# Patient Record
Sex: MALE | URBAN - METROPOLITAN AREA
[De-identification: names, ages, dates, MRNs, and addresses within clinical notes are randomized per-mention and may not be internally consistent; named-entity substitution may affect disease eponyms.]

---

## 2024-01-06 ENCOUNTER — INPATIENT (INPATIENT)
Facility: HOSPITAL | Age: 49
LOS: 30 days | Discharge: HOME | DRG: 981 | End: 2024-02-06
Attending: SURGERY | Admitting: SURGERY
Payer: MEDICAID

## 2024-01-06 VITALS
DIASTOLIC BLOOD PRESSURE: 77 MMHG | SYSTOLIC BLOOD PRESSURE: 110 MMHG | WEIGHT: 202.16 LBS | HEIGHT: 73 IN | TEMPERATURE: 97 F | RESPIRATION RATE: 50 BRPM | HEART RATE: 105 BPM

## 2024-01-06 DIAGNOSIS — J94.2 HEMOTHORAX: ICD-10-CM

## 2024-01-06 LAB
ALBUMIN SERPL ELPH-MCNC: 2 G/DL — LOW (ref 3.3–5)
ALBUMIN SERPL ELPH-MCNC: 2 G/DL — LOW (ref 3.3–5)
ALP SERPL-CCNC: 134 U/L — HIGH (ref 40–120)
ALP SERPL-CCNC: 134 U/L — HIGH (ref 40–120)
ALT FLD-CCNC: 28 U/L — SIGNIFICANT CHANGE UP (ref 10–45)
ALT FLD-CCNC: 28 U/L — SIGNIFICANT CHANGE UP (ref 10–45)
ANION GAP SERPL CALC-SCNC: 20 MMOL/L — HIGH (ref 5–17)
ANION GAP SERPL CALC-SCNC: 20 MMOL/L — HIGH (ref 5–17)
AST SERPL-CCNC: 107 U/L — HIGH (ref 10–40)
AST SERPL-CCNC: 107 U/L — HIGH (ref 10–40)
BASE EXCESS BLDV CALC-SCNC: 0 MMOL/L — SIGNIFICANT CHANGE UP (ref -2–3)
BASE EXCESS BLDV CALC-SCNC: 0 MMOL/L — SIGNIFICANT CHANGE UP (ref -2–3)
BASOPHILS # BLD AUTO: 0 K/UL — SIGNIFICANT CHANGE UP (ref 0–0.2)
BASOPHILS # BLD AUTO: 0 K/UL — SIGNIFICANT CHANGE UP (ref 0–0.2)
BASOPHILS NFR BLD AUTO: 0 % — SIGNIFICANT CHANGE UP (ref 0–2)
BASOPHILS NFR BLD AUTO: 0 % — SIGNIFICANT CHANGE UP (ref 0–2)
BILIRUB SERPL-MCNC: 0.8 MG/DL — SIGNIFICANT CHANGE UP (ref 0.2–1.2)
BILIRUB SERPL-MCNC: 0.8 MG/DL — SIGNIFICANT CHANGE UP (ref 0.2–1.2)
BUN SERPL-MCNC: 33 MG/DL — HIGH (ref 7–23)
BUN SERPL-MCNC: 33 MG/DL — HIGH (ref 7–23)
CA-I SERPL-SCNC: 0.98 MMOL/L — LOW (ref 1.15–1.33)
CA-I SERPL-SCNC: 0.98 MMOL/L — LOW (ref 1.15–1.33)
CALCIUM SERPL-MCNC: 8.3 MG/DL — LOW (ref 8.4–10.5)
CALCIUM SERPL-MCNC: 8.3 MG/DL — LOW (ref 8.4–10.5)
CHLORIDE BLDV-SCNC: 90 MMOL/L — LOW (ref 96–108)
CHLORIDE BLDV-SCNC: 90 MMOL/L — LOW (ref 96–108)
CHLORIDE SERPL-SCNC: 89 MMOL/L — LOW (ref 96–108)
CHLORIDE SERPL-SCNC: 89 MMOL/L — LOW (ref 96–108)
CK SERPL-CCNC: 349 U/L — HIGH (ref 30–200)
CK SERPL-CCNC: 349 U/L — HIGH (ref 30–200)
CO2 BLDV-SCNC: 26 MMOL/L — SIGNIFICANT CHANGE UP (ref 22–26)
CO2 BLDV-SCNC: 26 MMOL/L — SIGNIFICANT CHANGE UP (ref 22–26)
CO2 SERPL-SCNC: 15 MMOL/L — LOW (ref 22–31)
CO2 SERPL-SCNC: 15 MMOL/L — LOW (ref 22–31)
CREAT SERPL-MCNC: 1.99 MG/DL — HIGH (ref 0.5–1.3)
CREAT SERPL-MCNC: 1.99 MG/DL — HIGH (ref 0.5–1.3)
EGFR: 41 ML/MIN/1.73M2 — LOW
EGFR: 41 ML/MIN/1.73M2 — LOW
EOSINOPHIL # BLD AUTO: 0 K/UL — SIGNIFICANT CHANGE UP (ref 0–0.5)
EOSINOPHIL # BLD AUTO: 0 K/UL — SIGNIFICANT CHANGE UP (ref 0–0.5)
EOSINOPHIL NFR BLD AUTO: 0 % — SIGNIFICANT CHANGE UP (ref 0–6)
EOSINOPHIL NFR BLD AUTO: 0 % — SIGNIFICANT CHANGE UP (ref 0–6)
GAS PNL BLDV: 119 MMOL/L — CRITICAL LOW (ref 136–145)
GAS PNL BLDV: 119 MMOL/L — CRITICAL LOW (ref 136–145)
GAS PNL BLDV: SIGNIFICANT CHANGE UP
GAS PNL BLDV: SIGNIFICANT CHANGE UP
GLUCOSE BLDV-MCNC: 117 MG/DL — HIGH (ref 70–99)
GLUCOSE BLDV-MCNC: 117 MG/DL — HIGH (ref 70–99)
GLUCOSE SERPL-MCNC: 114 MG/DL — HIGH (ref 70–99)
GLUCOSE SERPL-MCNC: 114 MG/DL — HIGH (ref 70–99)
HCO3 BLDV-SCNC: 25 MMOL/L — SIGNIFICANT CHANGE UP (ref 22–29)
HCO3 BLDV-SCNC: 25 MMOL/L — SIGNIFICANT CHANGE UP (ref 22–29)
HCT VFR BLD CALC: 32 % — LOW (ref 39–50)
HCT VFR BLD CALC: 32 % — LOW (ref 39–50)
HCT VFR BLDA CALC: 35 % — LOW (ref 39–51)
HCT VFR BLDA CALC: 35 % — LOW (ref 39–51)
HGB BLD CALC-MCNC: 11.8 G/DL — LOW (ref 12.6–17.4)
HGB BLD CALC-MCNC: 11.8 G/DL — LOW (ref 12.6–17.4)
HGB BLD-MCNC: 11.5 G/DL — LOW (ref 13–17)
HGB BLD-MCNC: 11.5 G/DL — LOW (ref 13–17)
HOROWITZ INDEX BLDV+IHG-RTO: 100 — SIGNIFICANT CHANGE UP
HOROWITZ INDEX BLDV+IHG-RTO: 100 — SIGNIFICANT CHANGE UP
LACTATE BLDV-MCNC: 2.8 MMOL/L — HIGH (ref 0.5–2)
LACTATE BLDV-MCNC: 2.8 MMOL/L — HIGH (ref 0.5–2)
LIDOCAIN IGE QN: 23 U/L — SIGNIFICANT CHANGE UP (ref 7–60)
LIDOCAIN IGE QN: 23 U/L — SIGNIFICANT CHANGE UP (ref 7–60)
LYMPHOCYTES # BLD AUTO: 1.4 K/UL — SIGNIFICANT CHANGE UP (ref 1–3.3)
LYMPHOCYTES # BLD AUTO: 1.4 K/UL — SIGNIFICANT CHANGE UP (ref 1–3.3)
LYMPHOCYTES # BLD AUTO: 9.7 % — LOW (ref 13–44)
LYMPHOCYTES # BLD AUTO: 9.7 % — LOW (ref 13–44)
MANUAL SMEAR VERIFICATION: SIGNIFICANT CHANGE UP
MANUAL SMEAR VERIFICATION: SIGNIFICANT CHANGE UP
MCHC RBC-ENTMCNC: 29.3 PG — SIGNIFICANT CHANGE UP (ref 27–34)
MCHC RBC-ENTMCNC: 29.3 PG — SIGNIFICANT CHANGE UP (ref 27–34)
MCHC RBC-ENTMCNC: 35.9 GM/DL — SIGNIFICANT CHANGE UP (ref 32–36)
MCHC RBC-ENTMCNC: 35.9 GM/DL — SIGNIFICANT CHANGE UP (ref 32–36)
MCV RBC AUTO: 81.6 FL — SIGNIFICANT CHANGE UP (ref 80–100)
MCV RBC AUTO: 81.6 FL — SIGNIFICANT CHANGE UP (ref 80–100)
METAMYELOCYTES # FLD: 2.6 % — HIGH (ref 0–0)
METAMYELOCYTES # FLD: 2.6 % — HIGH (ref 0–0)
MONOCYTES # BLD AUTO: 1.01 K/UL — HIGH (ref 0–0.9)
MONOCYTES # BLD AUTO: 1.01 K/UL — HIGH (ref 0–0.9)
MONOCYTES NFR BLD AUTO: 7 % — SIGNIFICANT CHANGE UP (ref 2–14)
MONOCYTES NFR BLD AUTO: 7 % — SIGNIFICANT CHANGE UP (ref 2–14)
NEUTROPHILS # BLD AUTO: 11.64 K/UL — HIGH (ref 1.8–7.4)
NEUTROPHILS # BLD AUTO: 11.64 K/UL — HIGH (ref 1.8–7.4)
NEUTROPHILS NFR BLD AUTO: 68.4 % — SIGNIFICANT CHANGE UP (ref 43–77)
NEUTROPHILS NFR BLD AUTO: 68.4 % — SIGNIFICANT CHANGE UP (ref 43–77)
NEUTS BAND # BLD: 12.3 % — HIGH (ref 0–8)
NEUTS BAND # BLD: 12.3 % — HIGH (ref 0–8)
PCO2 BLDV: 40 MMHG — LOW (ref 42–55)
PCO2 BLDV: 40 MMHG — LOW (ref 42–55)
PH BLDV: 7.4 — SIGNIFICANT CHANGE UP (ref 7.32–7.43)
PH BLDV: 7.4 — SIGNIFICANT CHANGE UP (ref 7.32–7.43)
PLAT MORPH BLD: NORMAL — SIGNIFICANT CHANGE UP
PLAT MORPH BLD: NORMAL — SIGNIFICANT CHANGE UP
PLATELET # BLD AUTO: 224 K/UL — SIGNIFICANT CHANGE UP (ref 150–400)
PLATELET # BLD AUTO: 224 K/UL — SIGNIFICANT CHANGE UP (ref 150–400)
PO2 BLDV: 38 MMHG — SIGNIFICANT CHANGE UP (ref 25–45)
PO2 BLDV: 38 MMHG — SIGNIFICANT CHANGE UP (ref 25–45)
POLYCHROMASIA BLD QL SMEAR: SLIGHT — SIGNIFICANT CHANGE UP
POLYCHROMASIA BLD QL SMEAR: SLIGHT — SIGNIFICANT CHANGE UP
POTASSIUM BLDV-SCNC: 5.2 MMOL/L — HIGH (ref 3.5–5.1)
POTASSIUM BLDV-SCNC: 5.2 MMOL/L — HIGH (ref 3.5–5.1)
POTASSIUM SERPL-MCNC: 5.4 MMOL/L — HIGH (ref 3.5–5.3)
POTASSIUM SERPL-MCNC: 5.4 MMOL/L — HIGH (ref 3.5–5.3)
POTASSIUM SERPL-SCNC: 5.4 MMOL/L — HIGH (ref 3.5–5.3)
POTASSIUM SERPL-SCNC: 5.4 MMOL/L — HIGH (ref 3.5–5.3)
PROT SERPL-MCNC: 6.3 G/DL — SIGNIFICANT CHANGE UP (ref 6–8.3)
PROT SERPL-MCNC: 6.3 G/DL — SIGNIFICANT CHANGE UP (ref 6–8.3)
RBC # BLD: 3.92 M/UL — LOW (ref 4.2–5.8)
RBC # BLD: 3.92 M/UL — LOW (ref 4.2–5.8)
RBC # FLD: 14.5 % — SIGNIFICANT CHANGE UP (ref 10.3–14.5)
RBC # FLD: 14.5 % — SIGNIFICANT CHANGE UP (ref 10.3–14.5)
RBC BLD AUTO: SIGNIFICANT CHANGE UP
RBC BLD AUTO: SIGNIFICANT CHANGE UP
SAO2 % BLDV: 65.4 % — LOW (ref 67–88)
SAO2 % BLDV: 65.4 % — LOW (ref 67–88)
SODIUM SERPL-SCNC: 124 MMOL/L — LOW (ref 135–145)
SODIUM SERPL-SCNC: 124 MMOL/L — LOW (ref 135–145)
TOXIC GRANULES BLD QL SMEAR: PRESENT — SIGNIFICANT CHANGE UP
TOXIC GRANULES BLD QL SMEAR: PRESENT — SIGNIFICANT CHANGE UP
WBC # BLD: 14.42 K/UL — HIGH (ref 3.8–10.5)
WBC # BLD: 14.42 K/UL — HIGH (ref 3.8–10.5)
WBC # FLD AUTO: 14.42 K/UL — HIGH (ref 3.8–10.5)
WBC # FLD AUTO: 14.42 K/UL — HIGH (ref 3.8–10.5)

## 2024-01-06 PROCEDURE — 74176 CT ABD & PELVIS W/O CONTRAST: CPT | Mod: 26

## 2024-01-06 PROCEDURE — 72125 CT NECK SPINE W/O DYE: CPT | Mod: 26

## 2024-01-06 PROCEDURE — 71250 CT THORAX DX C-: CPT | Mod: 26

## 2024-01-06 PROCEDURE — 99291 CRITICAL CARE FIRST HOUR: CPT

## 2024-01-06 PROCEDURE — 71045 X-RAY EXAM CHEST 1 VIEW: CPT | Mod: 26

## 2024-01-06 PROCEDURE — 99223 1ST HOSP IP/OBS HIGH 75: CPT

## 2024-01-06 PROCEDURE — 70450 CT HEAD/BRAIN W/O DYE: CPT | Mod: 26

## 2024-01-06 RX ORDER — ACETAMINOPHEN 500 MG
1000 TABLET ORAL EVERY 6 HOURS
Refills: 0 | Status: COMPLETED | OUTPATIENT
Start: 2024-01-06 | End: 2024-01-07

## 2024-01-06 RX ORDER — INFLUENZA VIRUS VACCINE 15; 15; 15; 15 UG/.5ML; UG/.5ML; UG/.5ML; UG/.5ML
0.5 SUSPENSION INTRAMUSCULAR ONCE
Refills: 0 | Status: DISCONTINUED | OUTPATIENT
Start: 2024-01-06 | End: 2024-02-06

## 2024-01-06 RX ORDER — OXYCODONE HYDROCHLORIDE 5 MG/1
10 TABLET ORAL
Refills: 0 | Status: DISCONTINUED | OUTPATIENT
Start: 2024-01-06 | End: 2024-01-08

## 2024-01-06 RX ORDER — SENNA PLUS 8.6 MG/1
2 TABLET ORAL AT BEDTIME
Refills: 0 | Status: DISCONTINUED | OUTPATIENT
Start: 2024-01-06 | End: 2024-01-09

## 2024-01-06 RX ORDER — CALCIUM GLUCONATE 100 MG/ML
2 VIAL (ML) INTRAVENOUS ONCE
Refills: 0 | Status: COMPLETED | OUTPATIENT
Start: 2024-01-06 | End: 2024-01-07

## 2024-01-06 RX ORDER — HYDROMORPHONE HYDROCHLORIDE 2 MG/ML
0.5 INJECTION INTRAMUSCULAR; INTRAVENOUS; SUBCUTANEOUS
Refills: 0 | Status: DISCONTINUED | OUTPATIENT
Start: 2024-01-06 | End: 2024-01-11

## 2024-01-06 RX ORDER — SODIUM CHLORIDE 9 MG/ML
1000 INJECTION, SOLUTION INTRAVENOUS ONCE
Refills: 0 | Status: COMPLETED | OUTPATIENT
Start: 2024-01-06 | End: 2024-01-07

## 2024-01-06 RX ORDER — OXYCODONE HYDROCHLORIDE 5 MG/1
5 TABLET ORAL
Refills: 0 | Status: DISCONTINUED | OUTPATIENT
Start: 2024-01-06 | End: 2024-01-08

## 2024-01-06 RX ORDER — CHLORHEXIDINE GLUCONATE 213 G/1000ML
1 SOLUTION TOPICAL
Refills: 0 | Status: DISCONTINUED | OUTPATIENT
Start: 2024-01-06 | End: 2024-01-18

## 2024-01-06 RX ORDER — SODIUM CHLORIDE 9 MG/ML
1000 INJECTION, SOLUTION INTRAVENOUS
Refills: 0 | Status: DISCONTINUED | OUTPATIENT
Start: 2024-01-06 | End: 2024-01-06

## 2024-01-06 RX ORDER — ENOXAPARIN SODIUM 100 MG/ML
40 INJECTION SUBCUTANEOUS
Refills: 0 | Status: DISCONTINUED | OUTPATIENT
Start: 2024-01-06 | End: 2024-01-09

## 2024-01-06 RX ORDER — POLYETHYLENE GLYCOL 3350 17 G/17G
17 POWDER, FOR SOLUTION ORAL EVERY 12 HOURS
Refills: 0 | Status: DISCONTINUED | OUTPATIENT
Start: 2024-01-06 | End: 2024-01-12

## 2024-01-06 RX ORDER — SODIUM CHLORIDE 9 MG/ML
1000 INJECTION INTRAMUSCULAR; INTRAVENOUS; SUBCUTANEOUS
Refills: 0 | Status: DISCONTINUED | OUTPATIENT
Start: 2024-01-06 | End: 2024-01-06

## 2024-01-06 RX ORDER — LIDOCAINE 4 G/100G
1 CREAM TOPICAL EVERY 24 HOURS
Refills: 0 | Status: DISCONTINUED | OUTPATIENT
Start: 2024-01-06 | End: 2024-01-26

## 2024-01-06 RX ADMIN — Medication 400 MILLIGRAM(S): at 23:13

## 2024-01-06 RX ADMIN — SENNA PLUS 2 TABLET(S): 8.6 TABLET ORAL at 21:21

## 2024-01-06 RX ADMIN — LIDOCAINE 1 PATCH: 4 CREAM TOPICAL at 21:21

## 2024-01-06 RX ADMIN — Medication 1000 MILLIGRAM(S): at 23:43

## 2024-01-06 RX ADMIN — SODIUM CHLORIDE 125 MILLILITER(S): 9 INJECTION, SOLUTION INTRAVENOUS at 21:20

## 2024-01-06 RX ADMIN — OXYCODONE HYDROCHLORIDE 10 MILLIGRAM(S): 5 TABLET ORAL at 22:41

## 2024-01-06 RX ADMIN — OXYCODONE HYDROCHLORIDE 10 MILLIGRAM(S): 5 TABLET ORAL at 23:11

## 2024-01-06 NOTE — CONSULT NOTE ADULT - ASSESSMENT
ASSESSMENT:     PLAN:   Neurologic:  - AO x 4  - Multimodal pain control with Tylenol, Lidocaine patch, oxycodone PRN    Respiratory:  - Saturating well on 2L NC  - Incentive spirometry, presently pulling ***  - AM CXR    Cardiovascular:  - Hemodynamically stable    Gastrointestinal/Nutrition:  - Regular diet  - Bowel regimen with senna, Miralax    Genitourinary/Renal:  - Supplement electrolytes PRN    Hematologic:  - Chemical VTE ppx with Lovenox  - Mechanical VTE ppx with SCDs    Infectious Disease:  - No antibiotics    Endocrine:  - No active issues, no history of DM    Disposition: SICU ASSESSMENT: 48y Male with PMHx hepatitis C (diagnosed many years ago, never treated), depression transferred from outside hospital for trauma eval. Patient found down by roommate after unknown amount of time, found with multiple 4-11 L sided rib fractures with flail chest. Chest tube placed there with >1L output (old blood). Patient also with tachypnea, concern for respiratory failure started on BiPAP. Level 1 called for transient hypotension, patient given 2 unit PRBC in [facility] ED. Patient admitted to SICU for hemodynamic monitoring, pain management.     PLAN:   Neurologic:  - AO x 4  - Multimodal pain control with Tylenol, Lidocaine patch, oxycodone PRN  - Dilaudid for breakthrough pain  - Send urine tox        Respiratory:  - On 100% NRB, breathing comfortable, saturating well, titrate down as able  - L sided chest tube placed at outside hospital, maintain to suction  - Incentive spirometry  - AM CXR    Cardiovascular:  - Hemodynamically stable    Gastrointestinal/Nutrition:  - Regular diet  - Bowel regimen with senna, Miralax    Genitourinary/Renal:  - Supplement electrolytes PRN    Hematologic:  - Chemical VTE ppx with Lovenox  - Mechanical VTE ppx with SCDs    Infectious Disease:  - No antibiotics    Endocrine:  - No active issues, no history of DM    Disposition: SICU ASSESSMENT: 48y Male with PMHx hepatitis C (diagnosed many years ago, never treated), depression transferred from outside hospital for trauma eval. Patient found down by roommate after unknown amount of time, found with multiple 4-11 L sided rib fractures with flail chest. Chest tube placed there with >1L output (old blood). Patient also with tachypnea, concern for respiratory failure started on BiPAP. Level 1 called for transient hypotension, patient given 2 unit PRBC in [facility] ED. Patient admitted to SICU for hemodynamic monitoring, pain management.     PLAN:   Neurologic:  - AO x 4  - Multimodal pain control with Tylenol, Lidocaine patch, oxycodone PRN  - Dilaudid for breakthrough pain  - Send urine tox        Respiratory:  - On 100% NRB, breathing comfortable, saturating well, titrate down as able  - L sided chest tube placed at outside hospital, maintain to suction  - Incentive spirometry  - AM CXR    Cardiovascular:  - Hemodynamically stable    Gastrointestinal/Nutrition:  - Regular diet  - Bowel regimen with senna, Miralax    Genitourinary/Renal:  - Supplement electrolytes PRN    Hematologic:  - Chemical VTE ppx with Lovenox  - Mechanical VTE ppx with SCDs    Infectious Disease:  - No antibiotics    Endocrine:  - No active issues, no history of DM    Disposition: SICU ASSESSMENT: 48y Male with PMHx hepatitis C (diagnosed many years ago, never treated), depression transferred from outside hospital for trauma eval. Patient found down by roommate after unknown amount of time, found with multiple 4-11 L sided rib fractures with flail chest. Chest tube placed there with >1L output (old blood). Patient also with tachypnea, concern for respiratory failure started on BiPAP. Level 1 called for transient hypotension, patient given 2 unit PRBC in [facility] ED. Patient admitted to SICU for hemodynamic monitoring, pain management.     PLAN:   Neurologic:  - AO x 4  - Multimodal pain control with Tylenol, Lidocaine patch, oxycodone PRN  - Dilaudid for breakthrough pain  - Send urine tox  - Concern for history of ETOH use, sent ETOH level, monitor for signs of withdrawal     Respiratory:  - On 100% NRB, breathing comfortable, saturating well, titrate down as able  - L sided chest tube placed at outside hospital, maintain to suction  - Incentive spirometry  - AM CXR    Cardiovascular:  - Sinus tachycardia rate low 100s, ?multifactorial, monitor  - BP stable  - Send lactate     Gastrointestinal/Nutrition:  - Regular diet  - Bowel regimen with senna, Miralax    Genitourinary/Renal:  - Elevated Cr, unknown baseline  - Repeat lytes  - CPK elevated, continue to trend  - Plasmalyte 100cc/hr   - Supplement electrolytes PRN    Hematologic:  - Chemical VTE ppx with Lovenox  - Mechanical VTE ppx with SCDs    Infectious Disease:  - Treated empirically for sepsis with Rocephin, Azithro   - Imaging non-contributory for infection   - Send UA, blood cultures x 2  - Monitor off antibiotics     Endocrine:  - No active issues, no history of DM    Disposition: SICU ASSESSMENT: 48y Male with PMHx hepatitis C (diagnosed many years ago, never treated), depression transferred from outside hospital for trauma eval. Patient found down by roommate after unknown amount of time, found with multiple 4-11 L sided rib fractures with flail chest. Chest tube placed there with >1L output (old blood). Patient also with tachypnea, concern for respiratory failure started on BiPAP. Level 1 called for transient hypotension, patient given 2 unit PRBC in [facility] ED. Patient admitted to SICU for hemodynamic monitoring, pain management.     PLAN:   Neurologic:  - AO x 4  - Multimodal pain control with Tylenol, Lidocaine patch, oxycodone PRN  - Dilaudid for breakthrough pain  - Send urine tox  - Concern for history of ETOH use, sent ETOH level, monitor for signs of withdrawal     Respiratory:  - On 100% NRB, breathing comfortable, saturating well, titrate down as able  - L sided chest tube placed at outside hospital, maintain to suction  - Incentive spirometry  - AM CXR    Cardiovascular:  - Sinus tachycardia rate low 100s, ?multifactorial, monitor  - BP stable  - Send lactate     Gastrointestinal/Nutrition:  - Regular diet  - Bowel regimen with senna, Miralax    Genitourinary/Renal:  - Elevated Cr, unknown baseline  - Repeat lytes  - CPK elevated, continue to trend  - Plasmalyte 100cc/hr   - Supplement electrolytes PRN    Hematologic:  - Chemical VTE ppx with Lovenox  - Mechanical VTE ppx with SCDs    Infectious Disease:  - Treated empirically for sepsis with Rocephin, Azithro   - Imaging non-contributory for infection   - Send UA, blood cultures x 2  - Monitor off antibiotics     Endocrine:  - No active issues, no history of DM    Disposition: SICU ASSESSMENT: 48y Male with PMHx hepatitis C (diagnosed many years ago, never treated), depression transferred from outside hospital for trauma eval. Patient found down by roommate after unknown amount of time, found with multiple 4-11 L sided rib fractures with flail chest. Chest tube placed there with >1L output (old blood). Patient also with tachypnea, concern for respiratory failure started on BiPAP. Level 1 called for transient hypotension, patient given 2 unit PRBC in [facility] ED. Patient admitted to SICU for hemodynamic monitoring, pain management.     PLAN:   Neurologic:  - AO x 4  - Multimodal pain control with Tylenol, Lidocaine patch, oxycodone PRN  - Dilaudid for breakthrough pain  - Send urine tox  - Concern for history of ETOH use, sent ETOH level, monitor for signs of withdrawal     Respiratory:  - On 100% NRB, breathing comfortable, saturating well, titrate down as able  - L sided chest tube placed at outside hospital, maintain to suction  - Incentive spirometry  - AM CXR    Cardiovascular:  - Sinus tachycardia rate low 100s, ?multifactorial, monitor  - BP stable  - Send lactate     Gastrointestinal/Nutrition:  - Regular diet  - Bowel regimen with senna, Miralax    Genitourinary/Renal:  - Elevated Cr, unknown baseline  - Repeat lytes  - CPK elevated, continue to trend  - Plasmalyte 100cc/hr   - Supplement electrolytes PRN    Hematologic:  - Chemical VTE ppx with Lovenox  - Mechanical VTE ppx with SCDs    Infectious Disease:  - Treated empirically for sepsis with Rocephin, Azithro   - Imaging non-contributory for infection   - Send UA, blood cultures x 2  - Monitor off antibiotics     Endocrine:  - No active issues, no history of DM    Disposition: SICU. Discussed with Dr. Molina ASSESSMENT: 48y Male with PMHx hepatitis C (diagnosed many years ago, never treated), depression transferred from outside hospital for trauma eval. Patient found down by roommate after unknown amount of time, found with multiple 4-11 L sided rib fractures with flail chest. Chest tube placed there with >1L output (old blood). Patient also with tachypnea, concern for respiratory failure started on BiPAP. Level 1 called for transient hypotension, patient given 2 unit PRBC in [facility] ED. Patient admitted to SICU for hemodynamic monitoring, pain management.     PLAN:   Neurologic:  - AO x 4  - Multimodal pain control with Tylenol, Lidocaine patch, oxycodone PRN  - Dilaudid for breakthrough pain  - Send urine tox  - Concern for history of ETOH use, sent ETOH level, monitor for signs of withdrawal     Respiratory:  - On 100% NRB, breathing comfortable, saturating well, titrate down as able  - L sided chest tube placed at outside hospital, maintain to suction  - Incentive spirometry  - AM CXR    Cardiovascular:  - Sinus tachycardia rate low 100s, ?multifactorial, monitor  - BP stable  - Send lactate     Gastrointestinal/Nutrition:  - Regular diet  - Bowel regimen with senna, Miralax    Genitourinary/Renal:  - Elevated Cr, unknown baseline  - Repeat lytes  - CPK elevated, continue to trend  - Plasmalyte 100cc/hr   - Supplement electrolytes PRN    Hematologic:  - Chemical VTE ppx with Lovenox  - Mechanical VTE ppx with SCDs    Infectious Disease:  - Treated empirically for sepsis with Rocephin, Azithro   - Imaging non-contributory for infection   - Send UA, blood cultures x 2  - Monitor off antibiotics     Endocrine:  - No active issues, no history of DM    Disposition: SICU. Discussed with Dr. Molina

## 2024-01-06 NOTE — H&P ADULT - NSHPSOURCEINFORD_GEN_ALL_CORE
Patient
keep left leg elevated on 2 pillows. Ambulate q2-3hours    Pain medications sent by DR CUELLAR OFFICE

## 2024-01-06 NOTE — ED ADULT NURSE NOTE - NS ED NURSE LEVEL OF CONSCIOUSNESS MENTAL STATUS
Awake/Alert/Cooperative
Left 2nd toe: erythema, warmth and swelling present. ulceration with green discharge on plantar aspect of toe.

## 2024-01-06 NOTE — ED ADULT NURSE NOTE - OBJECTIVE STATEMENT
Pt is 48y M with unknown PMH transferred from [facility] for trauma. Level 1 trauma activated 1654. Pt found down this morning, assessment at [facility] found L rib fractures 4-11, L flail chest s/p L chest tube draining 1L of mara red blood. Found hypotensive 80s/40s, febrile. Placed on BIPAP, received ceftriaxone, zithromax, 4 morphine at [facility] prior to transfer. Upon assessment, A&Ox4, /72, tachy HR 110s, SpO2 100% on bipap.     See trauma flowsheet for additional documentation. Pt is 48y M with unknown PMH transferred from [facility] for trauma. Level 1 trauma activated 1654. Pt found down this morning, assessment at [facility] found L rib fractures 4-11, L flail chest s/p L chest tube draining 1L of mara red blood. Found hypotensive 80s/40s, febrile. Placed on BIPAP, received ceftriaxone, zithromax, 4 morphine at [facility] prior to transfer. Upon assessment, A&Ox4, /72, tachy HR 110s, SpO2 100% on bipap.     See trauma flowsheet for additional documentation.

## 2024-01-06 NOTE — PATIENT PROFILE ADULT - HOSPITALS/PSYCHIATRIC FACILITIES
[place] [facility] [place] [facility]

## 2024-01-06 NOTE — ED ADULT NURSE NOTE - NSFALLHARMRISKINTERV_ED_ALL_ED
Assistance OOB with selected safe patient handling equipment if applicable/Communicate risk of Fall with Harm to all staff, patient, and family/Provide visual cue: red socks, yellow wristband, yellow gown, etc/Reinforce activity limits and safety measures with patient and family/Bed in lowest position, wheels locked, appropriate side rails in place/Call bell, personal items and telephone in reach/Instruct patient to call for assistance before getting out of bed/chair/stretcher/Non-slip footwear applied when patient is off stretcher/[place] to call system/Physically safe environment - no spills, clutter or unnecessary equipment/Purposeful Proactive Rounding/Room/bathroom lighting operational, light cord in reach Assistance OOB with selected safe patient handling equipment if applicable/Communicate risk of Fall with Harm to all staff, patient, and family/Provide visual cue: red socks, yellow wristband, yellow gown, etc/Reinforce activity limits and safety measures with patient and family/Bed in lowest position, wheels locked, appropriate side rails in place/Call bell, personal items and telephone in reach/Instruct patient to call for assistance before getting out of bed/chair/stretcher/Non-slip footwear applied when patient is off stretcher/[place] to call system/Physically safe environment - no spills, clutter or unnecessary equipment/Purposeful Proactive Rounding/Room/bathroom lighting operational, light cord in reach

## 2024-01-06 NOTE — ED ADULT TRIAGE NOTE - NS ED NURSE AMBULANCES
[facility]

## 2024-01-06 NOTE — ED PROVIDER NOTE - PHYSICAL EXAMINATION
acutely ill appearing, toxic hypotension / respiratory distress  Symm Facies, PERRL 3mm, (+)Pallor, Anicteric, Dry MM;  tachycardic w/o m/g/r, equal distal pulses;   tachypnea w/ distress, L chest tube w/ bright red blood;   Abd soft, nt/nd, +bs;  No rash;  AOX3, Normal speech, normal strength/sensation

## 2024-01-06 NOTE — H&P ADULT - NSHPPHYSICALEXAM_GEN_ALL_CORE
GENERAL: NAD, lying in bed   NEURO: AOx3, awake alert appropriate  HEENT: NCAT, trachea midline  PULM: Respirations non-labored  ABD: Soft, non-tender, non-distended, no peritonitis/rebound tenderness,   EXT: Warm, well perfused General: NAD  HEENT: Normocephalic, atraumatic, EOMI, PEERLA.  Neck: Soft, midline trachea, C-collar in place  Chest: L chest tube in place  Cardiac: S1, S2, RRR  Respiratory: Bilateral breath sounds, clear and equal bilaterally. L chest tube in place  Abdomen: Soft, non-distended, non-tender, no rebound, no guarding, no masses palpated  Pelvis: Stable, non-tender, no ecchymosis  Ext: palp radial b/l UE, b/l DP palp in Lower Extrem, motor and sensory grossly intact in all 4 extremities  - LLE ecchymosis  - RLE ecchymosis   Back: no TTP, no palpable runoff/stepoff/deformity

## 2024-01-06 NOTE — ED PROVIDER NOTE - CARE PLAN
Principal Discharge DX:	Hemothorax, left   1 Principal Discharge DX:	Hemothorax, left  Secondary Diagnosis:	Flail chest  Secondary Diagnosis:	Acute hypotension  Secondary Diagnosis:	Severe dehydration   Principal Discharge DX:	Hemothorax, left  Secondary Diagnosis:	Flail chest  Secondary Diagnosis:	Acute hypotension  Secondary Diagnosis:	Severe dehydration  Secondary Diagnosis:	Multiple fractures of ribs of left side

## 2024-01-06 NOTE — H&P ADULT - NSHPLABSRESULTS_GEN_ALL_CORE
CBC (01-06 @ 17:31)                              11.5<L>                         14.42<H>  )----------------(  224        68.4  % Neutrophils, 9.7<L>% Lymphocytes, ANC: 11.64<H>                              32.0<L>    BMP (01-06 @ 23:41)             127<L>  |  93<L>   |  20    		Ca++ --      Ca 6.7<L>             ---------------------------------( 68<L> 		Mg 1.2<L>             3.7     |  15<L>   |  0.99  			Ph 1.9<L>  BMP (01-06 @ 17:30)             124<L>  |  89<L>   |  33<H> 		Ca++ --      Ca 8.3<L>             ---------------------------------( 114<H>		Mg --                 5.4<H>  |  15<L>   |  1.99<H>			Ph --        LFTs (01-06 @ 17:30)      TPro 6.3 / Alb 2.0<L> / TBili 0.8 / DBili -- / <H> / ALT 28 / AlkPhos 134<H>    Coags (01-06 @ 23:41)  aPTT 49.3<H> / INR 5.24<HH> / PT 52.4<H>    Cardiac Markers (01-06 @ 23:41)     HSTrop: -- / CKMB: -- / CK: 156  Cardiac Markers (01-06 @ 17:30)     HSTrop: -- / CKMB: -- / CK: 349      VBG (01-06 @ 21:15)     7.40 / 40<L> / 38 / 25 / 0.0 / 65.4<L>%     Lactate: 2.8<H>  < from: CT Cervical Spine No Cont (01.06.24 @ 18:02) >    IMPRESSION:    CT HEAD:  No acute hemorrhage, mass effect, or midline shift.  No acute calvarial fracture.    CT CERVICAL SPINE:  No acute fracture or subluxation.  Cervical spondylosis, as described above.  Partially imaged left hydropneumothorax. Please see concurrent CT chest   report.    < end of copied text >    < from: CT Abdomen and Pelvis No Cont (01.06.24 @ 18:05) >    IMPRESSION: Multiple left rib fractures, as described, concerning for   flail chest. Left-sided chest tube with mild left hydropneumothorax.   Subtle L1 and L2 superior compression deformities.    < end of copied text >

## 2024-01-06 NOTE — CONSULT NOTE ADULT - SUBJECTIVE AND OBJECTIVE BOX
HISTORY OF PRESENT ILLNESS:  BIANCA MICHAEL is a 48y Male  pt transferred to ED from outside ED for concerns of multiple L sided rib fx w/ flail chest and respiratory failure / tachypnea, chest tube placed w/ > 1L blood out, complicated by suspected sepsis (Ceftriaxone 1 gm and Azithromycin 500 mg given prior), complicated as suspected alcohol withdrawal, reviewed labs (ARF w/ elevated Cr) and CXR (L sided white out, my interpretation), Level 1 Trauma called on ED arrival for hypotension (SBP 85) and respiratory failure (outside ED and EMS kept pt on BiPap), pending full labs/imaging and admitted.  - Geovany Lebron MD     PAST MEDICAL HISTORY:     PAST SURGICAL HISTORY:     FAMILY HISTORY:     SOCIAL HISTORY:    CODE STATUS:     HOME MEDICATIONS:    ALLERGIES: No Known Allergies      VITAL SIGNS:  ICU Vital Signs Last 24 Hrs  T(C): 36 (06 Jan 2024 17:51), Max: 36 (06 Jan 2024 17:51)  T(F): 96.8 (06 Jan 2024 17:51), Max: 96.8 (06 Jan 2024 17:51)  HR: 107 (06 Jan 2024 19:00) (105 - 108)  BP: 124/70 (06 Jan 2024 19:00) (103/81 - 124/70)  BP(mean): 89 (06 Jan 2024 19:00) (80 - 90)  ABP: --  ABP(mean): --  RR: 34 (06 Jan 2024 19:00) (34 - 50)  SpO2: 100% (06 Jan 2024 19:00) (100% - 100%)        NEURO  Exam:  Meds:acetaminophen   IVPB .. 1000 milliGRAM(s) IV Intermittent every 6 hours  HYDROmorphone  Injectable 0.5 milliGRAM(s) IV Push every 3 hours PRN Breakthrough  oxyCODONE    IR 5 milliGRAM(s) Oral every 3 hours PRN Moderate Pain (4 - 6)  oxyCODONE    IR 10 milliGRAM(s) Oral every 3 hours PRN Severe Pain (7 - 10)      RESPIRATORY  Mechanical Ventilation:     Exam: CTA b/l. No wheezing, rales, rhonchi appreciated.   Meds:    CARDIOVASCULAR    Exam: S1S2. No murmurs, rubs, gallops appreciated.   Cardiac Rhythm: NSR.  Meds:    GI/NUTRITION  Exam: Soft, non distended, non-tender.  Diet:  Meds:polyethylene glycol 3350 17 Gram(s) Oral every 12 hours  senna 2 Tablet(s) Oral at bedtime      GENITOURINARY/RENAL  Meds:    Weight (kg): 91.7 (01-06 @ 17:51)  01-06    124<L>  |  89<L>  |  33<H>  ----------------------------<  114<H>  5.4<H>   |  15<L>  |  1.99<H>    Ca    8.3<L>      06 Jan 2024 17:30    TPro  6.3  /  Alb  2.0<L>  /  TBili  0.8  /  DBili  x   /  AST  107<H>  /  ALT  28  /  AlkPhos  134<H>  01-06    [ ] Hendrix catheter, indication: urine output monitoring in critically ill patient    HEMATOLOGIC  [ ] VTE Prophylaxis:                          11.5   14.42 )-----------( 224      ( 06 Jan 2024 17:31 )             32.0       Transfusion: [ ] PRBC	[ ] Platelets	[ ] FFP	[ ] Cryoprecipitate      INFECTIOUS DISEASES  Meds:influenza   Vaccine 0.5 milliLiter(s) IntraMuscular once    RECENT CULTURES:      ENDOCRINE  Meds:  CAPILLARY BLOOD GLUCOSE          PATIENT CARE ACCESS DEVICES:  [ ] Peripheral IV  [ ] Central Venous Line	[ ] R	[ ] L	[ ] IJ	[ ] Fem	[ ] SC	Placed:   [ ] Arterial Line		[ ] R	[ ] L	[ ] Fem	[ ] Rad	[ ] Ax	Placed:   [ ] PICC:					[ ] Mediport  [ ] Urinary Catheter, Date Placed:   [x] Necessity of urinary, arterial, and venous catheters discussed    OTHER MEDICATIONS: chlorhexidine 2% Cloths 1 Application(s) Topical <User Schedule>  lidocaine   4% Patch 1 Patch Transdermal every 24 hours      IMAGING STUDIES: HISTORY OF PRESENT ILLNESS:  BIANCA MICHAEL is a 48y Male with PMHx hepatitis C (diagnosed many years ago, never treated), depression transferred from outside hospital for trauma eval. Patient found down by roommate after unknown amount of time, found with multiple 4-11 L sided rib fractures with flail chest. Chest tube placed there with >1L output (old blood). Patient also with tachypnea, concern for respiratory failure started on BiPAP. Level 1 called for transient hypotension, patient given 2 unit PRBC in [facility] ED. Patient admitted to SICU for hemodynamic monitoring, pain management.     PAST MEDICAL HISTORY:   Depression  Hepatitis C    PAST SURGICAL HISTORY:     FAMILY HISTORY:     SOCIAL HISTORY:    CODE STATUS:   Full    HOME MEDICATIONS:  None    ALLERGIES: No Known Allergies      VITAL SIGNS:  ICU Vital Signs Last 24 Hrs  T(C): 36 (06 Jan 2024 17:51), Max: 36 (06 Jan 2024 17:51)  T(F): 96.8 (06 Jan 2024 17:51), Max: 96.8 (06 Jan 2024 17:51)  HR: 107 (06 Jan 2024 19:00) (105 - 108)  BP: 124/70 (06 Jan 2024 19:00) (103/81 - 124/70)  BP(mean): 89 (06 Jan 2024 19:00) (80 - 90)  ABP: --  ABP(mean): --  RR: 34 (06 Jan 2024 19:00) (34 - 50)  SpO2: 100% (06 Jan 2024 19:00) (100% - 100%)        NEURO  Exam: AOx3. NAD. Follows commands. Moves all extremities. Strength and sensation intact.   Meds:acetaminophen   IVPB .. 1000 milliGRAM(s) IV Intermittent every 6 hours  HYDROmorphone  Injectable 0.5 milliGRAM(s) IV Push every 3 hours PRN Breakthrough  oxyCODONE    IR 5 milliGRAM(s) Oral every 3 hours PRN Moderate Pain (4 - 6)  oxyCODONE    IR 10 milliGRAM(s) Oral every 3 hours PRN Severe Pain (7 - 10)      RESPIRATORY  Mechanical Ventilation:   Exam: CTA b/l. No wheezing, rales, rhonchi appreciated.   Meds:    CARDIOVASCULAR    Exam: S1S2. No murmurs, rubs, gallops appreciated.   Cardiac Rhythm: Sinus tachycardia rate 108  Meds:    GI/NUTRITION  Exam: Soft, non distended, non-tender.  Diet: Regular diet  Meds:polyethylene glycol 3350 17 Gram(s) Oral every 12 hours  senna 2 Tablet(s) Oral at bedtime      GENITOURINARY/RENAL  Meds:    Weight (kg): 91.7 (01-06 @ 17:51)  01-06    124<L>  |  89<L>  |  33<H>  ----------------------------<  114<H>  5.4<H>   |  15<L>  |  1.99<H>    Ca    8.3<L>      06 Jan 2024 17:30    TPro  6.3  /  Alb  2.0<L>  /  TBili  0.8  /  DBili  x   /  AST  107<H>  /  ALT  28  /  AlkPhos  134<H>  01-06    [ ] Hendrix catheter, indication: urine output monitoring in critically ill patient    HEMATOLOGIC  [ ] VTE Prophylaxis:                          11.5   14.42 )-----------( 224      ( 06 Jan 2024 17:31 )             32.0       Transfusion: [ ] PRBC	[ ] Platelets	[ ] FFP	[ ] Cryoprecipitate      INFECTIOUS DISEASES  Meds:influenza   Vaccine 0.5 milliLiter(s) IntraMuscular once    RECENT CULTURES:      ENDOCRINE  Meds:  CAPILLARY BLOOD GLUCOSE          PATIENT CARE ACCESS DEVICES:  [ X ] Peripheral IV  [ ] Central Venous Line	[ ] R	[ ] L	[ ] IJ	[ ] Fem	[ ] SC	Placed:   [ ] Arterial Line		[ ] R	[ ] L	[ ] Fem	[ ] Rad	[ ] Ax	Placed:   [ ] PICC:					[ ] Mediport  [ ] Urinary Catheter, Date Placed:   [x] Necessity of urinary, arterial, and venous catheters discussed    OTHER MEDICATIONS: chlorhexidine 2% Cloths 1 Application(s) Topical <User Schedule>  lidocaine   4% Patch 1 Patch Transdermal every 24 hours      IMAGING STUDIES: HISTORY OF PRESENT ILLNESS:  BIANCA MICHAEL is a 48y Male with PMHx hepatitis C (diagnosed many years ago, never treated), depression transferred from outside hospital for trauma eval. Patient found down by roommate after unknown amount of time, found with multiple 4-11 L sided rib fractures with flail chest. Chest tube placed there with >1L output (old blood). Patient also with tachypnea, concern for respiratory failure started on BiPAP. Level 1 called for transient hypotension, patient given 2 unit PRBC in [facility] ED. Patient admitted to SICU for hemodynamic monitoring, pain management.     PAST MEDICAL HISTORY:   Depression  Hepatitis C    PAST SURGICAL HISTORY:     FAMILY HISTORY:     SOCIAL HISTORY:    CODE STATUS:   Full    HOME MEDICATIONS:  None    ALLERGIES: No Known Allergies      VITAL SIGNS:  ICU Vital Signs Last 24 Hrs  T(C): 36 (06 Jan 2024 17:51), Max: 36 (06 Jan 2024 17:51)  T(F): 96.8 (06 Jan 2024 17:51), Max: 96.8 (06 Jan 2024 17:51)  HR: 107 (06 Jan 2024 19:00) (105 - 108)  BP: 124/70 (06 Jan 2024 19:00) (103/81 - 124/70)  BP(mean): 89 (06 Jan 2024 19:00) (80 - 90)  ABP: --  ABP(mean): --  RR: 34 (06 Jan 2024 19:00) (34 - 50)  SpO2: 100% (06 Jan 2024 19:00) (100% - 100%)        NEURO  Exam: AOx3. NAD. Follows commands. Moves all extremities. Strength and sensation intact.   Meds:acetaminophen   IVPB .. 1000 milliGRAM(s) IV Intermittent every 6 hours  HYDROmorphone  Injectable 0.5 milliGRAM(s) IV Push every 3 hours PRN Breakthrough  oxyCODONE    IR 5 milliGRAM(s) Oral every 3 hours PRN Moderate Pain (4 - 6)  oxyCODONE    IR 10 milliGRAM(s) Oral every 3 hours PRN Severe Pain (7 - 10)      RESPIRATORY  Mechanical Ventilation:   Exam: CTA b/l. No wheezing, rales, rhonchi appreciated.   Meds:    CARDIOVASCULAR    Exam: S1S2. No murmurs, rubs, gallops appreciated.   Cardiac Rhythm: Sinus tachycardia rate 108  Meds:    GI/NUTRITION  Exam: Soft, non distended, non-tender.  Diet: Regular diet  Meds:polyethylene glycol 3350 17 Gram(s) Oral every 12 hours  senna 2 Tablet(s) Oral at bedtime      GENITOURINARY/RENAL  Meds:    Weight (kg): 91.7 (01-06 @ 17:51)  01-06    124<L>  |  89<L>  |  33<H>  ----------------------------<  114<H>  5.4<H>   |  15<L>  |  1.99<H>    Ca    8.3<L>      06 Jan 2024 17:30    TPro  6.3  /  Alb  2.0<L>  /  TBili  0.8  /  DBili  x   /  AST  107<H>  /  ALT  28  /  AlkPhos  134<H>  01-06    [ ] Hendrix catheter, indication: urine output monitoring in critically ill patient    HEMATOLOGIC  [ ] VTE Prophylaxis:                          11.5   14.42 )-----------( 224      ( 06 Jan 2024 17:31 )             32.0       Transfusion: [ ] PRBC	[ ] Platelets	[ ] FFP	[ ] Cryoprecipitate      INFECTIOUS DISEASES  Meds:influenza   Vaccine 0.5 milliLiter(s) IntraMuscular once    RECENT CULTURES:      ENDOCRINE  Meds:  CAPILLARY BLOOD GLUCOSE          PATIENT CARE ACCESS DEVICES:  [ X ] Peripheral IV  [ ] Central Venous Line	[ ] R	[ ] L	[ ] IJ	[ ] Fem	[ ] SC	Placed:   [ ] Arterial Line		[ ] R	[ ] L	[ ] Fem	[ ] Rad	[ ] Ax	Placed:   [ ] PICC:					[ ] Mediport  [ ] Urinary Catheter, Date Placed:   [x] Necessity of urinary, arterial, and venous catheters discussed    OTHER MEDICATIONS: chlorhexidine 2% Cloths 1 Application(s) Topical <User Schedule>  lidocaine   4% Patch 1 Patch Transdermal every 24 hours      IMAGING STUDIES:

## 2024-01-06 NOTE — ED PROVIDER NOTE - ATTENDING CONTRIBUTION TO CARE
------------ATTENDING NOTE------------  pt transferred to ED from outside ED for concerns of multiple L sided rib fx w/ flail chest and respiratory failure / tachypnea, chest tube placed w/ > 1L blood out, complicated by suspected sepsis (Ceftriaxone 1 gm and Azithromycin 500 mg given prior), complicated as suspected alcohol withdrawal, reviewed labs (ARF w/ elevated Cr) and CXR (L sided white out, my interpretation), Level 1 Trauma called on ED arrival for hypotension (SBP 85) and respiratory failure (outside ED and EMS kept pt on BiPap), pending full labs/imaging and admitted.  - Geovany Lebron MD   ------------------------------------------------

## 2024-01-06 NOTE — ED ADULT NURSE NOTE - CHIEF COMPLAINT QUOTE
tx from [facility] now hypotensive upon arrival tx from [facility] now hypotensive upon arrival

## 2024-01-06 NOTE — PATIENT PROFILE ADULT - FALL HARM RISK - HARM RISK INTERVENTIONS
Assistance with ambulation/Assistance OOB with selected safe patient handling equipment/Communicate Risk of Fall with Harm to all staff/Discuss with provider need for PT consult/Monitor gait and stability/Reinforce activity limits and safety measures with patient and family/Tailored Fall Risk Interventions/Visual Cue: Yellow wristband and red socks/Bed in lowest position, wheels locked, appropriate side rails in place/Call bell, personal items and telephone in reach/Instruct patient to call for assistance before getting out of bed or chair/Non-slip footwear when patient is out of bed/[place] to call system/Physically safe environment - no spills, clutter or unnecessary equipment/Purposeful Proactive Rounding/Room/bathroom lighting operational, light cord in reach Assistance with ambulation/Assistance OOB with selected safe patient handling equipment/Communicate Risk of Fall with Harm to all staff/Discuss with provider need for PT consult/Monitor gait and stability/Reinforce activity limits and safety measures with patient and family/Tailored Fall Risk Interventions/Visual Cue: Yellow wristband and red socks/Bed in lowest position, wheels locked, appropriate side rails in place/Call bell, personal items and telephone in reach/Instruct patient to call for assistance before getting out of bed or chair/Non-slip footwear when patient is out of bed/[place] to call system/Physically safe environment - no spills, clutter or unnecessary equipment/Purposeful Proactive Rounding/Room/bathroom lighting operational, light cord in reach

## 2024-01-06 NOTE — PATIENT PROFILE ADULT - NSPROPTRIGHTNOTIFY_GEN_A_NUR
Carlos Enrique Vaz is an 36 y.o. female who presents to follow up anxiety and depression    #Anxiety/depression  - denies SI  - just wants to sleep and be calm  - always crying and tired  - doesn't think the venlafaxine is working  - confirmed that she is taking this medicine  - has not picked up next refill    #headache  - following with neurology  - lost insurance  - diamox was work and then it stopped  - needs to see an eye doctor    Allergies   Allergies   Allergen Reactions    Acetazolamide Unknown (comments)     Rash arm and paresthesias. Other Medication Unknown (comments)     Pt. States does not remember the name of the shot but had a reaction. Medications   Current Outpatient Medications   Medication Sig    traZODone (DESYREL) 50 mg tablet Take 1 tab every night and take half tab in the morning as needed. venlafaxine-SR (EFFEXOR-XR) 37.5 mg capsule Take 1 Capsule by mouth daily. Indications: Taper off of this medication. Take 1 tab daily for 2 weeks. Then take 1 tab every other day for two weeks. Then stop.    acetaZOLAMIDE (DIAMOX) 250 mg tablet Take 4 Tablets by mouth two (2) times a day for 180 days. butalbital-acetaminophen-caff (Fioricet) -40 mg per capsule Take 1 Capsule by mouth every four (4) hours as needed for Headache. (Patient not taking: Reported on 3/15/2023)     No current facility-administered medications for this visit. Past surgical, medical and social history reviewed and updated as relevant to presenting concerns. ROS: See HPI      OBJECTIVE  Visit Vitals  /69   Pulse 96   Temp 98.6 °F (37 °C) (Oral)   Wt 286 lb (129.7 kg)   SpO2 100%   BMI 47.59 kg/m²       Physical Exam  General: No acute distress. HEENT: Conjunctiva are clear, sclera non-icteric. Respiratory: Normal work of breathing, talking in full sentences without issue  Musculoskeletal: Normal gait.    Skin: No abnormalities or rashes   Neuro: Alert, oriented, speech clear and coherent  Psychiatric: Normal mood and affect        ASSESSMENT & PLAN  1. Insomnia secondary to anxiety  Difficult to control hygiene factors, twins sleep in bed with her. Will trial trazodone for sleep and anxiety/depression benefits. - traZODone (DESYREL) 50 mg tablet; Take 1 tab every night and take half tab in the morning as needed. Dispense: 60 Tablet; Refill: 1    2. Adjustment disorder with anxiety  Tapering off this medication. I wonder if buspar might be helpful - depression might be secondary to profound anxiety. Since I have trialed several medications with no effect, I also provided the number and address for [facility]. She would certainly benefit from counseling, and if our medication trials are not effective, will benefit from psychiatry consult there. - venlafaxine-SR (EFFEXOR-XR) 37.5 mg capsule; Take 1 Capsule by mouth daily. Indications: Taper off of this medication. Take 1 tab daily for 2 weeks. Then take 1 tab every other day for two weeks. Then stop. Dispense: 30 Capsule; Refill: 0    3. Chronic tension-type headache, not intractable  Tapering this medication as it does not appear to have helped headache or mood. WRote down instructions in [address] and explained with . Also has follow up with neurology. I provided the phone number of the previous ophthalmology referral, and asked our coordinator about offices that take pts without insurance. - venlafaxine-SR (EFFEXOR-XR) 37.5 mg capsule; Take 1 Capsule by mouth daily. Indications: Taper off of this medication. Take 1 tab daily for 2 weeks. Then take 1 tab every other day for two weeks. Then stop. Dispense: 30 Capsule; Refill: 0      Follow-up and Dispositions    Return in about 4 weeks (around 4/12/2023) for Anxiety. I have discussed the diagnosis with the patient and the intended plan as seen in the above orders. Patient verbalized understanding of the plan and agrees with the plan.  The patient has received an after-visit summary and questions were answered concerning future plans. I have discussed medication side effects and warnings with the patient as well. Informed patient to return to the office if new symptoms arise.         Farrukh Heard MD declines

## 2024-01-06 NOTE — H&P ADULT - HISTORY OF PRESENT ILLNESS
TRAUMA SERVICE (Acute Care Surgery / ACS - #9047) - CONSULT NOTE  --------------------------------------------------------------------------------------------    TRAUMA ACTIVATION LEVEL:     MECHANISM OF INJURY:   [] Blunt | [] MVC | [] Fall | 	[] Pedestrian Struck| [] Motorcycle accident   [] Penetrating | [] Gun Shot Wound | [] Stab Wound    GCS: 	E: 4	V: 5	M: 6    HPI  Patient is a 48y old  Male who presents with a chief complaint of   HPI:      PRIMARY SURVEY  A - airway intact  B - bilateral breath sounds and good chest rise  C - initial BP: -- , HR: -- , palpable pulses in all extremities  D - GCS 15 on arrival  Exposure obtained    SECONDARY SURVEY  General: NAD  HEENT: Normocephalic, atraumatic, EOMI, PEERLA.  Neck: Soft, midline trachea, C-collar in place  Chest: No chest wall tenderness.   Cardiac: S1, S2, RRR  Respiratory: Bilateral breath sounds, clear and equal bilaterally  Abdomen: Soft, non-distended, non-tender, no rebound, no guarding, no masses palpated  Pelvis: Stable, non-tender, no ecchymosis  Ext: palp radial b/l UE, b/l DP palp in Lower Extrem, motor and sensory grossly intact in all 4 extremities  Back: no TTP, no palpable runoff/stepoff/deformity  Rectal: No mara blood, DESTINEE with good tone    Patient denies fevers/chills, denies lightheadedness/dizziness, denies SOB/chest pain, denies nausea/vomiting, denies constipation/diarrhea.     ROS: 10-system review is otherwise negative except HPI above.        TRAUMA SERVICE (Acute Care Surgery / ACS - #9015) - CONSULT NOTE  --------------------------------------------------------------------------------------------    TRAUMA ACTIVATION LEVEL:     MECHANISM OF INJURY:   [] Blunt | [] MVC | [] Fall | 	[] Pedestrian Struck| [] Motorcycle accident   [] Penetrating | [] Gun Shot Wound | [] Stab Wound    GCS: 	E: 4	V: 5	M: 6    HPI  Patient is a 48y old  Male who presents with a chief complaint of   HPI:      PRIMARY SURVEY  A - airway intact  B - bilateral breath sounds and good chest rise  C - initial BP: -- , HR: -- , palpable pulses in all extremities  D - GCS 15 on arrival  Exposure obtained    SECONDARY SURVEY  General: NAD  HEENT: Normocephalic, atraumatic, EOMI, PEERLA.  Neck: Soft, midline trachea, C-collar in place  Chest: No chest wall tenderness.   Cardiac: S1, S2, RRR  Respiratory: Bilateral breath sounds, clear and equal bilaterally  Abdomen: Soft, non-distended, non-tender, no rebound, no guarding, no masses palpated  Pelvis: Stable, non-tender, no ecchymosis  Ext: palp radial b/l UE, b/l DP palp in Lower Extrem, motor and sensory grossly intact in all 4 extremities  Back: no TTP, no palpable runoff/stepoff/deformity  Rectal: No mara blood, DESTINEE with good tone    Patient denies fevers/chills, denies lightheadedness/dizziness, denies SOB/chest pain, denies nausea/vomiting, denies constipation/diarrhea.     ROS: 10-system review is otherwise negative except HPI above.        TRAUMA SERVICE (Acute Care Surgery / ACS - #4350) - CONSULT NOTE  --------------------------------------------------------------------------------------------    TRAUMA ACTIVATION LEVEL:     MECHANISM OF INJURY:   [] Blunt | [] MVC | [x] Fall | 	[] Pedestrian Struck| [] Motorcycle accident   [] Penetrating | [] Gun Shot Wound | [] Stab Wound    GCS: 15	E: 4	V: 5	M: 6    HPI  48y Male with PMHx hepatitis C (diagnosed many years ago, never treated), depression transferred from outside hospital for trauma eval. Patient found down by roommate after unknown amount of time, found with multiple 4-11 L sided rib fractures with flail chest. Chest tube placed there with >1L output (old blood). Patient also with tachypnea, concern for respiratory failure started on BiPAP. Level 1 called for transient hypotension, patient given 2 unit PRBC in [facility] ED.     PRIMARY SURVEY  A - airway intact  B - bilateral breath sounds and good chest rise  C - initial BP: -- , HR: -- , palpable pulses in all extremities  D - GCS 15 on arrival  Exposure obtained    SECONDARY SURVEY  General: NAD  HEENT: Normocephalic, atraumatic, EOMI, PEERLA.  Neck: Soft, midline trachea, C-collar in place  Chest: L chest tube in place  Cardiac: S1, S2, RRR  Respiratory: Bilateral breath sounds, clear and equal bilaterally. L chest tube in place  Abdomen: Soft, non-distended, non-tender, no rebound, no guarding, no masses palpated  Pelvis: Stable, non-tender, no ecchymosis  Ext: palp radial b/l UE, b/l DP palp in Lower Extrem, motor and sensory grossly intact in all 4 extremities  - LLE ecchymosis  - RLE ecchymosis   Back: no TTP, no palpable runoff/stepoff/deformity       Patient denies fevers/chills, denies lightheadedness/dizziness, denies SOB/chest pain, denies nausea/vomiting, denies constipation/diarrhea.     ROS: 10-system review is otherwise negative except HPI above.        TRAUMA SERVICE (Acute Care Surgery / ACS - #1381) - CONSULT NOTE  --------------------------------------------------------------------------------------------    TRAUMA ACTIVATION LEVEL:     MECHANISM OF INJURY:   [] Blunt | [] MVC | [x] Fall | 	[] Pedestrian Struck| [] Motorcycle accident   [] Penetrating | [] Gun Shot Wound | [] Stab Wound    GCS: 15	E: 4	V: 5	M: 6    HPI  48y Male with PMHx hepatitis C (diagnosed many years ago, never treated), depression transferred from outside hospital for trauma eval. Patient found down by roommate after unknown amount of time, found with multiple 4-11 L sided rib fractures with flail chest. Chest tube placed there with >1L output (old blood). Patient also with tachypnea, concern for respiratory failure started on BiPAP. Level 1 called for transient hypotension, patient given 2 unit PRBC in [facility] ED.     PRIMARY SURVEY  A - airway intact  B - bilateral breath sounds and good chest rise  C - initial BP: -- , HR: -- , palpable pulses in all extremities  D - GCS 15 on arrival  Exposure obtained    SECONDARY SURVEY  General: NAD  HEENT: Normocephalic, atraumatic, EOMI, PEERLA.  Neck: Soft, midline trachea, C-collar in place  Chest: L chest tube in place  Cardiac: S1, S2, RRR  Respiratory: Bilateral breath sounds, clear and equal bilaterally. L chest tube in place  Abdomen: Soft, non-distended, non-tender, no rebound, no guarding, no masses palpated  Pelvis: Stable, non-tender, no ecchymosis  Ext: palp radial b/l UE, b/l DP palp in Lower Extrem, motor and sensory grossly intact in all 4 extremities  - LLE ecchymosis  - RLE ecchymosis   Back: no TTP, no palpable runoff/stepoff/deformity       Patient denies fevers/chills, denies lightheadedness/dizziness, denies SOB/chest pain, denies nausea/vomiting, denies constipation/diarrhea.     ROS: 10-system review is otherwise negative except HPI above.        TRAUMA SERVICE (Acute Care Surgery / ACS - #0521) - CONSULT NOTE  --------------------------------------------------------------------------------------------    TRAUMA ACTIVATION LEVEL: 1    MECHANISM OF INJURY:   [] Blunt | [] MVC | [x] Fall | 	[] Pedestrian Struck| [] Motorcycle accident   [] Penetrating | [] Gun Shot Wound | [] Stab Wound    GCS: 15	E: 4	V: 5	M: 6    HPI  48y Male with PMHx hepatitis C (diagnosed many years ago, never treated), depression transferred from outside hospital for trauma eval. Patient found down by roommate after unknown amount of time, found with multiple 4-11 L sided rib fractures with flail chest. Chest tube placed there with >1L output (old blood). Patient also with tachypnea, concern for respiratory failure started on BiPAP. Level 1 called for transient hypotension, patient given 2 unit PRBC in [facility] ED.     PRIMARY SURVEY  A - airway intact  B - bilateral breath sounds and good chest rise  C - initial BP: -- , HR: -- , palpable pulses in all extremities  D - GCS 15 on arrival  Exposure obtained    SECONDARY SURVEY  General: NAD  HEENT: Normocephalic, atraumatic, EOMI, PEERLA.  Neck: Soft, midline trachea, C-collar in place  Chest: L chest tube in place  Cardiac: S1, S2, RRR  Respiratory: Bilateral breath sounds, clear and equal bilaterally. L chest tube in place  Abdomen: Soft, non-distended, non-tender, no rebound, no guarding, no masses palpated  Pelvis: Stable, non-tender, no ecchymosis  Ext: palp radial b/l UE, b/l DP palp in Lower Extrem, motor and sensory grossly intact in all 4 extremities  - LLE ecchymosis  - RLE ecchymosis   Back: no TTP, no palpable runoff/stepoff/deformity       Patient denies fevers/chills, denies lightheadedness/dizziness, denies SOB/chest pain, denies nausea/vomiting, denies constipation/diarrhea.     ROS: 10-system review is otherwise negative except HPI above.        TRAUMA SERVICE (Acute Care Surgery / ACS - #9119) - CONSULT NOTE  --------------------------------------------------------------------------------------------    TRAUMA ACTIVATION LEVEL: 1    MECHANISM OF INJURY:   [] Blunt | [] MVC | [x] Fall | 	[] Pedestrian Struck| [] Motorcycle accident   [] Penetrating | [] Gun Shot Wound | [] Stab Wound    GCS: 15	E: 4	V: 5	M: 6    HPI  48y Male with PMHx hepatitis C (diagnosed many years ago, never treated), depression transferred from outside hospital for trauma eval. Patient found down by roommate after unknown amount of time, found with multiple 4-11 L sided rib fractures with flail chest. Chest tube placed there with >1L output (old blood). Patient also with tachypnea, concern for respiratory failure started on BiPAP. Level 1 called for transient hypotension, patient given 2 unit PRBC in [facility] ED.     PRIMARY SURVEY  A - airway intact  B - bilateral breath sounds and good chest rise  C - initial BP: -- , HR: -- , palpable pulses in all extremities  D - GCS 15 on arrival  Exposure obtained    SECONDARY SURVEY  General: NAD  HEENT: Normocephalic, atraumatic, EOMI, PEERLA.  Neck: Soft, midline trachea, C-collar in place  Chest: L chest tube in place  Cardiac: S1, S2, RRR  Respiratory: Bilateral breath sounds, clear and equal bilaterally. L chest tube in place  Abdomen: Soft, non-distended, non-tender, no rebound, no guarding, no masses palpated  Pelvis: Stable, non-tender, no ecchymosis  Ext: palp radial b/l UE, b/l DP palp in Lower Extrem, motor and sensory grossly intact in all 4 extremities  - LLE ecchymosis  - RLE ecchymosis   Back: no TTP, no palpable runoff/stepoff/deformity       Patient denies fevers/chills, denies lightheadedness/dizziness, denies SOB/chest pain, denies nausea/vomiting, denies constipation/diarrhea.     ROS: 10-system review is otherwise negative except HPI above.

## 2024-01-06 NOTE — H&P ADULT - ASSESSMENT
48y Male with PMHx hepatitis C (diagnosed many years ago, never treated), depression transferred from outside hospital for trauma eval. Patient found down by roommate after unknown amount of time, found with multiple 4-11 L sided rib fractures with flail chest. Chest tube placed there with >1L output (old blood). Patient also with tachypnea, concern for respiratory failure started on BiPAP. Level 1 called for transient hypotension, patient given 2 unit PRBC in [facility] ED. Rib score 3    Injuries:   - left posterolateral fourth, fifth, sixth, and 11th ribs, minimally displaced   - mildly displaced fractures of the left anterolateral fourth, fifth, sixth, and seventh ribs  - displaced fractures of the left posterior seventh, eighth, ninth, and 10th ribs.    PLAN  - Admit to Dr Munoz  - SICU consult  - Rib fracture protocol   - Plan for rib platting for flail chest  - ISS  - Tertiary  - c/w chest tube    ACS/Trauma  [id number]  48y Male with PMHx hepatitis C (diagnosed many years ago, never treated), depression transferred from outside hospital for trauma eval. Patient found down by roommate after unknown amount of time, found with multiple 4-11 L sided rib fractures with flail chest. Chest tube placed there with >1L output (old blood). Patient also with tachypnea, concern for respiratory failure started on BiPAP. Level 1 called for transient hypotension, patient given 2 unit PRBC in [facility] ED. Rib score 3    Injuries:   - left posterolateral fourth, fifth, sixth, and 11th ribs, minimally displaced   - mildly displaced fractures of the left anterolateral fourth, fifth, sixth, and seventh ribs  - displaced fractures of the left posterior seventh, eighth, ninth, and 10th ribs.    PLAN  - Admit to Dr Munoz  - SICU consult  - Rib fracture protocol   - Plan for rib platting for flail chest  - ISS  - Tertiary  - c/w chest tube    ACS/Trauma  [id number]

## 2024-01-06 NOTE — H&P ADULT - ATTENDING COMMENTS
48M fell 2 days ago while intoxicated, subsequently found down by his roommate today,  transferred from [facility],  seen and examined upon arrival as level 1 trauma.    at [facility]  febrile to 102  CT scan demonstrated large left hemothorax (reviewed over internet)  >1L blood drained from left chest tube    at [facility]  initially hypotensive, but hemodynamics quickly improved with IVF and 1u RBC transfusion  moderate left lateral chest wall tenderness  minimal paradoxical movement of left chest  chest tube drainage is now serosanguinous  no air leak seen in Pleur-evac    CXR - left chest tube is in basilar position. no residual hemothorax is seen. no pneumothorax is seen.  FAST - no free intraperitoneal fluid      WBC = 14  hgb - 11.5 g/dL  Na - 124  K - 5.4  HCO3 - 15  anion gap - 20  Cr - 1.9  AST - 107  ALT - 28  CPK - 349    CT scans done without contrast since Cr = 2.2 at [facility]  CT head / c-spine - no intracranial hemorrhage or c-spine fracture  CT chest / abd/pelvis w/o contrast - left 4-9 anterolateral displaced rib fractures, left 4-11 posterior displaced rib fractures, mild L1 and L2 vertebral compression deformities.      left 4-9 anterolateral displaced rib fractures  left 4-11 posterior displaced rib fractures  left flail chest  -We initially planned urgent surgical stabilization of left chest wall with VATS and cryoablation of intercostal nerves. Shortly after SICU admission and pain meds, he appears to be breathing comfortably. Also, INR = 5.2, and he has fever 102 at [facility] which raises concern for sepsis, possibly secondary to aspiration pneumonia, so he is a suboptimal candidate for surgery.    traumatic left hemopneumothorax  acute blood loss anemia  -Despite >1500 mL initial chest tube output, he has a delayed presentation, so will no do emergent VATS as blood is likely old without evidence of ongoing bleeding.    possible sepsis  lactic acidosis  KEZIA with hyperkalemia  -fluid resuscitation  -f/u BCx  -care as per SICU    hyponatremia is likely hypovolemic, but could be secondary to chronic alcohol use  hypomagnesemia  elevate AST  -these lab abnormalities suggest significant alcohol use  -alcohol withdrawal syndrome is unlikely since he has already been abstinent for 2 days    L1/L2 vertebral compression deformities  -no tenderness to suggest acute fractures      I reviewed his labs and radiologic images.  [facility] chart and imaging reviewed.  care coordinated with ED and SICU teams. 48M fell 2 days ago while intoxicated, subsequently found down by his roommate today,  transferred from [facility],  seen and examined upon arrival as level 1 trauma.    at [facility]  febrile to 102  CT scan demonstrated large left hemothorax (reviewed over internet)  >1L blood drained from left chest tube    at [facility]  initially hypotensive, but hemodynamics quickly improved with IVF and 1u RBC transfusion  moderate left lateral chest wall tenderness  minimal paradoxical movement of left chest  chest tube drainage is now serosanguinous  no air leak seen in Pleur-evac    CXR - left chest tube is in basilar position. no residual hemothorax is seen. no pneumothorax is seen.  FAST - no free intraperitoneal fluid      WBC = 14  hgb - 11.5 g/dL  Na - 124  K - 5.4  HCO3 - 15  anion gap - 20  Cr - 1.9  AST - 107  ALT - 28  CPK - 349    CT scans done without contrast since Cr = 2.2 at [facility]  CT head / c-spine - no intracranial hemorrhage or c-spine fracture  CT chest / abd/pelvis w/o contrast - left 4-9 anterolateral displaced rib fractures, left 4-11 posterior displaced rib fractures, mild L1 and L2 vertebral compression deformities.      left 4-9 anterolateral displaced rib fractures  left 4-11 posterior displaced rib fractures  left flail chest  -We initially planned urgent surgical stabilization of left chest wall with VATS and cryoablation of intercostal nerves. Shortly after SICU admission and pain meds, he appears to be breathing comfortably. Also, INR = 5.2, and he has fever 102 at [facility] which raises concern for sepsis, possibly secondary to aspiration pneumonia, so he is a suboptimal candidate for surgery.    traumatic left hemopneumothorax  acute blood loss anemia  -Despite >1500 mL initial chest tube output, he has a delayed presentation, so will no do emergent VATS as blood is likely old without evidence of ongoing bleeding.    possible sepsis  lactic acidosis  KEZIA with hyperkalemia  -fluid resuscitation  -f/u BCx  -care as per SICU    hyponatremia is likely hypovolemic, but could be secondary to chronic alcohol use  hypomagnesemia  elevate AST  -these lab abnormalities suggest significant alcohol use  -alcohol withdrawal syndrome is unlikely since he has already been abstinent for 2 days    L1/L2 vertebral compression deformities  -no tenderness to suggest acute fractures      I reviewed his labs and radiologic images.  [facility] chart and imaging reviewed.  care coordinated with ED and SICU teams. 48M fell 2 days ago while intoxicated, subsequently found down by his roommate today,  transferred from [facility],  seen and examined upon arrival as level 1 trauma.    at [facility]  febrile to 102  CT scan demonstrated large left hemothorax (reviewed over internet)  >1L blood drained from left chest tube    at [facility]  initially hypotensive, but hemodynamics quickly improved with IVF and 1u RBC transfusion  moderate left lateral chest wall tenderness  minimal paradoxical movement of left chest  chest tube drainage is now serosanguinous  no air leak seen in Pleur-evac    CXR - left chest tube is in basilar position. no residual hemothorax is seen. no pneumothorax is seen.  FAST - no free intraperitoneal fluid      WBC = 14  hgb - 11.5 g/dL  Na - 124  K - 5.4  HCO3 - 15  anion gap - 20  Cr - 1.9  AST - 107  ALT - 28  CPK - 349    CT scans done without contrast since Cr = 2.2 at [facility]  CT head / c-spine - no intracranial hemorrhage or c-spine fracture  CT chest / abd/pelvis w/o contrast - left 4-9 anterolateral displaced rib fractures, left 4-11 posterior displaced rib fractures, mild L1 and L2 vertebral compression deformities.      left 4-9 anterolateral displaced rib fractures  left 4-11 posterior displaced rib fractures  left flail chest  -We initially planned urgent surgical stabilization of left chest wall with VATS and cryoablation of intercostal nerves. Shortly after SICU admission and pain meds, he appears to be breathing comfortably. Also, INR = 5.2, and he has fever 102 at [facility] which raises concern for sepsis, possibly secondary to aspiration pneumonia, so he is a suboptimal candidate for surgery.    traumatic left hemopneumothorax  acute blood loss anemia  -Despite >1500 mL initial chest tube output, he has a delayed presentation, so will no do emergent VATS as blood is likely old without evidence of ongoing bleeding.  -appears mostly drained with the chest tube, the trace residual pneumothorax is likely secondary to basilar chest tube placement and is not clinically significant    possible sepsis  lactic acidosis  KEZIA with hyperkalemia  -fluid resuscitation  -f/u BCx  -care as per SICU    hyponatremia is likely hypovolemic, but could be secondary to chronic alcohol use  hypomagnesemia  elevate AST  -these lab abnormalities suggest significant alcohol use  -alcohol withdrawal syndrome is unlikely since he has already been abstinent for 2 days    L1/L2 vertebral compression deformities  -no tenderness to suggest acute fractures      I reviewed his labs and radiologic images.  [facility] chart and imaging reviewed.  care coordinated with ED and SICU teams. 48M fell 2 days ago while intoxicated, subsequently found down by his roommate today,  transferred from [facility],  seen and examined upon arrival as level 1 trauma.    at [facility]  febrile to 102  CT scan demonstrated large left hemothorax (reviewed over internet)  >1L blood drained from left chest tube    at [facility]  initially hypotensive, but hemodynamics quickly improved with IVF and 1u RBC transfusion  moderate left lateral chest wall tenderness  minimal paradoxical movement of left chest  chest tube drainage is now serosanguinous  no air leak seen in Pleur-evac    CXR - left chest tube is in basilar position. no residual hemothorax is seen. no pneumothorax is seen.  FAST - no free intraperitoneal fluid      WBC = 14  hgb - 11.5 g/dL  Na - 124  K - 5.4  HCO3 - 15  anion gap - 20  Cr - 1.9  AST - 107  ALT - 28  CPK - 349    CT scans done without contrast since Cr = 2.2 at [facility]  CT head / c-spine - no intracranial hemorrhage or c-spine fracture  CT chest / abd/pelvis w/o contrast - left 4-9 anterolateral displaced rib fractures, left 4-11 posterior displaced rib fractures, mild L1 and L2 vertebral compression deformities.      left 4-9 anterolateral displaced rib fractures  left 4-11 posterior displaced rib fractures  left flail chest  -We initially planned urgent surgical stabilization of left chest wall with VATS and cryoablation of intercostal nerves. Shortly after SICU admission and pain meds, he appears to be breathing comfortably. Also, INR = 5.2, and he has fever 102 at [facility] which raises concern for sepsis, possibly secondary to aspiration pneumonia, so he is a suboptimal candidate for surgery.    traumatic left hemopneumothorax  acute blood loss anemia  -Despite >1500 mL initial chest tube output, he has a delayed presentation, so will no do emergent VATS as blood is likely old without evidence of ongoing bleeding.  -appears mostly drained with the chest tube, the trace residual pneumothorax is likely secondary to basilar chest tube placement and is not clinically significant    possible sepsis  lactic acidosis  KEZIA with hyperkalemia  -fluid resuscitation  -f/u BCx  -care as per SICU    hyponatremia is likely hypovolemic, but could be secondary to chronic alcohol use  hypomagnesemia  elevate AST  -these lab abnormalities suggest significant alcohol use  -alcohol withdrawal syndrome is unlikely since he has already been abstinent for 2 days    L1/L2 vertebral compression deformities  -no tenderness to suggest acute fractures      I reviewed his labs and radiologic images.  [facility] chart and imaging reviewed.  care coordinated with ED and SICU teams. 48M fell 2 days ago while intoxicated, subsequently found down by his roommate today,  transferred from [facility],  seen and examined upon arrival as level 1 trauma.    at [facility]  febrile to 102  CT scan demonstrated large left hemothorax (reviewed over internet)  >1L blood drained from left chest tube    at [facility]  initially hypotensive, but hemodynamics quickly improved with IVF and 1u RBC transfusion  moderate left lateral chest wall tenderness  minimal paradoxical movement of left chest  chest tube drainage is now serosanguinous  no air leak seen in Pleur-evac    CXR - left chest tube is in basilar position. no residual hemothorax is seen. no pneumothorax is seen.  FAST - no free intraperitoneal fluid      WBC = 14  hgb - 11.5 g/dL  Na - 124  K - 5.4  HCO3 - 15  anion gap - 20  Cr - 1.9  AST - 107  ALT - 28  CPK - 349    CT scans done without contrast since Cr = 2.2 at [facility]  CT head / c-spine - no intracranial hemorrhage or c-spine fracture  CT chest / abd/pelvis w/o contrast - left 4-9 anterolateral displaced rib fractures, left 4-11 posterior displaced rib fractures, mild L1 and L2 vertebral compression deformities.      left 4-9 anterolateral displaced rib fractures  left 4-11 posterior displaced rib fractures  left flail chest  -We initially planned urgent surgical stabilization of left chest wall with VATS and cryoablation of intercostal nerves. Shortly after SICU admission and pain meds, he appears to be breathing comfortably. Also, INR = 5.2, and he has fever 102 at [facility] which raises concern for sepsis, possibly secondary to aspiration pneumonia, so he is a suboptimal candidate for surgery.    traumatic left hemopneumothorax  traumatic hemorrhagic shock  acute blood loss anemia  -Despite >1500 mL initial chest tube output, he has a delayed presentation, so will no do emergent VATS as blood is likely old without evidence of ongoing bleeding.  -appears mostly drained with the chest tube, the trace residual pneumothorax is likely secondary to basilar chest tube placement and is not clinically significant    possible sepsis  lactic acidosis  KEZIA with hyperkalemia  -fluid resuscitation  -f/u BCx  -care as per SICU    hyponatremia is likely hypovolemic, but could be secondary to chronic alcohol use  hypomagnesemia  elevate AST  -these lab abnormalities suggest significant alcohol use  -alcohol withdrawal syndrome is unlikely since he has already been abstinent for 2 days    L1/L2 vertebral compression deformities  -no tenderness to suggest acute fractures      I reviewed his labs and radiologic images.  [facility] chart and imaging reviewed.  care coordinated with ED and SICU teams. 48M fell 2 days ago while intoxicated, subsequently found down by his roommate today,  transferred from [facility],  seen and examined upon arrival as level 1 trauma.    at [facility]  febrile to 102  CT scan demonstrated large left hemothorax (reviewed over internet)  >1L blood drained from left chest tube    at [facility]  initially hypotensive, but hemodynamics quickly improved with IVF and 1u RBC transfusion  moderate left lateral chest wall tenderness  minimal paradoxical movement of left chest  chest tube drainage is now serosanguinous  no air leak seen in Pleur-evac    CXR - left chest tube is in basilar position. no residual hemothorax is seen. no pneumothorax is seen.  FAST - no free intraperitoneal fluid      WBC = 14  hgb - 11.5 g/dL  Na - 124  K - 5.4  HCO3 - 15  anion gap - 20  Cr - 1.9  AST - 107  ALT - 28  CPK - 349    CT scans done without contrast since Cr = 2.2 at [facility]  CT head / c-spine - no intracranial hemorrhage or c-spine fracture  CT chest / abd/pelvis w/o contrast - left 4-9 anterolateral displaced rib fractures, left 4-11 posterior displaced rib fractures, mild L1 and L2 vertebral compression deformities.      left 4-9 anterolateral displaced rib fractures  left 4-11 posterior displaced rib fractures  left flail chest  -We initially planned urgent surgical stabilization of left chest wall with VATS and cryoablation of intercostal nerves. Shortly after SICU admission and pain meds, he appears to be breathing comfortably. Also, INR = 5.2, and he has fever 102 at [facility] which raises concern for sepsis, possibly secondary to aspiration pneumonia, so he is a suboptimal candidate for surgery.    traumatic left hemopneumothorax  traumatic hemorrhagic shock  acute blood loss anemia  -Despite >1500 mL initial chest tube output, he has a delayed presentation, so will no do emergent VATS as blood is likely old without evidence of ongoing bleeding.  -appears mostly drained with the chest tube, the trace residual pneumothorax is likely secondary to basilar chest tube placement and is not clinically significant    possible sepsis  lactic acidosis  KEZIA with hyperkalemia  -fluid resuscitation  -f/u BCx  -care as per SICU    hyponatremia is likely hypovolemic, but could be secondary to chronic alcohol use  hypomagnesemia  elevate AST  -these lab abnormalities suggest significant alcohol use  -alcohol withdrawal syndrome is unlikely since he has already been abstinent for 2 days    L1/L2 vertebral compression deformities  -no tenderness to suggest acute fractures      I reviewed his labs and radiologic images.  [facility] chart and imaging reviewed.  care coordinated with ED and SICU teams.

## 2024-01-06 NOTE — ED ADULT TRIAGE NOTE - CHIEF COMPLAINT QUOTE
tx from [facility] now hypotensive upon arrival tx from [facility] now hypotensive upon arrival

## 2024-01-07 LAB
ANION GAP SERPL CALC-SCNC: 13 MMOL/L — SIGNIFICANT CHANGE UP (ref 5–17)
ANION GAP SERPL CALC-SCNC: 19 MMOL/L — HIGH (ref 5–17)
ANION GAP SERPL CALC-SCNC: 19 MMOL/L — HIGH (ref 5–17)
APPEARANCE UR: ABNORMAL
APPEARANCE UR: ABNORMAL
APTT BLD: 32.6 SEC — SIGNIFICANT CHANGE UP (ref 24.5–35.6)
APTT BLD: 32.6 SEC — SIGNIFICANT CHANGE UP (ref 24.5–35.6)
APTT BLD: 36.7 SEC — HIGH (ref 24.5–35.6)
APTT BLD: 36.7 SEC — HIGH (ref 24.5–35.6)
APTT BLD: 49.3 SEC — HIGH (ref 24.5–35.6)
APTT BLD: 49.3 SEC — HIGH (ref 24.5–35.6)
BACTERIA # UR AUTO: NEGATIVE /HPF — SIGNIFICANT CHANGE UP
BACTERIA # UR AUTO: NEGATIVE /HPF — SIGNIFICANT CHANGE UP
BASE EXCESS BLDV CALC-SCNC: 2 MMOL/L — SIGNIFICANT CHANGE UP (ref -2–3)
BASE EXCESS BLDV CALC-SCNC: 2 MMOL/L — SIGNIFICANT CHANGE UP (ref -2–3)
BILIRUB UR-MCNC: NEGATIVE — SIGNIFICANT CHANGE UP
BILIRUB UR-MCNC: NEGATIVE — SIGNIFICANT CHANGE UP
BUN SERPL-MCNC: 20 MG/DL — SIGNIFICANT CHANGE UP (ref 7–23)
BUN SERPL-MCNC: 20 MG/DL — SIGNIFICANT CHANGE UP (ref 7–23)
BUN SERPL-MCNC: 23 MG/DL — SIGNIFICANT CHANGE UP (ref 7–23)
BUN SERPL-MCNC: 23 MG/DL — SIGNIFICANT CHANGE UP (ref 7–23)
BUN SERPL-MCNC: 28 MG/DL — HIGH (ref 7–23)
BUN SERPL-MCNC: 28 MG/DL — HIGH (ref 7–23)
CA-I SERPL-SCNC: 1.11 MMOL/L — LOW (ref 1.15–1.33)
CA-I SERPL-SCNC: 1.11 MMOL/L — LOW (ref 1.15–1.33)
CALCIUM SERPL-MCNC: 6.7 MG/DL — LOW (ref 8.4–10.5)
CALCIUM SERPL-MCNC: 6.7 MG/DL — LOW (ref 8.4–10.5)
CALCIUM SERPL-MCNC: 8.2 MG/DL — LOW (ref 8.4–10.5)
CALCIUM SERPL-MCNC: 8.2 MG/DL — LOW (ref 8.4–10.5)
CALCIUM SERPL-MCNC: 8.7 MG/DL — SIGNIFICANT CHANGE UP (ref 8.4–10.5)
CALCIUM SERPL-MCNC: 8.7 MG/DL — SIGNIFICANT CHANGE UP (ref 8.4–10.5)
CAST: 13 /LPF — HIGH (ref 0–4)
CAST: 13 /LPF — HIGH (ref 0–4)
CHLORIDE BLDV-SCNC: 91 MMOL/L — LOW (ref 96–108)
CHLORIDE BLDV-SCNC: 91 MMOL/L — LOW (ref 96–108)
CHLORIDE SERPL-SCNC: 88 MMOL/L — LOW (ref 96–108)
CHLORIDE SERPL-SCNC: 88 MMOL/L — LOW (ref 96–108)
CHLORIDE SERPL-SCNC: 89 MMOL/L — LOW (ref 96–108)
CHLORIDE SERPL-SCNC: 89 MMOL/L — LOW (ref 96–108)
CHLORIDE SERPL-SCNC: 93 MMOL/L — LOW (ref 96–108)
CHLORIDE SERPL-SCNC: 93 MMOL/L — LOW (ref 96–108)
CK SERPL-CCNC: 156 U/L — SIGNIFICANT CHANGE UP (ref 30–200)
CK SERPL-CCNC: 156 U/L — SIGNIFICANT CHANGE UP (ref 30–200)
CO2 BLDV-SCNC: 28 MMOL/L — HIGH (ref 22–26)
CO2 BLDV-SCNC: 28 MMOL/L — HIGH (ref 22–26)
CO2 SERPL-SCNC: 15 MMOL/L — LOW (ref 22–31)
CO2 SERPL-SCNC: 15 MMOL/L — LOW (ref 22–31)
CO2 SERPL-SCNC: 23 MMOL/L — SIGNIFICANT CHANGE UP (ref 22–31)
COLOR SPEC: YELLOW — SIGNIFICANT CHANGE UP
COLOR SPEC: YELLOW — SIGNIFICANT CHANGE UP
CREAT SERPL-MCNC: 0.97 MG/DL — SIGNIFICANT CHANGE UP (ref 0.5–1.3)
CREAT SERPL-MCNC: 0.97 MG/DL — SIGNIFICANT CHANGE UP (ref 0.5–1.3)
CREAT SERPL-MCNC: 0.99 MG/DL — SIGNIFICANT CHANGE UP (ref 0.5–1.3)
CREAT SERPL-MCNC: 0.99 MG/DL — SIGNIFICANT CHANGE UP (ref 0.5–1.3)
CREAT SERPL-MCNC: 1.31 MG/DL — HIGH (ref 0.5–1.3)
CREAT SERPL-MCNC: 1.31 MG/DL — HIGH (ref 0.5–1.3)
DIFF PNL FLD: NEGATIVE — SIGNIFICANT CHANGE UP
DIFF PNL FLD: NEGATIVE — SIGNIFICANT CHANGE UP
EGFR: 68 ML/MIN/1.73M2 — SIGNIFICANT CHANGE UP
EGFR: 68 ML/MIN/1.73M2 — SIGNIFICANT CHANGE UP
EGFR: 95 ML/MIN/1.73M2 — SIGNIFICANT CHANGE UP
EGFR: 95 ML/MIN/1.73M2 — SIGNIFICANT CHANGE UP
EGFR: 97 ML/MIN/1.73M2 — SIGNIFICANT CHANGE UP
EGFR: 97 ML/MIN/1.73M2 — SIGNIFICANT CHANGE UP
ETHANOL SERPL-MCNC: <10 MG/DL — SIGNIFICANT CHANGE UP (ref 0–10)
ETHANOL SERPL-MCNC: <10 MG/DL — SIGNIFICANT CHANGE UP (ref 0–10)
GAS PNL BLDV: 122 MMOL/L — LOW (ref 136–145)
GAS PNL BLDV: 122 MMOL/L — LOW (ref 136–145)
GAS PNL BLDV: SIGNIFICANT CHANGE UP
GLUCOSE BLDC GLUCOMTR-MCNC: 130 MG/DL — HIGH (ref 70–99)
GLUCOSE BLDC GLUCOMTR-MCNC: 130 MG/DL — HIGH (ref 70–99)
GLUCOSE BLDV-MCNC: 138 MG/DL — HIGH (ref 70–99)
GLUCOSE BLDV-MCNC: 138 MG/DL — HIGH (ref 70–99)
GLUCOSE SERPL-MCNC: 110 MG/DL — HIGH (ref 70–99)
GLUCOSE SERPL-MCNC: 110 MG/DL — HIGH (ref 70–99)
GLUCOSE SERPL-MCNC: 135 MG/DL — HIGH (ref 70–99)
GLUCOSE SERPL-MCNC: 135 MG/DL — HIGH (ref 70–99)
GLUCOSE SERPL-MCNC: 68 MG/DL — LOW (ref 70–99)
GLUCOSE SERPL-MCNC: 68 MG/DL — LOW (ref 70–99)
GLUCOSE UR QL: NEGATIVE MG/DL — SIGNIFICANT CHANGE UP
GLUCOSE UR QL: NEGATIVE MG/DL — SIGNIFICANT CHANGE UP
HCO3 BLDV-SCNC: 26 MMOL/L — SIGNIFICANT CHANGE UP (ref 22–29)
HCO3 BLDV-SCNC: 26 MMOL/L — SIGNIFICANT CHANGE UP (ref 22–29)
HCT VFR BLD CALC: 27.9 % — LOW (ref 39–50)
HCT VFR BLD CALC: 27.9 % — LOW (ref 39–50)
HCT VFR BLD CALC: 28.5 % — LOW (ref 39–50)
HCT VFR BLD CALC: 28.5 % — LOW (ref 39–50)
HCT VFR BLDA CALC: 32 % — LOW (ref 39–51)
HCT VFR BLDA CALC: 32 % — LOW (ref 39–51)
HGB BLD CALC-MCNC: 10.6 G/DL — LOW (ref 12.6–17.4)
HGB BLD CALC-MCNC: 10.6 G/DL — LOW (ref 12.6–17.4)
HGB BLD-MCNC: 10.1 G/DL — LOW (ref 13–17)
HGB BLD-MCNC: 10.1 G/DL — LOW (ref 13–17)
HGB BLD-MCNC: 10.5 G/DL — LOW (ref 13–17)
HGB BLD-MCNC: 10.5 G/DL — LOW (ref 13–17)
HOROWITZ INDEX BLDV+IHG-RTO: 28 — SIGNIFICANT CHANGE UP
HOROWITZ INDEX BLDV+IHG-RTO: 28 — SIGNIFICANT CHANGE UP
INR BLD: 1.01 RATIO — SIGNIFICANT CHANGE UP (ref 0.85–1.18)
INR BLD: 1.01 RATIO — SIGNIFICANT CHANGE UP (ref 0.85–1.18)
INR BLD: 1.12 RATIO — SIGNIFICANT CHANGE UP (ref 0.85–1.18)
INR BLD: 1.12 RATIO — SIGNIFICANT CHANGE UP (ref 0.85–1.18)
INR BLD: 5.24 RATIO — CRITICAL HIGH (ref 0.85–1.18)
INR BLD: 5.24 RATIO — CRITICAL HIGH (ref 0.85–1.18)
KETONES UR-MCNC: NEGATIVE MG/DL — SIGNIFICANT CHANGE UP
KETONES UR-MCNC: NEGATIVE MG/DL — SIGNIFICANT CHANGE UP
LACTATE BLDV-MCNC: 1.4 MMOL/L — SIGNIFICANT CHANGE UP (ref 0.5–2)
LACTATE BLDV-MCNC: 1.4 MMOL/L — SIGNIFICANT CHANGE UP (ref 0.5–2)
LEUKOCYTE ESTERASE UR-ACNC: NEGATIVE — SIGNIFICANT CHANGE UP
LEUKOCYTE ESTERASE UR-ACNC: NEGATIVE — SIGNIFICANT CHANGE UP
MAGNESIUM SERPL-MCNC: 1.2 MG/DL — LOW (ref 1.6–2.6)
MAGNESIUM SERPL-MCNC: 1.2 MG/DL — LOW (ref 1.6–2.6)
MAGNESIUM SERPL-MCNC: 2.8 MG/DL — HIGH (ref 1.6–2.6)
MAGNESIUM SERPL-MCNC: 2.8 MG/DL — HIGH (ref 1.6–2.6)
MAGNESIUM SERPL-MCNC: 3.3 MG/DL — HIGH (ref 1.6–2.6)
MAGNESIUM SERPL-MCNC: 3.3 MG/DL — HIGH (ref 1.6–2.6)
MCHC RBC-ENTMCNC: 29 PG — SIGNIFICANT CHANGE UP (ref 27–34)
MCHC RBC-ENTMCNC: 29 PG — SIGNIFICANT CHANGE UP (ref 27–34)
MCHC RBC-ENTMCNC: 29.2 PG — SIGNIFICANT CHANGE UP (ref 27–34)
MCHC RBC-ENTMCNC: 29.2 PG — SIGNIFICANT CHANGE UP (ref 27–34)
MCHC RBC-ENTMCNC: 36.2 GM/DL — HIGH (ref 32–36)
MCHC RBC-ENTMCNC: 36.2 GM/DL — HIGH (ref 32–36)
MCHC RBC-ENTMCNC: 36.8 GM/DL — HIGH (ref 32–36)
MCHC RBC-ENTMCNC: 36.8 GM/DL — HIGH (ref 32–36)
MCV RBC AUTO: 79.2 FL — LOW (ref 80–100)
MCV RBC AUTO: 79.2 FL — LOW (ref 80–100)
MCV RBC AUTO: 80.2 FL — SIGNIFICANT CHANGE UP (ref 80–100)
MCV RBC AUTO: 80.2 FL — SIGNIFICANT CHANGE UP (ref 80–100)
NITRITE UR-MCNC: NEGATIVE — SIGNIFICANT CHANGE UP
NITRITE UR-MCNC: NEGATIVE — SIGNIFICANT CHANGE UP
NRBC # BLD: 0 /100 WBCS — SIGNIFICANT CHANGE UP (ref 0–0)
PCO2 BLDV: 40 MMHG — LOW (ref 42–55)
PCO2 BLDV: 40 MMHG — LOW (ref 42–55)
PH BLDV: 7.43 — SIGNIFICANT CHANGE UP (ref 7.32–7.43)
PH BLDV: 7.43 — SIGNIFICANT CHANGE UP (ref 7.32–7.43)
PH UR: 5 — SIGNIFICANT CHANGE UP (ref 5–8)
PH UR: 5 — SIGNIFICANT CHANGE UP (ref 5–8)
PHOSPHATE SERPL-MCNC: 1.9 MG/DL — LOW (ref 2.5–4.5)
PHOSPHATE SERPL-MCNC: 1.9 MG/DL — LOW (ref 2.5–4.5)
PHOSPHATE SERPL-MCNC: 2.8 MG/DL — SIGNIFICANT CHANGE UP (ref 2.5–4.5)
PHOSPHATE SERPL-MCNC: 2.8 MG/DL — SIGNIFICANT CHANGE UP (ref 2.5–4.5)
PHOSPHATE SERPL-MCNC: 4.4 MG/DL — SIGNIFICANT CHANGE UP (ref 2.5–4.5)
PHOSPHATE SERPL-MCNC: 4.4 MG/DL — SIGNIFICANT CHANGE UP (ref 2.5–4.5)
PLATELET # BLD AUTO: 199 K/UL — SIGNIFICANT CHANGE UP (ref 150–400)
PLATELET # BLD AUTO: 199 K/UL — SIGNIFICANT CHANGE UP (ref 150–400)
PLATELET # BLD AUTO: 233 K/UL — SIGNIFICANT CHANGE UP (ref 150–400)
PLATELET # BLD AUTO: 233 K/UL — SIGNIFICANT CHANGE UP (ref 150–400)
PO2 BLDV: 39 MMHG — SIGNIFICANT CHANGE UP (ref 25–45)
PO2 BLDV: 39 MMHG — SIGNIFICANT CHANGE UP (ref 25–45)
POTASSIUM BLDV-SCNC: 2.9 MMOL/L — CRITICAL LOW (ref 3.5–5.1)
POTASSIUM BLDV-SCNC: 2.9 MMOL/L — CRITICAL LOW (ref 3.5–5.1)
POTASSIUM SERPL-MCNC: 2.9 MMOL/L — CRITICAL LOW (ref 3.5–5.3)
POTASSIUM SERPL-MCNC: 2.9 MMOL/L — CRITICAL LOW (ref 3.5–5.3)
POTASSIUM SERPL-MCNC: 3.2 MMOL/L — LOW (ref 3.5–5.3)
POTASSIUM SERPL-MCNC: 3.2 MMOL/L — LOW (ref 3.5–5.3)
POTASSIUM SERPL-MCNC: 3.7 MMOL/L — SIGNIFICANT CHANGE UP (ref 3.5–5.3)
POTASSIUM SERPL-MCNC: 3.7 MMOL/L — SIGNIFICANT CHANGE UP (ref 3.5–5.3)
POTASSIUM SERPL-SCNC: 2.9 MMOL/L — CRITICAL LOW (ref 3.5–5.3)
POTASSIUM SERPL-SCNC: 2.9 MMOL/L — CRITICAL LOW (ref 3.5–5.3)
POTASSIUM SERPL-SCNC: 3.2 MMOL/L — LOW (ref 3.5–5.3)
POTASSIUM SERPL-SCNC: 3.2 MMOL/L — LOW (ref 3.5–5.3)
POTASSIUM SERPL-SCNC: 3.7 MMOL/L — SIGNIFICANT CHANGE UP (ref 3.5–5.3)
POTASSIUM SERPL-SCNC: 3.7 MMOL/L — SIGNIFICANT CHANGE UP (ref 3.5–5.3)
PROCALCITONIN SERPL-MCNC: 5.27 NG/ML — HIGH (ref 0.02–0.1)
PROCALCITONIN SERPL-MCNC: 5.27 NG/ML — HIGH (ref 0.02–0.1)
PROCALCITONIN SERPL-MCNC: 5.63 NG/ML — HIGH (ref 0.02–0.1)
PROCALCITONIN SERPL-MCNC: 5.63 NG/ML — HIGH (ref 0.02–0.1)
PROCALCITONIN SERPL-MCNC: 9.39 NG/ML — HIGH (ref 0.02–0.1)
PROCALCITONIN SERPL-MCNC: 9.39 NG/ML — HIGH (ref 0.02–0.1)
PROT UR-MCNC: NEGATIVE MG/DL — SIGNIFICANT CHANGE UP
PROT UR-MCNC: NEGATIVE MG/DL — SIGNIFICANT CHANGE UP
PROTHROM AB SERPL-ACNC: 11.1 SEC — SIGNIFICANT CHANGE UP (ref 9.5–13)
PROTHROM AB SERPL-ACNC: 11.1 SEC — SIGNIFICANT CHANGE UP (ref 9.5–13)
PROTHROM AB SERPL-ACNC: 11.7 SEC — SIGNIFICANT CHANGE UP (ref 9.5–13)
PROTHROM AB SERPL-ACNC: 11.7 SEC — SIGNIFICANT CHANGE UP (ref 9.5–13)
PROTHROM AB SERPL-ACNC: 52.4 SEC — HIGH (ref 9.5–13)
PROTHROM AB SERPL-ACNC: 52.4 SEC — HIGH (ref 9.5–13)
RBC # BLD: 3.48 M/UL — LOW (ref 4.2–5.8)
RBC # BLD: 3.48 M/UL — LOW (ref 4.2–5.8)
RBC # BLD: 3.6 M/UL — LOW (ref 4.2–5.8)
RBC # BLD: 3.6 M/UL — LOW (ref 4.2–5.8)
RBC # FLD: 14.2 % — SIGNIFICANT CHANGE UP (ref 10.3–14.5)
RBC # FLD: 14.2 % — SIGNIFICANT CHANGE UP (ref 10.3–14.5)
RBC # FLD: 14.5 % — SIGNIFICANT CHANGE UP (ref 10.3–14.5)
RBC # FLD: 14.5 % — SIGNIFICANT CHANGE UP (ref 10.3–14.5)
RBC CASTS # UR COMP ASSIST: 2 /HPF — SIGNIFICANT CHANGE UP (ref 0–4)
RBC CASTS # UR COMP ASSIST: 2 /HPF — SIGNIFICANT CHANGE UP (ref 0–4)
REVIEW: SIGNIFICANT CHANGE UP
REVIEW: SIGNIFICANT CHANGE UP
SAO2 % BLDV: 70.6 % — SIGNIFICANT CHANGE UP (ref 67–88)
SAO2 % BLDV: 70.6 % — SIGNIFICANT CHANGE UP (ref 67–88)
SODIUM SERPL-SCNC: 124 MMOL/L — LOW (ref 135–145)
SODIUM SERPL-SCNC: 124 MMOL/L — LOW (ref 135–145)
SODIUM SERPL-SCNC: 125 MMOL/L — LOW (ref 135–145)
SODIUM SERPL-SCNC: 125 MMOL/L — LOW (ref 135–145)
SODIUM SERPL-SCNC: 127 MMOL/L — LOW (ref 135–145)
SODIUM SERPL-SCNC: 127 MMOL/L — LOW (ref 135–145)
SP GR SPEC: 1.02 — SIGNIFICANT CHANGE UP (ref 1–1.03)
SP GR SPEC: 1.02 — SIGNIFICANT CHANGE UP (ref 1–1.03)
SQUAMOUS # UR AUTO: 20 /HPF — HIGH (ref 0–5)
SQUAMOUS # UR AUTO: 20 /HPF — HIGH (ref 0–5)
UROBILINOGEN FLD QL: 1 MG/DL — SIGNIFICANT CHANGE UP (ref 0.2–1)
UROBILINOGEN FLD QL: 1 MG/DL — SIGNIFICANT CHANGE UP (ref 0.2–1)
WBC # BLD: 14.52 K/UL — HIGH (ref 3.8–10.5)
WBC # BLD: 14.52 K/UL — HIGH (ref 3.8–10.5)
WBC # BLD: 14.98 K/UL — HIGH (ref 3.8–10.5)
WBC # BLD: 14.98 K/UL — HIGH (ref 3.8–10.5)
WBC # FLD AUTO: 14.52 K/UL — HIGH (ref 3.8–10.5)
WBC # FLD AUTO: 14.52 K/UL — HIGH (ref 3.8–10.5)
WBC # FLD AUTO: 14.98 K/UL — HIGH (ref 3.8–10.5)
WBC # FLD AUTO: 14.98 K/UL — HIGH (ref 3.8–10.5)
WBC UR QL: 3 /HPF — SIGNIFICANT CHANGE UP (ref 0–5)
WBC UR QL: 3 /HPF — SIGNIFICANT CHANGE UP (ref 0–5)

## 2024-01-07 PROCEDURE — 72170 X-RAY EXAM OF PELVIS: CPT | Mod: 26

## 2024-01-07 PROCEDURE — 99232 SBSQ HOSP IP/OBS MODERATE 35: CPT

## 2024-01-07 PROCEDURE — 93010 ELECTROCARDIOGRAM REPORT: CPT

## 2024-01-07 PROCEDURE — 95720 EEG PHY/QHP EA INCR W/VEEG: CPT

## 2024-01-07 PROCEDURE — 71045 X-RAY EXAM CHEST 1 VIEW: CPT | Mod: 26,76

## 2024-01-07 RX ORDER — CALCIUM GLUCONATE 100 MG/ML
2 VIAL (ML) INTRAVENOUS ONCE
Refills: 0 | Status: COMPLETED | OUTPATIENT
Start: 2024-01-07 | End: 2024-01-07

## 2024-01-07 RX ORDER — PIPERACILLIN AND TAZOBACTAM 4; .5 G/20ML; G/20ML
3.38 INJECTION, POWDER, LYOPHILIZED, FOR SOLUTION INTRAVENOUS EVERY 8 HOURS
Refills: 0 | Status: COMPLETED | OUTPATIENT
Start: 2024-01-07 | End: 2024-01-14

## 2024-01-07 RX ORDER — POTASSIUM PHOSPHATE, MONOBASIC POTASSIUM PHOSPHATE, DIBASIC 236; 224 MG/ML; MG/ML
15 INJECTION, SOLUTION INTRAVENOUS ONCE
Refills: 0 | Status: COMPLETED | OUTPATIENT
Start: 2024-01-07 | End: 2024-01-07

## 2024-01-07 RX ORDER — SODIUM CHLORIDE 9 MG/ML
1000 INJECTION, SOLUTION INTRAVENOUS ONCE
Refills: 0 | Status: COMPLETED | OUTPATIENT
Start: 2024-01-07 | End: 2024-01-07

## 2024-01-07 RX ORDER — POTASSIUM CHLORIDE 20 MEQ
10 PACKET (EA) ORAL
Refills: 0 | Status: COMPLETED | OUTPATIENT
Start: 2024-01-07 | End: 2024-01-07

## 2024-01-07 RX ORDER — PIPERACILLIN AND TAZOBACTAM 4; .5 G/20ML; G/20ML
3.38 INJECTION, POWDER, LYOPHILIZED, FOR SOLUTION INTRAVENOUS ONCE
Refills: 0 | Status: COMPLETED | OUTPATIENT
Start: 2024-01-07 | End: 2024-01-07

## 2024-01-07 RX ORDER — POTASSIUM CHLORIDE 20 MEQ
40 PACKET (EA) ORAL EVERY 4 HOURS
Refills: 0 | Status: COMPLETED | OUTPATIENT
Start: 2024-01-07 | End: 2024-01-07

## 2024-01-07 RX ORDER — MAGNESIUM SULFATE 500 MG/ML
2 VIAL (ML) INJECTION
Refills: 0 | Status: COMPLETED | OUTPATIENT
Start: 2024-01-07 | End: 2024-01-07

## 2024-01-07 RX ORDER — FAMOTIDINE 10 MG/ML
20 INJECTION INTRAVENOUS ONCE
Refills: 0 | Status: COMPLETED | OUTPATIENT
Start: 2024-01-07 | End: 2024-01-07

## 2024-01-07 RX ADMIN — Medication 400 MILLIGRAM(S): at 18:07

## 2024-01-07 RX ADMIN — Medication 100 MILLIEQUIVALENT(S): at 12:08

## 2024-01-07 RX ADMIN — SODIUM CHLORIDE 2000 MILLILITER(S): 9 INJECTION, SOLUTION INTRAVENOUS at 12:45

## 2024-01-07 RX ADMIN — CHLORHEXIDINE GLUCONATE 1 APPLICATION(S): 213 SOLUTION TOPICAL at 05:52

## 2024-01-07 RX ADMIN — Medication 1000 MILLIGRAM(S): at 12:44

## 2024-01-07 RX ADMIN — ENOXAPARIN SODIUM 40 MILLIGRAM(S): 100 INJECTION SUBCUTANEOUS at 05:16

## 2024-01-07 RX ADMIN — Medication 1000 MILLIGRAM(S): at 05:46

## 2024-01-07 RX ADMIN — PIPERACILLIN AND TAZOBACTAM 25 GRAM(S): 4; .5 INJECTION, POWDER, LYOPHILIZED, FOR SOLUTION INTRAVENOUS at 12:59

## 2024-01-07 RX ADMIN — Medication 100 MILLIEQUIVALENT(S): at 10:32

## 2024-01-07 RX ADMIN — SODIUM CHLORIDE 1000 MILLILITER(S): 9 INJECTION, SOLUTION INTRAVENOUS at 00:15

## 2024-01-07 RX ADMIN — LIDOCAINE 1 PATCH: 4 CREAM TOPICAL at 07:04

## 2024-01-07 RX ADMIN — PIPERACILLIN AND TAZOBACTAM 200 GRAM(S): 4; .5 INJECTION, POWDER, LYOPHILIZED, FOR SOLUTION INTRAVENOUS at 09:52

## 2024-01-07 RX ADMIN — Medication 25 GRAM(S): at 03:49

## 2024-01-07 RX ADMIN — Medication 200 GRAM(S): at 09:10

## 2024-01-07 RX ADMIN — POTASSIUM PHOSPHATE, MONOBASIC POTASSIUM PHOSPHATE, DIBASIC 62.5 MILLIMOLE(S): 236; 224 INJECTION, SOLUTION INTRAVENOUS at 18:06

## 2024-01-07 RX ADMIN — Medication 200 GRAM(S): at 00:14

## 2024-01-07 RX ADMIN — Medication 400 MILLIGRAM(S): at 05:16

## 2024-01-07 RX ADMIN — Medication 1000 MILLIGRAM(S): at 18:38

## 2024-01-07 RX ADMIN — OXYCODONE HYDROCHLORIDE 5 MILLIGRAM(S): 5 TABLET ORAL at 12:44

## 2024-01-07 RX ADMIN — POLYETHYLENE GLYCOL 3350 17 GRAM(S): 17 POWDER, FOR SOLUTION ORAL at 05:16

## 2024-01-07 RX ADMIN — Medication 40 MILLIEQUIVALENT(S): at 18:06

## 2024-01-07 RX ADMIN — LIDOCAINE 1 PATCH: 4 CREAM TOPICAL at 09:04

## 2024-01-07 RX ADMIN — Medication 100 MILLIEQUIVALENT(S): at 08:54

## 2024-01-07 RX ADMIN — FAMOTIDINE 20 MILLIGRAM(S): 10 INJECTION INTRAVENOUS at 18:06

## 2024-01-07 RX ADMIN — Medication 255 MILLIMOLE(S): at 05:52

## 2024-01-07 RX ADMIN — PIPERACILLIN AND TAZOBACTAM 25 GRAM(S): 4; .5 INJECTION, POWDER, LYOPHILIZED, FOR SOLUTION INTRAVENOUS at 21:58

## 2024-01-07 RX ADMIN — Medication 25 GRAM(S): at 01:44

## 2024-01-07 RX ADMIN — Medication 40 MILLIEQUIVALENT(S): at 21:55

## 2024-01-07 RX ADMIN — Medication 400 MILLIGRAM(S): at 12:07

## 2024-01-07 RX ADMIN — OXYCODONE HYDROCHLORIDE 5 MILLIGRAM(S): 5 TABLET ORAL at 12:23

## 2024-01-07 NOTE — PROGRESS NOTE ADULT - ATTENDING COMMENTS
I have seen and evaluated this patient at the time of SICU admission. I have reviewed all new labs, imaging and reports. I have participated in formulating the plan for the day, and have read and agree with the history, ROS, exam, assessment and plan as stated above.     Pt is a 48 year old male with a medical history signficant for ETOH cirrhosis and Hep C who presented to [facility] after being found down at home by a room mate. It is likely he had fallen over a day ago. Work up revealed the following injuries:    left 4-11 rib fractures with flail segment; left hemothorax  Left hemothorax drained with chest tube at OSH --> 1L blood.  CXR here shows good resolution of hemothorax with chest tube in adequate position.     No acute events overnight.     WBC  14.98  Hgb  10.5  K+    2.9  Creat 0.99    A/p  S/p fall  ? seizure disorder  Neuro consult  24 hour EEG  CIWA protocol    S/p hemorrhagic shock  S/p resuscitation  Continue cardiac monitoring    Eight left rib fx with flail segment  No need for rib plating per Dr. Venegas  Continue multimodal pain control  Chest tube to H2O seal    Reg diet    Replace lytes  Monitor I's and O's    Procal is 5.27 will trend    I spent 35 min in the E/M of this patient I have seen and evaluated this patient at the time of SICU admission. I have reviewed all new labs, imaging and reports. I have participated in formulating the plan for the day, and have read and agree with the history, ROS, exam, assessment and plan as stated above.     Pt is a 48 year old male with a medical history signficant for ETOH cirrhosis and Hep C who presented to [facility] after being found down at home by a room mate. It is likely he had fallen over a day ago. Work up revealed the following injuries:    left 4-11 rib fractures with flail segment; left hemothorax  Left hemothorax drained with chest tube at OSH --> 1L blood.  CXR here shows good resolution of hemothorax with chest tube in adequate position.     No acute events overnight.     WBC  14.98  Hgb  10.5  K+    2.9  Creat 0.99    A/p  S/p fall  ? seizure disorder  Neuro consult  24 hour EEG  CIWA protocol    S/p hemorrhagic shock  S/p resuscitation  Continue cardiac monitoring    Eight left rib fx with flail segment  No need for rib plating per Dr. Venegas  Continue multimodal pain control  Chest tube to H2O seal    Reg diet    Replace lytes  Monitor I's and O's    Procal is 5.27 will trend    I spent 35 min in the E/M of this patient

## 2024-01-07 NOTE — SBIRT NOTE ADULT - NSSBIRTBRIEFINTDET_GEN_A_CORE
Screening results were reviewed with the patient and the patient was provided information about healthy guidelines and potential negative consequences associated with level of risk. Motivation and readiness to reduce or stop use was discussed and goals and activities to make changes were suggested/offered. Patient declined resources at this time.

## 2024-01-07 NOTE — CONSULT NOTE ADULT - CRITICAL CARE ATTENDING COMMENT
HPI as per resident note, personally verified by me. Patient with fall and resultant L sided rib fractures following a self-reported seizure. Confused after. Patient reports he was not intoxicated with alcohol but there are differing reports of this. He also may have had additional seizures outside the setting of alcohol intoxication/withdrawal but this is not clear. Connected to EEG at bedside. No focal neurologic deficits at bedside. Primary team has started him on phenobarbital.    GTT:  Phenylephrine 0.8 mcg/kg/min  Propofol 40 mcg/kg/min    ROS: Due to clinical condition unable to assess (DYAN)    Gen - Intubated, sedated, EEG connected  CV - Peripheral circulation intact, no evidence of edema  Eyes - Fundoscopy not well visualized    Neurologic exam:  MS - Intubated, sedated, lethargic, attends to all stimuli b/l, no speech output but nods head appropriately to "yes"/"no" questions, follows simple commands. DYAN orientation, rep/naming, attn/conc/recent and remote memory/fund of knowledge  CN - PERRL, EOMI, VFF, face sens/str/hearing WNL b/l, tongue midline, trap 5/5 b/l, (+) spontaneous respirations (patient rate 28 bpm, vent rate 14 bpm), (+) cough. DYAN palate  Motor - Normal bulk/tone. BUE's at least 4/5, BLE's at least 2+/5 and symmetric, wrist restraints noted  Sens - LT/temp intact all  DTR's - 2+ BUE's, 0+ BLE's, and neutral b/l plantar response  Coord - Limited by restraints, no tremors noted  Gait and station - DYAN    UA (-), CPK inc 349 -> WNL    vEEG 1/8 -  EEG Summary / Classification:  Abnormal EEG in the awake / drowsy states.  •	Background slowing (including PDR < 8)    EEG Impression / Clinical Correlate:  Abnormal prolonged EEG study due to mild diffuse slowing which indicates mild diffuse cerebral dysfunction that is not specific in etiology  No epileptic discharges recorded.  No seizures recorded.  •	Cardiac abnormalities mentioned above are better assessed with cardiac monitoring.  •	EEG is limited in part by excessive artifact.    A/P:  Seizure  Alcohol use disorder  Depression  Hepatitis C  L rib fractures and flail chest  Sepsis  Transaminitis (Alb 2.0, LFT's with inc AST/alk phos 107/134)    - Patient with likely self-reported seizure in the setting of alcohol withdrawal, no further seizures reported. Less likely to have underlying epilepsy although history is inconsistent. No focal neurologic deficits. CT head personally reviewed by me without evidence for acute intracranial event or seizure nidus  - vEEG with generalized slowing and increased beta activity. No focal slowing, epileptiform discharges, or seizures. PLEASE STOP  - CIWA protocol, phenobarbital taper  - MRI brain w/ and w/o (epilepsy protocol) when able  - No other ASM's needed  - Continue to address above medical and surgical issues, as you are doing  - Will continue to follow patient with you

## 2024-01-07 NOTE — PROGRESS NOTE ADULT - ASSESSMENT
48y Male with PMHx hepatitis C (diagnosed many years ago, never treated), depression transferred from outside hospital for trauma eval. Patient found down by roommate after unknown amount of time, found with multiple 4-11 L sided rib fractures with flail chest. Chest tube placed there with >1L output (old blood). Patient also with tachypnea, concern for respiratory failure started on BiPAP. Level 1 called for transient hypotension, patient given 2 unit PRBC in [facility] ED. Rib score 3    Injuries:   - left posterolateral fourth, fifth, sixth, and 11th ribs, minimally displaced   - mildly displaced fractures of the left anterolateral fourth, fifth, sixth, and seventh ribs  - displaced fractures of the left posterior seventh, eighth, ninth, and 10th ribs.    PLAN:  - F/u chest tube output  - Rec starting abx zosyn  - Rib fracture protocol   - Plan for rib platting for flail chest  - ISS  - Obtain Tertiary  - c/w chest tube    ACS/Trauma  [id number]  48y Male with PMHx hepatitis C (diagnosed many years ago, never treated), depression transferred from outside hospital for trauma eval. Patient found down by roommate after unknown amount of time, found with multiple 4-11 L sided rib fractures with flail chest. Chest tube placed there with >1L output (old blood). Patient also with tachypnea, concern for respiratory failure started on BiPAP. Level 1 called for transient hypotension, patient given 2 unit PRBC in [facility] ED. Rib score 3    Injuries:   - left posterolateral fourth, fifth, sixth, and 11th ribs, minimally displaced   - mildly displaced fractures of the left anterolateral fourth, fifth, sixth, and seventh ribs  - displaced fractures of the left posterior seventh, eighth, ninth, and 10th ribs.    PLAN:  - F/u chest tube output  - Rec starting abx zosyn  - Rib fracture protocol   - Plan for rib platting for flail chest  - ISS  - Obtain Tertiary  - c/w chest tube    ACS/Trauma  [id number]

## 2024-01-07 NOTE — PROGRESS NOTE ADULT - ASSESSMENT
ASSESSMENT: 48y Male with PMHx hepatitis C (diagnosed many years ago, never treated), depression transferred from outside hospital for trauma eval. Patient found down by roommate after unknown amount of time, found with multiple 4-11 L sided rib fractures with flail chest. Chest tube placed there with >1L output (old blood). Patient also with tachypnea, concern for respiratory failure started on BiPAP. Level 1 called for transient hypotension, patient given 2 unit PRBC in [facility] ED. Patient admitted to SICU for hemodynamic monitoring, pain management.     PLAN:   Neurologic:  - AO x 4  - Multimodal pain control with Tylenol, Lidocaine patch, oxycodone PRN  - Dilaudid for breakthrough pain  - Send urine tox  - Concern for history of ETOH use, not actively intoxicated, monitor for signs of withdrawal     Respiratory:  - On 100% NRB, breathing comfortable, saturating well, titrate down as able  - L sided chest tube placed at outside hospital, maintain to suction  - Incentive spirometry  - AM CXR    Cardiovascular:  - Sinus tachycardia rate low 100s, ?multifactorial, monitor  - BP stable  - Send lactate     Gastrointestinal/Nutrition:  - Regular diet  - Bowel regimen with senna, Miralax    Genitourinary/Renal:  - Creatinine normalized, electrolytes improved   - CPK cleared  - Supplement electrolytes PRN    Hematologic:  - Chemical VTE ppx with Lovenox  - Mechanical VTE ppx with SCDs    Infectious Disease:  - Treated empirically for sepsis with Rocephin, Azithro   - Procal 5.27  - Send UA, blood cultures x 2  - Afebrile here, monitor off antibiotics     Endocrine:  - No active issues, no history of DM    Disposition: SICU. ASSESSMENT: 48y Male with PMHx hepatitis C (diagnosed many years ago, never treated), depression transferred from outside hospital for trauma eval. Patient found down by roommate after unknown amount of time, found with multiple 4-11 L sided rib fractures with flail chest. Chest tube placed there with >1L output (old blood). Patient also with tachypnea, concern for respiratory failure started on BiPAP. Level 1 called for transient hypotension, patient given 2 unit PRBC in [facility] ED. Patient admitted to SICU for hemodynamic monitoring, pain management.     PLAN:   Neurologic:  - AO x 4  - Multimodal pain control with Tylenol, Lidocaine patch, oxycodone PRN  - Dilaudid for breakthrough pain  - Send urine tox  - Concern for history of ETOH use, not actively intoxicated, monitor for signs of withdrawal     Respiratory:  - On 100% NRB, breathing comfortable, saturating well, titrate down as able  - L sided chest tube placed at outside hospital, maintain to suction  - Incentive spirometry  - AM CXR    Cardiovascular:  - Sinus tachycardia rate low 100s, ?multifactorial, monitor  - BP stable  - Send lactate     Gastrointestinal/Nutrition:  - Regular diet  - Bowel regimen with senna, Miralax    Genitourinary/Renal:  - Creatinine normalized, electrolytes improved   - CPK cleared  - Supplement electrolytes PRN    Hematologic:  - Chemical VTE ppx with Lovenox  - Mechanical VTE ppx with SCDs    Infectious Disease:  - Treated empirically for sepsis with Rocephin, Azithro   - Procal 5.27  - Send UA, blood cultures x 2  - Afebrile here, monitor off antibiotics     Endocrine:  - No active issues, no history of DM    Disposition: SICU.

## 2024-01-07 NOTE — CONSULT NOTE ADULT - ASSESSMENT
48y (1975) PMH of hep c not treated, depression found by his roommate for a fall and found to have multiple 4-11 left sided rib fracture with flail chest and had a chest tube placement to evacuate the blood. The patient was found comatose by his roommate and was suspected to be intoxicated with alcohol use. When the patient woke up he said he had a seizure. He claimed having 4 seizures as well in the past. He expressed that he experiences postictal confusion after the episode for about an hour but never had incontinence or tongue biting. Despite note by roommate about alcohol intoxication, he expressed that he stopped alcohol 2 days ago. He expresses generalized movements that will be present during the episode witnessed by others. He has never be treated and notes that it might not only happen in the setting of alcohol withdrawal.     impression: episodes of loss of consciouness described by the patient to be GTC, untreated    plan:  [] obtain vEEG  [] obtain MRI brain epilepsy protocol  [] if seizure, document duration and if GTC > 3 minutes provide ativan 2 mg rescue, provide keppra 1000 mg rescue if refractory to ativan    case discussed with epilepsy fellow Kerrie.  48y (1975) PMH of hep c not treated, depression found by his roommate for a fall and found to have multiple 4-11 left sided rib fracture with flail chest and had a chest tube placement to evacuate the blood. The patient was found comatose by his roommate and was suspected to be intoxicated with alcohol use. When the patient woke up he said he had a seizure. He claimed having 4 seizures as well in the past. He expressed that he experiences postictal confusion after the episode for about an hour but never had incontinence or tongue biting. Despite note by roommate about alcohol intoxication, he expressed that he stopped alcohol 2 days ago. He expresses generalized movements that will be present during the episode witnessed by others. He has never be treated and notes that it might not only happen in the setting of alcohol withdrawal.     impression: episodes of loss of consciouness described by the patient to be GTC, untreated likely alcohol withdrawal seizure    plan:  [] obtain vEEG  [] obtain MRI brain epilepsy protocol  [] if seizure, document duration and if GTC > 3 minutes provide ativan 2 mg rescue, provide keppra 1000 mg rescue if refractory to ativan    case discussed with epilepsy fellow Kerrie.  48y (1975) PMH of hep c not treated, depression found by his roommate for a fall and found to have multiple 4-11 left sided rib fracture with flail chest and had a chest tube placement to evacuate the blood. The patient was found comatose by his roommate and was suspected to be intoxicated with alcohol use. When the patient woke up he said he had a seizure. He claimed having 4 seizures as well in the past. He expressed that he experiences postictal confusion after the episode for about an hour but never had incontinence or tongue biting. Despite note by roommate about alcohol intoxication, he expressed that he stopped alcohol 2 days ago. He expresses generalized movements that will be present during the episode witnessed by others. He has never be treated and notes that it might not only happen in the setting of alcohol withdrawal.     impression: episodes of loss of consciousness described by the patient to be GTC, untreated likely alcohol withdrawal seizure    plan:  [] obtain vEEG  [] obtain MRI brain epilepsy protocol  [] if seizure, document duration and if GTC > 3 minutes provide ativan 2 mg rescue, provide keppra 1000 mg rescue if refractory to ativan    case discussed with epilepsy fellow Kerrie.

## 2024-01-07 NOTE — PROGRESS NOTE ADULT - SUBJECTIVE AND OBJECTIVE BOX
24 HOUR EVENTS:  - Titrated to nasal cannula  - Creatinine normalized, electrolytes improved   - Procal 5.27    SUBJECTIVE/ROS:  [ X ] A ten-point review of systems was otherwise negative except as noted.  [ ] Due to altered mental status/intubation, subjective information were not able to be obtained from the patient. History was obtained, to the extent possible, from review of the chart and collateral sources of information.      NEURO  Exam: AOx3. NAD. Follows commands. Moves all extremities. Strength and sensation intact.   Meds: acetaminophen   IVPB .. 1000 milliGRAM(s) IV Intermittent every 6 hours  HYDROmorphone  Injectable 0.5 milliGRAM(s) IV Push every 3 hours PRN Breakthrough  oxyCODONE    IR 5 milliGRAM(s) Oral every 3 hours PRN Moderate Pain (4 - 6)  oxyCODONE    IR 10 milliGRAM(s) Oral every 3 hours PRN Severe Pain (7 - 10)    [x] Adequacy of sedation and pain control has been assessed and adjusted      RESPIRATORY  RR: 27 (01-07-24 @ 05:00) (20 - 50)  SpO2: 100% (01-07-24 @ 05:00) (97% - 100%)  Wt(kg): --  Exam: CTA b/l. No murmurs, rubs, gallops appreciated.   Mechanical Ventilation:     [ ] Extubation Readiness Assessed  Meds:       CARDIOVASCULAR  HR: 104 (01-07-24 @ 05:00) (104 - 116)  BP: 101/64 (01-07-24 @ 05:00) (92/66 - 124/70)  BP(mean): 76 (01-07-24 @ 05:00) (73 - 90)  ABP: --  ABP(mean): --  Wt(kg): --  CVP(cm H2O): --  VBG - ( 06 Jan 2024 21:15 )  pH: 7.40  /  pCO2: 40    /  pO2: 38    / HCO3: 25    / Base Excess: 0.0   /  SaO2: 65.4   Lactate: 2.8                Exam: S1S2. No murmurs, rubs, gallops appreciated.  Cardiac Rhythm: Sinus tachycardia rate 101  Meds:       GI/NUTRITION  Exam: Soft, non-distended, non-tender.   Diet: Regular  Meds: polyethylene glycol 3350 17 Gram(s) Oral every 12 hours  senna 2 Tablet(s) Oral at bedtime      GENITOURINARY  I&O's Detail    01-06 @ 07:01  -  01-07 @ 05:31  --------------------------------------------------------  IN:    IV PiggyBack: 300 mL    Lactated Ringers Bolus: 1000 mL    multiple electrolytes Injection Type 1.: 250 mL    Oral Fluid: 480 mL  Total IN: 2030 mL    OUT:    Voided (mL): 750 mL  Total OUT: 750 mL    Total NET: 1280 mL        Weight (kg): 91.7 (01-06 @ 17:51)  01-06    127<L>  |  93<L>  |  20  ----------------------------<  68<L>  3.7   |  15<L>  |  0.99    Ca    6.7<L>      06 Jan 2024 23:41  Phos  1.9     01-06  Mg     1.2     01-06    TPro  6.3  /  Alb  2.0<L>  /  TBili  0.8  /  DBili  x   /  AST  107<H>  /  ALT  28  /  AlkPhos  134<H>  01-06    [ ] Hendrix catheter, indication: N/A  Meds: sodium phosphate 30 milliMole(s)/500 mL IVPB 30 milliMole(s) IV Intermittent once        HEMATOLOGIC  Meds: enoxaparin Injectable 40 milliGRAM(s) SubCutaneous <User Schedule>    [x] VTE Prophylaxis                        10.1   14.52 )-----------( 199      ( 07 Jan 2024 01:07 )             27.9     PT/INR - ( 07 Jan 2024 01:08 )   PT: 11.7 sec;   INR: 1.12 ratio         PTT - ( 07 Jan 2024 01:08 )  PTT:32.6 sec  Transfusion     [ ] PRBC   [ ] Platelets   [ ] FFP   [ ] Cryoprecipitate      INFECTIOUS DISEASES  T(C): 36.7 (01-07-24 @ 03:00), Max: 37.2 (01-06-24 @ 19:00)  Wt(kg): --  WBC Count: 14.52 K/uL (01-07 @ 01:07)  WBC Count: 14.42 K/uL (01-06 @ 17:31)    Recent Cultures:    Meds: influenza   Vaccine 0.5 milliLiter(s) IntraMuscular once        ENDOCRINE  Capillary Blood Glucose    Meds:       ACCESS DEVICES:  [ X ] Peripheral IV  [ ] Central Venous Line	[ ] R	[ ] L	[ ] IJ	[ ] Fem	[ ] SC	Placed:   [ ] Arterial Line		[ ] R	[ ] L	[ ] Fem	[ ] Rad	[ ] Ax	Placed:   [ ] PICC:					[ ] Mediport  [ ] Urinary Catheter, Date Placed:   [ ] Necessity of urinary, arterial, and venous catheters discussed    OTHER MEDICATIONS:  chlorhexidine 2% Cloths 1 Application(s) Topical <User Schedule>  lidocaine   4% Patch 1 Patch Transdermal every 24 hours      CODE STATUS: Full    IMAGING:

## 2024-01-07 NOTE — PROGRESS NOTE ADULT - SUBJECTIVE AND OBJECTIVE BOX
ACS/Trauma Team (General Surgery) Daily Progress Note    SUBJECTIVE:  Pt seen and examined by surgical team.    OBJECTIVE:  Vital Signs Last 24 Hrs  T(C): 36.5 (07 Jan 2024 19:00), Max: 36.7 (06 Jan 2024 23:00)  T(F): 97.7 (07 Jan 2024 19:00), Max: 98.1 (07 Jan 2024 11:00)  HR: 99 (07 Jan 2024 19:00) (99 - 116)  BP: 110/63 (07 Jan 2024 19:00) (91/56 - 110/63)  BP(mean): 81 (07 Jan 2024 19:00) (68 - 82)  RR: 25 (07 Jan 2024 19:00) (20 - 39)  SpO2: 99% (07 Jan 2024 19:00) (93% - 100%)    Parameters below as of 07 Jan 2024 19:00  Patient On (Oxygen Delivery Method): room air        I&O's Detail    06 Jan 2024 07:01  -  07 Jan 2024 07:00  --------------------------------------------------------  IN:    IV PiggyBack: 650 mL    Lactated Ringers Bolus: 1000 mL    multiple electrolytes Injection Type 1.: 250 mL    Oral Fluid: 720 mL  Total IN: 2620 mL    OUT:    Chest Tube (mL): 700 mL    Voided (mL): 950 mL  Total OUT: 1650 mL    Total NET: 970 mL      07 Jan 2024 07:01  -  07 Jan 2024 20:15  --------------------------------------------------------  IN:    IV PiggyBack: 500 mL    IV PiggyBack: 525 mL    Lactated Ringers Bolus: 1000 mL    Oral Fluid: 600 mL  Total IN: 2625 mL    OUT:    Chest Tube (mL): 220 mL    Voided (mL): 700 mL  Total OUT: 920 mL    Total NET: 1705 mL        Exam:  GEN: NAD  HEENT: atraumatic, normocephalic  CV: no JVD  RESP: no increased work of breathing, no use of accessory muscles, chest tube on L side output SS 700mL  GI/ABD: soft, nontender, nondistended  EXTREMITIES: warm, pink, well-perfused                        10.5   14.98 )-----------( 233      ( 07 Jan 2024 06:47 )             28.5       01-07    125<L>  |  89<L>  |  23  ----------------------------<  110<H>  3.2<L>   |  23  |  0.97    Ca    8.7      07 Jan 2024 15:20  Phos  2.8     01-07  Mg     2.8     01-07    TPro  6.3  /  Alb  2.0<L>  /  TBili  0.8  /  DBili  x   /  AST  107<H>  /  ALT  28  /  AlkPhos  134<H>  01-06      PT/INR - ( 07 Jan 2024 06:47 )   PT: 11.1 sec;   INR: 1.01 ratio         PTT - ( 07 Jan 2024 06:47 )  PTT:36.7 sec

## 2024-01-07 NOTE — CONSULT NOTE ADULT - SUBJECTIVE AND OBJECTIVE BOX
Neurology - Consult Note    -  Spectra: [phone] ([facility]), [phone] ([facility])  -    HPI: Patient BIANCA MICHAEL is a 48y (1975) PMH of hep c not treated, depression found by his roommate for a fall and found to have multiple 4-11 left sided rib fracture with flail chest and had a chest tube placement to evacuate the blood. The patient was found comatose by his roommate and was suspected to be intoxicated with alcohol use. When the patient woke up he said he had a seizure. He claimed having 4 seizures as well in the past. He expressed that he experiences postictal confusion after the episode for about an hour but never had incontinence or tongue biting. Despite note by roommate about alcohol intoxication, he expressed that he stopped alcohol 2 days ago. He expresses generalized movements that will be present during the episode witnessed by others. He has never be treated and notes that it might not only happen in the setting of alcohol withdrawal.     Review of Systems:    NEUROLOGICAL: +As stated in HPI above  All other review of systems is negative unless indicated above.    Allergies:  No Known Allergies    PMHx/PSHx/Family Hx: As above, otherwise see below   No pertinent past medical history    No pertinent past medical history    Social Hx:  No current use of tobacco, alcohol, or illicit drugs    Medications:  MEDICATIONS  (STANDING):  chlorhexidine 2% Cloths 1 Application(s) Topical <User Schedule>  enoxaparin Injectable 40 milliGRAM(s) SubCutaneous <User Schedule>  influenza   Vaccine 0.5 milliLiter(s) IntraMuscular once  lidocaine   4% Patch 1 Patch Transdermal every 24 hours  piperacillin/tazobactam IVPB.. 3.375 Gram(s) IV Intermittent every 8 hours  polyethylene glycol 3350 17 Gram(s) Oral every 12 hours  potassium chloride    Tablet ER 40 milliEquivalent(s) Oral every 4 hours  senna 2 Tablet(s) Oral at bedtime    MEDICATIONS  (PRN):  HYDROmorphone  Injectable 0.5 milliGRAM(s) IV Push every 3 hours PRN Breakthrough  LORazepam   Injectable 2 milliGRAM(s) IV Push every 1 hour PRN CIWA-Ar score 8 or greater  oxyCODONE    IR 10 milliGRAM(s) Oral every 3 hours PRN Severe Pain (7 - 10)  oxyCODONE    IR 5 milliGRAM(s) Oral every 3 hours PRN Moderate Pain (4 - 6)      Vitals:  T(C): 36 (01-07-24 @ 15:00), Max: 36.7 (01-06-24 @ 23:00)  HR: 104 (01-07-24 @ 18:00) (99 - 116)  BP: 96/67 (01-07-24 @ 18:00) (91/56 - 109/68)  RR: 31 (01-07-24 @ 18:00) (20 - 39)  SpO2: 97% (01-07-24 @ 18:00) (93% - 100%)    Physical Examination:   General - NAD    Neurologic Exam:  Mental status - Awake, Alert, Oriented to person, place, and time. Speech fluent, repetition and naming intact. Follows simple and complex commands.     Cranial nerves - VFF, EOMI, face sensation (V1-V3) intact b/l, facial strength intact without asymmetry b/l  Motor - Normal bulk and tone throughout. No pronator drift.  Strength testing            Deltoid      Biceps      Triceps             R            5                 5               5                     5                             L             5                 5               5                     5                                       Hip Flexion    Hip Extension    Knee Flexion    Knee Extension                  Dorsiflexion    Plantar Flexion  R              5                           5                       5                           5                            5                          5  L              5                           5                        5                           5                            5                          5    Sensation - Light touch/temperature OR pain/vibration intact throughout    DTR's -             Biceps      Triceps     Brachioradialis               Patellar    Ankle    Toes/plantar response  R             2+             2+                  2+                       2+            2+                 Down  L              2+             2+                 2+                        2+           2+                 Down    Coordination - Finger to Nose intact b/l. No tremors appreciated. HTS normal    Gait and station - [facility]    Labs:                        10.5   14.98 )-----------( 233      ( 07 Jan 2024 06:47 )             28.5     01-07    125<L>  |  89<L>  |  23  ----------------------------<  110<H>  3.2<L>   |  23  |  0.97    Ca    8.7      07 Jan 2024 15:20  Phos  2.8     01-07  Mg     2.8     01-07    TPro  6.3  /  Alb  2.0<L>  /  TBili  0.8  /  DBili  x   /  AST  107<H>  /  ALT  28  /  AlkPhos  134<H>  01-06    CAPILLARY BLOOD GLUCOSE      POCT Blood Glucose.: 130 mg/dL (07 Jan 2024 02:05)    LIVER FUNCTIONS - ( 06 Jan 2024 17:30 )  Alb: 2.0 g/dL / Pro: 6.3 g/dL / ALK PHOS: 134 U/L / ALT: 28 U/L / AST: 107 U/L / GGT: x             PT/INR - ( 07 Jan 2024 06:47 )   PT: 11.1 sec;   INR: 1.01 ratio         PTT - ( 07 Jan 2024 06:47 )  PTT:36.7 sec  CSF:                  Radiology:  CT Head No Cont:  (06 Jan 2024 18:01)    < from: CT Head No Cont (01.06.24 @ 18:01) >  CT HEAD:  No acute hemorrhage, mass effect, or midline shift.  No acute calvarial fracture.    CT CERVICAL SPINE:  No acute fracture or subluxation.  Cervical spondylosis, as described above.  Partially imaged left hydropneumothorax. Please see concurrent CT chest   report.    < end of copied text >     Neurology - Consult Note    -  Spectra: [phone] ([facility]), [phone] ([facility])  -    HPI: Patient BIANCA MICHAEL is a 48y (1975) PMH of hep c not treated, depression found by his roommate for a fall and found to have multiple 4-11 left sided rib fracture with flail chest and had a chest tube placement to evacuate the blood. The patient was found comatose by his roommate and was suspected to be intoxicated with alcohol use. When the patient woke up he said he had a seizure. He claimed having 4 seizures as well in the past. He expressed that he experiences postictal confusion after the episode for about an hour but never had incontinence or tongue biting. Despite note by roommate about alcohol intoxication, he expressed that he stopped alcohol 2 days ago. He expresses generalized movements that will be present during the episode witnessed by others. He has never be treated and notes that it might not only happen in the setting of alcohol withdrawal.     Review of Systems:    NEUROLOGICAL: +As stated in HPI above  All other review of systems is negative unless indicated above.    Allergies:  No Known Allergies    PMHx/PSHx/Family Hx: As above, otherwise see below   No pertinent past medical history    No pertinent past medical history    Social Hx:  No current use of tobacco, alcohol, or illicit drugs    Medications:  MEDICATIONS  (STANDING):  chlorhexidine 2% Cloths 1 Application(s) Topical <User Schedule>  enoxaparin Injectable 40 milliGRAM(s) SubCutaneous <User Schedule>  influenza   Vaccine 0.5 milliLiter(s) IntraMuscular once  lidocaine   4% Patch 1 Patch Transdermal every 24 hours  piperacillin/tazobactam IVPB.. 3.375 Gram(s) IV Intermittent every 8 hours  polyethylene glycol 3350 17 Gram(s) Oral every 12 hours  potassium chloride    Tablet ER 40 milliEquivalent(s) Oral every 4 hours  senna 2 Tablet(s) Oral at bedtime    MEDICATIONS  (PRN):  HYDROmorphone  Injectable 0.5 milliGRAM(s) IV Push every 3 hours PRN Breakthrough  LORazepam   Injectable 2 milliGRAM(s) IV Push every 1 hour PRN CIWA-Ar score 8 or greater  oxyCODONE    IR 10 milliGRAM(s) Oral every 3 hours PRN Severe Pain (7 - 10)  oxyCODONE    IR 5 milliGRAM(s) Oral every 3 hours PRN Moderate Pain (4 - 6)      Vitals:  T(C): 36 (01-07-24 @ 15:00), Max: 36.7 (01-06-24 @ 23:00)  HR: 104 (01-07-24 @ 18:00) (99 - 116)  BP: 96/67 (01-07-24 @ 18:00) (91/56 - 109/68)  RR: 31 (01-07-24 @ 18:00) (20 - 39)  SpO2: 97% (01-07-24 @ 18:00) (93% - 100%)    Physical Examination:   General - NAD    Neurologic Exam:  Mental status - Awake, Alert, Oriented to person, place, and time. Speech fluent, repetition and naming intact. Follows simple and complex commands.     Cranial nerves - VFF, EOMI, face sensation (V1-V3) intact b/l, facial strength intact without asymmetry b/l  Motor - Normal bulk and tone throughout. No pronator drift.  Strength testing            Deltoid      Biceps      Triceps             R            5                 5               5                     5                             L             5                 5               5                     5                                       Hip Flexion    Hip Extension    Knee Flexion    Knee Extension                  Dorsiflexion    Plantar Flexion  R              5                           5                       5                           5                            5                          5  L              5                           5                        5                           5                            5                          5    Sensation - Light touch/temperature OR pain/vibration intact throughout    DTR's -             Biceps      Triceps     Brachioradialis               Patellar    Ankle    Toes/plantar response  R             2+             2+                  2+                       2+            2+                 Down  L              2+             2+                 2+                        2+           2+                 Down    Coordination - Finger to Nose intact b/l. No tremors appreciated. HTS normal    Gait and station - [facility]    Labs:                        10.5   14.98 )-----------( 233      ( 07 Jan 2024 06:47 )             28.5     01-07    125<L>  |  89<L>  |  23  ----------------------------<  110<H>  3.2<L>   |  23  |  0.97    Ca    8.7      07 Jan 2024 15:20  Phos  2.8     01-07  Mg     2.8     01-07    TPro  6.3  /  Alb  2.0<L>  /  TBili  0.8  /  DBili  x   /  AST  107<H>  /  ALT  28  /  AlkPhos  134<H>  01-06    CAPILLARY BLOOD GLUCOSE      POCT Blood Glucose.: 130 mg/dL (07 Jan 2024 02:05)    LIVER FUNCTIONS - ( 06 Jan 2024 17:30 )  Alb: 2.0 g/dL / Pro: 6.3 g/dL / ALK PHOS: 134 U/L / ALT: 28 U/L / AST: 107 U/L / GGT: x             PT/INR - ( 07 Jan 2024 06:47 )   PT: 11.1 sec;   INR: 1.01 ratio         PTT - ( 07 Jan 2024 06:47 )  PTT:36.7 sec  CSF:                  Radiology:  CT Head No Cont:  (06 Jan 2024 18:01)    < from: CT Head No Cont (01.06.24 @ 18:01) >  CT HEAD:  No acute hemorrhage, mass effect, or midline shift.  No acute calvarial fracture.    CT CERVICAL SPINE:  No acute fracture or subluxation.  Cervical spondylosis, as described above.  Partially imaged left hydropneumothorax. Please see concurrent CT chest   report.    < end of copied text >

## 2024-01-08 PROBLEM — Z78.9 OTHER SPECIFIED HEALTH STATUS: Chronic | Status: ACTIVE | Noted: 2024-01-07

## 2024-01-08 LAB
ANION GAP SERPL CALC-SCNC: 10 MMOL/L — SIGNIFICANT CHANGE UP (ref 5–17)
ANION GAP SERPL CALC-SCNC: 10 MMOL/L — SIGNIFICANT CHANGE UP (ref 5–17)
ANION GAP SERPL CALC-SCNC: 13 MMOL/L — SIGNIFICANT CHANGE UP (ref 5–17)
ANION GAP SERPL CALC-SCNC: 13 MMOL/L — SIGNIFICANT CHANGE UP (ref 5–17)
APTT BLD: 26 SEC — SIGNIFICANT CHANGE UP (ref 24.5–35.6)
APTT BLD: 26 SEC — SIGNIFICANT CHANGE UP (ref 24.5–35.6)
BUN SERPL-MCNC: 15 MG/DL — SIGNIFICANT CHANGE UP (ref 7–23)
BUN SERPL-MCNC: 15 MG/DL — SIGNIFICANT CHANGE UP (ref 7–23)
BUN SERPL-MCNC: 17 MG/DL — SIGNIFICANT CHANGE UP (ref 7–23)
BUN SERPL-MCNC: 17 MG/DL — SIGNIFICANT CHANGE UP (ref 7–23)
CALCIUM SERPL-MCNC: 8 MG/DL — LOW (ref 8.4–10.5)
CALCIUM SERPL-MCNC: 8 MG/DL — LOW (ref 8.4–10.5)
CALCIUM SERPL-MCNC: 8.3 MG/DL — LOW (ref 8.4–10.5)
CALCIUM SERPL-MCNC: 8.3 MG/DL — LOW (ref 8.4–10.5)
CHLORIDE SERPL-SCNC: 90 MMOL/L — LOW (ref 96–108)
CHLORIDE SERPL-SCNC: 90 MMOL/L — LOW (ref 96–108)
CHLORIDE SERPL-SCNC: 93 MMOL/L — LOW (ref 96–108)
CHLORIDE SERPL-SCNC: 93 MMOL/L — LOW (ref 96–108)
CO2 SERPL-SCNC: 23 MMOL/L — SIGNIFICANT CHANGE UP (ref 22–31)
CO2 SERPL-SCNC: 23 MMOL/L — SIGNIFICANT CHANGE UP (ref 22–31)
CO2 SERPL-SCNC: 24 MMOL/L — SIGNIFICANT CHANGE UP (ref 22–31)
CO2 SERPL-SCNC: 24 MMOL/L — SIGNIFICANT CHANGE UP (ref 22–31)
CREAT SERPL-MCNC: 0.72 MG/DL — SIGNIFICANT CHANGE UP (ref 0.5–1.3)
CREAT SERPL-MCNC: 0.72 MG/DL — SIGNIFICANT CHANGE UP (ref 0.5–1.3)
CREAT SERPL-MCNC: 0.77 MG/DL — SIGNIFICANT CHANGE UP (ref 0.5–1.3)
CREAT SERPL-MCNC: 0.77 MG/DL — SIGNIFICANT CHANGE UP (ref 0.5–1.3)
EGFR: 111 ML/MIN/1.73M2 — SIGNIFICANT CHANGE UP
EGFR: 111 ML/MIN/1.73M2 — SIGNIFICANT CHANGE UP
EGFR: 113 ML/MIN/1.73M2 — SIGNIFICANT CHANGE UP
EGFR: 113 ML/MIN/1.73M2 — SIGNIFICANT CHANGE UP
GAS PNL BLDA: SIGNIFICANT CHANGE UP
GAS PNL BLDA: SIGNIFICANT CHANGE UP
GLUCOSE SERPL-MCNC: 111 MG/DL — HIGH (ref 70–99)
GLUCOSE SERPL-MCNC: 111 MG/DL — HIGH (ref 70–99)
GLUCOSE SERPL-MCNC: 93 MG/DL — SIGNIFICANT CHANGE UP (ref 70–99)
GLUCOSE SERPL-MCNC: 93 MG/DL — SIGNIFICANT CHANGE UP (ref 70–99)
HCT VFR BLD CALC: 26.5 % — LOW (ref 39–50)
HCT VFR BLD CALC: 26.5 % — LOW (ref 39–50)
HGB BLD-MCNC: 9.7 G/DL — LOW (ref 13–17)
HGB BLD-MCNC: 9.7 G/DL — LOW (ref 13–17)
INR BLD: 1.04 RATIO — SIGNIFICANT CHANGE UP (ref 0.85–1.18)
INR BLD: 1.04 RATIO — SIGNIFICANT CHANGE UP (ref 0.85–1.18)
MAGNESIUM SERPL-MCNC: 2.2 MG/DL — SIGNIFICANT CHANGE UP (ref 1.6–2.6)
MAGNESIUM SERPL-MCNC: 2.2 MG/DL — SIGNIFICANT CHANGE UP (ref 1.6–2.6)
MAGNESIUM SERPL-MCNC: 2.3 MG/DL — SIGNIFICANT CHANGE UP (ref 1.6–2.6)
MAGNESIUM SERPL-MCNC: 2.3 MG/DL — SIGNIFICANT CHANGE UP (ref 1.6–2.6)
MCHC RBC-ENTMCNC: 28.6 PG — SIGNIFICANT CHANGE UP (ref 27–34)
MCHC RBC-ENTMCNC: 28.6 PG — SIGNIFICANT CHANGE UP (ref 27–34)
MCHC RBC-ENTMCNC: 36.6 GM/DL — HIGH (ref 32–36)
MCHC RBC-ENTMCNC: 36.6 GM/DL — HIGH (ref 32–36)
MCV RBC AUTO: 78.2 FL — LOW (ref 80–100)
MCV RBC AUTO: 78.2 FL — LOW (ref 80–100)
NRBC # BLD: 0 /100 WBCS — SIGNIFICANT CHANGE UP (ref 0–0)
NRBC # BLD: 0 /100 WBCS — SIGNIFICANT CHANGE UP (ref 0–0)
OSMOLALITY UR: 428 MOS/KG — SIGNIFICANT CHANGE UP (ref 300–900)
OSMOLALITY UR: 428 MOS/KG — SIGNIFICANT CHANGE UP (ref 300–900)
PHOSPHATE SERPL-MCNC: 2.1 MG/DL — LOW (ref 2.5–4.5)
PHOSPHATE SERPL-MCNC: 2.1 MG/DL — LOW (ref 2.5–4.5)
PHOSPHATE SERPL-MCNC: 4.3 MG/DL — SIGNIFICANT CHANGE UP (ref 2.5–4.5)
PHOSPHATE SERPL-MCNC: 4.3 MG/DL — SIGNIFICANT CHANGE UP (ref 2.5–4.5)
PLATELET # BLD AUTO: 342 K/UL — SIGNIFICANT CHANGE UP (ref 150–400)
PLATELET # BLD AUTO: 342 K/UL — SIGNIFICANT CHANGE UP (ref 150–400)
POTASSIUM SERPL-MCNC: 3.8 MMOL/L — SIGNIFICANT CHANGE UP (ref 3.5–5.3)
POTASSIUM SERPL-MCNC: 3.8 MMOL/L — SIGNIFICANT CHANGE UP (ref 3.5–5.3)
POTASSIUM SERPL-MCNC: 4 MMOL/L — SIGNIFICANT CHANGE UP (ref 3.5–5.3)
POTASSIUM SERPL-MCNC: 4 MMOL/L — SIGNIFICANT CHANGE UP (ref 3.5–5.3)
POTASSIUM SERPL-SCNC: 3.8 MMOL/L — SIGNIFICANT CHANGE UP (ref 3.5–5.3)
POTASSIUM SERPL-SCNC: 3.8 MMOL/L — SIGNIFICANT CHANGE UP (ref 3.5–5.3)
POTASSIUM SERPL-SCNC: 4 MMOL/L — SIGNIFICANT CHANGE UP (ref 3.5–5.3)
POTASSIUM SERPL-SCNC: 4 MMOL/L — SIGNIFICANT CHANGE UP (ref 3.5–5.3)
PROCALCITONIN SERPL-MCNC: 4.75 NG/ML — HIGH (ref 0.02–0.1)
PROCALCITONIN SERPL-MCNC: 4.75 NG/ML — HIGH (ref 0.02–0.1)
PROTHROM AB SERPL-ACNC: 11.4 SEC — SIGNIFICANT CHANGE UP (ref 9.5–13)
PROTHROM AB SERPL-ACNC: 11.4 SEC — SIGNIFICANT CHANGE UP (ref 9.5–13)
RBC # BLD: 3.39 M/UL — LOW (ref 4.2–5.8)
RBC # BLD: 3.39 M/UL — LOW (ref 4.2–5.8)
RBC # FLD: 14.7 % — HIGH (ref 10.3–14.5)
RBC # FLD: 14.7 % — HIGH (ref 10.3–14.5)
SODIUM SERPL-SCNC: 123 MMOL/L — LOW (ref 135–145)
SODIUM SERPL-SCNC: 123 MMOL/L — LOW (ref 135–145)
SODIUM SERPL-SCNC: 130 MMOL/L — LOW (ref 135–145)
SODIUM SERPL-SCNC: 130 MMOL/L — LOW (ref 135–145)
SODIUM UR-SCNC: 7 MMOL/L — SIGNIFICANT CHANGE UP
SODIUM UR-SCNC: 7 MMOL/L — SIGNIFICANT CHANGE UP
WBC # BLD: 19.22 K/UL — HIGH (ref 3.8–10.5)
WBC # BLD: 19.22 K/UL — HIGH (ref 3.8–10.5)
WBC # FLD AUTO: 19.22 K/UL — HIGH (ref 3.8–10.5)
WBC # FLD AUTO: 19.22 K/UL — HIGH (ref 3.8–10.5)

## 2024-01-08 PROCEDURE — 99291 CRITICAL CARE FIRST HOUR: CPT | Mod: GC

## 2024-01-08 PROCEDURE — 71045 X-RAY EXAM CHEST 1 VIEW: CPT | Mod: 26

## 2024-01-08 PROCEDURE — 71045 X-RAY EXAM CHEST 1 VIEW: CPT | Mod: 26,77

## 2024-01-08 PROCEDURE — 99232 SBSQ HOSP IP/OBS MODERATE 35: CPT | Mod: GC

## 2024-01-08 PROCEDURE — 95720 EEG PHY/QHP EA INCR W/VEEG: CPT

## 2024-01-08 PROCEDURE — 99291 CRITICAL CARE FIRST HOUR: CPT

## 2024-01-08 PROCEDURE — 99254 IP/OBS CNSLTJ NEW/EST MOD 60: CPT | Mod: 57

## 2024-01-08 RX ORDER — PROPOFOL 10 MG/ML
10 INJECTION, EMULSION INTRAVENOUS ONCE
Refills: 0 | Status: DISCONTINUED | OUTPATIENT
Start: 2024-01-08 | End: 2024-01-08

## 2024-01-08 RX ORDER — PHENOBARBITAL 60 MG
330 TABLET ORAL ONCE
Refills: 0 | Status: DISCONTINUED | OUTPATIENT
Start: 2024-01-08 | End: 2024-01-08

## 2024-01-08 RX ORDER — PROPOFOL 10 MG/ML
10 INJECTION, EMULSION INTRAVENOUS
Qty: 1000 | Refills: 0 | Status: DISCONTINUED | OUTPATIENT
Start: 2024-01-08 | End: 2024-01-08

## 2024-01-08 RX ORDER — CHLORHEXIDINE GLUCONATE 213 G/1000ML
15 SOLUTION TOPICAL EVERY 12 HOURS
Refills: 0 | Status: DISCONTINUED | OUTPATIENT
Start: 2024-01-08 | End: 2024-01-13

## 2024-01-08 RX ORDER — PROPOFOL 10 MG/ML
10 INJECTION, EMULSION INTRAVENOUS
Qty: 500 | Refills: 0 | Status: DISCONTINUED | OUTPATIENT
Start: 2024-01-08 | End: 2024-01-08

## 2024-01-08 RX ORDER — DEXMEDETOMIDINE HYDROCHLORIDE IN 0.9% SODIUM CHLORIDE 4 UG/ML
0.05 INJECTION INTRAVENOUS
Qty: 200 | Refills: 0 | Status: DISCONTINUED | OUTPATIENT
Start: 2024-01-08 | End: 2024-01-08

## 2024-01-08 RX ORDER — ASCORBIC ACID 60 MG
500 TABLET,CHEWABLE ORAL DAILY
Refills: 0 | Status: DISCONTINUED | OUTPATIENT
Start: 2024-01-08 | End: 2024-01-13

## 2024-01-08 RX ORDER — PROPOFOL 10 MG/ML
10 INJECTION, EMULSION INTRAVENOUS ONCE
Refills: 0 | Status: COMPLETED | OUTPATIENT
Start: 2024-01-08 | End: 2024-01-08

## 2024-01-08 RX ORDER — PHENOBARBITAL 60 MG
440 TABLET ORAL ONCE
Refills: 0 | Status: DISCONTINUED | OUTPATIENT
Start: 2024-01-08 | End: 2024-01-08

## 2024-01-08 RX ORDER — PHENYLEPHRINE HYDROCHLORIDE 10 MG/ML
0.2 INJECTION INTRAVENOUS
Qty: 40 | Refills: 0 | Status: DISCONTINUED | OUTPATIENT
Start: 2024-01-08 | End: 2024-01-09

## 2024-01-08 RX ORDER — PREGABALIN 225 MG/1
1000 CAPSULE ORAL DAILY
Refills: 0 | Status: DISCONTINUED | OUTPATIENT
Start: 2024-01-08 | End: 2024-01-13

## 2024-01-08 RX ORDER — PHENOBARBITAL 60 MG
130 TABLET ORAL EVERY 8 HOURS
Refills: 0 | Status: DISCONTINUED | OUTPATIENT
Start: 2024-01-08 | End: 2024-01-10

## 2024-01-08 RX ORDER — FOLIC ACID 0.8 MG
1 TABLET ORAL DAILY
Refills: 0 | Status: DISCONTINUED | OUTPATIENT
Start: 2024-01-08 | End: 2024-01-13

## 2024-01-08 RX ORDER — DEXMEDETOMIDINE HYDROCHLORIDE IN 0.9% SODIUM CHLORIDE 4 UG/ML
0.5 INJECTION INTRAVENOUS
Qty: 200 | Refills: 0 | Status: DISCONTINUED | OUTPATIENT
Start: 2024-01-08 | End: 2024-01-10

## 2024-01-08 RX ORDER — MULTIVIT-MIN/FERROUS GLUCONATE 9 MG/15 ML
15 LIQUID (ML) ORAL DAILY
Refills: 0 | Status: DISCONTINUED | OUTPATIENT
Start: 2024-01-08 | End: 2024-01-13

## 2024-01-08 RX ORDER — CALCIUM GLUCONATE 100 MG/ML
2 VIAL (ML) INTRAVENOUS ONCE
Refills: 0 | Status: COMPLETED | OUTPATIENT
Start: 2024-01-08 | End: 2024-01-08

## 2024-01-08 RX ORDER — PHENOBARBITAL 60 MG
130 TABLET ORAL EVERY 12 HOURS
Refills: 0 | Status: DISCONTINUED | OUTPATIENT
Start: 2024-01-09 | End: 2024-01-10

## 2024-01-08 RX ADMIN — PROPOFOL 10 MILLIGRAM(S): 10 INJECTION, EMULSION INTRAVENOUS at 04:12

## 2024-01-08 RX ADMIN — PHENYLEPHRINE HYDROCHLORIDE 6.88 MICROGRAM(S)/KG/MIN: 10 INJECTION INTRAVENOUS at 04:33

## 2024-01-08 RX ADMIN — CHLORHEXIDINE GLUCONATE 15 MILLILITER(S): 213 SOLUTION TOPICAL at 17:34

## 2024-01-08 RX ADMIN — ENOXAPARIN SODIUM 40 MILLIGRAM(S): 100 INJECTION SUBCUTANEOUS at 05:46

## 2024-01-08 RX ADMIN — PHENYLEPHRINE HYDROCHLORIDE 6.88 MICROGRAM(S)/KG/MIN: 10 INJECTION INTRAVENOUS at 19:10

## 2024-01-08 RX ADMIN — PROPOFOL 5.5 MICROGRAM(S)/KG/MIN: 10 INJECTION, EMULSION INTRAVENOUS at 05:00

## 2024-01-08 RX ADMIN — PIPERACILLIN AND TAZOBACTAM 25 GRAM(S): 4; .5 INJECTION, POWDER, LYOPHILIZED, FOR SOLUTION INTRAVENOUS at 13:35

## 2024-01-08 RX ADMIN — HYDROMORPHONE HYDROCHLORIDE 0.5 MILLIGRAM(S): 2 INJECTION INTRAMUSCULAR; INTRAVENOUS; SUBCUTANEOUS at 20:38

## 2024-01-08 RX ADMIN — HYDROMORPHONE HYDROCHLORIDE 0.5 MILLIGRAM(S): 2 INJECTION INTRAMUSCULAR; INTRAVENOUS; SUBCUTANEOUS at 13:34

## 2024-01-08 RX ADMIN — CHLORHEXIDINE GLUCONATE 15 MILLILITER(S): 213 SOLUTION TOPICAL at 05:47

## 2024-01-08 RX ADMIN — Medication 130 MILLIGRAM(S): at 23:42

## 2024-01-08 RX ADMIN — DEXMEDETOMIDINE HYDROCHLORIDE IN 0.9% SODIUM CHLORIDE 11.5 MICROGRAM(S)/KG/HR: 4 INJECTION INTRAVENOUS at 14:25

## 2024-01-08 RX ADMIN — PHENYLEPHRINE HYDROCHLORIDE 6.88 MICROGRAM(S)/KG/MIN: 10 INJECTION INTRAVENOUS at 08:16

## 2024-01-08 RX ADMIN — HYDROMORPHONE HYDROCHLORIDE 0.5 MILLIGRAM(S): 2 INJECTION INTRAMUSCULAR; INTRAVENOUS; SUBCUTANEOUS at 18:03

## 2024-01-08 RX ADMIN — Medication 130 MILLIGRAM(S): at 16:25

## 2024-01-08 RX ADMIN — Medication 2 MILLIGRAM(S): at 01:45

## 2024-01-08 RX ADMIN — CHLORHEXIDINE GLUCONATE 1 APPLICATION(S): 213 SOLUTION TOPICAL at 05:46

## 2024-01-08 RX ADMIN — PIPERACILLIN AND TAZOBACTAM 25 GRAM(S): 4; .5 INJECTION, POWDER, LYOPHILIZED, FOR SOLUTION INTRAVENOUS at 22:16

## 2024-01-08 RX ADMIN — DEXMEDETOMIDINE HYDROCHLORIDE IN 0.9% SODIUM CHLORIDE 11.5 MICROGRAM(S)/KG/HR: 4 INJECTION INTRAVENOUS at 20:38

## 2024-01-08 RX ADMIN — PROPOFOL 5.5 MICROGRAM(S)/KG/MIN: 10 INJECTION, EMULSION INTRAVENOUS at 08:16

## 2024-01-08 RX ADMIN — Medication 255 MILLIMOLE(S): at 11:15

## 2024-01-08 RX ADMIN — PIPERACILLIN AND TAZOBACTAM 25 GRAM(S): 4; .5 INJECTION, POWDER, LYOPHILIZED, FOR SOLUTION INTRAVENOUS at 05:50

## 2024-01-08 RX ADMIN — Medication 2 MILLIGRAM(S): at 02:29

## 2024-01-08 RX ADMIN — HYDROMORPHONE HYDROCHLORIDE 0.5 MILLIGRAM(S): 2 INJECTION INTRAMUSCULAR; INTRAVENOUS; SUBCUTANEOUS at 21:08

## 2024-01-08 RX ADMIN — HYDROMORPHONE HYDROCHLORIDE 0.5 MILLIGRAM(S): 2 INJECTION INTRAMUSCULAR; INTRAVENOUS; SUBCUTANEOUS at 03:20

## 2024-01-08 RX ADMIN — Medication 200 GRAM(S): at 08:15

## 2024-01-08 RX ADMIN — HYDROMORPHONE HYDROCHLORIDE 0.5 MILLIGRAM(S): 2 INJECTION INTRAMUSCULAR; INTRAVENOUS; SUBCUTANEOUS at 03:50

## 2024-01-08 RX ADMIN — HYDROMORPHONE HYDROCHLORIDE 0.5 MILLIGRAM(S): 2 INJECTION INTRAMUSCULAR; INTRAVENOUS; SUBCUTANEOUS at 10:48

## 2024-01-08 RX ADMIN — HYDROMORPHONE HYDROCHLORIDE 0.5 MILLIGRAM(S): 2 INJECTION INTRAMUSCULAR; INTRAVENOUS; SUBCUTANEOUS at 14:04

## 2024-01-08 RX ADMIN — HYDROMORPHONE HYDROCHLORIDE 0.5 MILLIGRAM(S): 2 INJECTION INTRAMUSCULAR; INTRAVENOUS; SUBCUTANEOUS at 10:18

## 2024-01-08 RX ADMIN — DEXMEDETOMIDINE HYDROCHLORIDE IN 0.9% SODIUM CHLORIDE 1.15 MICROGRAM(S)/KG/HR: 4 INJECTION INTRAVENOUS at 02:17

## 2024-01-08 RX ADMIN — LIDOCAINE 1 PATCH: 4 CREAM TOPICAL at 22:16

## 2024-01-08 RX ADMIN — DEXMEDETOMIDINE HYDROCHLORIDE IN 0.9% SODIUM CHLORIDE 11.5 MICROGRAM(S)/KG/HR: 4 INJECTION INTRAVENOUS at 20:03

## 2024-01-08 RX ADMIN — HYDROMORPHONE HYDROCHLORIDE 0.5 MILLIGRAM(S): 2 INJECTION INTRAMUSCULAR; INTRAVENOUS; SUBCUTANEOUS at 17:33

## 2024-01-08 NOTE — DIETITIAN INITIAL EVALUATION ADULT - REASON FOR ADMISSION
Cataracts    Cataracts refer to a clouding or yellowing of the lens of the eye, which is located just behind the pupil and helps to better focus incoming light.  In most cases they develop in people over 60, but can develop earlier and in rare cases are congenital.  Cataracts develop at different rates in different people, and sometimes at different rates in one eye versus the other.  Some cataracts may take years to develop to a point where they are a significant problem, others may worsen in much less time.    Two conditions which can cause them to develop earlier are poorly controlled diabetes and previous eye trauma.  Long term use of steroid medications may also cause them.    People who have cataracts often notice a decrease in their vision-at certain distances or  sometimes in general.  Another common problem is glare, especially at night.    In their early stages, cataracts may simply require a change of eyeglass prescription to see better.  As they progress, if glasses do not improve vision to a satisfactory level to enjoy the tasks of daily living, then cataract surgery is the suggested treatment.  In cataract micro-surgery, a tiny incision is made in the eye and the cloudy lens is removed and replaced with a clear plastic one.  Following cataract surgery, most people experience substantial improvement in their vision.    There is no medicine one can take to treat a cataract.  Protection of the eyes from long term ultraviolet (UV) light exposure throughout life, may help prevent their development.           "48y Male with PMHx hepatitis C (diagnosed many years ago, never treated), depression transferred from outside hospital for trauma eval. Patient found down by roommate after unknown amount of time, found with multiple 4-11 L sided rib fractures with flail chest. Chest tube placed there with >1L output (old blood). Patient also with tachypnea, concern for respiratory failure"

## 2024-01-08 NOTE — CHART NOTE - NSCHARTNOTEFT_GEN_A_CORE
Patient seen and examined at bedside with attending and neurology team. Please refer to attending attestation of neurology consult note from the day prior for final recommendations.    EEG reviewed.  EEG Summary / Classification:  Abnormal EEG in the awake / drowsy states.  •	Background slowing (including PDR < 8)    EEG Impression / Clinical Correlate:  Abnormal prolonged EEG study due to mild diffuse slowing which indicates mild diffuse cerebral dysfunction that is not specific in etiology  No epileptic discharges recorded.  No seizures recorded.  •	Cardiac abnormalities mentioned above are better assessed with cardiac monitoring.  •	EEG is limited in part by excessive artifact.      Ok to dc EEG and monitor off ASMs. Will follow up on MRI brain (if ordered, not currently ordered)

## 2024-01-08 NOTE — DIETITIAN INITIAL EVALUATION ADULT - PERTINENT MEDS FT
MEDICATIONS  (STANDING):  chlorhexidine 0.12% Liquid 15 milliLiter(s) Oral Mucosa every 12 hours  chlorhexidine 2% Cloths 1 Application(s) Topical <User Schedule>  enoxaparin Injectable 40 milliGRAM(s) SubCutaneous <User Schedule>  influenza   Vaccine 0.5 milliLiter(s) IntraMuscular once  lidocaine   4% Patch 1 Patch Transdermal every 24 hours  phenylephrine    Infusion 0.2 MICROgram(s)/kG/Min (6.88 mL/Hr) IV Continuous <Continuous>  piperacillin/tazobactam IVPB.. 3.375 Gram(s) IV Intermittent every 8 hours  polyethylene glycol 3350 17 Gram(s) Oral every 12 hours  propofol Infusion 10 MICROgram(s)/kG/Min (5.5 mL/Hr) IV Continuous <Continuous>  senna 2 Tablet(s) Oral at bedtime  sodium phosphate 30 milliMole(s)/500 mL IVPB 30 milliMole(s) IV Intermittent once    MEDICATIONS  (PRN):  HYDROmorphone  Injectable 0.5 milliGRAM(s) IV Push every 3 hours PRN Breakthrough  LORazepam   Injectable 2 milliGRAM(s) IV Push every 1 hour PRN CIWA-Ar score 8 or greater  oxyCODONE    IR 5 milliGRAM(s) Oral every 3 hours PRN Moderate Pain (4 - 6)  oxyCODONE    IR 10 milliGRAM(s) Oral every 3 hours PRN Severe Pain (7 - 10)

## 2024-01-08 NOTE — DIETITIAN INITIAL EVALUATION ADULT - NSFNSGIIOFT_GEN_A_CORE
01-07-24 @ 07:01  -  01-08-24 @ 07:00  --------------------------------------------------------  OUT:    Chest Tube (mL): 390 mL  Total OUT: 390 mL    Total NET: -390 mL

## 2024-01-08 NOTE — CONSULT NOTE ADULT - SUBJECTIVE AND OBJECTIVE BOX
BIANCA MICHAEL [id number]  48y Male  2d    HPI:  TRAUMA SERVICE (Acute Care Surgery / ACS - #[id number]) - CONSULT NOTE  --------------------------------------------------------------------------------------------    TRAUMA ACTIVATION LEVEL: 1    MECHANISM OF INJURY:   [] Blunt | [] MVC | [x] Fall | 	[] Pedestrian Struck| [] Motorcycle accident   [] Penetrating | [] Gun Shot Wound | [] Stab Wound    GCS: 15	E: 4	V: 5	M: 6    HPI  48y Male with PMHx hepatitis C (diagnosed many years ago, never treated), depression transferred from outside hospital for trauma eval. Patient found down by roommate after unknown amount of time, found with multiple 4-11 L sided rib fractures with flail chest. Chest tube placed there with >1L output (old blood). Patient also with tachypnea, concern for respiratory failure started on BiPAP. Level 1 called for transient hypotension, patient given 2 unit PRBC in [facility] ED. Intubated 1/8 for agitation and respiratory distress.      PAST MEDICAL & SURGICAL HISTORY:  No pertinent past medical history      No significant past surgical history        MEDICATIONS  (STANDING):  chlorhexidine 0.12% Liquid 15 milliLiter(s) Oral Mucosa every 12 hours  chlorhexidine 2% Cloths 1 Application(s) Topical <User Schedule>  dexMEDEtomidine Infusion 0.5 MICROgram(s)/kG/Hr (11.5 mL/Hr) IV Continuous <Continuous>  enoxaparin Injectable 40 milliGRAM(s) SubCutaneous <User Schedule>  influenza   Vaccine 0.5 milliLiter(s) IntraMuscular once  lidocaine   4% Patch 1 Patch Transdermal every 24 hours  phenylephrine    Infusion 0.2 MICROgram(s)/kG/Min (6.88 mL/Hr) IV Continuous <Continuous>  piperacillin/tazobactam IVPB.. 3.375 Gram(s) IV Intermittent every 8 hours  polyethylene glycol 3350 17 Gram(s) Oral every 12 hours  propofol Infusion 10 MICROgram(s)/kG/Min (5.5 mL/Hr) IV Continuous <Continuous>  senna 2 Tablet(s) Oral at bedtime    MEDICATIONS  (PRN):  HYDROmorphone  Injectable 0.5 milliGRAM(s) IV Push every 3 hours PRN Breakthrough      Allergies    No Known Allergies    Intolerances        REVIEW OF SYSTEMS    [ ] A ten-point review of systems was otherwise negative except as noted.  [X] Due to altered mental status/intubation, subjective information were not able to be obtained from the patient. History was obtained, to the extent possible, from review of the chart and collateral sources of information.      Vital Signs Last 24 Hrs  T(C): 37.2 (08 Jan 2024 11:00), Max: 37.2 (08 Jan 2024 11:00)  T(F): 98.9 (08 Jan 2024 11:00), Max: 98.9 (08 Jan 2024 11:00)  HR: 89 (08 Jan 2024 13:30) (77 - 132)  BP: 95/62 (08 Jan 2024 13:30) (76/51 - 143/65)  BP(mean): 72 (08 Jan 2024 13:30) (57 - 109)  RR: 30 (08 Jan 2024 13:30) (25 - 53)  SpO2: 98% (08 Jan 2024 13:30) (89% - 100%)    Parameters below as of 08 Jan 2024 07:00  Patient On (Oxygen Delivery Method): ventilator        PHYSICAL EXAM:  GENERAL: Intubated, appropriately sedated  CHEST/LUNG: Clear to auscultation bilaterally, notable L chest tube in place.   HEART: Regular rate and rhythm  ABDOMEN: Soft, Nontender, Nondistended;   EXTREMITIES:  No clubbing, cyanosis, or edema      LABS:  Labs:  CAPILLARY BLOOD GLUCOSE                              10.5   14.98 )-----------( 233      ( 07 Jan 2024 06:47 )             28.5         01-08    123<L>  |  90<L>  |  17  ----------------------------<  93  3.8   |  23  |  0.77      Phosphorus: 2.1 mg/dL (01-08-24 @ 04:31)      LFTs:             6.3  | 0.8  | 107      ------------------[134     ( 06 Jan 2024 17:30 )  2.0  | x    | 28          Lipase:23     Amylase:x         Blood Gas Arterial, Lactate: 1.1 mmol/L (01-08-24 @ 04:05)  Blood Gas Venous - Lactate: 1.4 mmol/L (01-07-24 @ 06:35)  Blood Gas Venous - Lactate: 2.8 mmol/L (01-06-24 @ 21:15)    ABG - ( 08 Jan 2024 04:05 )  pH: 7.56  /  pCO2: 26    /  pO2: 170   / HCO3: 23    / Base Excess: 1.8   /  SaO2: 100.0             Coags:     11.4   ----< 1.04    ( 08 Jan 2024 04:31 )     26.0        CARDIAC MARKERS ( 06 Jan 2024 23:41 )  x     / x     / 156 U/L / x     / x      CARDIAC MARKERS ( 06 Jan 2024 17:30 )  x     / x     / 349 U/L / x     / x              Urinalysis Basic - ( 08 Jan 2024 04:31 )    Color: x / Appearance: x / SG: x / pH: x  Gluc: 93 mg/dL / Ketone: x  / Bili: x / Urobili: x   Blood: x / Protein: x / Nitrite: x   Leuk Esterase: x / RBC: x / WBC x   Sq Epi: x / Non Sq Epi: x / Bacteria: x        Culture - Blood (collected 06 Jan 2024 21:48)  Source: .Blood Blood-Peripheral  Preliminary Report (08 Jan 2024 07:02):    No growth at 24 hours    Culture - Blood (collected 06 Jan 2024 21:48)  Source: .Blood Blood-Peripheral  Preliminary Report (08 Jan 2024 07:02):    No growth at 24 hours          RADIOLOGY & ADDITIONAL STUDIES:  < from: CT Abdomen and Pelvis No Cont (01.06.24 @ 18:05) >  FINDINGS:  CHEST:  LUNGS AND LARGE AIRWAYS: Patent central airways. There is moderate   subpleural consolidative opacities throughout the left lower lobe   posterolaterally. There is focal consolidative opacity in the right lower   lobe posteromedially.  PLEURA: There is a left-sided chest tube extending posteriorly and   caudally, entering between the fifth and sixth ribs laterally. There is   mild left hydropneumothorax.  VESSELS: Within normal limits.  HEART: Heart size is normal. No pericardial effusion.  MEDIASTINUM AND SENTHIL: No lymphadenopathy.  CHEST WALL AND LOWER NECK: There are minimally displaced fractures of the   left posterolateral fourth, fifth, sixth, and 11th ribs, mildly displaced   fractures of the left anterolateral fourth, fifth, sixth, and seventh   ribs, and displaced fractures of the left posterior seventh, eighth,   ninth, and 10th ribs.    ABDOMEN AND PELVIS:  LIVER: Mild diffuse decreased attenuation of the liver, compatible with   steatosis. Otherwise, within normal limits.  BILE DUCTS: Normal caliber.  GALLBLADDER: Within normal limits.  SPLEEN: Within normal limits.  PANCREAS: Within normal limits.  ADRENALS: Within normal limits.  KIDNEYS/URETERS: Within normal limits.    BLADDER: Within normal limits.  REPRODUCTIVE ORGANS: The prostate is not enlarged.    BOWEL: No bowel obstruction. Appendix is normal.  PERITONEUM: No ascites.  VESSELS: Within normal limits.  RETROPERITONEUM/LYMPH NODES: No lymphadenopathy.  ABDOMINAL WALL: Within normal limits.  BONES: There are subtle compression deformities of the superior L1 and L2   vertebral bodies.    IMPRESSION: Multiple left rib fractures, as described, concerning for   flail chest. Left-sided chest tube with mild left hydropneumothorax.   Subtle L1 and L2 superior compression deformities.    --- End of Report ---    < end of copied text >   BIANCA MICHAEL [id number]  48y Male  2d    HPI:  TRAUMA SERVICE (Acute Care Surgery / ACS - #[id number]) - CONSULT NOTE  --------------------------------------------------------------------------------------------    TRAUMA ACTIVATION LEVEL: 1    MECHANISM OF INJURY:   [] Blunt | [] MVC | [x] Fall | 	[] Pedestrian Struck| [] Motorcycle accident   [] Penetrating | [] Gun Shot Wound | [] Stab Wound    GCS: 15	E: 4	V: 5	M: 6    HPI  48y Male with PMHx hepatitis C (diagnosed many years ago, never treated), depression transferred from outside hospital for trauma eval. Patient found down by roommate after unknown amount of time, found with multiple 4-11 L sided rib fractures with flail chest. Chest tube placed there with >1L output (old blood). Patient also with tachypnea, concern for respiratory failure started on BiPAP. Level 1 called for transient hypotension, patient given 2 unit PRBC in [facility] ED. Intubated 1/8 for agitation and respiratory distress.      PAST MEDICAL & SURGICAL HISTORY:  No pertinent past medical history      No significant past surgical history        MEDICATIONS  (STANDING):  chlorhexidine 0.12% Liquid 15 milliLiter(s) Oral Mucosa every 12 hours  chlorhexidine 2% Cloths 1 Application(s) Topical <User Schedule>  dexMEDEtomidine Infusion 0.5 MICROgram(s)/kG/Hr (11.5 mL/Hr) IV Continuous <Continuous>  enoxaparin Injectable 40 milliGRAM(s) SubCutaneous <User Schedule>  influenza   Vaccine 0.5 milliLiter(s) IntraMuscular once  lidocaine   4% Patch 1 Patch Transdermal every 24 hours  phenylephrine    Infusion 0.2 MICROgram(s)/kG/Min (6.88 mL/Hr) IV Continuous <Continuous>  piperacillin/tazobactam IVPB.. 3.375 Gram(s) IV Intermittent every 8 hours  polyethylene glycol 3350 17 Gram(s) Oral every 12 hours  propofol Infusion 10 MICROgram(s)/kG/Min (5.5 mL/Hr) IV Continuous <Continuous>  senna 2 Tablet(s) Oral at bedtime    MEDICATIONS  (PRN):  HYDROmorphone  Injectable 0.5 milliGRAM(s) IV Push every 3 hours PRN Breakthrough      Allergies    No Known Allergies    Intolerances        REVIEW OF SYSTEMS    [ ] A ten-point review of systems was otherwise negative except as noted.  [X] Due to altered mental status/intubation, subjective information were not able to be obtained from the patient. History was obtained, to the extent possible, from review of the chart and collateral sources of information.      Vital Signs Last 24 Hrs  T(C): 37.2 (08 Jan 2024 11:00), Max: 37.2 (08 Jan 2024 11:00)  T(F): 98.9 (08 Jan 2024 11:00), Max: 98.9 (08 Jan 2024 11:00)  HR: 89 (08 Jan 2024 13:30) (77 - 132)  BP: 95/62 (08 Jan 2024 13:30) (76/51 - 143/65)  BP(mean): 72 (08 Jan 2024 13:30) (57 - 109)  RR: 30 (08 Jan 2024 13:30) (25 - 53)  SpO2: 98% (08 Jan 2024 13:30) (89% - 100%)    Parameters below as of 08 Jan 2024 07:00  Patient On (Oxygen Delivery Method): ventilator        PHYSICAL EXAM:  GENERAL: Intubated, appropriately sedated  CHEST/LUNG: Clear to auscultation bilaterally, notable L chest tube in place.   HEART: Regular rate and rhythm  ABDOMEN: Soft, Nontender, Nondistended;   EXTREMITIES:  No clubbing, cyanosis, or edema      LABS:  Labs:  CAPILLARY BLOOD GLUCOSE                              10.5   14.98 )-----------( 233      ( 07 Jan 2024 06:47 )             28.5         01-08    123<L>  |  90<L>  |  17  ----------------------------<  93  3.8   |  23  |  0.77      Phosphorus: 2.1 mg/dL (01-08-24 @ 04:31)      LFTs:             6.3  | 0.8  | 107      ------------------[134     ( 06 Jan 2024 17:30 )  2.0  | x    | 28          Lipase:23     Amylase:x         Blood Gas Arterial, Lactate: 1.1 mmol/L (01-08-24 @ 04:05)  Blood Gas Venous - Lactate: 1.4 mmol/L (01-07-24 @ 06:35)  Blood Gas Venous - Lactate: 2.8 mmol/L (01-06-24 @ 21:15)    ABG - ( 08 Jan 2024 04:05 )  pH: 7.56  /  pCO2: 26    /  pO2: 170   / HCO3: 23    / Base Excess: 1.8   /  SaO2: 100.0             Coags:     11.4   ----< 1.04    ( 08 Jan 2024 04:31 )     26.0        CARDIAC MARKERS ( 06 Jan 2024 23:41 )  x     / x     / 156 U/L / x     / x      CARDIAC MARKERS ( 06 Jan 2024 17:30 )  x     / x     / 349 U/L / x     / x              Urinalysis Basic - ( 08 Jan 2024 04:31 )    Color: x / Appearance: x / SG: x / pH: x  Gluc: 93 mg/dL / Ketone: x  / Bili: x / Urobili: x   Blood: x / Protein: x / Nitrite: x   Leuk Esterase: x / RBC: x / WBC x   Sq Epi: x / Non Sq Epi: x / Bacteria: x        Culture - Blood (collected 06 Jan 2024 21:48)  Source: .Blood Blood-Peripheral  Preliminary Report (08 Jan 2024 07:02):    No growth at 24 hours    Culture - Blood (collected 06 Jan 2024 21:48)  Source: .Blood Blood-Peripheral  Preliminary Report (08 Jan 2024 07:02):    No growth at 24 hours          RADIOLOGY & ADDITIONAL STUDIES:  < from: CT Abdomen and Pelvis No Cont (01.06.24 @ 18:05) >  FINDINGS:  CHEST:  LUNGS AND LARGE AIRWAYS: Patent central airways. There is moderate   subpleural consolidative opacities throughout the left lower lobe   posterolaterally. There is focal consolidative opacity in the right lower   lobe posteromedially.  PLEURA: There is a left-sided chest tube extending posteriorly and   caudally, entering between the fifth and sixth ribs laterally. There is   mild left hydropneumothorax.  VESSELS: Within normal limits.  HEART: Heart size is normal. No pericardial effusion.  MEDIASTINUM AND SENTHIL: No lymphadenopathy.  CHEST WALL AND LOWER NECK: There are minimally displaced fractures of the   left posterolateral fourth, fifth, sixth, and 11th ribs, mildly displaced   fractures of the left anterolateral fourth, fifth, sixth, and seventh   ribs, and displaced fractures of the left posterior seventh, eighth,   ninth, and 10th ribs.    ABDOMEN AND PELVIS:  LIVER: Mild diffuse decreased attenuation of the liver, compatible with   steatosis. Otherwise, within normal limits.  BILE DUCTS: Normal caliber.  GALLBLADDER: Within normal limits.  SPLEEN: Within normal limits.  PANCREAS: Within normal limits.  ADRENALS: Within normal limits.  KIDNEYS/URETERS: Within normal limits.    BLADDER: Within normal limits.  REPRODUCTIVE ORGANS: The prostate is not enlarged.    BOWEL: No bowel obstruction. Appendix is normal.  PERITONEUM: No ascites.  VESSELS: Within normal limits.  RETROPERITONEUM/LYMPH NODES: No lymphadenopathy.  ABDOMINAL WALL: Within normal limits.  BONES: There are subtle compression deformities of the superior L1 and L2   vertebral bodies.    IMPRESSION: Multiple left rib fractures, as described, concerning for   flail chest. Left-sided chest tube with mild left hydropneumothorax.   Subtle L1 and L2 superior compression deformities.    --- End of Report ---    < end of copied text >

## 2024-01-08 NOTE — ADVANCED PRACTICE NURSE CONSULT - ASSESSMENT
Patient encountered on 8ICU. When wound care RN arrived on unit, patient was found lying in a low air loss pressure redistribution support surface style bed. Patient was intubated with B/L wrist restraints in use, he is unable to turn independently and staff assistance x 2 was provided. Once turned, wound care RN was able to visualize an area of gray and tan adherent eschar in place over right buttocks measuring approximately 5cm x 5cm x unknown - presentation is consistent with an ustageable pressure injury present on admission. Once consult was complete, patient was placed in a modified right side-lying position utilizing pillow positioner assistive devices. Education regarding the need for routine turning and positioning to prevent pressure injuries not appropriate at this time.  Patient encountered on 8ICU. When wound care RN arrived on unit, patient was found lying in a low air loss pressure redistribution support surface style bed. Patient was intubated with B/L wrist restraints in use, he is unable to turn independently and staff assistance x 2 was provided. Once turned, wound care RN was able to visualize an area of gray and tan adherent eschar in place over right buttocks measuring approximately 5cm x 5cm x unknown - presentation is consistent with an unstageable pressure injury present on admission. On B/L heels, there is hyperpigmentation noted to measure approximately 3cm x 3cm x 0cm - cannot rule out a deep tissue injury present on admission. Once consult was complete, patient was placed in a modified right side-lying position utilizing pillow positioner assistive devices. Education regarding the need for routine turning and positioning to prevent pressure injuries not appropriate at this time.

## 2024-01-08 NOTE — PROGRESS NOTE ADULT - ATTENDING COMMENTS
ATTENDING ATTESTATION:    48M history of hepatitis C found down with following injuries:  - left 4-11 severely displaced rib fractures with flail segment  - left hemothorax s/p chest tube   - L1-L2 superior compression deformities of vertebral bodies    Intubated overnight for tachypnea and/or alcohol withdrawal  PRVC FIO2 40%, PEEP 5  Left CT serosanguinous - 390cc, no air leak    WBC = 19  Hb = 9.7  Na = 130    CXR 1/8 - minimal left pleural effusion, no pneumothorax    A/P:  #left 4-11 severely displaced rib fractures with flail segment and acute respiratory failure  - thoracic surgery consult for possible SSRF  - continue left chest tube    #L1-L2 superior compression deformities of vertebral bodies  - spine consult    Care coordinated with SICU.       Total time spent in the care of this patient today (excluding critical care, teaching & procedures): 35 minutes    Over 50% of the total time was spent in discussion and coordination of care with consulting services, dietary and rehab services.    Moon Banks MD  Acute Care Surgery

## 2024-01-08 NOTE — PROGRESS NOTE ADULT - ASSESSMENT
48y Male with PMHx hepatitis C (diagnosed many years ago, never treated), depression transferred from outside hospital for trauma eval. Patient found down by roommate after unknown amount of time, found with multiple 4-11 L sided rib fractures with flail chest. Chest tube placed there with >1L output (old blood). Patient also with tachypnea, concern for respiratory failure started on BiPAP. Level 1 called for transient hypotension, patient given 2 unit PRBC in [facility] ED. Rib score 3    Injuries:   - left posterolateral fourth, fifth, sixth, and 11th ribs, minimally displaced   - mildly displaced fractures of the left anterolateral fourth, fifth, sixth, and seventh ribs  - displaced fractures of the left posterior seventh, eighth, ninth, and 10th ribs.    PLAN  - NPO/IVF  - Wean vent and pressors as tolerated  - Rib fracture protocol   - Plan for rib platting for flail chest  - ISS  - c/w chest tube  - Appreciate excellent SICU care    ACS/Trauma  [id number] 48y Male with PMHx hepatitis C (diagnosed many years ago, never treated), depression transferred from outside hospital for trauma eval. Patient found down by roommate after unknown amount of time, found with multiple 4-11 L sided rib fractures with flail chest. Chest tube placed there with >1L output (old blood). Patient also with tachypnea, concern for respiratory failure started on BiPAP. Level 1 called for transient hypotension, patient given 2 unit PRBC in [facility] ED. Rib score 3    Injuries:   - left posterolateral fourth, fifth, sixth, and 11th ribs, minimally displaced   - mildly displaced fractures of the left anterolateral fourth, fifth, sixth, and seventh ribs  - displaced fractures of the left posterior seventh, eighth, ninth, and 10th ribs.    PLAN  - NPO/IVF  - Wean vent and pressors as tolerated  - Rib fracture protocol   - Plan for rib platting for flail chest  - ISS  - c/w chest tube  - Appreciate excellent SICU care    ACS/Trauma  [id number]

## 2024-01-08 NOTE — DIETITIAN INITIAL EVALUATION ADULT - ADD RECOMMEND
1) Recommend 6Scan Peptode 1.5 @ 60mL x 24hrs providing 1440mL of formula, 2214kcal/day (24kcal/kg), 106g protein/day (1.2g/kg), 1008mL of free water - based on 91.6kg   -Defer free water to Team   2) Add multivitamin, folic acid, vitamin C and thiamine daily  3) Monitor diet tolerance, weight trends, labs, GI function, and skin integrity    Estelita Javier, MS, RDN, CDN (Teams)  1) Recommend Tradesparq Peptode 1.5 @ 60mL x 24hrs providing 1440mL of formula, 2214kcal/day (24kcal/kg), 106g protein/day (1.2g/kg), 1008mL of free water - based on 91.6kg   -Defer free water to Team   2) Add multivitamin, folic acid, vitamin C and thiamine daily  3) Monitor diet tolerance, weight trends, labs, GI function, and skin integrity    Estelita Javier, MS, RDN, CDN (Teams)  1) Recommend Pivot 1.5 @ 40mL x 24hrs providing 960mL of formula, 1440kcal/day, 90g protein/day (1.2g/kg), 720mL of free water - based on 91.6kg   -Additional 726kcal/day via lipids from Propofol  -Add Prosource TF Free x1/day for an additional 90kcal and 15g protein   -Total: 2166kcal/day (24kcal/kg), 105g protein/day (1.2g/kg) - based on 91.6kg   -Defer free water to Team   2) Add multivitamin, folic acid, vitamin C and thiamine daily  3) Monitor diet tolerance, weight trends, labs, GI function, and skin integrity    Estelita Javier, MS, RDN, CDN (Teams)

## 2024-01-08 NOTE — PROGRESS NOTE ADULT - SUBJECTIVE AND OBJECTIVE BOX
ACS TEAM SURGERY DAILY PROGRESS NOTE:     INTERVAL EVENTS:   -follow up on potassium s/p repletion  -chest tube  suction  -f/u procal and bc  -increasing agitation, diaphoresis, tremor-on precedex,  -intubated for AMS and increase WOB      SUBJECTIVE/ROS: Patient seen and examined at bedside by surgical team.      OBJECTIVE:  Vital Signs Last 24 Hrs  T(C): 36.8 (08 Jan 2024 03:00), Max: 36.8 (07 Jan 2024 23:00)  T(F): 98.2 (08 Jan 2024 03:00), Max: 98.2 (07 Jan 2024 23:00)  HR: 82 (08 Jan 2024 08:30) (82 - 132)  BP: 109/73 (08 Jan 2024 08:30) (76/51 - 134/66)  BP(mean): 86 (08 Jan 2024 08:30) (57 - 109)  RR: 31 (08 Jan 2024 08:30) (25 - 53)  SpO2: 98% (08 Jan 2024 08:30) (89% - 100%)    Parameters below as of 07 Jan 2024 19:00  Patient On (Oxygen Delivery Method): room air                            10.5   14.98 )-----------( 233      ( 07 Jan 2024 06:47 )             28.5     01-08    123<L>  |  90<L>  |  17  ----------------------------<  93  3.8   |  23  |  0.77    Ca    8.0<L>      08 Jan 2024 04:31  Phos  2.1     01-08  Mg     2.2     01-08    TPro  6.3  /  Alb  2.0<L>  /  TBili  0.8  /  DBili  x   /  AST  107<H>  /  ALT  28  /  AlkPhos  134<H>  01-06   PT/INR - ( 08 Jan 2024 04:31 )   PT: 11.4 sec;   INR: 1.04 ratio         PTT - ( 08 Jan 2024 04:31 )  PTT:26.0 sec  I&O's Detail    07 Jan 2024 07:01  -  08 Jan 2024 07:00  --------------------------------------------------------  IN:    IV PiggyBack: 650 mL    IV PiggyBack: 600 mL    Lactated Ringers Bolus: 1000 mL    Oral Fluid: 600 mL    Phenylephrine: 123.7 mL    Propofol: 27.5 mL  Total IN: 3001.2 mL    OUT:    Chest Tube (mL): 390 mL    Dexmedetomidine: 0 mL    Voided (mL): 1500 mL  Total OUT: 1890 mL    Total NET: 1111.2 mL      08 Jan 2024 07:01  -  08 Jan 2024 09:32  --------------------------------------------------------  IN:    IV PiggyBack: 50 mL    IV PiggyBack: 100 mL    Phenylephrine: 82.6 mL    Propofol: 33 mL  Total IN: 265.6 mL    OUT:    Voided (mL): 0 mL  Total OUT: 0 mL    Total NET: 265.6 mL          IMAGING:      PHYSICAL EXAM:  Constitutional: NAD  Respiratory: non-labored breathing, patent airway  Gastrointestinal: abdomen soft, nontender, nondistended  Extremities: warm  Neurological: intact

## 2024-01-08 NOTE — ADVANCED PRACTICE NURSE CONSULT - RECOMMEDATIONS
Impression:    right buttocks unstageable pressure injury present on admission   B/L heel hyperpigmentation, cannot rule out a deep tissue injury present on admission    Recommendations:    1) continue turning and positioning q2 and PRN utilizing offloading assistive devices  2) continue with routine pericare daily and PRN soiling  3) encourage optimal nutrition  4) waffle cushion when oob to chair  5) B/L LE complete cair air fluidized boots OR carla lock pillow to offload heels/feet  6) triad protective barrier cream to B/L buttocks/sacrum daily and PRN soiling    Plan discussed with ELIZABETH Mccormick at bedside    For questions or comments regarding this consult please call Lisseth at [phone]. Thank you. Impression:    right buttocks unstageable pressure injury present on admission   B/L heel hyperpigmentation, cannot rule out a deep tissue injury present on admission    Recommendations:    1) continue turning and positioning q2 and PRN utilizing offloading assistive devices  2) continue with routine pericare daily and PRN soiling  3) encourage optimal nutrition  4) waffle cushion when oob to chair  5) B/L LE complete cair air fluidized boots OR carla lock pillow to offload heels/feet  6) triad protective barrier cream to B/L buttocks/sacrum daily and PRN soiling    Plan discussed with ELIZABETH Mccormick at bedside    For questions or comments regarding this consult please call Lisseth at [phone]. Thank you.

## 2024-01-08 NOTE — PROGRESS NOTE ADULT - CRITICAL CARE ATTENDING COMMENT
Dr. Garcia (Attending Physician)  Agitated Delirium likely from EtOH withdrawal, will start phenobarb  Acute resp failure intubated overnight for agitation/tachypnea Dr. Garcia (Attending Physician)  Agitated Delirium, likely from EtOH withdrawal, will start phenobarb  Acute resp failure intubated overnight for agitation/tachypnea, flail chest will consult thoracic surgery for rib plating  Hyponatremia will send Lashaun, Uosm, euvolemic

## 2024-01-08 NOTE — PROVIDER CONTACT NOTE (OTHER) - SITUATION
Patient experiencing extreme agitation. Patient trying to get out of bed, pull lines, and becoming very agitated.

## 2024-01-08 NOTE — DIETITIAN INITIAL EVALUATION ADULT - PERTINENT LABORATORY DATA
01-08    123<L>  |  90<L>  |  17  ----------------------------<  93  3.8   |  23  |  0.77    Ca    8.0<L>      08 Jan 2024 04:31  Phos  2.1     01-08  Mg     2.2     01-08    TPro  6.3  /  Alb  2.0<L>  /  TBili  0.8  /  DBili  x   /  AST  107<H>  /  ALT  28  /  AlkPhos  134<H>  01-06

## 2024-01-08 NOTE — PROGRESS NOTE ADULT - ASSESSMENT
Neurologic:  - AO x 4  - Multimodal pain control with Tylenol, Lidocaine patch, oxycodone PRN  - Dilaudid for breakthrough pain  - Send urine tox  - Concern for history of ETOH use, not actively intoxicated, monitor for signs of withdrawal   -CIWA  -EEG pending read    Respiratory:  - On 100% RA, breathing comfortable, saturating well,   - L sided chest tube placed at outside hospital, water seal  - Incentive spirometry  - AM CXR    Cardiovascular:  - Sinus tachycardia rate low 100s, ?multifactorial, monitor  - BP stable  - Send lactate     Gastrointestinal/Nutrition:  - Regular diet  - Bowel regimen with senna, Miralax    Genitourinary/Renal:  - Creatinine normalized, electrolytes improved   - CPK cleared  - Supplement electrolytes PRN  -hypokalemia, hyponatremia    Hematologic:  - Chemical VTE ppx with Lovenox  - Mechanical VTE ppx with SCDs    Infectious Disease:  - Treated empirically for sepsis with Rocephin, Azithro   -Zosyn 1/7-  - Procal 5.27  - Send UA, blood cultures x 2  - Afebrile here, monitor off antibiotics     Endocrine:  - No active issues, no history of DM    Disposition: SICU.       ASSESSMENT: 48y Male with PMHx hepatitis C (diagnosed many years ago, never treated), depression transferred from outside hospital for trauma eval. Patient found down by roommate after unknown amount of time, found with multiple 4-11 L sided rib fractures with flail chest. Chest tube placed there with >1L output (old blood). Patient also with tachypnea, concern for respiratory failure started on BiPAP. Level 1 called for transient hypotension, patient given 2 unit PRBC in [facility] ED. Patient admitted to SICU for hemodynamic monitoring, pain management.       Neurologic:  - AO x 4  - Multimodal pain control with Tylenol, Lidocaine patch, oxycodone PRN  - Dilaudid for breakthrough pain  - Send urine tox  - Concern for history of ETOH use, not actively intoxicated, monitor for signs of withdrawal   -CIWA  -EEG pending read    Respiratory:  - On 100% RA, breathing comfortable, saturating well,   - L sided chest tube placed at outside hospital, water seal  - Incentive spirometry  - AM CXR    Cardiovascular:  - Sinus tachycardia rate low 100s, ?multifactorial, monitor  - BP stable  - Send lactate     Gastrointestinal/Nutrition:  - Regular diet  - Bowel regimen with senna, Miralax    Genitourinary/Renal:  - Creatinine normalized, electrolytes improved   - CPK cleared  - Supplement electrolytes PRN  -hypokalemia, hyponatremia    Hematologic:  - Chemical VTE ppx with Lovenox  - Mechanical VTE ppx with SCDs    Infectious Disease:  - Treated empirically for sepsis with Rocephin, Azithro   -Zosyn 1/7-  - Procal 5.27  - Send UA, blood cultures x 2  - Afebrile here, monitor off antibiotics     Endocrine:  - No active issues, no history of DM    Disposition: SICU.       ASSESSMENT: 48y Male with PMHx hepatitis C (diagnosed many years ago, never treated), depression transferred from outside hospital for trauma eval. Patient found down by roommate after unknown amount of time, found with multiple 4-11 L sided rib fractures with flail chest. Chest tube placed there with >1L output (old blood). Patient also with tachypnea, concern for respiratory failure started on BiPAP. Level 1 called for transient hypotension, patient given 2 unit PRBC in [facility] ED. Patient admitted to SICU for hemodynamic monitoring, pain management.       Neurologic:  - AO x 4  - Multimodal pain control with Tylenol, Lidocaine patch, oxycodone PRN  - Dilaudid for breakthrough pain  - Send urine tox  - Concern for history of ETOH use, not actively intoxicated, monitor for signs of withdrawal   -CIWA  -EEG pending read    Respiratory:  - On 100% RA, breathing comfortable, saturating well,   - L sided chest tube placed at outside hospital, water seal  - Incentive spirometry  - AM CXR    Cardiovascular:  - Sinus tachycardia rate low 100s, ?multifactorial, monitor  - BP stable  - Send lactate     Gastrointestinal/Nutrition:  - Regular diet  - Bowel regimen with senna, Miralax    Genitourinary/Renal:  - Creatinine normalized, electrolytes improved   - CPK cleared  - Supplement electrolytes PRN  -hypokalemia, hyponatremia    Hematologic:  - Chemical VTE ppx with Lovenox  - Mechanical VTE ppx with SCDs    Infectious Disease:  - Treated empirically for sepsis with Rocephin, Azithro   -Zosyn 1/7-  - Procal 5.27  - Send UA, blood cultures x 2  - Afebrile here, monitor off antibiotics     Endocrine:  - No active issues, no history of DM    Disposition: SICU.       ASSESSMENT: 48y Male with PMHx hepatitis C (diagnosed many years ago, never treated), depression transferred from outside hospital for trauma eval. Patient found down by roommate after unknown amount of time, found with multiple 4-11 L sided rib fractures with flail chest. Chest tube placed there with >1L output (old blood). Patient also with tachypnea, concern for respiratory failure started on BiPAP. Level 1 called for transient hypotension, patient given 2 unit PRBC in [facility] ED. Patient admitted to SICU for hemodynamic monitoring, pain management.       Neurologic:  - AO x 4  - Multimodal pain control with Tylenol, Lidocaine patch, oxycodone PRN  - Dilaudid for breakthrough pain  - Send urine tox  - Concern for history of ETOH use, not actively intoxicated, monitor for signs of withdrawal   -precedex for agitation   -EEG pending read    Respiratory:  - On 100% RA, breathing comfortable, saturating well,   - L sided chest tube placed at outside hospital, water seal  - Incentive spirometry  - AM CXR    Cardiovascular:  - Sinus tachycardia rate low 100s, ?multifactorial, monitor  - BP stable  - Send lactate     Gastrointestinal/Nutrition:  - Regular diet  - Bowel regimen with senna, Miralax    Genitourinary/Renal:  - Creatinine normalized, electrolytes improved   - CPK cleared  - Supplement electrolytes PRN  -hypokalemia, hyponatremia    Hematologic:  - Chemical VTE ppx with Lovenox  - Mechanical VTE ppx with SCDs    Infectious Disease:  - Treated empirically for sepsis with Rocephin, Azithro   -Zosyn 1/7-  - Procal 5.27  - Send UA, blood cultures x 2  - Afebrile here, monitor off antibiotics     Endocrine:  - No active issues, no history of DM    Disposition: SICU.       ASSESSMENT: 48y Male with PMHx hepatitis C (diagnosed many years ago, never treated), depression transferred from outside hospital for trauma eval. Patient found down by roommate after unknown amount of time, found with multiple 4-11 L sided rib fractures with flail chest. Chest tube placed there with >1L output (old blood). Patient also with tachypnea, concern for respiratory failure started on BiPAP. Level 1 called for transient hypotension, patient given 2 unit PRBC in [facility] ED. Patient admitted to SICU for hemodynamic monitoring, pain management.       Neurologic:  - AO x 4  - Multimodal pain control with Tylenol, Lidocaine patch, oxycodone PRN  - Dilaudid for breakthrough pain  - Send urine tox  - Concern for history of ETOH use, not actively intoxicated, monitor for signs of withdrawal   -precedex for agitation   -EEG pending read    Respiratory:  - On 100% RA, breathing comfortable, saturating well,   - L sided chest tube placed at outside hospital, water seal  - Incentive spirometry  - AM CXR    Cardiovascular:  - Sinus tachycardia rate low 100s, ?multifactorial, monitor  - BP stable  - Send lactate     Gastrointestinal/Nutrition:  - Regular diet  - Bowel regimen with senna, Miralax    Genitourinary/Renal:  - Creatinine normalized, electrolytes improved   - CPK cleared  - Supplement electrolytes PRN  -hypokalemia, hyponatremia    Hematologic:  - Chemical VTE ppx with Lovenox  - Mechanical VTE ppx with SCDs    Infectious Disease:  - Treated empirically for sepsis with Rocephin, Azithro   -Zosyn 1/7-  - Procal 5.27  - Send UA, blood cultures x 2  - Afebrile here, monitor off antibiotics     Endocrine:  - No active issues, no history of DM    Disposition: SICU.       ASSESSMENT: 48y Male with PMHx hepatitis C (diagnosed many years ago, never treated), depression transferred from outside hospital for trauma eval. Patient found down by roommate after unknown amount of time, found with multiple 4-11 L sided rib fractures with flail chest. Chest tube placed there with >1L output (old blood). Patient also with tachypnea, concern for respiratory failure started on BiPAP. Level 1 called for transient hypotension, patient given 2 unit PRBC in [facility] ED. Patient admitted to SICU for hemodynamic monitoring, pain management.       Neurologic:  - AO x 4  - Multimodal pain control with Tylenol, Lidocaine patch, oxycodone PRN  - Dilaudid for breakthrough pain  - Send urine tox  - Concern for history of ETOH use, not actively intoxicated, monitor for signs of withdrawal   -precedex for agitation , ativan PRN as needed   -EEG pending read    Respiratory:  - On 100% RA, breathing comfortable, saturating well,   - L sided chest tube placed at outside hospital, water seal  - Incentive spirometry  - AM CXR    Cardiovascular:  - Sinus tachycardia rate low 100s, ?multifactorial, monitor  - BP stable  - Send lactate     Gastrointestinal/Nutrition:  - Regular diet  - Bowel regimen with senna, Miralax    Genitourinary/Renal:  - Creatinine normalized, electrolytes improved   - CPK cleared  - Supplement electrolytes PRN  -hypokalemia, hyponatremia    Hematologic:  - Chemical VTE ppx with Lovenox  - Mechanical VTE ppx with SCDs    Infectious Disease:  - Treated empirically for sepsis with Rocephin, Azithro   -Zosyn 1/7-  - Procal 5.27  - Send UA, blood cultures x 2  - Afebrile here, monitor off antibiotics     Endocrine:  - No active issues, no history of DM    Disposition: SICU.       ASSESSMENT: 48y Male with PMHx hepatitis C (diagnosed many years ago, never treated), depression transferred from outside hospital for trauma eval. Patient found down by roommate after unknown amount of time, found with multiple 4-11 L sided rib fractures with flail chest. Chest tube placed there with >1L output (old blood). Patient also with tachypnea, concern for respiratory failure started on BiPAP. Level 1 called for transient hypotension, patient given 2 unit PRBC in [facility] ED. Patient admitted to SICU for hemodynamic monitoring, pain management.       Neurologic:  - AO x 4  - Multimodal pain control with Tylenol, Lidocaine patch, oxycodone PRN  - Dilaudid for breakthrough pain  - Send urine tox  - Concern for history of ETOH use, not actively intoxicated, monitor for signs of withdrawal   -precedex for agitation , ativan PRN as needed   -EEG pending read    Respiratory:  - On 100% RA, breathing comfortable, saturating well,   - L sided chest tube placed at outside hospital, water seal  - Incentive spirometry  - AM CXR    Cardiovascular:  - Sinus tachycardia rate low 100s, ?multifactorial, monitor  - BP stable  - Send lactate     Gastrointestinal/Nutrition:  - Regular diet  - Bowel regimen with senna, Miralax    Genitourinary/Renal:  - Creatinine normalized, electrolytes improved   - CPK cleared  - Supplement electrolytes PRN  -hypokalemia, hyponatremia    Hematologic:  - Chemical VTE ppx with Lovenox  - Mechanical VTE ppx with SCDs    Infectious Disease:  - Treated empirically for sepsis with Rocephin, Azithro   -Zosyn 1/7-  - Procal 5.27  - Send UA, blood cultures x 2  - Afebrile here, monitor off antibiotics     Endocrine:  - No active issues, no history of DM    Disposition: SICU.       ASSESSMENT: 48y Male with PMHx hepatitis C (diagnosed many years ago, never treated), depression transferred from outside hospital for trauma eval. Patient found down by roommate after unknown amount of time, found with multiple 4-11 L sided rib fractures with flail chest. Chest tube placed there with >1L output (old blood). Patient also with tachypnea, concern for respiratory failure started on BiPAP. Level 1 called for transient hypotension, patient given 2 unit PRBC in [facility] ED. Patient admitted to SICU for hemodynamic monitoring, pain management.       Neurologic:  - AO x 4  - Multimodal pain control with Tylenol, Lidocaine patch, oxycodone PRN  - Dilaudid for breakthrough pain  - Send urine tox  - Concern for history of ETOH use, not actively intoxicated, monitor for signs of withdrawal   -precedex for agitation , ativan PRN as needed   -EEG pending read  -sedated on propofol    Respiratory:  - Intubated 1/8 PRVC 500/14/50/5  - L sided chest tube placed at outside hospital, water seal  - Incentive spirometry  - AM CXR    Cardiovascular:  - Sinus tachycardia rate low 100s, ?multifactorial, monitor  - BP stable  - Send lactate     Gastrointestinal/Nutrition:  - Regular diet  - Bowel regimen with senna, Miralax    Genitourinary/Renal:  - Creatinine normalized, electrolytes improved   - CPK cleared  - Supplement electrolytes PRN  -hypokalemia, hyponatremia    Hematologic:  - Chemical VTE ppx with Lovenox  - Mechanical VTE ppx with SCDs    Infectious Disease:  - Treated empirically for sepsis with Rocephin, Azithro   -Zosyn 1/7-  - Procal 5.27  - Send UA, blood cultures x 2  - Afebrile here, monitor off antibiotics     Endocrine:  - No active issues, no history of DM    Disposition: SICU.       ASSESSMENT: 48y Male with PMHx hepatitis C (diagnosed many years ago, never treated), depression transferred from outside hospital for trauma eval. Patient found down by roommate after unknown amount of time, found with multiple 4-11 L sided rib fractures with flail chest. Chest tube placed there with >1L output (old blood). Patient also with tachypnea, concern for respiratory failure started on BiPAP. Level 1 called for transient hypotension, patient given 2 unit PRBC in [facility] ED. Patient admitted to SICU for hemodynamic monitoring, pain management.       Neurologic:  - AO x 4  - Multimodal pain control with Tylenol, Lidocaine patch, oxycodone PRN  - Dilaudid for breakthrough pain  - Send urine tox  - Concern for history of ETOH use, not actively intoxicated, monitor for signs of withdrawal   -precedex for agitation , ativan PRN as needed   -EEG pending read  -sedated on propofol    Respiratory:  - Intubated 1/8 PRVC 500/14/50/5  - L sided chest tube placed at outside hospital, water seal  - Incentive spirometry  - AM CXR    Cardiovascular:  - Sinus tachycardia rate low 100s, ?multifactorial, monitor  - BP stable  - Send lactate     Gastrointestinal/Nutrition:  - Regular diet  - Bowel regimen with senna, Miralax    Genitourinary/Renal:  - Creatinine normalized, electrolytes improved   - CPK cleared  - Supplement electrolytes PRN  -hypokalemia, hyponatremia    Hematologic:  - Chemical VTE ppx with Lovenox  - Mechanical VTE ppx with SCDs    Infectious Disease:  - Treated empirically for sepsis with Rocephin, Azithro   -Zosyn 1/7-  - Procal 5.27  - Send UA, blood cultures x 2  - Afebrile here, monitor off antibiotics     Endocrine:  - No active issues, no history of DM    Disposition: SICU.

## 2024-01-08 NOTE — DIETITIAN INITIAL EVALUATION ADULT - WEIGHT USED FOR CALCULATIONS
Please make an appointment to follow up with Primary Care - [facility] (phone: [phone]) in 2-4 days.  Please make an appointment to follow up with Cardiology Clinic (phone: [phone]) as soon as possible.     If you have worsening symptoms including increased pain, shortness of breath, feeling like you are going to pass out or other concerns, return to the emergency department for re-evaluation.      
current weight

## 2024-01-08 NOTE — ADVANCED PRACTICE NURSE CONSULT - REASON FOR CONSULT
Wound care consult initiated by RN to assess patient's skin for a possible right buttocks deep tissue injury present on admission     HPI  48y Male with PMHx hepatitis C (diagnosed many years ago, never treated), depression transferred from outside hospital for trauma eval. Patient found down by roommate after unknown amount of time, found with multiple 4-11 L sided rib fractures with flail chest. Chest tube placed there with >1L output (old blood). Patient also with tachypnea, concern for respiratory failure started on BiPAP. Level 1 called for transient hypotension, patient given 2 unit PRBC in [facility] ED.  Wound care consult initiated by RN to assess patient's skin for a possible right buttocks deep tissue injury present on admission     HPI  48y Male with PMHx hepatitis C (diagnosed many years ago, never treated), depression transferred from outside hospital for trauma eval. Patient found down by roommate after unknown amount of time, found with multiple 4-11 L sided rib fractures with flail chest. Chest tube placed there with >1L output (old blood). Patient also with tachypnea, concern for respiratory failure started on BiPAP. Level 1 called for transient hypotension, patient given 2 unit PRBC in [facility] ED.

## 2024-01-08 NOTE — EEG REPORT - NS EEG TEXT BOX
REPORT OF CONTINUOUS VIDEO EEG      [facility]: [address], [place] [address], Phone: [phone]  [facility]: [address], [place] [address], Phone: [phone]  [facility]: [address] [place] [address], Phone: [phone]    Patient Name: Bernardo Salazar    Age: 48 year, : 1975  Temple: -8 ICU # 4  EEG #: [id number]    Study Date: 2024   Start Time: 13:27     End Date:  2024        End Time: 08:00    Study Duration: 18 h 31 m. EEG is off from 24 @ 1:03 till the end of the recording    Study Information:    EEG Recording Technique:  The patient underwent continuous Video-EEG monitoring, using Telemetry System hardware on the XLTek Digital System. EEG and video data were stored on a computer hard drive with important events saved in digital archive files. The material was reviewed by a physician (electroencephalographer / epileptologist) on a daily basis. Cristian and seizure detection algorithms were utilized and reviewed. An EEG Technician attended to the patient, and was available throughout daytime work hours.  The epilepsy center neurologist was available in person or on call 24-hours per day.    EEG Placement and Labeling of Electrodes:  The EEG was performed utilizing 20 channel referential EEG connections (coronal over temporal over parasagittal montage) using all standard 10-20 electrode placements with EKG, with additional electrodes placed in the inferior temporal region using the modified 10-10 montage electrode placements for elective admissions, or if deemed necessary. Recording was at a sampling rate of 256 samples per second per channel. Time synchronized digital video recording was done simultaneously with EEG recording. A low light infrared camera was used for low light recording.     History:  48y Male with PMHx hepatitis C (diagnosed many years ago, never treated), and depression is here for evaluation of seizures.      Medication  No Data.    Interpretation:    [[[Abbreviation Key:  PDR=alpha rhythm/posterior dominant rhythm. A-P=anterior posterior.  Amplitude: ‘very low’:<20; ‘low’:20-49; ‘medium’:; ‘high’:>150uV.  Persistence for periodic/rhythmic patterns (% of epoch) ‘rare’:<1%; ‘occasional’:1-10%; ‘frequent’:10-50%; ‘abundant’:50-90%; ‘continuous’:>90%.  Persistence for sporadic discharges: ‘rare’:<1/hr; ‘occasional’:1/min-1/hr; ‘frequent’:>1/min; ‘abundant’:>1/10 sec.  RPP=rhythmic and periodic patterns; GRDA=generalized rhythmic delta activity; FIRDA=frontal intermittent GRDA; LRDA=lateralized rhythmic delta activity; TIRDA=temporal intermittent rhythmic delta activity;  LPD=PLED=lateralized periodic discharges; GPD=generalized periodic discharges; BIPDs =bilateral independent periodic discharges; Mf=multifocal; SIRPDs=stimulus induced rhythmic, periodic, or ictal appearing discharges; BIRDs=brief potentially ictal rhythmic discharges >4 Hz, lasting .5-10s; PFA (paroxysmal bursts >13 Hz or =8 Hz <10s).  Modifiers: +F=with fast component; +S=with spike component; +R=with rhythmic component.  S-B=burst suppression pattern.  Max=maximal. N1-drowsy; N2-stage II sleep; N3-slow wave sleep. SSS/BETS=small sharp spikes/benign epileptiform transients of sleep. HV=hyperventilation; PS=photic stimulation]]]      Daily EEG Visual Analysis    FINDINGS:      Background:  Symmetry: symmetric  Continuous: continuous  PDR: symmetric, well-modulated 7 Hz activity, with amplitude to 40 uV, that attenuated to eye opening.  Low amplitude frontal beta noted in wakefulness.  Reactivity: present  Voltage: normal, [defined typically between 20-150uV]  Anterior Posterior Gradient: present  Breach: absent    Background Slowing:  Generalized slowing: none was present.  Focal slowing: none was present.    State Changes:   -Drowsiness was characterized by fragmentation, attenuation, and slowing of the background activity.    -Sleep is not seen.    Sporadic Epileptiform Discharges:   None    Rhythmic and Periodic Patterns (RPPs):  None     Electrographic and Electroclinical seizures:  None    Other Clinical Events:  None    Activation Procedures:   -Hyperventilation was not performed.    -Photic stimulation was not performed.    Artifacts:  Intermittent myogenic and movement artifacts were noted. Study is limited by excessive artifact.    ECG:  The heart rate on single channel ECG shows sinus tachycardia and was predominantly between 114-116 BPM.    EEG Summary / Classification:  Abnormal EEG in the awake / drowsy states.  •	Background slowing (including PDR < 8)    EEG Impression / Clinical Correlate:  Abnormal prolonged EEG study due to mild diffuse slowing which indicates mild diffuse cerebral dysfunction that is not specific in etiology  No epileptic discharges recorded.  No seizures recorded.  •	Cardiac abnormalities mentioned above are better assessed with cardiac monitoring.  •	EEG is limited in part by excessive artifact.    ________________________________________    NEL Arias  Attending Physician, [facility] [place]      ------------------------------------  EEG Reading Room: [phone]  On Call Service After Hours: [phone]      REPORT OF CONTINUOUS VIDEO EEG      [facility]: [address], [place] [address], Phone: [phone]  [facility]: [phone], [place] [address], Phone: [phone]  [facility]: [address] [place] [address], Phone: [phone]    Patient Name: Bernardo Salazar    Age: 48 year, : 1975  Temple: -8 ICU # 4  EEG #: [id number]    Study Date: 2024   Start Time: 13:27     End Date:  2024        End Time: 08:00    Study Duration: 18 h 31 m. EEG is off from 24 @ 1:03 till the end of the recording    Study Information:    EEG Recording Technique:  The patient underwent continuous Video-EEG monitoring, using Telemetry System hardware on the XLTek Digital System. EEG and video data were stored on a computer hard drive with important events saved in digital archive files. The material was reviewed by a physician (electroencephalographer / epileptologist) on a daily basis. Cristian and seizure detection algorithms were utilized and reviewed. An EEG Technician attended to the patient, and was available throughout daytime work hours.  The epilepsy center neurologist was available in person or on call 24-hours per day.    EEG Placement and Labeling of Electrodes:  The EEG was performed utilizing 20 channel referential EEG connections (coronal over temporal over parasagittal montage) using all standard 10-20 electrode placements with EKG, with additional electrodes placed in the inferior temporal region using the modified 10-10 montage electrode placements for elective admissions, or if deemed necessary. Recording was at a sampling rate of 256 samples per second per channel. Time synchronized digital video recording was done simultaneously with EEG recording. A low light infrared camera was used for low light recording.     History:  48y Male with PMHx hepatitis C (diagnosed many years ago, never treated), and depression is here for evaluation of seizures.      Medication  No Data.    Interpretation:    [[[Abbreviation Key:  PDR=alpha rhythm/posterior dominant rhythm. A-P=anterior posterior.  Amplitude: ‘very low’:<20; ‘low’:20-49; ‘medium’:; ‘high’:>150uV.  Persistence for periodic/rhythmic patterns (% of epoch) ‘rare’:<1%; ‘occasional’:1-10%; ‘frequent’:10-50%; ‘abundant’:50-90%; ‘continuous’:>90%.  Persistence for sporadic discharges: ‘rare’:<1/hr; ‘occasional’:1/min-1/hr; ‘frequent’:>1/min; ‘abundant’:>1/10 sec.  RPP=rhythmic and periodic patterns; GRDA=generalized rhythmic delta activity; FIRDA=frontal intermittent GRDA; LRDA=lateralized rhythmic delta activity; TIRDA=temporal intermittent rhythmic delta activity;  LPD=PLED=lateralized periodic discharges; GPD=generalized periodic discharges; BIPDs =bilateral independent periodic discharges; Mf=multifocal; SIRPDs=stimulus induced rhythmic, periodic, or ictal appearing discharges; BIRDs=brief potentially ictal rhythmic discharges >4 Hz, lasting .5-10s; PFA (paroxysmal bursts >13 Hz or =8 Hz <10s).  Modifiers: +F=with fast component; +S=with spike component; +R=with rhythmic component.  S-B=burst suppression pattern.  Max=maximal. N1-drowsy; N2-stage II sleep; N3-slow wave sleep. SSS/BETS=small sharp spikes/benign epileptiform transients of sleep. HV=hyperventilation; PS=photic stimulation]]]      Daily EEG Visual Analysis    FINDINGS:      Background:  Symmetry: symmetric  Continuous: continuous  PDR: symmetric, well-modulated 7 Hz activity, with amplitude to 40 uV, that attenuated to eye opening.  Low amplitude frontal beta noted in wakefulness.  Reactivity: present  Voltage: normal, [defined typically between 20-150uV]  Anterior Posterior Gradient: present  Breach: absent    Background Slowing:  Generalized slowing: none was present.  Focal slowing: none was present.    State Changes:   -Drowsiness was characterized by fragmentation, attenuation, and slowing of the background activity.    -Sleep is not seen.    Sporadic Epileptiform Discharges:   None    Rhythmic and Periodic Patterns (RPPs):  None     Electrographic and Electroclinical seizures:  None    Other Clinical Events:  None    Activation Procedures:   -Hyperventilation was not performed.    -Photic stimulation was not performed.    Artifacts:  Intermittent myogenic and movement artifacts were noted. Study is limited by excessive artifact.    ECG:  The heart rate on single channel ECG shows sinus tachycardia and was predominantly between 114-116 BPM.    EEG Summary / Classification:  Abnormal EEG in the awake / drowsy states.  •	Background slowing (including PDR < 8)    EEG Impression / Clinical Correlate:  Abnormal prolonged EEG study due to mild diffuse slowing which indicates mild diffuse cerebral dysfunction that is not specific in etiology  No epileptic discharges recorded.  No seizures recorded.  •	Cardiac abnormalities mentioned above are better assessed with cardiac monitoring.  •	EEG is limited in part by excessive artifact.    ________________________________________    NEL Arias  Attending Physician, [facility] [place]      ------------------------------------  EEG Reading Room: [phone]  On Call Service After Hours: [phone]      REPORT OF CONTINUOUS VIDEO EEG      [facility]: [address], [place] [address], Phone: [phone]  [facility]: [phone], [place] [address], Phone: [phone]  [facility]: [address] [place] [address], Phone: [phone]    Patient Name: Bernardo Salazar    Age: 48 year, : 1975  Temple: -8 ICU # 4  EEG #: [id number]    Study Date: 2024   Start Time: 13:27     End Date:  2024        End Time: 08:00    Study Duration: 18 h 31 m. EEG is off from 24 @ 1:03 till the end of the recording    Study Information:    EEG Recording Technique:  The patient underwent continuous Video-EEG monitoring, using Telemetry System hardware on the XLTek Digital System. EEG and video data were stored on a computer hard drive with important events saved in digital archive files. The material was reviewed by a physician (electroencephalographer / epileptologist) on a daily basis. Cristian and seizure detection algorithms were utilized and reviewed. An EEG Technician attended to the patient, and was available throughout daytime work hours.  The epilepsy center neurologist was available in person or on call 24-hours per day.    EEG Placement and Labeling of Electrodes:  The EEG was performed utilizing 20 channel referential EEG connections (coronal over temporal over parasagittal montage) using all standard 10-20 electrode placements with EKG, with additional electrodes placed in the inferior temporal region using the modified 10-10 montage electrode placements for elective admissions, or if deemed necessary. Recording was at a sampling rate of 256 samples per second per channel. Time synchronized digital video recording was done simultaneously with EEG recording. A low light infrared camera was used for low light recording.     History:  48y Male with PMHx hepatitis C (diagnosed many years ago, never treated), and depression is here for evaluation of seizures.      Medication  No Data.    Interpretation:    [[[Abbreviation Key:  PDR=alpha rhythm/posterior dominant rhythm. A-P=anterior posterior.  Amplitude: ‘very low’:<20; ‘low’:20-49; ‘medium’:; ‘high’:>150uV.  Persistence for periodic/rhythmic patterns (% of epoch) ‘rare’:<1%; ‘occasional’:1-10%; ‘frequent’:10-50%; ‘abundant’:50-90%; ‘continuous’:>90%.  Persistence for sporadic discharges: ‘rare’:<1/hr; ‘occasional’:1/min-1/hr; ‘frequent’:>1/min; ‘abundant’:>1/10 sec.  RPP=rhythmic and periodic patterns; GRDA=generalized rhythmic delta activity; FIRDA=frontal intermittent GRDA; LRDA=lateralized rhythmic delta activity; TIRDA=temporal intermittent rhythmic delta activity;  LPD=PLED=lateralized periodic discharges; GPD=generalized periodic discharges; BIPDs =bilateral independent periodic discharges; Mf=multifocal; SIRPDs=stimulus induced rhythmic, periodic, or ictal appearing discharges; BIRDs=brief potentially ictal rhythmic discharges >4 Hz, lasting .5-10s; PFA (paroxysmal bursts >13 Hz or =8 Hz <10s).  Modifiers: +F=with fast component; +S=with spike component; +R=with rhythmic component.  S-B=burst suppression pattern.  Max=maximal. N1-drowsy; N2-stage II sleep; N3-slow wave sleep. SSS/BETS=small sharp spikes/benign epileptiform transients of sleep. HV=hyperventilation; PS=photic stimulation]]]      Daily EEG Visual Analysis    FINDINGS:      Background:  Symmetry: symmetric  Continuous: continuous  PDR: symmetric, well-modulated 7 Hz activity, with amplitude to 40 uV, that attenuated to eye opening.  Low amplitude frontal beta noted in wakefulness.  Reactivity: present  Voltage: normal, [defined typically between 20-150uV]  Anterior Posterior Gradient: present  Breach: absent    Background Slowing:  Generalized slowing: none was present.  Focal slowing: none was present.    State Changes:   -Drowsiness was characterized by fragmentation, attenuation, and slowing of the background activity.    -Sleep is not seen.    Sporadic Epileptiform Discharges:   None    Rhythmic and Periodic Patterns (RPPs):  None     Electrographic and Electroclinical seizures:  None    Other Clinical Events:  None    Activation Procedures:   -Hyperventilation was not performed.    -Photic stimulation was not performed.    Artifacts:  Intermittent myogenic and movement artifacts were noted. Study is limited by excessive artifact.    ECG:  The heart rate on single channel ECG shows sinus tachycardia and was predominantly between 114-116 BPM.    EEG Summary / Classification:  Abnormal EEG in the awake / drowsy states.  •	Background slowing (including PDR < 8)    EEG Impression / Clinical Correlate:  Abnormal prolonged EEG study due to mild diffuse slowing which indicates mild diffuse cerebral dysfunction that is not specific in etiology  No epileptic discharges recorded.  No seizures recorded.  •	Cardiac abnormalities mentioned above are better assessed with cardiac monitoring.  •	EEG is limited in part by excessive artifact.    This is preliminary report. Full report follows.  ________________________________________    NEL Arias  Attending Physician, [facility]      ------------------------------------  EEG Reading Room: [phone]  On Call Service After Hours: [phone]      REPORT OF CONTINUOUS VIDEO EEG      [facility]: [address], [place] [address], Phone: [phone]  [facility]: [address] [address] [facility], [place] [address], Phone: [phone]  [facility]: [address] [place] [address], Phone: [phone]    Patient Name: Bernardo Salazar    Age: 48 year, : 1975  Temple: -8 ICU # 4  EEG #: [id number]    Study Date: 2024   Start Time: 13:27     End Date:  2024        End Time: 08:00    Study Duration: 18 h 31 m. EEG is off from 24 @ 1:03 till the end of the recording    Study Information:    EEG Recording Technique:  The patient underwent continuous Video-EEG monitoring, using Telemetry System hardware on the XLTek Digital System. EEG and video data were stored on a computer hard drive with important events saved in digital archive files. The material was reviewed by a physician (electroencephalographer / epileptologist) on a daily basis. Cristian and seizure detection algorithms were utilized and reviewed. An EEG Technician attended to the patient, and was available throughout daytime work hours.  The epilepsy center neurologist was available in person or on call 24-hours per day.    EEG Placement and Labeling of Electrodes:  The EEG was performed utilizing 20 channel referential EEG connections (coronal over temporal over parasagittal montage) using all standard 10-20 electrode placements with EKG, with additional electrodes placed in the inferior temporal region using the modified 10-10 montage electrode placements for elective admissions, or if deemed necessary. Recording was at a sampling rate of 256 samples per second per channel. Time synchronized digital video recording was done simultaneously with EEG recording. A low light infrared camera was used for low light recording.     History:  48y Male with PMHx hepatitis C (diagnosed many years ago, never treated), and depression is here for evaluation of seizures.      Medication  No Data.    Interpretation:    [[[Abbreviation Key:  PDR=alpha rhythm/posterior dominant rhythm. A-P=anterior posterior.  Amplitude: ‘very low’:<20; ‘low’:20-49; ‘medium’:; ‘high’:>150uV.  Persistence for periodic/rhythmic patterns (% of epoch) ‘rare’:<1%; ‘occasional’:1-10%; ‘frequent’:10-50%; ‘abundant’:50-90%; ‘continuous’:>90%.  Persistence for sporadic discharges: ‘rare’:<1/hr; ‘occasional’:1/min-1/hr; ‘frequent’:>1/min; ‘abundant’:>1/10 sec.  RPP=rhythmic and periodic patterns; GRDA=generalized rhythmic delta activity; FIRDA=frontal intermittent GRDA; LRDA=lateralized rhythmic delta activity; TIRDA=temporal intermittent rhythmic delta activity;  LPD=PLED=lateralized periodic discharges; GPD=generalized periodic discharges; BIPDs =bilateral independent periodic discharges; Mf=multifocal; SIRPDs=stimulus induced rhythmic, periodic, or ictal appearing discharges; BIRDs=brief potentially ictal rhythmic discharges >4 Hz, lasting .5-10s; PFA (paroxysmal bursts >13 Hz or =8 Hz <10s).  Modifiers: +F=with fast component; +S=with spike component; +R=with rhythmic component.  S-B=burst suppression pattern.  Max=maximal. N1-drowsy; N2-stage II sleep; N3-slow wave sleep. SSS/BETS=small sharp spikes/benign epileptiform transients of sleep. HV=hyperventilation; PS=photic stimulation]]]      Daily EEG Visual Analysis    FINDINGS:      Background:  Symmetry: symmetric  Continuous: continuous  PDR: symmetric, well-modulated 7 Hz activity, with amplitude to 40 uV, that attenuated to eye opening.  Low amplitude frontal beta noted in wakefulness.  Reactivity: present  Voltage: normal, [defined typically between 20-150uV]  Anterior Posterior Gradient: present  Breach: absent    Background Slowing:  Generalized slowing: none was present.  Focal slowing: none was present.    State Changes:   -Drowsiness was characterized by fragmentation, attenuation, and slowing of the background activity.    -Sleep is not seen.    Sporadic Epileptiform Discharges:   None    Rhythmic and Periodic Patterns (RPPs):  None     Electrographic and Electroclinical seizures:  None    Other Clinical Events:  None    Activation Procedures:   -Hyperventilation was not performed.    -Photic stimulation was not performed.    Artifacts:  Intermittent myogenic and movement artifacts were noted. Study is limited by excessive artifact.    ECG:  The heart rate on single channel ECG shows sinus tachycardia and was predominantly between 114-116 BPM.    EEG Summary / Classification:  Abnormal EEG in the awake / drowsy states.  •	Background slowing (including PDR < 8)    EEG Impression / Clinical Correlate:  Abnormal prolonged EEG study due to mild diffuse slowing which indicates mild diffuse cerebral dysfunction that is not specific in etiology  No epileptic discharges recorded.  No seizures recorded.  •	Cardiac abnormalities mentioned above are better assessed with cardiac monitoring.  •	EEG is limited in part by excessive artifact.    This is preliminary report. Full report follows.  ________________________________________    NEL Arias  Attending Physician, [facility]      ------------------------------------  EEG Reading Room: [phone]  On Call Service After Hours: [phone]      REPORT OF CONTINUOUS VIDEO EEG      [facility]: [address], [place] [address], Phone: [phone]  [facility]: [address], [place] [address], Phone: [phone]  [facility]: [address] [place] [address], Phone: [phone]    Patient Name: Bernardo Salazar    Age: 48 year, : 1975  Temple: -8 ICU # 4  EEG #: [id number]    Study Date: 2024   Start Time: 13:27     End Date:  2024        End Time: 08:00    Study Duration: 18 h 31 m. EEG is off from 24 @ 1:03 till the end of the recording    Study Information:    EEG Recording Technique:  The patient underwent continuous Video-EEG monitoring, using Telemetry System hardware on the XLTek Digital System. EEG and video data were stored on a computer hard drive with important events saved in digital archive files. The material was reviewed by a physician (electroencephalographer / epileptologist) on a daily basis. Cristian and seizure detection algorithms were utilized and reviewed. An EEG Technician attended to the patient, and was available throughout daytime work hours.  The epilepsy center neurologist was available in person or on call 24-hours per day.    EEG Placement and Labeling of Electrodes:  The EEG was performed utilizing 20 channel referential EEG connections (coronal over temporal over parasagittal montage) using all standard 10-20 electrode placements with EKG, with additional electrodes placed in the inferior temporal region using the modified 10-10 montage electrode placements for elective admissions, or if deemed necessary. Recording was at a sampling rate of 256 samples per second per channel. Time synchronized digital video recording was done simultaneously with EEG recording. A low light infrared camera was used for low light recording.     History:  48y Male with PMHx hepatitis C (diagnosed many years ago, never treated), and depression is here for evaluation of seizures.      Medication  No Data.    Interpretation:    [[[Abbreviation Key:  PDR=alpha rhythm/posterior dominant rhythm. A-P=anterior posterior.  Amplitude: ‘very low’:<20; ‘low’:20-49; ‘medium’:; ‘high’:>150uV.  Persistence for periodic/rhythmic patterns (% of epoch) ‘rare’:<1%; ‘occasional’:1-10%; ‘frequent’:10-50%; ‘abundant’:50-90%; ‘continuous’:>90%.  Persistence for sporadic discharges: ‘rare’:<1/hr; ‘occasional’:1/min-1/hr; ‘frequent’:>1/min; ‘abundant’:>1/10 sec.  RPP=rhythmic and periodic patterns; GRDA=generalized rhythmic delta activity; FIRDA=frontal intermittent GRDA; LRDA=lateralized rhythmic delta activity; TIRDA=temporal intermittent rhythmic delta activity;  LPD=PLED=lateralized periodic discharges; GPD=generalized periodic discharges; BIPDs =bilateral independent periodic discharges; Mf=multifocal; SIRPDs=stimulus induced rhythmic, periodic, or ictal appearing discharges; BIRDs=brief potentially ictal rhythmic discharges >4 Hz, lasting .5-10s; PFA (paroxysmal bursts >13 Hz or =8 Hz <10s).  Modifiers: +F=with fast component; +S=with spike component; +R=with rhythmic component.  S-B=burst suppression pattern.  Max=maximal. N1-drowsy; N2-stage II sleep; N3-slow wave sleep. SSS/BETS=small sharp spikes/benign epileptiform transients of sleep. HV=hyperventilation; PS=photic stimulation]]]      Daily EEG Visual Analysis    FINDINGS:      Background:  Symmetry: symmetric  Continuous: continuous  PDR: symmetric, well-modulated 7 Hz activity, with amplitude to 40 uV, that attenuated to eye opening.  Low amplitude frontal beta noted in wakefulness.  Reactivity: present  Voltage: normal, [defined typically between 20-150uV]  Anterior Posterior Gradient: present  Breach: absent    Background Slowing:  Generalized slowing: none was present.  Focal slowing: none was present.    State Changes:   -Drowsiness was characterized by fragmentation, attenuation, and slowing of the background activity.    -Sleep is not seen.    Sporadic Epileptiform Discharges:   None    Rhythmic and Periodic Patterns (RPPs):  None     Electrographic and Electroclinical seizures:  None    Other Clinical Events:  None    Activation Procedures:   -Hyperventilation was not performed.    -Photic stimulation was not performed.    Artifacts:  Intermittent myogenic and movement artifacts were noted. Study is limited by excessive artifact.    ECG:  The heart rate on single channel ECG shows sinus tachycardia and was predominantly between 114-116 BPM.    EEG Summary / Classification:  Abnormal EEG in the awake / drowsy states.  •	Background slowing (including PDR < 8)    EEG Impression / Clinical Correlate:  Abnormal prolonged EEG study due to mild diffuse slowing which indicates mild diffuse cerebral dysfunction that is not specific in etiology  No epileptic discharges recorded.  No seizures recorded.  •	Cardiac abnormalities mentioned above are better assessed with cardiac monitoring.  •	EEG is limited in part by excessive artifact.    ________________________________________    NEL Arias  Attending Physician, [facility]    Mitchell Paz MD  EEG/Epilepsy Attending       ------------------------------------  EEG Reading Room: [phone]  On Call Service After Hours: [phone]      REPORT OF CONTINUOUS VIDEO EEG      [facility]: [address], [place] [address], Phone: [phone]  [facility]: [address], [place] [address], Phone: [phone]  [facility]: [address] [place] [address], Phone: [phone]    Patient Name: Bernardo Salazar    Age: 48 year, : 1975  Temple: -8 ICU # 4  EEG #: [id number]    Study Date: 2024   Start Time: 13:27     End Date:  2024        End Time: 08:00    Study Duration: 18 h 31 m. EEG is off from 24 @ 1:03 till the end of the recording    Study Information:    EEG Recording Technique:  The patient underwent continuous Video-EEG monitoring, using Telemetry System hardware on the XLTek Digital System. EEG and video data were stored on a computer hard drive with important events saved in digital archive files. The material was reviewed by a physician (electroencephalographer / epileptologist) on a daily basis. Cristian and seizure detection algorithms were utilized and reviewed. An EEG Technician attended to the patient, and was available throughout daytime work hours.  The epilepsy center neurologist was available in person or on call 24-hours per day.    EEG Placement and Labeling of Electrodes:  The EEG was performed utilizing 20 channel referential EEG connections (coronal over temporal over parasagittal montage) using all standard 10-20 electrode placements with EKG, with additional electrodes placed in the inferior temporal region using the modified 10-10 montage electrode placements for elective admissions, or if deemed necessary. Recording was at a sampling rate of 256 samples per second per channel. Time synchronized digital video recording was done simultaneously with EEG recording. A low light infrared camera was used for low light recording.     History:  48y Male with PMHx hepatitis C (diagnosed many years ago, never treated), and depression is here for evaluation of seizures.      Medication  No Data.    Interpretation:    [[[Abbreviation Key:  PDR=alpha rhythm/posterior dominant rhythm. A-P=anterior posterior.  Amplitude: ‘very low’:<20; ‘low’:20-49; ‘medium’:; ‘high’:>150uV.  Persistence for periodic/rhythmic patterns (% of epoch) ‘rare’:<1%; ‘occasional’:1-10%; ‘frequent’:10-50%; ‘abundant’:50-90%; ‘continuous’:>90%.  Persistence for sporadic discharges: ‘rare’:<1/hr; ‘occasional’:1/min-1/hr; ‘frequent’:>1/min; ‘abundant’:>1/10 sec.  RPP=rhythmic and periodic patterns; GRDA=generalized rhythmic delta activity; FIRDA=frontal intermittent GRDA; LRDA=lateralized rhythmic delta activity; TIRDA=temporal intermittent rhythmic delta activity;  LPD=PLED=lateralized periodic discharges; GPD=generalized periodic discharges; BIPDs =bilateral independent periodic discharges; Mf=multifocal; SIRPDs=stimulus induced rhythmic, periodic, or ictal appearing discharges; BIRDs=brief potentially ictal rhythmic discharges >4 Hz, lasting .5-10s; PFA (paroxysmal bursts >13 Hz or =8 Hz <10s).  Modifiers: +F=with fast component; +S=with spike component; +R=with rhythmic component.  S-B=burst suppression pattern.  Max=maximal. N1-drowsy; N2-stage II sleep; N3-slow wave sleep. SSS/BETS=small sharp spikes/benign epileptiform transients of sleep. HV=hyperventilation; PS=photic stimulation]]]      Daily EEG Visual Analysis    FINDINGS:      Background:  Symmetry: symmetric  Continuous: continuous  PDR: symmetric, well-modulated 7 Hz activity, with amplitude to 40 uV, that attenuated to eye opening.  Low amplitude frontal beta noted in wakefulness.  Reactivity: present  Voltage: normal, [defined typically between 20-150uV]  Anterior Posterior Gradient: present  Breach: absent    Background Slowing:  Generalized slowing: none was present.  Focal slowing: none was present.    State Changes:   -Drowsiness was characterized by fragmentation, attenuation, and slowing of the background activity.    -Sleep is not seen.    Sporadic Epileptiform Discharges:   None    Rhythmic and Periodic Patterns (RPPs):  None     Electrographic and Electroclinical seizures:  None    Other Clinical Events:  None    Activation Procedures:   -Hyperventilation was not performed.    -Photic stimulation was not performed.    Artifacts:  Intermittent myogenic and movement artifacts were noted. Study is limited by excessive artifact.    ECG:  The heart rate on single channel ECG shows sinus tachycardia and was predominantly between 114-116 BPM.    EEG Summary / Classification:  Abnormal EEG in the awake / drowsy states.  •	Background slowing (including PDR < 8)    EEG Impression / Clinical Correlate:  Abnormal prolonged EEG study due to mild diffuse slowing which indicates mild diffuse cerebral dysfunction that is not specific in etiology  No epileptic discharges recorded.  No seizures recorded.  •	Cardiac abnormalities mentioned above are better assessed with cardiac monitoring.  •	EEG is limited in part by excessive artifact.    ________________________________________    NEL Arias  Attending Physician, [facility]    Mitchell Paz MD  EEG/Epilepsy Attending       ------------------------------------  EEG Reading Room: [phone]  On Call Service After Hours: [phone]

## 2024-01-08 NOTE — DIETITIAN INITIAL EVALUATION ADULT - OTHER INFO
GI/Intake:  -NPO; plan to place NGT today   -No BM documented thus far; bowel regimen ordered (Miralax, Senna)   -? ETOH withdrawal; order folic acid, thiamine and multivitamin     Renal:  -Hypophosphatemic - NaPhos ordered for repletion   -Hyponatremic     Resp:   -Intubated for WOB and AMS   -Chest tube in place     Neuro:   -Sedated on Propofol   -Approximately 726kcal/day via lipids     CV:  -Yonathan for pressor support

## 2024-01-08 NOTE — PROVIDER CONTACT NOTE (OTHER) - SITUATION
Patient is SOB, making a snoring sound with his breaths. Patient is also tachypneic and tachycardic (RR: 40-50s and HR in 120s). Patient states he is having trouble breathing.

## 2024-01-08 NOTE — CONSULT NOTE ADULT - ASSESSMENT
ASSESSMENT: 54yo M w pmhx of Hep C and possible EtOH use disorder who presents after being found down. Intubated for respiratory distress and agitation 1/8. Pt has displaced 4-11 L rib fx with flail segment.     RECOMMENDATIONS:   - Thoracic surgery attending Dr. Sepulveda to review imaging for possible rib plating  - further recs pending    Plan discussed with surgical attending, Dr. Sepulveda    Thoracic Surgery [id number] ASSESSMENT: 54yo M w pmhx of Hep C and possible EtOH use disorder who presents after being found down. Intubated for respiratory distress and agitation 1/8. Pt has displaced 4-11 L rib fx with flail segment.     RECOMMENDATIONS:   - Thoracic surgery attending Dr. Sepulveda to review imaging for possible rib plating  - further recs pending    Plan discussed with surgical attending, Dr. Sepulveda    Thoracic Surgery [id number]

## 2024-01-08 NOTE — PROGRESS NOTE ADULT - SUBJECTIVE AND OBJECTIVE BOX
24 HOUR EVENTS:  follow up on potassium s/p repletion  chest tube water seal, xray in am  f/u procal and bc    SUBJECTIVE/ROS:  Patient seen at bedside this AM.         OBJECTIVE:    VITALS:  Vital Signs Last 24 Hrs  T(C): 36.8 (07 Jan 2024 23:00), Max: 36.8 (07 Jan 2024 23:00)  T(F): 98.2 (07 Jan 2024 23:00), Max: 98.2 (07 Jan 2024 23:00)  HR: 106 (08 Jan 2024 00:00) (98 - 109)  BP: 125/81 (08 Jan 2024 00:00) (91/56 - 125/81)  BP(mean): 98 (08 Jan 2024 00:00) (68 - 98)  RR: 25 (08 Jan 2024 00:00) (21 - 36)  SpO2: 98% (08 Jan 2024 00:00) (93% - 100%)    Parameters below as of 07 Jan 2024 19:00  Patient On (Oxygen Delivery Method): room air        I&O's Summary    06 Jan 2024 07:01  -  07 Jan 2024 07:00  --------------------------------------------------------  IN: 2620 mL / OUT: 1650 mL / NET: 970 mL    07 Jan 2024 07:01  -  08 Jan 2024 00:20  --------------------------------------------------------  IN: 2800 mL / OUT: 1720 mL / NET: 1080 mL        PHYSICAL EXAM:    NEURO  RASS:     GCS:     CAM ICU:  Exam: awake, alert, oriented    RESPIRATORY  Exam: no increased WOB on RA  Mechanical Ventilation:     CARDIOVASCULAR  Exam: warm, well-perfused  Cardiac Rhythm: normal sinus rhythm noted on cardiac monitor    GI/NUTRITION  Exam: soft, non-distended, non-tender    GENITOURINARY  Exam:     ACCESS DEVICES:  [ ] Peripheral IV  [ ] Central Venous Line	[ ] R	[ ] L	[ ] IJ	[ ] Fem	[ ] SC	Placed:   [ ] Arterial Line		[ ] R	[ ] L	[ ] Fem	[ ] Rad	[ ] Ax	Placed:   [ ] PICC:					[ ] Mediport  [ ] Urinary Catheter, Date Placed:   [x] Necessity of urinary, arterial, and venous catheters discussed      LABS:                        10.5   14.98 )-----------( 233      ( 07 Jan 2024 06:47 )             28.5   01-07    125<L>  |  89<L>  |  23  ----------------------------<  110<H>  3.2<L>   |  23  |  0.97    Ca    8.7      07 Jan 2024 15:20  Phos  2.8     01-07  Mg     2.8     01-07    TPro  6.3  /  Alb  2.0<L>  /  TBili  0.8  /  DBili  x   /  AST  107<H>  /  ALT  28  /  AlkPhos  134<H>  01-06    CAPILLARY BLOOD GLUCOSE      POCT Blood Glucose.: 130 mg/dL (07 Jan 2024 02:05)      MEDICATIONS:   MEDICATIONS  (STANDING):  chlorhexidine 2% Cloths 1 Application(s) Topical <User Schedule>  enoxaparin Injectable 40 milliGRAM(s) SubCutaneous <User Schedule>  influenza   Vaccine 0.5 milliLiter(s) IntraMuscular once  lidocaine   4% Patch 1 Patch Transdermal every 24 hours  piperacillin/tazobactam IVPB.. 3.375 Gram(s) IV Intermittent every 8 hours  polyethylene glycol 3350 17 Gram(s) Oral every 12 hours  senna 2 Tablet(s) Oral at bedtime    MEDICATIONS  (PRN):  HYDROmorphone  Injectable 0.5 milliGRAM(s) IV Push every 3 hours PRN Breakthrough  LORazepam   Injectable 2 milliGRAM(s) IV Push every 1 hour PRN CIWA-Ar score 8 or greater  oxyCODONE    IR 5 milliGRAM(s) Oral every 3 hours PRN Moderate Pain (4 - 6)  oxyCODONE    IR 10 milliGRAM(s) Oral every 3 hours PRN Severe Pain (7 - 10)      IMAGING: 24 HOUR EVENTS:  follow up on potassium s/p repletion  chest tube water seal, xray in am  f/u procal and bc  -midsternal chest pain, rpt xray, ekg    SUBJECTIVE/ROS:  Patient seen at bedside this AM.         OBJECTIVE:    VITALS:  Vital Signs Last 24 Hrs  T(C): 36.8 (07 Jan 2024 23:00), Max: 36.8 (07 Jan 2024 23:00)  T(F): 98.2 (07 Jan 2024 23:00), Max: 98.2 (07 Jan 2024 23:00)  HR: 106 (08 Jan 2024 00:00) (98 - 109)  BP: 125/81 (08 Jan 2024 00:00) (91/56 - 125/81)  BP(mean): 98 (08 Jan 2024 00:00) (68 - 98)  RR: 25 (08 Jan 2024 00:00) (21 - 36)  SpO2: 98% (08 Jan 2024 00:00) (93% - 100%)    Parameters below as of 07 Jan 2024 19:00  Patient On (Oxygen Delivery Method): room air        I&O's Summary    06 Jan 2024 07:01  -  07 Jan 2024 07:00  --------------------------------------------------------  IN: 2620 mL / OUT: 1650 mL / NET: 970 mL    07 Jan 2024 07:01  -  08 Jan 2024 00:20  --------------------------------------------------------  IN: 2800 mL / OUT: 1720 mL / NET: 1080 mL        PHYSICAL EXAM:    NEURO  RASS:     GCS:     CAM ICU:  Exam: awake, alert, oriented    RESPIRATORY  Exam: no increased WOB on RA  Mechanical Ventilation:     CARDIOVASCULAR  Exam: warm, well-perfused  Cardiac Rhythm: normal sinus rhythm noted on cardiac monitor    GI/NUTRITION  Exam: soft, non-distended, non-tender    GENITOURINARY  Exam:     ACCESS DEVICES:  [ ] Peripheral IV  [ ] Central Venous Line	[ ] R	[ ] L	[ ] IJ	[ ] Fem	[ ] SC	Placed:   [ ] Arterial Line		[ ] R	[ ] L	[ ] Fem	[ ] Rad	[ ] Ax	Placed:   [ ] PICC:					[ ] Mediport  [ ] Urinary Catheter, Date Placed:   [x] Necessity of urinary, arterial, and venous catheters discussed      LABS:                        10.5   14.98 )-----------( 233      ( 07 Jan 2024 06:47 )             28.5   01-07    125<L>  |  89<L>  |  23  ----------------------------<  110<H>  3.2<L>   |  23  |  0.97    Ca    8.7      07 Jan 2024 15:20  Phos  2.8     01-07  Mg     2.8     01-07    TPro  6.3  /  Alb  2.0<L>  /  TBili  0.8  /  DBili  x   /  AST  107<H>  /  ALT  28  /  AlkPhos  134<H>  01-06    CAPILLARY BLOOD GLUCOSE      POCT Blood Glucose.: 130 mg/dL (07 Jan 2024 02:05)      MEDICATIONS:   MEDICATIONS  (STANDING):  chlorhexidine 2% Cloths 1 Application(s) Topical <User Schedule>  enoxaparin Injectable 40 milliGRAM(s) SubCutaneous <User Schedule>  influenza   Vaccine 0.5 milliLiter(s) IntraMuscular once  lidocaine   4% Patch 1 Patch Transdermal every 24 hours  piperacillin/tazobactam IVPB.. 3.375 Gram(s) IV Intermittent every 8 hours  polyethylene glycol 3350 17 Gram(s) Oral every 12 hours  senna 2 Tablet(s) Oral at bedtime    MEDICATIONS  (PRN):  HYDROmorphone  Injectable 0.5 milliGRAM(s) IV Push every 3 hours PRN Breakthrough  LORazepam   Injectable 2 milliGRAM(s) IV Push every 1 hour PRN CIWA-Ar score 8 or greater  oxyCODONE    IR 5 milliGRAM(s) Oral every 3 hours PRN Moderate Pain (4 - 6)  oxyCODONE    IR 10 milliGRAM(s) Oral every 3 hours PRN Severe Pain (7 - 10)      IMAGING: 24 HOUR EVENTS:  follow up on potassium s/p repletion  chest tube water seal, xray in am  f/u procal and bc  -midsternal chest pain, rpt xray, ekg  -increasing agitation, diaphoresis, tremor-on precedex    SUBJECTIVE/ROS:  Patient seen at bedside this AM.         OBJECTIVE:    VITALS:  Vital Signs Last 24 Hrs  T(C): 36.8 (07 Jan 2024 23:00), Max: 36.8 (07 Jan 2024 23:00)  T(F): 98.2 (07 Jan 2024 23:00), Max: 98.2 (07 Jan 2024 23:00)  HR: 106 (08 Jan 2024 00:00) (98 - 109)  BP: 125/81 (08 Jan 2024 00:00) (91/56 - 125/81)  BP(mean): 98 (08 Jan 2024 00:00) (68 - 98)  RR: 25 (08 Jan 2024 00:00) (21 - 36)  SpO2: 98% (08 Jan 2024 00:00) (93% - 100%)    Parameters below as of 07 Jan 2024 19:00  Patient On (Oxygen Delivery Method): room air        I&O's Summary    06 Jan 2024 07:01  -  07 Jan 2024 07:00  --------------------------------------------------------  IN: 2620 mL / OUT: 1650 mL / NET: 970 mL    07 Jan 2024 07:01  -  08 Jan 2024 00:20  --------------------------------------------------------  IN: 2800 mL / OUT: 1720 mL / NET: 1080 mL        PHYSICAL EXAM:    NEURO  RASS:     GCS:     CAM ICU:  Exam: awake, alert, oriented    RESPIRATORY  Exam: no increased WOB on RA  Mechanical Ventilation:     CARDIOVASCULAR  Exam: warm, well-perfused  Cardiac Rhythm: normal sinus rhythm noted on cardiac monitor    GI/NUTRITION  Exam: soft, non-distended, non-tender    GENITOURINARY  Exam:     ACCESS DEVICES:  [ ] Peripheral IV  [ ] Central Venous Line	[ ] R	[ ] L	[ ] IJ	[ ] Fem	[ ] SC	Placed:   [ ] Arterial Line		[ ] R	[ ] L	[ ] Fem	[ ] Rad	[ ] Ax	Placed:   [ ] PICC:					[ ] Mediport  [ ] Urinary Catheter, Date Placed:   [x] Necessity of urinary, arterial, and venous catheters discussed      LABS:                        10.5   14.98 )-----------( 233      ( 07 Jan 2024 06:47 )             28.5   01-07    125<L>  |  89<L>  |  23  ----------------------------<  110<H>  3.2<L>   |  23  |  0.97    Ca    8.7      07 Jan 2024 15:20  Phos  2.8     01-07  Mg     2.8     01-07    TPro  6.3  /  Alb  2.0<L>  /  TBili  0.8  /  DBili  x   /  AST  107<H>  /  ALT  28  /  AlkPhos  134<H>  01-06    CAPILLARY BLOOD GLUCOSE      POCT Blood Glucose.: 130 mg/dL (07 Jan 2024 02:05)      MEDICATIONS:   MEDICATIONS  (STANDING):  chlorhexidine 2% Cloths 1 Application(s) Topical <User Schedule>  enoxaparin Injectable 40 milliGRAM(s) SubCutaneous <User Schedule>  influenza   Vaccine 0.5 milliLiter(s) IntraMuscular once  lidocaine   4% Patch 1 Patch Transdermal every 24 hours  piperacillin/tazobactam IVPB.. 3.375 Gram(s) IV Intermittent every 8 hours  polyethylene glycol 3350 17 Gram(s) Oral every 12 hours  senna 2 Tablet(s) Oral at bedtime    MEDICATIONS  (PRN):  HYDROmorphone  Injectable 0.5 milliGRAM(s) IV Push every 3 hours PRN Breakthrough  LORazepam   Injectable 2 milliGRAM(s) IV Push every 1 hour PRN CIWA-Ar score 8 or greater  oxyCODONE    IR 5 milliGRAM(s) Oral every 3 hours PRN Moderate Pain (4 - 6)  oxyCODONE    IR 10 milliGRAM(s) Oral every 3 hours PRN Severe Pain (7 - 10)      IMAGING: 24 HOUR EVENTS:  follow up on potassium s/p repletion  chest tube water seal, xray in am  f/u procal and bc  -midsternal chest pain, rpt xray, ekg  -increasing agitation, diaphoresis, tremor-on precedex, ativan PRN    SUBJECTIVE/ROS:  Patient seen at bedside this AM.         OBJECTIVE:    VITALS:  Vital Signs Last 24 Hrs  T(C): 36.8 (07 Jan 2024 23:00), Max: 36.8 (07 Jan 2024 23:00)  T(F): 98.2 (07 Jan 2024 23:00), Max: 98.2 (07 Jan 2024 23:00)  HR: 106 (08 Jan 2024 00:00) (98 - 109)  BP: 125/81 (08 Jan 2024 00:00) (91/56 - 125/81)  BP(mean): 98 (08 Jan 2024 00:00) (68 - 98)  RR: 25 (08 Jan 2024 00:00) (21 - 36)  SpO2: 98% (08 Jan 2024 00:00) (93% - 100%)    Parameters below as of 07 Jan 2024 19:00  Patient On (Oxygen Delivery Method): room air        I&O's Summary    06 Jan 2024 07:01  -  07 Jan 2024 07:00  --------------------------------------------------------  IN: 2620 mL / OUT: 1650 mL / NET: 970 mL    07 Jan 2024 07:01  -  08 Jan 2024 00:20  --------------------------------------------------------  IN: 2800 mL / OUT: 1720 mL / NET: 1080 mL        PHYSICAL EXAM:    NEURO  RASS:     GCS:     CAM ICU:  Exam: awake, alert, oriented    RESPIRATORY  Exam: no increased WOB on RA  Mechanical Ventilation:     CARDIOVASCULAR  Exam: warm, well-perfused  Cardiac Rhythm: normal sinus rhythm noted on cardiac monitor    GI/NUTRITION  Exam: soft, non-distended, non-tender    GENITOURINARY  Exam:     ACCESS DEVICES:  [ ] Peripheral IV  [ ] Central Venous Line	[ ] R	[ ] L	[ ] IJ	[ ] Fem	[ ] SC	Placed:   [ ] Arterial Line		[ ] R	[ ] L	[ ] Fem	[ ] Rad	[ ] Ax	Placed:   [ ] PICC:					[ ] Mediport  [ ] Urinary Catheter, Date Placed:   [x] Necessity of urinary, arterial, and venous catheters discussed      LABS:                        10.5   14.98 )-----------( 233      ( 07 Jan 2024 06:47 )             28.5   01-07    125<L>  |  89<L>  |  23  ----------------------------<  110<H>  3.2<L>   |  23  |  0.97    Ca    8.7      07 Jan 2024 15:20  Phos  2.8     01-07  Mg     2.8     01-07    TPro  6.3  /  Alb  2.0<L>  /  TBili  0.8  /  DBili  x   /  AST  107<H>  /  ALT  28  /  AlkPhos  134<H>  01-06    CAPILLARY BLOOD GLUCOSE      POCT Blood Glucose.: 130 mg/dL (07 Jan 2024 02:05)      MEDICATIONS:   MEDICATIONS  (STANDING):  chlorhexidine 2% Cloths 1 Application(s) Topical <User Schedule>  enoxaparin Injectable 40 milliGRAM(s) SubCutaneous <User Schedule>  influenza   Vaccine 0.5 milliLiter(s) IntraMuscular once  lidocaine   4% Patch 1 Patch Transdermal every 24 hours  piperacillin/tazobactam IVPB.. 3.375 Gram(s) IV Intermittent every 8 hours  polyethylene glycol 3350 17 Gram(s) Oral every 12 hours  senna 2 Tablet(s) Oral at bedtime    MEDICATIONS  (PRN):  HYDROmorphone  Injectable 0.5 milliGRAM(s) IV Push every 3 hours PRN Breakthrough  LORazepam   Injectable 2 milliGRAM(s) IV Push every 1 hour PRN CIWA-Ar score 8 or greater  oxyCODONE    IR 5 milliGRAM(s) Oral every 3 hours PRN Moderate Pain (4 - 6)  oxyCODONE    IR 10 milliGRAM(s) Oral every 3 hours PRN Severe Pain (7 - 10)      IMAGING: 24 HOUR EVENTS:  follow up on potassium s/p repletion  chest tube water seal, xray in am  f/u procal and bc  -midsternal chest pain, rpt xray, ekg  -increasing agitation, diaphoresis, tremor-on precedex, loaded w phenobarb as per bakari protocol    SUBJECTIVE/ROS:  Patient seen at bedside this AM.         OBJECTIVE:    VITALS:  Vital Signs Last 24 Hrs  T(C): 36.8 (07 Jan 2024 23:00), Max: 36.8 (07 Jan 2024 23:00)  T(F): 98.2 (07 Jan 2024 23:00), Max: 98.2 (07 Jan 2024 23:00)  HR: 106 (08 Jan 2024 00:00) (98 - 109)  BP: 125/81 (08 Jan 2024 00:00) (91/56 - 125/81)  BP(mean): 98 (08 Jan 2024 00:00) (68 - 98)  RR: 25 (08 Jan 2024 00:00) (21 - 36)  SpO2: 98% (08 Jan 2024 00:00) (93% - 100%)    Parameters below as of 07 Jan 2024 19:00  Patient On (Oxygen Delivery Method): room air        I&O's Summary    06 Jan 2024 07:01  -  07 Jan 2024 07:00  --------------------------------------------------------  IN: 2620 mL / OUT: 1650 mL / NET: 970 mL    07 Jan 2024 07:01  -  08 Jan 2024 00:20  --------------------------------------------------------  IN: 2800 mL / OUT: 1720 mL / NET: 1080 mL        PHYSICAL EXAM:    NEURO  RASS:     GCS:     CAM ICU:  Exam: awake, alert, oriented    RESPIRATORY  Exam: no increased WOB on RA  Mechanical Ventilation:     CARDIOVASCULAR  Exam: warm, well-perfused  Cardiac Rhythm: normal sinus rhythm noted on cardiac monitor    GI/NUTRITION  Exam: soft, non-distended, non-tender    GENITOURINARY  Exam:     ACCESS DEVICES:  [ ] Peripheral IV  [ ] Central Venous Line	[ ] R	[ ] L	[ ] IJ	[ ] Fem	[ ] SC	Placed:   [ ] Arterial Line		[ ] R	[ ] L	[ ] Fem	[ ] Rad	[ ] Ax	Placed:   [ ] PICC:					[ ] Mediport  [ ] Urinary Catheter, Date Placed:   [x] Necessity of urinary, arterial, and venous catheters discussed      LABS:                        10.5   14.98 )-----------( 233      ( 07 Jan 2024 06:47 )             28.5   01-07    125<L>  |  89<L>  |  23  ----------------------------<  110<H>  3.2<L>   |  23  |  0.97    Ca    8.7      07 Jan 2024 15:20  Phos  2.8     01-07  Mg     2.8     01-07    TPro  6.3  /  Alb  2.0<L>  /  TBili  0.8  /  DBili  x   /  AST  107<H>  /  ALT  28  /  AlkPhos  134<H>  01-06    CAPILLARY BLOOD GLUCOSE      POCT Blood Glucose.: 130 mg/dL (07 Jan 2024 02:05)      MEDICATIONS:   MEDICATIONS  (STANDING):  chlorhexidine 2% Cloths 1 Application(s) Topical <User Schedule>  enoxaparin Injectable 40 milliGRAM(s) SubCutaneous <User Schedule>  influenza   Vaccine 0.5 milliLiter(s) IntraMuscular once  lidocaine   4% Patch 1 Patch Transdermal every 24 hours  piperacillin/tazobactam IVPB.. 3.375 Gram(s) IV Intermittent every 8 hours  polyethylene glycol 3350 17 Gram(s) Oral every 12 hours  senna 2 Tablet(s) Oral at bedtime    MEDICATIONS  (PRN):  HYDROmorphone  Injectable 0.5 milliGRAM(s) IV Push every 3 hours PRN Breakthrough  LORazepam   Injectable 2 milliGRAM(s) IV Push every 1 hour PRN CIWA-Ar score 8 or greater  oxyCODONE    IR 5 milliGRAM(s) Oral every 3 hours PRN Moderate Pain (4 - 6)  oxyCODONE    IR 10 milliGRAM(s) Oral every 3 hours PRN Severe Pain (7 - 10)      IMAGING: 24 HOUR EVENTS:  -follow up on potassium s/p repletion  -chest tube  suction  -f/u procal and bc  -increasing agitation, diaphoresis, tremor-on precedex,  -intubated for AMS and increase WOB    SUBJECTIVE/ROS:  Patient seen at bedside this AM.         OBJECTIVE:    VITALS:  Vital Signs Last 24 Hrs  T(C): 36.8 (07 Jan 2024 23:00), Max: 36.8 (07 Jan 2024 23:00)  T(F): 98.2 (07 Jan 2024 23:00), Max: 98.2 (07 Jan 2024 23:00)  HR: 106 (08 Jan 2024 00:00) (98 - 109)  BP: 125/81 (08 Jan 2024 00:00) (91/56 - 125/81)  BP(mean): 98 (08 Jan 2024 00:00) (68 - 98)  RR: 25 (08 Jan 2024 00:00) (21 - 36)  SpO2: 98% (08 Jan 2024 00:00) (93% - 100%)    Parameters below as of 07 Jan 2024 19:00  Patient On (Oxygen Delivery Method): room air        I&O's Summary    06 Jan 2024 07:01  -  07 Jan 2024 07:00  --------------------------------------------------------  IN: 2620 mL / OUT: 1650 mL / NET: 970 mL    07 Jan 2024 07:01  -  08 Jan 2024 00:20  --------------------------------------------------------  IN: 2800 mL / OUT: 1720 mL / NET: 1080 mL        PHYSICAL EXAM:    NEURO  RASS:     GCS:     CAM ICU:  Exam: awake, alert, oriented    RESPIRATORY  Exam: no increased WOB on RA  Mechanical Ventilation:     CARDIOVASCULAR  Exam: warm, well-perfused  Cardiac Rhythm: normal sinus rhythm noted on cardiac monitor    GI/NUTRITION  Exam: soft, non-distended, non-tender    GENITOURINARY  Exam:     ACCESS DEVICES:  [ ] Peripheral IV  [ ] Central Venous Line	[ ] R	[ ] L	[ ] IJ	[ ] Fem	[ ] SC	Placed:   [ ] Arterial Line		[ ] R	[ ] L	[ ] Fem	[ ] Rad	[ ] Ax	Placed:   [ ] PICC:					[ ] Mediport  [ ] Urinary Catheter, Date Placed:   [x] Necessity of urinary, arterial, and venous catheters discussed      LABS:                        10.5   14.98 )-----------( 233      ( 07 Jan 2024 06:47 )             28.5   01-07    125<L>  |  89<L>  |  23  ----------------------------<  110<H>  3.2<L>   |  23  |  0.97    Ca    8.7      07 Jan 2024 15:20  Phos  2.8     01-07  Mg     2.8     01-07    TPro  6.3  /  Alb  2.0<L>  /  TBili  0.8  /  DBili  x   /  AST  107<H>  /  ALT  28  /  AlkPhos  134<H>  01-06    CAPILLARY BLOOD GLUCOSE      POCT Blood Glucose.: 130 mg/dL (07 Jan 2024 02:05)      MEDICATIONS:   MEDICATIONS  (STANDING):  chlorhexidine 2% Cloths 1 Application(s) Topical <User Schedule>  enoxaparin Injectable 40 milliGRAM(s) SubCutaneous <User Schedule>  influenza   Vaccine 0.5 milliLiter(s) IntraMuscular once  lidocaine   4% Patch 1 Patch Transdermal every 24 hours  piperacillin/tazobactam IVPB.. 3.375 Gram(s) IV Intermittent every 8 hours  polyethylene glycol 3350 17 Gram(s) Oral every 12 hours  senna 2 Tablet(s) Oral at bedtime    MEDICATIONS  (PRN):  HYDROmorphone  Injectable 0.5 milliGRAM(s) IV Push every 3 hours PRN Breakthrough  LORazepam   Injectable 2 milliGRAM(s) IV Push every 1 hour PRN CIWA-Ar score 8 or greater  oxyCODONE    IR 5 milliGRAM(s) Oral every 3 hours PRN Moderate Pain (4 - 6)  oxyCODONE    IR 10 milliGRAM(s) Oral every 3 hours PRN Severe Pain (7 - 10)      IMAGING:

## 2024-01-08 NOTE — PROVIDER CONTACT NOTE (OTHER) - SITUATION
Patient complaining of 8/10 crushing chest pain when taking a breath. Patient is tachypneic at this time RR in 30s

## 2024-01-09 ENCOUNTER — TRANSCRIPTION ENCOUNTER (OUTPATIENT)
Age: 49
End: 2024-01-09

## 2024-01-09 DIAGNOSIS — R29.898 OTHER SYMPTOMS AND SIGNS INVOLVING THE MUSCULOSKELETAL SYSTEM: ICD-10-CM

## 2024-01-09 PROBLEM — Z00.00 ENCOUNTER FOR PREVENTIVE HEALTH EXAMINATION: Status: ACTIVE | Noted: 2024-01-09

## 2024-01-09 LAB
ANION GAP SERPL CALC-SCNC: 12 MMOL/L — SIGNIFICANT CHANGE UP (ref 5–17)
ANION GAP SERPL CALC-SCNC: 12 MMOL/L — SIGNIFICANT CHANGE UP (ref 5–17)
ANION GAP SERPL CALC-SCNC: 9 MMOL/L — SIGNIFICANT CHANGE UP (ref 5–17)
ANION GAP SERPL CALC-SCNC: 9 MMOL/L — SIGNIFICANT CHANGE UP (ref 5–17)
APTT BLD: 29 SEC — SIGNIFICANT CHANGE UP (ref 24.5–35.6)
APTT BLD: 29 SEC — SIGNIFICANT CHANGE UP (ref 24.5–35.6)
BASE EXCESS BLDV CALC-SCNC: 3.4 MMOL/L — HIGH (ref -2–3)
BASE EXCESS BLDV CALC-SCNC: 3.4 MMOL/L — HIGH (ref -2–3)
BLD GP AB SCN SERPL QL: NEGATIVE — SIGNIFICANT CHANGE UP
BLD GP AB SCN SERPL QL: NEGATIVE — SIGNIFICANT CHANGE UP
BUN SERPL-MCNC: 14 MG/DL — SIGNIFICANT CHANGE UP (ref 7–23)
CA-I SERPL-SCNC: 1.1 MMOL/L — LOW (ref 1.15–1.33)
CA-I SERPL-SCNC: 1.1 MMOL/L — LOW (ref 1.15–1.33)
CALCIUM SERPL-MCNC: 8.2 MG/DL — LOW (ref 8.4–10.5)
CALCIUM SERPL-MCNC: 8.2 MG/DL — LOW (ref 8.4–10.5)
CALCIUM SERPL-MCNC: 8.3 MG/DL — LOW (ref 8.4–10.5)
CALCIUM SERPL-MCNC: 8.3 MG/DL — LOW (ref 8.4–10.5)
CHLORIDE BLDV-SCNC: 97 MMOL/L — SIGNIFICANT CHANGE UP (ref 96–108)
CHLORIDE BLDV-SCNC: 97 MMOL/L — SIGNIFICANT CHANGE UP (ref 96–108)
CHLORIDE SERPL-SCNC: 95 MMOL/L — LOW (ref 96–108)
CHLORIDE SERPL-SCNC: 95 MMOL/L — LOW (ref 96–108)
CHLORIDE SERPL-SCNC: 96 MMOL/L — SIGNIFICANT CHANGE UP (ref 96–108)
CHLORIDE SERPL-SCNC: 96 MMOL/L — SIGNIFICANT CHANGE UP (ref 96–108)
CO2 BLDV-SCNC: 28 MMOL/L — HIGH (ref 22–26)
CO2 BLDV-SCNC: 28 MMOL/L — HIGH (ref 22–26)
CO2 SERPL-SCNC: 23 MMOL/L — SIGNIFICANT CHANGE UP (ref 22–31)
CO2 SERPL-SCNC: 23 MMOL/L — SIGNIFICANT CHANGE UP (ref 22–31)
CO2 SERPL-SCNC: 24 MMOL/L — SIGNIFICANT CHANGE UP (ref 22–31)
CO2 SERPL-SCNC: 24 MMOL/L — SIGNIFICANT CHANGE UP (ref 22–31)
CREAT SERPL-MCNC: 0.72 MG/DL — SIGNIFICANT CHANGE UP (ref 0.5–1.3)
CREAT SERPL-MCNC: 0.72 MG/DL — SIGNIFICANT CHANGE UP (ref 0.5–1.3)
CREAT SERPL-MCNC: 0.8 MG/DL — SIGNIFICANT CHANGE UP (ref 0.5–1.3)
CREAT SERPL-MCNC: 0.8 MG/DL — SIGNIFICANT CHANGE UP (ref 0.5–1.3)
EGFR: 110 ML/MIN/1.73M2 — SIGNIFICANT CHANGE UP
EGFR: 110 ML/MIN/1.73M2 — SIGNIFICANT CHANGE UP
EGFR: 113 ML/MIN/1.73M2 — SIGNIFICANT CHANGE UP
EGFR: 113 ML/MIN/1.73M2 — SIGNIFICANT CHANGE UP
GAS PNL BLDV: 126 MMOL/L — LOW (ref 136–145)
GAS PNL BLDV: 126 MMOL/L — LOW (ref 136–145)
GAS PNL BLDV: SIGNIFICANT CHANGE UP
GAS PNL BLDV: SIGNIFICANT CHANGE UP
GLUCOSE BLDV-MCNC: 109 MG/DL — HIGH (ref 70–99)
GLUCOSE BLDV-MCNC: 109 MG/DL — HIGH (ref 70–99)
GLUCOSE SERPL-MCNC: 110 MG/DL — HIGH (ref 70–99)
GLUCOSE SERPL-MCNC: 110 MG/DL — HIGH (ref 70–99)
GLUCOSE SERPL-MCNC: 113 MG/DL — HIGH (ref 70–99)
GLUCOSE SERPL-MCNC: 113 MG/DL — HIGH (ref 70–99)
HCO3 BLDV-SCNC: 27 MMOL/L — SIGNIFICANT CHANGE UP (ref 22–29)
HCO3 BLDV-SCNC: 27 MMOL/L — SIGNIFICANT CHANGE UP (ref 22–29)
HCT VFR BLD CALC: 28 % — LOW (ref 39–50)
HCT VFR BLD CALC: 28 % — LOW (ref 39–50)
HCT VFR BLDA CALC: 41 % — SIGNIFICANT CHANGE UP (ref 39–51)
HCT VFR BLDA CALC: 41 % — SIGNIFICANT CHANGE UP (ref 39–51)
HGB BLD CALC-MCNC: 13.5 G/DL — SIGNIFICANT CHANGE UP (ref 12.6–17.4)
HGB BLD CALC-MCNC: 13.5 G/DL — SIGNIFICANT CHANGE UP (ref 12.6–17.4)
HGB BLD-MCNC: 10.1 G/DL — LOW (ref 13–17)
HGB BLD-MCNC: 10.1 G/DL — LOW (ref 13–17)
INR BLD: 1.12 RATIO — SIGNIFICANT CHANGE UP (ref 0.85–1.18)
INR BLD: 1.12 RATIO — SIGNIFICANT CHANGE UP (ref 0.85–1.18)
LACTATE BLDV-MCNC: 1.5 MMOL/L — SIGNIFICANT CHANGE UP (ref 0.5–2)
LACTATE BLDV-MCNC: 1.5 MMOL/L — SIGNIFICANT CHANGE UP (ref 0.5–2)
MAGNESIUM SERPL-MCNC: 2.1 MG/DL — SIGNIFICANT CHANGE UP (ref 1.6–2.6)
MCHC RBC-ENTMCNC: 28.5 PG — SIGNIFICANT CHANGE UP (ref 27–34)
MCHC RBC-ENTMCNC: 28.5 PG — SIGNIFICANT CHANGE UP (ref 27–34)
MCHC RBC-ENTMCNC: 36.1 GM/DL — HIGH (ref 32–36)
MCHC RBC-ENTMCNC: 36.1 GM/DL — HIGH (ref 32–36)
MCV RBC AUTO: 79.1 FL — LOW (ref 80–100)
MCV RBC AUTO: 79.1 FL — LOW (ref 80–100)
NRBC # BLD: 0 /100 WBCS — SIGNIFICANT CHANGE UP (ref 0–0)
NRBC # BLD: 0 /100 WBCS — SIGNIFICANT CHANGE UP (ref 0–0)
OTHER CELLS CSF MANUAL: 13.8 ML/DL — LOW (ref 18–22)
OTHER CELLS CSF MANUAL: 13.8 ML/DL — LOW (ref 18–22)
PCO2 BLDV: 36 MMHG — LOW (ref 42–55)
PCO2 BLDV: 36 MMHG — LOW (ref 42–55)
PH BLDV: 7.48 — HIGH (ref 7.32–7.43)
PH BLDV: 7.48 — HIGH (ref 7.32–7.43)
PHOSPHATE SERPL-MCNC: 2.8 MG/DL — SIGNIFICANT CHANGE UP (ref 2.5–4.5)
PHOSPHATE SERPL-MCNC: 2.8 MG/DL — SIGNIFICANT CHANGE UP (ref 2.5–4.5)
PHOSPHATE SERPL-MCNC: 4.1 MG/DL — SIGNIFICANT CHANGE UP (ref 2.5–4.5)
PHOSPHATE SERPL-MCNC: 4.1 MG/DL — SIGNIFICANT CHANGE UP (ref 2.5–4.5)
PLATELET # BLD AUTO: 355 K/UL — SIGNIFICANT CHANGE UP (ref 150–400)
PLATELET # BLD AUTO: 355 K/UL — SIGNIFICANT CHANGE UP (ref 150–400)
PO2 BLDV: 47 MMHG — HIGH (ref 25–45)
PO2 BLDV: 47 MMHG — HIGH (ref 25–45)
POTASSIUM BLDV-SCNC: 3.9 MMOL/L — SIGNIFICANT CHANGE UP (ref 3.5–5.1)
POTASSIUM BLDV-SCNC: 3.9 MMOL/L — SIGNIFICANT CHANGE UP (ref 3.5–5.1)
POTASSIUM SERPL-MCNC: 3.6 MMOL/L — SIGNIFICANT CHANGE UP (ref 3.5–5.3)
POTASSIUM SERPL-MCNC: 3.6 MMOL/L — SIGNIFICANT CHANGE UP (ref 3.5–5.3)
POTASSIUM SERPL-MCNC: 4 MMOL/L — SIGNIFICANT CHANGE UP (ref 3.5–5.3)
POTASSIUM SERPL-MCNC: 4 MMOL/L — SIGNIFICANT CHANGE UP (ref 3.5–5.3)
POTASSIUM SERPL-SCNC: 3.6 MMOL/L — SIGNIFICANT CHANGE UP (ref 3.5–5.3)
POTASSIUM SERPL-SCNC: 3.6 MMOL/L — SIGNIFICANT CHANGE UP (ref 3.5–5.3)
POTASSIUM SERPL-SCNC: 4 MMOL/L — SIGNIFICANT CHANGE UP (ref 3.5–5.3)
POTASSIUM SERPL-SCNC: 4 MMOL/L — SIGNIFICANT CHANGE UP (ref 3.5–5.3)
PROTHROM AB SERPL-ACNC: 11.7 SEC — SIGNIFICANT CHANGE UP (ref 9.5–13)
PROTHROM AB SERPL-ACNC: 11.7 SEC — SIGNIFICANT CHANGE UP (ref 9.5–13)
RAPID RVP RESULT: SIGNIFICANT CHANGE UP
RAPID RVP RESULT: SIGNIFICANT CHANGE UP
RBC # BLD: 3.54 M/UL — LOW (ref 4.2–5.8)
RBC # BLD: 3.54 M/UL — LOW (ref 4.2–5.8)
RBC # FLD: 14.4 % — SIGNIFICANT CHANGE UP (ref 10.3–14.5)
RBC # FLD: 14.4 % — SIGNIFICANT CHANGE UP (ref 10.3–14.5)
RH IG SCN BLD-IMP: POSITIVE — SIGNIFICANT CHANGE UP
RH IG SCN BLD-IMP: POSITIVE — SIGNIFICANT CHANGE UP
SAO2 % BLDV: 74.8 % — SIGNIFICANT CHANGE UP (ref 67–88)
SAO2 % BLDV: 74.8 % — SIGNIFICANT CHANGE UP (ref 67–88)
SARS-COV-2 RNA SPEC QL NAA+PROBE: SIGNIFICANT CHANGE UP
SARS-COV-2 RNA SPEC QL NAA+PROBE: SIGNIFICANT CHANGE UP
SODIUM SERPL-SCNC: 128 MMOL/L — LOW (ref 135–145)
SODIUM SERPL-SCNC: 128 MMOL/L — LOW (ref 135–145)
SODIUM SERPL-SCNC: 131 MMOL/L — LOW (ref 135–145)
SODIUM SERPL-SCNC: 131 MMOL/L — LOW (ref 135–145)
WBC # BLD: 18.57 K/UL — HIGH (ref 3.8–10.5)
WBC # BLD: 18.57 K/UL — HIGH (ref 3.8–10.5)
WBC # FLD AUTO: 18.57 K/UL — HIGH (ref 3.8–10.5)
WBC # FLD AUTO: 18.57 K/UL — HIGH (ref 3.8–10.5)

## 2024-01-09 PROCEDURE — 99233 SBSQ HOSP IP/OBS HIGH 50: CPT | Mod: 57

## 2024-01-09 PROCEDURE — 71045 X-RAY EXAM CHEST 1 VIEW: CPT | Mod: 26

## 2024-01-09 PROCEDURE — 99291 CRITICAL CARE FIRST HOUR: CPT | Mod: GC

## 2024-01-09 PROCEDURE — 99231 SBSQ HOSP IP/OBS SF/LOW 25: CPT

## 2024-01-09 PROCEDURE — 95718 EEG PHYS/QHP 2-12 HR W/VEEG: CPT

## 2024-01-09 RX ORDER — PHENOBARBITAL 60 MG
130 TABLET ORAL ONCE
Refills: 0 | Status: DISCONTINUED | OUTPATIENT
Start: 2024-01-09 | End: 2024-01-09

## 2024-01-09 RX ORDER — PANTOPRAZOLE SODIUM 20 MG/1
40 TABLET, DELAYED RELEASE ORAL DAILY
Refills: 0 | Status: DISCONTINUED | OUTPATIENT
Start: 2024-01-09 | End: 2024-01-13

## 2024-01-09 RX ORDER — SENNA PLUS 8.6 MG/1
10 TABLET ORAL EVERY 24 HOURS
Refills: 0 | Status: DISCONTINUED | OUTPATIENT
Start: 2024-01-09 | End: 2024-01-12

## 2024-01-09 RX ORDER — VANCOMYCIN HCL 1 G
1000 VIAL (EA) INTRAVENOUS ONCE
Refills: 0 | Status: COMPLETED | OUTPATIENT
Start: 2024-01-09 | End: 2024-01-09

## 2024-01-09 RX ORDER — ACETAMINOPHEN 500 MG
1000 TABLET ORAL ONCE
Refills: 0 | Status: COMPLETED | OUTPATIENT
Start: 2024-01-09 | End: 2024-01-09

## 2024-01-09 RX ORDER — PHENYLEPHRINE HYDROCHLORIDE 10 MG/ML
0.2 INJECTION INTRAVENOUS
Qty: 40 | Refills: 0 | Status: DISCONTINUED | OUTPATIENT
Start: 2024-01-09 | End: 2024-01-11

## 2024-01-09 RX ORDER — CALCIUM GLUCONATE 100 MG/ML
2 VIAL (ML) INTRAVENOUS ONCE
Refills: 0 | Status: COMPLETED | OUTPATIENT
Start: 2024-01-09 | End: 2024-01-09

## 2024-01-09 RX ORDER — ENOXAPARIN SODIUM 100 MG/ML
40 INJECTION SUBCUTANEOUS
Refills: 0 | Status: DISCONTINUED | OUTPATIENT
Start: 2024-01-09 | End: 2024-01-10

## 2024-01-09 RX ORDER — POTASSIUM CHLORIDE 20 MEQ
40 PACKET (EA) ORAL ONCE
Refills: 0 | Status: COMPLETED | OUTPATIENT
Start: 2024-01-09 | End: 2024-01-09

## 2024-01-09 RX ADMIN — DEXMEDETOMIDINE HYDROCHLORIDE IN 0.9% SODIUM CHLORIDE 11.5 MICROGRAM(S)/KG/HR: 4 INJECTION INTRAVENOUS at 08:05

## 2024-01-09 RX ADMIN — HYDROMORPHONE HYDROCHLORIDE 0.5 MILLIGRAM(S): 2 INJECTION INTRAMUSCULAR; INTRAVENOUS; SUBCUTANEOUS at 18:07

## 2024-01-09 RX ADMIN — Medication 2 MILLIGRAM(S): at 17:48

## 2024-01-09 RX ADMIN — HYDROMORPHONE HYDROCHLORIDE 0.5 MILLIGRAM(S): 2 INJECTION INTRAMUSCULAR; INTRAVENOUS; SUBCUTANEOUS at 17:40

## 2024-01-09 RX ADMIN — HYDROMORPHONE HYDROCHLORIDE 0.5 MILLIGRAM(S): 2 INJECTION INTRAMUSCULAR; INTRAVENOUS; SUBCUTANEOUS at 12:29

## 2024-01-09 RX ADMIN — PIPERACILLIN AND TAZOBACTAM 25 GRAM(S): 4; .5 INJECTION, POWDER, LYOPHILIZED, FOR SOLUTION INTRAVENOUS at 05:13

## 2024-01-09 RX ADMIN — CHLORHEXIDINE GLUCONATE 1 APPLICATION(S): 213 SOLUTION TOPICAL at 05:13

## 2024-01-09 RX ADMIN — PREGABALIN 1000 MICROGRAM(S): 225 CAPSULE ORAL at 12:11

## 2024-01-09 RX ADMIN — Medication 200 GRAM(S): at 07:10

## 2024-01-09 RX ADMIN — HYDROMORPHONE HYDROCHLORIDE 0.5 MILLIGRAM(S): 2 INJECTION INTRAMUSCULAR; INTRAVENOUS; SUBCUTANEOUS at 01:14

## 2024-01-09 RX ADMIN — HYDROMORPHONE HYDROCHLORIDE 0.5 MILLIGRAM(S): 2 INJECTION INTRAMUSCULAR; INTRAVENOUS; SUBCUTANEOUS at 00:44

## 2024-01-09 RX ADMIN — Medication 1000 MILLIGRAM(S): at 14:27

## 2024-01-09 RX ADMIN — Medication 250 MILLIGRAM(S): at 13:32

## 2024-01-09 RX ADMIN — Medication 500 MILLIGRAM(S): at 12:11

## 2024-01-09 RX ADMIN — PHENYLEPHRINE HYDROCHLORIDE 6.88 MICROGRAM(S)/KG/MIN: 10 INJECTION INTRAVENOUS at 19:15

## 2024-01-09 RX ADMIN — Medication 130 MILLIGRAM(S): at 19:17

## 2024-01-09 RX ADMIN — SENNA PLUS 10 MILLILITER(S): 8.6 TABLET ORAL at 22:59

## 2024-01-09 RX ADMIN — Medication 130 MILLIGRAM(S): at 03:38

## 2024-01-09 RX ADMIN — Medication 400 MILLIGRAM(S): at 13:57

## 2024-01-09 RX ADMIN — LIDOCAINE 1 PATCH: 4 CREAM TOPICAL at 11:00

## 2024-01-09 RX ADMIN — POLYETHYLENE GLYCOL 3350 17 GRAM(S): 17 POWDER, FOR SOLUTION ORAL at 17:58

## 2024-01-09 RX ADMIN — PHENYLEPHRINE HYDROCHLORIDE 6.88 MICROGRAM(S)/KG/MIN: 10 INJECTION INTRAVENOUS at 08:05

## 2024-01-09 RX ADMIN — LIDOCAINE 1 PATCH: 4 CREAM TOPICAL at 08:00

## 2024-01-09 RX ADMIN — DEXMEDETOMIDINE HYDROCHLORIDE IN 0.9% SODIUM CHLORIDE 11.5 MICROGRAM(S)/KG/HR: 4 INJECTION INTRAVENOUS at 04:36

## 2024-01-09 RX ADMIN — CHLORHEXIDINE GLUCONATE 15 MILLILITER(S): 213 SOLUTION TOPICAL at 05:13

## 2024-01-09 RX ADMIN — LIDOCAINE 1 PATCH: 4 CREAM TOPICAL at 22:59

## 2024-01-09 RX ADMIN — Medication 130 MILLIGRAM(S): at 16:47

## 2024-01-09 RX ADMIN — PIPERACILLIN AND TAZOBACTAM 25 GRAM(S): 4; .5 INJECTION, POWDER, LYOPHILIZED, FOR SOLUTION INTRAVENOUS at 13:36

## 2024-01-09 RX ADMIN — Medication 15 MILLILITER(S): at 12:11

## 2024-01-09 RX ADMIN — Medication 1 MILLIGRAM(S): at 12:12

## 2024-01-09 RX ADMIN — DEXMEDETOMIDINE HYDROCHLORIDE IN 0.9% SODIUM CHLORIDE 11.5 MICROGRAM(S)/KG/HR: 4 INJECTION INTRAVENOUS at 19:15

## 2024-01-09 RX ADMIN — Medication 40 MILLIEQUIVALENT(S): at 13:31

## 2024-01-09 RX ADMIN — HYDROMORPHONE HYDROCHLORIDE 0.5 MILLIGRAM(S): 2 INJECTION INTRAMUSCULAR; INTRAVENOUS; SUBCUTANEOUS at 12:59

## 2024-01-09 RX ADMIN — PIPERACILLIN AND TAZOBACTAM 25 GRAM(S): 4; .5 INJECTION, POWDER, LYOPHILIZED, FOR SOLUTION INTRAVENOUS at 23:00

## 2024-01-09 RX ADMIN — Medication 130 MILLIGRAM(S): at 08:05

## 2024-01-09 RX ADMIN — PANTOPRAZOLE SODIUM 40 MILLIGRAM(S): 20 TABLET, DELAYED RELEASE ORAL at 12:12

## 2024-01-09 RX ADMIN — HYDROMORPHONE HYDROCHLORIDE 0.5 MILLIGRAM(S): 2 INJECTION INTRAMUSCULAR; INTRAVENOUS; SUBCUTANEOUS at 23:31

## 2024-01-09 RX ADMIN — CHLORHEXIDINE GLUCONATE 15 MILLILITER(S): 213 SOLUTION TOPICAL at 17:58

## 2024-01-09 RX ADMIN — HYDROMORPHONE HYDROCHLORIDE 0.5 MILLIGRAM(S): 2 INJECTION INTRAMUSCULAR; INTRAVENOUS; SUBCUTANEOUS at 09:30

## 2024-01-09 RX ADMIN — ENOXAPARIN SODIUM 40 MILLIGRAM(S): 100 INJECTION SUBCUTANEOUS at 05:14

## 2024-01-09 RX ADMIN — Medication 255 MILLIMOLE(S): at 13:32

## 2024-01-09 RX ADMIN — HYDROMORPHONE HYDROCHLORIDE 0.5 MILLIGRAM(S): 2 INJECTION INTRAMUSCULAR; INTRAVENOUS; SUBCUTANEOUS at 23:01

## 2024-01-09 RX ADMIN — POLYETHYLENE GLYCOL 3350 17 GRAM(S): 17 POWDER, FOR SOLUTION ORAL at 05:13

## 2024-01-09 RX ADMIN — HYDROMORPHONE HYDROCHLORIDE 0.5 MILLIGRAM(S): 2 INJECTION INTRAMUSCULAR; INTRAVENOUS; SUBCUTANEOUS at 09:05

## 2024-01-09 NOTE — PROGRESS NOTE ADULT - CRITICAL CARE ATTENDING COMMENT
Dr. Garcia (Attending Physician)  N -  Phenobarb taper, prns yesterday  P - Flail chest acute resp failure will get rib plating tomorrow  C - low dose phenylephrine for sedation  GI - start TFs    - hyponatremia low Stefano, High Uosm possible SIADH  H - lovenox for dvt ppx  ID - Fever 102.2, on zosyn, will check blood cx, combicath, UA negative on 1/7, pct downtrending yesterday, will mrsa and rvp, give dose of vanc while mrsa pending  E - glucose controlled  PPx - ppi, lovenox  Lines -   Dispo - sicu, OR tomorrow Dr. Garcia (Attending Physician)  N -  Phenobarb taper, prns given yesterday  P - Flail chest acute resp failure will get rib plating tomorrow  C - low dose phenylephrine when more sedated  GI - start TFs    - hyponatremia low Stefano, High Uosm possible SIADH free water resticting, less likely beer potomania  H - lovenox for dvt ppx  ID - Fever 102.2, on zosyn, will check blood cx, combicath, UA negative on 1/7, pct downtrending yesterday, will mrsa and rvp, give dose of vanc while mrsa pending  E - glucose controlled  PPx - ppi, lovenox  Dispo - sicu, OR tomorrow

## 2024-01-09 NOTE — PROGRESS NOTE ADULT - NS ATTEND AMEND GEN_ALL_CORE FT
patient seen - chest tube in place, rescheduled for tomorrow for rib plating via left thoracotomy with cryoablation. discussed procedure with mother over phone including risks not limited to bleeding, infection, scarring, injury to surrounding structures. all questions answered and patient's mother  agreeable to proceed.

## 2024-01-09 NOTE — PROGRESS NOTE ADULT - SUBJECTIVE AND OBJECTIVE BOX
Patient is a 48y old  Male who presents with a chief complaint of Trauma 1 activation (09 Jan 2024 09:48)      SUBJECTIVE: "Hi, I feel fine "    Vital Signs Last 24 Hrs  T(C): 39 (01-09-24 @ 12:00), Max: 39 (01-09-24 @ 12:00)  T(F): 102.2 (01-09-24 @ 12:00), Max: 102.2 (01-09-24 @ 12:00)  HR: 84 (01-09-24 @ 12:45) (54 - 92)  BP: 75/47 (01-09-24 @ 12:45) (75/47 - 155/101)  RR: 25 (01-09-24 @ 12:45) (20 - 40)  SpO2: 91% (01-09-24 @ 12:45) (86% - 100%)          Mode: AC/ CMV (Assist Control/ Continuous Mandatory Ventilation), RR (machine): 14, TV (machine): 500, FiO2: 30, PEEP: 5, ITime: 0.9, MAP: 11, PIP: 22      01-08-24 @ 07:01  -  01-09-24 @ 07:00  --------------------------------------------------------  IN: 1734.3 mL / OUT: 2615 mL / NET: -880.7 mL    01-09-24 @ 07:01  -  01-09-24 @ 12:54  --------------------------------------------------------  IN: 360.6 mL / OUT: 190 mL / NET: 170.6 mL        Daily     Daily                           10.1   18.57 )-----------( 355      ( 09 Jan 2024 05:55 )             28.0     01-09    128<L>  |  95<L>  |  14  ----------------------------<  110<H>  3.6   |  24  |  0.72    Ca    8.3<L>      09 Jan 2024 05:55  Phos  2.8     01-09  Mg     2.1     01-09            PHYSICAL EXAM  Neurology:  Intubated   CV : [id number]+[id number]  Lungs: intubated  Abdomen: Soft, NT/ND, +[id number]  Extremities: B/L LE edema, negative calf tenderness, +PP           CHEST TUBES:  Left CT     [  ]LWS  [  ]H2O seal  Right CT   [  ]LWS  [  ]H2O seal        Mode: AC/ CMV (Assist Control/ Continuous Mandatory Ventilation)  RR (machine): 14  TV (machine): 500  FiO2: 30  PEEP: 5  ITime: 0.9  MAP: 11  PIP: 22        MEDICATIONS  ascorbic acid 500 milliGRAM(s) Oral daily  chlorhexidine 0.12% Liquid 15 milliLiter(s) Oral Mucosa every 12 hours  chlorhexidine 2% Cloths 1 Application(s) Topical <User Schedule>  cyanocobalamin 1000 MICROGram(s) Oral daily  dexMEDEtomidine Infusion 0.5 MICROgram(s)/kG/Hr IV Continuous <Continuous>  folic acid 1 milliGRAM(s) Enteral Tube daily  HYDROmorphone  Injectable 0.5 milliGRAM(s) IV Push every 3 hours PRN  influenza   Vaccine 0.5 milliLiter(s) IntraMuscular once  lidocaine   4% Patch 1 Patch Transdermal every 24 hours  multivitamin/minerals/iron Oral Solution (CENTRUM) 15 milliLiter(s) Enteral Tube daily  pantoprazole  Injectable 40 milliGRAM(s) IV Push daily  PHENobarbital Injectable 130 milliGRAM(s) IV Push every 8 hours  PHENobarbital Injectable 130 milliGRAM(s) IV Push every 12 hours  piperacillin/tazobactam IVPB.. 3.375 Gram(s) IV Intermittent every 8 hours  polyethylene glycol 3350 17 Gram(s) Oral every 12 hours  potassium chloride   Solution 40 milliEquivalent(s) Oral once  senna 2 Tablet(s) Oral at bedtime  sodium phosphate 15 milliMole(s)/250 mL IVPB 15 milliMole(s) IV Intermittent once  vancomycin  IVPB 1000 milliGRAM(s) IV Intermittent once

## 2024-01-09 NOTE — PROGRESS NOTE ADULT - SUBJECTIVE AND OBJECTIVE BOX
CARIN WATSON 49y Male  MRN#: [id number]   Hospital Day: 127d    HPI:  49 yo M with PMHx of Intellectual Disability, DM not on any medications presents with foot wounds. Pt has very poor foot hygiene and per the sister, has been persistently scratching an open wound on his L second toe, which worsened and became more red, had drainage, malodor. He saw a physician at [facility] and was told to come to the ED for evaluation. Pt is poor historian. Does endorse abdominal pain for a few days, cannot give much more description. Per the sister, pt did not seem to have any recent fevers, however is also not a very good historian and does not seem to have good health literacy. States that her and her mom decided to stop giving pt Metformin a while ago, are treating his diabetes by not giving him any sugary foods.  In the ED, T 97.8, /87, , RR 16, SpO2 99% on RA. Lab work unrevealing.  (31 Mar 2022 22:36)    SUBJECTIVE  Patient is a 49y old Male who presents with a chief complaint of DFU (04 Aug 2022 17:18)  Currently admitted to medicine with the primary diagnosis of Onychomycosis    INTERVAL HPI AND OVERNIGHT EVENTS:  Patient was examined and seen at bedside. This morning he is resting comfortably in bed and reports no issues or overnight events.    OBJECTIVE  PAST MEDICAL & SURGICAL HISTORY  DM (diabetes mellitus)    Intellectual disability    ALLERGIES:  penicillin (Unknown)    MEDICATIONS:  STANDING MEDICATIONS  ammonium lactate 12% Lotion 1 Application(s) Topical two times a day  chlorhexidine 4% Liquid 1 Application(s) Topical <User Schedule>  clotrimazole 1% Cream 1 Application(s) Topical two times a day  famotidine    Tablet 20 milliGRAM(s) Oral daily  insulin glargine Injectable (LANTUS) 22 Unit(s) SubCutaneous every morning  insulin lispro (ADMELOG) corrective regimen sliding scale   SubCutaneous three times a day before meals  insulin lispro Injectable (ADMELOG) 8 Unit(s) SubCutaneous three times a day before meals  lactobacillus acidophilus 1 Tablet(s) Oral two times a day  melatonin 3 milliGRAM(s) Oral at bedtime  vitamin A &amp; D Ointment 1 Application(s) Topical daily    PRN MEDICATIONS  acetaminophen     Tablet .. 650 milliGRAM(s) Oral every 6 hours PRN      VITAL SIGNS: Last 24 Hours  T(C): 36.1 (05 Aug 2022 08:00), Max: 36.1 (04 Aug 2022 16:00)  T(F): 97 (05 Aug 2022 08:00), Max: 97 (05 Aug 2022 08:00)  HR: 91 (05 Aug 2022 08:00) (90 - 91)  BP: 132/62 (05 Aug 2022 08:00) (109/61 - 132/62)  BP(mean): --  RR: 18 (05 Aug 2022 00:13) (18 - 19)  SpO2: --    LABS:                        14.2   6.41  )-----------( 311      ( 03 Aug 2022 11:58 )             43.1     08-03    139  |  102  |  16  ----------------------------<  100<H>  3.9   |  28  |  0.7    Ca    9.2      03 Aug 2022 11:58  Mg     1.7     08-03      RADIOLOGY:      PHYSICAL EXAM:  Not in acute distress  General: No icterus  HEENT:   no JVD.  Heart: S1+S2 audible  Lungs: bilateral  moderate air entry, no wheezing, no crepitations.  Abdomen: Soft, non-tender, non-distended , no  rigidity or guarding.  CNS: Awake alert, CN  grossly intact. Intellectual disability  Extremities:  No edema, onychomycosis toe nails     24 HOUR EVENTS:  -OG placement  -phenobarb pushes PRN   -rib plate plan for CT surg 1/9  -xray am    SUBJECTIVE/ROS:  Patient seen at bedside this AM.     [ ] Due to altered mental status/intubation, subjective information were not able to be obtained from the patient. History was obtained, to the extent possible, from review of the chart and collateral sources of information.    OBJECTIVE:    VITALS:  Vital Signs Last 24 Hrs  T(C): 37.2 (08 Jan 2024 23:00), Max: 37.2 (08 Jan 2024 11:00)  T(F): 98.9 (08 Jan 2024 23:00), Max: 98.9 (08 Jan 2024 11:00)  HR: 75 (09 Jan 2024 02:45) (54 - 127)  BP: 111/72 (09 Jan 2024 02:45) (76/51 - 155/101)  BP(mean): 87 (09 Jan 2024 02:45) (57 - 123)  RR: 22 (09 Jan 2024 02:45) (20 - 53)  SpO2: 95% (09 Jan 2024 02:45) (90% - 100%)    Parameters below as of 08 Jan 2024 23:00  Patient On (Oxygen Delivery Method): ventilator    O2 Concentration (%): 30  Mode: AC/ CMV (Assist Control/ Continuous Mandatory Ventilation)  RR (machine): 14  TV (machine): 500  FiO2: 30  PEEP: 5  ITime: 0.9  MAP: 11  PIP: 22    I&O's Summary    07 Jan 2024 07:01  -  08 Jan 2024 07:00  --------------------------------------------------------  IN: 3001.2 mL / OUT: 1890 mL / NET: 1111.2 mL    08 Jan 2024 07:01  -  09 Jan 2024 02:57  --------------------------------------------------------  IN: 1558.1 mL / OUT: 2405 mL / NET: -846.9 mL        PHYSICAL EXAM:    NEURO  RASS:     GCS:     CAM ICU:  Exam: awake, alert, oriented    RESPIRATORY  Exam: no increased WOB on RA  Mechanical Ventilation: Mode: AC/ CMV (Assist Control/ Continuous Mandatory Ventilation), RR (machine): 14, RR (patient): 21, TV (machine): 500, FiO2: 30, PEEP: 5, ITime: 0.9, MAP: 11, PIP: 22    CARDIOVASCULAR  Exam: warm, well-perfused  Cardiac Rhythm: normal sinus rhythm noted on cardiac monitor    GI/NUTRITION  Exam: soft, non-distended, non-tender    GENITOURINARY  Exam:     ACCESS DEVICES:  [ ] Peripheral IV  [ ] Central Venous Line	[ ] R	[ ] L	[ ] IJ	[ ] Fem	[ ] SC	Placed:   [ ] Arterial Line		[ ] R	[ ] L	[ ] Fem	[ ] Rad	[ ] Ax	Placed:   [ ] PICC:					[ ] Mediport  [ ] Urinary Catheter, Date Placed:   [x] Necessity of urinary, arterial, and venous catheters discussed      LABS:                        9.7    19.22 )-----------( 342      ( 08 Jan 2024 18:43 )             26.5   01-08    130<L>  |  93<L>  |  15  ----------------------------<  111<H>  4.0   |  24  |  0.72    Ca    8.3<L>      08 Jan 2024 18:43  Phos  4.3     01-08  Mg     2.3     01-08      CAPILLARY BLOOD GLUCOSE          MEDICATIONS:   MEDICATIONS  (STANDING):  ascorbic acid 500 milliGRAM(s) Oral daily  chlorhexidine 0.12% Liquid 15 milliLiter(s) Oral Mucosa every 12 hours  chlorhexidine 2% Cloths 1 Application(s) Topical <User Schedule>  cyanocobalamin 1000 MICROGram(s) Oral daily  dexMEDEtomidine Infusion 0.5 MICROgram(s)/kG/Hr (11.5 mL/Hr) IV Continuous <Continuous>  enoxaparin Injectable 40 milliGRAM(s) SubCutaneous <User Schedule>  folic acid 1 milliGRAM(s) Enteral Tube daily  influenza   Vaccine 0.5 milliLiter(s) IntraMuscular once  lidocaine   4% Patch 1 Patch Transdermal every 24 hours  multivitamin/minerals/iron Oral Solution (CENTRUM) 15 milliLiter(s) Enteral Tube daily  PHENobarbital Injectable 130 milliGRAM(s) IV Push every 8 hours  PHENobarbital Injectable 130 milliGRAM(s) IV Push every 12 hours  phenylephrine    Infusion 0.2 MICROgram(s)/kG/Min (6.88 mL/Hr) IV Continuous <Continuous>  piperacillin/tazobactam IVPB.. 3.375 Gram(s) IV Intermittent every 8 hours  polyethylene glycol 3350 17 Gram(s) Oral every 12 hours  senna 2 Tablet(s) Oral at bedtime    MEDICATIONS  (PRN):  HYDROmorphone  Injectable 0.5 milliGRAM(s) IV Push every 3 hours PRN Breakthrough      IMAGING:

## 2024-01-09 NOTE — PHYSICAL THERAPY INITIAL EVALUATION ADULT - ADDITIONAL COMMENTS
Pt is a poor historian; as per chart review, pt lives alone in a first floor apartment, no steps required. Pt was independent with all functional mobility and ADLs prior to admission without AD.

## 2024-01-09 NOTE — PROGRESS NOTE ADULT - PROBLEM SELECTOR PLAN 1
Left Rib Fx 4-11th /Hemothorax  Dr. Sepulveda to review imaging   Plan for Rib plating WED   NPO after MN  active T&S and coags

## 2024-01-09 NOTE — PROGRESS NOTE ADULT - ASSESSMENT
48y Male with PMHx hepatitis C (diagnosed many years ago, never treated), depression transferred from outside hospital for trauma eval. Patient found down by roommate after unknown amount of time, found with multiple 4-11 L sided rib fractures with flail chest. Chest tube placed there with >1L output (old blood). Patient also with tachypnea, concern for respiratory failure started on BiPAP. Level 1 called for transient hypotension, patient given 2 unit PRBC in [facility] ED. Rib score 3    Injuries:   - left posterolateral fourth, fifth, sixth, and 11th ribs, minimally displaced   - mildly displaced fractures of the left anterolateral fourth, fifth, sixth, and seventh ribs  - displaced fractures of the left posterior seventh, eighth, ninth, and 10th ribs.    PLAN  - NPO/IVF  - Wean vent and pressors as tolerated  - Rib fracture protocol   - Plan for rib platting for flail chest 1/9   - ISS  - c/w chest tube  - Appreciate excellent SICU care    ACS/Trauma  [id number] 48y Male with PMHx hepatitis C (diagnosed many years ago, never treated), depression transferred from outside hospital for trauma eval. Patient found down by roommate after unknown amount of time, found with multiple 4-11 L sided rib fractures with flail chest. Chest tube placed there with >1L output (old blood). Patient also with tachypnea, concern for respiratory failure started on BiPAP. Level 1 called for transient hypotension, patient given 2 unit PRBC in [facility] ED. Rib score 3    Injuries:   - left posterolateral fourth, fifth, sixth, and 11th ribs, minimally displaced   - mildly displaced fractures of the left anterolateral fourth, fifth, sixth, and seventh ribs  - displaced fractures of the left posterior seventh, eighth, ninth, and 10th ribs.    PLAN  - NPO/IVF  - Wean vent and pressors as tolerated  - Rib fracture protocol   - Plan for rib platting for flail chest 1/9   - ISS  - c/w chest tube  - Appreciate excellent SICU care    ACS/Trauma  [id number]

## 2024-01-09 NOTE — EEG REPORT - NS EEG TEXT BOX
REPORT OF CONTINUOUS VIDEO EEG      [facility]: [address] [facility] [address], [place] [address], Phone: [phone]  [facility]: [address], [place] [address], Phone: [phone]  [facility]: [address] [place] [address], Phone: [phone]    Patient Name: Bernardo Salazar    Age: 48 year, : 1975  Temple: -8 ICU # 4  EEG #: [id number]    Study Date: 2024   Start Time: 8:00  End Date: 2024         End Time: 08:00   Study Duration: 23 h 59 m    Study Information:    EEG Recording Technique:  The patient underwent continuous Video-EEG monitoring, using Telemetry System hardware on the XLTek Digital System. EEG and video data were stored on a computer hard drive with important events saved in digital archive files. The material was reviewed by a physician (electroencephalographer / epileptologist) on a daily basis. Cristian and seizure detection algorithms were utilized and reviewed. An EEG Technician attended to the patient, and was available throughout daytime work hours.  The epilepsy center neurologist was available in person or on call 24-hours per day.    EEG Placement and Labeling of Electrodes:  The EEG was performed utilizing 20 channel referential EEG connections (coronal over temporal over parasagittal montage) using all standard 10-20 electrode placements with EKG, with additional electrodes placed in the inferior temporal region using the modified 10-10 montage electrode placements for elective admissions, or if deemed necessary. Recording was at a sampling rate of 256 samples per second per channel. Time synchronized digital video recording was done simultaneously with EEG recording. A low light infrared camera was used for low light recording.     History:  48 year-old male presents for evaluation of seizures.      Medication  No Data.    Interpretation:    [[[Abbreviation Key:  PDR=alpha rhythm/posterior dominant rhythm. A-P=anterior posterior.  Amplitude: ‘very low’:<20; ‘low’:20-49; ‘medium’:; ‘high’:>150uV.  Persistence for periodic/rhythmic patterns (% of epoch) ‘rare’:<1%; ‘occasional’:1-10%; ‘frequent’:10-50%; ‘abundant’:50-90%; ‘continuous’:>90%.  Persistence for sporadic discharges: ‘rare’:<1/hr; ‘occasional’:1/min-1/hr; ‘frequent’:>1/min; ‘abundant’:>1/10 sec.  RPP=rhythmic and periodic patterns; GRDA=generalized rhythmic delta activity; FIRDA=frontal intermittent GRDA; LRDA=lateralized rhythmic delta activity; TIRDA=temporal intermittent rhythmic delta activity;  LPD=PLED=lateralized periodic discharges; GPD=generalized periodic discharges; BIPDs =bilateral independent periodic discharges; Mf=multifocal; SIRPDs=stimulus induced rhythmic, periodic, or ictal appearing discharges; BIRDs=brief potentially ictal rhythmic discharges >4 Hz, lasting .5-10s; PFA (paroxysmal bursts >13 Hz or =8 Hz <10s).  Modifiers: +F=with fast component; +S=with spike component; +R=with rhythmic component.  S-B=burst suppression pattern.  Max=maximal. N1-drowsy; N2-stage II sleep; N3-slow wave sleep. SSS/BETS=small sharp spikes/benign epileptiform transients of sleep. HV=hyperventilation; PS=photic stimulation]]]      Daily EEG Visual Analysis    FINDINGS:      Background:  Symmetry: symmetric  Continuous: continuous  PDR: absent  Reactivity: present  Voltage: normal, [defined typically between 20-150uV]  Anterior Posterior Gradient: absent  Breach: absent    Background Slowing:  Generalized slowing: present. Background is diffusely slow and consists primarily of delta theta activity up to 5 Hz.  Focal slowing: none was present.    State Changes:   No clear state changes.    Sporadic Epileptiform Discharges:   None    Rhythmic and Periodic Patterns (RPPs):  Generalized rhythmic delta activity (GRDA) is noted occasionally during the recording.    Electrographic and Electroclinical seizures:  None    Other Clinical Events:  None    Activation Procedures:   -Hyperventilation was not performed.    -Photic stimulation was not performed.      Artifacts:  Intermittent myogenic and movement artifacts were noted.    ECG:  The heart rate on single channel ECG was predominantly between  BPM.    EEG Summary / Classification:  Abnormal EEG.  •	Background slowing    EEG Impression / Clinical Correlate:  Abnormal prolonged EEG study due to   1- Moderate to severe diffuse slowing which indicates moderate to severe diffuse cerebral dysfunction that is not specific in etiology.  2- GRDA which is seen in diffuse cerebral dysfunction, toxic/metabolic encephalopathy or in certain structural lesions  No epileptic discharges recorded.  No seizures recorded.  • This is preliminary report. Full report follows	       ________________________	  NEL Arias  Attending Physician, [facility] Epilepsy [place]      ------------------------------------  EEG Reading Room: [phone]  On Call Service After Hours: [phone]      REPORT OF CONTINUOUS VIDEO EEG      [facility]: [address] [facility] [address], [place] [address], Phone: [phone]  [facility]: [phone], [place] [address], Phone: [phone]  [facility]: [address] [place] [address], Phone: [phone]    Patient Name: Bernardo Salazar    Age: 48 year, : 1975  Temple: -8 ICU # 4  EEG #: [id number]    Study Date: 2024   Start Time: 8:00  End Date: 2024         End Time: 08:00   Study Duration: 23 h 59 m    Study Information:    EEG Recording Technique:  The patient underwent continuous Video-EEG monitoring, using Telemetry System hardware on the XLTek Digital System. EEG and video data were stored on a computer hard drive with important events saved in digital archive files. The material was reviewed by a physician (electroencephalographer / epileptologist) on a daily basis. Cristian and seizure detection algorithms were utilized and reviewed. An EEG Technician attended to the patient, and was available throughout daytime work hours.  The epilepsy center neurologist was available in person or on call 24-hours per day.    EEG Placement and Labeling of Electrodes:  The EEG was performed utilizing 20 channel referential EEG connections (coronal over temporal over parasagittal montage) using all standard 10-20 electrode placements with EKG, with additional electrodes placed in the inferior temporal region using the modified 10-10 montage electrode placements for elective admissions, or if deemed necessary. Recording was at a sampling rate of 256 samples per second per channel. Time synchronized digital video recording was done simultaneously with EEG recording. A low light infrared camera was used for low light recording.     History:  48 year-old male presents for evaluation of seizures.      Medication  No Data.    Interpretation:    [[[Abbreviation Key:  PDR=alpha rhythm/posterior dominant rhythm. A-P=anterior posterior.  Amplitude: ‘very low’:<20; ‘low’:20-49; ‘medium’:; ‘high’:>150uV.  Persistence for periodic/rhythmic patterns (% of epoch) ‘rare’:<1%; ‘occasional’:1-10%; ‘frequent’:10-50%; ‘abundant’:50-90%; ‘continuous’:>90%.  Persistence for sporadic discharges: ‘rare’:<1/hr; ‘occasional’:1/min-1/hr; ‘frequent’:>1/min; ‘abundant’:>1/10 sec.  RPP=rhythmic and periodic patterns; GRDA=generalized rhythmic delta activity; FIRDA=frontal intermittent GRDA; LRDA=lateralized rhythmic delta activity; TIRDA=temporal intermittent rhythmic delta activity;  LPD=PLED=lateralized periodic discharges; GPD=generalized periodic discharges; BIPDs =bilateral independent periodic discharges; Mf=multifocal; SIRPDs=stimulus induced rhythmic, periodic, or ictal appearing discharges; BIRDs=brief potentially ictal rhythmic discharges >4 Hz, lasting .5-10s; PFA (paroxysmal bursts >13 Hz or =8 Hz <10s).  Modifiers: +F=with fast component; +S=with spike component; +R=with rhythmic component.  S-B=burst suppression pattern.  Max=maximal. N1-drowsy; N2-stage II sleep; N3-slow wave sleep. SSS/BETS=small sharp spikes/benign epileptiform transients of sleep. HV=hyperventilation; PS=photic stimulation]]]      Daily EEG Visual Analysis    FINDINGS:      Background:  Symmetry: symmetric  Continuous: continuous  PDR: absent  Reactivity: present  Voltage: normal, [defined typically between 20-150uV]  Anterior Posterior Gradient: absent  Breach: absent    Background Slowing:  Generalized slowing: present. Background is diffusely slow and consists primarily of delta theta activity up to 5 Hz.  Focal slowing: none was present.    State Changes:   No clear state changes.    Sporadic Epileptiform Discharges:   None    Rhythmic and Periodic Patterns (RPPs):  Generalized rhythmic delta activity (GRDA) is noted occasionally during the recording.    Electrographic and Electroclinical seizures:  None    Other Clinical Events:  None    Activation Procedures:   -Hyperventilation was not performed.    -Photic stimulation was not performed.      Artifacts:  Intermittent myogenic and movement artifacts were noted.    ECG:  The heart rate on single channel ECG was predominantly between  BPM.    EEG Summary / Classification:  Abnormal EEG.  •	Background slowing    EEG Impression / Clinical Correlate:  Abnormal prolonged EEG study due to   1- Moderate to severe diffuse slowing which indicates moderate to severe diffuse cerebral dysfunction that is not specific in etiology.  2- GRDA which is seen in diffuse cerebral dysfunction, toxic/metabolic encephalopathy or in certain structural lesions  No epileptic discharges recorded.  No seizures recorded.  • This is preliminary report. Full report follows	       ________________________	  NEL Arias  Attending Physician, [facility] Epilepsy [place]      ------------------------------------  EEG Reading Room: [phone]  On Call Service After Hours: [phone]      REPORT OF CONTINUOUS VIDEO EEG      [facility]: [address] [facility] [address], [place] [address], Phone: [phone]  [facility]: [address] [place] [address], [place] [address], Phone: [phone]  [facility]: [address] [place] [address], Phone: [phone]    Patient Name: Bernardo Salazar    Age: 48 year, : 1975  Temple: -8 ICU # 4  EEG #: [id number]    Study Date: 2024   Start Time: 8:00  End Date: 2024         End Time: 08:00   Study Duration: 24 h     Study Information:    EEG Recording Technique:  The patient underwent continuous Video-EEG monitoring, using Telemetry System hardware on the XLTek Digital System. EEG and video data were stored on a computer hard drive with important events saved in digital archive files. The material was reviewed by a physician (electroencephalographer / epileptologist) on a daily basis. Cristian and seizure detection algorithms were utilized and reviewed. An EEG Technician attended to the patient, and was available throughout daytime work hours.  The epilepsy center neurologist was available in person or on call 24-hours per day.    EEG Placement and Labeling of Electrodes:  The EEG was performed utilizing 20 channel referential EEG connections (coronal over temporal over parasagittal montage) using all standard 10-20 electrode placements with EKG, with additional electrodes placed in the inferior temporal region using the modified 10-10 montage electrode placements for elective admissions, or if deemed necessary. Recording was at a sampling rate of 256 samples per second per channel. Time synchronized digital video recording was done simultaneously with EEG recording. A low light infrared camera was used for low light recording.     History:  48 year-old male presents for evaluation of seizures.      Medication  No Data.    Interpretation:    [[[Abbreviation Key:  PDR=alpha rhythm/posterior dominant rhythm. A-P=anterior posterior.  Amplitude: ‘very low’:<20; ‘low’:20-49; ‘medium’:; ‘high’:>150uV.  Persistence for periodic/rhythmic patterns (% of epoch) ‘rare’:<1%; ‘occasional’:1-10%; ‘frequent’:10-50%; ‘abundant’:50-90%; ‘continuous’:>90%.  Persistence for sporadic discharges: ‘rare’:<1/hr; ‘occasional’:1/min-1/hr; ‘frequent’:>1/min; ‘abundant’:>1/10 sec.  RPP=rhythmic and periodic patterns; GRDA=generalized rhythmic delta activity; FIRDA=frontal intermittent GRDA; LRDA=lateralized rhythmic delta activity; TIRDA=temporal intermittent rhythmic delta activity;  LPD=PLED=lateralized periodic discharges; GPD=generalized periodic discharges; BIPDs =bilateral independent periodic discharges; Mf=multifocal; SIRPDs=stimulus induced rhythmic, periodic, or ictal appearing discharges; BIRDs=brief potentially ictal rhythmic discharges >4 Hz, lasting .5-10s; PFA (paroxysmal bursts >13 Hz or =8 Hz <10s).  Modifiers: +F=with fast component; +S=with spike component; +R=with rhythmic component.  S-B=burst suppression pattern.  Max=maximal. N1-drowsy; N2-stage II sleep; N3-slow wave sleep. SSS/BETS=small sharp spikes/benign epileptiform transients of sleep. HV=hyperventilation; PS=photic stimulation]]]      Daily EEG Visual Analysis    FINDINGS:      Background:  Symmetry: symmetric  Continuous: continuous  PDR: absent  Reactivity: present  Voltage: normal, [defined typically between 20-150uV]  Anterior Posterior Gradient: absent  Breach: absent    Background Slowing:  Generalized slowing: present. Background is diffusely slow and consists primarily of delta theta activity up to 5 Hz.  Focal slowing: none was present.    State Changes:   No clear state changes.    Sporadic Epileptiform Discharges:   None    Rhythmic and Periodic Patterns (RPPs):  Generalized rhythmic delta activity (GRDA) is noted occasionally during the recording.    Electrographic and Electroclinical seizures:  None    Other Clinical Events:  None    Activation Procedures:   -Hyperventilation was not performed.    -Photic stimulation was not performed.      Artifacts:  Intermittent myogenic and movement artifacts were noted.    ECG:  The heart rate on single channel ECG was predominantly between  BPM.    EEG Summary / Classification:  Abnormal EEG.  •	Background slowing    EEG Impression / Clinical Correlate:  Abnormal prolonged EEG study due to   1- Moderate to severe diffuse slowing which indicates moderate to severe diffuse cerebral dysfunction that is not specific in etiology.  2- GRDA which is seen in diffuse cerebral dysfunction, toxic/metabolic encephalopathy or in certain structural lesions  No epileptic discharges recorded.  No seizures recorded.       ________________________	  NEL Arias  Attending Physician, [facility] Epilepsy [place]    Mitchell Paz MD  EEG/Epilepsy Attending     ------------------------------------  EEG Reading Room: [phone]  On Call Service After Hours: [phone]      REPORT OF CONTINUOUS VIDEO EEG      [facility]: [address] [facility] [address], [place] [address], Phone: [phone]  [facility]: [address] [place] [address], [place] [address], Phone: [phone]  [facility]: [address] [place] [address], Phone: [phone]    Patient Name: Bernardo Salazar    Age: 48 year, : 1975  Temple: -8 ICU # 4  EEG #: [id number]    Study Date: 2024   Start Time: 8:00  End Date: 2024         End Time: 08:00   Study Duration: 24 h     Study Information:    EEG Recording Technique:  The patient underwent continuous Video-EEG monitoring, using Telemetry System hardware on the XLTek Digital System. EEG and video data were stored on a computer hard drive with important events saved in digital archive files. The material was reviewed by a physician (electroencephalographer / epileptologist) on a daily basis. Cristian and seizure detection algorithms were utilized and reviewed. An EEG Technician attended to the patient, and was available throughout daytime work hours.  The epilepsy center neurologist was available in person or on call 24-hours per day.    EEG Placement and Labeling of Electrodes:  The EEG was performed utilizing 20 channel referential EEG connections (coronal over temporal over parasagittal montage) using all standard 10-20 electrode placements with EKG, with additional electrodes placed in the inferior temporal region using the modified 10-10 montage electrode placements for elective admissions, or if deemed necessary. Recording was at a sampling rate of 256 samples per second per channel. Time synchronized digital video recording was done simultaneously with EEG recording. A low light infrared camera was used for low light recording.     History:  48 year-old male presents for evaluation of seizures.      Medication  No Data.    Interpretation:    [[[Abbreviation Key:  PDR=alpha rhythm/posterior dominant rhythm. A-P=anterior posterior.  Amplitude: ‘very low’:<20; ‘low’:20-49; ‘medium’:; ‘high’:>150uV.  Persistence for periodic/rhythmic patterns (% of epoch) ‘rare’:<1%; ‘occasional’:1-10%; ‘frequent’:10-50%; ‘abundant’:50-90%; ‘continuous’:>90%.  Persistence for sporadic discharges: ‘rare’:<1/hr; ‘occasional’:1/min-1/hr; ‘frequent’:>1/min; ‘abundant’:>1/10 sec.  RPP=rhythmic and periodic patterns; GRDA=generalized rhythmic delta activity; FIRDA=frontal intermittent GRDA; LRDA=lateralized rhythmic delta activity; TIRDA=temporal intermittent rhythmic delta activity;  LPD=PLED=lateralized periodic discharges; GPD=generalized periodic discharges; BIPDs =bilateral independent periodic discharges; Mf=multifocal; SIRPDs=stimulus induced rhythmic, periodic, or ictal appearing discharges; BIRDs=brief potentially ictal rhythmic discharges >4 Hz, lasting .5-10s; PFA (paroxysmal bursts >13 Hz or =8 Hz <10s).  Modifiers: +F=with fast component; +S=with spike component; +R=with rhythmic component.  S-B=burst suppression pattern.  Max=maximal. N1-drowsy; N2-stage II sleep; N3-slow wave sleep. SSS/BETS=small sharp spikes/benign epileptiform transients of sleep. HV=hyperventilation; PS=photic stimulation]]]      Daily EEG Visual Analysis    FINDINGS:      Background:  Symmetry: symmetric  Continuous: continuous  PDR: absent  Reactivity: present  Voltage: normal, [defined typically between 20-150uV]  Anterior Posterior Gradient: absent  Breach: absent    Background Slowing:  Generalized slowing: present. Background is diffusely slow and consists primarily of delta theta activity up to 5 Hz.  Focal slowing: none was present.    State Changes:   No clear state changes.    Sporadic Epileptiform Discharges:   None    Rhythmic and Periodic Patterns (RPPs):  Generalized rhythmic delta activity (GRDA) is noted occasionally during the recording.    Electrographic and Electroclinical seizures:  None    Other Clinical Events:  None    Activation Procedures:   -Hyperventilation was not performed.    -Photic stimulation was not performed.      Artifacts:  Intermittent myogenic and movement artifacts were noted.    ECG:  The heart rate on single channel ECG was predominantly between  BPM.    EEG Summary / Classification:  Abnormal EEG.  •	Background slowing    EEG Impression / Clinical Correlate:  Abnormal prolonged EEG study due to   1- Moderate to severe diffuse slowing which indicates moderate to severe diffuse cerebral dysfunction that is not specific in etiology.  2- GRDA which is seen in diffuse cerebral dysfunction, toxic/metabolic encephalopathy or in certain structural lesions  No epileptic discharges recorded.  No seizures recorded.       ________________________	  NEL Arias  Attending Physician, [facility] Epilepsy [place]    Mitchell Paz MD  EEG/Epilepsy Attending     ------------------------------------  EEG Reading Room: [phone]  On Call Service After Hours: [phone]      REPORT OF CONTINUOUS VIDEO EEG      [facility]: [address] [facility] [address], [place] [address], Phone: [phone]  [facility]: [address] [place] [address], [place] [address], Phone: [phone]  [facility]: [address] [place] [address], Phone: [phone]    Patient Name: Bernardo Salazar    Age: 48 year, : 1975  Temple: -8 ICU # 4  EEG #: [id number]    Study Date: 2024   Start Time: 8:00  End Date: 2024         End Time: 17:00   Study Duration: 33 h     Study Information:    EEG Recording Technique:  The patient underwent continuous Video-EEG monitoring, using Telemetry System hardware on the XLTek Digital System. EEG and video data were stored on a computer hard drive with important events saved in digital archive files. The material was reviewed by a physician (electroencephalographer / epileptologist) on a daily basis. Cristian and seizure detection algorithms were utilized and reviewed. An EEG Technician attended to the patient, and was available throughout daytime work hours.  The epilepsy center neurologist was available in person or on call 24-hours per day.    EEG Placement and Labeling of Electrodes:  The EEG was performed utilizing 20 channel referential EEG connections (coronal over temporal over parasagittal montage) using all standard 10-20 electrode placements with EKG, with additional electrodes placed in the inferior temporal region using the modified 10-10 montage electrode placements for elective admissions, or if deemed necessary. Recording was at a sampling rate of 256 samples per second per channel. Time synchronized digital video recording was done simultaneously with EEG recording. A low light infrared camera was used for low light recording.     History:  48 year-old male presents for evaluation of seizures.      Medication  No Data.    Interpretation:    [[[Abbreviation Key:  PDR=alpha rhythm/posterior dominant rhythm. A-P=anterior posterior.  Amplitude: ‘very low’:<20; ‘low’:20-49; ‘medium’:; ‘high’:>150uV.  Persistence for periodic/rhythmic patterns (% of epoch) ‘rare’:<1%; ‘occasional’:1-10%; ‘frequent’:10-50%; ‘abundant’:50-90%; ‘continuous’:>90%.  Persistence for sporadic discharges: ‘rare’:<1/hr; ‘occasional’:1/min-1/hr; ‘frequent’:>1/min; ‘abundant’:>1/10 sec.  RPP=rhythmic and periodic patterns; GRDA=generalized rhythmic delta activity; FIRDA=frontal intermittent GRDA; LRDA=lateralized rhythmic delta activity; TIRDA=temporal intermittent rhythmic delta activity;  LPD=PLED=lateralized periodic discharges; GPD=generalized periodic discharges; BIPDs =bilateral independent periodic discharges; Mf=multifocal; SIRPDs=stimulus induced rhythmic, periodic, or ictal appearing discharges; BIRDs=brief potentially ictal rhythmic discharges >4 Hz, lasting .5-10s; PFA (paroxysmal bursts >13 Hz or =8 Hz <10s).  Modifiers: +F=with fast component; +S=with spike component; +R=with rhythmic component.  S-B=burst suppression pattern.  Max=maximal. N1-drowsy; N2-stage II sleep; N3-slow wave sleep. SSS/BETS=small sharp spikes/benign epileptiform transients of sleep. HV=hyperventilation; PS=photic stimulation]]]      Daily EEG Visual Analysis    FINDINGS:      Background:  Symmetry: symmetric  Continuous: continuous  PDR: absent  Reactivity: present  Voltage: normal, [defined typically between 20-150uV]  Anterior Posterior Gradient: absent  Breach: absent    Background Slowing:  Generalized slowing: present. Background is diffusely slow and consists primarily of delta theta activity up to 5 Hz.  Focal slowing: none was present.    State Changes:   No clear state changes.    Sporadic Epileptiform Discharges:   None    Rhythmic and Periodic Patterns (RPPs):  Generalized rhythmic delta activity (GRDA) is noted occasionally during the recording.    Electrographic and Electroclinical seizures:  None    Other Clinical Events:  None    Activation Procedures:   -Hyperventilation was not performed.    -Photic stimulation was not performed.      Artifacts:  Intermittent myogenic and movement artifacts were noted.    ECG:  The heart rate on single channel ECG was predominantly between  BPM.    EEG Summary / Classification:  Abnormal EEG.  •	Background slowing    EEG Impression / Clinical Correlate:  Abnormal prolonged EEG study due to   1- Moderate to severe diffuse slowing which indicates moderate to severe diffuse cerebral dysfunction that is not specific in etiology.  2- GRDA which is seen in diffuse cerebral dysfunction, toxic/metabolic encephalopathy or in certain structural lesions  No epileptic discharges recorded.  No seizures recorded.       ________________________	  NEL Arias  Attending Physician, [facility] [place]    Mitchell Paz MD  EEG/Epilepsy Attending     ------------------------------------  EEG Reading Room: [phone]  On Call Service After Hours: [phone]      REPORT OF CONTINUOUS VIDEO EEG      [facility]: [address], [place] [address], Phone: [phone]  [facility]: [address] [place] [address], [place] [address], Phone: [phone]  [facility]: [address] [place] [address], Phone: [phone]    Patient Name: Bernardo Salazar    Age: 48 year, : 1975  [place]: -8 ICU # 4  EEG #: [id number]    Study Date: 2024   Start Time: 8:00  End Date: 2024         End Time: 17:00   Study Duration: 33 h     Study Information:    EEG Recording Technique:  The patient underwent continuous Video-EEG monitoring, using Telemetry System hardware on the XLTek Digital System. EEG and video data were stored on a computer hard drive with important events saved in digital archive files. The material was reviewed by a physician (electroencephalographer / epileptologist) on a daily basis. Cristian and seizure detection algorithms were utilized and reviewed. An EEG Technician attended to the patient, and was available throughout daytime work hours.  The epilepsy center neurologist was available in person or on call 24-hours per day.    EEG Placement and Labeling of Electrodes:  The EEG was performed utilizing 20 channel referential EEG connections (coronal over temporal over parasagittal montage) using all standard 10-20 electrode placements with EKG, with additional electrodes placed in the inferior temporal region using the modified 10-10 montage electrode placements for elective admissions, or if deemed necessary. Recording was at a sampling rate of 256 samples per second per channel. Time synchronized digital video recording was done simultaneously with EEG recording. A low light infrared camera was used for low light recording.     History:  48 year-old male presents for evaluation of seizures.      Medication  No Data.    Interpretation:    [[[Abbreviation Key:  PDR=alpha rhythm/posterior dominant rhythm. A-P=anterior posterior.  Amplitude: ‘very low’:<20; ‘low’:20-49; ‘medium’:; ‘high’:>150uV.  Persistence for periodic/rhythmic patterns (% of epoch) ‘rare’:<1%; ‘occasional’:1-10%; ‘frequent’:10-50%; ‘abundant’:50-90%; ‘continuous’:>90%.  Persistence for sporadic discharges: ‘rare’:<1/hr; ‘occasional’:1/min-1/hr; ‘frequent’:>1/min; ‘abundant’:>1/10 sec.  RPP=rhythmic and periodic patterns; GRDA=generalized rhythmic delta activity; FIRDA=frontal intermittent GRDA; LRDA=lateralized rhythmic delta activity; TIRDA=temporal intermittent rhythmic delta activity;  LPD=PLED=lateralized periodic discharges; GPD=generalized periodic discharges; BIPDs =bilateral independent periodic discharges; Mf=multifocal; SIRPDs=stimulus induced rhythmic, periodic, or ictal appearing discharges; BIRDs=brief potentially ictal rhythmic discharges >4 Hz, lasting .5-10s; PFA (paroxysmal bursts >13 Hz or =8 Hz <10s).  Modifiers: +F=with fast component; +S=with spike component; +R=with rhythmic component.  S-B=burst suppression pattern.  Max=maximal. N1-drowsy; N2-stage II sleep; N3-slow wave sleep. SSS/BETS=small sharp spikes/benign epileptiform transients of sleep. HV=hyperventilation; PS=photic stimulation]]]      Daily EEG Visual Analysis    FINDINGS:      Background:  Symmetry: symmetric  Continuous: continuous  PDR: absent  Reactivity: present  Voltage: normal, [defined typically between 20-150uV]  Anterior Posterior Gradient: absent  Breach: absent    Background Slowing:  Generalized slowing: present. Background is diffusely slow and consists primarily of delta theta activity up to 5 Hz.  Focal slowing: none was present.    State Changes:   No clear state changes.    Sporadic Epileptiform Discharges:   None    Rhythmic and Periodic Patterns (RPPs):  Generalized rhythmic delta activity (GRDA) is noted occasionally during the recording.    Electrographic and Electroclinical seizures:  None    Other Clinical Events:  None    Activation Procedures:   -Hyperventilation was not performed.    -Photic stimulation was not performed.      Artifacts:  Intermittent myogenic and movement artifacts were noted.    ECG:  The heart rate on single channel ECG was predominantly between  BPM.    EEG Summary / Classification:  Abnormal EEG.  •	Background slowing    EEG Impression / Clinical Correlate:  Abnormal prolonged EEG study due to   1- Moderate to severe diffuse slowing which indicates moderate to severe diffuse cerebral dysfunction that is not specific in etiology.  2- GRDA which is seen in diffuse cerebral dysfunction, toxic/metabolic encephalopathy or in certain structural lesions  No epileptic discharges recorded.  No seizures recorded.       ________________________	  NEL Arias  Attending Physician, [facility] [place]    Mitchell Paz MD  EEG/Epilepsy Attending     ------------------------------------  EEG Reading Room: [phone]  On Call Service After Hours: [phone]

## 2024-01-09 NOTE — PROGRESS NOTE ADULT - ASSESSMENT
ASSESSMENT: 56yo M w pmhx of Hep C and possible EtOH use disorder who presents after being found down. Intubated for respiratory distress and agitation 1/8. Pt has displaced 4-11 L rib fx with flail segment.     1/9 VSS: NPO after MN. Plan for Rib plating WED 1/10/24

## 2024-01-09 NOTE — PROGRESS NOTE ADULT - SUBJECTIVE AND OBJECTIVE BOX
SURGERY DAILY PROGRESS NOTE:     Overnight Events:  No acute events overnight.    SUBJECTIVE: Patient seen and evaluated on AM rounds.      OBJECTIVE:  Vital Signs Last 24 Hrs  T(C): 37.8 (09 Jan 2024 08:00), Max: 37.8 (09 Jan 2024 08:00)  T(F): 100 (09 Jan 2024 08:00), Max: 100 (09 Jan 2024 08:00)  HR: 90 (09 Jan 2024 09:00) (54 - 92)  BP: 128/67 (09 Jan 2024 09:00) (80/57 - 155/101)  BP(mean): 90 (09 Jan 2024 09:00) (60 - 123)  RR: 30 (09 Jan 2024 09:00) (20 - 33)  SpO2: 86% (09 Jan 2024 09:00) (86% - 100%)    Parameters below as of 09 Jan 2024 07:00  Patient On (Oxygen Delivery Method): ventilator    O2 Concentration (%): 30  I&O's Detail    08 Jan 2024 07:01  -  09 Jan 2024 07:00  --------------------------------------------------------  IN:    Dexmedetomidine: 511.1 mL    IV PiggyBack: 433.2 mL    IV PiggyBack: 275 mL    Phenylephrine: 377.5 mL    Propofol: 137.5 mL  Total IN: 1734.3 mL    OUT:    Chest Tube (mL): 110 mL    Indwelling Catheter - Urethral (mL): 700 mL    Indwelling Catheter - Urethral (mL): 1805 mL    Voided (mL): 0 mL  Total OUT: 2615 mL    Total NET: -880.7 mL      09 Jan 2024 07:01  -  09 Jan 2024 09:48  --------------------------------------------------------  IN:    Dexmedetomidine: 68.8 mL    IV PiggyBack: 100 mL    IV PiggyBack: 50 mL    Phenylephrine: 6.8 mL  Total IN: 225.6 mL    OUT:    Indwelling Catheter - Urethral (mL): 60 mL  Total OUT: 60 mL    Total NET: 165.6 mL        Daily     Daily     LABS:                        10.1   18.57 )-----------( 355      ( 09 Jan 2024 05:55 )             28.0     01-09    128<L>  |  95<L>  |  14  ----------------------------<  110<H>  3.6   |  24  |  0.72    Ca    8.3<L>      09 Jan 2024 05:55  Phos  2.8     01-09  Mg     2.1     01-09      PT/INR - ( 09 Jan 2024 05:55 )   PT: 11.7 sec;   INR: 1.12 ratio         PTT - ( 09 Jan 2024 05:55 )  PTT:29.0 sec  Urinalysis Basic - ( 09 Jan 2024 05:55 )    Color: x / Appearance: x / SG: x / pH: x  Gluc: 110 mg/dL / Ketone: x  / Bili: x / Urobili: x   Blood: x / Protein: x / Nitrite: x   Leuk Esterase: x / RBC: x / WBC x   Sq Epi: x / Non Sq Epi: x / Bacteria: x        PHYSICAL EXAM:  Constitutional: NAD  Respiratory: non-labored breathing, intubated  Gastrointestinal: abdomen soft, nontender, nondistended  Extremities: warm  Neurological: intact

## 2024-01-09 NOTE — PHYSICAL THERAPY INITIAL EVALUATION ADULT - PERTINENT HX OF CURRENT PROBLEM, REHAB EVAL
48y Male with PMHx hepatitis C (diagnosed many years ago, never treated), depression transferred from outside hospital for trauma eval. Patient found down by roommate after unknown amount of time, found with multiple 4-11 L sided rib fractures with flail chest. Chest tube placed there with >1L output (old blood). Patient also with tachypnea, concern for respiratory failure started on BiPAP. Level 1 called for transient hypotension, patient given 2 unit [facility]BC in [facility] ED. 48y Male with PMHx hepatitis C (diagnosed many years ago, never treated), depression transferred from outside hospital for trauma eval. Patient found down by roommate after unknown amount of time, found with multiple 4-11 L sided rib fractures with flail chest. Chest tube placed there with >1L output (old blood). Patient also with tachypnea, concern for respiratory failure started on BiPAP. Level 1 called for transient hypotension, patient given 2 unit PRBC in [facility] ED. 48y Male with PMHx hepatitis C (diagnosed many years ago, never treated), depression transferred from outside hospital for trauma eval. Patient found down by roommate after unknown amount of time, found with multiple 4-11 L sided rib fractures with flail chest. Chest tube placed there with >1L output (old blood). Patient also with tachypnea, concern for respiratory failure started on BiPAP. Level 1 called for transient hypotension, patient given 2 unit PRBC in [facility] ED. 1/9 VSS: NPO after MN. Plan for Rib plating WED 1/10/24 1/10 underwent VATS, with partial lung decortication  Thoracoscopic removal of intrapleural foreign body 1/11VSS intubated sedated on pressors  chest tube lws. 48y Male with PMHx hepatitis C (diagnosed many years ago, never treated), depression transferred from outside hospital for trauma eval. Patient found down by roommate after unknown amount of time, found with multiple 4-11 L sided rib fractures with flail chest. Chest tube placed there with >1L output (old blood). Patient also with tachypnea, concern for respiratory failure started on BiPAP. Level 1 called for transient hypotension, patient given 2 unit PRBC in [facility] ED. 1/9 VSS: NPO after MN. Plan for Rib plating WED 1/10/24 1/10 underwent VATS, with partial lung decortication  Thoracoscopic removal of intrapleural foreign body 1/11VSS intubated sedated on pressors  chest tube lws.

## 2024-01-09 NOTE — PROGRESS NOTE ADULT - ASSESSMENT
ASSESSMENT: 48y Male with PMHx hepatitis C (diagnosed many years ago, never treated), depression transferred from outside hospital for trauma eval. Patient found down by roommate after unknown amount of time, found with multiple 4-11 L sided rib fractures with flail chest. Chest tube placed there with >1L output (old blood). Patient also with tachypnea, concern for respiratory failure started on BiPAP. Level 1 called for transient hypotension, patient given 2 unit PRBC in [facility] ED. Patient admitted to SICU for hemodynamic monitoring, pain management.       Neurologic:  - Intubated sedated on propofol  - Multimodal pain control with Tylenol, Lidocaine patch, oxycodone PRN  - Dilaudid for breakthrough pain  - Send urine tox  - Concern for history of ETOH use, not actively intoxicated, monitor for signs of withdrawal   -precedex for agitation , ativan PRN as needed   -EEG negative prelim read  -phenobarb PRN       Respiratory:  - Intubated 1/8 PRVC 500/14/50/5  - L sided chest tube placed at outside hospital, water seal-am xray p  - Incentive spirometry  - AM CXR    Cardiovascular:  - Sinus tachycardia rate low 100s, ?multifactorial, monitor  - BP stable  - Send lactate     Gastrointestinal/Nutrition:  - Regular diet  - Bowel regimen with senna, Miralax    Genitourinary/Renal:  - Creatinine normalized, electrolytes improved   - CPK cleared  - Supplement electrolytes PRN  -hypokalemia, hyponatremia    Hematologic:  - Chemical VTE ppx with Lovenox  - Mechanical VTE ppx with SCDs    Infectious Disease:  - Treated empirically for sepsis with Rocephin, Azithro   -Zosyn 1/7-  - Procal 5.27  - Send UA, blood cultures x 2  - Afebrile here, monitor off antibiotics     Endocrine:  - No active issues, no history of DM    Disposition: SICU.       ASSESSMENT: 48y Male with PMHx hepatitis C (diagnosed many years ago, never treated), depression transferred from outside hospital for trauma eval. Patient found down by roommate after unknown amount of time, found with multiple 4-11 L sided rib fractures with flail chest. Chest tube placed there with >1L output (old blood). Patient also with tachypnea, concern for respiratory failure started on BiPAP. Level 1 called for transient hypotension, patient given 2 unit PRBC in [facility] ED. Patient admitted to SICU for hemodynamic monitoring, pain management.       Neurologic:  - Intubated sedated on propofol  - Multimodal pain control with Tylenol, Lidocaine patch, oxycodone PRN  - Dilaudid for breakthrough pain  - Send urine tox  - Concern for history of ETOH use, not actively intoxicated, monitor for signs of withdrawal   -precedex for agitation , ativan PRN as needed   -EEG negative prelim read  -phenobarb PRN       Respiratory:  - Intubated 1/8 PRVC 500/14/50/5  - L sided chest tube placed at outside hospital, water seal-am xray p  - Incentive spirometry  - AM CXR    Cardiovascular:  - Sinus tachycardia rate low 100s, ?multifactorial, monitor  - BP stable  - Send lactate     Gastrointestinal/Nutrition:  - Regular diet  - Bowel regimen with senna, Miralax    Genitourinary/Renal:  - Creatinine normalized, electrolytes improved   - CPK cleared  - Supplement electrolytes PRN  -hypokalemia, hyponatremia    Hematologic:  - Chemical VTE ppx with Lovenox  - Mechanical VTE ppx with SCDs    Infectious Disease:  - Treated empirically for sepsis with Rocephin, Azithro   -Zosyn 1/7-  - Procal 5.27  - Send UA, blood cultures x 2  - Afebrile here, monitor off antibiotics     Endocrine:  - No active issues, no history of DM    Disposition: SICU.

## 2024-01-10 ENCOUNTER — APPOINTMENT (OUTPATIENT)
Dept: THORACIC SURGERY | Facility: HOSPITAL | Age: 49
End: 2024-01-10

## 2024-01-10 ENCOUNTER — TRANSCRIPTION ENCOUNTER (OUTPATIENT)
Age: 49
End: 2024-01-10

## 2024-01-10 LAB
ANION GAP SERPL CALC-SCNC: 11 MMOL/L — SIGNIFICANT CHANGE UP (ref 5–17)
APTT BLD: 27.5 SEC — SIGNIFICANT CHANGE UP (ref 24.5–35.6)
APTT BLD: 27.5 SEC — SIGNIFICANT CHANGE UP (ref 24.5–35.6)
BASE EXCESS BLDA CALC-SCNC: 2.9 MMOL/L — SIGNIFICANT CHANGE UP (ref -2–3)
BASE EXCESS BLDA CALC-SCNC: 2.9 MMOL/L — SIGNIFICANT CHANGE UP (ref -2–3)
BUN SERPL-MCNC: 12 MG/DL — SIGNIFICANT CHANGE UP (ref 7–23)
BUN SERPL-MCNC: 12 MG/DL — SIGNIFICANT CHANGE UP (ref 7–23)
BUN SERPL-MCNC: 14 MG/DL — SIGNIFICANT CHANGE UP (ref 7–23)
BUN SERPL-MCNC: 14 MG/DL — SIGNIFICANT CHANGE UP (ref 7–23)
CALCIUM SERPL-MCNC: 7.8 MG/DL — LOW (ref 8.4–10.5)
CALCIUM SERPL-MCNC: 7.8 MG/DL — LOW (ref 8.4–10.5)
CALCIUM SERPL-MCNC: 8.4 MG/DL — SIGNIFICANT CHANGE UP (ref 8.4–10.5)
CALCIUM SERPL-MCNC: 8.4 MG/DL — SIGNIFICANT CHANGE UP (ref 8.4–10.5)
CHLORIDE SERPL-SCNC: 100 MMOL/L — SIGNIFICANT CHANGE UP (ref 96–108)
CHLORIDE SERPL-SCNC: 100 MMOL/L — SIGNIFICANT CHANGE UP (ref 96–108)
CHLORIDE SERPL-SCNC: 98 MMOL/L — SIGNIFICANT CHANGE UP (ref 96–108)
CHLORIDE SERPL-SCNC: 98 MMOL/L — SIGNIFICANT CHANGE UP (ref 96–108)
CO2 BLDA-SCNC: 27 MMOL/L — HIGH (ref 19–24)
CO2 BLDA-SCNC: 27 MMOL/L — HIGH (ref 19–24)
CO2 SERPL-SCNC: 21 MMOL/L — LOW (ref 22–31)
CO2 SERPL-SCNC: 21 MMOL/L — LOW (ref 22–31)
CO2 SERPL-SCNC: 22 MMOL/L — SIGNIFICANT CHANGE UP (ref 22–31)
CO2 SERPL-SCNC: 22 MMOL/L — SIGNIFICANT CHANGE UP (ref 22–31)
CREAT SERPL-MCNC: 0.57 MG/DL — SIGNIFICANT CHANGE UP (ref 0.5–1.3)
CREAT SERPL-MCNC: 0.57 MG/DL — SIGNIFICANT CHANGE UP (ref 0.5–1.3)
CREAT SERPL-MCNC: 0.58 MG/DL — SIGNIFICANT CHANGE UP (ref 0.5–1.3)
CREAT SERPL-MCNC: 0.58 MG/DL — SIGNIFICANT CHANGE UP (ref 0.5–1.3)
EGFR: 121 ML/MIN/1.73M2 — SIGNIFICANT CHANGE UP
EGFR: 121 ML/MIN/1.73M2 — SIGNIFICANT CHANGE UP
EGFR: 122 ML/MIN/1.73M2 — SIGNIFICANT CHANGE UP
EGFR: 122 ML/MIN/1.73M2 — SIGNIFICANT CHANGE UP
GAS PNL BLDA: SIGNIFICANT CHANGE UP
GAS PNL BLDA: SIGNIFICANT CHANGE UP
GLUCOSE SERPL-MCNC: 116 MG/DL — HIGH (ref 70–99)
GLUCOSE SERPL-MCNC: 116 MG/DL — HIGH (ref 70–99)
GLUCOSE SERPL-MCNC: 125 MG/DL — HIGH (ref 70–99)
GLUCOSE SERPL-MCNC: 125 MG/DL — HIGH (ref 70–99)
GRAM STN FLD: SIGNIFICANT CHANGE UP
GRAM STN FLD: SIGNIFICANT CHANGE UP
HCO3 BLDA-SCNC: 26 MMOL/L — SIGNIFICANT CHANGE UP (ref 21–28)
HCO3 BLDA-SCNC: 26 MMOL/L — SIGNIFICANT CHANGE UP (ref 21–28)
HCT VFR BLD CALC: 25.3 % — LOW (ref 39–50)
HCT VFR BLD CALC: 25.3 % — LOW (ref 39–50)
HCT VFR BLD CALC: 26.2 % — LOW (ref 39–50)
HCT VFR BLD CALC: 26.2 % — LOW (ref 39–50)
HGB BLD-MCNC: 8.9 G/DL — LOW (ref 13–17)
HGB BLD-MCNC: 8.9 G/DL — LOW (ref 13–17)
HGB BLD-MCNC: 9.3 G/DL — LOW (ref 13–17)
HGB BLD-MCNC: 9.3 G/DL — LOW (ref 13–17)
HOROWITZ INDEX BLDA+IHG-RTO: 50 — SIGNIFICANT CHANGE UP
HOROWITZ INDEX BLDA+IHG-RTO: 50 — SIGNIFICANT CHANGE UP
INR BLD: 1.08 RATIO — SIGNIFICANT CHANGE UP (ref 0.85–1.18)
INR BLD: 1.08 RATIO — SIGNIFICANT CHANGE UP (ref 0.85–1.18)
MAGNESIUM SERPL-MCNC: 1.9 MG/DL — SIGNIFICANT CHANGE UP (ref 1.6–2.6)
MAGNESIUM SERPL-MCNC: 1.9 MG/DL — SIGNIFICANT CHANGE UP (ref 1.6–2.6)
MAGNESIUM SERPL-MCNC: 2 MG/DL — SIGNIFICANT CHANGE UP (ref 1.6–2.6)
MAGNESIUM SERPL-MCNC: 2 MG/DL — SIGNIFICANT CHANGE UP (ref 1.6–2.6)
MCHC RBC-ENTMCNC: 28.6 PG — SIGNIFICANT CHANGE UP (ref 27–34)
MCHC RBC-ENTMCNC: 28.6 PG — SIGNIFICANT CHANGE UP (ref 27–34)
MCHC RBC-ENTMCNC: 28.7 PG — SIGNIFICANT CHANGE UP (ref 27–34)
MCHC RBC-ENTMCNC: 28.7 PG — SIGNIFICANT CHANGE UP (ref 27–34)
MCHC RBC-ENTMCNC: 35.2 GM/DL — SIGNIFICANT CHANGE UP (ref 32–36)
MCHC RBC-ENTMCNC: 35.2 GM/DL — SIGNIFICANT CHANGE UP (ref 32–36)
MCHC RBC-ENTMCNC: 35.5 GM/DL — SIGNIFICANT CHANGE UP (ref 32–36)
MCHC RBC-ENTMCNC: 35.5 GM/DL — SIGNIFICANT CHANGE UP (ref 32–36)
MCV RBC AUTO: 80.6 FL — SIGNIFICANT CHANGE UP (ref 80–100)
MCV RBC AUTO: 80.6 FL — SIGNIFICANT CHANGE UP (ref 80–100)
MCV RBC AUTO: 81.6 FL — SIGNIFICANT CHANGE UP (ref 80–100)
MCV RBC AUTO: 81.6 FL — SIGNIFICANT CHANGE UP (ref 80–100)
MRSA PCR RESULT.: DETECTED
MRSA PCR RESULT.: DETECTED
NRBC # BLD: 0 /100 WBCS — SIGNIFICANT CHANGE UP (ref 0–0)
PCO2 BLDA: 34 MMHG — LOW (ref 35–48)
PCO2 BLDA: 34 MMHG — LOW (ref 35–48)
PH BLDA: 7.49 — HIGH (ref 7.35–7.45)
PH BLDA: 7.49 — HIGH (ref 7.35–7.45)
PHOSPHATE SERPL-MCNC: 3.1 MG/DL — SIGNIFICANT CHANGE UP (ref 2.5–4.5)
PHOSPHATE SERPL-MCNC: 3.1 MG/DL — SIGNIFICANT CHANGE UP (ref 2.5–4.5)
PHOSPHATE SERPL-MCNC: 4.4 MG/DL — SIGNIFICANT CHANGE UP (ref 2.5–4.5)
PHOSPHATE SERPL-MCNC: 4.4 MG/DL — SIGNIFICANT CHANGE UP (ref 2.5–4.5)
PLATELET # BLD AUTO: 332 K/UL — SIGNIFICANT CHANGE UP (ref 150–400)
PLATELET # BLD AUTO: 332 K/UL — SIGNIFICANT CHANGE UP (ref 150–400)
PLATELET # BLD AUTO: 384 K/UL — SIGNIFICANT CHANGE UP (ref 150–400)
PLATELET # BLD AUTO: 384 K/UL — SIGNIFICANT CHANGE UP (ref 150–400)
PO2 BLDA: 123 MMHG — HIGH (ref 83–108)
PO2 BLDA: 123 MMHG — HIGH (ref 83–108)
POTASSIUM SERPL-MCNC: 3.6 MMOL/L — SIGNIFICANT CHANGE UP (ref 3.5–5.3)
POTASSIUM SERPL-MCNC: 3.6 MMOL/L — SIGNIFICANT CHANGE UP (ref 3.5–5.3)
POTASSIUM SERPL-MCNC: 3.9 MMOL/L — SIGNIFICANT CHANGE UP (ref 3.5–5.3)
POTASSIUM SERPL-MCNC: 3.9 MMOL/L — SIGNIFICANT CHANGE UP (ref 3.5–5.3)
POTASSIUM SERPL-SCNC: 3.6 MMOL/L — SIGNIFICANT CHANGE UP (ref 3.5–5.3)
POTASSIUM SERPL-SCNC: 3.6 MMOL/L — SIGNIFICANT CHANGE UP (ref 3.5–5.3)
POTASSIUM SERPL-SCNC: 3.9 MMOL/L — SIGNIFICANT CHANGE UP (ref 3.5–5.3)
POTASSIUM SERPL-SCNC: 3.9 MMOL/L — SIGNIFICANT CHANGE UP (ref 3.5–5.3)
PROCALCITONIN SERPL-MCNC: 1.53 NG/ML — HIGH (ref 0.02–0.1)
PROCALCITONIN SERPL-MCNC: 1.53 NG/ML — HIGH (ref 0.02–0.1)
PROTHROM AB SERPL-ACNC: 11.9 SEC — SIGNIFICANT CHANGE UP (ref 9.5–13)
PROTHROM AB SERPL-ACNC: 11.9 SEC — SIGNIFICANT CHANGE UP (ref 9.5–13)
RBC # BLD: 3.1 M/UL — LOW (ref 4.2–5.8)
RBC # BLD: 3.1 M/UL — LOW (ref 4.2–5.8)
RBC # BLD: 3.25 M/UL — LOW (ref 4.2–5.8)
RBC # BLD: 3.25 M/UL — LOW (ref 4.2–5.8)
RBC # FLD: 14.9 % — HIGH (ref 10.3–14.5)
RBC # FLD: 14.9 % — HIGH (ref 10.3–14.5)
RBC # FLD: 15.2 % — HIGH (ref 10.3–14.5)
RBC # FLD: 15.2 % — HIGH (ref 10.3–14.5)
S AUREUS DNA NOSE QL NAA+PROBE: DETECTED
S AUREUS DNA NOSE QL NAA+PROBE: DETECTED
SAO2 % BLDA: 99.2 % — HIGH (ref 94–98)
SAO2 % BLDA: 99.2 % — HIGH (ref 94–98)
SODIUM SERPL-SCNC: 131 MMOL/L — LOW (ref 135–145)
SODIUM SERPL-SCNC: 131 MMOL/L — LOW (ref 135–145)
SODIUM SERPL-SCNC: 132 MMOL/L — LOW (ref 135–145)
SODIUM SERPL-SCNC: 132 MMOL/L — LOW (ref 135–145)
SPECIMEN SOURCE: SIGNIFICANT CHANGE UP
SPECIMEN SOURCE: SIGNIFICANT CHANGE UP
WBC # BLD: 13.16 K/UL — HIGH (ref 3.8–10.5)
WBC # BLD: 13.16 K/UL — HIGH (ref 3.8–10.5)
WBC # BLD: 20.7 K/UL — HIGH (ref 3.8–10.5)
WBC # BLD: 20.7 K/UL — HIGH (ref 3.8–10.5)
WBC # FLD AUTO: 13.16 K/UL — HIGH (ref 3.8–10.5)
WBC # FLD AUTO: 13.16 K/UL — HIGH (ref 3.8–10.5)
WBC # FLD AUTO: 20.7 K/UL — HIGH (ref 3.8–10.5)
WBC # FLD AUTO: 20.7 K/UL — HIGH (ref 3.8–10.5)

## 2024-01-10 PROCEDURE — 32652 THORACOSCOPY REM TOTL CORTEX: CPT

## 2024-01-10 PROCEDURE — 99291 CRITICAL CARE FIRST HOUR: CPT | Mod: GC

## 2024-01-10 PROCEDURE — 32653 THORACOSCOPY REMOV FB/FIBRIN: CPT

## 2024-01-10 PROCEDURE — 71045 X-RAY EXAM CHEST 1 VIEW: CPT | Mod: 26,76

## 2024-01-10 DEVICE — CHEST DRAIN THORACIC ARGYLE PVC 28FR STRAIGHT: Type: IMPLANTABLE DEVICE | Site: LEFT | Status: FUNCTIONAL

## 2024-01-10 RX ORDER — MUPIROCIN 20 MG/G
1 OINTMENT TOPICAL
Refills: 0 | Status: COMPLETED | OUTPATIENT
Start: 2024-01-10 | End: 2024-01-15

## 2024-01-10 RX ORDER — VANCOMYCIN HCL 1 G
1200 VIAL (EA) INTRAVENOUS EVERY 8 HOURS
Refills: 0 | Status: DISCONTINUED | OUTPATIENT
Start: 2024-01-10 | End: 2024-01-10

## 2024-01-10 RX ORDER — ACETAMINOPHEN 500 MG
1000 TABLET ORAL ONCE
Refills: 0 | Status: COMPLETED | OUTPATIENT
Start: 2024-01-10 | End: 2024-01-10

## 2024-01-10 RX ORDER — MAGNESIUM SULFATE 500 MG/ML
2 VIAL (ML) INJECTION ONCE
Refills: 0 | Status: COMPLETED | OUTPATIENT
Start: 2024-01-10 | End: 2024-01-10

## 2024-01-10 RX ORDER — PHENOBARBITAL 60 MG
130 TABLET ORAL EVERY 6 HOURS
Refills: 0 | Status: DISCONTINUED | OUTPATIENT
Start: 2024-01-10 | End: 2024-01-12

## 2024-01-10 RX ORDER — HYDROMORPHONE HYDROCHLORIDE 2 MG/ML
0.5 INJECTION INTRAMUSCULAR; INTRAVENOUS; SUBCUTANEOUS ONCE
Refills: 0 | Status: DISCONTINUED | OUTPATIENT
Start: 2024-01-10 | End: 2024-01-10

## 2024-01-10 RX ORDER — VANCOMYCIN HCL 1 G
1250 VIAL (EA) INTRAVENOUS EVERY 12 HOURS
Refills: 0 | Status: DISCONTINUED | OUTPATIENT
Start: 2024-01-10 | End: 2024-01-11

## 2024-01-10 RX ORDER — ACETAMINOPHEN 500 MG
1000 TABLET ORAL EVERY 6 HOURS
Refills: 0 | Status: COMPLETED | OUTPATIENT
Start: 2024-01-10 | End: 2024-01-11

## 2024-01-10 RX ORDER — ENOXAPARIN SODIUM 100 MG/ML
40 INJECTION SUBCUTANEOUS EVERY 24 HOURS
Refills: 0 | Status: DISCONTINUED | OUTPATIENT
Start: 2024-01-10 | End: 2024-01-21

## 2024-01-10 RX ORDER — ACETAMINOPHEN 500 MG
1000 TABLET ORAL EVERY 6 HOURS
Refills: 0 | Status: DISCONTINUED | OUTPATIENT
Start: 2024-01-10 | End: 2024-01-10

## 2024-01-10 RX ORDER — PROPOFOL 10 MG/ML
30 INJECTION, EMULSION INTRAVENOUS
Qty: 1000 | Refills: 0 | Status: DISCONTINUED | OUTPATIENT
Start: 2024-01-10 | End: 2024-01-11

## 2024-01-10 RX ADMIN — Medication 2 MILLIGRAM(S): at 09:14

## 2024-01-10 RX ADMIN — PIPERACILLIN AND TAZOBACTAM 25 GRAM(S): 4; .5 INJECTION, POWDER, LYOPHILIZED, FOR SOLUTION INTRAVENOUS at 17:23

## 2024-01-10 RX ADMIN — MUPIROCIN 1 APPLICATION(S): 20 OINTMENT TOPICAL at 17:21

## 2024-01-10 RX ADMIN — PREGABALIN 1000 MICROGRAM(S): 225 CAPSULE ORAL at 11:13

## 2024-01-10 RX ADMIN — Medication 400 MILLIGRAM(S): at 20:14

## 2024-01-10 RX ADMIN — LIDOCAINE 1 PATCH: 4 CREAM TOPICAL at 07:21

## 2024-01-10 RX ADMIN — Medication 500 MILLIGRAM(S): at 11:15

## 2024-01-10 RX ADMIN — HYDROMORPHONE HYDROCHLORIDE 0.5 MILLIGRAM(S): 2 INJECTION INTRAMUSCULAR; INTRAVENOUS; SUBCUTANEOUS at 17:24

## 2024-01-10 RX ADMIN — PIPERACILLIN AND TAZOBACTAM 25 GRAM(S): 4; .5 INJECTION, POWDER, LYOPHILIZED, FOR SOLUTION INTRAVENOUS at 22:12

## 2024-01-10 RX ADMIN — PIPERACILLIN AND TAZOBACTAM 25 GRAM(S): 4; .5 INJECTION, POWDER, LYOPHILIZED, FOR SOLUTION INTRAVENOUS at 06:25

## 2024-01-10 RX ADMIN — Medication 2 MILLIGRAM(S): at 17:57

## 2024-01-10 RX ADMIN — PHENYLEPHRINE HYDROCHLORIDE 6.88 MICROGRAM(S)/KG/MIN: 10 INJECTION INTRAVENOUS at 06:25

## 2024-01-10 RX ADMIN — CHLORHEXIDINE GLUCONATE 1 APPLICATION(S): 213 SOLUTION TOPICAL at 06:25

## 2024-01-10 RX ADMIN — Medication 166.67 MILLIGRAM(S): at 17:20

## 2024-01-10 RX ADMIN — Medication 400 MILLIGRAM(S): at 01:27

## 2024-01-10 RX ADMIN — Medication 400 MILLIGRAM(S): at 06:25

## 2024-01-10 RX ADMIN — LIDOCAINE 1 PATCH: 4 CREAM TOPICAL at 10:25

## 2024-01-10 RX ADMIN — ENOXAPARIN SODIUM 40 MILLIGRAM(S): 100 INJECTION SUBCUTANEOUS at 06:26

## 2024-01-10 RX ADMIN — Medication 1 MILLIGRAM(S): at 11:13

## 2024-01-10 RX ADMIN — PHENYLEPHRINE HYDROCHLORIDE 6.88 MICROGRAM(S)/KG/MIN: 10 INJECTION INTRAVENOUS at 23:12

## 2024-01-10 RX ADMIN — PROPOFOL 16.5 MICROGRAM(S)/KG/MIN: 10 INJECTION, EMULSION INTRAVENOUS at 20:45

## 2024-01-10 RX ADMIN — Medication 130 MILLIGRAM(S): at 20:46

## 2024-01-10 RX ADMIN — ENOXAPARIN SODIUM 40 MILLIGRAM(S): 100 INJECTION SUBCUTANEOUS at 23:12

## 2024-01-10 RX ADMIN — Medication 130 MILLIGRAM(S): at 23:12

## 2024-01-10 RX ADMIN — CHLORHEXIDINE GLUCONATE 15 MILLILITER(S): 213 SOLUTION TOPICAL at 06:26

## 2024-01-10 RX ADMIN — Medication 130 MILLIGRAM(S): at 00:24

## 2024-01-10 RX ADMIN — PANTOPRAZOLE SODIUM 40 MILLIGRAM(S): 20 TABLET, DELAYED RELEASE ORAL at 11:13

## 2024-01-10 RX ADMIN — PHENYLEPHRINE HYDROCHLORIDE 6.88 MICROGRAM(S)/KG/MIN: 10 INJECTION INTRAVENOUS at 20:45

## 2024-01-10 RX ADMIN — DEXMEDETOMIDINE HYDROCHLORIDE IN 0.9% SODIUM CHLORIDE 11.5 MICROGRAM(S)/KG/HR: 4 INJECTION INTRAVENOUS at 07:37

## 2024-01-10 RX ADMIN — Medication 130 MILLIGRAM(S): at 07:36

## 2024-01-10 RX ADMIN — Medication 25 GRAM(S): at 23:12

## 2024-01-10 RX ADMIN — HYDROMORPHONE HYDROCHLORIDE 0.5 MILLIGRAM(S): 2 INJECTION INTRAMUSCULAR; INTRAVENOUS; SUBCUTANEOUS at 17:29

## 2024-01-10 RX ADMIN — CHLORHEXIDINE GLUCONATE 15 MILLILITER(S): 213 SOLUTION TOPICAL at 17:20

## 2024-01-10 RX ADMIN — HYDROMORPHONE HYDROCHLORIDE 0.5 MILLIGRAM(S): 2 INJECTION INTRAMUSCULAR; INTRAVENOUS; SUBCUTANEOUS at 23:42

## 2024-01-10 RX ADMIN — Medication 15 MILLILITER(S): at 11:13

## 2024-01-10 RX ADMIN — HYDROMORPHONE HYDROCHLORIDE 0.5 MILLIGRAM(S): 2 INJECTION INTRAMUSCULAR; INTRAVENOUS; SUBCUTANEOUS at 12:29

## 2024-01-10 RX ADMIN — PHENYLEPHRINE HYDROCHLORIDE 6.88 MICROGRAM(S)/KG/MIN: 10 INJECTION INTRAVENOUS at 07:37

## 2024-01-10 RX ADMIN — POLYETHYLENE GLYCOL 3350 17 GRAM(S): 17 POWDER, FOR SOLUTION ORAL at 17:20

## 2024-01-10 RX ADMIN — HYDROMORPHONE HYDROCHLORIDE 0.5 MILLIGRAM(S): 2 INJECTION INTRAMUSCULAR; INTRAVENOUS; SUBCUTANEOUS at 18:30

## 2024-01-10 RX ADMIN — LIDOCAINE 1 PATCH: 4 CREAM TOPICAL at 23:11

## 2024-01-10 RX ADMIN — HYDROMORPHONE HYDROCHLORIDE 0.5 MILLIGRAM(S): 2 INJECTION INTRAMUSCULAR; INTRAVENOUS; SUBCUTANEOUS at 23:12

## 2024-01-10 NOTE — PROGRESS NOTE ADULT - SUBJECTIVE AND OBJECTIVE BOX
ACS/Trauma Team (General Surgery) Daily Progress Note    SUBJECTIVE:  Pt seen and examined, and is resting in bed. Intubated, sedated.     OBJECTIVE:  Vital Signs Last 24 Hrs  T(C): 36.7 (10 Dominic 2024 07:00), Max: 39 (2024 12:00)  T(F): 98.1 (10 Dominic 2024 07:00), Max: 102.2 (2024 12:00)  HR: 73 (10 Dominic 2024 08:15) (60 - 92)  BP: 85/51 (10 Dominic 2024 08:15) (75/47 - 148/59)  BP(mean): 62 (10 Dominic 2024 08:15) (55 - 107)  RR: 21 (10 Dominic 2024 08:15) (20 - 51)  SpO2: 96% (10 Dominic 2024 08:15) (86% - 100%)    Parameters below as of 10 Dominic 2024 07:00  Patient On (Oxygen Delivery Method): ventilator    O2 Concentration (%): 50    I&O's Detail    2024 07:01  -  10 Dominic 2024 07:00  --------------------------------------------------------  IN:    Dexmedetomidine: 793.4 mL    Enteral Tube Flush: 30 mL    IV PiggyBack: 250 mL    IV PiggyBack: 250 mL    IV PiggyBack: 375 mL    IV PiggyBack: 375 mL    Miscellaneous Tube Feedin mL    Phenylephrine: 13.6 mL    Phenylephrine: 125.8 mL  Total IN: 2272.8 mL    OUT:    Chest Tube (mL): 70 mL    Indwelling Catheter - Urethral (mL): 920 mL  Total OUT: 990 mL    Total NET: 1282.8 mL      10 Dominic 2024 07:01  -  10 Dominic 2024 08:23  --------------------------------------------------------  IN:    Dexmedetomidine: 29.8 mL    IV PiggyBack: 25 mL    Phenylephrine: 4.8 mL  Total IN: 59.6 mL    OUT:    Chest Tube (mL): 0 mL    Indwelling Catheter - Urethral (mL): 35 mL  Total OUT: 35 mL    Total NET: 24.6 mL        Exam:  Constitutional: NAD, C-collar in place  Respiratory: non-labored breathing, intubated  Gastrointestinal: abdomen soft, nontender, nondistended  Extremities: warm  Neurological: intact                        9.3    13.16 )-----------( 332      ( 10 Dominic 2024 05:56 )             26.2       0110    131<L>  |  98  |  14  ----------------------------<  125<H>  3.6   |  22  |  0.58    Ca    7.8<L>      10 Dominic 2024 05:56  Phos  3.1     01-10  Mg     2.0     -10        PT/INR - ( 10 Dominic 2024 05:56 )   PT: 11.9 sec;   INR: 1.08 ratio         PTT - ( 10 Dominic 2024 05:56 )  PTT:27.5 sec

## 2024-01-10 NOTE — CONSULT NOTE ADULT - SUBJECTIVE AND OBJECTIVE BOX
HPI  48yMale transferred from outside hospital for trauma eval. Patient found down by roommate after unknown amount of time, found with multiple 4-11 L sided rib fractures with flail chest. Pt experiencing withdrawal symptoms and intubated on 1/8.     ROS  Negative unless otherwise specified in HPI.    PAST MEDICAL & SURGICAL Hx  PAST MEDICAL & SURGICAL HISTORY:  No pertinent past medical history      No significant past surgical history          MEDICATIONS  Home Medications:      ALLERGIES  No Known Allergies      FAMILY Hx  FAMILY HISTORY:  No pertinent family history in first degree relatives        SOCIAL Hx  Social History:      VITALS  Vital Signs Last 24 Hrs  T(C): 36.7 (10 Dominic 2024 07:00), Max: 38.8 (09 Jan 2024 23:00)  T(F): 98.1 (10 Dominic 2024 07:00), Max: 101.8 (09 Jan 2024 23:00)  HR: 86 (10 Dominic 2024 11:45) (60 - 88)  BP: 96/63 (10 Dominic 2024 11:45) (75/47 - 131/90)  BP(mean): 75 (10 Dominic 2024 11:45) (55 - 107)  RR: 29 (10 Dominic 2024 11:45) (20 - 51)  SpO2: 98% (10 Dominic 2024 11:45) (89% - 100%)    Parameters below as of 10 Dominic 2024 07:00  Patient On (Oxygen Delivery Method): ventilator    O2 Concentration (%): 50    PHYSICAL EXAM  Gen: Lying in bed, intubated, sedated on precedex  Resp: intubated  Spine:  Skin intact  No bony TTP or step-offs along c-, t-, l-spine, or sacrum  SILT BLUE and BLLE  able to move bilateral fingers  able to SLR, extend knee, dorsi/plantarflex against gravity on RLE  able to dorsi/plantarflex on LLE  palpable radial and DP pulses bilaterally  unable to assess motor or sensory function further due to intubated/sedated status      LABS                        9.3    13.16 )-----------( 332      ( 10 Dominic 2024 05:56 )             26.2     01-10    131<L>  |  98  |  14  ----------------------------<  125<H>  3.6   |  22  |  0.58    Ca    7.8<L>      10 Dominic 2024 05:56  Phos  3.1     01-10  Mg     2.0     01-10      PT/INR - ( 10 Dominic 2024 05:56 )   PT: 11.9 sec;   INR: 1.08 ratio         PTT - ( 10 Dominic 2024 05:56 )  PTT:27.5 sec    IMAGING  CT: mild L1-2 VCF (personal read)    ASSESSMENT & PLAN  48yMale w/ L1-2 VCF w/o bony retropulsion.  -WBAT  -pain control  -incentive spirometry  -PT/OT  -no acute ortho spine surgery at this time, ortho stable for DC planning  -f/u outpt with Dr. Wyatt upon DC, please call office for appt    -will discuss with Dr. Wyatt and advise with changes to plan HPI  48yMale transferred from outside hospital for trauma eval. Patient found down by roommate after unknown amount of time, found with multiple 4-11 L sided rib fractures with flail chest. Pt experiencing withdrawal symptoms and intubated on 1/8.     ROS  Negative unless otherwise specified in HPI.    PAST MEDICAL & SURGICAL Hx  PAST MEDICAL & SURGICAL HISTORY:  No pertinent past medical history      No significant past surgical history          MEDICATIONS  Home Medications:      ALLERGIES  No Known Allergies      FAMILY Hx  FAMILY HISTORY:  No pertinent family history in first degree relatives        SOCIAL Hx  Social History:      VITALS  Vital Signs Last 24 Hrs  T(C): 36.7 (10 Dominic 2024 07:00), Max: 38.8 (09 Jan 2024 23:00)  T(F): 98.1 (10 Dominic 2024 07:00), Max: 101.8 (09 Jan 2024 23:00)  HR: 86 (10 Dominic 2024 11:45) (60 - 88)  BP: 96/63 (10 Dominic 2024 11:45) (75/47 - 131/90)  BP(mean): 75 (10 Dominic 2024 11:45) (55 - 107)  RR: 29 (10 Dominic 2024 11:45) (20 - 51)  SpO2: 98% (10 Dominic 2024 11:45) (89% - 100%)    Parameters below as of 10 Dominic 2024 07:00  Patient On (Oxygen Delivery Method): ventilator    O2 Concentration (%): 50    PHYSICAL EXAM  Gen: Lying in bed, intubated, sedated on precedex  Resp: intubated  Spine:  Skin intact  No bony TTP or step-offs along c-, t-, l-spine, or sacrum  SILT BLUE and BLLE  able to move bilateral fingers  able to SLR, extend knee, dorsi/plantarflex against gravity on RLE  able to dorsi/plantarflex on LLE  palpable radial and DP pulses bilaterally  unable to assess motor or sensory function further due to intubated/sedated status      LABS                        9.3    13.16 )-----------( 332      ( 10 Dominic 2024 05:56 )             26.2     01-10    131<L>  |  98  |  14  ----------------------------<  125<H>  3.6   |  22  |  0.58    Ca    7.8<L>      10 Dominic 2024 05:56  Phos  3.1     01-10  Mg     2.0     01-10      PT/INR - ( 10 Dominic 2024 05:56 )   PT: 11.9 sec;   INR: 1.08 ratio         PTT - ( 10 Dominic 2024 05:56 )  PTT:27.5 sec    IMAGING  CT: mild L1-2 VCF (personal read)    ASSESSMENT & PLAN  48yMale w/ L1-2 VCF w/o bony retropulsion.    -FU tertiary exam once extubated  -WBAT  -pain control  -incentive spirometry  -PT/OT  -no acute ortho spine surgery at this time, ortho stable for DC planning  -f/u outpt with Dr. Wyatt upon DC, please call office for appt  -discussed with Dr. Wyatt who agrees with plan

## 2024-01-10 NOTE — PROGRESS NOTE ADULT - ASSESSMENT
48y Male with PMHx hepatitis C (diagnosed many years ago, never treated), depression transferred from outside hospital for trauma eval. Patient found down by roommate after unknown amount of time, found with multiple 4-11 L sided rib fractures with flail chest. Chest tube placed there with >1L output (old blood). Patient also with tachypnea, concern for respiratory failure started on BiPAP. Level 1 called for transient hypotension, patient given 2 unit PRBC in [facility] ED. Rib score 3    Injuries:   - left posterolateral fourth, fifth, sixth, and 11th ribs, minimally displaced   - mildly displaced fractures of the left anterolateral fourth, fifth, sixth, and seventh ribs  - displaced fractures of the left posterior seventh, eighth, ninth, and 10th ribs.    PLAN  - Rec getting a-line  - NPO/IVF/Tube feeds  - Wean vent and pressors as tolerated  - Rib fracture protocol   - Plan for rib platting for flail chest today   - ISS  - c/w chest tube  - Appreciate excellent SICU care    ACS/Trauma  [id number] 48y Male with PMHx hepatitis C (diagnosed many years ago, never treated), depression transferred from outside hospital for trauma eval. Patient found down by roommate after unknown amount of time, found with multiple 4-11 L sided rib fractures with flail chest. Chest tube placed there with >1L output (old blood). Patient also with tachypnea, concern for respiratory failure started on BiPAP. Level 1 called for transient hypotension, patient given 2 unit PRBC in [facility] ED. Rib score 3    Injuries:   - left posterolateral fourth, fifth, sixth, and 11th ribs, minimally displaced   - mildly displaced fractures of the left anterolateral fourth, fifth, sixth, and seventh ribs  - displaced fractures of the left posterior seventh, eighth, ninth, and 10th ribs.    PLAN  - Rec getting a-line  - NPO/IVF/Tube feeds  - Wean vent and pressors as tolerated  - Rib fracture protocol   - Plan for rib platting for flail chest today   - ISS  - c/w chest tube  - Appreciate excellent SICU care    ACS/Trauma  [id number]

## 2024-01-10 NOTE — PROGRESS NOTE ADULT - SUBJECTIVE AND OBJECTIVE BOX
24 HOUR EVENTS:  -agitation, got PRN phenobarb, 2 ativan    SUBJECTIVE/ROS:  Patient seen at bedside this AM.     [ ] Due to altered mental status/intubation, subjective information were not able to be obtained from the patient. History was obtained, to the extent possible, from review of the chart and collateral sources of information.    OBJECTIVE:    VITALS:  Vital Signs Last 24 Hrs  T(C): 37.8 (09 Jan 2024 19:00), Max: 39 (09 Jan 2024 12:00)  T(F): 100 (09 Jan 2024 19:00), Max: 102.2 (09 Jan 2024 12:00)  HR: 77 (09 Jan 2024 22:45) (60 - 92)  BP: 95/59 (09 Jan 2024 21:45) (75/47 - 148/59)  BP(mean): 72 (09 Jan 2024 21:45) (55 - 107)  RR: 25 (09 Jan 2024 22:45) (20 - 51)  SpO2: 96% (09 Jan 2024 22:45) (86% - 100%)    Parameters below as of 09 Jan 2024 19:00  Patient On (Oxygen Delivery Method): ventilator    O2 Concentration (%): 30  Mode: AC/ CMV (Assist Control/ Continuous Mandatory Ventilation)  RR (machine): 14  TV (machine): 500  FiO2: 30  PEEP: 5  ITime: 0.9  MAP: 12  PIP: 23    I&O's Summary    08 Jan 2024 07:01  -  09 Jan 2024 07:00  --------------------------------------------------------  IN: 1734.3 mL / OUT: 2615 mL / NET: -880.7 mL    09 Jan 2024 07:01  -  10 Dominic 2024 01:04  --------------------------------------------------------  IN: 1473.5 mL / OUT: 580 mL / NET: 893.5 mL        PHYSICAL EXAM:    NEURO  RASS:     GCS:     CAM ICU:  Exam: awake, alert, oriented    RESPIRATORY  Exam: no increased WOB on RA  Mechanical Ventilation: Mode: AC/ CMV (Assist Control/ Continuous Mandatory Ventilation), RR (machine): 14, RR (patient): 23, TV (machine): 500, FiO2: 30, PEEP: 5, ITime: 0.9, MAP: 12, PIP: 23    CARDIOVASCULAR  Exam: warm, well-perfused  Cardiac Rhythm: normal sinus rhythm noted on cardiac monitor    GI/NUTRITION  Exam: soft, non-distended, non-tender    GENITOURINARY  Exam:     ACCESS DEVICES:  [ ] Peripheral IV  [ ] Central Venous Line	[ ] R	[ ] L	[ ] IJ	[ ] Fem	[ ] SC	Placed:   [ ] Arterial Line		[ ] R	[ ] L	[ ] Fem	[ ] Rad	[ ] Ax	Placed:   [ ] PICC:					[ ] Mediport  [ ] Urinary Catheter, Date Placed:   [x] Necessity of urinary, arterial, and venous catheters discussed      LABS:                        10.1   18.57 )-----------( 355      ( 09 Jan 2024 05:55 )             28.0   01-09    131<L>  |  96  |  14  ----------------------------<  113<H>  4.0   |  23  |  0.80    Ca    8.2<L>      09 Jan 2024 18:29  Phos  4.1     01-09  Mg     2.1     01-09      CAPILLARY BLOOD GLUCOSE          MEDICATIONS:   MEDICATIONS  (STANDING):  acetaminophen   IVPB .. 1000 milliGRAM(s) IV Intermittent once  ascorbic acid 500 milliGRAM(s) Oral daily  chlorhexidine 0.12% Liquid 15 milliLiter(s) Oral Mucosa every 12 hours  chlorhexidine 2% Cloths 1 Application(s) Topical <User Schedule>  cyanocobalamin 1000 MICROGram(s) Oral daily  dexMEDEtomidine Infusion 0.5 MICROgram(s)/kG/Hr (11.5 mL/Hr) IV Continuous <Continuous>  enoxaparin Injectable 40 milliGRAM(s) SubCutaneous <User Schedule>  folic acid 1 milliGRAM(s) Enteral Tube daily  influenza   Vaccine 0.5 milliLiter(s) IntraMuscular once  lidocaine   4% Patch 1 Patch Transdermal every 24 hours  LORazepam   Injectable 2 milliGRAM(s) IV Push once  multivitamin/minerals/iron Oral Solution (CENTRUM) 15 milliLiter(s) Enteral Tube daily  pantoprazole  Injectable 40 milliGRAM(s) IV Push daily  PHENobarbital Injectable 130 milliGRAM(s) IV Push every 8 hours  PHENobarbital Injectable 130 milliGRAM(s) IV Push every 12 hours  phenylephrine    Infusion 0.2 MICROgram(s)/kG/Min (6.88 mL/Hr) IV Continuous <Continuous>  piperacillin/tazobactam IVPB.. 3.375 Gram(s) IV Intermittent every 8 hours  polyethylene glycol 3350 17 Gram(s) Oral every 12 hours  senna Syrup 10 milliLiter(s) Oral every 24 hours    MEDICATIONS  (PRN):  HYDROmorphone  Injectable 0.5 milliGRAM(s) IV Push every 3 hours PRN Breakthrough      IMAGING: 24 HOUR EVENTS:  -agitation, got PRN phenobarb, 2 ativan  -increased fio2 50% hypoxemic 90%    SUBJECTIVE/ROS:  Patient seen at bedside this AM.     [ ] Due to altered mental status/intubation, subjective information were not able to be obtained from the patient. History was obtained, to the extent possible, from review of the chart and collateral sources of information.    OBJECTIVE:    VITALS:  Vital Signs Last 24 Hrs  T(C): 37.8 (09 Jan 2024 19:00), Max: 39 (09 Jan 2024 12:00)  T(F): 100 (09 Jan 2024 19:00), Max: 102.2 (09 Jan 2024 12:00)  HR: 77 (09 Jan 2024 22:45) (60 - 92)  BP: 95/59 (09 Jan 2024 21:45) (75/47 - 148/59)  BP(mean): 72 (09 Jan 2024 21:45) (55 - 107)  RR: 25 (09 Jan 2024 22:45) (20 - 51)  SpO2: 96% (09 Jan 2024 22:45) (86% - 100%)    Parameters below as of 09 Jan 2024 19:00  Patient On (Oxygen Delivery Method): ventilator    O2 Concentration (%): 30  Mode: AC/ CMV (Assist Control/ Continuous Mandatory Ventilation)  RR (machine): 14  TV (machine): 500  FiO2: 30  PEEP: 5  ITime: 0.9  MAP: 12  PIP: 23    I&O's Summary    08 Jan 2024 07:01  -  09 Jan 2024 07:00  --------------------------------------------------------  IN: 1734.3 mL / OUT: 2615 mL / NET: -880.7 mL    09 Jan 2024 07:01  -  10 Dominic 2024 01:04  --------------------------------------------------------  IN: 1473.5 mL / OUT: 580 mL / NET: 893.5 mL        PHYSICAL EXAM:    NEURO  RASS:     GCS:     CAM ICU:  Exam: awake, alert, oriented    RESPIRATORY  Exam: no increased WOB on RA  Mechanical Ventilation: Mode: AC/ CMV (Assist Control/ Continuous Mandatory Ventilation), RR (machine): 14, RR (patient): 23, TV (machine): 500, FiO2: 30, PEEP: 5, ITime: 0.9, MAP: 12, PIP: 23    CARDIOVASCULAR  Exam: warm, well-perfused  Cardiac Rhythm: normal sinus rhythm noted on cardiac monitor    GI/NUTRITION  Exam: soft, non-distended, non-tender    GENITOURINARY  Exam:     ACCESS DEVICES:  [ ] Peripheral IV  [ ] Central Venous Line	[ ] R	[ ] L	[ ] IJ	[ ] Fem	[ ] SC	Placed:   [ ] Arterial Line		[ ] R	[ ] L	[ ] Fem	[ ] Rad	[ ] Ax	Placed:   [ ] PICC:					[ ] Mediport  [ ] Urinary Catheter, Date Placed:   [x] Necessity of urinary, arterial, and venous catheters discussed      LABS:                        10.1   18.57 )-----------( 355      ( 09 Jan 2024 05:55 )             28.0   01-09    131<L>  |  96  |  14  ----------------------------<  113<H>  4.0   |  23  |  0.80    Ca    8.2<L>      09 Jan 2024 18:29  Phos  4.1     01-09  Mg     2.1     01-09      CAPILLARY BLOOD GLUCOSE          MEDICATIONS:   MEDICATIONS  (STANDING):  acetaminophen   IVPB .. 1000 milliGRAM(s) IV Intermittent once  ascorbic acid 500 milliGRAM(s) Oral daily  chlorhexidine 0.12% Liquid 15 milliLiter(s) Oral Mucosa every 12 hours  chlorhexidine 2% Cloths 1 Application(s) Topical <User Schedule>  cyanocobalamin 1000 MICROGram(s) Oral daily  dexMEDEtomidine Infusion 0.5 MICROgram(s)/kG/Hr (11.5 mL/Hr) IV Continuous <Continuous>  enoxaparin Injectable 40 milliGRAM(s) SubCutaneous <User Schedule>  folic acid 1 milliGRAM(s) Enteral Tube daily  influenza   Vaccine 0.5 milliLiter(s) IntraMuscular once  lidocaine   4% Patch 1 Patch Transdermal every 24 hours  LORazepam   Injectable 2 milliGRAM(s) IV Push once  multivitamin/minerals/iron Oral Solution (CENTRUM) 15 milliLiter(s) Enteral Tube daily  pantoprazole  Injectable 40 milliGRAM(s) IV Push daily  PHENobarbital Injectable 130 milliGRAM(s) IV Push every 8 hours  PHENobarbital Injectable 130 milliGRAM(s) IV Push every 12 hours  phenylephrine    Infusion 0.2 MICROgram(s)/kG/Min (6.88 mL/Hr) IV Continuous <Continuous>  piperacillin/tazobactam IVPB.. 3.375 Gram(s) IV Intermittent every 8 hours  polyethylene glycol 3350 17 Gram(s) Oral every 12 hours  senna Syrup 10 milliLiter(s) Oral every 24 hours    MEDICATIONS  (PRN):  HYDROmorphone  Injectable 0.5 milliGRAM(s) IV Push every 3 hours PRN Breakthrough      IMAGING:

## 2024-01-10 NOTE — PROVIDER CONTACT NOTE (OTHER) - SITUATION
Pt on Yonathan running through midline, per order ok to run through PIV. Now pt with increased pressor requirement, Yonathan @ 1.7. Pt on Yonathan running through midline, per order ok to run through PIV. Now pt with increased pressor requirement, Yonahtan @ 1.7.

## 2024-01-10 NOTE — CONSULT NOTE ADULT - TIME BILLING
Please note that over 55 minutes of time was spent in care of this patient including  - pre visit preparation  - in person visit  - post visit documentation  - review of imaging  - discussion with colleagues

## 2024-01-10 NOTE — PROGRESS NOTE ADULT - ASSESSMENT
ASSESSMENT: 48y Male with PMHx hepatitis C (diagnosed many years ago, never treated), depression transferred from outside hospital for trauma eval. Patient found down by roommate after unknown amount of time, found with multiple 4-11 L sided rib fractures with flail chest. Chest tube placed there with >1L output (old blood). Patient also with tachypnea, concern for respiratory failure started on BiPAP. Level 1 called for transient hypotension, patient given 2 unit PRBC in [facility] ED. Patient admitted to SICU for hemodynamic monitoring, pain management.       Neurologic:  - Intubated sedated on propofol  - Multimodal pain control with Tylenol, Lidocaine patch, oxycodone PRN  - Dilaudid for breakthrough pain  - Send urine tox  - Concern for history of ETOH use, not actively intoxicated, monitor for signs of withdrawal   -precedex for agitation , ativan PRN as needed   -EEG negative prelim read  -phenobarb PRN ETOH withdrawal       Respiratory:  - Intubated 1/8 PRVC 500/14/50/5  - L sided chest tube placed at outside hospital, water seal-am xray p  - Incentive spirometry  - AM CXR  -plan for rib plating 1/10    Cardiovascular:  - Sinus tachycardia rate low 100s, ?multifactorial, monitor  - BP stable  - Send lactate     Gastrointestinal/Nutrition:  - Regular diet  - Bowel regimen with senna, Miralax    Genitourinary/Renal:  - Creatinine normalized, electrolytes improved   - CPK cleared  - Supplement electrolytes PRN  -hypokalemia, hyponatremia    Hematologic:  - Chemical VTE ppx with Lovenox  - Mechanical VTE ppx with SCDs    Infectious Disease:  - Treated empirically for sepsis with Rocephin, Azithro   -Zosyn 1/7-  - Procal 5.27  - Send UA, blood cultures x 2  - Afebrile here, monitor off antibiotics     Endocrine:  - No active issues, no history of DM    Disposition: SICU.       ASSESSMENT: 48y Male with PMHx hepatitis C (diagnosed many years ago, never treated), depression transferred from outside hospital for trauma eval. Patient found down by roommate after unknown amount of time, found with multiple 4-11 L sided rib fractures with flail chest. Chest tube placed there with >1L output (old blood). Patient also with tachypnea, concern for respiratory failure started on BiPAP. Level 1 called for transient hypotension, patient given 2 unit [facility]BC in [facility] ED. Patient admitted to SICU for hemodynamic monitoring, pain management.       Neurologic:  - Intubated sedated on propofol  - Multimodal pain control with Tylenol, Lidocaine patch, oxycodone PRN  - Dilaudid for breakthrough pain  - Send urine tox  - Concern for history of ETOH use, not actively intoxicated, monitor for signs of withdrawal   -precedex for agitation , ativan PRN as needed   -EEG negative prelim read  -phenobarb PRN ETOH withdrawal       Respiratory:  - Intubated 1/8 PRVC 500/14/50/5  - L sided chest tube placed at outside hospital, water seal-am xray p  - Incentive spirometry  - AM CXR  -plan for rib plating 1/10    Cardiovascular:  - Sinus tachycardia rate low 100s, ?multifactorial, monitor  - BP stable  - Send lactate     Gastrointestinal/Nutrition:  - Regular diet  - Bowel regimen with senna, Miralax    Genitourinary/Renal:  - Creatinine normalized, electrolytes improved   - CPK cleared  - Supplement electrolytes PRN  -hypokalemia, hyponatremia    Hematologic:  - Chemical VTE ppx with Lovenox  - Mechanical VTE ppx with SCDs    Infectious Disease:  - Treated empirically for sepsis with Rocephin, Azithro   -Zosyn 1/7-  - Procal 5.27  - Send UA, blood cultures x 2  - Afebrile here, monitor off antibiotics     Endocrine:  - No active issues, no history of DM    Disposition: SICU.

## 2024-01-10 NOTE — CHART NOTE - NSCHARTNOTEFT_GEN_A_CORE
discussed procedure with patient's mother yesterday including all risks and alternatives.   this morning mother is not reachable by phone - and /or paging for anesthesia cosent.   will proceed with surgery with two physician consent for anesthesia and rep for biomet and atricure. ICU attending agreeable given the urgent nature of surgery .

## 2024-01-10 NOTE — PROGRESS NOTE ADULT - CRITICAL CARE ATTENDING COMMENT
Dr. Garcia (Attending Physician)  Alcohol Withdrawal - Phenobarbital wean and precedex for sedation  Acute resp failure - intubated on LPV, will wean to 40%, if has episode of hypoxia will increase PEEP to 8  Flail chest - OR today for rib plating  Fever yesterday, MRSA positive, will start vancomycin, combicath gram stain neg

## 2024-01-11 ENCOUNTER — RESULT REVIEW (OUTPATIENT)
Age: 49
End: 2024-01-11

## 2024-01-11 DIAGNOSIS — J94.2 HEMOTHORAX: ICD-10-CM

## 2024-01-11 LAB
ANION GAP SERPL CALC-SCNC: 9 MMOL/L — SIGNIFICANT CHANGE UP (ref 5–17)
ANION GAP SERPL CALC-SCNC: 9 MMOL/L — SIGNIFICANT CHANGE UP (ref 5–17)
APTT BLD: 30.9 SEC — SIGNIFICANT CHANGE UP (ref 24.5–35.6)
APTT BLD: 30.9 SEC — SIGNIFICANT CHANGE UP (ref 24.5–35.6)
BUN SERPL-MCNC: 8 MG/DL — SIGNIFICANT CHANGE UP (ref 7–23)
BUN SERPL-MCNC: 8 MG/DL — SIGNIFICANT CHANGE UP (ref 7–23)
CALCIUM SERPL-MCNC: 7.4 MG/DL — LOW (ref 8.4–10.5)
CALCIUM SERPL-MCNC: 7.4 MG/DL — LOW (ref 8.4–10.5)
CHLORIDE SERPL-SCNC: 101 MMOL/L — SIGNIFICANT CHANGE UP (ref 96–108)
CHLORIDE SERPL-SCNC: 101 MMOL/L — SIGNIFICANT CHANGE UP (ref 96–108)
CO2 SERPL-SCNC: 24 MMOL/L — SIGNIFICANT CHANGE UP (ref 22–31)
CO2 SERPL-SCNC: 24 MMOL/L — SIGNIFICANT CHANGE UP (ref 22–31)
CREAT SERPL-MCNC: 0.58 MG/DL — SIGNIFICANT CHANGE UP (ref 0.5–1.3)
CREAT SERPL-MCNC: 0.58 MG/DL — SIGNIFICANT CHANGE UP (ref 0.5–1.3)
CULTURE RESULTS: SIGNIFICANT CHANGE UP
CULTURE RESULTS: SIGNIFICANT CHANGE UP
EGFR: 121 ML/MIN/1.73M2 — SIGNIFICANT CHANGE UP
EGFR: 121 ML/MIN/1.73M2 — SIGNIFICANT CHANGE UP
GAS PNL BLDA: SIGNIFICANT CHANGE UP
GAS PNL BLDA: SIGNIFICANT CHANGE UP
GLUCOSE BLDC GLUCOMTR-MCNC: 93 MG/DL — SIGNIFICANT CHANGE UP (ref 70–99)
GLUCOSE BLDC GLUCOMTR-MCNC: 93 MG/DL — SIGNIFICANT CHANGE UP (ref 70–99)
GLUCOSE SERPL-MCNC: 100 MG/DL — HIGH (ref 70–99)
GLUCOSE SERPL-MCNC: 100 MG/DL — HIGH (ref 70–99)
GRAM STN FLD: SIGNIFICANT CHANGE UP
GRAM STN FLD: SIGNIFICANT CHANGE UP
HCT VFR BLD CALC: 23 % — LOW (ref 39–50)
HCT VFR BLD CALC: 23 % — LOW (ref 39–50)
HGB BLD-MCNC: 8.1 G/DL — LOW (ref 13–17)
HGB BLD-MCNC: 8.1 G/DL — LOW (ref 13–17)
INR BLD: 1.18 RATIO — SIGNIFICANT CHANGE UP (ref 0.85–1.18)
INR BLD: 1.18 RATIO — SIGNIFICANT CHANGE UP (ref 0.85–1.18)
MAGNESIUM SERPL-MCNC: 2.2 MG/DL — SIGNIFICANT CHANGE UP (ref 1.6–2.6)
MAGNESIUM SERPL-MCNC: 2.2 MG/DL — SIGNIFICANT CHANGE UP (ref 1.6–2.6)
MCHC RBC-ENTMCNC: 28.8 PG — SIGNIFICANT CHANGE UP (ref 27–34)
MCHC RBC-ENTMCNC: 28.8 PG — SIGNIFICANT CHANGE UP (ref 27–34)
MCHC RBC-ENTMCNC: 35.2 GM/DL — SIGNIFICANT CHANGE UP (ref 32–36)
MCHC RBC-ENTMCNC: 35.2 GM/DL — SIGNIFICANT CHANGE UP (ref 32–36)
MCV RBC AUTO: 81.9 FL — SIGNIFICANT CHANGE UP (ref 80–100)
MCV RBC AUTO: 81.9 FL — SIGNIFICANT CHANGE UP (ref 80–100)
NIGHT BLUE STAIN TISS: SIGNIFICANT CHANGE UP
NIGHT BLUE STAIN TISS: SIGNIFICANT CHANGE UP
NRBC # BLD: 0 /100 WBCS — SIGNIFICANT CHANGE UP (ref 0–0)
NRBC # BLD: 0 /100 WBCS — SIGNIFICANT CHANGE UP (ref 0–0)
PHOSPHATE SERPL-MCNC: 3.4 MG/DL — SIGNIFICANT CHANGE UP (ref 2.5–4.5)
PHOSPHATE SERPL-MCNC: 3.4 MG/DL — SIGNIFICANT CHANGE UP (ref 2.5–4.5)
PLATELET # BLD AUTO: 390 K/UL — SIGNIFICANT CHANGE UP (ref 150–400)
PLATELET # BLD AUTO: 390 K/UL — SIGNIFICANT CHANGE UP (ref 150–400)
POTASSIUM SERPL-MCNC: 3.6 MMOL/L — SIGNIFICANT CHANGE UP (ref 3.5–5.3)
POTASSIUM SERPL-MCNC: 3.6 MMOL/L — SIGNIFICANT CHANGE UP (ref 3.5–5.3)
POTASSIUM SERPL-SCNC: 3.6 MMOL/L — SIGNIFICANT CHANGE UP (ref 3.5–5.3)
POTASSIUM SERPL-SCNC: 3.6 MMOL/L — SIGNIFICANT CHANGE UP (ref 3.5–5.3)
PROTHROM AB SERPL-ACNC: 12.3 SEC — SIGNIFICANT CHANGE UP (ref 9.5–13)
PROTHROM AB SERPL-ACNC: 12.3 SEC — SIGNIFICANT CHANGE UP (ref 9.5–13)
RBC # BLD: 2.81 M/UL — LOW (ref 4.2–5.8)
RBC # BLD: 2.81 M/UL — LOW (ref 4.2–5.8)
RBC # FLD: 15.1 % — HIGH (ref 10.3–14.5)
RBC # FLD: 15.1 % — HIGH (ref 10.3–14.5)
SODIUM SERPL-SCNC: 134 MMOL/L — LOW (ref 135–145)
SODIUM SERPL-SCNC: 134 MMOL/L — LOW (ref 135–145)
SPECIMEN SOURCE: SIGNIFICANT CHANGE UP
VANCOMYCIN TROUGH SERPL-MCNC: 7 UG/ML — LOW (ref 10–20)
VANCOMYCIN TROUGH SERPL-MCNC: 7 UG/ML — LOW (ref 10–20)
WBC # BLD: 18.95 K/UL — HIGH (ref 3.8–10.5)
WBC # BLD: 18.95 K/UL — HIGH (ref 3.8–10.5)
WBC # FLD AUTO: 18.95 K/UL — HIGH (ref 3.8–10.5)
WBC # FLD AUTO: 18.95 K/UL — HIGH (ref 3.8–10.5)

## 2024-01-11 PROCEDURE — 71045 X-RAY EXAM CHEST 1 VIEW: CPT | Mod: 26,76

## 2024-01-11 PROCEDURE — 99222 1ST HOSP IP/OBS MODERATE 55: CPT

## 2024-01-11 PROCEDURE — 99291 CRITICAL CARE FIRST HOUR: CPT | Mod: GC

## 2024-01-11 PROCEDURE — 88300 SURGICAL PATH GROSS: CPT | Mod: 26

## 2024-01-11 PROCEDURE — 71045 X-RAY EXAM CHEST 1 VIEW: CPT | Mod: 26,77,76

## 2024-01-11 PROCEDURE — 99231 SBSQ HOSP IP/OBS SF/LOW 25: CPT

## 2024-01-11 PROCEDURE — 36556 INSERT NON-TUNNEL CV CATH: CPT | Mod: GC

## 2024-01-11 RX ORDER — HYDROMORPHONE HYDROCHLORIDE 2 MG/ML
0.5 INJECTION INTRAMUSCULAR; INTRAVENOUS; SUBCUTANEOUS
Refills: 0 | Status: DISCONTINUED | OUTPATIENT
Start: 2024-01-11 | End: 2024-01-12

## 2024-01-11 RX ORDER — HYDROMORPHONE HYDROCHLORIDE 2 MG/ML
0.5 INJECTION INTRAMUSCULAR; INTRAVENOUS; SUBCUTANEOUS ONCE
Refills: 0 | Status: DISCONTINUED | OUTPATIENT
Start: 2024-01-11 | End: 2024-01-11

## 2024-01-11 RX ORDER — VANCOMYCIN HCL 1 G
1250 VIAL (EA) INTRAVENOUS EVERY 12 HOURS
Refills: 0 | Status: DISCONTINUED | OUTPATIENT
Start: 2024-01-11 | End: 2024-01-12

## 2024-01-11 RX ORDER — MIDAZOLAM HYDROCHLORIDE 1 MG/ML
2 INJECTION, SOLUTION INTRAMUSCULAR; INTRAVENOUS ONCE
Refills: 0 | Status: DISCONTINUED | OUTPATIENT
Start: 2024-01-11 | End: 2024-01-11

## 2024-01-11 RX ORDER — FENTANYL CITRATE 50 UG/ML
100 INJECTION INTRAVENOUS ONCE
Refills: 0 | Status: DISCONTINUED | OUTPATIENT
Start: 2024-01-11 | End: 2024-01-11

## 2024-01-11 RX ORDER — VANCOMYCIN HCL 1 G
1000 VIAL (EA) INTRAVENOUS EVERY 8 HOURS
Refills: 0 | Status: DISCONTINUED | OUTPATIENT
Start: 2024-01-11 | End: 2024-01-11

## 2024-01-11 RX ORDER — PHENOBARBITAL 60 MG
130 TABLET ORAL EVERY 6 HOURS
Refills: 0 | Status: DISCONTINUED | OUTPATIENT
Start: 2024-01-11 | End: 2024-01-13

## 2024-01-11 RX ORDER — CALCIUM GLUCONATE 100 MG/ML
2 VIAL (ML) INTRAVENOUS ONCE
Refills: 0 | Status: COMPLETED | OUTPATIENT
Start: 2024-01-11 | End: 2024-01-11

## 2024-01-11 RX ORDER — SODIUM CHLORIDE 9 MG/ML
500 INJECTION, SOLUTION INTRAVENOUS ONCE
Refills: 0 | Status: COMPLETED | OUTPATIENT
Start: 2024-01-11 | End: 2024-01-11

## 2024-01-11 RX ORDER — DEXMEDETOMIDINE HYDROCHLORIDE IN 0.9% SODIUM CHLORIDE 4 UG/ML
1 INJECTION INTRAVENOUS
Qty: 200 | Refills: 0 | Status: DISCONTINUED | OUTPATIENT
Start: 2024-01-11 | End: 2024-01-17

## 2024-01-11 RX ORDER — PHENOBARBITAL 60 MG
130 TABLET ORAL ONCE
Refills: 0 | Status: DISCONTINUED | OUTPATIENT
Start: 2024-01-11 | End: 2024-01-11

## 2024-01-11 RX ORDER — PHENOBARBITAL 60 MG
260 TABLET ORAL ONCE
Refills: 0 | Status: DISCONTINUED | OUTPATIENT
Start: 2024-01-11 | End: 2024-01-11

## 2024-01-11 RX ORDER — POTASSIUM CHLORIDE 20 MEQ
10 PACKET (EA) ORAL
Refills: 0 | Status: COMPLETED | OUTPATIENT
Start: 2024-01-11 | End: 2024-01-11

## 2024-01-11 RX ORDER — PHENYLEPHRINE HYDROCHLORIDE 10 MG/ML
0.3 INJECTION INTRAVENOUS
Qty: 160 | Refills: 0 | Status: DISCONTINUED | OUTPATIENT
Start: 2024-01-11 | End: 2024-01-12

## 2024-01-11 RX ORDER — KETOROLAC TROMETHAMINE 30 MG/ML
15 SYRINGE (ML) INJECTION EVERY 6 HOURS
Refills: 0 | Status: DISCONTINUED | OUTPATIENT
Start: 2024-01-11 | End: 2024-01-12

## 2024-01-11 RX ADMIN — Medication 130 MILLIGRAM(S): at 23:05

## 2024-01-11 RX ADMIN — Medication 400 MILLIGRAM(S): at 10:15

## 2024-01-11 RX ADMIN — SODIUM CHLORIDE 2000 MILLILITER(S): 9 INJECTION, SOLUTION INTRAVENOUS at 02:05

## 2024-01-11 RX ADMIN — Medication 200 GRAM(S): at 06:11

## 2024-01-11 RX ADMIN — MIDAZOLAM HYDROCHLORIDE 2 MILLIGRAM(S): 1 INJECTION, SOLUTION INTRAMUSCULAR; INTRAVENOUS at 17:24

## 2024-01-11 RX ADMIN — Medication 15 MILLIGRAM(S): at 01:05

## 2024-01-11 RX ADMIN — Medication 500 MILLIGRAM(S): at 11:32

## 2024-01-11 RX ADMIN — PROPOFOL 16.5 MICROGRAM(S)/KG/MIN: 10 INJECTION, EMULSION INTRAVENOUS at 00:32

## 2024-01-11 RX ADMIN — Medication 166.67 MILLIGRAM(S): at 18:49

## 2024-01-11 RX ADMIN — CHLORHEXIDINE GLUCONATE 15 MILLILITER(S): 213 SOLUTION TOPICAL at 17:35

## 2024-01-11 RX ADMIN — PROPOFOL 16.5 MICROGRAM(S)/KG/MIN: 10 INJECTION, EMULSION INTRAVENOUS at 06:10

## 2024-01-11 RX ADMIN — SENNA PLUS 10 MILLILITER(S): 8.6 TABLET ORAL at 23:00

## 2024-01-11 RX ADMIN — Medication 130 MILLIGRAM(S): at 06:11

## 2024-01-11 RX ADMIN — ENOXAPARIN SODIUM 40 MILLIGRAM(S): 100 INJECTION SUBCUTANEOUS at 23:00

## 2024-01-11 RX ADMIN — DEXMEDETOMIDINE HYDROCHLORIDE IN 0.9% SODIUM CHLORIDE 22.9 MICROGRAM(S)/KG/HR: 4 INJECTION INTRAVENOUS at 20:18

## 2024-01-11 RX ADMIN — MUPIROCIN 1 APPLICATION(S): 20 OINTMENT TOPICAL at 06:14

## 2024-01-11 RX ADMIN — Medication 100 MILLIEQUIVALENT(S): at 10:21

## 2024-01-11 RX ADMIN — MIDAZOLAM HYDROCHLORIDE 2 MILLIGRAM(S): 1 INJECTION, SOLUTION INTRAMUSCULAR; INTRAVENOUS at 14:30

## 2024-01-11 RX ADMIN — Medication 130 MILLIGRAM(S): at 18:00

## 2024-01-11 RX ADMIN — HYDROMORPHONE HYDROCHLORIDE 0.5 MILLIGRAM(S): 2 INJECTION INTRAMUSCULAR; INTRAVENOUS; SUBCUTANEOUS at 01:55

## 2024-01-11 RX ADMIN — Medication 15 MILLILITER(S): at 11:32

## 2024-01-11 RX ADMIN — Medication 166.67 MILLIGRAM(S): at 06:11

## 2024-01-11 RX ADMIN — Medication 130 MILLIGRAM(S): at 14:30

## 2024-01-11 RX ADMIN — CHLORHEXIDINE GLUCONATE 15 MILLILITER(S): 213 SOLUTION TOPICAL at 06:11

## 2024-01-11 RX ADMIN — Medication 100 MILLIEQUIVALENT(S): at 08:59

## 2024-01-11 RX ADMIN — MUPIROCIN 1 APPLICATION(S): 20 OINTMENT TOPICAL at 17:35

## 2024-01-11 RX ADMIN — PHENYLEPHRINE HYDROCHLORIDE 6.88 MICROGRAM(S)/KG/MIN: 10 INJECTION INTRAVENOUS at 08:07

## 2024-01-11 RX ADMIN — PROPOFOL 16.5 MICROGRAM(S)/KG/MIN: 10 INJECTION, EMULSION INTRAVENOUS at 08:07

## 2024-01-11 RX ADMIN — Medication 130 MILLIGRAM(S): at 17:25

## 2024-01-11 RX ADMIN — PHENYLEPHRINE HYDROCHLORIDE 5.16 MICROGRAM(S)/KG/MIN: 10 INJECTION INTRAVENOUS at 21:19

## 2024-01-11 RX ADMIN — LIDOCAINE 1 PATCH: 4 CREAM TOPICAL at 11:00

## 2024-01-11 RX ADMIN — Medication 400 MILLIGRAM(S): at 19:17

## 2024-01-11 RX ADMIN — Medication 130 MILLIGRAM(S): at 10:14

## 2024-01-11 RX ADMIN — Medication 260 MILLIGRAM(S): at 19:37

## 2024-01-11 RX ADMIN — HYDROMORPHONE HYDROCHLORIDE 0.5 MILLIGRAM(S): 2 INJECTION INTRAMUSCULAR; INTRAVENOUS; SUBCUTANEOUS at 01:40

## 2024-01-11 RX ADMIN — PANTOPRAZOLE SODIUM 40 MILLIGRAM(S): 20 TABLET, DELAYED RELEASE ORAL at 11:32

## 2024-01-11 RX ADMIN — DEXMEDETOMIDINE HYDROCHLORIDE IN 0.9% SODIUM CHLORIDE 22.9 MICROGRAM(S)/KG/HR: 4 INJECTION INTRAVENOUS at 10:15

## 2024-01-11 RX ADMIN — PIPERACILLIN AND TAZOBACTAM 25 GRAM(S): 4; .5 INJECTION, POWDER, LYOPHILIZED, FOR SOLUTION INTRAVENOUS at 06:12

## 2024-01-11 RX ADMIN — PIPERACILLIN AND TAZOBACTAM 25 GRAM(S): 4; .5 INJECTION, POWDER, LYOPHILIZED, FOR SOLUTION INTRAVENOUS at 22:59

## 2024-01-11 RX ADMIN — CHLORHEXIDINE GLUCONATE 1 APPLICATION(S): 213 SOLUTION TOPICAL at 06:11

## 2024-01-11 RX ADMIN — Medication 1 MILLIGRAM(S): at 11:32

## 2024-01-11 RX ADMIN — LIDOCAINE 1 PATCH: 4 CREAM TOPICAL at 23:13

## 2024-01-11 RX ADMIN — FENTANYL CITRATE 100 MICROGRAM(S): 50 INJECTION INTRAVENOUS at 18:00

## 2024-01-11 RX ADMIN — Medication 100 MILLIEQUIVALENT(S): at 08:08

## 2024-01-11 RX ADMIN — Medication 130 MILLIGRAM(S): at 11:32

## 2024-01-11 RX ADMIN — PIPERACILLIN AND TAZOBACTAM 25 GRAM(S): 4; .5 INJECTION, POWDER, LYOPHILIZED, FOR SOLUTION INTRAVENOUS at 14:34

## 2024-01-11 RX ADMIN — HYDROMORPHONE HYDROCHLORIDE 0.5 MILLIGRAM(S): 2 INJECTION INTRAMUSCULAR; INTRAVENOUS; SUBCUTANEOUS at 19:37

## 2024-01-11 RX ADMIN — SODIUM CHLORIDE 1000 MILLILITER(S): 9 INJECTION, SOLUTION INTRAVENOUS at 04:33

## 2024-01-11 RX ADMIN — HYDROMORPHONE HYDROCHLORIDE 0.5 MILLIGRAM(S): 2 INJECTION INTRAMUSCULAR; INTRAVENOUS; SUBCUTANEOUS at 19:52

## 2024-01-11 RX ADMIN — Medication 130 MILLIGRAM(S): at 19:05

## 2024-01-11 RX ADMIN — Medication 1000 MILLIGRAM(S): at 11:45

## 2024-01-11 RX ADMIN — Medication 130 MILLIGRAM(S): at 17:35

## 2024-01-11 RX ADMIN — PREGABALIN 1000 MICROGRAM(S): 225 CAPSULE ORAL at 11:32

## 2024-01-11 RX ADMIN — PHENYLEPHRINE HYDROCHLORIDE 6.88 MICROGRAM(S)/KG/MIN: 10 INJECTION INTRAVENOUS at 06:10

## 2024-01-11 RX ADMIN — LIDOCAINE 1 PATCH: 4 CREAM TOPICAL at 07:00

## 2024-01-11 NOTE — PROCEDURE NOTE - NSPROCDETAILS_GEN_ALL_CORE
location identified, draped/prepped, sterile technique used/sterile dressing applied/sterile technique, catheter placed/ultrasound guidance
location identified, draped/prepped, sterile technique used, needle inserted/introduced/positive blood return obtained via catheter/connected to a pressurized flush line/sutured in place/hemostasis with direct pressure, dressing applied/Seldinger technique/all materials/supplies accounted for at end of procedure
guidewire recovered/lumen(s) aspirated and flushed/sterile dressing applied/sterile technique, catheter placed/ultrasound guidance with use of sterile gel and probe cove

## 2024-01-11 NOTE — OCCUPATIONAL THERAPY INITIAL EVALUATION ADULT - GENERAL OBSERVATIONS, REHAB EVAL
pt received supine in bed +IV, +mccormack, +b/l wrist restraints, +ETT, +ICU Monitor, +chest tube pt received supine in bed +IV,  +ICU Monitor

## 2024-01-11 NOTE — PROGRESS NOTE ADULT - ASSESSMENT
This is a  56yo M w pmhx of Hep C and possible EtOH use disorder who presents after being found down. Intubated for respiratory distress and agitation 1/8. Pt has displaced 4-11 L rib fx with flail segment.     1/9 VSS: NPO after MN. Plan for Rib plating WED 1/10/24  1/10 underwent VATS, with partial lung decortication  Thoracoscopic removal of intrapleural foreign body    1/11VSS intubated sedated on pressors  chest tube lws

## 2024-01-11 NOTE — OCCUPATIONAL THERAPY INITIAL EVALUATION ADULT - ORIENTATION, REHAB EVAL
not following commands, ETT/unable to assess oriented to person, place, time and situation/unable to assess

## 2024-01-11 NOTE — OCCUPATIONAL THERAPY INITIAL EVALUATION ADULT - NS ASR FOLLOW COMMAND OT EVAL
25% of the time/able to follow single-step instructions 75% of the time/able to follow single-step instructions

## 2024-01-11 NOTE — PROGRESS NOTE ADULT - PROBLEM SELECTOR PLAN 1
Suspected fracture of rib on left side, closed.   ·  Plan: Left Rib Fx 4-11th /Hemothorax  1/10 underwent VATS, with partial lung decortication  Thoracoscopic removal of intrapleural foreign body    daily xray    maintain chest  tube lws until 1/13  care as  per SICU team

## 2024-01-11 NOTE — OCCUPATIONAL THERAPY INITIAL EVALUATION ADULT - PERTINENT HX OF CURRENT PROBLEM, REHAB EVAL
48y Male with PMHx hepatitis C (diagnosed many years ago, never treated), depression transferred from outside hospital for trauma eval. Patient found down by roommate after unknown amount of time, found with multiple 4-11 L sided rib fractures with flail chest. Chest tube placed there with >1L output (old blood). Patient also with tachypnea, concern for respiratory failure started on BiPAP. Level 1 called for transient hypotension, patient given 2 unit PRBC in [facility] ED. 1/9 VSS: NPO after MN. Plan for Rib plating WED 1/10/24 1/10 underwent VATS, with partial lung decortication  Thoracoscopic removal of intrapleural foreign body 1/11VSS intubated sedated on pressors  chest tube lws. 48y Male with PMHx hepatitis C (diagnosed many years ago, never treated), depression transferred from outside hospital for trauma eval. Patient found down by roommate after unknown amount of time, found with multiple 4-11 L sided rib fractures with flail chest. Chest tube placed there with >1L output (old blood). Patient also with tachypnea, concern for respiratory failure started on BiPAP. Level 1 called for transient hypotension, patient given 2 unit PRBC in [facility] ED. 1/9 VSS: NPO after MN. Plan for Rib plating WED 1/10/24 1/10 underwent VATS, with partial lung decortication  Thoracoscopic removal of intrapleural foreign body 1/11VSS intubated sedated on pressors  chest tube lws.

## 2024-01-11 NOTE — CHART NOTE - NSCHARTNOTEFT_GEN_A_CORE
Nutrition Follow Up Note  Patient seen for: verbal consult - TF recommendations     Chart reviewed, events noted.    Source: Electronic Medical Record, Team (Rounds)     Diet Order:   Diet, NPO with Tube Feed:   Tube Feeding Modality: Orogastric  [place] [place] Peptide 1.5  Total Volume for 24 Hours (mL): 1440  Continuous  Starting Tube Feed Rate {mL per Hour}: 60  Until Goal Tube Feed Rate (mL per Hour): 60  Tube Feed Duration (in Hours): 24  Tube Feed Start Time: 11:00  No Carb Prosource TF     Qty per Day:  1 (24)    Nutrition-related events:  -EN: TF on hold  pending placement of OGT; noted with <50% of estimated needs x >5 days   -GI: last BM documented 1/10; bowel regimen ordered (Miralax, Senna); concern for ETOH cirrhosis   -Renal: hyponatremic   -Neuro: sedated on Precedex; Propofol d/c'ed this morning   -Resp: intubated; chest tube in place   -CV: on Yonathan for pressor support       Weights:   Daily Weight in k.3 (01-10), Weight in k.8 ()    Nutritionally Pertinent MEDICATIONS  (STANDING):  ascorbic acid  cyanocobalamin  folic acid  multivitamin/minerals/iron Oral Solution (CENTRUM)  pantoprazole  Injectable  phenylephrine    Infusion  piperacillin/tazobactam IVPB..  polyethylene glycol 3350  senna Syrup  vancomycin  IVPB    Pertinent Labs:  @ 04:43: Na 134<L>, BUN 8, Cr 0.58, <H>, K+ 3.6, Phos 3.4, Mg 2.2, Alk Phos --, ALT/SGPT --, AST/SGOT --, HbA1c --  01-10 @ 16:29: Na 132<L>, BUN 12, Cr 0.57, <H>, K+ 3.9, Phos 4.4, Mg 1.9, Alk Phos --, ALT/SGPT --, AST/SGOT --, HbA1c --      Finger Sticks:  POCT Blood Glucose.: 93 mg/dL ( @ 03:48)      (Per flowsheet)  Pressure Injuries:  -R ischium: unstageable    Edema:   -1+ generalized   -2+ L/R foot     Estimated Needs: based on 91.6kg   [x] no change since previous assessment  -Energy: 23-28kcal/kg (2106-2564kcal/day)   -Protein: 1.2-1.4g/kg (110-128g/day)   -[place]: 2493kcal/day   -Defer fluids to Team     Previous Nutrition Diagnosis:   1) Increased protein-energy needs   Nutrition Diagnosis is: ongoing     New Nutrition Diagnosis:   P: Severe malnutrition in the context of acute illness   E: related to inadequate protein-energy intake in the setting of AMS  S: as evidenced by <50% intake x >5 days; moderate edema   Goal: Pt to receive >80% of estimated needs     Nutrition Care Plan:  [x] In Progress  [] Achieved  [] Not applicable    Nutrition Interventions:     Education Provided:       [] Yes:  [x] No:     Recommendations:      1) Propofol d/c'ed, recommend Lisseth [place] Peptide 1.5 @ 60mLx 24hrs 1440mL of formula, 2214kcal/day (24kcal/kg), 106g protein/day (1.15g/kg), 1008mL of free water - based on 91.6kg   -Add Porsource TF Free x1/day: additional 90kcal/day, 15g protein = 2304kcal/day (25kcal/kg), 121g protein/day (1.3g/kg)  -Defer free water to Team   2) Continue folic acid, thiamine, multivitamin, Vitamin C, B12  3) Malnutrition sticker placed      Monitoring and Evaluation:   Continue to monitor nutritional intake, tolerance to diet prescription, weights, labs, skin integrity      RD remains available upon request and will follow up per protocol    Estelita Javier, MS, RDN, CDN (Teams)

## 2024-01-11 NOTE — CHART NOTE - NSCHARTNOTEFT_GEN_A_CORE
Patient was measured and dispensed a TLSO rigid posterior panel extending from the sacrococcygeal junction to the scapular spine. Due to the multiple rib fractures and flail chest a soft apron front that will not impact the ribs was chosen. The orthosis will stabilize and control the spine, reduce pain and ROM and assist in the healing process. Contact info was left bedside. All went without incident.  [facility]  [phone] Patient was measured and dispensed a TLSO rigid posterior panel extending from the sacrococcygeal junction to the scapular spine. Due to the multiple rib fractures and flail chest a soft apron front that will not impact the ribs was chosen. The orthosis will stabilize and control the spine, reduce pain and ROM and assist in the healing process. Contact info was left bedside. All went without incident.  [facility]  [phone]

## 2024-01-11 NOTE — PROGRESS NOTE ADULT - SUBJECTIVE AND OBJECTIVE BOX
subjective  'DYAN  intubated sedated'    Vital Signs Last 24 Hrs  T(C): 37.9 (01-11-24 @ 07:00), Max: 38.9 (01-10-24 @ 21:00)  T(F): 100.2 (01-11-24 @ 07:00), Max: 102 (01-10-24 @ 21:00)  HR: 101 (01-11-24 @ 09:45) (69 - 109)  BP: 125/62 (01-11-24 @ 07:00) (90/56 - 125/62)  RR: 31 (01-11-24 @ 09:45) (19 - 44)  SpO2: 100% (01-11-24 @ 09:45) (66% - 100%)           01-10 @ 07:01  -  01-11 @ 07:00  --------------------------------------------------------  IN: 3218.5 mL / OUT: 1280 mL / NET: 1938.5 mL    01-11 @ 07:01 - 01-11 @ 10:31  --------------------------------------------------------  IN: 241.8 mL / OUT: 85 mL / NET: 156.8 mL                          8.1    18.95 )-----------( 390      ( 11 Jan 2024 04:44 )             23.0       01-11    134<L>  |  101  |  8   ----------------------------<  100<H>  3.6   |  24  |  0.58    Ca    7.4<L>      11 Jan 2024 04:43  Phos  3.4     01-11  Mg     2.2     01-11      MEDICATIONS  (STANDING):  ascorbic acid 500 milliGRAM(s) Oral daily  chlorhexidine 0.12% Liquid 15 milliLiter(s) Oral Mucosa every 12 hours  chlorhexidine 2% Cloths 1 Application(s) Topical <User Schedule>  cyanocobalamin 1000 MICROGram(s) Oral daily  dexMEDEtomidine Infusion 1 MICROgram(s)/kG/Hr (22.9 mL/Hr) IV Continuous <Continuous>  enoxaparin Injectable 40 milliGRAM(s) SubCutaneous every 24 hours  folic acid 1 milliGRAM(s) Enteral Tube daily  influenza   Vaccine 0.5 milliLiter(s) IntraMuscular once  lidocaine   4% Patch 1 Patch Transdermal every 24 hours  multivitamin/minerals/iron Oral Solution (CENTRUM) 15 milliLiter(s) Enteral Tube daily  mupirocin 2% Nasal 1 Application(s) Both Nostrils two times a day  pantoprazole  Injectable 40 milliGRAM(s) IV Push daily  PHENobarbital Injectable 130 milliGRAM(s) IV Push every 6 hours  phenylephrine    Infusion 0.2 MICROgram(s)/kG/Min (6.88 mL/Hr) IV Continuous <Continuous>  piperacillin/tazobactam IVPB.. 3.375 Gram(s) IV Intermittent every 8 hours  polyethylene glycol 3350 17 Gram(s) Oral every 12 hours  senna Syrup 10 milliLiter(s) Oral every 24 hours  vancomycin  IVPB 1250 milliGRAM(s) IV Intermittent every 12 hours    MEDICATIONS  (PRN):  acetaminophen   IVPB .. 1000 milliGRAM(s) IV Intermittent every 6 hours PRN Temp greater or equal to 38C (100.4F), Mild Pain (1 - 3)  HYDROmorphone  Injectable 0.5 milliGRAM(s) IV Push every 3 hours PRN Breakthrough  ketorolac   Injectable 15 milliGRAM(s) IV Push every 6 hours PRN Mild Pain (1 - 3), fever  PHENobarbital Injectable 130 milliGRAM(s) IV Push every 6 hours PRN Agitation      Mode: AC/ CMV (Assist Control/ Continuous Mandatory Ventilation)  RR (machine): 14  TV (machine): 500  PEEP: 5  ITime: 0.9  MAP: 12  PIP: 23        CAPILLARY BLOOD GLUCOSE      POCT Blood Glucose.: 93 mg/dL (11 Jan 2024 03:48)          Drains:     MS         [  ] Drainage:                 L Pleural  [  x]  Drainage:   LWS                              PHYSICAL EXAM        Neurology: intubate /sedated  nonfocal, no gross deficits    CV :S1S2    Lungs: mechanical breath sounds on full vent support   left pleural  CT   maintain suction    until 1/12  Abdomen: soft, nontender, nondistended, positive bowel sounds, last bowel movement     :   mccormack             Extremities arm well perfused                                            Physical Therapy Rec:       Discussed with Cardiothoracic Team at AM rounds.

## 2024-01-11 NOTE — PROGRESS NOTE ADULT - CRITICAL CARE ATTENDING COMMENT
Dr. Garcia (Attending Physician)  N - Alcohol Withdrawal increased phenobarb for agitation post intubation  P - acute resp failure with hypoxia, failed SBT, Flail chest, left chest tube with serosanguinous output, no flail chest, empyema s/p VATS washout yesterday with foreign body glove identified  C - Phenylephrine 0.9, will place central line  GI - TFs increase to goal   - positive 2 liters volume status yesterday,   H - lovenox for dvt ppx  ID - vancomycin for infected chest tube site with glove foreign body identified  E - glucose well controlled, dc'd FS  PPx - ppi  Lines - radial richard, will place TLC today  Dispo - SICU, full code

## 2024-01-11 NOTE — PROGRESS NOTE ADULT - ASSESSMENT
48y Male with PMHx hepatitis C (diagnosed many years ago, never treated), depression transferred from outside hospital for trauma eval. Patient found down by roommate after unknown amount of time, found with multiple 4-11 L sided rib fractures with flail chest. Chest tube placed there with >1L output (old blood). Patient also with tachypnea, concern for respiratory failure started on BiPAP. Level 1 called for transient hypotension, patient given 2 unit PRBC in [facility] ED. Rib score 3. Pt now s/p VATS, with partial lung decortication and Thoracoscopic removal of intrapleural foreign body.    Injuries:   - left posterolateral fourth, fifth, sixth, and 11th ribs, minimally displaced   - mildly displaced fractures of the left anterolateral fourth, fifth, sixth, and seventh ribs  - displaced fractures of the left posterior seventh, eighth, ninth, and 10th ribs.    PLAN  - Rec getting a-line  - NPO/IVF/Tube feeds  - Wean vent and pressors as tolerated  - Rib fracture protocol   - No rib plate performed  - ISS  - c/w chest tube  - Appreciate excellent SICU care    ACS/Trauma  [id number]   48y Male with PMHx hepatitis C (diagnosed many years ago, never treated), depression transferred from outside hospital for trauma eval. Patient found down by roommate after unknown amount of time, found with multiple 4-11 L sided rib fractures with flail chest. Chest tube placed there with >1L output (old blood). Patient also with tachypnea, concern for respiratory failure started on BiPAP. Level 1 called for transient hypotension, patient given 2 unit PRBC in [facility] ED. Rib score 3. Pt now s/p VATS, with partial lung decortication and Thoracoscopic removal of intrapleural foreign body.    Injuries:   - left posterolateral fourth, fifth, sixth, and 11th ribs, minimally displaced   - mildly displaced fractures of the left anterolateral fourth, fifth, sixth, and seventh ribs  - displaced fractures of the left posterior seventh, eighth, ninth, and 10th ribs.    PLAN  - Rec getting a-line  - NPO/IVF/Tube feeds  - Wean vent and pressors as tolerated  - Rib fracture protocol   - No rib plate performed  - ISS  - c/w chest tube  - Appreciate excellent SICU care    ACS/Trauma  [id number]

## 2024-01-11 NOTE — PROCEDURAL SAFETY CHECKLIST WITH OR WITHOUT SEDATION - NSCORRECTPOSIT4PROC_GEN_ALL_CORE
done Rhombic Flap Text: The defect edges were debeveled with a #15 scalpel blade.  Given the location of the defect and the proximity to free margins a rhombic flap was deemed most appropriate.  Using a sterile surgical marker, an appropriate rhombic flap was drawn incorporating the defect.    The area thus outlined was incised deep to adipose tissue with a #15 scalpel blade.  The skin margins were undermined to an appropriate distance in all directions utilizing iris scissors.

## 2024-01-11 NOTE — PROGRESS NOTE ADULT - ASSESSMENT
ASSESSMENT: 48y Male with PMHx hepatitis C (diagnosed many years ago, never treated), depression transferred from outside hospital for trauma eval. Patient found down by roommate after unknown amount of time, found with multiple 4-11 L sided rib fractures with flail chest. Chest tube placed there with >1L output (old blood). Patient also with tachypnea, concern for respiratory failure started on BiPAP. Level 1 called for transient hypotension, patient given 2 unit PRBC in [facility] ED. Patient admitted to SICU for hemodynamic monitoring, pain management.     1/10 VATS  with discovery of Fibropurulent exudative adhesions. Further dissection revealed a piece of retained foreign material likely the tip of a glove, s/p washout, with new 28f chest tube placed.     Neurologic:  - Intubated sedated on propofol  - Multimodal pain control with Tylenol, Lidocaine patch, oxycodone PRN  - Dilaudid for breakthrough pain  - Send urine tox  - Concern for history of ETOH use, not actively intoxicated, monitor for signs of withdrawal   -precedex for agitation , ativan PRN as needed   -EEG negative prelim read  -phenobarb PRN ETOH withdrawal       Respiratory:  - Intubated 1/8 PRVC 500/14/50/5  - L sided chest tube placed at outside hospital, water seal-am xray p  - Incentive spirometry  - AM CXR  -new left sided chest tube set to suction    Cardiovascular:  - Sinus tachycardia rate low 100s, ?multifactorial, monitor  - BP stable  - Send lactate     Gastrointestinal/Nutrition:  - Regular diet  - Bowel regimen with senna, Miralax    Genitourinary/Renal:  - Creatinine normalized, electrolytes improved   - CPK cleared  - Supplement electrolytes PRN  -hypokalemia, hyponatremia  -hypovolemic post op gave 500 LR bolus    Hematologic:  - Chemical VTE ppx with Lovenox  - Mechanical VTE ppx with SCDs    Infectious Disease:  -Zosyn 1/7-  -vanc 1/10  - Procal 5.27  - Send UA, blood cultures x 2      Endocrine:  - No active issues, no history of DM    Disposition: SICU.     ASSESSMENT: 48y Male with PMHx hepatitis C (diagnosed many years ago, never treated), depression transferred from outside hospital for trauma eval. Patient found down by roommate after unknown amount of time, found with multiple 4-11 L sided rib fractures with flail chest. Chest tube placed there with >1L output (old blood). Patient also with tachypnea, concern for respiratory failure started on BiPAP. Level 1 called for transient hypotension, patient given 2 unit PRBC in [facility] ED. Patient admitted to SICU for hemodynamic monitoring, pain management.     1/10 VATS  with discovery of Fibropurulent exudative adhesions. Further dissection revealed a piece of retained foreign material likely the tip of a glove, s/p washout, with new 28f chest tube placed.     Neurologic:  - Intubated sedated on propofol  - Multimodal pain control with Tylenol, Lidocaine patch, oxycodone PRN  - Dilaudid for breakthrough pain  - Send urine tox  - Concern for history of ETOH use, not actively intoxicated, monitor for signs of withdrawal   -precedex for agitation , ativan PRN as needed   -EEG negative prelim read  -phenobarb PRN ETOH withdrawal       Respiratory:  - Intubated 1/8 PRVC 500/14/50/5  - L sided chest tube placed at outside hospital, water seal-am xray p  - Incentive spirometry  - AM CXR  -new left sided chest tube set to suction    Cardiovascular:  - Sinus tachycardia rate low 100s, ?multifactorial, monitor  - BP stable  - Send lactate     Gastrointestinal/Nutrition:  - Regular diet  - Bowel regimen with senna, Miralax    Genitourinary/Renal:  - Creatinine normalized, electrolytes improved   - CPK cleared  - Supplement electrolytes PRN  -hypokalemia, hyponatremia  -hypovolemic post op gave 500 LR bolus    Hematologic:  - Chemical VTE ppx with Lovenox  - Mechanical VTE ppx with SCDs    Infectious Disease:  -Zosyn 1/7-  -vanc 1/10  - Procal 5.27  - Send UA, blood cultures x 2      Endocrine:  - No active issues, no history of DM    Disposition: SICU.

## 2024-01-11 NOTE — DIETITIAN NUTRITION RISK NOTIFICATION - TREATMENT: THE FOLLOWING DIET HAS BEEN RECOMMENDED
Diet, NPO with Tube Feed:   Tube Feeding Modality: Orogastric  Lisseth Ortiz Peptide 1.5  Total Volume for 24 Hours (mL): 1440  Continuous  Starting Tube Feed Rate {mL per Hour}: 60  Until Goal Tube Feed Rate (mL per Hour): 60  Tube Feed Duration (in Hours): 24  Tube Feed Start Time: 11:00  No Carb Prosource TF     Qty per Day:  1 (01-11-24 @ 10:25) [Active]

## 2024-01-11 NOTE — PROCEDURE NOTE - NSINDICATIONS_GEN_A_CORE
critical patient/monitoring purposes
respiratory distress
critical illness/emergency venous access/volume resuscitation
venous access

## 2024-01-11 NOTE — PROGRESS NOTE ADULT - SUBJECTIVE AND OBJECTIVE BOX
24 HOUR EVENTS:  -SBT am  -POCUS; hypovolemic collapsible IVC gave 500LR  SUBJECTIVE/ROS:  Patient seen at bedside this AM.     [ ] Due to altered mental status/intubation, subjective information were not able to be obtained from the patient. History was obtained, to the extent possible, from review of the chart and collateral sources of information.    OBJECTIVE:    VITALS:  Vital Signs Last 24 Hrs  T(C): 38.1 (11 Jan 2024 01:00), Max: 38.9 (10 Dominic 2024 21:00)  T(F): 100.6 (11 Jan 2024 01:00), Max: 102 (10 Dominic 2024 21:00)  HR: 84 (11 Jan 2024 02:00) (66 - 109)  BP: 93/51 (10 Dominic 2024 20:45) (77/48 - 115/82)  BP(mean): 69 (10 Dominic 2024 20:45) (57 - 94)  RR: 20 (11 Jan 2024 02:00) (19 - 44)  SpO2: 100% (11 Jan 2024 02:00) (66% - 100%)    Parameters below as of 10 Dominic 2024 23:00  Patient On (Oxygen Delivery Method): ventilator    O2 Concentration (%): 50  Mode: AC/ CMV (Assist Control/ Continuous Mandatory Ventilation)  RR (machine): 14  TV (machine): 500  FiO2: 40  PEEP: 5  ITime: 0.9  MAP: 12  PIP: 22    I&O's Summary    09 Jan 2024 07:01  -  10 Dominic 2024 07:00  --------------------------------------------------------  IN: 2272.8 mL / OUT: 990 mL / NET: 1282.8 mL    10 Dominic 2024 07:01  -  11 Jan 2024 02:13  --------------------------------------------------------  IN: 1436.2 mL / OUT: 1030 mL / NET: 406.2 mL        PHYSICAL EXAM:    NEURO  RASS:     GCS:     CAM ICU:  Exam: awake, alert, oriented    RESPIRATORY  Exam: no increased WOB on RA  Mechanical Ventilation: Mode: AC/ CMV (Assist Control/ Continuous Mandatory Ventilation), RR (machine): 14, RR (patient): 25, TV (machine): 500, FiO2: 40, PEEP: 5, ITime: 0.9, MAP: 12, PIP: 22    CARDIOVASCULAR  Exam: warm, well-perfused  Cardiac Rhythm: normal sinus rhythm noted on cardiac monitor    GI/NUTRITION  Exam: soft, non-distended, non-tender    GENITOURINARY  Exam:     ACCESS DEVICES:  [ ] Peripheral IV  [ ] Central Venous Line	[ ] R	[ ] L	[ ] IJ	[ ] Fem	[ ] SC	Placed:   [ ] Arterial Line		[ ] R	[ ] L	[ ] Fem	[ ] Rad	[ ] Ax	Placed:   [ ] PICC:					[ ] Mediport  [ ] Urinary Catheter, Date Placed:   [x] Necessity of urinary, arterial, and venous catheters discussed      LABS:                        8.9    20.70 )-----------( 384      ( 10 Dominic 2024 16:29 )             25.3   01-10    132<L>  |  100  |  12  ----------------------------<  116<H>  3.9   |  21<L>  |  0.57    Ca    8.4      10 Dominic 2024 16:29  Phos  4.4     01-10  Mg     1.9     01-10      CAPILLARY BLOOD GLUCOSE          MEDICATIONS:   MEDICATIONS  (STANDING):  ascorbic acid 500 milliGRAM(s) Oral daily  chlorhexidine 0.12% Liquid 15 milliLiter(s) Oral Mucosa every 12 hours  chlorhexidine 2% Cloths 1 Application(s) Topical <User Schedule>  cyanocobalamin 1000 MICROGram(s) Oral daily  enoxaparin Injectable 40 milliGRAM(s) SubCutaneous every 24 hours  folic acid 1 milliGRAM(s) Enteral Tube daily  influenza   Vaccine 0.5 milliLiter(s) IntraMuscular once  lactated ringers Bolus 500 milliLiter(s) IV Bolus once  lidocaine   4% Patch 1 Patch Transdermal every 24 hours  multivitamin/minerals/iron Oral Solution (CENTRUM) 15 milliLiter(s) Enteral Tube daily  mupirocin 2% Nasal 1 Application(s) Both Nostrils two times a day  pantoprazole  Injectable 40 milliGRAM(s) IV Push daily  PHENobarbital Injectable 130 milliGRAM(s) IV Push every 6 hours  phenylephrine    Infusion 0.2 MICROgram(s)/kG/Min (6.88 mL/Hr) IV Continuous <Continuous>  piperacillin/tazobactam IVPB.. 3.375 Gram(s) IV Intermittent every 8 hours  polyethylene glycol 3350 17 Gram(s) Oral every 12 hours  propofol Infusion 30 MICROgram(s)/kG/Min (16.5 mL/Hr) IV Continuous <Continuous>  senna Syrup 10 milliLiter(s) Oral every 24 hours  vancomycin  IVPB 1250 milliGRAM(s) IV Intermittent every 12 hours    MEDICATIONS  (PRN):  acetaminophen   IVPB .. 1000 milliGRAM(s) IV Intermittent every 6 hours PRN Temp greater or equal to 38C (100.4F), Mild Pain (1 - 3)  HYDROmorphone  Injectable 0.5 milliGRAM(s) IV Push every 3 hours PRN Breakthrough  ketorolac   Injectable 15 milliGRAM(s) IV Push every 6 hours PRN Mild Pain (1 - 3), fever      IMAGING: 24 HOUR EVENTS:  -SBT am  -POCUS; hypovolemic POCUS  gave [id number]  SUBJECTIVE/ROS:  Patient seen at bedside this AM.     [ ] Due to altered mental status/intubation, subjective information were not able to be obtained from the patient. History was obtained, to the extent possible, from review of the chart and collateral sources of information.    OBJECTIVE:    VITALS:  Vital Signs Last 24 Hrs  T(C): 38.1 (11 Jan 2024 01:00), Max: 38.9 (10 Dominic 2024 21:00)  T(F): 100.6 (11 Jan 2024 01:00), Max: 102 (10 Dominic 2024 21:00)  HR: 84 (11 Jan 2024 02:00) (66 - 109)  BP: 93/51 (10 Dominic 2024 20:45) (77/48 - 115/82)  BP(mean): 69 (10 Dominic 2024 20:45) (57 - 94)  RR: 20 (11 Jan 2024 02:00) (19 - 44)  SpO2: 100% (11 Jan 2024 02:00) (66% - 100%)    Parameters below as of 10 Dominic 2024 23:00  Patient On (Oxygen Delivery Method): ventilator    O2 Concentration (%): 50  Mode: AC/ CMV (Assist Control/ Continuous Mandatory Ventilation)  RR (machine): 14  TV (machine): 500  FiO2: 40  PEEP: 5  ITime: 0.9  MAP: 12  PIP: 22    I&O's Summary    09 Jan 2024 07:01  -  10 Dominic 2024 07:00  --------------------------------------------------------  IN: 2272.8 mL / OUT: 990 mL / NET: 1282.8 mL    10 Dominic 2024 07:01  -  11 Jan 2024 02:13  --------------------------------------------------------  IN: 1436.2 mL / OUT: 1030 mL / NET: 406.2 mL        PHYSICAL EXAM:    NEURO  RASS:     GCS:     CAM ICU:  Exam: awake, alert, oriented    RESPIRATORY  Exam: no increased WOB on RA  Mechanical Ventilation: Mode: AC/ CMV (Assist Control/ Continuous Mandatory Ventilation), RR (machine): 14, RR (patient): 25, TV (machine): 500, FiO2: 40, PEEP: 5, ITime: 0.9, MAP: 12, PIP: 22    CARDIOVASCULAR  Exam: warm, well-perfused  Cardiac Rhythm: normal sinus rhythm noted on cardiac monitor    GI/NUTRITION  Exam: soft, non-distended, non-tender    GENITOURINARY  Exam:     ACCESS DEVICES:  [ ] Peripheral IV  [ ] Central Venous Line	[ ] R	[ ] L	[ ] IJ	[ ] Fem	[ ] SC	Placed:   [ ] Arterial Line		[ ] R	[ ] L	[ ] Fem	[ ] Rad	[ ] Ax	Placed:   [ ] PICC:					[ ] Mediport  [ ] Urinary Catheter, Date Placed:   [x] Necessity of urinary, arterial, and venous catheters discussed      LABS:                        8.9    20.70 )-----------( 384      ( 10 Dominic 2024 16:29 )             25.3   01-10    132<L>  |  100  |  12  ----------------------------<  116<H>  3.9   |  21<L>  |  0.57    Ca    8.4      10 Dominic 2024 16:29  Phos  4.4     01-10  Mg     1.9     01-10      CAPILLARY BLOOD GLUCOSE          MEDICATIONS:   MEDICATIONS  (STANDING):  ascorbic acid 500 milliGRAM(s) Oral daily  chlorhexidine 0.12% Liquid 15 milliLiter(s) Oral Mucosa every 12 hours  chlorhexidine 2% Cloths 1 Application(s) Topical <User Schedule>  cyanocobalamin 1000 MICROGram(s) Oral daily  enoxaparin Injectable 40 milliGRAM(s) SubCutaneous every 24 hours  folic acid 1 milliGRAM(s) Enteral Tube daily  influenza   Vaccine 0.5 milliLiter(s) IntraMuscular once  lactated ringers Bolus 500 milliLiter(s) IV Bolus once  lidocaine   4% Patch 1 Patch Transdermal every 24 hours  multivitamin/minerals/iron Oral Solution (CENTRUM) 15 milliLiter(s) Enteral Tube daily  mupirocin 2% Nasal 1 Application(s) Both Nostrils two times a day  pantoprazole  Injectable 40 milliGRAM(s) IV Push daily  PHENobarbital Injectable 130 milliGRAM(s) IV Push every 6 hours  phenylephrine    Infusion 0.2 MICROgram(s)/kG/Min (6.88 mL/Hr) IV Continuous <Continuous>  piperacillin/tazobactam IVPB.. 3.375 Gram(s) IV Intermittent every 8 hours  polyethylene glycol 3350 17 Gram(s) Oral every 12 hours  propofol Infusion 30 MICROgram(s)/kG/Min (16.5 mL/Hr) IV Continuous <Continuous>  senna Syrup 10 milliLiter(s) Oral every 24 hours  vancomycin  IVPB 1250 milliGRAM(s) IV Intermittent every 12 hours    MEDICATIONS  (PRN):  acetaminophen   IVPB .. 1000 milliGRAM(s) IV Intermittent every 6 hours PRN Temp greater or equal to 38C (100.4F), Mild Pain (1 - 3)  HYDROmorphone  Injectable 0.5 milliGRAM(s) IV Push every 3 hours PRN Breakthrough  ketorolac   Injectable 15 milliGRAM(s) IV Push every 6 hours PRN Mild Pain (1 - 3), fever      IMAGING: 24 HOUR EVENTS:  -SBT am  -POCUS; hypovolemic POCUS  gave [id number]  SUBJECTIVE/ROS:  Patient seen at bedside this AM.     [ ] Due to altered mental status/intubation, subjective information were not able to be obtained from the patient. History was obtained, to the extent possible, from review of the chart and collateral sources of information.    OBJECTIVE:    VITALS:  Vital Signs Last 24 Hrs  T(C): 38.1 (11 Jan 2024 01:00), Max: 38.9 (10 Dominic 2024 21:00)  T(F): 100.6 (11 Jan 2024 01:00), Max: 102 (10 Dominic 2024 21:00)  HR: 84 (11 Jan 2024 02:00) (66 - 109)  BP: 93/51 (10 Dominic 2024 20:45) (77/48 - 115/82)  BP(mean): 69 (10 Dominic 2024 20:45) (57 - 94)  RR: 20 (11 Jan 2024 02:00) (19 - 44)  SpO2: 100% (11 Jan 2024 02:00) (66% - 100%)    Parameters below as of 10 Dominic 2024 23:00  Patient On (Oxygen Delivery Method): ventilator    O2 Concentration (%): 50  Mode: AC/ CMV (Assist Control/ Continuous Mandatory Ventilation)  RR (machine): 14  TV (machine): 500  FiO2: 40  PEEP: 5  ITime: 0.9  MAP: 12  PIP: 22    I&O's Summary    09 Jan 2024 07:01  -  10 Dominic 2024 07:00  --------------------------------------------------------  IN: 2272.8 mL / OUT: 990 mL / NET: 1282.8 mL    10 Dominic 2024 07:01  -  11 Jan 2024 02:13  --------------------------------------------------------  IN: 1436.2 mL / OUT: 1030 mL / NET: 406.2 mL        PHYSICAL EXAM:    NEURO  RASS:     GCS:     CAM ICU:  Exam: awake, alert, oriented    RESPIRATORY  Exam: no increased WOB on RA  Mechanical Ventilation: Mode: AC/ CMV (Assist Control/ Continuous Mandatory Ventilation), RR (machine): 14, RR (patient): 25, TV (machine): 500, FiO2: 40, PEEP: 5, ITime: 0.9, MAP: 12, PIP: 22    CARDIOVASCULAR  Exam: warm, well-perfused  Cardiac Rhythm: normal sinus rhythm noted on cardiac monitor    GI/NUTRITION  Exam: soft, non-distended, non-tender    GENITOURINARY  Exam:     ACCESS DEVICES:  [ ] Peripheral IV  [ ] Central Venous Line	[ ] R	[ ] L	[ ] IJ	[ ] Fem	[ ] SC	Placed:   [ ] Arterial Line		[ ] R	[ ] L	[ ] Fem	[ ] Rad	[ ] Ax	Placed:   [ ] PICC:					[ ] Mediport  [ ] Urinary Catheter, Date Placed:   [x] Necessity of urinary, arterial, and venous catheters discussed      LABS:                        8.9    20.70 )-----------( 384      ( 10 Dominic 2024 16:29 )             25.3   01-10    132<L>  |  100  |  12  ----------------------------<  116<H>  3.9   |  21<L>  |  0.57    Ca    8.4      10 Dominic 2024 16:29  Phos  4.4     01-10  Mg     1.9     01-10      CAPILLARY BLOOD GLUCOSE          MEDICATIONS:   MEDICATIONS  (STANDING):  ascorbic acid 500 milliGRAM(s) Oral daily  chlorhexidine 0.12% Liquid 15 milliLiter(s) Oral Mucosa every 12 hours  chlorhexidine 2% Cloths 1 Application(s) Topical <User Schedule>  cyanocobalamin 1000 MICROGram(s) Oral daily  enoxaparin Injectable 40 milliGRAM(s) SubCutaneous every 24 hours  folic acid 1 milliGRAM(s) Enteral Tube daily  influenza   Vaccine 0.5 milliLiter(s) IntraMuscular once  lactated ringers Bolus 500 milliLiter(s) IV Bolus once  lidocaine   4% Patch 1 Patch Transdermal every 24 hours  multivitamin/minerals/iron Oral Solution (CENTRUM) 15 milliLiter(s) Enteral Tube daily  mupirocin 2% Nasal 1 Application(s) Both Nostrils two times a day  pantoprazole  Injectable 40 milliGRAM(s) IV Push daily  PHENobarbital Injectable 130 milliGRAM(s) IV Push every 6 hours  phenylephrine    Infusion 0.2 MICROgram(s)/kG/Min (6.88 mL/Hr) IV Continuous <Continuous>  piperacillin/tazobactam IVPB.. 3.375 Gram(s) IV Intermittent every 8 hours  polyethylene glycol 3350 17 Gram(s) Oral every 12 hours  propofol Infusion 30 MICROgram(s)/kG/Min (16.5 mL/Hr) IV Continuous <Continuous>  senna Syrup 10 milliLiter(s) Oral every 24 hours  vancomycin  IVPB 1250 milliGRAM(s) IV Intermittent every 12 hours    MEDICATIONS  (PRN):  acetaminophen   IVPB .. 1000 milliGRAM(s) IV Intermittent every 6 hours PRN Temp greater or equal to 38C (100.4F), Mild Pain (1 - 3)  HYDROmorphone  Injectable 0.5 milliGRAM(s) IV Push every 3 hours PRN Breakthrough  ketorolac   Injectable 15 milliGRAM(s) IV Push every 6 hours PRN Mild Pain (1 - 3), fever      IMAGING:

## 2024-01-11 NOTE — PROGRESS NOTE ADULT - SUBJECTIVE AND OBJECTIVE BOX
ACS TEAM SURGERY DAILY PROGRESS NOTE:     INTERVAL EVENTS:  -SBT am  -POCUS; hypovolemic POCUS  gave [id number]    SUBJECTIVE/ROS: Patient seen and examined at bedside by surgical team. Intubated and sedated.     OBJECTIVE:  Vital Signs Last 24 Hrs  T(C): 37.9 (2024 07:00), Max: 38.9 (10 Dominic 2024 21:00)  T(F): 100.2 (2024 07:00), Max: 102 (10 Dominic 2024 21:00)  HR: 97 (2024 08:45) (69 - 109)  BP: 125/62 (2024 07:00) (82/50 - 125/62)  BP(mean): 85 (2024 07:00) (61 - 94)  RR: 29 (2024 08:45) (19 - 44)  SpO2: 100% (2024 08:45) (66% - 100%)    Parameters below as of 2024 07:00  Patient On (Oxygen Delivery Method): ventilator    O2 Concentration (%): 40                        8.1    18.95 )-----------( 390      ( 2024 04:44 )             23.0     01-11    134<L>  |  101  |  8   ----------------------------<  100<H>  3.6   |  24  |  0.58    Ca    7.4<L>      2024 04:43  Phos  3.4     -11  Mg     2.2     11     PT/INR - ( 2024 04:43 )   PT: 12.3 sec;   INR: 1.18 ratio         PTT - ( 2024 04:43 )  PTT:30.9 sec  I&O's Detail    10 Dominic 2024 07:01  -  2024 07:00  --------------------------------------------------------  IN:    Dexmedetomidine: 300.4 mL    Enteral Tube Flush: 60 mL    IV PiggyBack: 375 mL    IV PiggyBack: 700 mL    Lactated Ringers Bolus: 1000 mL    Phenylephrine: 535.6 mL    Propofol: 247.5 mL  Total IN: 3218.5 mL    OUT:    Chest Tube (mL): 215 mL    Indwelling Catheter - Urethral (mL): 1065 mL    Miscellaneous Tube Feedin mL  Total OUT: 1280 mL    Total NET: 1938.5 mL          IMAGING:      PHYSICAL EXAM:  Constitutional: NAD  Respiratory: non-labored breathing, patent airway  Gastrointestinal: abdomen soft, nontender, nondistended  Extremities: warm  Neurological: intact           ACS TEAM SURGERY DAILY PROGRESS NOTE:     INTERVAL EVENTS:  -SBT am  -POCUS; hypovolemic POCUS  gave [id number]    SUBJECTIVE/ROS: Patient seen and examined at bedside by surgical team. Intubated and sedated.     OBJECTIVE:  Vital Signs Last 24 Hrs  T(C): 37.9 (2024 07:00), Max: 38.9 (10 Dominic 2024 21:00)  T(F): 100.2 (2024 07:00), Max: 102 (10 Dominic 2024 21:00)  HR: 97 (2024 08:45) (69 - 109)  BP: 125/62 (2024 07:00) (82/50 - 125/62)  BP(mean): 85 (2024 07:00) (61 - 94)  RR: 29 (2024 08:45) (19 - 44)  SpO2: 100% (2024 08:45) (66% - 100%)    Parameters below as of 2024 07:00  Patient On (Oxygen Delivery Method): ventilator    O2 Concentration (%): 40                        8.1    18.95 )-----------( 390      ( 2024 04:44 )             23.0     01-11    134<L>  |  101  |  8   ----------------------------<  100<H>  3.6   |  24  |  0.58    Ca    7.4<L>      2024 04:43  Phos  3.4     -11  Mg     2.2     11     PT/INR - ( 2024 04:43 )   PT: 12.3 sec;   INR: 1.18 ratio         PTT - ( 2024 04:43 )  PTT:30.9 sec  I&O's Detail    10 Dominic 2024 07:01  -  2024 07:00  --------------------------------------------------------  IN:    Dexmedetomidine: 300.4 mL    Enteral Tube Flush: 60 mL    IV PiggyBack: 375 mL    IV PiggyBack: 700 mL    Lactated Ringers Bolus: 1000 mL    Phenylephrine: 535.6 mL    Propofol: 247.5 mL  Total IN: 3218.5 mL    OUT:    Chest Tube (mL): 215 mL    Indwelling Catheter - Urethral (mL): 1065 mL    Miscellaneous Tube Feedin mL  Total OUT: 1280 mL    Total NET: 1938.5 mL          IMAGING:      PHYSICAL EXAM:  Constitutional: NAD  Respiratory: non-labored breathing, patent airway  Gastrointestinal: abdomen soft, nontender, nondistended  Extremities: warm  Neurological: intact

## 2024-01-11 NOTE — CHART NOTE - NSCHARTNOTEFT_GEN_A_CORE
Tertiary survey:    48y Male with PMHx hepatitis C (diagnosed many years ago, never treated), depression transferred from outside hospital for trauma eval. Patient found down by roommate after unknown amount of time, found with multiple 4-11 L sided rib fractures with flail chest. Chest tube placed there with >1L output (old blood). Patient also with tachypnea, concern for respiratory failure started on BiPAP. Level 1 called for transient hypotension, patient given 2 unit PRBC in [facility] ED. Rib score 3. Pt now s/p VATS, with partial lung decortication and Thoracoscopic removal of intrapleural foreign body.    Complete physical examination    T(C): 38.2 (01-11-24 @ 11:00), Max: 38.9 (01-10-24 @ 21:00)  HR: 76 (01-11-24 @ 12:15) (76 - 109)  BP: 125/62 (01-11-24 @ 07:00) (93/51 - 125/62)  RR: 22 (01-11-24 @ 12:15) (19 - 44)  SpO2: 100% (01-11-24 @ 12:15) (66% - 100%)    CONSTITUTIONAL: Well groomed, intubated, sedated.  EYES: PERRLA and symmetric, EOMI, No conjunctival or scleral injection, non-icteric, no spontaneous opening.  ENMT: Oral mucosa with moist membranes. ET tube to 24cm  NECK: Supple, symmetric and without tracheal deviation   RESP: Intubated on PRVC, LL chest dressing for CT. CT with sanguineous output 25cc  CV: RRR, no JVD, no peripheral edema, neosynephrine 0.5  GI: Soft, slightly distended, no rebound, no guarding; no palpable masses; no hepatosplenomegaly; no hernia palpated  LYMPH: No cervical, axillary or inguinal LAD  MSK: Unable to assess gait; No digital clubbing or cyanosis; capillary refill 2sec. examination of the (head/neck/pelvis, RUE, LUE, RLE, LLE) without misalignment,   unable to assess muscle strength/tone.  SKIN: Bruising on LLE, bruising in LLQ. no subcutaneous nodules or induration palpable  NEURO: Intubated, non responsive to verbal or tactile stimuli.   PSYCH: Unable to assess.    Did the patient have any lacerations repaired? NO  - If yes, what is the plan for the repair (suture/staple removal)?    Were the labs reviewed? YES  - Note any clinically significant abnormal values and plan.    What imaging did the patient have? (List what X-rays, CTs, etc)?  < from: Xray Chest 1 View- PORTABLE-Urgent (Xray Chest 1 View- PORTABLE-Urgent .) (01.06.24 @ 17:16) >     XR CHEST PORTABLE URGENT    < end of copied text >    < from: CT Head No Cont (01.06.24 @ 18:01) >    CT CERVICAL SPINE    < end of copied text >    < from: CT Head No Cont (01.06.24 @ 18:01) >    CT BRAIN    < end of copied text >    < from: CT Chest No Cont (01.06.24 @ 18:05) >    CT ABDOMEN AND PELVIS    < end of copied text >    < from: CT Chest No Cont (01.06.24 @ 18:05) >    CT CHEST    < end of copied text >    < from: Xray Pelvis AP only (01.07.24 @ 09:07) >    XR PELVIS AP ONLY 1-2 VIEWS    < end of copied text >      --------------------------------------------------------------------------------------------------------------------------  Injuries:    Injuries:   - left posterolateral fourth, fifth, sixth, and 11th ribs, minimally displaced   - mildly displaced fractures of the left anterolateral fourth, fifth, sixth, and seventh ribs  - displaced fractures of the left posterior seventh, eighth, ninth, and 10th ribs.    Incidental findings list: NONE      Relevant medical problems and plan:  - Alcohol Withdrawal --> phenobarb for agitation post intubation  - Acute resp failure with hypoxia, failed SBT, Flail chest --> left chest tube with serosanguinous output, s/p VATS washout with foreign body glove identified  - Foreign body identified --> Vancomycin coverage    Other:    Does the patient have neurologic findings that are not explained by known injuries? NO  - If yes, was CTA head/neck OR “whole body CT” done to rule out blunt cerebrovascular injury?      Does the patient have significant maxillofacial/ head trauma (seatbelt sign across neck, direct blow to the neck, Lefort II or III basilar skull fracture, diffuse axonal injury)? NO  - If yes, was CTA head/neck or “whole body CT” done to rule out blunt cerebrovascular injury?      Does the patient have a cervical spine fracture? NO  - If yes, are they wearing a properly fitting rigid cervical spine collar ([place] J, [place])? If not, either change the collar or call Orthotics to assist in appropriate collar fitting.  - If yes, was CTA head/neck or “whole body CT” done to rule out blunt cerebrovascular injury?      Does the patient have rib fractures? YES  - If yes, did they have a repeat CXR in 24 hours to rule out interval development of hemo/pneumothorax?      Does the patient have a pneumothorax and/or hemothorax? YES  - If the patient was observed with an occult PTX or small DANIEL, was repeat CXR done in 24 hours? YES      Does the patient have a traumatic intracranial hemorrhage? NO  - If yes, was there a repeat head CT to demonstrate ICH stability done 12-24 hours after the initial head CT?  - Was it stable?  - If stable, was lovenox ordered as 40mg subcutaneous q24 hours to start at least 24 hours after the stable head CT?    		  Pain management: YES  - Is the patient on pain medication consistent with the ACS Pain Management- Acute Post-Traumatic clinical management guideline?      Chemical VTE prophylaxis:  - If not addressed above, is the patient on chemical VTE prophylaxis? YES  - If not, why?    High-energy mechanism? NO  - If yes, was CTA head/neck AND CTA chest OR “whole body CT” done to rule out blunt cerebrovascular injury and blunt aortic injury?  		If not, discuss with Trauma Attending and document rationale.  - If yes, was EKG done to evaluate for blunt myocardial injury?  		If not, discuss with Trauma Attending and document rationale.  - If EKG shows unexplained sinus tachycardia, atrial/junctional/ventricular arrhythmia, conduction abnormalities, ST and T wave changes that are new, discuss possibility of BCI with Trauma Attending.    ACS Trauma 1311 Tertiary survey:    48y Male with PMHx hepatitis C (diagnosed many years ago, never treated), depression transferred from outside hospital for trauma eval. Patient found down by roommate after unknown amount of time, found with multiple 4-11 L sided rib fractures with flail chest. Chest tube placed there with >1L output (old blood). Patient also with tachypnea, concern for respiratory failure started on BiPAP. Level 1 called for transient hypotension, patient given 2 unit PRBC in [facility] ED. Rib score 3. Pt now s/p VATS, with partial lung decortication and Thoracoscopic removal of intrapleural foreign body.    Complete physical examination    T(C): 38.2 (01-11-24 @ 11:00), Max: 38.9 (01-10-24 @ 21:00)  HR: 76 (01-11-24 @ 12:15) (76 - 109)  BP: 125/62 (01-11-24 @ 07:00) (93/51 - 125/62)  RR: 22 (01-11-24 @ 12:15) (19 - 44)  SpO2: 100% (01-11-24 @ 12:15) (66% - 100%)    CONSTITUTIONAL: Well groomed, intubated, sedated.  EYES: PERRLA and symmetric, EOMI, No conjunctival or scleral injection, non-icteric, no spontaneous opening.  ENMT: Oral mucosa with moist membranes. ET tube to 24cm  NECK: Supple, symmetric and without tracheal deviation   RESP: Intubated on PRVC, LL chest dressing for CT. CT with sanguineous output 25cc  CV: RRR, no JVD, no peripheral edema, neosynephrine 0.5  GI: Soft, slightly distended, no rebound, no guarding; no palpable masses; no hepatosplenomegaly; no hernia palpated  LYMPH: No cervical, axillary or inguinal LAD  MSK: Unable to assess gait; No digital clubbing or cyanosis; capillary refill 2sec. examination of the (head/neck/pelvis, RUE, LUE, RLE, LLE) without misalignment,   unable to assess muscle strength/tone.  SKIN: Bruising on LLE, bruising in LLQ. no subcutaneous nodules or induration palpable  NEURO: Intubated, non responsive to verbal or tactile stimuli.   PSYCH: Unable to assess.    Did the patient have any lacerations repaired? NO  - If yes, what is the plan for the repair (suture/staple removal)?    Were the labs reviewed? YES  - Note any clinically significant abnormal values and plan.    What imaging did the patient have? (List what X-rays, CTs, etc)?  < from: Xray Chest 1 View- PORTABLE-Urgent (Xray Chest 1 View- PORTABLE-Urgent .) (01.06.24 @ 17:16) >     XR CHEST PORTABLE URGENT    < end of copied text >    < from: CT Head No Cont (01.06.24 @ 18:01) >    CT CERVICAL SPINE    < end of copied text >    < from: CT Head No Cont (01.06.24 @ 18:01) >    CT BRAIN    < end of copied text >    < from: CT Chest No Cont (01.06.24 @ 18:05) >    CT ABDOMEN AND PELVIS    < end of copied text >    < from: CT Chest No Cont (01.06.24 @ 18:05) >    CT CHEST    < end of copied text >    < from: Xray Pelvis AP only (01.07.24 @ 09:07) >    XR PELVIS AP ONLY 1-2 VIEWS    < end of copied text >      --------------------------------------------------------------------------------------------------------------------------  Injuries:    Injuries:   - left posterolateral fourth, fifth, sixth, and 11th ribs, minimally displaced   - mildly displaced fractures of the left anterolateral fourth, fifth, sixth, and seventh ribs  - displaced fractures of the left posterior seventh, eighth, ninth, and 10th ribs.    Incidental findings list: NONE      Relevant medical problems and plan:  - Alcohol Withdrawal --> phenobarb for agitation post intubation  - Acute resp failure with hypoxia, failed SBT, Flail chest --> left chest tube with serosanguinous output, s/p VATS washout with foreign body glove identified  - Foreign body identified --> Vancomycin coverage    Other:    Does the patient have neurologic findings that are not explained by known injuries? NO  - If yes, was CTA head/neck OR “whole body CT” done to rule out blunt cerebrovascular injury?      Does the patient have significant maxillofacial/ head trauma (seatbelt sign across neck, direct blow to the neck, Lefort II or III basilar skull fracture, diffuse axonal injury)? NO  - If yes, was CTA head/neck or “whole body CT” done to rule out blunt cerebrovascular injury?      Does the patient have a cervical spine fracture? NO  - If yes, are they wearing a properly fitting rigid cervical spine collar ([place] J, [place])? If not, either change the collar or call Orthotics to assist in appropriate collar fitting.  - If yes, was CTA head/neck or “whole body CT” done to rule out blunt cerebrovascular injury?      Does the patient have rib fractures? YES  - If yes, did they have a repeat CXR in 24 hours to rule out interval development of hemo/pneumothorax?      Does the patient have a pneumothorax and/or hemothorax? YES  - If the patient was observed with an occult PTX or small DANIEL, was repeat CXR done in 24 hours? YES      Does the patient have a traumatic intracranial hemorrhage? NO  - If yes, was there a repeat head CT to demonstrate ICH stability done 12-24 hours after the initial head CT?  - Was it stable?  - If stable, was lovenox ordered as 40mg subcutaneous q24 hours to start at least 24 hours after the stable head CT?    		  Pain management: YES  - Is the patient on pain medication consistent with the ACS Pain Management- Acute Post-Traumatic clinical management guideline?      Chemical VTE prophylaxis:  - If not addressed above, is the patient on chemical VTE prophylaxis? YES  - If not, why?    High-energy mechanism? NO  - If yes, was CTA head/neck AND CTA chest OR “whole body CT” done to rule out blunt cerebrovascular injury and blunt aortic injury?  		If not, discuss with Trauma Attending and document rationale.  - If yes, was EKG done to evaluate for blunt myocardial injury?  		If not, discuss with Trauma Attending and document rationale.  - If EKG shows unexplained sinus tachycardia, atrial/junctional/ventricular arrhythmia, conduction abnormalities, ST and T wave changes that are new, discuss possibility of BCI with Trauma Attending.    ACS Trauma 1311 Tertiary survey:    48y Male with PMHx hepatitis C (diagnosed many years ago, never treated), depression transferred from outside hospital for trauma eval. Patient found down by roommate after unknown amount of time, found with multiple 4-11 L sided rib fractures with flail chest. Chest tube placed there with >1L output (old blood). Patient also with tachypnea, concern for respiratory failure started on BiPAP. Level 1 called for transient hypotension, patient given 2 unit PRBC in [facility] ED. Rib score 3. Pt now s/p VATS, with partial lung decortication and Thoracoscopic removal of intrapleural foreign body.    Complete physical examination    T(C): 38.2 (01-11-24 @ 11:00), Max: 38.9 (01-10-24 @ 21:00)  HR: 76 (01-11-24 @ 12:15) (76 - 109)  BP: 125/62 (01-11-24 @ 07:00) (93/51 - 125/62)  RR: 22 (01-11-24 @ 12:15) (19 - 44)  SpO2: 100% (01-11-24 @ 12:15) (66% - 100%)    CONSTITUTIONAL: Well groomed, intubated, sedated.  EYES: PERRLA and symmetric, EOMI, No conjunctival or scleral injection, non-icteric, no spontaneous opening.  ENMT: Oral mucosa with moist membranes. ET tube to 24cm  NECK: Supple, symmetric and without tracheal deviation   RESP: Intubated on PRVC, LL chest dressing for CT. CT with sanguineous output 25cc  CV: RRR, no JVD, no peripheral edema, neosynephrine 0.5  GI: Soft, slightly distended, no rebound, no guarding; no palpable masses; no hepatosplenomegaly; no hernia palpated  LYMPH: No cervical, axillary or inguinal LAD  MSK: Unable to assess gait; No digital clubbing or cyanosis; capillary refill 2sec. examination of the (head/neck/pelvis, RUE, LUE, RLE, LLE) without misalignment,   unable to assess muscle strength/tone. Dressing c/d/i on the R-buttock.  SKIN: Bruising on LLE, bruising in LLQ. no subcutaneous nodules or induration palpable  NEURO: Intubated, non responsive to verbal or tactile stimuli.   PSYCH: Unable to assess.    Did the patient have any lacerations repaired? NO  - If yes, what is the plan for the repair (suture/staple removal)?    Were the labs reviewed? YES  - Note any clinically significant abnormal values and plan.    What imaging did the patient have? (List what X-rays, CTs, etc)?  < from: Xray Chest 1 View- PORTABLE-Urgent (Xray Chest 1 View- PORTABLE-Urgent .) (01.06.24 @ 17:16) >     XR CHEST PORTABLE URGENT    < end of copied text >    < from: CT Head No Cont (01.06.24 @ 18:01) >    CT CERVICAL SPINE    < end of copied text >    < from: CT Head No Cont (01.06.24 @ 18:01) >    CT BRAIN    < end of copied text >    < from: CT Chest No Cont (01.06.24 @ 18:05) >    CT ABDOMEN AND PELVIS    < end of copied text >    < from: CT Chest No Cont (01.06.24 @ 18:05) >    CT CHEST    < end of copied text >    < from: Xray Pelvis AP only (01.07.24 @ 09:07) >    XR PELVIS AP ONLY 1-2 VIEWS    < end of copied text >      --------------------------------------------------------------------------------------------------------------------------  Injuries:    Injuries:   - left posterolateral fourth, fifth, sixth, and 11th ribs, minimally displaced   - mildly displaced fractures of the left anterolateral fourth, fifth, sixth, and seventh ribs  - displaced fractures of the left posterior seventh, eighth, ninth, and 10th ribs.    Incidental findings list: NONE      Relevant medical problems and plan:  - Alcohol Withdrawal --> phenobarb for agitation post intubation  - Acute resp failure with hypoxia, failed SBT, Flail chest --> left chest tube with serosanguinous output, s/p VATS washout with foreign body glove identified  - Foreign body identified --> Vancomycin coverage    Other:    Does the patient have neurologic findings that are not explained by known injuries? NO  - If yes, was CTA head/neck OR “whole body CT” done to rule out blunt cerebrovascular injury?      Does the patient have significant maxillofacial/ head trauma (seatbelt sign across neck, direct blow to the neck, Lefort II or III basilar skull fracture, diffuse axonal injury)? NO  - If yes, was CTA head/neck or “whole body CT” done to rule out blunt cerebrovascular injury?      Does the patient have a cervical spine fracture? NO  - If yes, are they wearing a properly fitting rigid cervical spine collar ([place] J, [place])? If not, either change the collar or call Orthotics to assist in appropriate collar fitting.  - If yes, was CTA head/neck or “whole body CT” done to rule out blunt cerebrovascular injury?      Does the patient have rib fractures? YES  - If yes, did they have a repeat CXR in 24 hours to rule out interval development of hemo/pneumothorax?      Does the patient have a pneumothorax and/or hemothorax? YES  - If the patient was observed with an occult PTX or small DANIEL, was repeat CXR done in 24 hours? YES      Does the patient have a traumatic intracranial hemorrhage? NO  - If yes, was there a repeat head CT to demonstrate ICH stability done 12-24 hours after the initial head CT?  - Was it stable?  - If stable, was lovenox ordered as 40mg subcutaneous q24 hours to start at least 24 hours after the stable head CT?    		  Pain management: YES  - Is the patient on pain medication consistent with the ACS Pain Management- Acute Post-Traumatic clinical management guideline?      Chemical VTE prophylaxis:  - If not addressed above, is the patient on chemical VTE prophylaxis? YES  - If not, why?    High-energy mechanism? NO  - If yes, was CTA head/neck AND CTA chest OR “whole body CT” done to rule out blunt cerebrovascular injury and blunt aortic injury?  		If not, discuss with Trauma Attending and document rationale.  - If yes, was EKG done to evaluate for blunt myocardial injury?  		If not, discuss with Trauma Attending and document rationale.  - If EKG shows unexplained sinus tachycardia, atrial/junctional/ventricular arrhythmia, conduction abnormalities, ST and T wave changes that are new, discuss possibility of BCI with Trauma Attending.    ACS Trauma 1311 Tertiary survey:    48y Male with PMHx hepatitis C (diagnosed many years ago, never treated), depression transferred from outside hospital for trauma eval. Patient found down by roommate after unknown amount of time, found with multiple 4-11 L sided rib fractures with flail chest. Chest tube placed there with >1L output (old blood). Patient also with tachypnea, concern for respiratory failure started on BiPAP. Level 1 called for transient hypotension, patient given 2 unit PRBC in [facility] ED. Rib score 3. Pt now s/p VATS, with partial lung decortication and Thoracoscopic removal of intrapleural foreign body.    Complete physical examination    T(C): 38.2 (01-11-24 @ 11:00), Max: 38.9 (01-10-24 @ 21:00)  HR: 76 (01-11-24 @ 12:15) (76 - 109)  BP: 125/62 (01-11-24 @ 07:00) (93/51 - 125/62)  RR: 22 (01-11-24 @ 12:15) (19 - 44)  SpO2: 100% (01-11-24 @ 12:15) (66% - 100%)    CONSTITUTIONAL: Well groomed, intubated, sedated.  EYES: PERRLA and symmetric, EOMI, No conjunctival or scleral injection, non-icteric, no spontaneous opening.  ENMT: Oral mucosa with moist membranes. ET tube to 24cm  NECK: Supple, symmetric and without tracheal deviation   RESP: Intubated on PRVC, LL chest dressing for CT. CT with sanguineous output 25cc  CV: RRR, no JVD, no peripheral edema, neosynephrine 0.5  GI: Soft, slightly distended, no rebound, no guarding; no palpable masses; no hepatosplenomegaly; no hernia palpated  LYMPH: No cervical, axillary or inguinal LAD  MSK: Unable to assess gait; No digital clubbing or cyanosis; capillary refill 2sec. examination of the (head/neck/pelvis, RUE, LUE, RLE, LLE) without misalignment,   unable to assess muscle strength/tone. Dressing c/d/i on the R-buttock.  SKIN: Bruising on LLE, bruising in LLQ. no subcutaneous nodules or induration palpable  NEURO: Intubated, non responsive to verbal or tactile stimuli.   PSYCH: Unable to assess.    Did the patient have any lacerations repaired? NO  - If yes, what is the plan for the repair (suture/staple removal)?    Were the labs reviewed? YES  - Note any clinically significant abnormal values and plan.    What imaging did the patient have? (List what X-rays, CTs, etc)?  < from: Xray Chest 1 View- PORTABLE-Urgent (Xray Chest 1 View- PORTABLE-Urgent .) (01.06.24 @ 17:16) >     XR CHEST PORTABLE URGENT    < end of copied text >    < from: CT Head No Cont (01.06.24 @ 18:01) >    CT CERVICAL SPINE    < end of copied text >    < from: CT Head No Cont (01.06.24 @ 18:01) >    CT BRAIN    < end of copied text >    < from: CT Chest No Cont (01.06.24 @ 18:05) >    CT ABDOMEN AND PELVIS    < end of copied text >    < from: CT Chest No Cont (01.06.24 @ 18:05) >    CT CHEST    < end of copied text >    < from: Xray Pelvis AP only (01.07.24 @ 09:07) >    XR PELVIS AP ONLY 1-2 VIEWS    < end of copied text >      --------------------------------------------------------------------------------------------------------------------------  Injuries:    Injuries:   - left posterolateral fourth, fifth, sixth, and 11th ribs, minimally displaced   - mildly displaced fractures of the left anterolateral fourth, fifth, sixth, and seventh ribs  - displaced fractures of the left posterior seventh, eighth, ninth, and 10th ribs.    Incidental findings list: NONE      Relevant medical problems and plan:  - Alcohol Withdrawal --> phenobarb for agitation post intubation  - Acute resp failure with hypoxia, failed SBT, Flail chest --> left chest tube with serosanguinous output, s/p VATS washout with foreign body glove identified  - Foreign body identified --> Vancomycin coverage    Other:    Does the patient have neurologic findings that are not explained by known injuries? NO  - If yes, was CTA head/neck OR “whole body CT” done to rule out blunt cerebrovascular injury?      Does the patient have significant maxillofacial/ head trauma (seatbelt sign across neck, direct blow to the neck, Lefort II or III basilar skull fracture, diffuse axonal injury)? NO  - If yes, was CTA head/neck or “whole body CT” done to rule out blunt cerebrovascular injury?      Does the patient have a cervical spine fracture? NO  - If yes, are they wearing a properly fitting rigid cervical spine collar ([place] J, [place])? If not, either change the collar or call Orthotics to assist in appropriate collar fitting.  - If yes, was CTA head/neck or “whole body CT” done to rule out blunt cerebrovascular injury?      Does the patient have rib fractures? YES  - If yes, did they have a repeat CXR in 24 hours to rule out interval development of hemo/pneumothorax?      Does the patient have a pneumothorax and/or hemothorax? YES  - If the patient was observed with an occult PTX or small DANIEL, was repeat CXR done in 24 hours? YES      Does the patient have a traumatic intracranial hemorrhage? NO  - If yes, was there a repeat head CT to demonstrate ICH stability done 12-24 hours after the initial head CT?  - Was it stable?  - If stable, was lovenox ordered as 40mg subcutaneous q24 hours to start at least 24 hours after the stable head CT?    		  Pain management: YES  - Is the patient on pain medication consistent with the ACS Pain Management- Acute Post-Traumatic clinical management guideline?      Chemical VTE prophylaxis:  - If not addressed above, is the patient on chemical VTE prophylaxis? YES  - If not, why?    High-energy mechanism? NO  - If yes, was CTA head/neck AND CTA chest OR “whole body CT” done to rule out blunt cerebrovascular injury and blunt aortic injury?  		If not, discuss with Trauma Attending and document rationale.  - If yes, was EKG done to evaluate for blunt myocardial injury?  		If not, discuss with Trauma Attending and document rationale.  - If EKG shows unexplained sinus tachycardia, atrial/junctional/ventricular arrhythmia, conduction abnormalities, ST and T wave changes that are new, discuss possibility of BCI with Trauma Attending.    ACS Trauma 1311

## 2024-01-12 LAB
ANION GAP SERPL CALC-SCNC: 9 MMOL/L — SIGNIFICANT CHANGE UP (ref 5–17)
ANION GAP SERPL CALC-SCNC: 9 MMOL/L — SIGNIFICANT CHANGE UP (ref 5–17)
APTT BLD: 30.2 SEC — SIGNIFICANT CHANGE UP (ref 24.5–35.6)
APTT BLD: 30.2 SEC — SIGNIFICANT CHANGE UP (ref 24.5–35.6)
BASE EXCESS BLDV CALC-SCNC: -1.3 MMOL/L — SIGNIFICANT CHANGE UP (ref -2–3)
BASE EXCESS BLDV CALC-SCNC: -1.3 MMOL/L — SIGNIFICANT CHANGE UP (ref -2–3)
BUN SERPL-MCNC: 8 MG/DL — SIGNIFICANT CHANGE UP (ref 7–23)
BUN SERPL-MCNC: 8 MG/DL — SIGNIFICANT CHANGE UP (ref 7–23)
CA-I SERPL-SCNC: 1.1 MMOL/L — LOW (ref 1.15–1.33)
CA-I SERPL-SCNC: 1.1 MMOL/L — LOW (ref 1.15–1.33)
CALCIUM SERPL-MCNC: 7.2 MG/DL — LOW (ref 8.4–10.5)
CALCIUM SERPL-MCNC: 7.2 MG/DL — LOW (ref 8.4–10.5)
CHLORIDE BLDV-SCNC: 103 MMOL/L — SIGNIFICANT CHANGE UP (ref 96–108)
CHLORIDE BLDV-SCNC: 103 MMOL/L — SIGNIFICANT CHANGE UP (ref 96–108)
CHLORIDE SERPL-SCNC: 102 MMOL/L — SIGNIFICANT CHANGE UP (ref 96–108)
CHLORIDE SERPL-SCNC: 102 MMOL/L — SIGNIFICANT CHANGE UP (ref 96–108)
CO2 BLDV-SCNC: 25 MMOL/L — SIGNIFICANT CHANGE UP (ref 22–26)
CO2 BLDV-SCNC: 25 MMOL/L — SIGNIFICANT CHANGE UP (ref 22–26)
CO2 SERPL-SCNC: 23 MMOL/L — SIGNIFICANT CHANGE UP (ref 22–31)
CO2 SERPL-SCNC: 23 MMOL/L — SIGNIFICANT CHANGE UP (ref 22–31)
CREAT SERPL-MCNC: 0.71 MG/DL — SIGNIFICANT CHANGE UP (ref 0.5–1.3)
CREAT SERPL-MCNC: 0.71 MG/DL — SIGNIFICANT CHANGE UP (ref 0.5–1.3)
CULTURE RESULTS: SIGNIFICANT CHANGE UP
EGFR: 114 ML/MIN/1.73M2 — SIGNIFICANT CHANGE UP
EGFR: 114 ML/MIN/1.73M2 — SIGNIFICANT CHANGE UP
GAS PNL BLDA: SIGNIFICANT CHANGE UP
GAS PNL BLDA: SIGNIFICANT CHANGE UP
GAS PNL BLDV: 131 MMOL/L — LOW (ref 136–145)
GAS PNL BLDV: 131 MMOL/L — LOW (ref 136–145)
GAS PNL BLDV: SIGNIFICANT CHANGE UP
GLUCOSE BLDV-MCNC: 129 MG/DL — HIGH (ref 70–99)
GLUCOSE BLDV-MCNC: 129 MG/DL — HIGH (ref 70–99)
GLUCOSE SERPL-MCNC: 107 MG/DL — HIGH (ref 70–99)
GLUCOSE SERPL-MCNC: 107 MG/DL — HIGH (ref 70–99)
HCO3 BLDV-SCNC: 24 MMOL/L — SIGNIFICANT CHANGE UP (ref 22–29)
HCO3 BLDV-SCNC: 24 MMOL/L — SIGNIFICANT CHANGE UP (ref 22–29)
HCT VFR BLD CALC: 20 % — CRITICAL LOW (ref 39–50)
HCT VFR BLD CALC: 20 % — CRITICAL LOW (ref 39–50)
HCT VFR BLD CALC: 22.9 % — LOW (ref 39–50)
HCT VFR BLD CALC: 22.9 % — LOW (ref 39–50)
HCT VFR BLD CALC: 24.2 % — LOW (ref 39–50)
HCT VFR BLD CALC: 24.2 % — LOW (ref 39–50)
HCT VFR BLDA CALC: 25 % — LOW (ref 39–51)
HCT VFR BLDA CALC: 25 % — LOW (ref 39–51)
HGB BLD CALC-MCNC: 8.2 G/DL — LOW (ref 12.6–17.4)
HGB BLD CALC-MCNC: 8.2 G/DL — LOW (ref 12.6–17.4)
HGB BLD-MCNC: 7.1 G/DL — LOW (ref 13–17)
HGB BLD-MCNC: 7.1 G/DL — LOW (ref 13–17)
HGB BLD-MCNC: 7.9 G/DL — LOW (ref 13–17)
HGB BLD-MCNC: 7.9 G/DL — LOW (ref 13–17)
HGB BLD-MCNC: 8.3 G/DL — LOW (ref 13–17)
HGB BLD-MCNC: 8.3 G/DL — LOW (ref 13–17)
HOROWITZ INDEX BLDV+IHG-RTO: 30 — SIGNIFICANT CHANGE UP
HOROWITZ INDEX BLDV+IHG-RTO: 30 — SIGNIFICANT CHANGE UP
INR BLD: 1.06 RATIO — SIGNIFICANT CHANGE UP (ref 0.85–1.18)
INR BLD: 1.06 RATIO — SIGNIFICANT CHANGE UP (ref 0.85–1.18)
LACTATE BLDV-MCNC: 2 MMOL/L — SIGNIFICANT CHANGE UP (ref 0.5–2)
LACTATE BLDV-MCNC: 2 MMOL/L — SIGNIFICANT CHANGE UP (ref 0.5–2)
MAGNESIUM SERPL-MCNC: 2 MG/DL — SIGNIFICANT CHANGE UP (ref 1.6–2.6)
MAGNESIUM SERPL-MCNC: 2 MG/DL — SIGNIFICANT CHANGE UP (ref 1.6–2.6)
MCHC RBC-ENTMCNC: 28.5 PG — SIGNIFICANT CHANGE UP (ref 27–34)
MCHC RBC-ENTMCNC: 28.5 PG — SIGNIFICANT CHANGE UP (ref 27–34)
MCHC RBC-ENTMCNC: 28.6 PG — SIGNIFICANT CHANGE UP (ref 27–34)
MCHC RBC-ENTMCNC: 28.6 PG — SIGNIFICANT CHANGE UP (ref 27–34)
MCHC RBC-ENTMCNC: 29.2 PG — SIGNIFICANT CHANGE UP (ref 27–34)
MCHC RBC-ENTMCNC: 29.2 PG — SIGNIFICANT CHANGE UP (ref 27–34)
MCHC RBC-ENTMCNC: 34.3 GM/DL — SIGNIFICANT CHANGE UP (ref 32–36)
MCHC RBC-ENTMCNC: 34.3 GM/DL — SIGNIFICANT CHANGE UP (ref 32–36)
MCHC RBC-ENTMCNC: 34.5 GM/DL — SIGNIFICANT CHANGE UP (ref 32–36)
MCHC RBC-ENTMCNC: 34.5 GM/DL — SIGNIFICANT CHANGE UP (ref 32–36)
MCHC RBC-ENTMCNC: 35.5 GM/DL — SIGNIFICANT CHANGE UP (ref 32–36)
MCHC RBC-ENTMCNC: 35.5 GM/DL — SIGNIFICANT CHANGE UP (ref 32–36)
MCV RBC AUTO: 82.3 FL — SIGNIFICANT CHANGE UP (ref 80–100)
MCV RBC AUTO: 82.3 FL — SIGNIFICANT CHANGE UP (ref 80–100)
MCV RBC AUTO: 83 FL — SIGNIFICANT CHANGE UP (ref 80–100)
MCV RBC AUTO: 83 FL — SIGNIFICANT CHANGE UP (ref 80–100)
MCV RBC AUTO: 83.2 FL — SIGNIFICANT CHANGE UP (ref 80–100)
MCV RBC AUTO: 83.2 FL — SIGNIFICANT CHANGE UP (ref 80–100)
NRBC # BLD: 0 /100 WBCS — SIGNIFICANT CHANGE UP (ref 0–0)
PCO2 BLDV: 40 MMHG — LOW (ref 42–55)
PCO2 BLDV: 40 MMHG — LOW (ref 42–55)
PH BLDV: 7.38 — SIGNIFICANT CHANGE UP (ref 7.32–7.43)
PH BLDV: 7.38 — SIGNIFICANT CHANGE UP (ref 7.32–7.43)
PHENOBARB SERPL-MCNC: 39.5 UG/ML — SIGNIFICANT CHANGE UP (ref 15–40)
PHENOBARB SERPL-MCNC: 39.5 UG/ML — SIGNIFICANT CHANGE UP (ref 15–40)
PHOSPHATE SERPL-MCNC: 2.8 MG/DL — SIGNIFICANT CHANGE UP (ref 2.5–4.5)
PHOSPHATE SERPL-MCNC: 2.8 MG/DL — SIGNIFICANT CHANGE UP (ref 2.5–4.5)
PLATELET # BLD AUTO: 373 K/UL — SIGNIFICANT CHANGE UP (ref 150–400)
PLATELET # BLD AUTO: 373 K/UL — SIGNIFICANT CHANGE UP (ref 150–400)
PLATELET # BLD AUTO: 383 K/UL — SIGNIFICANT CHANGE UP (ref 150–400)
PLATELET # BLD AUTO: 383 K/UL — SIGNIFICANT CHANGE UP (ref 150–400)
PLATELET # BLD AUTO: 402 K/UL — HIGH (ref 150–400)
PLATELET # BLD AUTO: 402 K/UL — HIGH (ref 150–400)
PO2 BLDV: 48 MMHG — HIGH (ref 25–45)
PO2 BLDV: 48 MMHG — HIGH (ref 25–45)
POTASSIUM BLDV-SCNC: 3.7 MMOL/L — SIGNIFICANT CHANGE UP (ref 3.5–5.1)
POTASSIUM BLDV-SCNC: 3.7 MMOL/L — SIGNIFICANT CHANGE UP (ref 3.5–5.1)
POTASSIUM SERPL-MCNC: 3.6 MMOL/L — SIGNIFICANT CHANGE UP (ref 3.5–5.3)
POTASSIUM SERPL-MCNC: 3.6 MMOL/L — SIGNIFICANT CHANGE UP (ref 3.5–5.3)
POTASSIUM SERPL-SCNC: 3.6 MMOL/L — SIGNIFICANT CHANGE UP (ref 3.5–5.3)
POTASSIUM SERPL-SCNC: 3.6 MMOL/L — SIGNIFICANT CHANGE UP (ref 3.5–5.3)
PROTHROM AB SERPL-ACNC: 11.6 SEC — SIGNIFICANT CHANGE UP (ref 9.5–13)
PROTHROM AB SERPL-ACNC: 11.6 SEC — SIGNIFICANT CHANGE UP (ref 9.5–13)
RBC # BLD: 2.43 M/UL — LOW (ref 4.2–5.8)
RBC # BLD: 2.43 M/UL — LOW (ref 4.2–5.8)
RBC # BLD: 2.76 M/UL — LOW (ref 4.2–5.8)
RBC # BLD: 2.76 M/UL — LOW (ref 4.2–5.8)
RBC # BLD: 2.91 M/UL — LOW (ref 4.2–5.8)
RBC # BLD: 2.91 M/UL — LOW (ref 4.2–5.8)
RBC # FLD: 15 % — HIGH (ref 10.3–14.5)
RBC # FLD: 15 % — HIGH (ref 10.3–14.5)
RBC # FLD: 15.2 % — HIGH (ref 10.3–14.5)
SAO2 % BLDV: 81.7 % — SIGNIFICANT CHANGE UP (ref 67–88)
SAO2 % BLDV: 81.7 % — SIGNIFICANT CHANGE UP (ref 67–88)
SODIUM SERPL-SCNC: 134 MMOL/L — LOW (ref 135–145)
SODIUM SERPL-SCNC: 134 MMOL/L — LOW (ref 135–145)
SPECIMEN SOURCE: SIGNIFICANT CHANGE UP
SURGICAL PATHOLOGY STUDY: SIGNIFICANT CHANGE UP
SURGICAL PATHOLOGY STUDY: SIGNIFICANT CHANGE UP
VANCOMYCIN TROUGH SERPL-MCNC: 10.9 UG/ML — SIGNIFICANT CHANGE UP (ref 10–20)
VANCOMYCIN TROUGH SERPL-MCNC: 10.9 UG/ML — SIGNIFICANT CHANGE UP (ref 10–20)
WBC # BLD: 11.71 K/UL — HIGH (ref 3.8–10.5)
WBC # BLD: 11.71 K/UL — HIGH (ref 3.8–10.5)
WBC # BLD: 12.09 K/UL — HIGH (ref 3.8–10.5)
WBC # BLD: 12.09 K/UL — HIGH (ref 3.8–10.5)
WBC # BLD: 12.11 K/UL — HIGH (ref 3.8–10.5)
WBC # BLD: 12.11 K/UL — HIGH (ref 3.8–10.5)
WBC # FLD AUTO: 11.71 K/UL — HIGH (ref 3.8–10.5)
WBC # FLD AUTO: 11.71 K/UL — HIGH (ref 3.8–10.5)
WBC # FLD AUTO: 12.09 K/UL — HIGH (ref 3.8–10.5)
WBC # FLD AUTO: 12.09 K/UL — HIGH (ref 3.8–10.5)
WBC # FLD AUTO: 12.11 K/UL — HIGH (ref 3.8–10.5)
WBC # FLD AUTO: 12.11 K/UL — HIGH (ref 3.8–10.5)

## 2024-01-12 PROCEDURE — 99291 CRITICAL CARE FIRST HOUR: CPT | Mod: GC

## 2024-01-12 PROCEDURE — 71045 X-RAY EXAM CHEST 1 VIEW: CPT | Mod: 26

## 2024-01-12 PROCEDURE — 99232 SBSQ HOSP IP/OBS MODERATE 35: CPT

## 2024-01-12 PROCEDURE — 99222 1ST HOSP IP/OBS MODERATE 55: CPT

## 2024-01-12 RX ORDER — VANCOMYCIN HCL 1 G
1000 VIAL (EA) INTRAVENOUS EVERY 8 HOURS
Refills: 0 | Status: DISCONTINUED | OUTPATIENT
Start: 2024-01-12 | End: 2024-01-15

## 2024-01-12 RX ORDER — HYDROMORPHONE HYDROCHLORIDE 2 MG/ML
1 INJECTION INTRAMUSCULAR; INTRAVENOUS; SUBCUTANEOUS
Refills: 0 | Status: DISCONTINUED | OUTPATIENT
Start: 2024-01-12 | End: 2024-01-18

## 2024-01-12 RX ORDER — ACETAMINOPHEN 500 MG
650 TABLET ORAL EVERY 6 HOURS
Refills: 0 | Status: DISCONTINUED | OUTPATIENT
Start: 2024-01-12 | End: 2024-01-13

## 2024-01-12 RX ORDER — OXYCODONE HYDROCHLORIDE 5 MG/1
10 TABLET ORAL EVERY 4 HOURS
Refills: 0 | Status: DISCONTINUED | OUTPATIENT
Start: 2024-01-12 | End: 2024-01-13

## 2024-01-12 RX ORDER — SODIUM CHLORIDE 9 MG/ML
1000 INJECTION, SOLUTION INTRAVENOUS ONCE
Refills: 0 | Status: COMPLETED | OUTPATIENT
Start: 2024-01-12 | End: 2024-01-12

## 2024-01-12 RX ORDER — PHENOBARBITAL 60 MG
260 TABLET ORAL EVERY 12 HOURS
Refills: 0 | Status: DISCONTINUED | OUTPATIENT
Start: 2024-01-12 | End: 2024-01-12

## 2024-01-12 RX ORDER — IBUPROFEN 200 MG
400 TABLET ORAL EVERY 6 HOURS
Refills: 0 | Status: DISCONTINUED | OUTPATIENT
Start: 2024-01-12 | End: 2024-01-13

## 2024-01-12 RX ORDER — PHENYLEPHRINE HYDROCHLORIDE 10 MG/ML
0.2 INJECTION INTRAVENOUS
Qty: 40 | Refills: 0 | Status: DISCONTINUED | OUTPATIENT
Start: 2024-01-12 | End: 2024-01-12

## 2024-01-12 RX ORDER — PHENOBARBITAL 60 MG
130 TABLET ORAL EVERY 6 HOURS
Refills: 0 | Status: DISCONTINUED | OUTPATIENT
Start: 2024-01-12 | End: 2024-01-14

## 2024-01-12 RX ORDER — OXYCODONE HYDROCHLORIDE 5 MG/1
5 TABLET ORAL EVERY 4 HOURS
Refills: 0 | Status: DISCONTINUED | OUTPATIENT
Start: 2024-01-12 | End: 2024-01-13

## 2024-01-12 RX ADMIN — HYDROMORPHONE HYDROCHLORIDE 1 MILLIGRAM(S): 2 INJECTION INTRAMUSCULAR; INTRAVENOUS; SUBCUTANEOUS at 16:58

## 2024-01-12 RX ADMIN — HYDROMORPHONE HYDROCHLORIDE 0.5 MILLIGRAM(S): 2 INJECTION INTRAMUSCULAR; INTRAVENOUS; SUBCUTANEOUS at 03:16

## 2024-01-12 RX ADMIN — OXYCODONE HYDROCHLORIDE 10 MILLIGRAM(S): 5 TABLET ORAL at 13:25

## 2024-01-12 RX ADMIN — Medication 130 MILLIGRAM(S): at 03:11

## 2024-01-12 RX ADMIN — POLYETHYLENE GLYCOL 3350 17 GRAM(S): 17 POWDER, FOR SOLUTION ORAL at 05:15

## 2024-01-12 RX ADMIN — Medication 250 MILLIGRAM(S): at 21:51

## 2024-01-12 RX ADMIN — Medication 130 MILLIGRAM(S): at 16:57

## 2024-01-12 RX ADMIN — OXYCODONE HYDROCHLORIDE 10 MILLIGRAM(S): 5 TABLET ORAL at 23:13

## 2024-01-12 RX ADMIN — CHLORHEXIDINE GLUCONATE 15 MILLILITER(S): 213 SOLUTION TOPICAL at 05:15

## 2024-01-12 RX ADMIN — Medication 130 MILLIGRAM(S): at 09:44

## 2024-01-12 RX ADMIN — HYDROMORPHONE HYDROCHLORIDE 1 MILLIGRAM(S): 2 INJECTION INTRAMUSCULAR; INTRAVENOUS; SUBCUTANEOUS at 13:15

## 2024-01-12 RX ADMIN — Medication 130 MILLIGRAM(S): at 22:58

## 2024-01-12 RX ADMIN — PANTOPRAZOLE SODIUM 40 MILLIGRAM(S): 20 TABLET, DELAYED RELEASE ORAL at 12:36

## 2024-01-12 RX ADMIN — OXYCODONE HYDROCHLORIDE 5 MILLIGRAM(S): 5 TABLET ORAL at 15:30

## 2024-01-12 RX ADMIN — ENOXAPARIN SODIUM 40 MILLIGRAM(S): 100 INJECTION SUBCUTANEOUS at 23:21

## 2024-01-12 RX ADMIN — CHLORHEXIDINE GLUCONATE 15 MILLILITER(S): 213 SOLUTION TOPICAL at 17:50

## 2024-01-12 RX ADMIN — HYDROMORPHONE HYDROCHLORIDE 1 MILLIGRAM(S): 2 INJECTION INTRAMUSCULAR; INTRAVENOUS; SUBCUTANEOUS at 21:14

## 2024-01-12 RX ADMIN — LIDOCAINE 1 PATCH: 4 CREAM TOPICAL at 07:00

## 2024-01-12 RX ADMIN — HYDROMORPHONE HYDROCHLORIDE 1 MILLIGRAM(S): 2 INJECTION INTRAMUSCULAR; INTRAVENOUS; SUBCUTANEOUS at 09:44

## 2024-01-12 RX ADMIN — PIPERACILLIN AND TAZOBACTAM 25 GRAM(S): 4; .5 INJECTION, POWDER, LYOPHILIZED, FOR SOLUTION INTRAVENOUS at 15:30

## 2024-01-12 RX ADMIN — PIPERACILLIN AND TAZOBACTAM 25 GRAM(S): 4; .5 INJECTION, POWDER, LYOPHILIZED, FOR SOLUTION INTRAVENOUS at 21:50

## 2024-01-12 RX ADMIN — SODIUM CHLORIDE 1000 MILLILITER(S): 9 INJECTION, SOLUTION INTRAVENOUS at 21:46

## 2024-01-12 RX ADMIN — HYDROMORPHONE HYDROCHLORIDE 1 MILLIGRAM(S): 2 INJECTION INTRAMUSCULAR; INTRAVENOUS; SUBCUTANEOUS at 21:44

## 2024-01-12 RX ADMIN — Medication 15 MILLIGRAM(S): at 04:17

## 2024-01-12 RX ADMIN — Medication 650 MILLIGRAM(S): at 17:49

## 2024-01-12 RX ADMIN — Medication 15 MILLILITER(S): at 12:35

## 2024-01-12 RX ADMIN — Medication 166.67 MILLIGRAM(S): at 05:44

## 2024-01-12 RX ADMIN — Medication 250 MILLIGRAM(S): at 15:30

## 2024-01-12 RX ADMIN — OXYCODONE HYDROCHLORIDE 5 MILLIGRAM(S): 5 TABLET ORAL at 10:37

## 2024-01-12 RX ADMIN — MUPIROCIN 1 APPLICATION(S): 20 OINTMENT TOPICAL at 17:50

## 2024-01-12 RX ADMIN — HYDROMORPHONE HYDROCHLORIDE 1 MILLIGRAM(S): 2 INJECTION INTRAMUSCULAR; INTRAVENOUS; SUBCUTANEOUS at 09:59

## 2024-01-12 RX ADMIN — PREGABALIN 1000 MICROGRAM(S): 225 CAPSULE ORAL at 12:36

## 2024-01-12 RX ADMIN — OXYCODONE HYDROCHLORIDE 5 MILLIGRAM(S): 5 TABLET ORAL at 22:44

## 2024-01-12 RX ADMIN — Medication 650 MILLIGRAM(S): at 23:21

## 2024-01-12 RX ADMIN — OXYCODONE HYDROCHLORIDE 5 MILLIGRAM(S): 5 TABLET ORAL at 21:50

## 2024-01-12 RX ADMIN — LIDOCAINE 1 PATCH: 4 CREAM TOPICAL at 11:00

## 2024-01-12 RX ADMIN — OXYCODONE HYDROCHLORIDE 10 MILLIGRAM(S): 5 TABLET ORAL at 23:43

## 2024-01-12 RX ADMIN — OXYCODONE HYDROCHLORIDE 10 MILLIGRAM(S): 5 TABLET ORAL at 13:55

## 2024-01-12 RX ADMIN — HYDROMORPHONE HYDROCHLORIDE 0.5 MILLIGRAM(S): 2 INJECTION INTRAMUSCULAR; INTRAVENOUS; SUBCUTANEOUS at 02:46

## 2024-01-12 RX ADMIN — CHLORHEXIDINE GLUCONATE 1 APPLICATION(S): 213 SOLUTION TOPICAL at 05:16

## 2024-01-12 RX ADMIN — MUPIROCIN 1 APPLICATION(S): 20 OINTMENT TOPICAL at 05:16

## 2024-01-12 RX ADMIN — Medication 130 MILLIGRAM(S): at 05:15

## 2024-01-12 RX ADMIN — Medication 500 MILLIGRAM(S): at 12:36

## 2024-01-12 RX ADMIN — Medication 130 MILLIGRAM(S): at 17:49

## 2024-01-12 RX ADMIN — PIPERACILLIN AND TAZOBACTAM 25 GRAM(S): 4; .5 INJECTION, POWDER, LYOPHILIZED, FOR SOLUTION INTRAVENOUS at 05:15

## 2024-01-12 RX ADMIN — DEXMEDETOMIDINE HYDROCHLORIDE IN 0.9% SODIUM CHLORIDE 22.9 MICROGRAM(S)/KG/HR: 4 INJECTION INTRAVENOUS at 21:46

## 2024-01-12 RX ADMIN — Medication 650 MILLIGRAM(S): at 12:35

## 2024-01-12 RX ADMIN — HYDROMORPHONE HYDROCHLORIDE 1 MILLIGRAM(S): 2 INJECTION INTRAMUSCULAR; INTRAVENOUS; SUBCUTANEOUS at 17:13

## 2024-01-12 RX ADMIN — OXYCODONE HYDROCHLORIDE 5 MILLIGRAM(S): 5 TABLET ORAL at 17:50

## 2024-01-12 RX ADMIN — Medication 15 MILLIGRAM(S): at 03:47

## 2024-01-12 RX ADMIN — Medication 1 MILLIGRAM(S): at 12:35

## 2024-01-12 RX ADMIN — DEXMEDETOMIDINE HYDROCHLORIDE IN 0.9% SODIUM CHLORIDE 22.9 MICROGRAM(S)/KG/HR: 4 INJECTION INTRAVENOUS at 07:57

## 2024-01-12 RX ADMIN — HYDROMORPHONE HYDROCHLORIDE 1 MILLIGRAM(S): 2 INJECTION INTRAMUSCULAR; INTRAVENOUS; SUBCUTANEOUS at 13:30

## 2024-01-12 NOTE — PROGRESS NOTE ADULT - ASSESSMENT
ASSESSMENT: 48y Male with PMHx hepatitis C (diagnosed many years ago, never treated), depression transferred from outside hospital for trauma eval. Patient found down by roommate after unknown amount of time, found with multiple 4-11 L sided rib fractures with flail chest. Chest tube placed there with >1L output (old blood). Patient also with tachypnea, concern for respiratory failure started on BiPAP. Level 1 called for transient hypotension, patient given 2 unit PRBC in [facility] ED. Patient admitted to SICU for hemodynamic monitoring, pain management.     Neurologic:  - Propofol off, changed to Precedex gtt  - Phenobarb pushes PRN for agitation and concern for ETOH withdrawal  - Lidocaine patch, Dilaudid PRN for breakthrough pain  - EEG negative prelim read    Respiratory:  - Intubated 1/8 for concern of poor mental status, airway protection. On PRVC 500/14/30%/+5  - Failed SBT  - 1/10 VATS with discovery of Fibropurulent exudative adhesions. Further dissection revealed a piece of retained foreign material likely the tip of a glove, s/p washout, with new 28f chest tube placed  - Incentive spirometry  - AM CXR    Cardiovascular:  - Remains on low dose pressors  - Lactate remains negative    Gastrointestinal/Nutrition:  - Tube feeds via OGT at goal 60cc/hr  - Bowel regimen with senna, Miralax  - PPI while intubated     Genitourinary/Renal:  - Creatinine normalized, electrolytes improved   - CPK cleared  - Supplement electrolytes PRN    Hematologic:  - Chemical VTE ppx with Lovenox  - Mechanical VTE ppx with SCDs    Infectious Disease: Empyema   - Continue Zosyn 1/7-  - S/p vanco 1/10  - Procal 5.27  - Tissue culture 1/10 no organisms seen   - BCx 1/9 no growth to date   - MRSA PCR 1/9 negative    Endocrine:  - No active issues, no history of DM    Disposition: SICU ASSESSMENT: 48y Male with PMHx hepatitis C (diagnosed many years ago, never treated), depression transferred from outside hospital for trauma eval. Patient found down by roommate after unknown amount of time, found with multiple 4-11 L sided rib fractures with flail chest. Chest tube placed there with >1L output (old blood). Patient also with tachypnea, concern for respiratory failure started on BiPAP. Level 1 called for transient hypotension, patient given 2 unit PRBC in [facility] ED. Patient admitted to SICU for hemodynamic monitoring, pain management.     Neurologic:  - Propofol off, changed to Precedex gtt  - Phenobarb pushes PRN for agitation and concern for ETOH withdrawal  - Lidocaine patch, Dilaudid PRN for breakthrough pain  - EEG negative prelim read    Respiratory:  - Intubated 1/8 for concern of poor mental status, airway protection. On PRVC 500/14/30%/+5  - Failed SBT  - 1/10 VATS with discovery of Fibropurulent exudative adhesions. Further dissection revealed a piece of retained foreign material likely the tip of a glove, s/p washout, with new 28f chest tube placed  - Incentive spirometry  - AM CXR    Cardiovascular:  - Remains on low dose pressors  - Lactate remains negative    Gastrointestinal/Nutrition:  - Tube feeds via OGT at goal 60cc/hr  - Bowel regimen with senna, Miralax  - PPI while intubated     Genitourinary/Renal:  - Creatinine normalized, electrolytes improved   - CPK cleared  - Supplement electrolytes PRN    Hematologic:  - Chemical VTE ppx with Lovenox  - Mechanical VTE ppx with SCDs    Infectious Disease: Empyema   - Continue Zosyn 1/7-  - S/p vanco 1/10  - Procal 5.27  - Tissue culture 1/10 no organisms seen   - BCx 1/9 no growth to date   - MRSA PCR 1/9 negative    Endocrine:  - No active issues, no history of DM    Disposition: SICU ASSESSMENT: 48y Male with PMHx hepatitis C (diagnosed many years ago, never treated), depression transferred from outside hospital for trauma eval. Patient found down by roommate after unknown amount of time, found with multiple 4-11 L sided rib fractures with flail chest. Chest tube placed there with >1L output (old blood). Patient also with tachypnea, concern for respiratory failure started on BiPAP. Level 1 called for transient hypotension, patient given 2 unit PRBC in [facility] ED. Patient admitted to SICU for hemodynamic monitoring, pain management.     Neurologic:  - Propofol off, changed to Precedex gtt  - Phenobarb pushes PRN for agitation and concern for ETOH withdrawal  - Lidocaine patch, Dilaudid PRN for breakthrough pain  - EEG negative prelim read    Respiratory:  - Intubated 1/8 for concern of poor mental status, airway protection. On PRVC 500/14/30%/+5  - Failed SBT  - 1/10 VATS with discovery of Fibropurulent exudative adhesions. Further dissection revealed a piece of retained foreign material likely the tip of a glove, s/p washout, with new 28f chest tube placed  - Incentive spirometry  - AM CXR    Cardiovascular:  - Remains on low dose pressors  - Lactate remains negative    Gastrointestinal/Nutrition:  - Tube feeds via OGT at goal 60cc/hr  - Bowel regimen with senna, Miralax  - PPI while intubated     Genitourinary/Renal:  - Creatinine normalized, electrolytes improved   - CPK cleared  - Supplement electrolytes PRN    Hematologic:  - Chemical VTE ppx with Lovenox  - Mechanical VTE ppx with SCDs  -transfused 1u prbc 1/12    Infectious Disease: Empyema   - Continue Zosyn 1/7-  - S/p vanco 1/10  - Procal 5.27  - Tissue culture 1/10 no organisms seen   - BCx 1/9 no growth to date   - MRSA PCR 1/9 negative    Endocrine:  - No active issues, no history of DM    Disposition: SICU ASSESSMENT: 48y Male with PMHx hepatitis C (diagnosed many years ago, never treated), depression transferred from outside hospital for trauma eval. Patient found down by roommate after unknown amount of time, found with multiple 4-11 L sided rib fractures with flail chest. Chest tube placed there with >1L output (old blood). Patient also with tachypnea, concern for respiratory failure started on BiPAP. Level 1 called for transient hypotension, patient given 2 unit PRBC in [facility] ED. Patient admitted to SICU for hemodynamic monitoring, pain management.     Neurologic:  - Propofol off, changed to Precedex gtt  - Phenobarb pushes PRN for agitation and concern for ETOH withdrawal  - Lidocaine patch, Dilaudid PRN for breakthrough pain  - EEG negative prelim read    Respiratory:  - Intubated 1/8 for concern of poor mental status, airway protection. On PRVC 500/14/30%/+5  - Failed SBT  - 1/10 VATS with discovery of Fibropurulent exudative adhesions. Further dissection revealed a piece of retained foreign material likely the tip of a glove, s/p washout, with new 28f chest tube placed  - Incentive spirometry  - AM CXR    Cardiovascular:  - Remains on low dose pressors  - Lactate remains negative    Gastrointestinal/Nutrition:  - Tube feeds via OGT at goal 60cc/hr  - Bowel regimen with senna, Miralax  - PPI while intubated     Genitourinary/Renal:  - Creatinine normalized, electrolytes improved   - CPK cleared  - Supplement electrolytes PRN    Hematologic:  - Chemical VTE ppx with Lovenox  - Mechanical VTE ppx with SCDs  -transfused 1u prbc 1/12    Infectious Disease: Empyema   - Continue Zosyn 1/7-  - S/p vanco 1/10  - Procal 5.27  - Tissue culture 1/10 no organisms seen   - BCx 1/9 no growth to date   - MRSA PCR 1/9 negative    Endocrine:  - No active issues, no history of DM    Disposition: SICU

## 2024-01-12 NOTE — PROGRESS NOTE ADULT - SUBJECTIVE AND OBJECTIVE BOX
24 HOUR EVENTS:  - Propofol off, changed to Precedex gtt  - Phenobarb pushes PRN for agitation  - Failed SBT  - Remains on low dose pressors    SUBJECTIVE/ROS:  [ X ] A ten-point review of systems was otherwise negative except as noted.  [ ] Due to altered mental status/intubation, subjective information were not able to be obtained from the patient. History was obtained, to the extent possible, from review of the chart and collateral sources of information.      NEURO  Exam: Intubated, sedated  Meds: dexMEDEtomidine Infusion 1 MICROgram(s)/kG/Hr IV Continuous <Continuous>  HYDROmorphone  Injectable 0.5 milliGRAM(s) IV Push every 3 hours PRN Breakthrough  ketorolac   Injectable 15 milliGRAM(s) IV Push every 6 hours PRN Mild Pain (1 - 3), fever  PHENobarbital Injectable 130 milliGRAM(s) IV Push every 6 hours  PHENobarbital Injectable 130 milliGRAM(s) IV Push every 6 hours PRN Agitation    [x] Adequacy of sedation and pain control has been assessed and adjusted      RESPIRATORY  RR: 23 (24 @ 01:15) (16 - 35)  SpO2: 95% (24 @ 01:15) (66% - 100%)  Wt(kg): --  Exam: CTA b/l. No murmurs, rubs, gallops appreciated.   Mechanical Ventilation: Mode: AC/ CMV (Assist Control/ Continuous Mandatory Ventilation), RR (machine): 14, RR (patient): 27, TV (machine): 500, FiO2: 30, PEEP: 5, ITime: 0.9, MAP: 12, PIP: 22  ABG - ( 2024 04:40 )  pH: 7.49  /  pCO2: 33    /  pO2: 130   / HCO3: 25    / Base Excess: 1.8   /  SaO2: 99.6    Lactate: x                [ ] Extubation Readiness Assessed  Meds:       CARDIOVASCULAR  HR: 74 (24 @ 01:15) (71 - 107)  BP: 107/56 (24 @ 01:15) (97/55 - 146/62)  BP(mean): 77 (24 @ 01:15) (69 - 104)  ABP: 0/0 (24 @ 18:45) (0/0 - 137/65)  ABP(mean): 0 (24 @ 18:45) (0 - 92)  Wt(kg): --  CVP(cm H2O): --      Exam: S1S2. No murmurs, rubs, gallops appreciated.  Cardiac Rhythm: NSR rate 85  Meds: phenylephrine    Infusion 0.3 MICROgram(s)/kG/Min IV Continuous <Continuous>        GI/NUTRITION  Exam: Soft, non-distended, non-tender.   Diet: Tube feeds @60  Meds: pantoprazole  Injectable 40 milliGRAM(s) IV Push daily  polyethylene glycol 3350 17 Gram(s) Oral every 12 hours  senna Syrup 10 milliLiter(s) Oral every 24 hours      GENITOURINARY  I&O's Detail    01-10 @ 07: @ 07:00  --------------------------------------------------------  IN:    Dexmedetomidine: 300.4 mL    Enteral Tube Flush: 60 mL    IV PiggyBack: 375 mL    IV PiggyBack: 700 mL    Lactated Ringers Bolus: 1000 mL    Phenylephrine: 535.6 mL    Propofol: 247.5 mL  Total IN: 3218.5 mL    OUT:    Chest Tube (mL): 215 mL    Indwelling Catheter - Urethral (mL): 1065 mL    Miscellaneous Tube Feedin mL  Total OUT: 1280 mL    Total NET: 1938.5 mL       @ 07:12 @ 01:22  --------------------------------------------------------  IN:    Dexmedetomidine: 474.7 mL    Enteral Tube Flush: 205 mL    IV PiggyBack: 550 mL    IV PiggyBack: 425 mL    Miscellaneous Tube Feedin mL    Phenylephrine: 262 mL    Phenylephrine: 10.2 mL    Propofol: 55 mL  Total IN: 2321.9 mL    OUT:    Chest Tube (mL): 25 mL    Indwelling Catheter - Urethral (mL): 760 mL  Total OUT: 785 mL    Total NET: 1536.9 mL              134<L>  |  101  |  8   ----------------------------<  100<H>  3.6   |  24  |  0.58    Ca    7.4<L>      2024 04:43  Phos  3.4       Mg     2.2           [ ] Hendrix catheter, indication: N/A  Meds: ascorbic acid 500 milliGRAM(s) Oral daily  cyanocobalamin 1000 MICROGram(s) Oral daily  folic acid 1 milliGRAM(s) Enteral Tube daily  multivitamin/minerals/iron Oral Solution (CENTRUM) 15 milliLiter(s) Enteral Tube daily        HEMATOLOGIC  Meds: enoxaparin Injectable 40 milliGRAM(s) SubCutaneous every 24 hours    [x] VTE Prophylaxis                        8.1    18.95 )-----------( 390      ( 2024 04:44 )             23.0     PT/INR - ( 2024 04:43 )   PT: 12.3 sec;   INR: 1.18 ratio         PTT - ( 2024 04:43 )  PTT:30.9 sec  Transfusion     [ ] PRBC   [ ] Platelets   [ ] FFP   [ ] Cryoprecipitate      INFECTIOUS DISEASES  T(C): 37.3 (24 @ 23:00), Max: 38.5 (24 @ 20:00)  Wt(kg): --  WBC Count: 18.95 K/uL ( @ 04:44)    Recent Cultures:  Specimen Source: .Tissue Other, 01-10 @ 22:30; Results   Testing in progress; Gram Stain:   No polymorphonuclear cells seen per low power field  No organisms seen per oil power field; Organism: --  Specimen Source: Combi-Cath Combi-Cath,  @ 18:27; Results   Normal Respiratory Farzana present; Gram Stain:   No polymorphonuclear cells seen per low power field  No squamous epithelial cells per low power field  No organisms seen per oil power field; Organism: --  Specimen Source: .Blood Blood-Venous,  @ 13:30; Results   No growth at 48 Hours; Gram Stain: --; Organism: --  Specimen Source: .Blood Blood-Venous,  @ 13:15; Results   No growth at 48 Hours; Gram Stain: --; Organism: --  Specimen Source: .Blood Blood-Peripheral,  @ 21:48; Results   No growth at 4 days; Gram Stain: --; Organism: --    Meds: influenza   Vaccine 0.5 milliLiter(s) IntraMuscular once  piperacillin/tazobactam IVPB.. 3.375 Gram(s) IV Intermittent every 8 hours  vancomycin  IVPB 1250 milliGRAM(s) IV Intermittent every 12 hours        ENDOCRINE  Capillary Blood Glucose    Meds:       ACCESS DEVICES:  [ ] Peripheral IV  [ X ] Central Venous Line	[ X ] R	[ ] L	[ X ] IJ	[ ] Fem	[ ] SC	Placed:   [ ] Arterial Line		[ ] R	[ ] L	[ ] Fem	[ ] Rad	[ ] Ax	Placed:   [ ] PICC:					[ ] Mediport  [ X ] Urinary Catheter, Date Placed:   [ ] Necessity of urinary, arterial, and venous catheters discussed    OTHER MEDICATIONS:  chlorhexidine 0.12% Liquid 15 milliLiter(s) Oral Mucosa every 12 hours  chlorhexidine 2% Cloths 1 Application(s) Topical <User Schedule>  lidocaine   4% Patch 1 Patch Transdermal every 24 hours  mupirocin 2% Nasal 1 Application(s) Both Nostrils two times a day      CODE STATUS: Full    IMAGING: 24 HOUR EVENTS:  - Propofol off, changed to Precedex gtt  - Phenobarb pushes PRN for agitation  - Failed SBT  - Remains on low dose pressors  -transfused 1u prbc hgb 7.1    SUBJECTIVE/ROS:  [ X ] A ten-point review of systems was otherwise negative except as noted.  [ ] Due to altered mental status/intubation, subjective information were not able to be obtained from the patient. History was obtained, to the extent possible, from review of the chart and collateral sources of information.      NEURO  Exam: Intubated, sedated  Meds: dexMEDEtomidine Infusion 1 MICROgram(s)/kG/Hr IV Continuous <Continuous>  HYDROmorphone  Injectable 0.5 milliGRAM(s) IV Push every 3 hours PRN Breakthrough  ketorolac   Injectable 15 milliGRAM(s) IV Push every 6 hours PRN Mild Pain (1 - 3), fever  PHENobarbital Injectable 130 milliGRAM(s) IV Push every 6 hours  PHENobarbital Injectable 130 milliGRAM(s) IV Push every 6 hours PRN Agitation    [x] Adequacy of sedation and pain control has been assessed and adjusted      RESPIRATORY  RR: 23 (24 @ 01:15) (16 - 35)  SpO2: 95% (24 @ 01:15) (66% - 100%)  Wt(kg): --  Exam: CTA b/l. No murmurs, rubs, gallops appreciated.   Mechanical Ventilation: Mode: AC/ CMV (Assist Control/ Continuous Mandatory Ventilation), RR (machine): 14, RR (patient): 27, TV (machine): 500, FiO2: 30, PEEP: 5, ITime: 0.9, MAP: 12, PIP: 22  ABG - ( 2024 04:40 )  pH: 7.49  /  pCO2: 33    /  pO2: 130   / HCO3: 25    / Base Excess: 1.8   /  SaO2: 99.6    Lactate: x                [ ] Extubation Readiness Assessed  Meds:       CARDIOVASCULAR  HR: 74 (24 @ 01:15) (71 - 107)  BP: 107/56 (24 @ 01:15) (97/55 - 146/62)  BP(mean): 77 (24 @ 01:15) (69 - 104)  ABP: 0/0 (24 @ 18:45) (0/0 - 137/65)  ABP(mean): 0 (24 @ 18:45) (0 - 92)  Wt(kg): --  CVP(cm H2O): --      Exam: S1S2. No murmurs, rubs, gallops appreciated.  Cardiac Rhythm: NSR rate 85  Meds: phenylephrine    Infusion 0.3 MICROgram(s)/kG/Min IV Continuous <Continuous>        GI/NUTRITION  Exam: Soft, non-distended, non-tender.   Diet: Tube feeds @60  Meds: pantoprazole  Injectable 40 milliGRAM(s) IV Push daily  polyethylene glycol 3350 17 Gram(s) Oral every 12 hours  senna Syrup 10 milliLiter(s) Oral every 24 hours      GENITOURINARY  I&O's Detail    01-10 @ 07: @ 07:00  --------------------------------------------------------  IN:    Dexmedetomidine: 300.4 mL    Enteral Tube Flush: 60 mL    IV PiggyBack: 375 mL    IV PiggyBack: 700 mL    Lactated Ringers Bolus: 1000 mL    Phenylephrine: 535.6 mL    Propofol: 247.5 mL  Total IN: 3218.5 mL    OUT:    Chest Tube (mL): 215 mL    Indwelling Catheter - Urethral (mL): 1065 mL    Miscellaneous Tube Feedin mL  Total OUT: 1280 mL    Total NET: 1938.5 mL       @ 07: @ 01:22  --------------------------------------------------------  IN:    Dexmedetomidine: 474.7 mL    Enteral Tube Flush: 205 mL    IV PiggyBack: 550 mL    IV PiggyBack: 425 mL    Miscellaneous Tube Feedin mL    Phenylephrine: 262 mL    Phenylephrine: 10.2 mL    Propofol: 55 mL  Total IN: 2321.9 mL    OUT:    Chest Tube (mL): 25 mL    Indwelling Catheter - Urethral (mL): 760 mL  Total OUT: 785 mL    Total NET: 1536.9 mL              134<L>  |  101  |  8   ----------------------------<  100<H>  3.6   |  24  |  0.58    Ca    7.4<L>      2024 04:43  Phos  3.4       Mg     2.2           [ ] Hendrix catheter, indication: N/A  Meds: ascorbic acid 500 milliGRAM(s) Oral daily  cyanocobalamin 1000 MICROGram(s) Oral daily  folic acid 1 milliGRAM(s) Enteral Tube daily  multivitamin/minerals/iron Oral Solution (CENTRUM) 15 milliLiter(s) Enteral Tube daily        HEMATOLOGIC  Meds: enoxaparin Injectable 40 milliGRAM(s) SubCutaneous every 24 hours    [x] VTE Prophylaxis                        8.1    18.95 )-----------( 390      ( 2024 04:44 )             23.0     PT/INR - ( 2024 04:43 )   PT: 12.3 sec;   INR: 1.18 ratio         PTT - ( 2024 04:43 )  PTT:30.9 sec  Transfusion     [ ] PRBC   [ ] Platelets   [ ] FFP   [ ] Cryoprecipitate      INFECTIOUS DISEASES  T(C): 37.3 (24 @ 23:00), Max: 38.5 (24 @ 20:00)  Wt(kg): --  WBC Count: 18.95 K/uL ( @ 04:44)    Recent Cultures:  Specimen Source: .Tissue Other, 01-10 @ 22:30; Results   Testing in progress; Gram Stain:   No polymorphonuclear cells seen per low power field  No organisms seen per oil power field; Organism: --  Specimen Source: Combi-Cath Combi-Cath,  @ 18:27; Results   Normal Respiratory Farzana present; Gram Stain:   No polymorphonuclear cells seen per low power field  No squamous epithelial cells per low power field  No organisms seen per oil power field; Organism: --  Specimen Source: .Blood Blood-Venous,  @ 13:30; Results   No growth at 48 Hours; Gram Stain: --; Organism: --  Specimen Source: .Blood Blood-Venous,  @ 13:15; Results   No growth at 48 Hours; Gram Stain: --; Organism: --  Specimen Source: .Blood Blood-Peripheral,  @ 21:48; Results   No growth at 4 days; Gram Stain: --; Organism: --    Meds: influenza   Vaccine 0.5 milliLiter(s) IntraMuscular once  piperacillin/tazobactam IVPB.. 3.375 Gram(s) IV Intermittent every 8 hours  vancomycin  IVPB 1250 milliGRAM(s) IV Intermittent every 12 hours        ENDOCRINE  Capillary Blood Glucose    Meds:       ACCESS DEVICES:  [ ] Peripheral IV  [ X ] Central Venous Line	[ X ] R	[ ] L	[ X ] IJ	[ ] Fem	[ ] SC	Placed:   [ ] Arterial Line		[ ] R	[ ] L	[ ] Fem	[ ] Rad	[ ] Ax	Placed:   [ ] PICC:					[ ] Mediport  [ X ] Urinary Catheter, Date Placed:   [ ] Necessity of urinary, arterial, and venous catheters discussed    OTHER MEDICATIONS:  chlorhexidine 0.12% Liquid 15 milliLiter(s) Oral Mucosa every 12 hours  chlorhexidine 2% Cloths 1 Application(s) Topical <User Schedule>  lidocaine   4% Patch 1 Patch Transdermal every 24 hours  mupirocin 2% Nasal 1 Application(s) Both Nostrils two times a day      CODE STATUS: Full    IMAGING:

## 2024-01-12 NOTE — PROGRESS NOTE ADULT - SUBJECTIVE AND OBJECTIVE BOX
Patient is a 48y old  Male who presents with a chief complaint of Trauma 1 activation (12 Jan 2024 08:37)      Vital Signs Last 24 Hrs  T(C): 36.4 (01-12-24 @ 07:00), Max: 38.5 (01-11-24 @ 20:00)  T(F): 97.5 (01-12-24 @ 07:00), Max: 101.3 (01-11-24 @ 20:00)  HR: 89 (01-12-24 @ 10:00) (65 - 101)  BP: 138/79 (01-12-24 @ 10:00) (95/50 - 146/62)  RR: 29 (01-12-24 @ 10:00) (16 - 35)  SpO2: 97% (01-12-24 @ 10:00) (66% - 100%)          Mode: CPAP with PS, FiO2: 30, PEEP: 5, PS: 10, MAP: 9, PIP: 20      01-11-24 @ 07:01  -  01-12-24 @ 07:00  --------------------------------------------------------  IN: 3122.1 mL / OUT: 905 mL / NET: 2217.1 mL                            7.1    11.71 )-----------( 383      ( 12 Jan 2024 05:00 )             20.0     134<L>  |  102  |  8   ----------------------------<  107<H>  3.6   |  23  |  0.71      PHYSICAL EXAM  Neurology: Intubated, Sedated  CV : RRR+S1S2  Lungs: Intubated, mechanical BS on ventilator  +Left chest tube with serosanguinous drainage to LWS, no air leak  Abdomen: Soft, NT/ND, +BSx4Q  Extremities: B/L LE warm, no edema, +PP               MEDICATIONS  acetaminophen   Oral Liquid .. 650 milliGRAM(s) Oral every 6 hours  ascorbic acid 500 milliGRAM(s) Oral daily  chlorhexidine 0.12% Liquid 15 milliLiter(s) Oral Mucosa every 12 hours  chlorhexidine 2% Cloths 1 Application(s) Topical <User Schedule>  cyanocobalamin 1000 MICROGram(s) Oral daily  dexMEDEtomidine Infusion 1 MICROgram(s)/kG/Hr IV Continuous <Continuous>  enoxaparin Injectable 40 milliGRAM(s) SubCutaneous every 24 hours  folic acid 1 milliGRAM(s) Enteral Tube daily  HYDROmorphone  Injectable 1 milliGRAM(s) IV Push every 3 hours PRN  ibuprofen  Suspension. 400 milliGRAM(s) Oral every 6 hours PRN  influenza   Vaccine 0.5 milliLiter(s) IntraMuscular once  lidocaine   4% Patch 1 Patch Transdermal every 24 hours  multivitamin/minerals/iron Oral Solution (CENTRUM) 15 milliLiter(s) Enteral Tube daily  mupirocin 2% Nasal 1 Application(s) Both Nostrils two times a day  oxyCODONE    Solution 5 milliGRAM(s) Oral every 4 hours  oxyCODONE    Solution 10 milliGRAM(s) Oral every 4 hours PRN  pantoprazole  Injectable 40 milliGRAM(s) IV Push daily  PHENobarbital Injectable 260 milliGRAM(s) IV Push every 12 hours  PHENobarbital Injectable 130 milliGRAM(s) IV Push every 6 hours PRN  piperacillin/tazobactam IVPB.. 3.375 Gram(s) IV Intermittent every 8 hours  vancomycin  IVPB 1000 milliGRAM(s) IV Intermittent every 8 hours     Patient is a 48y old  Male who presents with a chief complaint of Trauma 1 activation (12 Jan 2024 08:37)      Vital Signs Last 24 Hrs  T(C): 36.4 (01-12-24 @ 07:00), Max: 38.5 (01-11-24 @ 20:00)  T(F): 97.5 (01-12-24 @ 07:00), Max: 101.3 (01-11-24 @ 20:00)  HR: 89 (01-12-24 @ 10:00) (65 - 101)  BP: 138/79 (01-12-24 @ 10:00) (95/50 - 146/62)  RR: 29 (01-12-24 @ 10:00) (16 - 35)  SpO2: 97% (01-12-24 @ 10:00) (66% - 100%)          Mode: CPAP with PS, FiO2: 30, PEEP: 5, PS: 10, MAP: 9, PIP: 20      01-11-24 @ 07:01  -  01-12-24 @ 07:00  --------------------------------------------------------  IN: 3122.1 mL / OUT: 905 mL / NET: 2217.1 mL                            7.1    11.71 )-----------( 383      ( 12 Jan 2024 05:00 )             20.0     134<L>  |  102  |  8   ----------------------------<  107<H>  3.6   |  23  |  0.71      PHYSICAL EXAM  Neurology: Intubated, Sedated  CV : RRR+S1S2  Lungs: Intubated, mechanical BS on ventilator  +Left chest tube with serosanguinous drainage to water seal, no air leak  Abdomen: Soft, NT/ND, +BSx4Q  Extremities: B/L LE warm, no edema, +PP               MEDICATIONS  acetaminophen   Oral Liquid .. 650 milliGRAM(s) Oral every 6 hours  ascorbic acid 500 milliGRAM(s) Oral daily  chlorhexidine 0.12% Liquid 15 milliLiter(s) Oral Mucosa every 12 hours  chlorhexidine 2% Cloths 1 Application(s) Topical <User Schedule>  cyanocobalamin 1000 MICROGram(s) Oral daily  dexMEDEtomidine Infusion 1 MICROgram(s)/kG/Hr IV Continuous <Continuous>  enoxaparin Injectable 40 milliGRAM(s) SubCutaneous every 24 hours  folic acid 1 milliGRAM(s) Enteral Tube daily  HYDROmorphone  Injectable 1 milliGRAM(s) IV Push every 3 hours PRN  ibuprofen  Suspension. 400 milliGRAM(s) Oral every 6 hours PRN  influenza   Vaccine 0.5 milliLiter(s) IntraMuscular once  lidocaine   4% Patch 1 Patch Transdermal every 24 hours  multivitamin/minerals/iron Oral Solution (CENTRUM) 15 milliLiter(s) Enteral Tube daily  mupirocin 2% Nasal 1 Application(s) Both Nostrils two times a day  oxyCODONE    Solution 5 milliGRAM(s) Oral every 4 hours  oxyCODONE    Solution 10 milliGRAM(s) Oral every 4 hours PRN  pantoprazole  Injectable 40 milliGRAM(s) IV Push daily  PHENobarbital Injectable 260 milliGRAM(s) IV Push every 12 hours  PHENobarbital Injectable 130 milliGRAM(s) IV Push every 6 hours PRN  piperacillin/tazobactam IVPB.. 3.375 Gram(s) IV Intermittent every 8 hours  vancomycin  IVPB 1000 milliGRAM(s) IV Intermittent every 8 hours
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
## 2024-01-12 NOTE — PROGRESS NOTE ADULT - CRITICAL CARE ATTENDING COMMENT
Dr. Garcia (Attending Physician)  Alcohol Withdrawal on Phenobarb standing and prn, Precedex, starting oxycodone, ibuprofen, tylenol  TLSO brace  Acute Respiratory Failure with Hypoxia chest tube placed to water seal chest tube output  TFs at goal  Start naloxogel for bowel regimen  Hb decreased  Empyema - Vanc trough 11, will increase today for goal 15-20
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
## 2024-01-12 NOTE — PROGRESS NOTE ADULT - SUBJECTIVE AND OBJECTIVE BOX
ACS TEAM SURGERY DAILY PROGRESS NOTE:     INTERVAL EVENTS:   - Propofol off, changed to Precedex gtt  - Phenobarb pushes PRN for agitation  - Failed SBT  - Remains on low dose pressors  -transfused 1u prbc hgb 7.1      SUBJECTIVE/ROS: Patient seen and examined at bedside by surgical team.     OBJECTIVE:  Vital Signs Last 24 Hrs  T(C): 36.4 (2024 07:00), Max: 38.5 (2024 20:00)  T(F): 97.5 (2024 07:00), Max: 101.3 (2024 20:00)  HR: 73 (2024 08:00) (65 - 107)  BP: 115/57 (2024 08:00) (95/50 - 146/62)  BP(mean): 79 (2024 08:00) (69 - 104)  RR: 25 (2024 08:00) (16 - 35)  SpO2: 95% (:00) (66% - 100%)    Parameters below as of 2024 07:00  Patient On (Oxygen Delivery Method): ventilator    O2 Concentration (%): 30                        7.1    11.71 )-----------( 383      ( 2024 05:00 )             20.0     01-12    134<L>  |  102  |  8   ----------------------------<  107<H>  3.6   |  23  |  0.71    Ca    7.2<L>      2024 05:00  Phos  2.8     12  Mg     2.0     12     PT/INR - ( 2024 05:00 )   PT: 11.6 sec;   INR: 1.06 ratio         PTT - ( 2024 05:00 )  PTT:30.2 sec  I&O's Detail    2024 07:01  -  2024 07:00  --------------------------------------------------------  IN:    Dexmedetomidine: 674.9 mL    Enteral Tube Flush: 405 mL    IV PiggyBack: 475 mL    IV PiggyBack: 800 mL    Miscellaneous Tube Feedin mL    Phenylephrine: 262 mL    Phenylephrine: 10.2 mL    Propofol: 55 mL  Total IN: 3122.1 mL    OUT:    Chest Tube (mL): 75 mL    Indwelling Catheter - Urethral (mL): 830 mL  Total OUT: 905 mL    Total NET: 2217.1 mL      2024 07:01  -  2024 08:38  --------------------------------------------------------  IN:    Dexmedetomidine: 34.4 mL    Enteral Tube Flush: 20 mL    IV PiggyBack: 25 mL    Miscellaneous Tube Feedin mL    PRBCs (Packed Red Blood Cells): 300 mL  Total IN: 409.4 mL    OUT:    Indwelling Catheter - Urethral (mL): 30 mL  Total OUT: 30 mL    Total NET: 379.4 mL          IMAGING:      PHYSICAL EXAM:  Constitutional: NAD  Respiratory: non-labored breathing, patent airway  Gastrointestinal: abdomen soft, nontender, nondistended  Extremities: warm  Neurological: intact
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
## 2024-01-12 NOTE — PROGRESS NOTE ADULT - PROBLEM SELECTOR PLAN 1
Suspected fracture of rib on left side, closed.   ·  Plan: Left Rib Fx 4-11th /Hemothorax  1/10 underwent VATS, with partial lung decortication  Thoracoscopic removal of intrapleural foreign body    daily xray    maintain chest  tube lws until 1/13  care as  per SICU team Suspected fracture of rib on left side, closed. Left Rib Fx 4-11th /Hemothorax  1/10 underwent VATS, with partial lung decortication  Thoracoscopic removal of intrapleural foreign body    Left chest tube placed on water seal 1/12  Strict I & Os, monitor drainage  daily xray while chest tube in place  Vent management wean as tolerated  Continue care as per SICU team Suspected fracture of rib on left side, closed. Left Rib Fx 4-11th /Hemothorax  1/10 underwent VATS, with partial lung decortication  Thoracoscopic removal of intrapleural foreign body    Maintain left chest tube to continuous LWS x48h postop per Dr. Sepulveda  till 1/13  Strict I & Os, monitor drainage  daily xray while chest tube in place  Vent management wean as tolerated  Continue care as per SICU team

## 2024-01-13 LAB
ANION GAP SERPL CALC-SCNC: 8 MMOL/L — SIGNIFICANT CHANGE UP (ref 5–17)
ANION GAP SERPL CALC-SCNC: 8 MMOL/L — SIGNIFICANT CHANGE UP (ref 5–17)
APPEARANCE UR: ABNORMAL
APPEARANCE UR: ABNORMAL
APTT BLD: 28.8 SEC — SIGNIFICANT CHANGE UP (ref 24.5–35.6)
APTT BLD: 28.8 SEC — SIGNIFICANT CHANGE UP (ref 24.5–35.6)
BACTERIA # UR AUTO: NEGATIVE /HPF — SIGNIFICANT CHANGE UP
BACTERIA # UR AUTO: NEGATIVE /HPF — SIGNIFICANT CHANGE UP
BILIRUB UR-MCNC: NEGATIVE — SIGNIFICANT CHANGE UP
BILIRUB UR-MCNC: NEGATIVE — SIGNIFICANT CHANGE UP
BLD GP AB SCN SERPL QL: NEGATIVE — SIGNIFICANT CHANGE UP
BLD GP AB SCN SERPL QL: NEGATIVE — SIGNIFICANT CHANGE UP
BUN SERPL-MCNC: 13 MG/DL — SIGNIFICANT CHANGE UP (ref 7–23)
BUN SERPL-MCNC: 13 MG/DL — SIGNIFICANT CHANGE UP (ref 7–23)
CALCIUM SERPL-MCNC: 7.5 MG/DL — LOW (ref 8.4–10.5)
CALCIUM SERPL-MCNC: 7.5 MG/DL — LOW (ref 8.4–10.5)
CAST: 9 /LPF — HIGH (ref 0–4)
CAST: 9 /LPF — HIGH (ref 0–4)
CHLORIDE SERPL-SCNC: 102 MMOL/L — SIGNIFICANT CHANGE UP (ref 96–108)
CHLORIDE SERPL-SCNC: 102 MMOL/L — SIGNIFICANT CHANGE UP (ref 96–108)
CO2 SERPL-SCNC: 22 MMOL/L — SIGNIFICANT CHANGE UP (ref 22–31)
CO2 SERPL-SCNC: 22 MMOL/L — SIGNIFICANT CHANGE UP (ref 22–31)
COD CRY URNS QL: PRESENT
COD CRY URNS QL: PRESENT
COLOR SPEC: YELLOW — SIGNIFICANT CHANGE UP
COLOR SPEC: YELLOW — SIGNIFICANT CHANGE UP
CREAT SERPL-MCNC: 0.88 MG/DL — SIGNIFICANT CHANGE UP (ref 0.5–1.3)
CREAT SERPL-MCNC: 0.88 MG/DL — SIGNIFICANT CHANGE UP (ref 0.5–1.3)
DIFF PNL FLD: ABNORMAL
DIFF PNL FLD: ABNORMAL
EGFR: 107 ML/MIN/1.73M2 — SIGNIFICANT CHANGE UP
EGFR: 107 ML/MIN/1.73M2 — SIGNIFICANT CHANGE UP
GAS PNL BLDV: SIGNIFICANT CHANGE UP
GAS PNL BLDV: SIGNIFICANT CHANGE UP
GLUCOSE SERPL-MCNC: 135 MG/DL — HIGH (ref 70–99)
GLUCOSE SERPL-MCNC: 135 MG/DL — HIGH (ref 70–99)
GLUCOSE UR QL: NEGATIVE MG/DL — SIGNIFICANT CHANGE UP
GLUCOSE UR QL: NEGATIVE MG/DL — SIGNIFICANT CHANGE UP
GRAM STN FLD: SIGNIFICANT CHANGE UP
GRAM STN FLD: SIGNIFICANT CHANGE UP
HCT VFR BLD CALC: 22.5 % — LOW (ref 39–50)
HCT VFR BLD CALC: 22.5 % — LOW (ref 39–50)
HGB BLD-MCNC: 7.8 G/DL — LOW (ref 13–17)
HGB BLD-MCNC: 7.8 G/DL — LOW (ref 13–17)
INR BLD: 1.01 RATIO — SIGNIFICANT CHANGE UP (ref 0.85–1.18)
INR BLD: 1.01 RATIO — SIGNIFICANT CHANGE UP (ref 0.85–1.18)
KETONES UR-MCNC: NEGATIVE MG/DL — SIGNIFICANT CHANGE UP
KETONES UR-MCNC: NEGATIVE MG/DL — SIGNIFICANT CHANGE UP
LEUKOCYTE ESTERASE UR-ACNC: ABNORMAL
LEUKOCYTE ESTERASE UR-ACNC: ABNORMAL
MAGNESIUM SERPL-MCNC: 2 MG/DL — SIGNIFICANT CHANGE UP (ref 1.6–2.6)
MAGNESIUM SERPL-MCNC: 2 MG/DL — SIGNIFICANT CHANGE UP (ref 1.6–2.6)
MCHC RBC-ENTMCNC: 29.1 PG — SIGNIFICANT CHANGE UP (ref 27–34)
MCHC RBC-ENTMCNC: 29.1 PG — SIGNIFICANT CHANGE UP (ref 27–34)
MCHC RBC-ENTMCNC: 34.7 GM/DL — SIGNIFICANT CHANGE UP (ref 32–36)
MCHC RBC-ENTMCNC: 34.7 GM/DL — SIGNIFICANT CHANGE UP (ref 32–36)
MCV RBC AUTO: 84 FL — SIGNIFICANT CHANGE UP (ref 80–100)
MCV RBC AUTO: 84 FL — SIGNIFICANT CHANGE UP (ref 80–100)
NITRITE UR-MCNC: NEGATIVE — SIGNIFICANT CHANGE UP
NITRITE UR-MCNC: NEGATIVE — SIGNIFICANT CHANGE UP
NRBC # BLD: 0 /100 WBCS — SIGNIFICANT CHANGE UP (ref 0–0)
NRBC # BLD: 0 /100 WBCS — SIGNIFICANT CHANGE UP (ref 0–0)
PH UR: 5 — SIGNIFICANT CHANGE UP (ref 5–8)
PH UR: 5 — SIGNIFICANT CHANGE UP (ref 5–8)
PHOSPHATE SERPL-MCNC: 2.8 MG/DL — SIGNIFICANT CHANGE UP (ref 2.5–4.5)
PHOSPHATE SERPL-MCNC: 2.8 MG/DL — SIGNIFICANT CHANGE UP (ref 2.5–4.5)
PLATELET # BLD AUTO: 368 K/UL — SIGNIFICANT CHANGE UP (ref 150–400)
PLATELET # BLD AUTO: 368 K/UL — SIGNIFICANT CHANGE UP (ref 150–400)
POTASSIUM SERPL-MCNC: 3.6 MMOL/L — SIGNIFICANT CHANGE UP (ref 3.5–5.3)
POTASSIUM SERPL-MCNC: 3.6 MMOL/L — SIGNIFICANT CHANGE UP (ref 3.5–5.3)
POTASSIUM SERPL-SCNC: 3.6 MMOL/L — SIGNIFICANT CHANGE UP (ref 3.5–5.3)
POTASSIUM SERPL-SCNC: 3.6 MMOL/L — SIGNIFICANT CHANGE UP (ref 3.5–5.3)
PROCALCITONIN SERPL-MCNC: 0.64 NG/ML — HIGH (ref 0.02–0.1)
PROCALCITONIN SERPL-MCNC: 0.64 NG/ML — HIGH (ref 0.02–0.1)
PROT UR-MCNC: 30 MG/DL
PROT UR-MCNC: 30 MG/DL
PROTHROM AB SERPL-ACNC: 10.6 SEC — SIGNIFICANT CHANGE UP (ref 9.5–13)
PROTHROM AB SERPL-ACNC: 10.6 SEC — SIGNIFICANT CHANGE UP (ref 9.5–13)
RBC # BLD: 2.68 M/UL — LOW (ref 4.2–5.8)
RBC # BLD: 2.68 M/UL — LOW (ref 4.2–5.8)
RBC # FLD: 15.3 % — HIGH (ref 10.3–14.5)
RBC # FLD: 15.3 % — HIGH (ref 10.3–14.5)
RBC CASTS # UR COMP ASSIST: 7 /HPF — HIGH (ref 0–4)
RBC CASTS # UR COMP ASSIST: 7 /HPF — HIGH (ref 0–4)
REVIEW: SIGNIFICANT CHANGE UP
REVIEW: SIGNIFICANT CHANGE UP
RH IG SCN BLD-IMP: POSITIVE — SIGNIFICANT CHANGE UP
RH IG SCN BLD-IMP: POSITIVE — SIGNIFICANT CHANGE UP
SODIUM SERPL-SCNC: 132 MMOL/L — LOW (ref 135–145)
SODIUM SERPL-SCNC: 132 MMOL/L — LOW (ref 135–145)
SP GR SPEC: 1.02 — SIGNIFICANT CHANGE UP (ref 1–1.03)
SP GR SPEC: 1.02 — SIGNIFICANT CHANGE UP (ref 1–1.03)
SPECIMEN SOURCE: SIGNIFICANT CHANGE UP
SPECIMEN SOURCE: SIGNIFICANT CHANGE UP
SQUAMOUS # UR AUTO: 8 /HPF — HIGH (ref 0–5)
SQUAMOUS # UR AUTO: 8 /HPF — HIGH (ref 0–5)
TRI-PHOS CRY UR QL COMP ASSIST: PRESENT
TRI-PHOS CRY UR QL COMP ASSIST: PRESENT
URATE CRY FLD QL MICRO: PRESENT
URATE CRY FLD QL MICRO: PRESENT
UROBILINOGEN FLD QL: 1 MG/DL — SIGNIFICANT CHANGE UP (ref 0.2–1)
UROBILINOGEN FLD QL: 1 MG/DL — SIGNIFICANT CHANGE UP (ref 0.2–1)
VANCOMYCIN TROUGH SERPL-MCNC: 18.3 UG/ML — SIGNIFICANT CHANGE UP (ref 10–20)
VANCOMYCIN TROUGH SERPL-MCNC: 18.3 UG/ML — SIGNIFICANT CHANGE UP (ref 10–20)
WBC # BLD: 12.45 K/UL — HIGH (ref 3.8–10.5)
WBC # BLD: 12.45 K/UL — HIGH (ref 3.8–10.5)
WBC # FLD AUTO: 12.45 K/UL — HIGH (ref 3.8–10.5)
WBC # FLD AUTO: 12.45 K/UL — HIGH (ref 3.8–10.5)
WBC UR QL: 14 /HPF — HIGH (ref 0–5)
WBC UR QL: 14 /HPF — HIGH (ref 0–5)

## 2024-01-13 PROCEDURE — 71045 X-RAY EXAM CHEST 1 VIEW: CPT | Mod: 26

## 2024-01-13 PROCEDURE — 99291 CRITICAL CARE FIRST HOUR: CPT | Mod: GC

## 2024-01-13 PROCEDURE — 93010 ELECTROCARDIOGRAM REPORT: CPT

## 2024-01-13 RX ORDER — PHENOBARBITAL 60 MG
130 TABLET ORAL ONCE
Refills: 0 | Status: DISCONTINUED | OUTPATIENT
Start: 2024-01-13 | End: 2024-01-13

## 2024-01-13 RX ORDER — ACETAMINOPHEN 500 MG
500 TABLET ORAL EVERY 6 HOURS
Refills: 0 | Status: DISCONTINUED | OUTPATIENT
Start: 2024-01-13 | End: 2024-01-20

## 2024-01-13 RX ORDER — MULTIVIT-MIN/FERROUS GLUCONATE 9 MG/15 ML
15 LIQUID (ML) ORAL DAILY
Refills: 0 | Status: DISCONTINUED | OUTPATIENT
Start: 2024-01-13 | End: 2024-01-13

## 2024-01-13 RX ORDER — ASCORBIC ACID 60 MG
500 TABLET,CHEWABLE ORAL DAILY
Refills: 0 | Status: DISCONTINUED | OUTPATIENT
Start: 2024-01-13 | End: 2024-01-13

## 2024-01-13 RX ORDER — FOLIC ACID 0.8 MG
1 TABLET ORAL DAILY
Refills: 0 | Status: DISCONTINUED | OUTPATIENT
Start: 2024-01-13 | End: 2024-01-13

## 2024-01-13 RX ORDER — OXYCODONE HYDROCHLORIDE 5 MG/1
5 TABLET ORAL EVERY 4 HOURS
Refills: 0 | Status: DISCONTINUED | OUTPATIENT
Start: 2024-01-13 | End: 2024-01-18

## 2024-01-13 RX ORDER — POTASSIUM PHOSPHATE, MONOBASIC POTASSIUM PHOSPHATE, DIBASIC 236; 224 MG/ML; MG/ML
15 INJECTION, SOLUTION INTRAVENOUS ONCE
Refills: 0 | Status: COMPLETED | OUTPATIENT
Start: 2024-01-13 | End: 2024-01-13

## 2024-01-13 RX ORDER — CALCIUM GLUCONATE 100 MG/ML
2 VIAL (ML) INTRAVENOUS ONCE
Refills: 0 | Status: COMPLETED | OUTPATIENT
Start: 2024-01-13 | End: 2024-01-13

## 2024-01-13 RX ORDER — ASCORBIC ACID 60 MG
500 TABLET,CHEWABLE ORAL DAILY
Refills: 0 | Status: DISCONTINUED | OUTPATIENT
Start: 2024-01-13 | End: 2024-01-26

## 2024-01-13 RX ORDER — QUETIAPINE FUMARATE 200 MG/1
25 TABLET, FILM COATED ORAL AT BEDTIME
Refills: 0 | Status: DISCONTINUED | OUTPATIENT
Start: 2024-01-13 | End: 2024-01-14

## 2024-01-13 RX ORDER — IBUPROFEN 200 MG
400 TABLET ORAL EVERY 4 HOURS
Refills: 0 | Status: DISCONTINUED | OUTPATIENT
Start: 2024-01-13 | End: 2024-01-16

## 2024-01-13 RX ORDER — NOREPINEPHRINE BITARTRATE/D5W 8 MG/250ML
0.02 PLASTIC BAG, INJECTION (ML) INTRAVENOUS
Qty: 8 | Refills: 0 | Status: DISCONTINUED | OUTPATIENT
Start: 2024-01-13 | End: 2024-01-13

## 2024-01-13 RX ORDER — OXYCODONE HYDROCHLORIDE 5 MG/1
10 TABLET ORAL EVERY 4 HOURS
Refills: 0 | Status: DISCONTINUED | OUTPATIENT
Start: 2024-01-13 | End: 2024-01-18

## 2024-01-13 RX ORDER — MIDAZOLAM HYDROCHLORIDE 1 MG/ML
2 INJECTION, SOLUTION INTRAMUSCULAR; INTRAVENOUS ONCE
Refills: 0 | Status: DISCONTINUED | OUTPATIENT
Start: 2024-01-13 | End: 2024-01-13

## 2024-01-13 RX ORDER — PREGABALIN 225 MG/1
1000 CAPSULE ORAL DAILY
Refills: 0 | Status: DISCONTINUED | OUTPATIENT
Start: 2024-01-13 | End: 2024-01-13

## 2024-01-13 RX ORDER — QUETIAPINE FUMARATE 200 MG/1
25 TABLET, FILM COATED ORAL AT BEDTIME
Refills: 0 | Status: DISCONTINUED | OUTPATIENT
Start: 2024-01-13 | End: 2024-01-13

## 2024-01-13 RX ORDER — FOLIC ACID 0.8 MG
1 TABLET ORAL DAILY
Refills: 0 | Status: DISCONTINUED | OUTPATIENT
Start: 2024-01-13 | End: 2024-01-26

## 2024-01-13 RX ORDER — PREGABALIN 225 MG/1
1000 CAPSULE ORAL DAILY
Refills: 0 | Status: DISCONTINUED | OUTPATIENT
Start: 2024-01-13 | End: 2024-01-26

## 2024-01-13 RX ADMIN — LIDOCAINE 1 PATCH: 4 CREAM TOPICAL at 05:48

## 2024-01-13 RX ADMIN — OXYCODONE HYDROCHLORIDE 5 MILLIGRAM(S): 5 TABLET ORAL at 03:42

## 2024-01-13 RX ADMIN — CHLORHEXIDINE GLUCONATE 1 APPLICATION(S): 213 SOLUTION TOPICAL at 05:49

## 2024-01-13 RX ADMIN — Medication 650 MILLIGRAM(S): at 12:51

## 2024-01-13 RX ADMIN — ENOXAPARIN SODIUM 40 MILLIGRAM(S): 100 INJECTION SUBCUTANEOUS at 23:31

## 2024-01-13 RX ADMIN — PIPERACILLIN AND TAZOBACTAM 25 GRAM(S): 4; .5 INJECTION, POWDER, LYOPHILIZED, FOR SOLUTION INTRAVENOUS at 22:10

## 2024-01-13 RX ADMIN — LIDOCAINE 1 PATCH: 4 CREAM TOPICAL at 16:53

## 2024-01-13 RX ADMIN — MIDAZOLAM HYDROCHLORIDE 2 MILLIGRAM(S): 1 INJECTION, SOLUTION INTRAMUSCULAR; INTRAVENOUS at 04:11

## 2024-01-13 RX ADMIN — MUPIROCIN 1 APPLICATION(S): 20 OINTMENT TOPICAL at 17:53

## 2024-01-13 RX ADMIN — Medication 500 MILLIGRAM(S): at 17:29

## 2024-01-13 RX ADMIN — OXYCODONE HYDROCHLORIDE 5 MILLIGRAM(S): 5 TABLET ORAL at 09:07

## 2024-01-13 RX ADMIN — Medication 650 MILLIGRAM(S): at 05:47

## 2024-01-13 RX ADMIN — DEXMEDETOMIDINE HYDROCHLORIDE IN 0.9% SODIUM CHLORIDE 22.9 MICROGRAM(S)/KG/HR: 4 INJECTION INTRAVENOUS at 07:38

## 2024-01-13 RX ADMIN — Medication 500 MILLIGRAM(S): at 23:30

## 2024-01-13 RX ADMIN — HYDROMORPHONE HYDROCHLORIDE 1 MILLIGRAM(S): 2 INJECTION INTRAMUSCULAR; INTRAVENOUS; SUBCUTANEOUS at 00:41

## 2024-01-13 RX ADMIN — PANTOPRAZOLE SODIUM 40 MILLIGRAM(S): 20 TABLET, DELAYED RELEASE ORAL at 11:23

## 2024-01-13 RX ADMIN — POTASSIUM PHOSPHATE, MONOBASIC POTASSIUM PHOSPHATE, DIBASIC 62.5 MILLIMOLE(S): 236; 224 INJECTION, SOLUTION INTRAVENOUS at 02:43

## 2024-01-13 RX ADMIN — OXYCODONE HYDROCHLORIDE 5 MILLIGRAM(S): 5 TABLET ORAL at 05:45

## 2024-01-13 RX ADMIN — Medication 130 MILLIGRAM(S): at 17:14

## 2024-01-13 RX ADMIN — Medication 250 MILLIGRAM(S): at 22:10

## 2024-01-13 RX ADMIN — OXYCODONE HYDROCHLORIDE 5 MILLIGRAM(S): 5 TABLET ORAL at 09:25

## 2024-01-13 RX ADMIN — Medication 130 MILLIGRAM(S): at 00:49

## 2024-01-13 RX ADMIN — Medication 650 MILLIGRAM(S): at 06:45

## 2024-01-13 RX ADMIN — Medication 130 MILLIGRAM(S): at 23:30

## 2024-01-13 RX ADMIN — LIDOCAINE 1 PATCH: 4 CREAM TOPICAL at 07:48

## 2024-01-13 RX ADMIN — PIPERACILLIN AND TAZOBACTAM 25 GRAM(S): 4; .5 INJECTION, POWDER, LYOPHILIZED, FOR SOLUTION INTRAVENOUS at 05:45

## 2024-01-13 RX ADMIN — PIPERACILLIN AND TAZOBACTAM 25 GRAM(S): 4; .5 INJECTION, POWDER, LYOPHILIZED, FOR SOLUTION INTRAVENOUS at 13:59

## 2024-01-13 RX ADMIN — Medication 250 MILLIGRAM(S): at 05:46

## 2024-01-13 RX ADMIN — Medication 250 MILLIGRAM(S): at 14:49

## 2024-01-13 RX ADMIN — Medication 130 MILLIGRAM(S): at 02:42

## 2024-01-13 RX ADMIN — PREGABALIN 1000 MICROGRAM(S): 225 CAPSULE ORAL at 11:23

## 2024-01-13 RX ADMIN — MIDAZOLAM HYDROCHLORIDE 2 MILLIGRAM(S): 1 INJECTION, SOLUTION INTRAMUSCULAR; INTRAVENOUS at 03:34

## 2024-01-13 RX ADMIN — DEXMEDETOMIDINE HYDROCHLORIDE IN 0.9% SODIUM CHLORIDE 22.9 MICROGRAM(S)/KG/HR: 4 INJECTION INTRAVENOUS at 13:59

## 2024-01-13 RX ADMIN — Medication 3.44 MICROGRAM(S)/KG/MIN: at 00:50

## 2024-01-13 RX ADMIN — Medication 130 MILLIGRAM(S): at 11:23

## 2024-01-13 RX ADMIN — OXYCODONE HYDROCHLORIDE 10 MILLIGRAM(S): 5 TABLET ORAL at 05:46

## 2024-01-13 RX ADMIN — Medication 500 MILLIGRAM(S): at 11:23

## 2024-01-13 RX ADMIN — OXYCODONE HYDROCHLORIDE 10 MILLIGRAM(S): 5 TABLET ORAL at 06:45

## 2024-01-13 RX ADMIN — OXYCODONE HYDROCHLORIDE 10 MILLIGRAM(S): 5 TABLET ORAL at 10:40

## 2024-01-13 RX ADMIN — Medication 650 MILLIGRAM(S): at 00:30

## 2024-01-13 RX ADMIN — QUETIAPINE FUMARATE 25 MILLIGRAM(S): 200 TABLET, FILM COATED ORAL at 22:09

## 2024-01-13 RX ADMIN — Medication 500 MILLIGRAM(S): at 17:26

## 2024-01-13 RX ADMIN — OXYCODONE HYDROCHLORIDE 5 MILLIGRAM(S): 5 TABLET ORAL at 02:42

## 2024-01-13 RX ADMIN — OXYCODONE HYDROCHLORIDE 5 MILLIGRAM(S): 5 TABLET ORAL at 06:45

## 2024-01-13 RX ADMIN — HYDROMORPHONE HYDROCHLORIDE 1 MILLIGRAM(S): 2 INJECTION INTRAMUSCULAR; INTRAVENOUS; SUBCUTANEOUS at 00:49

## 2024-01-13 RX ADMIN — CHLORHEXIDINE GLUCONATE 15 MILLILITER(S): 213 SOLUTION TOPICAL at 05:47

## 2024-01-13 RX ADMIN — HYDROMORPHONE HYDROCHLORIDE 1 MILLIGRAM(S): 2 INJECTION INTRAMUSCULAR; INTRAVENOUS; SUBCUTANEOUS at 09:09

## 2024-01-13 RX ADMIN — Medication 1 MILLIGRAM(S): at 11:23

## 2024-01-13 RX ADMIN — OXYCODONE HYDROCHLORIDE 10 MILLIGRAM(S): 5 TABLET ORAL at 11:10

## 2024-01-13 RX ADMIN — HYDROMORPHONE HYDROCHLORIDE 1 MILLIGRAM(S): 2 INJECTION INTRAMUSCULAR; INTRAVENOUS; SUBCUTANEOUS at 09:25

## 2024-01-13 RX ADMIN — Medication 200 GRAM(S): at 11:59

## 2024-01-13 RX ADMIN — Medication 15 MILLILITER(S): at 11:23

## 2024-01-13 RX ADMIN — MUPIROCIN 1 APPLICATION(S): 20 OINTMENT TOPICAL at 05:48

## 2024-01-13 RX ADMIN — Medication 650 MILLIGRAM(S): at 11:24

## 2024-01-13 RX ADMIN — Medication 130 MILLIGRAM(S): at 05:45

## 2024-01-13 RX ADMIN — HYDROMORPHONE HYDROCHLORIDE 1 MILLIGRAM(S): 2 INJECTION INTRAMUSCULAR; INTRAVENOUS; SUBCUTANEOUS at 01:04

## 2024-01-13 RX ADMIN — HYDROMORPHONE HYDROCHLORIDE 1 MILLIGRAM(S): 2 INJECTION INTRAMUSCULAR; INTRAVENOUS; SUBCUTANEOUS at 04:11

## 2024-01-13 NOTE — PROGRESS NOTE ADULT - SUBJECTIVE AND OBJECTIVE BOX
24 HOUR EVENTS:  -hypotension, not fluid responsive s/p 1L  -levo for hypotension, septic shock?  -resent cultures, procalcitonin       SUBJECTIVE/ROS:  Patient seen at bedside this AM.     [ ] Due to altered mental status/intubation, subjective information were not able to be obtained from the patient. History was obtained, to the extent possible, from review of the chart and collateral sources of information.    OBJECTIVE:    VITALS:  Vital Signs Last 24 Hrs  T(C): 38.8 (12 Jan 2024 23:00), Max: 38.8 (12 Jan 2024 23:00)  T(F): 101.8 (12 Jan 2024 23:00), Max: 101.8 (12 Jan 2024 23:00)  HR: 120 (12 Jan 2024 23:00) (65 - 120)  BP: 131/71 (12 Jan 2024 23:00) (82/52 - 144/65)  BP(mean): 97 (12 Jan 2024 23:00) (62 - 103)  RR: 32 (12 Jan 2024 23:00) (16 - 35)  SpO2: 100% (12 Jan 2024 23:00) (92% - 100%)    Parameters below as of 12 Jan 2024 19:00  Patient On (Oxygen Delivery Method): ventilator    O2 Concentration (%): 30  Mode: AC/ CMV (Assist Control/ Continuous Mandatory Ventilation)  RR (machine): 14  TV (machine): 500  FiO2: 30  PEEP: 5  ITime: 1  MAP: 9.7  PIP: 18    I&O's Summary    11 Jan 2024 07:01  -  12 Jan 2024 07:00  --------------------------------------------------------  IN: 3122.1 mL / OUT: 960 mL / NET: 2162.1 mL    12 Jan 2024 07:01  -  13 Jan 2024 00:12  --------------------------------------------------------  IN: 2659 mL / OUT: 590 mL / NET: 2069 mL        PHYSICAL EXAM:    NEURO  RASS:     GCS:     CAM ICU:  Exam: awake, alert, oriented    RESPIRATORY  Exam: no increased WOB on RA  Mechanical Ventilation: Mode: AC/ CMV (Assist Control/ Continuous Mandatory Ventilation), RR (machine): 14, RR (patient): 29, TV (machine): 500, FiO2: 30, PEEP: 5, ITime: 1, MAP: 9.7, PIP: 18    CARDIOVASCULAR  Exam: warm, well-perfused  Cardiac Rhythm: normal sinus rhythm noted on cardiac monitor    GI/NUTRITION  Exam: soft, non-distended, non-tender    GENITOURINARY  Exam:     ACCESS DEVICES:  [ ] Peripheral IV  [ ] Central Venous Line	[ ] R	[ ] L	[ ] IJ	[ ] Fem	[ ] SC	Placed:   [ ] Arterial Line		[ ] R	[ ] L	[ ] Fem	[ ] Rad	[ ] Ax	Placed:   [ ] PICC:					[ ] Mediport  [ ] Urinary Catheter, Date Placed:   [x] Necessity of urinary, arterial, and venous catheters discussed      LABS:                        7.9    12.09 )-----------( 402      ( 12 Jan 2024 18:57 )             22.9   01-12    134<L>  |  102  |  8   ----------------------------<  107<H>  3.6   |  23  |  0.71    Ca    7.2<L>      12 Jan 2024 05:00  Phos  2.8     01-12  Mg     2.0     01-12      CAPILLARY BLOOD GLUCOSE          MEDICATIONS:   MEDICATIONS  (STANDING):  acetaminophen   Oral Liquid .. 650 milliGRAM(s) Oral every 6 hours  ascorbic acid 500 milliGRAM(s) Oral daily  chlorhexidine 0.12% Liquid 15 milliLiter(s) Oral Mucosa every 12 hours  chlorhexidine 2% Cloths 1 Application(s) Topical <User Schedule>  cyanocobalamin 1000 MICROGram(s) Oral daily  dexMEDEtomidine Infusion 1 MICROgram(s)/kG/Hr (22.9 mL/Hr) IV Continuous <Continuous>  enoxaparin Injectable 40 milliGRAM(s) SubCutaneous every 24 hours  folic acid 1 milliGRAM(s) Enteral Tube daily  influenza   Vaccine 0.5 milliLiter(s) IntraMuscular once  lidocaine   4% Patch 1 Patch Transdermal every 24 hours  multivitamin/minerals/iron Oral Solution (CENTRUM) 15 milliLiter(s) Enteral Tube daily  mupirocin 2% Nasal 1 Application(s) Both Nostrils two times a day  norepinephrine Infusion 0.02 MICROgram(s)/kG/Min (3.44 mL/Hr) IV Continuous <Continuous>  oxyCODONE    Solution 5 milliGRAM(s) Oral every 4 hours  pantoprazole  Injectable 40 milliGRAM(s) IV Push daily  PHENobarbital Injectable 130 milliGRAM(s) IV Push every 6 hours  piperacillin/tazobactam IVPB.. 3.375 Gram(s) IV Intermittent every 8 hours  vancomycin  IVPB 1000 milliGRAM(s) IV Intermittent every 8 hours    MEDICATIONS  (PRN):  HYDROmorphone  Injectable 1 milliGRAM(s) IV Push every 3 hours PRN Breakthrough  ibuprofen  Suspension. 400 milliGRAM(s) Oral every 6 hours PRN Mild Pain (1 - 3)  oxyCODONE    Solution 10 milliGRAM(s) Oral every 4 hours PRN Severe Pain (7 - 10)  PHENobarbital Injectable 130 milliGRAM(s) IV Push every 6 hours PRN Agitation      IMAGING: 24 HOUR EVENTS:  -hypotension, not fluid responsive s/p 1L, seems to be intermittent around sedation/pain meds  -resent cultures, procalcitonin       SUBJECTIVE/ROS:  Patient seen at bedside this AM.     [ ] Due to altered mental status/intubation, subjective information were not able to be obtained from the patient. History was obtained, to the extent possible, from review of the chart and collateral sources of information.    OBJECTIVE:    VITALS:  Vital Signs Last 24 Hrs  T(C): 38.8 (12 Jan 2024 23:00), Max: 38.8 (12 Jan 2024 23:00)  T(F): 101.8 (12 Jan 2024 23:00), Max: 101.8 (12 Jan 2024 23:00)  HR: 120 (12 Jan 2024 23:00) (65 - 120)  BP: 131/71 (12 Jan 2024 23:00) (82/52 - 144/65)  BP(mean): 97 (12 Jan 2024 23:00) (62 - 103)  RR: 32 (12 Jan 2024 23:00) (16 - 35)  SpO2: 100% (12 Jan 2024 23:00) (92% - 100%)    Parameters below as of 12 Jan 2024 19:00  Patient On (Oxygen Delivery Method): ventilator    O2 Concentration (%): 30  Mode: AC/ CMV (Assist Control/ Continuous Mandatory Ventilation)  RR (machine): 14  TV (machine): 500  FiO2: 30  PEEP: 5  ITime: 1  MAP: 9.7  PIP: 18    I&O's Summary    11 Jan 2024 07:01  -  12 Jan 2024 07:00  --------------------------------------------------------  IN: 3122.1 mL / OUT: 960 mL / NET: 2162.1 mL    12 Jan 2024 07:01  -  13 Jan 2024 00:12  --------------------------------------------------------  IN: 2659 mL / OUT: 590 mL / NET: 2069 mL        PHYSICAL EXAM:    NEURO  RASS:     GCS:     CAM ICU:  Exam: awake, alert, oriented    RESPIRATORY  Exam: no increased WOB on RA  Mechanical Ventilation: Mode: AC/ CMV (Assist Control/ Continuous Mandatory Ventilation), RR (machine): 14, RR (patient): 29, TV (machine): 500, FiO2: 30, PEEP: 5, ITime: 1, MAP: 9.7, PIP: 18    CARDIOVASCULAR  Exam: warm, well-perfused  Cardiac Rhythm: normal sinus rhythm noted on cardiac monitor    GI/NUTRITION  Exam: soft, non-distended, non-tender    GENITOURINARY  Exam:     ACCESS DEVICES:  [ ] Peripheral IV  [ ] Central Venous Line	[ ] R	[ ] L	[ ] IJ	[ ] Fem	[ ] SC	Placed:   [ ] Arterial Line		[ ] R	[ ] L	[ ] Fem	[ ] Rad	[ ] Ax	Placed:   [ ] PICC:					[ ] Mediport  [ ] Urinary Catheter, Date Placed:   [x] Necessity of urinary, arterial, and venous catheters discussed      LABS:                        7.9    12.09 )-----------( 402      ( 12 Jan 2024 18:57 )             22.9   01-12    134<L>  |  102  |  8   ----------------------------<  107<H>  3.6   |  23  |  0.71    Ca    7.2<L>      12 Jan 2024 05:00  Phos  2.8     01-12  Mg     2.0     01-12      CAPILLARY BLOOD GLUCOSE          MEDICATIONS:   MEDICATIONS  (STANDING):  acetaminophen   Oral Liquid .. 650 milliGRAM(s) Oral every 6 hours  ascorbic acid 500 milliGRAM(s) Oral daily  chlorhexidine 0.12% Liquid 15 milliLiter(s) Oral Mucosa every 12 hours  chlorhexidine 2% Cloths 1 Application(s) Topical <User Schedule>  cyanocobalamin 1000 MICROGram(s) Oral daily  dexMEDEtomidine Infusion 1 MICROgram(s)/kG/Hr (22.9 mL/Hr) IV Continuous <Continuous>  enoxaparin Injectable 40 milliGRAM(s) SubCutaneous every 24 hours  folic acid 1 milliGRAM(s) Enteral Tube daily  influenza   Vaccine 0.5 milliLiter(s) IntraMuscular once  lidocaine   4% Patch 1 Patch Transdermal every 24 hours  multivitamin/minerals/iron Oral Solution (CENTRUM) 15 milliLiter(s) Enteral Tube daily  mupirocin 2% Nasal 1 Application(s) Both Nostrils two times a day  norepinephrine Infusion 0.02 MICROgram(s)/kG/Min (3.44 mL/Hr) IV Continuous <Continuous>  oxyCODONE    Solution 5 milliGRAM(s) Oral every 4 hours  pantoprazole  Injectable 40 milliGRAM(s) IV Push daily  PHENobarbital Injectable 130 milliGRAM(s) IV Push every 6 hours  piperacillin/tazobactam IVPB.. 3.375 Gram(s) IV Intermittent every 8 hours  vancomycin  IVPB 1000 milliGRAM(s) IV Intermittent every 8 hours    MEDICATIONS  (PRN):  HYDROmorphone  Injectable 1 milliGRAM(s) IV Push every 3 hours PRN Breakthrough  ibuprofen  Suspension. 400 milliGRAM(s) Oral every 6 hours PRN Mild Pain (1 - 3)  oxyCODONE    Solution 10 milliGRAM(s) Oral every 4 hours PRN Severe Pain (7 - 10)  PHENobarbital Injectable 130 milliGRAM(s) IV Push every 6 hours PRN Agitation      IMAGING:

## 2024-01-13 NOTE — PROGRESS NOTE ADULT - ASSESSMENT
ASSESSMENT: 48y Male with PMHx hepatitis C (diagnosed many years ago, never treated), depression transferred from outside hospital for trauma eval. Patient found down by roommate after unknown amount of time, found with multiple 4-11 L sided rib fractures with flail chest. Chest tube placed there with >1L output (old blood). Patient also with tachypnea, concern for respiratory failure started on BiPAP. Level 1 called for transient hypotension, patient given 2 unit PRBC in [facility] ED. Patient admitted to SICU for hemodynamic monitoring, pain management.     Neurologic:  - Propofol off, changed to Precedex gtt  - Phenobarb pushes PRN for agitation and concern for ETOH withdrawal  - Lidocaine patch, Dilaudid PRN for breakthrough pain  - EEG negative prelim read    Respiratory:  - Intubated 1/8 for concern of poor mental status, airway protection. On PRVC 500/14/30%/+5  - Failed SBT  - 1/10 VATS with discovery of Fibropurulent exudative adhesions. Further dissection revealed a piece of retained foreign material likely the tip of a glove, s/p washout, with new 28f chest tube placed  - Incentive spirometry  - AM CXR    Cardiovascular:  - Remains on low dose pressors: levo  - Lactate remains negative    Gastrointestinal/Nutrition:  - Tube feeds via OGT at goal 60cc/hr  - Bowel regimen with senna, Miralax  - PPI while intubated     Genitourinary/Renal:  - Creatinine normalized, electrolytes improved   - CPK cleared  - Supplement electrolytes PRN    Hematologic:  - Chemical VTE ppx with Lovenox  - Mechanical VTE ppx with SCDs  -transfused 1u prbc 1/12    Infectious Disease: Empyema   - Continue Zosyn 1/7-  - S/p vanco 1/10  - Procal 5.27  - Tissue culture 1/10 no organisms seen   - BCx 1/9 no growth to date   - MRSA PCR 1/9 negative  -resent cultures, procal 1/13   Endocrine:  - No active issues, no history of DM    Disposition: SICU    Plan: levo for concern for septic shock secondary to likely empyema, but CTM wound on buttock-follow up blood cultures and procal.    ASSESSMENT: 48y Male with PMHx hepatitis C (diagnosed many years ago, never treated), depression transferred from outside hospital for trauma eval. Patient found down by roommate after unknown amount of time, found with multiple 4-11 L sided rib fractures with flail chest. Chest tube placed there with >1L output (old blood). Patient also with tachypnea, concern for respiratory failure started on BiPAP. Level 1 called for transient hypotension, patient given 2 unit PRBC in [facility] ED. Patient admitted to SICU for hemodynamic monitoring, pain management.     Neurologic:  - Propofol off, changed to Precedex gtt  - Phenobarb pushes PRN for agitation and concern for ETOH withdrawal  - Lidocaine patch, Dilaudid PRN for breakthrough pain  - EEG negative prelim read    Respiratory:  - Intubated 1/8 for concern of poor mental status, airway protection. On PRVC 500/14/30%/+5  - Failed SBT  - 1/10 VATS with discovery of Fibropurulent exudative adhesions. Further dissection revealed a piece of retained foreign material likely the tip of a glove, s/p washout, with new 28f chest tube placed  - Incentive spirometry  - AM CXR    Cardiovascular:  - Remains on low dose pressors: levo  - Lactate remains negative    Gastrointestinal/Nutrition:  - Tube feeds via OGT at goal 60cc/hr  - Bowel regimen with senna, Miralax  - PPI while intubated     Genitourinary/Renal:  - Creatinine normalized, electrolytes improved   - CPK cleared  - Supplement electrolytes PRN    Hematologic:  - Chemical VTE ppx with Lovenox  - Mechanical VTE ppx with SCDs  -transfused 1u prbc 1/12    Infectious Disease: Empyema   - Continue Zosyn 1/7-  - S/p vanco 1/10  - Procal 5.27  - Tissue culture 1/10 no organisms seen   - BCx 1/9 no growth to date   - MRSA PCR 1/9 negative  -resent cultures, procal 1/13   Endocrine:  - No active issues, no history of DM    Disposition: SICU    Plan: levo for concern for septic shock secondary to likely empyema, but CTM wound on buttock-follow up blood cultures and procal.    ASSESSMENT: 48y Male with PMHx hepatitis C (diagnosed many years ago, never treated), depression transferred from outside hospital for trauma eval. Patient found down by roommate after unknown amount of time, found with multiple 4-11 L sided rib fractures with flail chest. Chest tube placed there with >1L output (old blood). Patient also with tachypnea, concern for respiratory failure started on BiPAP. Level 1 called for transient hypotension, patient given 2 unit PRBC in [facility] ED. Patient admitted to SICU for hemodynamic monitoring, pain management.     Neurologic:  - Propofol off, changed to Precedex gtt  - Phenobarb pushes PRN for agitation and concern for ETOH withdrawal  - Lidocaine patch, Dilaudid PRN for breakthrough pain  - EEG negative prelim read    Respiratory:  - Intubated 1/8 for concern of poor mental status, airway protection. On PRVC 500/14/30%/+5  - Failed SBT  - 1/10 VATS with discovery of Fibropurulent exudative adhesions. Further dissection revealed a piece of retained foreign material likely the tip of a glove, s/p washout, with new 28f chest tube placed  - Incentive spirometry  - AM CXR    Cardiovascular:  - off pressors MAP >65  - Lactate remains negative    Gastrointestinal/Nutrition:  - Tube feeds via OGT at goal 60cc/hr  - Bowel regimen with senna, Miralax  - PPI while intubated     Genitourinary/Renal:  - Creatinine normalized, electrolytes improved   - CPK cleared  - Supplement electrolytes PRN    Hematologic:  - Chemical VTE ppx with Lovenox  - Mechanical VTE ppx with SCDs  -transfused 1u prbc 1/12    Infectious Disease: Empyema   - Continue Zosyn 1/7-  - S/p vanco 1/10  - Procal 5.27  - Tissue culture 1/10 no organisms seen   - BCx 1/9 no growth to date   - MRSA PCR 1/9 negative  -resent cultures, procal 1/13   Endocrine:  - No active issues, no history of DM    Disposition: SICU    Plan: levo for concern for septic shock secondary to likely empyema, but CTM wound on buttock-follow up blood cultures and procal.    ASSESSMENT: 48y Male with PMHx hepatitis C (diagnosed many years ago, never treated), depression transferred from outside hospital for trauma eval. Patient found down by roommate after unknown amount of time, found with multiple 4-11 L sided rib fractures with flail chest. Chest tube placed there with >1L output (old blood). Patient also with tachypnea, concern for respiratory failure started on BiPAP. Level 1 called for transient hypotension, patient given 2 unit PRBC in [facility] ED. Patient admitted to SICU for hemodynamic monitoring, pain management.     Neurologic:  - Propofol off, changed to Precedex gtt  - Phenobarb pushes PRN for agitation and concern for ETOH withdrawal  - Lidocaine patch, Dilaudid PRN for breakthrough pain  - EEG negative prelim read    Respiratory:  - Intubated 1/8 for concern of poor mental status, airway protection. On PRVC 500/14/30%/+5  - Failed SBT  - 1/10 VATS with discovery of Fibropurulent exudative adhesions. Further dissection revealed a piece of retained foreign material likely the tip of a glove, s/p washout, with new 28f chest tube placed  - Incentive spirometry  - AM CXR    Cardiovascular:  - off pressors MAP >65  - Lactate remains negative    Gastrointestinal/Nutrition:  - Tube feeds via OGT at goal 60cc/hr  - Bowel regimen with senna, Miralax  - PPI while intubated     Genitourinary/Renal:  - Creatinine normalized, electrolytes improved   - CPK cleared  - Supplement electrolytes PRN    Hematologic:  - Chemical VTE ppx with Lovenox  - Mechanical VTE ppx with SCDs  -transfused 1u prbc 1/12    Infectious Disease: Empyema   - Continue Zosyn 1/7-  - S/p vanco 1/10  - Procal 5.27  - Tissue culture 1/10 no organisms seen   - BCx 1/9 no growth to date   - MRSA PCR 1/9 negative  -resent cultures, procal 1/13   Endocrine:  - No active issues, no history of DM    Disposition: SICU    Plan: levo for concern for septic shock secondary to likely empyema, but CTM wound on buttock-follow up blood cultures and procal.    ASSESSMENT: 48y Male with PMHx hepatitis C (diagnosed many years ago, never treated), depression transferred from outside hospital for trauma eval. Patient found down by roommate after unknown amount of time, found with multiple 4-11 L sided rib fractures with flail chest. Chest tube placed there with >1L output (old blood). Patient also with tachypnea, concern for respiratory failure started on BiPAP. Level 1 called for transient hypotension, patient given 2 unit PRBC in [facility] ED. Patient admitted to SICU for hemodynamic monitoring, pain management.     Neurologic:  - Propofol off, changed to Precedex gtt  - Phenobarb pushes PRN for agitation and concern for ETOH withdrawal  - Lidocaine patch, Dilaudid PRN for breakthrough pain  - EEG negative prelim read    Respiratory:  - Intubated 1/8 for concern of poor mental status, airway protection. On PRVC 500/14/30%/+5  - Failed SBT  - 1/10 VATS with discovery of Fibropurulent exudative adhesions. Further dissection revealed a piece of retained foreign material likely the tip of a glove, s/p washout, with new 28f chest tube placed  - Incentive spirometry  - AM CXR    Cardiovascular:  - off pressors MAP >65  - Lactate remains negative    Gastrointestinal/Nutrition:  - Tube feeds via OGT at goal 60cc/hr  - Bowel regimen with senna, Miralax  - PPI while intubated     Genitourinary/Renal:  - Creatinine normalized, electrolytes improved   - CPK cleared  - Supplement electrolytes PRN    Hematologic:  - Chemical VTE ppx with Lovenox  - Mechanical VTE ppx with SCDs  -transfused 1u prbc 1/12    Infectious Disease: Empyema   - Continue Zosyn 1/7-  - S/p vanco 1/10  - Procal 5.27  - Tissue culture 1/10 no organisms seen   - BCx 1/9 no growth to date   - MRSA PCR 1/9 negative  -resent cultures, procal 1/13   Endocrine:  - No active issues, no history of DM    Disposition: SICU     ASSESSMENT: 48y Male with PMHx hepatitis C (diagnosed many years ago, never treated), depression transferred from outside hospital for trauma eval. Patient found down by roommate after unknown amount of time, found with multiple 4-11 L sided rib fractures with flail chest. Chest tube placed there with >1L output (old blood). Patient also with tachypnea, concern for respiratory failure started on BiPAP. Level 1 called for transient hypotension, patient given 2 unit PRBC in [facility] ED. Patient admitted to SICU for hemodynamic monitoring, pain management.     Neurologic:  - Propofol off, changed to Precedex gtt  - Phenobarb pushes PRN for agitation and concern for ETOH withdrawal  - Lidocaine patch, Dilaudid PRN for breakthrough pain  - EEG negative prelim read    Respiratory:  - Intubated 1/8 for concern of poor mental status, airway protection. On PRVC 500/14/30%/+5  - Failed SBT  - 1/10 VATS with discovery of Fibropurulent exudative adhesions. Further dissection revealed a piece of retained foreign material likely the tip of a glove, s/p washout, with new 28f chest tube placed  - Incentive spirometry  - AM CXR    Cardiovascular:  - off pressors MAP >65  - Lactate remains negative    Gastrointestinal/Nutrition:  - Tube feeds via OGT at goal 60cc/hr  - Bowel regimen with senna, Miralax  - PPI while intubated     Genitourinary/Renal:  - Creatinine normalized, electrolytes improved   - CPK cleared  - Supplement electrolytes PRN    Hematologic:  - Chemical VTE ppx with Lovenox  - Mechanical VTE ppx with SCDs  -transfused 1u prbc 1/12    Infectious Disease: Empyema   - Continue Zosyn 1/7-  - S/p vanco 1/10  - Procal 5.27  - Tissue culture 1/10 no organisms seen   - BCx 1/9 no growth to date   - MRSA PCR 1/9 negative  -resent cultures, procal 1/13   Endocrine:  - No active issues, no history of DM    Disposition: SICU

## 2024-01-13 NOTE — PROVIDER CONTACT NOTE (OTHER) - SITUATION
Pt agitated to RASS +3 for several hours. Pt now tachypneic to 40s, tachycardic to 130s, hypertensive. Pt gagging, foaming at mouth w/ saliva, c/f vomitus and aspiration. Tmax 39.8.

## 2024-01-13 NOTE — PROGRESS NOTE ADULT - CRITICAL CARE ATTENDING COMMENT
Dr. Garcia (Attending Physician)  N - EtOH withdrawal on phenobarbital, periods of agitation, will give dilaudid rather than benzos, already on precedex, will start seroquel at night for agitation, responds well to pain medications, likely pain related  P - acute resp failure failed sbt yesterday for tachypnea on 10/5 but may have been due to agitation, LLL ateletasis from flail chest, placed on SBT this am, unable to be rib plated  C - Off pressors  GI - TFs at goal    - 860 UO,   H - lovenox for dvt ppx, h/h stable  ID - vanc/zosyn, check vanc level today, febrile overnight, sent UA, blood cx, pct downtrending  E - no acute issues   PPx - ppi, lovenox  Lines - RIJ central line  Dispo - SICU

## 2024-01-13 NOTE — PROGRESS NOTE ADULT - PROBLEM SELECTOR PLAN 1
Suspected fracture of rib on left side, closed. Left Rib Fx 4-11th /Hemothorax  1/10 underwent VATS, with partial lung decortication  Thoracoscopic removal of intrapleural foreign body    Maintain left chest tube to waterseal  Strict I & Os, monitor drainage  daily xray while chest tube in place  Vent management wean as tolerated  Continue care as per SICU team

## 2024-01-13 NOTE — AIRWAY REMOVAL NOTE  ADULT & PEDS - ARTIFICAL AIRWAY REMOVAL COMMENTS
PER PROVIDER ORDER, PT SUCTIONED , CUFF TOTALLY DEFLATED, PT EXTUBATED, NO TRAUMA, NO STRIDOR, PT VOCALIZED NAME , GOOD COUGH, PT ON RA, SATS %, PROVIDER AND RN AT BEDSIDE. PER PROVIDER ORDER, PT SUCTIONED , CUFF TOTALLY DEFLATED, PT EXTUBATED, NO TRAUMA, NO STRIDOR, PT VOCALIZED NAME , GOOD COUGH, PT ON RA, SATS %, PROVIDER  CHRISTINA PAC, AND ELIZABETH QUICK AT BEDSIDE.

## 2024-01-13 NOTE — PROGRESS NOTE ADULT - SUBJECTIVE AND OBJECTIVE BOX
Subjective: Sedated Intubated      V/S  T(C): 36.6 (24 @ 07:00), Max: 39.8 (24 @ 04:00)  HR: 102 (24 @ 09:00) (76 - 133)  BP: 107/68 (24 @ 09:00) (80/44 - 186/68)  ABP: --  ABP(mean): --  RR: 27 (24 @ 09:00) (21 - 48)  SpO2: 100% (24 @ 09:00) (92% - 100%)      CHEST TUBE:       Left CT waterseal         Drainage:  serosang  AIR LEAKS:  [ ] YES [x ] NO    I&O's Detail    2024 07:01  -  2024 07:00  --------------------------------------------------------  IN:    Dexmedetomidine: 819.2 mL    Enteral Tube Flush: 435 mL    IV PiggyBack: 775 mL    IV PiggyBack: 500 mL    Miscellaneous Tube Feedin mL    Norepinephrine: 2.1 mL    PRBCs (Packed Red Blood Cells): 300 mL  Total IN: 3731.3 mL    OUT:    Chest Tube (mL): 160 mL    Indwelling Catheter - Urethral (mL): 910 mL    Rectal Tube (mL): 0 mL  Total OUT: 1070 mL    Total NET: 2661.3 mL      2024 07:01  -  2024 09:22  --------------------------------------------------------  IN:    Dexmedetomidine: 68.8 mL    Enteral Tube Flush: 60 mL    IV PiggyBack: 50 mL  Total IN: 178.8 mL    OUT:    Indwelling Catheter - Urethral (mL): 225 mL    Norepinephrine: 0 mL  Total OUT: 225 mL    Total NET: -46.2 mL          24 @ 07:01  -  24 @ 07:00  --------------------------------------------------------  IN: 3731.3 mL / OUT: 1070 mL / NET: 2661.3 mL    24 @ 07:01  -  24 @ 09:22  --------------------------------------------------------  IN: 178.8 mL / OUT: 225 mL / NET: -46.2 mL      MEDICATIONS  (STANDING):  acetaminophen   Oral Liquid .. 650 milliGRAM(s) Oral every 6 hours  ascorbic acid 500 milliGRAM(s) Oral daily  chlorhexidine 0.12% Liquid 15 milliLiter(s) Oral Mucosa every 12 hours  chlorhexidine 2% Cloths 1 Application(s) Topical <User Schedule>  cyanocobalamin 1000 MICROGram(s) Oral daily  dexMEDEtomidine Infusion 1 MICROgram(s)/kG/Hr (22.9 mL/Hr) IV Continuous <Continuous>  enoxaparin Injectable 40 milliGRAM(s) SubCutaneous every 24 hours  folic acid 1 milliGRAM(s) Enteral Tube daily  influenza   Vaccine 0.5 milliLiter(s) IntraMuscular once  lidocaine   4% Patch 1 Patch Transdermal every 24 hours  multivitamin/minerals/iron Oral Solution (CENTRUM) 15 milliLiter(s) Enteral Tube daily  mupirocin 2% Nasal 1 Application(s) Both Nostrils two times a day  norepinephrine Infusion 0.02 MICROgram(s)/kG/Min (3.44 mL/Hr) IV Continuous <Continuous>  oxyCODONE    Solution 5 milliGRAM(s) Oral every 4 hours  pantoprazole  Injectable 40 milliGRAM(s) IV Push daily  PHENobarbital Injectable 130 milliGRAM(s) IV Push every 6 hours  piperacillin/tazobactam IVPB.. 3.375 Gram(s) IV Intermittent every 8 hours  QUEtiapine 25 milliGRAM(s) Oral at bedtime  vancomycin  IVPB 1000 milliGRAM(s) IV Intermittent every 8 hours          132<L>  |  102  |  13  ----------------------------<  135<H>  3.6   |  22  |  0.88    Ca    7.5<L>      2024 23:54  Phos  2.8     01-12  Mg     2.0     -12                                 7.8    12.45 )-----------( 368      ( 2024 23:54 )             22.5        PT/INR - ( 2024 23:54 )   PT: 10.6 sec;   INR: 1.01 ratio         PTT - ( 2024 23:54 )  PTT:28.8 sec  Mode: AC/ CMV (Assist Control/ Continuous Mandatory Ventilation)  RR (machine): 14  TV (machine): 500  FiO2: 30  PEEP: 5  ITime: 1  MAP: 10  PIP: 21        Physical Exam:      Neurology: Intubated, Sedated  CV : RRR+S1S2  Lungs: Intubated, mechanical BS on ventilator  +Left chest tube with serosanguinous drainage to water seal, no air leak  Abdomen: Soft, NT/ND, +BSx4Q  Extremities: B/L LE warm, no edema, +PP                     PAST MEDICAL & SURGICAL HISTORY:  No pertinent past medical history      No significant past surgical history

## 2024-01-13 NOTE — PROGRESS NOTE ADULT - SUBJECTIVE AND OBJECTIVE BOX
ACS TEAM SURGERY DAILY PROGRESS NOTE:     INTERVAL EVENTS:   -hypotension, not fluid responsive s/p 1L, seems to be intermittent around sedation/pain meds  -resent cultures, procalcitonin     SUBJECTIVE/ROS: Patient seen and examined at bedside by surgical team.     OBJECTIVE:  Vital Signs Last 24 Hrs  T(C): 36.6 (2024 07:00), Max: 39.8 (2024 04:00)  T(F): 97.9 (2024 07:00), Max: 103.6 (2024 04:00)  HR: 86 (2024 08:00) (76 - 133)  BP: 90/67 (2024 08:00) (80/44 - 186/68)  BP(mean): 74 (2024 08:00) (58 - 114)  RR: 22 (2024 08:00) (21 - 48)  SpO2: 98% (2024 08:00) (92% - 100%)    Parameters below as of 2024 07:00  Patient On (Oxygen Delivery Method): ventilator    O2 Concentration (%): 30                        7.8    12.45 )-----------( 368      ( 2024 23:54 )             22.5     01-12    132<L>  |  102  |  13  ----------------------------<  135<H>  3.6   |  22  |  0.88    Ca    7.5<L>      2024 23:54  Phos  2.8     -12  Mg     2.0     -12     PT/INR - ( 2024 23:54 )   PT: 10.6 sec;   INR: 1.01 ratio         PTT - ( 2024 23:54 )  PTT:28.8 sec  I&O's Detail    2024 07:01  -  2024 07:00  --------------------------------------------------------  IN:    Dexmedetomidine: 819.2 mL    Enteral Tube Flush: 435 mL    IV PiggyBack: 775 mL    IV PiggyBack: 500 mL    Miscellaneous Tube Feedin mL    Norepinephrine: 2.1 mL    PRBCs (Packed Red Blood Cells): 300 mL  Total IN: 3731.3 mL    OUT:    Chest Tube (mL): 160 mL    Indwelling Catheter - Urethral (mL): 910 mL    Rectal Tube (mL): 0 mL  Total OUT: 1070 mL    Total NET: 2661.3 mL      2024 07:01  -  2024 09:08  --------------------------------------------------------  IN:    Dexmedetomidine: 34.4 mL    Enteral Tube Flush: 30 mL    IV PiggyBack: 25 mL  Total IN: 89.4 mL    OUT:    Indwelling Catheter - Urethral (mL): 70 mL    Norepinephrine: 0 mL  Total OUT: 70 mL    Total NET: 19.4 mL          IMAGING:      PHYSICAL EXAM:  Constitutional: NAD  Respiratory: non-labored breathing, patent airway  Gastrointestinal: abdomen soft, nontender, nondistended  Extremities: warm  Neurological: intact

## 2024-01-13 NOTE — PROVIDER CONTACT NOTE (OTHER) - ACTION/TREATMENT ORDERED:
Continue to give PRN and additional medications as ordered for sedation to goal RASS -1. Keep tube feeds off. Cooling blanket ordered. Pan cultures sent. Vest restraint ordered.

## 2024-01-13 NOTE — PROGRESS NOTE ADULT - ASSESSMENT
48y Male with PMHx hepatitis C (diagnosed many years ago, never treated), depression transferred from outside hospital for trauma eval. Patient found down by roommate after unknown amount of time, found with multiple 4-11 L sided rib fractures with flail chest. Chest tube placed there with >1L output (old blood). Patient also with tachypnea, concern for respiratory failure started on BiPAP. Level 1 called for transient hypotension, patient given 2 unit PRBC in [facility] ED. Rib score 3. Pt now s/p VATS, with partial lung decortication and Thoracoscopic removal of intrapleural foreign body.    Injuries:   - left posterolateral fourth, fifth, sixth, and 11th ribs, minimally displaced   - mildly displaced fractures of the left anterolateral fourth, fifth, sixth, and seventh ribs  - displaced fractures of the left posterior seventh, eighth, ninth, and 10th ribs.    PLAN  - D/c chest tube  - NPO/IVF/Tube feeds  - Wean vent and pressors as tolerated  - Rib fracture protocol   - IV abx  - ISS  - Appreciate excellent SICU care    ACS/Trauma  [id number]   48y Male with PMHx hepatitis C (diagnosed many years ago, never treated), depression transferred from outside hospital for trauma eval. Patient found down by roommate after unknown amount of time, found with multiple 4-11 L sided rib fractures with flail chest. Chest tube placed there with >1L output (old blood). Patient also with tachypnea, concern for respiratory failure started on BiPAP. Level 1 called for transient hypotension, patient given 2 unit PRBC in [facility] ED. Rib score 3. Pt now s/p VATS, with partial lung decortication and Thoracoscopic removal of intrapleural foreign body.    Injuries:   - left posterolateral fourth, fifth, sixth, and 11th ribs, minimally displaced   - mildly displaced fractures of the left anterolateral fourth, fifth, sixth, and seventh ribs  - displaced fractures of the left posterior seventh, eighth, ninth, and 10th ribs.    PLAN  - D/c chest tube  - NPO/IVF/Tube feeds  - Wean vent and pressors as tolerated  - Rib fracture protocol   - IV abx  - ISS  - Appreciate excellent SICU care    ACS/Trauma  [id number]

## 2024-01-13 NOTE — PROGRESS NOTE ADULT - ASSESSMENT
This is a  54yo M w pmhx of Hep C and possible EtOH use disorder who presents after being found down. Intubated for respiratory distress and agitation 1/8. Pt has displaced 4-11 L rib fx with flail segment.     1/9 VSS: NPO after MN. Plan for Rib plating WED 1/10/24  1/10 underwent VATS, with partial lung decortication  Thoracoscopic removal of intrapleural foreign body    1/11VSS intubated sedated on pressors  chest tube lws   1/12 VSS, pt still remains intubated/sedated, getting phenobarbital for agitation. Left chest tube output 75cc/24h placed on water seal this AM. 1U PRBC -H/H 7.1/20. Continue care per SICU team  1/13 Maintain L pleural tube waterseal Daily CXR Care as per SICU team  This is a  56yo M w pmhx of Hep C and possible EtOH use disorder who presents after being found down. Intubated for respiratory distress and agitation 1/8. Pt has displaced 4-11 L rib fx with flail segment.     1/9 VSS: NPO after MN. Plan for Rib plating WED 1/10/24  1/10 underwent VATS, with partial lung decortication  Thoracoscopic removal of intrapleural foreign body    1/11VSS intubated sedated on pressors  chest tube lws   1/12 VSS, pt still remains intubated/sedated, getting phenobarbital for agitation. Left chest tube output 75cc/24h placed on water seal this AM. 1U PRBC -H/H 7.1/20. Continue care per SICU team  1/13 Maintain L pleural tube waterseal Daily CXR Care as per SICU team

## 2024-01-14 LAB
-  STAPHYLOCOCCUS EPIDERMIDIS: SIGNIFICANT CHANGE UP
-  STAPHYLOCOCCUS EPIDERMIDIS: SIGNIFICANT CHANGE UP
ANION GAP SERPL CALC-SCNC: 10 MMOL/L — SIGNIFICANT CHANGE UP (ref 5–17)
ANION GAP SERPL CALC-SCNC: 10 MMOL/L — SIGNIFICANT CHANGE UP (ref 5–17)
ANION GAP SERPL CALC-SCNC: 11 MMOL/L — SIGNIFICANT CHANGE UP (ref 5–17)
ANION GAP SERPL CALC-SCNC: 11 MMOL/L — SIGNIFICANT CHANGE UP (ref 5–17)
APTT BLD: 28.7 SEC — SIGNIFICANT CHANGE UP (ref 24.5–35.6)
APTT BLD: 28.7 SEC — SIGNIFICANT CHANGE UP (ref 24.5–35.6)
APTT BLD: 30 SEC — SIGNIFICANT CHANGE UP (ref 24.5–35.6)
APTT BLD: 30 SEC — SIGNIFICANT CHANGE UP (ref 24.5–35.6)
BASE EXCESS BLDV CALC-SCNC: 0.5 MMOL/L — SIGNIFICANT CHANGE UP (ref -2–3)
BASE EXCESS BLDV CALC-SCNC: 0.5 MMOL/L — SIGNIFICANT CHANGE UP (ref -2–3)
BUN SERPL-MCNC: 10 MG/DL — SIGNIFICANT CHANGE UP (ref 7–23)
BUN SERPL-MCNC: 10 MG/DL — SIGNIFICANT CHANGE UP (ref 7–23)
BUN SERPL-MCNC: 11 MG/DL — SIGNIFICANT CHANGE UP (ref 7–23)
BUN SERPL-MCNC: 11 MG/DL — SIGNIFICANT CHANGE UP (ref 7–23)
CA-I SERPL-SCNC: 1.15 MMOL/L — SIGNIFICANT CHANGE UP (ref 1.15–1.33)
CA-I SERPL-SCNC: 1.15 MMOL/L — SIGNIFICANT CHANGE UP (ref 1.15–1.33)
CALCIUM SERPL-MCNC: 8 MG/DL — LOW (ref 8.4–10.5)
CALCIUM SERPL-MCNC: 8 MG/DL — LOW (ref 8.4–10.5)
CALCIUM SERPL-MCNC: 8.1 MG/DL — LOW (ref 8.4–10.5)
CALCIUM SERPL-MCNC: 8.1 MG/DL — LOW (ref 8.4–10.5)
CHLORIDE BLDV-SCNC: 103 MMOL/L — SIGNIFICANT CHANGE UP (ref 96–108)
CHLORIDE BLDV-SCNC: 103 MMOL/L — SIGNIFICANT CHANGE UP (ref 96–108)
CHLORIDE SERPL-SCNC: 101 MMOL/L — SIGNIFICANT CHANGE UP (ref 96–108)
CHLORIDE SERPL-SCNC: 101 MMOL/L — SIGNIFICANT CHANGE UP (ref 96–108)
CHLORIDE SERPL-SCNC: 103 MMOL/L — SIGNIFICANT CHANGE UP (ref 96–108)
CHLORIDE SERPL-SCNC: 103 MMOL/L — SIGNIFICANT CHANGE UP (ref 96–108)
CO2 BLDV-SCNC: 25 MMOL/L — SIGNIFICANT CHANGE UP (ref 22–26)
CO2 BLDV-SCNC: 25 MMOL/L — SIGNIFICANT CHANGE UP (ref 22–26)
CO2 SERPL-SCNC: 22 MMOL/L — SIGNIFICANT CHANGE UP (ref 22–31)
CREAT SERPL-MCNC: 0.88 MG/DL — SIGNIFICANT CHANGE UP (ref 0.5–1.3)
CREAT SERPL-MCNC: 0.88 MG/DL — SIGNIFICANT CHANGE UP (ref 0.5–1.3)
CREAT SERPL-MCNC: 1.16 MG/DL — SIGNIFICANT CHANGE UP (ref 0.5–1.3)
CREAT SERPL-MCNC: 1.16 MG/DL — SIGNIFICANT CHANGE UP (ref 0.5–1.3)
CULTURE RESULTS: SIGNIFICANT CHANGE UP
EGFR: 107 ML/MIN/1.73M2 — SIGNIFICANT CHANGE UP
EGFR: 107 ML/MIN/1.73M2 — SIGNIFICANT CHANGE UP
EGFR: 78 ML/MIN/1.73M2 — SIGNIFICANT CHANGE UP
EGFR: 78 ML/MIN/1.73M2 — SIGNIFICANT CHANGE UP
GAS PNL BLDV: 133 MMOL/L — LOW (ref 136–145)
GAS PNL BLDV: 133 MMOL/L — LOW (ref 136–145)
GAS PNL BLDV: SIGNIFICANT CHANGE UP
GLUCOSE BLDV-MCNC: 103 MG/DL — HIGH (ref 70–99)
GLUCOSE BLDV-MCNC: 103 MG/DL — HIGH (ref 70–99)
GLUCOSE SERPL-MCNC: 107 MG/DL — HIGH (ref 70–99)
GLUCOSE SERPL-MCNC: 107 MG/DL — HIGH (ref 70–99)
GLUCOSE SERPL-MCNC: 110 MG/DL — HIGH (ref 70–99)
GLUCOSE SERPL-MCNC: 110 MG/DL — HIGH (ref 70–99)
GRAM STN FLD: ABNORMAL
GRAM STN FLD: ABNORMAL
HCO3 BLDV-SCNC: 24 MMOL/L — SIGNIFICANT CHANGE UP (ref 22–29)
HCO3 BLDV-SCNC: 24 MMOL/L — SIGNIFICANT CHANGE UP (ref 22–29)
HCT VFR BLD CALC: 22.2 % — LOW (ref 39–50)
HCT VFR BLD CALC: 22.2 % — LOW (ref 39–50)
HCT VFR BLD CALC: 23.3 % — LOW (ref 39–50)
HCT VFR BLD CALC: 23.3 % — LOW (ref 39–50)
HCT VFR BLDA CALC: 23 % — LOW (ref 39–51)
HCT VFR BLDA CALC: 23 % — LOW (ref 39–51)
HGB BLD CALC-MCNC: 7.8 G/DL — LOW (ref 12.6–17.4)
HGB BLD CALC-MCNC: 7.8 G/DL — LOW (ref 12.6–17.4)
HGB BLD-MCNC: 7.7 G/DL — LOW (ref 13–17)
HGB BLD-MCNC: 7.7 G/DL — LOW (ref 13–17)
HGB BLD-MCNC: 8 G/DL — LOW (ref 13–17)
HGB BLD-MCNC: 8 G/DL — LOW (ref 13–17)
INR BLD: 1.07 RATIO — SIGNIFICANT CHANGE UP (ref 0.85–1.18)
INR BLD: 1.07 RATIO — SIGNIFICANT CHANGE UP (ref 0.85–1.18)
INR BLD: 1.24 RATIO — HIGH (ref 0.85–1.18)
INR BLD: 1.24 RATIO — HIGH (ref 0.85–1.18)
LACTATE BLDV-MCNC: 1.8 MMOL/L — SIGNIFICANT CHANGE UP (ref 0.5–2)
LACTATE BLDV-MCNC: 1.8 MMOL/L — SIGNIFICANT CHANGE UP (ref 0.5–2)
MAGNESIUM SERPL-MCNC: 1.9 MG/DL — SIGNIFICANT CHANGE UP (ref 1.6–2.6)
MAGNESIUM SERPL-MCNC: 1.9 MG/DL — SIGNIFICANT CHANGE UP (ref 1.6–2.6)
MAGNESIUM SERPL-MCNC: 2.2 MG/DL — SIGNIFICANT CHANGE UP (ref 1.6–2.6)
MAGNESIUM SERPL-MCNC: 2.2 MG/DL — SIGNIFICANT CHANGE UP (ref 1.6–2.6)
MCHC RBC-ENTMCNC: 28.5 PG — SIGNIFICANT CHANGE UP (ref 27–34)
MCHC RBC-ENTMCNC: 28.5 PG — SIGNIFICANT CHANGE UP (ref 27–34)
MCHC RBC-ENTMCNC: 28.7 PG — SIGNIFICANT CHANGE UP (ref 27–34)
MCHC RBC-ENTMCNC: 28.7 PG — SIGNIFICANT CHANGE UP (ref 27–34)
MCHC RBC-ENTMCNC: 34.3 GM/DL — SIGNIFICANT CHANGE UP (ref 32–36)
MCHC RBC-ENTMCNC: 34.3 GM/DL — SIGNIFICANT CHANGE UP (ref 32–36)
MCHC RBC-ENTMCNC: 34.7 GM/DL — SIGNIFICANT CHANGE UP (ref 32–36)
MCHC RBC-ENTMCNC: 34.7 GM/DL — SIGNIFICANT CHANGE UP (ref 32–36)
MCV RBC AUTO: 82.2 FL — SIGNIFICANT CHANGE UP (ref 80–100)
MCV RBC AUTO: 82.2 FL — SIGNIFICANT CHANGE UP (ref 80–100)
MCV RBC AUTO: 83.5 FL — SIGNIFICANT CHANGE UP (ref 80–100)
MCV RBC AUTO: 83.5 FL — SIGNIFICANT CHANGE UP (ref 80–100)
METHOD TYPE: SIGNIFICANT CHANGE UP
METHOD TYPE: SIGNIFICANT CHANGE UP
NRBC # BLD: 0 /100 WBCS — SIGNIFICANT CHANGE UP (ref 0–0)
PCO2 BLDV: 34 MMHG — LOW (ref 42–55)
PCO2 BLDV: 34 MMHG — LOW (ref 42–55)
PH BLDV: 7.46 — HIGH (ref 7.32–7.43)
PH BLDV: 7.46 — HIGH (ref 7.32–7.43)
PHOSPHATE SERPL-MCNC: 3 MG/DL — SIGNIFICANT CHANGE UP (ref 2.5–4.5)
PHOSPHATE SERPL-MCNC: 3 MG/DL — SIGNIFICANT CHANGE UP (ref 2.5–4.5)
PHOSPHATE SERPL-MCNC: 3.5 MG/DL — SIGNIFICANT CHANGE UP (ref 2.5–4.5)
PHOSPHATE SERPL-MCNC: 3.5 MG/DL — SIGNIFICANT CHANGE UP (ref 2.5–4.5)
PLATELET # BLD AUTO: 451 K/UL — HIGH (ref 150–400)
PLATELET # BLD AUTO: 451 K/UL — HIGH (ref 150–400)
PLATELET # BLD AUTO: 488 K/UL — HIGH (ref 150–400)
PLATELET # BLD AUTO: 488 K/UL — HIGH (ref 150–400)
PO2 BLDV: 35 MMHG — SIGNIFICANT CHANGE UP (ref 25–45)
PO2 BLDV: 35 MMHG — SIGNIFICANT CHANGE UP (ref 25–45)
POTASSIUM BLDV-SCNC: 3.9 MMOL/L — SIGNIFICANT CHANGE UP (ref 3.5–5.1)
POTASSIUM BLDV-SCNC: 3.9 MMOL/L — SIGNIFICANT CHANGE UP (ref 3.5–5.1)
POTASSIUM SERPL-MCNC: 3.9 MMOL/L — SIGNIFICANT CHANGE UP (ref 3.5–5.3)
POTASSIUM SERPL-SCNC: 3.9 MMOL/L — SIGNIFICANT CHANGE UP (ref 3.5–5.3)
PROTHROM AB SERPL-ACNC: 11.8 SEC — SIGNIFICANT CHANGE UP (ref 9.5–13)
PROTHROM AB SERPL-ACNC: 11.8 SEC — SIGNIFICANT CHANGE UP (ref 9.5–13)
PROTHROM AB SERPL-ACNC: 12.9 SEC — SIGNIFICANT CHANGE UP (ref 9.5–13)
PROTHROM AB SERPL-ACNC: 12.9 SEC — SIGNIFICANT CHANGE UP (ref 9.5–13)
RBC # BLD: 2.7 M/UL — LOW (ref 4.2–5.8)
RBC # BLD: 2.7 M/UL — LOW (ref 4.2–5.8)
RBC # BLD: 2.79 M/UL — LOW (ref 4.2–5.8)
RBC # BLD: 2.79 M/UL — LOW (ref 4.2–5.8)
RBC # FLD: 14.7 % — HIGH (ref 10.3–14.5)
RBC # FLD: 14.7 % — HIGH (ref 10.3–14.5)
RBC # FLD: 14.9 % — HIGH (ref 10.3–14.5)
RBC # FLD: 14.9 % — HIGH (ref 10.3–14.5)
SAO2 % BLDV: 63.8 % — LOW (ref 67–88)
SAO2 % BLDV: 63.8 % — LOW (ref 67–88)
SODIUM SERPL-SCNC: 134 MMOL/L — LOW (ref 135–145)
SODIUM SERPL-SCNC: 134 MMOL/L — LOW (ref 135–145)
SODIUM SERPL-SCNC: 135 MMOL/L — SIGNIFICANT CHANGE UP (ref 135–145)
SODIUM SERPL-SCNC: 135 MMOL/L — SIGNIFICANT CHANGE UP (ref 135–145)
SPECIMEN SOURCE: SIGNIFICANT CHANGE UP
VANCOMYCIN TROUGH SERPL-MCNC: 18.9 UG/ML — SIGNIFICANT CHANGE UP (ref 10–20)
VANCOMYCIN TROUGH SERPL-MCNC: 18.9 UG/ML — SIGNIFICANT CHANGE UP (ref 10–20)
WBC # BLD: 15.24 K/UL — HIGH (ref 3.8–10.5)
WBC # BLD: 15.24 K/UL — HIGH (ref 3.8–10.5)
WBC # BLD: 18.64 K/UL — HIGH (ref 3.8–10.5)
WBC # BLD: 18.64 K/UL — HIGH (ref 3.8–10.5)
WBC # FLD AUTO: 15.24 K/UL — HIGH (ref 3.8–10.5)
WBC # FLD AUTO: 15.24 K/UL — HIGH (ref 3.8–10.5)
WBC # FLD AUTO: 18.64 K/UL — HIGH (ref 3.8–10.5)
WBC # FLD AUTO: 18.64 K/UL — HIGH (ref 3.8–10.5)

## 2024-01-14 PROCEDURE — 99232 SBSQ HOSP IP/OBS MODERATE 35: CPT | Mod: GC

## 2024-01-14 PROCEDURE — 99291 CRITICAL CARE FIRST HOUR: CPT | Mod: GC

## 2024-01-14 PROCEDURE — 93010 ELECTROCARDIOGRAM REPORT: CPT

## 2024-01-14 PROCEDURE — 71045 X-RAY EXAM CHEST 1 VIEW: CPT | Mod: 26

## 2024-01-14 RX ORDER — HALOPERIDOL DECANOATE 100 MG/ML
5 INJECTION INTRAMUSCULAR ONCE
Refills: 0 | Status: COMPLETED | OUTPATIENT
Start: 2024-01-14 | End: 2024-01-14

## 2024-01-14 RX ORDER — HALOPERIDOL DECANOATE 100 MG/ML
2.5 INJECTION INTRAMUSCULAR
Refills: 0 | Status: DISCONTINUED | OUTPATIENT
Start: 2024-01-14 | End: 2024-01-16

## 2024-01-14 RX ORDER — MAGNESIUM SULFATE 500 MG/ML
1 VIAL (ML) INJECTION ONCE
Refills: 0 | Status: COMPLETED | OUTPATIENT
Start: 2024-01-14 | End: 2024-01-14

## 2024-01-14 RX ORDER — POTASSIUM CHLORIDE 20 MEQ
20 PACKET (EA) ORAL ONCE
Refills: 0 | Status: COMPLETED | OUTPATIENT
Start: 2024-01-14 | End: 2024-01-14

## 2024-01-14 RX ORDER — POTASSIUM CHLORIDE 20 MEQ
10 PACKET (EA) ORAL ONCE
Refills: 0 | Status: COMPLETED | OUTPATIENT
Start: 2024-01-14 | End: 2024-01-15

## 2024-01-14 RX ORDER — POTASSIUM CHLORIDE 20 MEQ
20 PACKET (EA) ORAL ONCE
Refills: 0 | Status: DISCONTINUED | OUTPATIENT
Start: 2024-01-14 | End: 2024-01-14

## 2024-01-14 RX ORDER — PHENOBARBITAL 60 MG
130 TABLET ORAL EVERY 8 HOURS
Refills: 0 | Status: DISCONTINUED | OUTPATIENT
Start: 2024-01-14 | End: 2024-01-15

## 2024-01-14 RX ORDER — HALOPERIDOL DECANOATE 100 MG/ML
5 INJECTION INTRAMUSCULAR
Refills: 0 | Status: DISCONTINUED | OUTPATIENT
Start: 2024-01-14 | End: 2024-01-16

## 2024-01-14 RX ADMIN — Medication 130 MILLIGRAM(S): at 13:12

## 2024-01-14 RX ADMIN — HALOPERIDOL DECANOATE 5 MILLIGRAM(S): 100 INJECTION INTRAMUSCULAR at 13:57

## 2024-01-14 RX ADMIN — LIDOCAINE 1 PATCH: 4 CREAM TOPICAL at 07:37

## 2024-01-14 RX ADMIN — Medication 100 GRAM(S): at 06:47

## 2024-01-14 RX ADMIN — Medication 250 MILLIGRAM(S): at 13:13

## 2024-01-14 RX ADMIN — CHLORHEXIDINE GLUCONATE 1 APPLICATION(S): 213 SOLUTION TOPICAL at 05:29

## 2024-01-14 RX ADMIN — Medication 250 MILLIGRAM(S): at 05:28

## 2024-01-14 RX ADMIN — PIPERACILLIN AND TAZOBACTAM 25 GRAM(S): 4; .5 INJECTION, POWDER, LYOPHILIZED, FOR SOLUTION INTRAVENOUS at 13:15

## 2024-01-14 RX ADMIN — Medication 250 MILLIGRAM(S): at 22:50

## 2024-01-14 RX ADMIN — ENOXAPARIN SODIUM 40 MILLIGRAM(S): 100 INJECTION SUBCUTANEOUS at 22:49

## 2024-01-14 RX ADMIN — MUPIROCIN 1 APPLICATION(S): 20 OINTMENT TOPICAL at 05:29

## 2024-01-14 RX ADMIN — LIDOCAINE 1 PATCH: 4 CREAM TOPICAL at 17:55

## 2024-01-14 RX ADMIN — Medication 500 MILLIGRAM(S): at 17:54

## 2024-01-14 RX ADMIN — Medication 130 MILLIGRAM(S): at 22:49

## 2024-01-14 RX ADMIN — PREGABALIN 1000 MICROGRAM(S): 225 CAPSULE ORAL at 12:00

## 2024-01-14 RX ADMIN — DEXMEDETOMIDINE HYDROCHLORIDE IN 0.9% SODIUM CHLORIDE 22.9 MICROGRAM(S)/KG/HR: 4 INJECTION INTRAVENOUS at 07:18

## 2024-01-14 RX ADMIN — Medication 500 MILLIGRAM(S): at 17:24

## 2024-01-14 RX ADMIN — MUPIROCIN 1 APPLICATION(S): 20 OINTMENT TOPICAL at 17:24

## 2024-01-14 RX ADMIN — Medication 500 MILLIGRAM(S): at 12:01

## 2024-01-14 RX ADMIN — Medication 500 MILLIGRAM(S): at 23:17

## 2024-01-14 RX ADMIN — Medication 1 TABLET(S): at 12:01

## 2024-01-14 RX ADMIN — Medication 130 MILLIGRAM(S): at 05:29

## 2024-01-14 RX ADMIN — Medication 500 MILLIGRAM(S): at 06:00

## 2024-01-14 RX ADMIN — Medication 500 MILLIGRAM(S): at 12:00

## 2024-01-14 RX ADMIN — Medication 500 MILLIGRAM(S): at 05:30

## 2024-01-14 RX ADMIN — PIPERACILLIN AND TAZOBACTAM 25 GRAM(S): 4; .5 INJECTION, POWDER, LYOPHILIZED, FOR SOLUTION INTRAVENOUS at 05:28

## 2024-01-14 RX ADMIN — LIDOCAINE 1 PATCH: 4 CREAM TOPICAL at 05:29

## 2024-01-14 RX ADMIN — Medication 500 MILLIGRAM(S): at 00:00

## 2024-01-14 RX ADMIN — Medication 20 MILLIEQUIVALENT(S): at 06:47

## 2024-01-14 RX ADMIN — Medication 1 MILLIGRAM(S): at 12:01

## 2024-01-14 RX ADMIN — HALOPERIDOL DECANOATE 5 MILLIGRAM(S): 100 INJECTION INTRAMUSCULAR at 22:49

## 2024-01-14 NOTE — PROGRESS NOTE ADULT - ASSESSMENT
Pt is a  with  48y y/o  Male PMHx hepatitis C transferred from outside hospital for trauma eval. Patient found down by roommate after unknown amount of time, found with multiple 4-11 L sided rib fractures with flail chest. He is admitted to SICU for hemodynamic/respiratory monitoring. He was extubated on 1/13 and chest tube removed on 1/14. Orthopaedically, he was found to have Subtle L1 and L2 superior compression deformities. He has no neuromotor deficits. He is stable from an ortho spine perspective.    -  No acute orthopedic surgical intervention  -   Multimodal Pain control  -    DVT ppx per primary team  -    Resumed home meds  -    Venodynes  -    Weight bearing status: WBAT  -    PT/OT  -    Dispo: TBD  -    Patient can follow up with Dr. Wyatt in office when discharged      [place]  [id number]  [facility]        [id number]  Pt is a  with  48y y/o  Male PMHx hepatitis C transferred from outside hospital for trauma eval. Patient found down by roommate after unknown amount of time, found with multiple 4-11 L sided rib fractures with flail chest. He is admitted to SICU for hemodynamic/respiratory monitoring. He was extubated on 1/13 and chest tube removed on 1/14. Orthopaedically, he was found to have Subtle L1 and L2 superior compression deformities. He has no neuromotor deficits. He is stable from an ortho spine perspective.    -  No acute orthopedic surgical intervention  -   Multimodal Pain control  -    DVT ppx per primary team  -    Resumed home meds  -    Venodynes  -    Weight bearing status: WBAT  -    PT/OT  -    Dispo: TBD  -    Patient can follow up with Dr. Wyatt in office when discharged      Orthopaedic Surgery  [facility] [id number]  [facility]        [id number]  Pt is a  with  48y y/o  Male PMHx hepatitis C transferred from outside hospital for trauma eval. Patient found down by roommate after unknown amount of time, found with multiple 4-11 L sided rib fractures with flail chest. He is admitted to SICU for hemodynamic/respiratory monitoring. He was extubated on 1/13 and chest tube removed on 1/14. Orthopaedically, he was found to have Subtle L1 and L2 superior compression deformities. He has no neuromotor deficits. He is stable from an ortho spine perspective.    -  No acute orthopedic surgical intervention  -   Multimodal Pain control  -    DVT ppx per primary team  -    Resumed home meds  -    Venodynes  -    Weight bearing status: WBAT  -    PT/OT  -    Dispo: TBD  -    Patient can follow up with Dr. Wyatt in office when discharged      [place]  [id number]  [facility]        [id number]

## 2024-01-14 NOTE — PROGRESS NOTE ADULT - SUBJECTIVE AND OBJECTIVE BOX
ACS TEAM SURGERY DAILY PROGRESS NOTE:     INTERVAL EVENTS: None    SUBJECTIVE/ROS: Patient seen and examined at bedside by surgical team.      MEDICATIONS  (STANDING):  acetaminophen     Tablet .. 500 milliGRAM(s) Oral every 6 hours  ascorbic acid 500 milliGRAM(s) Oral daily  chlorhexidine 2% Cloths 1 Application(s) Topical <User Schedule>  cyanocobalamin 1000 MICROGram(s) Oral daily  dexMEDEtomidine Infusion 1 MICROgram(s)/kG/Hr (22.9 mL/Hr) IV Continuous <Continuous>  enoxaparin Injectable 40 milliGRAM(s) SubCutaneous every 24 hours  folic acid 1 milliGRAM(s) Oral daily  haloperidol    Injectable 2.5 milliGRAM(s) IntraMuscular <User Schedule>  haloperidol    Injectable 5 milliGRAM(s) IV Push <User Schedule>  influenza   Vaccine 0.5 milliLiter(s) IntraMuscular once  lidocaine   4% Patch 1 Patch Transdermal every 24 hours  multivitamin 1 Tablet(s) Oral daily  mupirocin 2% Nasal 1 Application(s) Both Nostrils two times a day  PHENobarbital Injectable 130 milliGRAM(s) IV Push every 8 hours  vancomycin  IVPB 1000 milliGRAM(s) IV Intermittent every 8 hours    MEDICATIONS  (PRN):  HYDROmorphone  Injectable 1 milliGRAM(s) IV Push every 3 hours PRN Breakthrough  ibuprofen  Tablet. 400 milliGRAM(s) Oral every 4 hours PRN Mild Pain (1 - 3)  oxyCODONE    IR 5 milliGRAM(s) Oral every 4 hours PRN Moderate Pain (4 - 6)  oxyCODONE    IR 10 milliGRAM(s) Oral every 4 hours PRN Severe Pain (7 - 10)      LABS:                        8.0    18.64 )-----------( 451      ( 2024 23:50 )             23.3         134<L>  |  101  |  10  ----------------------------<  110<H>  3.9   |  22  |  0.88    Ca    8.1<L>      2024 23:50  Phos  3.0       Mg     1.9           PT/INR - ( 2024 23:50 )   PT: 11.8 sec;   INR: 1.07 ratio         PTT - ( 2024 23:50 )  PTT:28.7 sec  Urinalysis Basic - ( 2024 23:50 )    Color: x / Appearance: x / SG: x / pH: x  Gluc: 110 mg/dL / Ketone: x  / Bili: x / Urobili: x   Blood: x / Protein: x / Nitrite: x   Leuk Esterase: x / RBC: x / WBC x   Sq Epi: x / Non Sq Epi: x / Bacteria: x          Vital Signs Last 24 Hrs  T(C): 37 (2024 15:00), Max: 37 (2024 15:00)  T(F): 98.6 (2024 15:00), Max: 98.6 (2024 15:00)  HR: 102 (2024 20:00) (92 - 128)  BP: 95/53 (2024 20:00) (85/59 - 162/76)  BP(mean): 68 (:00) (63 - 138)  RR: 32 (:) (20 - 35)  SpO2: 100% (2024 20:00) (98% - 100%)    Parameters below as of 2024 07:00  Patient On (Oxygen Delivery Method): room air          I&O's Summary    2024 07:01  -  2024 07:00  --------------------------------------------------------  IN: 2120.5 mL / OUT: 1370 mL / NET: 750.5 mL    2024 07:01  -  2024 20:49  --------------------------------------------------------  IN: 734.3 mL / OUT: 0 mL / NET: 734.3 mL      I&O's Detail    2024 07:01  -  2024 07:00  --------------------------------------------------------  IN:    Dexmedetomidine: 470.5 mL    Enteral Tube Flush: 60 mL    IV PiggyBack: 775 mL    IV PiggyBack: 475 mL    Miscellaneous Tube Feedin mL    Oral Fluid: 160 mL  Total IN: 2120.5 mL    OUT:    Chest Tube (mL): 50 mL    Indwelling Catheter - Urethral (mL): 755 mL    Norepinephrine: 0 mL    Voided (mL): 565 mL  Total OUT: 1370 mL    Total NET: 750.5 mL      2024 07:01  -  2024 20:49  --------------------------------------------------------  IN:    Dexmedetomidine: 309.3 mL    IV PiggyBack: 250 mL    IV PiggyBack: 175 mL  Total IN: 734.3 mL    OUT:  Total OUT: 0 mL    Total NET: 734.3 mL      PHYSICAL EXAM:  Constitutional: NAD  Respiratory: non-labored breathing, patent airway  Gastrointestinal: abdomen soft, nontender, nondistended  Extremities: warm  Neurological: intact

## 2024-01-14 NOTE — PROGRESS NOTE ADULT - ASSESSMENT
This is a  54yo M w pmhx of Hep C and possible EtOH use disorder who presents after being found down. Intubated for respiratory distress and agitation 1/8. Pt has displaced 4-11 L rib fx with flail segment.     1/9 VSS: NPO after MN. Plan for Rib plating WED 1/10/24  1/10 underwent VATS, with partial lung decortication  Thoracoscopic removal of intrapleural foreign body    1/11VSS intubated sedated on pressors  chest tube lws   1/12 VSS, pt still remains intubated/sedated, getting phenobarbital for agitation. Left chest tube output 75cc/24h placed on water seal this AM. 1U PRBC -H/H 7.1/20. Continue care per SICU team  1/13 Maintain L pleural tube waterseal Daily CXR Care as per SICU team  1/14 Left pleural tube removed CXR ordered

## 2024-01-14 NOTE — PROGRESS NOTE ADULT - ASSESSMENT
48y Male with PMHx hepatitis C (diagnosed many years ago, never treated), depression transferred from outside hospital for trauma eval. Patient found down by roommate after unknown amount of time, found with multiple 4-11 L sided rib fractures with flail chest. Chest tube placed there with >1L output (old blood). Patient also with tachypnea, concern for respiratory failure started on BiPAP. Level 1 called for transient hypotension, patient given 2 unit PRBC in [facility] ED. Rib score 3. Pt now s/p VATS, with partial lung decortication and Thoracoscopic removal of intrapleural foreign body.     Injuries:   - left posterolateral fourth, fifth, sixth, and 11th ribs, minimally displaced   - mildly displaced fractures of the left anterolateral fourth, fifth, sixth, and seventh ribs  - displaced fractures of the left posterior seventh, eighth, ninth, and 10th ribs.    PLAN  - D/c'ed chest tube  - AM CXR  - Regular diet  - Rib fracture protocol   - IV abx  - ISS  - Appreciate excellent SICU care    ACS/Trauma  [id number]   48y Male with PMHx hepatitis C (diagnosed many years ago, never treated), depression transferred from outside hospital for trauma eval. Patient found down by roommate after unknown amount of time, found with multiple 4-11 L sided rib fractures with flail chest. Chest tube placed there with >1L output (old blood). Patient also with tachypnea, concern for respiratory failure started on BiPAP. Level 1 called for transient hypotension, patient given 2 unit PRBC in [facility] ED. Rib score 3. Pt now s/p VATS, with partial lung decortication and Thoracoscopic removal of intrapleural foreign body.     Injuries:   - left posterolateral fourth, fifth, sixth, and 11th ribs, minimally displaced   - mildly displaced fractures of the left anterolateral fourth, fifth, sixth, and seventh ribs  - displaced fractures of the left posterior seventh, eighth, ninth, and 10th ribs.    PLAN  - D/c'ed chest tube  - AM CXR  - Regular diet  - Rib fracture protocol   - IV abx  - ISS  - Appreciate excellent SICU care    ACS/Trauma  [id number]   48y Male with PMHx hepatitis C (diagnosed many years ago, never treated), depression transferred from outside hospital for trauma eval. Patient found down by roommate after unknown amount of time, found with multiple 4-11 L sided rib fractures with flail chest. Chest tube placed there with >1L output (old blood). Patient also with tachypnea, concern for respiratory failure started on BiPAP. Level 1 called for transient hypotension, patient given 2 unit PRBC in [facility] ED. Rib score 3. Pt now s/p VATS, with partial lung decortication and Thoracoscopic removal of intrapleural foreign body.     Injuries:   - left posterolateral fourth, fifth, sixth, and 11th ribs, minimally displaced   - mildly displaced fractures of the left anterolateral fourth, fifth, sixth, and seventh ribs  - displaced fractures of the left posterior seventh, eighth, ninth, and 10th ribs.    PLAN  - D/c'ed chest tube  - AM CXR  - Regular diet  - Rib fracture protocol   - IV abx  - ISS  - Appreciate excellent SICU care    ACS/Trauma  [id number]

## 2024-01-14 NOTE — PROGRESS NOTE ADULT - ASSESSMENT
ASSESSMENT: 48y Male with PMHx hepatitis C (diagnosed many years ago, never treated), depression transferred from outside hospital for trauma eval. Patient found down by roommate after unknown amount of time, found with multiple 4-11 L sided rib fractures with flail chest. Chest tube placed there with >1L output (old blood). Patient also with tachypnea, concern for respiratory failure started on BiPAP. Level 1 called for transient hypotension, patient given 2 unit PRBC in [facility] ED. Patient admitted to SICU for hemodynamic monitoring, pain management.     Neurologic:  - weaning Phenobarb for agitation and concern for ETOH withdrawal  - Lidocaine patch, Dilaudid PRN for breakthrough pain  - seroquel 25 at bedtime    Respiratory:  - Extubated on room air  - 1/10 VATS with discovery of Fibropurulent exudative adhesions. Further dissection revealed a piece of retained foreign material likely the tip of a glove, s/p washout, with new 28f chest tube placed  - Ctm CT output  - Incentive spirometry  - AM CXR    Cardiovascular:  - off pressors MAP >65  - Lactate remains nml    Gastrointestinal/Nutrition:  - Tube feeds via OGT at goal 60cc/hr  - Bowel regimen with senna, Miralax    Genitourinary/Renal:  - Creatinine normalized, electrolytes improved   - CPK cleared  - Supplement electrolytes PRN    Hematologic:  - Chemical VTE ppx with Lovenox  - Mechanical VTE ppx with SCDs  -transfused 1u prbc 1/12    Infectious Disease: Empyema   - Continue Zosyn and vanco  - Febrile to 101.7 F on 1/13  - Procal 0.64 on 1/12    Endocrine:  - No active issues, no history of DM    Disposition: SICU    above discussed with Dr. Valiente   ASSESSMENT: 48y Male with PMHx hepatitis C (diagnosed many years ago, never treated), depression transferred from outside hospital for trauma eval. Patient found down by roommate after unknown amount of time, found with multiple 4-11 L sided rib fractures with flail chest. Chest tube placed there with >1L output (old blood). Patient also with tachypnea, concern for respiratory failure started on BiPAP. Level 1 called for transient hypotension, patient given 2 unit PRBC in [facility] ED. Patient admitted to SICU for hemodynamic monitoring, pain management.     Neurologic:  - weaning Phenobarb for agitation and concern for ETOH withdrawal  - Lidocaine patch, Dilaudid PRN for breakthrough pain  - seroquel 25 at bedtime    Respiratory:  - Extubated on room air  - 1/10 VATS with discovery of Fibropurulent exudative adhesions. Further dissection revealed a piece of retained foreign material likely the tip of a glove, s/p washout, with new 28f chest tube placed  - Ctm CT output  - Incentive spirometry  - AM CXR    Cardiovascular:  - off pressors MAP >65  - Lactate remains nml    Gastrointestinal/Nutrition:  - Tube feeds via OGT at goal 60cc/hr  - Bowel regimen with senna, Miralax    Genitourinary/Renal:  - Creatinine normalized, electrolytes improved   - CPK cleared  - Supplement electrolytes PRN    Hematologic:  - Chemical VTE ppx with Lovenox  - Mechanical VTE ppx with SCDs  -transfused 1u prbc 1/12    Infectious Disease: Empyema   - Continue Zosyn and vanco  - Febrile to 101.7 F on 1/13  - Procal 0.64 on 1/12    Endocrine:  - No active issues, no history of DM    Disposition: SICU    above discussed with Dr. Valiente   ASSESSMENT: 48y Male with PMHx hepatitis C (diagnosed many years ago, never treated), depression transferred from outside hospital for trauma eval. Patient found down by roommate after unknown amount of time, found with multiple 4-11 L sided rib fractures with flail chest. Chest tube placed there with >1L output (old blood). Patient also with tachypnea, concern for respiratory failure started on BiPAP. Level 1 called for transient hypotension, patient given 2 unit PRBC in [facility] ED. Patient admitted to SICU for hemodynamic monitoring, pain management.     Neurologic:  - weaning Phenobarb for agitation and concern for ETOH withdrawal  - Lidocaine patch, Dilaudid PRN for breakthrough pain  - haldol 2.5 daytime, 5 bedtime    Respiratory:  - Extubated on room air  - 1/10 VATS with discovery of Fibropurulent exudative adhesions. Further dissection revealed a piece of retained foreign material likely the tip of a glove, s/p washout, with new 28f chest tube placed  - Chest tube removed CT surg 1/14  - Incentive spirometry  - AM CXR    Cardiovascular:  - off pressors MAP >65  - Lactate remains nml    Gastrointestinal/Nutrition:  - Tube feeds via OGT at goal 60cc/hr  - Bowel regimen with senna, Miralax    Genitourinary/Renal:  - Creatinine normalized, electrolytes improved   - CPK cleared  - Supplement electrolytes PRN    Hematologic:  - Chemical VTE ppx with Lovenox  - Mechanical VTE ppx with SCDs  -transfused 1u prbc 1/12    Infectious Disease: Empyema   - Continue Zosyn and vanco  - Febrile to 101.7 F on 1/13  - Procal 0.64 on 1/12  -follow up blood cultures    Endocrine:  - No active issues, no history of DM    Disposition: SICU       ASSESSMENT: 48y Male with PMHx hepatitis C (diagnosed many years ago, never treated), depression transferred from outside hospital for trauma eval. Patient found down by roommate after unknown amount of time, found with multiple 4-11 L sided rib fractures with flail chest. Chest tube placed there with >1L output (old blood). Patient also with tachypnea, concern for respiratory failure started on BiPAP. Level 1 called for transient hypotension, patient given 2 unit PRBC in [facility] ED. Patient admitted to SICU for hemodynamic monitoring, pain management.     Neurologic:  - weaning Phenobarb for agitation and concern for ETOH withdrawal  - Lidocaine patch, Dilaudid PRN for breakthrough pain  - haldol 2.5 daytime, 5 bedtime    Respiratory:  - Extubated on room air  - 1/10 VATS with discovery of Fibropurulent exudative adhesions. Further dissection revealed a piece of retained foreign material likely the tip of a glove, s/p washout, with new 28f chest tube placed  - Chest tube removed CT surg 1/14  - Incentive spirometry  - AM CXR    Cardiovascular:  - off pressors MAP >65  - Lactate remains nml    Gastrointestinal/Nutrition:  - Tube feeds via OGT at goal 60cc/hr  - Bowel regimen with senna, Miralax    Genitourinary/Renal:  - Creatinine normalized, electrolytes improved   - CPK cleared  - Supplement electrolytes PRN    Hematologic:  - Chemical VTE ppx with Lovenox  - Mechanical VTE ppx with SCDs  -transfused 1u prbc 1/12    Infectious Disease: Empyema   - Continue Zosyn and vanco  - Febrile to 101.7 F on 1/13  - Procal 0.64 on 1/12  -follow up blood cultures    Endocrine:  - No active issues, no history of DM    Disposition: SICU

## 2024-01-14 NOTE — PROGRESS NOTE ADULT - SUBJECTIVE AND OBJECTIVE BOX
24 HOUR EVENTS:  - Seroquel 25 at bedtime  - Weaning phenobarbital  - Extubated to room air  - D/c mccormack, passed TOV    SUBJECTIVE/ROS:  NEURO  Exam: awake, confused  Meds: acetaminophen     Tablet .. 500 milliGRAM(s) Oral every 6 hours  dexMEDEtomidine Infusion 1 MICROgram(s)/kG/Hr IV Continuous <Continuous>  HYDROmorphone  Injectable 1 milliGRAM(s) IV Push every 3 hours PRN Breakthrough  ibuprofen  Tablet. 400 milliGRAM(s) Oral every 4 hours PRN Mild Pain (1 - 3)  oxyCODONE    IR 10 milliGRAM(s) Oral every 4 hours PRN Severe Pain (7 - 10)  oxyCODONE    IR 5 milliGRAM(s) Oral every 4 hours PRN Moderate Pain (4 - 6)  PHENobarbital Injectable 130 milliGRAM(s) IV Push every 6 hours  QUEtiapine 25 milliGRAM(s) Oral at bedtime    [x] Adequacy of sedation and pain control has been assessed and adjusted    RESPIRATORY  RR: 32 (24 @ 23:00) (22 - 48)  SpO2: 99% (24 @ 23:00) (94% - 100%)  Wt(kg): --  Exam: CTA b/l. No murmurs, rubs, gallops appreciated.   Mechanical Ventilation: Mode: CPAP with PS, RR (patient): 23, FiO2: 30, PEEP: 5, PS: 5, MAP: 6.7, PIP: 12  ABG - ( 2024 04:30 )  pH: 7.46  /  pCO2: 34    /  pO2: 100   / HCO3: 24    / Base Excess: 0.4   /  SaO2: 98.1    Lactate: x        CARDIOVASCULAR  HR: 117 (24 @ 23:00) (86 - 133)  BP: 136/76 (24 @ 23:00) (80/50 - 186/68)  BP(mean): 100 (24 @ 23:00) (60 - 114)  ABP: --  ABP(mean): --  Wt(kg): --  CVP(cm H2O): --  VBG - ( 2024 10:46 )  pH: 7.38  /  pCO2: 40    /  pO2: 43    / HCO3: 24    / Base Excess: -1.3  /  SaO2: 68.8   Lactate: 1.4    CT on WS, serosang output    Exam: S1S2.   Cardiac Rhythm: NSR    GI/NUTRITION  Exam: Soft, non-distended, non-tender.   Diet: TF at goal    GENITOURINARY  I&O's Detail     @ 07:  -   @ 07:00  --------------------------------------------------------  IN:    Dexmedetomidine: 819.2 mL    Enteral Tube Flush: 435 mL    IV PiggyBack: 775 mL    IV PiggyBack: 500 mL    Miscellaneous Tube Feedin mL    Norepinephrine: 2.1 mL    PRBCs (Packed Red Blood Cells): 300 mL  Total IN: 3731.3 mL    OUT:    Chest Tube (mL): 160 mL    Indwelling Catheter - Urethral (mL): 910 mL    Rectal Tube (mL): 0 mL  Total OUT: 1070 mL    Total NET: 2661.3 mL     @ 07: @ 00:30  --------------------------------------------------------  IN:    Dexmedetomidine: 345.9 mL    Enteral Tube Flush: 60 mL    IV PiggyBack: 425 mL    IV PiggyBack: 375 mL    Miscellaneous Tube Feedin mL    Oral Fluid: 120 mL  Total IN: 1505.9 mL    OUT:    Chest Tube (mL): 50 mL    Indwelling Catheter - Urethral (mL): 755 mL    Norepinephrine: 0 mL    Voided (mL): 140 mL  Total OUT: 945 mL    Total NET: 560.9 mL        134<L>  |  101  |  10  ----------------------------<  110<H>  3.9   |  22  |  0.88    Ca    8.1<L>      2024 23:50  Phos  3.0       Mg     1.9         [ ] Mccormack catheter, indication: N/A  Meds: ascorbic acid 500 milliGRAM(s) Oral daily  cyanocobalamin 1000 MICROGram(s) Oral daily  folic acid 1 milliGRAM(s) Oral daily  multivitamin 1 Tablet(s) Oral daily    HEMATOLOGIC  Meds: enoxaparin Injectable 40 milliGRAM(s) SubCutaneous every 24 hours    [x] VTE Prophylaxis                        8.0    18.64 )-----------( 451      ( 2024 23:50 )             23.3     PT/INR - ( 2024 23:50 )   PT: 11.8 sec;   INR: 1.07 ratio       PTT - ( 2024 23:50 )  PTT:28.7 sec  Transfusion     [ ] PRBC   [ ] Platelets   [ ] FFP   [ ] Cryoprecipitate    INFECTIOUS DISEASES  T(C): 36.9 (24 @ 23:00), Max: 39.8 (24 @ 04:00)  Wt(kg): --  WBC Count: 18.64 K/uL ( @ 23:50)    Recent Cultures:  Specimen Source: Combi-Cath Combi-Cath,  @ 04:38; Results --; Gram Stain:   Few polymorphonuclear leukocytes per low power field  No organisms seen; Organism: --  Specimen Source: .Tissue Other, 01-10 @ 22:30; Results   Culture is being performed.; Gram Stain:   No polymorphonuclear cells seen per low power field  No organisms seen per oil power field; Organism: --  Specimen Source: Combi-Cath Combi-Cath,  @ 18:27; Results   Normal Respiratory Farzana present; Gram Stain:   No polymorphonuclear cells seen per low power field  No squamous epithelial cells per low power field  No organisms seen per oil power field; Organism: --  Specimen Source: .Blood Blood-Venous,  @ 13:30; Results   No growth at 4 days; Gram Stain: --; Organism: --  Specimen Source: .Blood Blood-Venous,  @ 13:15; Results   No growth at 4 days; Gram Stain: --; Organism: --    Meds: influenza   Vaccine 0.5 milliLiter(s) IntraMuscular once  piperacillin/tazobactam IVPB.. 3.375 Gram(s) IV Intermittent every 8 hours  vancomycin  IVPB 1000 milliGRAM(s) IV Intermittent every 8 hours    ENDOCRINE  Capillary Blood Glucose    Meds:     OTHER MEDICATIONS:  chlorhexidine 2% Cloths 1 Application(s) Topical <User Schedule>  lidocaine   4% Patch 1 Patch Transdermal every 24 hours  mupirocin 2% Nasal 1 Application(s) Both Nostrils two times a day     24 HOUR EVENTS:  -resent bc  -decrease phenobarbital 130 q8, increase precedex, added haldol 2.5 day, 5 night  -CT surg pulled chest tube     SUBJECTIVE/ROS:  NEURO  Exam: awake, confused  Meds: acetaminophen     Tablet .. 500 milliGRAM(s) Oral every 6 hours  dexMEDEtomidine Infusion 1 MICROgram(s)/kG/Hr IV Continuous <Continuous>  HYDROmorphone  Injectable 1 milliGRAM(s) IV Push every 3 hours PRN Breakthrough  ibuprofen  Tablet. 400 milliGRAM(s) Oral every 4 hours PRN Mild Pain (1 - 3)  oxyCODONE    IR 10 milliGRAM(s) Oral every 4 hours PRN Severe Pain (7 - 10)  oxyCODONE    IR 5 milliGRAM(s) Oral every 4 hours PRN Moderate Pain (4 - 6)  PHENobarbital Injectable 130 milliGRAM(s) IV Push every 6 hours  QUEtiapine 25 milliGRAM(s) Oral at bedtime    [x] Adequacy of sedation and pain control has been assessed and adjusted    RESPIRATORY  RR: 32 (24 @ 23:00) (22 - 48)  SpO2: 99% (24 @ 23:00) (94% - 100%)  Wt(kg): --  Exam: CTA b/l. No murmurs, rubs, gallops appreciated.   Mechanical Ventilation: Mode: CPAP with PS, RR (patient): 23, FiO2: 30, PEEP: 5, PS: 5, MAP: 6.7, PIP: 12  ABG - ( 2024 04:30 )  pH: 7.46  /  pCO2: 34    /  pO2: 100   / HCO3: 24    / Base Excess: 0.4   /  SaO2: 98.1    Lactate: x        CARDIOVASCULAR  HR: 117 (24 @ 23:00) (86 - 133)  BP: 136/76 (24 @ 23:00) (80/50 - 186/68)  BP(mean): 100 (24 @ 23:00) (60 - 114)  ABP: --  ABP(mean): --  Wt(kg): --  CVP(cm H2O): --  VBG - ( 2024 10:46 )  pH: 7.38  /  pCO2: 40    /  pO2: 43    / HCO3: 24    / Base Excess: -1.3  /  SaO2: 68.8   Lactate: 1.4    CT on WS, serosang output    Exam: S1S2.   Cardiac Rhythm: NSR    GI/NUTRITION  Exam: Soft, non-distended, non-tender.   Diet: TF at goal    GENITOURINARY  I&O's Detail     @ 07:  -   @ 07:00  --------------------------------------------------------  IN:    Dexmedetomidine: 819.2 mL    Enteral Tube Flush: 435 mL    IV PiggyBack: 775 mL    IV PiggyBack: 500 mL    Miscellaneous Tube Feedin mL    Norepinephrine: 2.1 mL    PRBCs (Packed Red Blood Cells): 300 mL  Total IN: 3731.3 mL    OUT:    Chest Tube (mL): 160 mL    Indwelling Catheter - Urethral (mL): 910 mL    Rectal Tube (mL): 0 mL  Total OUT: 1070 mL    Total NET: 2661.3 mL     @ 07: @ 00:30  --------------------------------------------------------  IN:    Dexmedetomidine: 345.9 mL    Enteral Tube Flush: 60 mL    IV PiggyBack: 425 mL    IV PiggyBack: 375 mL    Miscellaneous Tube Feedin mL    Oral Fluid: 120 mL  Total IN: 1505.9 mL    OUT:    Chest Tube (mL): 50 mL    Indwelling Catheter - Urethral (mL): 755 mL    Norepinephrine: 0 mL    Voided (mL): 140 mL  Total OUT: 945 mL    Total NET: 560.9 mL        134<L>  |  101  |  10  ----------------------------<  110<H>  3.9   |  22  |  0.88    Ca    8.1<L>      2024 23:50  Phos  3.0       Mg     1.9         [ ] Hendrix catheter, indication: N/A  Meds: ascorbic acid 500 milliGRAM(s) Oral daily  cyanocobalamin 1000 MICROGram(s) Oral daily  folic acid 1 milliGRAM(s) Oral daily  multivitamin 1 Tablet(s) Oral daily    HEMATOLOGIC  Meds: enoxaparin Injectable 40 milliGRAM(s) SubCutaneous every 24 hours    [x] VTE Prophylaxis                        8.0    18.64 )-----------( 451      ( 2024 23:50 )             23.3     PT/INR - ( 2024 23:50 )   PT: 11.8 sec;   INR: 1.07 ratio       PTT - ( 2024 23:50 )  PTT:28.7 sec  Transfusion     [ ] PRBC   [ ] Platelets   [ ] FFP   [ ] Cryoprecipitate    INFECTIOUS DISEASES  T(C): 36.9 (24 @ 23:00), Max: 39.8 (24 @ 04:00)  Wt(kg): --  WBC Count: 18.64 K/uL ( @ 23:50)    Recent Cultures:  Specimen Source: Combi-Cath Combi-Cath,  @ 04:38; Results --; Gram Stain:   Few polymorphonuclear leukocytes per low power field  No organisms seen; Organism: --  Specimen Source: .Tissue Other, 01-10 @ 22:30; Results   Culture is being performed.; Gram Stain:   No polymorphonuclear cells seen per low power field  No organisms seen per oil power field; Organism: --  Specimen Source: Combi-Cath Combi-Cath,  @ 18:27; Results   Normal Respiratory Farzana present; Gram Stain:   No polymorphonuclear cells seen per low power field  No squamous epithelial cells per low power field  No organisms seen per oil power field; Organism: --  Specimen Source: .Blood Blood-Venous,  @ 13:30; Results   No growth at 4 days; Gram Stain: --; Organism: --  Specimen Source: .Blood Blood-Venous,  @ 13:15; Results   No growth at 4 days; Gram Stain: --; Organism: --    Meds: influenza   Vaccine 0.5 milliLiter(s) IntraMuscular once  piperacillin/tazobactam IVPB.. 3.375 Gram(s) IV Intermittent every 8 hours  vancomycin  IVPB 1000 milliGRAM(s) IV Intermittent every 8 hours    ENDOCRINE  Capillary Blood Glucose    Meds:     OTHER MEDICATIONS:  chlorhexidine 2% Cloths 1 Application(s) Topical <User Schedule>  lidocaine   4% Patch 1 Patch Transdermal every 24 hours  mupirocin 2% Nasal 1 Application(s) Both Nostrils two times a day

## 2024-01-14 NOTE — CHART NOTE - NSCHARTNOTEFT_GEN_A_CORE
Pt refused spine and tertiary exam. Saying he does not want me to touch him or remove his blanket. Explained the reason for exam and pt said to come back tomorrow.

## 2024-01-14 NOTE — PROGRESS NOTE ADULT - SUBJECTIVE AND OBJECTIVE BOX
Subjective: "Get the h--- out of here, I dont want any of this"  Sitting up in bed, not cooperative with care      V/S  T(C): 36.7 (24 @ 11:00), Max: 37.9 (24 @ 15:00)  HR: 99 (24 @ 14:00) (99 - 129)  BP: 91/55 (24 @ 14:00) (85/59 - 150/76)  RR: 28 (24 @ 14:00) (20 - 35)  SpO2: 100% (24 @ 14:00) (94% - 100%)      CHEST TUBE:        Left pleural tube    Drainage:  serosang 50 ml overnite  AIR LEAKS:  [ ] YES [ ] NO    I&O's Detail    2024 07:01  -  2024 07:00  --------------------------------------------------------  IN:    Dexmedetomidine: 470.5 mL    Enteral Tube Flush: 60 mL    IV PiggyBack: 775 mL    IV PiggyBack: 475 mL    Miscellaneous Tube Feedin mL    Oral Fluid: 160 mL  Total IN: 2120.5 mL    OUT:    Chest Tube (mL): 50 mL    Indwelling Catheter - Urethral (mL): 755 mL    Norepinephrine: 0 mL    Voided (mL): 565 mL  Total OUT: 1370 mL    Total NET: 750.5 mL      2024 07:01  -  2024 14:37  --------------------------------------------------------  IN:    Dexmedetomidine: 183.3 mL    IV PiggyBack: 250 mL    IV PiggyBack: 125 mL  Total IN: 558.3 mL    OUT:  Total OUT: 0 mL    Total NET: 558.3 mL          24 @ 07:01  -  24 @ 07:00  --------------------------------------------------------  IN: 2120.5 mL / OUT: 1370 mL / NET: 750.5 mL    24 @ 07:01  -  24 @ 14:37  --------------------------------------------------------  IN: 558.3 mL / OUT: 0 mL / NET: 558.3 mL      MEDICATIONS  (STANDING):  acetaminophen     Tablet .. 500 milliGRAM(s) Oral every 6 hours  ascorbic acid 500 milliGRAM(s) Oral daily  chlorhexidine 2% Cloths 1 Application(s) Topical <User Schedule>  cyanocobalamin 1000 MICROGram(s) Oral daily  dexMEDEtomidine Infusion 1 MICROgram(s)/kG/Hr (22.9 mL/Hr) IV Continuous <Continuous>  enoxaparin Injectable 40 milliGRAM(s) SubCutaneous every 24 hours  folic acid 1 milliGRAM(s) Oral daily  haloperidol    Injectable 5 milliGRAM(s) IV Push <User Schedule>  haloperidol    Injectable 2.5 milliGRAM(s) IntraMuscular <User Schedule>  influenza   Vaccine 0.5 milliLiter(s) IntraMuscular once  lidocaine   4% Patch 1 Patch Transdermal every 24 hours  multivitamin 1 Tablet(s) Oral daily  mupirocin 2% Nasal 1 Application(s) Both Nostrils two times a day  PHENobarbital Injectable 130 milliGRAM(s) IV Push every 8 hours  vancomycin  IVPB 1000 milliGRAM(s) IV Intermittent every 8 hours          134<L>  |  101  |  10  ----------------------------<  110<H>  3.9   |  22  |  0.88    Ca    8.1<L>      2024 23:50  Phos  3.0       Mg     1.9                                      8.0    18.64 )-----------( 451      ( 2024 23:50 )             23.3        PT/INR - ( 2024 23:50 )   PT: 11.8 sec;   INR: 1.07 ratio         PTT - ( 2024 23:50 )  PTT:28.7 sec         CAPILLARY BLOOD GLUCOSE               Physical Exam:    Neurology: Awake, alert, follows commands, Right side weakness  CV : RRR+S1S2  Lungs: IDiminished bilaterally L CT waterseal> removed Prolenne tie DSD applied  Incisions OTS + steristrips  Abdomen: Soft, NT/ND, +[id number]  Extremities: B/L LE warm, no edema, +PP              PAST MEDICAL & SURGICAL HISTORY:  No pertinent past medical history      No significant past surgical history

## 2024-01-14 NOTE — PROGRESS NOTE ADULT - ATTENDING COMMENTS
ATTENDING ATTESTATION:    48M history of hepatitis C found down with following injuries:  - left 4-11 severely displaced rib fractures with flail segment  - left hemothorax s/p chest tube at OSH and s/p left VATS, decortication, removal of foreign body (1/10/24)     Extubated  Agitated and combative, in restraints    WBC = 15  Hb = 7.7    A/P:  #left 4-11 severely displaced rib fractures with flail segment and acute respiratory failure  - attempted left SSRF however discovered fibropurulent lung peel secondary to retained piece of glove, left VATS and decortication performed by thoracic (1/10/24)  - left chest tube removed 1/14  - extubated    #ETOH withdrawal  - phenobarbitol taper   - remains on max dose precedex    Care coordinated with SICU.       Total time spent in the care of this patient today (excluding critical care, teaching & procedures): 35 minutes    Over 50% of the total time was spent in discussion and coordination of care with consulting services, dietary and rehab services.    Moon Banks MD  Acute Care Surgery .

## 2024-01-14 NOTE — PROGRESS NOTE ADULT - SUBJECTIVE AND OBJECTIVE BOX
Orthopaedic Surgery Progress Note     Subjective     Patient seen and examined at bedside. Patient amenable to exam now. About to have his chest tube removed by       Physical Exam:  Vital Signs Last 24 Hrs  T(C): 36.7 (14 Jan 2024 11:00), Max: 37.9 (13 Jan 2024 15:00)  T(F): 98 (14 Jan 2024 11:00), Max: 100.2 (13 Jan 2024 15:00)  HR: 119 (14 Jan 2024 12:00) (102 - 129)  BP: 134/93 (14 Jan 2024 12:00) (85/59 - 150/76)  BP(mean): 110 (14 Jan 2024 12:00) (68 - 138)  RR: 20 (14 Jan 2024 12:00) (20 - 35)  SpO2: 99% (14 Jan 2024 12:00) (94% - 100%)    Parameters below as of 14 Jan 2024 07:00  Patient On (Oxygen Delivery Method): room air        Gen: NAD  Resp: Non labored breathing  Spine:  Dressing clean/dry/intact  Drain: HV/RJ serosanguinous with good suction    Motor:              C5(Del/Bi)         C6(EF/WE)           C7 (EE/WF)           C8 (FDS)           T1 (FAbd)  R            5/5                    5/5                        5/5                           5/5                   5/5  L             5/5                    5/5                        5/5                            5/5                  5/5                L2 (IP)            L3 (Quad)            L4 (TA)               L5 (EHL)            S1(Gas)        S2(FHL)  R           5/5                       5/5                 5/5                       5/5                    5/5                5/5  L           5/5                       5/5                 5/5                       5/5                    5/5                5/5    Sensory:            C5         C6         C7      C8       T1        (0=absent, 1=impaired, 2=normal, NT=not testable)  R         2            2           2        2         2  L          2            2           2        2         2               L2          L3         L4      L5       S1         (0=absent, 1=impaired, 2=normal, NT=not testable)  R         2            2            2        2        2  L          2            2           2        2         2    Extremities warm and well perfused bilaterally     LABS:                        8.0    18.64 )-----------( 451      ( 13 Jan 2024 23:50 )             23.3     01-13    134<L>  |  101  |  10  ----------------------------<  110<H>  3.9   |  22  |  0.88    Ca    8.1<L>      13 Jan 2024 23:50  Phos  3.0     01-13  Mg     1.9     01-13        Assessment and Plan   Pt is a  with  48y y/o  Male   POD#  s/p    C1-C7, L1-S1 level . Patient is hemodynamically stable; recovering well from orhtopaedic standpoint.  -    Multimodal Pain control  -    DVT ppx: ASA vs LVX   -    Resumed home meds  -    Check AM labs  -    Monitor HMV/RJ output  -    Weight bearing status: WBAT  -    PT/OT  -    Dispo: Home/ Rehab/VNA/ TBD      .sign  .page         Orthopaedic Surgery Progress Note     Subjective     Patient seen and examined at bedside. Patient amenable to exam now. About to have his chest tube removed by       Physical Exam:  Vital Signs Last 24 Hrs  T(C): 36.7 (14 Jan 2024 11:00), Max: 37.9 (13 Jan 2024 15:00)  T(F): 98 (14 Jan 2024 11:00), Max: 100.2 (13 Jan 2024 15:00)  HR: 119 (14 Jan 2024 12:00) (102 - 129)  BP: 134/93 (14 Jan 2024 12:00) (85/59 - 150/76)  BP(mean): 110 (14 Jan 2024 12:00) (68 - 138)  RR: 20 (14 Jan 2024 12:00) (20 - 35)  SpO2: 99% (14 Jan 2024 12:00) (94% - 100%)    Parameters below as of 14 Jan 2024 07:00  Patient On (Oxygen Delivery Method): room air        Gen: NAD  Resp: Non labored breathing  Spine:  Dressing clean/dry/intact  Drain: HV/RJ serosanguinous with good suction    Motor:              C5(Del/Bi)         C6(EF/WE)           C7 (EE/WF)           C8 (FDS)           T1 (FAbd)  R            5/5                    5/5                        5/5                           5/5                   5/5  L             5/5                    5/5                        5/5                            5/5                  5/5                L2 (IP)            L3 (Quad)            L4 (TA)               L5 (EHL)            S1(Gas)        S2(FHL)  R           5/5                       5/5                 5/5                       5/5                    5/5                5/5  L           5/5                       5/5                 5/5                       5/5                    5/5                5/5    Sensory:            C5         C6         C7      C8       T1        (0=absent, 1=impaired, 2=normal, NT=not testable)  R         2            2           2        2         2  L          2            2           2        2         2               L2          L3         L4      L5       S1         (0=absent, 1=impaired, 2=normal, NT=not testable)  R         2            2            2        2        2  L          2            2           2        2         2    Extremities warm and well perfused bilaterally     LABS:                        8.0    18.64 )-----------( 451      ( 13 Jan 2024 23:50 )             23.3     01-13    134<L>  |  101  |  10  ----------------------------<  110<H>  3.9   |  22  |  0.88    Ca    8.1<L>      13 Jan 2024 23:50  Phos  3.0     01-13  Mg     1.9     01-13        Assessment and Plan   Pt is a  with  48y y/o  Male   POD#  s/p    C1-C7, L1-S1 level . Patient is hemodynamically stable; recovering well from orhtopaedic standpoint.  -    Multimodal Pain control  -    DVT ppx: ASA vs LVX   -    Resumed home meds  -    Check AM labs  -    Monitor HMV/RJ output  -    Weight bearing status: WBAT  -    PT/OT  -    Dispo: Home/ Rehab/VAN/ TBD      .sign  .page         Orthopaedic Surgery Progress Note     Subjective     Patient seen and examined at bedside. Patient amenable to exam now. About to have his chest tube removed by       Physical Exam:  Vital Signs Last 24 Hrs  T(C): 36.7 (14 Jan 2024 11:00), Max: 37.9 (13 Jan 2024 15:00)  T(F): 98 (14 Jan 2024 11:00), Max: 100.2 (13 Jan 2024 15:00)  HR: 119 (14 Jan 2024 12:00) (102 - 129)  BP: 134/93 (14 Jan 2024 12:00) (85/59 - 150/76)  BP(mean): 110 (14 Jan 2024 12:00) (68 - 138)  RR: 20 (14 Jan 2024 12:00) (20 - 35)  SpO2: 99% (14 Jan 2024 12:00) (94% - 100%)    Parameters below as of 14 Jan 2024 07:00  Patient On (Oxygen Delivery Method): room air        Gen: NAD  Resp: Non labored breathing  Spine:    Motor:              C5(Del/Bi)         C6(EF/WE)           C7 (EE/WF)           C8 (FDS)           T1 (FAbd)  R            5/5                    5/5                        5/5                           5/5                   5/5  L             3+/5                   4/5                        5/5                            5/5                  5/5  (pain limited 2/2 rib/chest tube pain)                L2 (IP)            L3 (Quad)            L4 (TA)               L5 (EHL)            S1(Gas)        S2(FHL)  R           5/5                       5/5                 5/5                       5/5                    5/5                5/5  L           4+/5                      4+/5                4+/5                     4+/5                  4/5                5/5  (pain limited)    Sensory:            C5         C6         C7      C8       T1        (0=absent, 1=impaired, 2=normal, NT=not testable)  R         2            2           2        2         2  L          2            2           2        2         2               L2          L3         L4      L5       S1         (0=absent, 1=impaired, 2=normal, NT=not testable)  R         2            2            2        2        2  L          2            2           2        2         2    Extremities warm and well perfused bilaterally     LABS:                        8.0    18.64 )-----------( 451      ( 13 Jan 2024 23:50 )             23.3     01-13    134<L>  |  101  |  10  ----------------------------<  110<H>  3.9   |  22  |  0.88    Ca    8.1<L>      13 Jan 2024 23:50  Phos  3.0     01-13  Mg     1.9     01-13        < from: CT Abdomen and Pelvis No Cont (01.06.24 @ 18:05) >    INTERPRETATION:  CLINICAL INFORMATION: Trauma transfer.    COMPARISON: None.    CONTRAST/COMPLICATIONS:  IV Contrast: NONE  Oral Contrast: NONE  Complications: None reported at time of study completion    PROCEDURE:  CT of the Chest, Abdomen and Pelvis was performed.  Sagittal and coronal reformats were performed.    FINDINGS:  CHEST:  LUNGS AND LARGE AIRWAYS: Patent central airways. There is moderate   subpleural consolidative opacities throughout the left lower lobe   posterolaterally. There is focal consolidative opacity in the right lower   lobe posteromedially.  PLEURA: There is a left-sided chest tube extending posteriorly and   caudally, entering between the fifth and sixth ribs laterally. There is   mild left hydropneumothorax.  VESSELS: Within normal limits.  HEART: Heart size is normal. No pericardial effusion.  MEDIASTINUM AND SENTHIL: No lymphadenopathy.  CHEST WALL AND LOWER NECK: There are minimally displaced fractures of the   left posterolateral fourth, fifth, sixth, and 11th ribs, mildly displaced   fractures of the left anterolateral fourth, fifth, sixth, and seventh   ribs, and displaced fractures of the left posterior seventh, eighth,   ninth, and 10th ribs.    ABDOMEN AND PELVIS:  LIVER: Mild diffuse decreased attenuation of the liver, compatible with   steatosis. Otherwise, within normal limits.  BILE DUCTS: Normal caliber.  GALLBLADDER: Within normal limits.  SPLEEN: Within normal limits.  PANCREAS: Within normal limits.  ADRENALS: Within normal limits.  KIDNEYS/URETERS: Within normal limits.    BLADDER: Within normal limits.  REPRODUCTIVE ORGANS: The prostate is not enlarged.    BOWEL: No bowel obstruction. Appendix is normal.  PERITONEUM: No ascites.  VESSELS: Within normal limits.  RETROPERITONEUM/LYMPH NODES: No lymphadenopathy.  ABDOMINAL WALL: Within normal limits.  BONES: There are subtle compression deformities of the superior L1 and L2   vertebral bodies.    IMPRESSION: Multiple left rib fractures, as described, concerning for   flail chest. Left-sided chest tube with mild left hydropneumothorax.   Subtle L1 and L2 superior compression deformities.    --- End of Report ---            HENRY GANN MD; Attending Radiologist  This document has been electronically signed. Jan 6 2024  6:19PM    < end of copied text >

## 2024-01-14 NOTE — PROGRESS NOTE ADULT - CRITICAL CARE ATTENDING COMMENT
Dr. Garcia (Attending Physician)  Alcohol withdrawal on phenobarb 130 mg q6 hours, weaning will decrease to q8 hours, start haldol for agitation, c/w precedex, will place 1:1, protected sleep tonight  Chest xray unchanged, serous output from chest tube approx 50 mL/shift  Empyema on vanc/zosyn, mrsa swab positive, staph epi in 1 blood cx  dc TLC

## 2024-01-14 NOTE — PROGRESS NOTE ADULT - PROBLEM SELECTOR PLAN 1
Suspected fracture of rib on left side, closed. Left Rib Fx 4-11th /Hemothorax  1/10 underwent VATS, with partial lung decortication  Thoracoscopic removal of intrapleural foreign body    DCd left chest tube   FU CXR  Reconsult if needed, will sign off  Continue care as per SICU team

## 2024-01-15 LAB
ANION GAP SERPL CALC-SCNC: 10 MMOL/L — SIGNIFICANT CHANGE UP (ref 5–17)
ANION GAP SERPL CALC-SCNC: 10 MMOL/L — SIGNIFICANT CHANGE UP (ref 5–17)
BUN SERPL-MCNC: 13 MG/DL — SIGNIFICANT CHANGE UP (ref 7–23)
BUN SERPL-MCNC: 13 MG/DL — SIGNIFICANT CHANGE UP (ref 7–23)
CALCIUM SERPL-MCNC: 7.9 MG/DL — LOW (ref 8.4–10.5)
CALCIUM SERPL-MCNC: 7.9 MG/DL — LOW (ref 8.4–10.5)
CHLORIDE SERPL-SCNC: 101 MMOL/L — SIGNIFICANT CHANGE UP (ref 96–108)
CHLORIDE SERPL-SCNC: 101 MMOL/L — SIGNIFICANT CHANGE UP (ref 96–108)
CO2 SERPL-SCNC: 24 MMOL/L — SIGNIFICANT CHANGE UP (ref 22–31)
CO2 SERPL-SCNC: 24 MMOL/L — SIGNIFICANT CHANGE UP (ref 22–31)
CREAT ?TM UR-MCNC: 26 MG/DL — SIGNIFICANT CHANGE UP
CREAT ?TM UR-MCNC: 26 MG/DL — SIGNIFICANT CHANGE UP
CREAT SERPL-MCNC: 1.35 MG/DL — HIGH (ref 0.5–1.3)
CREAT SERPL-MCNC: 1.35 MG/DL — HIGH (ref 0.5–1.3)
CULTURE RESULTS: ABNORMAL
CULTURE RESULTS: ABNORMAL
EGFR: 65 ML/MIN/1.73M2 — SIGNIFICANT CHANGE UP
EGFR: 65 ML/MIN/1.73M2 — SIGNIFICANT CHANGE UP
GLUCOSE SERPL-MCNC: 113 MG/DL — HIGH (ref 70–99)
GLUCOSE SERPL-MCNC: 113 MG/DL — HIGH (ref 70–99)
MAGNESIUM SERPL-MCNC: 2.1 MG/DL — SIGNIFICANT CHANGE UP (ref 1.6–2.6)
MAGNESIUM SERPL-MCNC: 2.1 MG/DL — SIGNIFICANT CHANGE UP (ref 1.6–2.6)
ORGANISM # SPEC MICROSCOPIC CNT: ABNORMAL
PHOSPHATE SERPL-MCNC: 4.5 MG/DL — SIGNIFICANT CHANGE UP (ref 2.5–4.5)
PHOSPHATE SERPL-MCNC: 4.5 MG/DL — SIGNIFICANT CHANGE UP (ref 2.5–4.5)
POTASSIUM SERPL-MCNC: 4.1 MMOL/L — SIGNIFICANT CHANGE UP (ref 3.5–5.3)
POTASSIUM SERPL-MCNC: 4.1 MMOL/L — SIGNIFICANT CHANGE UP (ref 3.5–5.3)
POTASSIUM SERPL-SCNC: 4.1 MMOL/L — SIGNIFICANT CHANGE UP (ref 3.5–5.3)
POTASSIUM SERPL-SCNC: 4.1 MMOL/L — SIGNIFICANT CHANGE UP (ref 3.5–5.3)
SODIUM SERPL-SCNC: 135 MMOL/L — SIGNIFICANT CHANGE UP (ref 135–145)
SODIUM SERPL-SCNC: 135 MMOL/L — SIGNIFICANT CHANGE UP (ref 135–145)
SPECIMEN SOURCE: SIGNIFICANT CHANGE UP
SPECIMEN SOURCE: SIGNIFICANT CHANGE UP
UUN UR-MCNC: 130 MG/DL — SIGNIFICANT CHANGE UP

## 2024-01-15 PROCEDURE — 93970 EXTREMITY STUDY: CPT | Mod: 26

## 2024-01-15 PROCEDURE — 71045 X-RAY EXAM CHEST 1 VIEW: CPT | Mod: 26

## 2024-01-15 PROCEDURE — 99233 SBSQ HOSP IP/OBS HIGH 50: CPT

## 2024-01-15 RX ORDER — PIPERACILLIN AND TAZOBACTAM 4; .5 G/20ML; G/20ML
3.38 INJECTION, POWDER, LYOPHILIZED, FOR SOLUTION INTRAVENOUS ONCE
Refills: 0 | Status: COMPLETED | OUTPATIENT
Start: 2024-01-15 | End: 2024-01-15

## 2024-01-15 RX ORDER — VANCOMYCIN HCL 1 G
1000 VIAL (EA) INTRAVENOUS EVERY 12 HOURS
Refills: 0 | Status: DISCONTINUED | OUTPATIENT
Start: 2024-01-15 | End: 2024-01-16

## 2024-01-15 RX ORDER — PIPERACILLIN AND TAZOBACTAM 4; .5 G/20ML; G/20ML
3.38 INJECTION, POWDER, LYOPHILIZED, FOR SOLUTION INTRAVENOUS EVERY 8 HOURS
Refills: 0 | Status: COMPLETED | OUTPATIENT
Start: 2024-01-15 | End: 2024-01-24

## 2024-01-15 RX ORDER — HALOPERIDOL DECANOATE 100 MG/ML
5 INJECTION INTRAMUSCULAR ONCE
Refills: 0 | Status: COMPLETED | OUTPATIENT
Start: 2024-01-15 | End: 2024-01-15

## 2024-01-15 RX ORDER — IPRATROPIUM/ALBUTEROL SULFATE 18-103MCG
3 AEROSOL WITH ADAPTER (GRAM) INHALATION EVERY 6 HOURS
Refills: 0 | Status: DISCONTINUED | OUTPATIENT
Start: 2024-01-15 | End: 2024-01-18

## 2024-01-15 RX ORDER — FUROSEMIDE 40 MG
20 TABLET ORAL ONCE
Refills: 0 | Status: COMPLETED | OUTPATIENT
Start: 2024-01-15 | End: 2024-01-15

## 2024-01-15 RX ORDER — PHENOBARBITAL 60 MG
130 TABLET ORAL EVERY 12 HOURS
Refills: 0 | Status: DISCONTINUED | OUTPATIENT
Start: 2024-01-15 | End: 2024-01-17

## 2024-01-15 RX ADMIN — Medication 500 MILLIGRAM(S): at 11:54

## 2024-01-15 RX ADMIN — Medication 130 MILLIGRAM(S): at 17:05

## 2024-01-15 RX ADMIN — Medication 500 MILLIGRAM(S): at 07:00

## 2024-01-15 RX ADMIN — Medication 500 MILLIGRAM(S): at 12:24

## 2024-01-15 RX ADMIN — DEXMEDETOMIDINE HYDROCHLORIDE IN 0.9% SODIUM CHLORIDE 22.9 MICROGRAM(S)/KG/HR: 4 INJECTION INTRAVENOUS at 19:54

## 2024-01-15 RX ADMIN — PIPERACILLIN AND TAZOBACTAM 25 GRAM(S): 4; .5 INJECTION, POWDER, LYOPHILIZED, FOR SOLUTION INTRAVENOUS at 22:41

## 2024-01-15 RX ADMIN — HALOPERIDOL DECANOATE 5 MILLIGRAM(S): 100 INJECTION INTRAMUSCULAR at 17:14

## 2024-01-15 RX ADMIN — HALOPERIDOL DECANOATE 5 MILLIGRAM(S): 100 INJECTION INTRAMUSCULAR at 21:24

## 2024-01-15 RX ADMIN — DEXMEDETOMIDINE HYDROCHLORIDE IN 0.9% SODIUM CHLORIDE 22.9 MICROGRAM(S)/KG/HR: 4 INJECTION INTRAVENOUS at 08:49

## 2024-01-15 RX ADMIN — Medication 1 MILLIGRAM(S): at 11:55

## 2024-01-15 RX ADMIN — LIDOCAINE 1 PATCH: 4 CREAM TOPICAL at 17:13

## 2024-01-15 RX ADMIN — Medication 500 MILLIGRAM(S): at 17:35

## 2024-01-15 RX ADMIN — LIDOCAINE 1 PATCH: 4 CREAM TOPICAL at 07:44

## 2024-01-15 RX ADMIN — Medication 3 MILLILITER(S): at 11:18

## 2024-01-15 RX ADMIN — HYDROMORPHONE HYDROCHLORIDE 1 MILLIGRAM(S): 2 INJECTION INTRAMUSCULAR; INTRAVENOUS; SUBCUTANEOUS at 01:30

## 2024-01-15 RX ADMIN — DEXMEDETOMIDINE HYDROCHLORIDE IN 0.9% SODIUM CHLORIDE 22.9 MICROGRAM(S)/KG/HR: 4 INJECTION INTRAVENOUS at 00:03

## 2024-01-15 RX ADMIN — Medication 3 MILLILITER(S): at 17:42

## 2024-01-15 RX ADMIN — LIDOCAINE 1 PATCH: 4 CREAM TOPICAL at 06:06

## 2024-01-15 RX ADMIN — Medication 500 MILLIGRAM(S): at 23:35

## 2024-01-15 RX ADMIN — Medication 1 TABLET(S): at 11:54

## 2024-01-15 RX ADMIN — Medication 500 MILLIGRAM(S): at 05:59

## 2024-01-15 RX ADMIN — Medication 500 MILLIGRAM(S): at 17:05

## 2024-01-15 RX ADMIN — Medication 10 MILLIEQUIVALENT(S): at 01:16

## 2024-01-15 RX ADMIN — Medication 500 MILLIGRAM(S): at 00:15

## 2024-01-15 RX ADMIN — HYDROMORPHONE HYDROCHLORIDE 1 MILLIGRAM(S): 2 INJECTION INTRAMUSCULAR; INTRAVENOUS; SUBCUTANEOUS at 01:16

## 2024-01-15 RX ADMIN — Medication 3 MILLILITER(S): at 23:46

## 2024-01-15 RX ADMIN — MUPIROCIN 1 APPLICATION(S): 20 OINTMENT TOPICAL at 06:18

## 2024-01-15 RX ADMIN — ENOXAPARIN SODIUM 40 MILLIGRAM(S): 100 INJECTION SUBCUTANEOUS at 22:34

## 2024-01-15 RX ADMIN — Medication 20 MILLIGRAM(S): at 08:49

## 2024-01-15 RX ADMIN — Medication 250 MILLIGRAM(S): at 17:05

## 2024-01-15 RX ADMIN — Medication 3 MILLILITER(S): at 05:58

## 2024-01-15 RX ADMIN — Medication 130 MILLIGRAM(S): at 05:58

## 2024-01-15 RX ADMIN — PIPERACILLIN AND TAZOBACTAM 25 GRAM(S): 4; .5 INJECTION, POWDER, LYOPHILIZED, FOR SOLUTION INTRAVENOUS at 09:52

## 2024-01-15 RX ADMIN — PIPERACILLIN AND TAZOBACTAM 25 GRAM(S): 4; .5 INJECTION, POWDER, LYOPHILIZED, FOR SOLUTION INTRAVENOUS at 17:05

## 2024-01-15 RX ADMIN — Medication 250 MILLIGRAM(S): at 05:57

## 2024-01-15 RX ADMIN — HALOPERIDOL DECANOATE 2.5 MILLIGRAM(S): 100 INJECTION INTRAMUSCULAR at 08:49

## 2024-01-15 RX ADMIN — PIPERACILLIN AND TAZOBACTAM 200 GRAM(S): 4; .5 INJECTION, POWDER, LYOPHILIZED, FOR SOLUTION INTRAVENOUS at 07:13

## 2024-01-15 RX ADMIN — CHLORHEXIDINE GLUCONATE 1 APPLICATION(S): 213 SOLUTION TOPICAL at 05:58

## 2024-01-15 RX ADMIN — PREGABALIN 1000 MICROGRAM(S): 225 CAPSULE ORAL at 11:54

## 2024-01-15 NOTE — PROGRESS NOTE ADULT - ASSESSMENT
48M with PMHx hepatitis C (diagnosed many years ago, never treated), depression transferred from outside hospital for trauma eval. Patient found down by roommate after unknown amount of time, found with multiple 4-11 L sided rib fractures with flail chest. Chest tube placed there with >1L output (old blood). Patient also with tachypnea, concern for respiratory failure started on BiPAP. Level 1 called for transient hypotension, patient given 2 unit PRBC in [facility] ED. Patient admitted to SICU for hemodynamic monitoring, pain management.     Injuries:   - left posterolateral fourth, fifth, sixth, and 11th ribs, minimally displaced   - mildly displaced fx of L anterolateral 4-7th ribs  - displaced fx of the left posterior 7-10th ribs    1/10 s/p VATS, with partial lung decortication;   1/14 s/p chest tube removal    Neurologic:  - Precedex gtt, Haldol 2.5 in AM + 5 in PM for agitation  - Phenobarb 130 q8 for ETOH withdrawal- weaning  - Lidocaine patch, Dilaudid PRN for breakthrough pain  - EEG negative prelim read    Respiratory:  - Intubated 1/8 for concern of poor mental status, airway protection- extubated  - s/p VATS 1/10 with discovery of Fibropurulent exudative adhesions and removal of retained foreign material likely the tip of a glove, s/p washout, with new 28f chest tube placed > now removed  - Incentive spirometry  - AM CXR    Cardiovascular:  - off pressors MAP >65  - Lactate remains negative    Gastrointestinal/Nutrition:  - Regular diet    Genitourinary/Renal:  - Creatinine normalized, electrolytes improved   - CPK cleared  - Supplement electrolytes PRN    Hematologic:  - Chemical VTE ppx with Lovenox  - Mechanical VTE ppx with SCDs  - s/p pRBC x1 unit 1/12    Infectious Disease: Empyema   - Procal 9 on 1/7 - downtrended to 0.64 on 1/12  - Tissue culture 1/10 no organisms seen   - BCx 1/9 no growth to date; Bcx 1/13 with staph epi x1 bottle  - MRSA PCR 1/9 negative  - resent cultures, procal 1/13   - Continue Zosyn 1/7-  - Continue Vanc 1/10-    Endocrine:  - No active issues, no history of DM    Line:  -RIJ cvc 1/11    Disposition: SICU   48M with PMHx hepatitis C (diagnosed many years ago, never treated), depression transferred from outside hospital for trauma eval. Patient found down by roommate after unknown amount of time, found with multiple 4-11 L sided rib fractures with flail chest. Chest tube placed there with >1L output (old blood). Patient also with tachypnea, concern for respiratory failure started on BiPAP. Level 1 called for transient hypotension, patient given 2 unit PRBC in [facility] ED. Patient admitted to SICU for hemodynamic monitoring, pain management.     Injuries:   - left posterolateral fourth, fifth, sixth, and 11th ribs, minimally displaced   - mildly displaced fx of L anterolateral 4-7th ribs  - displaced fx of the left posterior 7-10th ribs    1/10 s/p VATS, with partial lung decortication;   1/14 s/p chest tube removal    Neurologic:  - Precedex gtt, Haldol 2.5 in AM + 5 in PM for agitation  - Phenobarb 130 q8 for ETOH withdrawal- weaning  - Lidocaine patch, Dilaudid PRN for breakthrough pain  - EEG negative prelim read    Respiratory:  - Intubated 1/8 for concern of poor mental status, airway protection- extubated  - s/p VATS 1/10 with discovery of Fibropurulent exudative adhesions and removal of retained foreign material likely the tip of a glove, s/p washout, with new 28f chest tube placed > now removed  - Incentive spirometry  - AM CXR    Cardiovascular:  - off pressors MAP >65  - Lactate remains negative    Gastrointestinal/Nutrition:  - Regular diet    Genitourinary/Renal:  - Creatinine normalized, electrolytes improved   - CPK cleared  - Supplement electrolytes PRN    Hematologic:  - Chemical VTE ppx with Lovenox  - Mechanical VTE ppx with SCDs  - s/p pRBC x1 unit 1/12    Infectious Disease: Empyema   - Procal 9 on 1/7 - downtrended to 0.64 on 1/12  - Tissue culture 1/10 no organisms seen   - BCx 1/9 no growth to date; Bcx 1/13 with staph epi x1 bottle  - MRSA PCR 1/9 negative  - resent cultures, procal 1/13   - Continue Zosyn 1/7-  - Continue Vanc 1/10-    Endocrine:  - No active issues, no history of DM    Line:  -RIJ cvc 1/11    Disposition: SICU   48M with PMHx hepatitis C (diagnosed many years ago, never treated), depression transferred from outside hospital for trauma eval. Patient found down by roommate after unknown amount of time, found with multiple 4-11 L sided rib fractures with flail chest. Chest tube placed there with >1L output (old blood). Patient also with tachypnea, concern for respiratory failure started on BiPAP. Level 1 called for transient hypotension, patient given 2 unit PRBC in [facility] ED. Patient admitted to SICU for hemodynamic monitoring, pain management.     Injuries:   - left posterolateral fourth, fifth, sixth, and 11th ribs, minimally displaced   - mildly displaced fx of L anterolateral 4-7th ribs  - displaced fx of the left posterior 7-10th ribs    1/10 s/p VATS, with partial lung decortication;   1/14 s/p chest tube removal    Neurologic:  - Precedex gtt, Haldol 2.5 in AM + 5 in PM for agitation  - Phenobarb 130 q8 for ETOH withdrawal- weaning  - Lidocaine patch, Dilaudid PRN for breakthrough pain  - EEG negative prelim read    Respiratory:  - Intubated 1/8 for concern of poor mental status, airway protection- extubated  - s/p VATS 1/10 with discovery of Fibropurulent exudative adhesions and removal of retained foreign material likely the tip of a glove, s/p washout, with new 28f chest tube placed > now removed  - Incentive spirometry  - AM CXR    Cardiovascular:  - off pressors MAP >65  - Lactate remains negative    Gastrointestinal/Nutrition:  - Regular diet    Genitourinary/Renal:  - Creatinine normalized, electrolytes improved   - CPK cleared  - Supplement electrolytes PRN    Hematologic:  - Chemical VTE ppx with Lovenox  - Mechanical VTE ppx with SCDs  - s/p pRBC x1 unit 1/12    Infectious Disease: Empyema   - Procal 9 on 1/7 - downtrended to 0.64 on 1/12  - Tissue culture 1/10 no organisms seen   - BCx 1/9 no growth to date; Bcx 1/13 with staph epi x1 bottle  - MRSA PCR 1/9 negative  - resent cultures, procal 1/13   - Continue Zosyn 1/7-  - Continue Vanc 1/10-    Endocrine:  - No active issues, no history of DM    Line:  -RIJ cvc 1/11    Disposition: SICU

## 2024-01-15 NOTE — PROGRESS NOTE ADULT - ATTENDING COMMENTS
SICU ATTENDING ATTESTATION    I have seen and examined this patient on multidisciplinary SICU rounds thismorning. I have reviewed all new labs, imaging and reports. I have participated in formulating the plan for the day, and have read and agree with the history, ROS, exam, assessment and plan as stated above, with my additions listed below:     Alcohol withdrawal: precedex gtt, phenobarb taper (q8 --> BID today).   Haldol 2.5 qAM and PM for agitation.   pain controlled with dilaudid, oxy and tylenol  CT removed yesterday. satturating well on room air.   regular diet  slight increase in creatinine 0.8-->1.16. Will check pm BMP. bladder scan shows 300. urine lytes and mccormack if increases.   Hg stable.   On Vanc/Zosyn. Pending results of thoracic OR cultures.   BCx with staph epi are likely a contaminant (one bottle only)--> repeat sent.   Procal 9-->0.6, will de-escalate abx based on pending cultures.   LLE edema --> dvt ultrasound pending  lovenox for dvt ppx  lasix 20mg for generalized edema today.  Will d/c central line.     Total time spent in the care of this patient today (excluding procedures & teaching): 35 min                 Over 50% of the total time was spent in discussion and coordination of care with consulting services, dietary and rehab services.     Shelly Molina M.D., M.S.  Division of Acute Care Surgery

## 2024-01-15 NOTE — PROGRESS NOTE ADULT - ASSESSMENT
48y Male with PMHx hepatitis C (diagnosed many years ago, never treated), depression transferred from outside hospital for trauma eval. Patient found down by roommate after unknown amount of time, found with multiple 4-11 L sided rib fractures with flail chest. Chest tube placed there with >1L output (old blood). Patient also with tachypnea, concern for respiratory failure started on BiPAP. Level 1 called for transient hypotension, patient given 2 unit PRBC in [facility] ED. Rib score 3. Pt now s/p VATS, with partial lung decortication and Thoracoscopic removal of intrapleural foreign body.     Injuries:   - left posterolateral fourth, fifth, sixth, and 11th ribs, minimally displaced   - mildly displaced fractures of the left anterolateral fourth, fifth, sixth, and seventh ribs  - displaced fractures of the left posterior seventh, eighth, ninth, and 10th ribs.    PLAN  - wean phenobarbital   - D/c'ed chest tube  - AM CXR  - Regular diet  - Rib fracture protocol   - IV abx  - ISS  - Appreciate excellent SICU care     48y Male with PMHx hepatitis C (diagnosed many years ago, never treated), depression transferred from outside hospital for trauma eval. Patient found down by roommate after unknown amount of time, found with multiple 4-11 L sided rib fractures with flail chest. Chest tube placed there with >1L output (old blood). Patient also with tachypnea, concern for respiratory failure started on BiPAP. Level 1 called for transient hypotension, patient given 2 unit PRBC in [facility] ED. Rib score 3. Pt now s/p VATS, with partial lung decortication and Thoracoscopic removal of intrapleural foreign body.     Injuries:   - left posterolateral fourth, fifth, sixth, and 11th ribs, minimally displaced   - mildly displaced fractures of the left anterolateral fourth, fifth, sixth, and seventh ribs  - displaced fractures of the left posterior seventh, eighth, ninth, and 10th ribs.    PLAN  - wean phenobarbital   - D/c'ed chest tube  - AM CXR  - Regular diet  - Rib fracture protocol   - IV abx  - ISS  - Appreciate excellent SICU care     [Feeds self] : feeds self 48y Male with PMHx hepatitis C (diagnosed many years ago, never treated), depression transferred from outside hospital for trauma eval. Patient found down by roommate after unknown amount of time, found with multiple 4-11 L sided rib fractures with flail chest. Chest tube placed there with >1L output (old blood). Patient also with tachypnea, concern for respiratory failure started on BiPAP. Level 1 called for transient hypotension, patient given 2 unit PRBC in [facility] ED. Rib score 3. Pt now s/p VATS, with partial lung decortication and Thoracoscopic removal of intrapleural foreign body.     Injuries:   - left posterolateral fourth, fifth, sixth, and 11th ribs, minimally displaced   - mildly displaced fractures of the left anterolateral fourth, fifth, sixth, and seventh ribs  - displaced fractures of the left posterior seventh, eighth, ninth, and 10th ribs.    PLAN  - wean phenobarbital   - D/c'ed chest tube  - AM CXR  - Regular diet  - Rib fracture protocol   - IV abx  - ISS  - Appreciate excellent SICU care     [Uses oral exploration] : uses oral exploration [Enjoys vocal turn taking] : enjoys vocal turn taking [Shows pleasure from interactions with others] : shows pleasure from interactions with others [Rakes objects] : rakes objects [Lizette] : lizette [Sit - no support, leaning forward] : sit - no support, leaning forward [Roll over] : roll over

## 2024-01-15 NOTE — PROGRESS NOTE ADULT - SUBJECTIVE AND OBJECTIVE BOX
INTERVAL EVENTS:  - Seroquel 25 at bedtime  - Weaning phenobarbital  - Extubated to room air  - D/c mccormack, passed TOV  - Chest tube discontinued; post pull CXR with no PTX  - Duplex ordered for LLE swelling      SUBJECTIVE/ROS:  [ ] A ten-point review of systems was otherwise negative except as noted.  [ ] Due to altered mental status/intubation, subjective information were not able to be obtained from the patient. History was obtained, to the extent possible, from review of the chart and collateral sources of information.      NEURO  RASS:     GCS:     [facility] ICU:  Exam: awake, alert  Meds: acetaminophen     Tablet .. 500 milliGRAM(s) Oral every 6 hours  dexMEDEtomidine Infusion 1 MICROgram(s)/kG/Hr IV Continuous <Continuous>  haloperidol    Injectable 5 milliGRAM(s) IV Push <User Schedule>  haloperidol    Injectable 2.5 milliGRAM(s) IntraMuscular <User Schedule>  HYDROmorphone  Injectable 1 milliGRAM(s) IV Push every 3 hours PRN Breakthrough  ibuprofen  Tablet. 400 milliGRAM(s) Oral every 4 hours PRN Mild Pain (1 - 3)  oxyCODONE    IR 5 milliGRAM(s) Oral every 4 hours PRN Moderate Pain (4 - 6)  oxyCODONE    IR 10 milliGRAM(s) Oral every 4 hours PRN Severe Pain (7 - 10)  PHENobarbital Injectable 130 milliGRAM(s) IV Push every 8 hours    [x] Adequacy of sedation and pain control has been assessed and adjusted      RESPIRATORY  RR: 49 (24 @ 23:00) (20 - 49)  SpO2: 93% (24 @ 23:00) (93% - 100%)  Wt(kg): --  Exam: tachypneic in 30s, breathing comfortably on room air, Left chest incision well approximated with staples, no erythema , Previous chest tube site well approximated with suture.    CARDIOVASCULAR  HR: 121 (24 @ 23:00) (92 - 128)  BP: 149/75 (24 @ 23:00) (89/50 - 162/76)  BP(mean): 98 (24 @ 23:00) (63 - 138)  VBG - ( 2024 22:16 )  pH: 7.46  /  pCO2: 34    /  pO2: 35    / HCO3: 24    / Base Excess: 0.5   /  SaO2: 63.8   Lactate: 1.8                Exam: regular rate and rhythm  Cardiac Rhythm: sinus  Perfusion     [x]Adequate   [ ]Inadequate  Mentation   [x]Normal       [ ]Reduced  Extremities  [x]Warm         [ ]Cool  Volume Status [ ]Hypervolemic [x]Euvolemic [ ]Hypovolemic  Meds:       GI/NUTRITION  Exam: soft, nontender, nondistended  Diet: regular  Meds:     GENITOURINARY  I&O's Detail     @ 07: @ 07:00  --------------------------------------------------------  IN:    Dexmedetomidine: 470.5 mL    Enteral Tube Flush: 60 mL    IV PiggyBack: 775 mL    IV PiggyBack: 475 mL    Miscellaneous Tube Feedin mL    Oral Fluid: 160 mL  Total IN: 2120.5 mL    OUT:    Chest Tube (mL): 50 mL    Indwelling Catheter - Urethral (mL): 755 mL    Norepinephrine: 0 mL    Voided (mL): 565 mL  Total OUT: 1370 mL    Total NET: 750.5 mL       @ 07:01  -  01-15 @ 00:33  --------------------------------------------------------  IN:    Dexmedetomidine: 309.3 mL    IV PiggyBack: 250 mL    IV PiggyBack: 175 mL  Total IN: 734.3 mL    OUT:  Total OUT: 0 mL    Total NET: 734.3 mL              135  |  103  |  11  ----------------------------<  107<H>  3.9   |  22  |  1.16    Ca    8.0<L>      2024 22:19  Phos  3.5       Mg     2.2           [ ] Mccormack catheter, indication: N/A  Meds: ascorbic acid 500 milliGRAM(s) Oral daily  cyanocobalamin 1000 MICROGram(s) Oral daily  folic acid 1 milliGRAM(s) Oral daily  multivitamin 1 Tablet(s) Oral daily  potassium chloride    Tablet ER 10 milliEquivalent(s) Oral once        HEMATOLOGIC  Meds: enoxaparin Injectable 40 milliGRAM(s) SubCutaneous every 24 hours  Exam: Left leg TTP, larger than right    [x] VTE Prophylaxis                        7.7    15.24 )-----------( 488      ( 2024 22:19 )             22.2     PT/INR - ( 2024 22:19 )   PT: 12.9 sec;   INR: 1.24 ratio         PTT - ( 2024 22:19 )  PTT:30.0 sec  Transfusion     [ ] PRBC   [ ] Platelets   [ ] FFP   [ ] Cryoprecipitate      INFECTIOUS DISEASES  WBC Count: 15.24 K/uL ( @ 22:19)    RECENT CULTURES:  Specimen Source: .Blood Blood-Peripheral  Date/Time:  @ 04:38  Culture Results:   No growth at 24 hours  Gram Stain:   Growth in aerobic bottle: Gram Positive Cocci in Clusters<!>  Organism: Blood Culture PCR<!>  Specimen Source: .Tissue Other  Date/Time: 01-10 @ 22:30  Culture Results:   Culture is being performed.  Gram Stain:   No polymorphonuclear cells seen per low power field  No organisms seen per oil power field  Organism: --    Meds: influenza   Vaccine 0.5 milliLiter(s) IntraMuscular once  vancomycin  IVPB 1000 milliGRAM(s) IV Intermittent every 8 hours        ENDOCRINE  CAPILLARY BLOOD GLUCOSE      OTHER MEDICATIONS:  chlorhexidine 2% Cloths 1 Application(s) Topical <User Schedule>  lidocaine   4% Patch 1 Patch Transdermal every 24 hours  mupirocin 2% Nasal 1 Application(s) Both Nostrils two times a day      IMAGING:  < from: Xray Chest 1 View- PORTABLE-Urgent (Xray Chest 1 View- PORTABLE-Urgent .) (24 @ 15:39) >    ACC: [id number] EXAM:  XR CHEST PORTABLE ROUTINE 1V   ORDERED BY: CHRISTINA YU     ACC: [id number] EXAM:  XR CHEST PORTABLE URGENT 1V   ORDERED BY: ABDULAZIZ HOFF     PROCEDURE DATE:  2024          INTERPRETATION:  EXAMINATION: XR CHEST URGENT, XR CHEST    CLINICAL INDICATION: Chest tube removal    TECHNIQUE: Single frontal, portable view of the chest was obtained.    COMPARISON: Chest x-ray 2024 6:31 AM    FINDINGS:  Chest x-ray 2024 6:35 AM  Right IJ approach central venous catheter with tip in the SVC.  Interval removal of endotracheal tube and enteric tube.  Left-sided chest tube in similar position.  The heart is not accurately assessed in this AP projection.  Unchanged, small left-sided pleural effusion with associated atelectasis.   Right lung is clear.  There is no pneumothorax or right-sided pleural effusion.  Redemonstration of left-sided rib fractures.    Chest x-ray 2024 3:08 PM  Interval removal of left-sided chest tube.  Increase in size of left-sidedpleural effusion. No pneumothorax.    IMPRESSION:  Slight increase in size of left-sided pleural effusion status post left   chest tube removal.    --- End of Report ---          DHRUV VU MD; Resident Radiologist  This document has been electronically signed.  DANG RAUSCH MD; Attending Radiologist  This document has been electronically signed. 2024  3:50PM    < end of copied text >   INTERVAL EVENTS:  - Seroquel 25 at bedtime  - Weaning phenobarbital  - Extubated to room air  - D/c mccormack, passed TOV  - Chest tube discontinued; post pull CXR with no PTX  - Duplex ordered for LLE swelling      SUBJECTIVE/ROS:  [ ] A ten-point review of systems was otherwise negative except as noted.  [ ] Due to altered mental status/intubation, subjective information were not able to be obtained from the patient. History was obtained, to the extent possible, from review of the chart and collateral sources of information.      NEURO  RASS:     GCS:     CAM ICU:  Exam: awake, alert  Meds: acetaminophen     Tablet .. 500 milliGRAM(s) Oral every 6 hours  dexMEDEtomidine Infusion 1 MICROgram(s)/kG/Hr IV Continuous <Continuous>  haloperidol    Injectable 5 milliGRAM(s) IV Push <User Schedule>  haloperidol    Injectable 2.5 milliGRAM(s) IntraMuscular <User Schedule>  HYDROmorphone  Injectable 1 milliGRAM(s) IV Push every 3 hours PRN Breakthrough  ibuprofen  Tablet. 400 milliGRAM(s) Oral every 4 hours PRN Mild Pain (1 - 3)  oxyCODONE    IR 5 milliGRAM(s) Oral every 4 hours PRN Moderate Pain (4 - 6)  oxyCODONE    IR 10 milliGRAM(s) Oral every 4 hours PRN Severe Pain (7 - 10)  PHENobarbital Injectable 130 milliGRAM(s) IV Push every 8 hours    [x] Adequacy of sedation and pain control has been assessed and adjusted      RESPIRATORY  RR: 49 (24 @ 23:00) (20 - 49)  SpO2: 93% (24 @ 23:00) (93% - 100%)  Wt(kg): --  Exam: tachypneic in 30s, breathing comfortably on room air, Left chest incision well approximated with staples, no erythema , Previous chest tube site well approximated with suture.    CARDIOVASCULAR  HR: 121 (24 @ 23:00) (92 - 128)  BP: 149/75 (24 @ 23:00) (89/50 - 162/76)  BP(mean): 98 (24 @ 23:00) (63 - 138)  VBG - ( 2024 22:16 )  pH: 7.46  /  pCO2: 34    /  pO2: 35    / HCO3: 24    / Base Excess: 0.5   /  SaO2: 63.8   Lactate: 1.8                Exam: regular rate and rhythm  Cardiac Rhythm: sinus  Perfusion     [x]Adequate   [ ]Inadequate  Mentation   [x]Normal       [ ]Reduced  Extremities  [x]Warm         [ ]Cool  Volume Status [ ]Hypervolemic [x]Euvolemic [ ]Hypovolemic  Meds:       GI/NUTRITION  Exam: soft, nontender, nondistended  Diet: regular  Meds:     GENITOURINARY  I&O's Detail     @ 07: @ 07:00  --------------------------------------------------------  IN:    Dexmedetomidine: 470.5 mL    Enteral Tube Flush: 60 mL    IV PiggyBack: 775 mL    IV PiggyBack: 475 mL    Miscellaneous Tube Feedin mL    Oral Fluid: 160 mL  Total IN: 2120.5 mL    OUT:    Chest Tube (mL): 50 mL    Indwelling Catheter - Urethral (mL): 755 mL    Norepinephrine: 0 mL    Voided (mL): 565 mL  Total OUT: 1370 mL    Total NET: 750.5 mL       @ 07:01  -  01-15 @ 00:33  --------------------------------------------------------  IN:    Dexmedetomidine: 309.3 mL    IV PiggyBack: 250 mL    IV PiggyBack: 175 mL  Total IN: 734.3 mL    OUT:  Total OUT: 0 mL    Total NET: 734.3 mL              135  |  103  |  11  ----------------------------<  107<H>  3.9   |  22  |  1.16    Ca    8.0<L>      2024 22:19  Phos  3.5       Mg     2.2           [ ] Mccormack catheter, indication: N/A  Meds: ascorbic acid 500 milliGRAM(s) Oral daily  cyanocobalamin 1000 MICROGram(s) Oral daily  folic acid 1 milliGRAM(s) Oral daily  multivitamin 1 Tablet(s) Oral daily  potassium chloride    Tablet ER 10 milliEquivalent(s) Oral once        HEMATOLOGIC  Meds: enoxaparin Injectable 40 milliGRAM(s) SubCutaneous every 24 hours  Exam: Left leg TTP, larger than right    [x] VTE Prophylaxis                        7.7    15.24 )-----------( 488      ( 2024 22:19 )             22.2     PT/INR - ( 2024 22:19 )   PT: 12.9 sec;   INR: 1.24 ratio         PTT - ( 2024 22:19 )  PTT:30.0 sec  Transfusion     [ ] PRBC   [ ] Platelets   [ ] FFP   [ ] Cryoprecipitate      INFECTIOUS DISEASES  WBC Count: 15.24 K/uL ( @ 22:19)    RECENT CULTURES:  Specimen Source: .Blood Blood-Peripheral  Date/Time:  @ 04:38  Culture Results:   No growth at 24 hours  Gram Stain:   Growth in aerobic bottle: Gram Positive Cocci in Clusters<!>  Organism: Blood Culture PCR<!>  Specimen Source: .Tissue Other  Date/Time: 01-10 @ 22:30  Culture Results:   Culture is being performed.  Gram Stain:   No polymorphonuclear cells seen per low power field  No organisms seen per oil power field  Organism: --    Meds: influenza   Vaccine 0.5 milliLiter(s) IntraMuscular once  vancomycin  IVPB 1000 milliGRAM(s) IV Intermittent every 8 hours        ENDOCRINE  CAPILLARY BLOOD GLUCOSE      OTHER MEDICATIONS:  chlorhexidine 2% Cloths 1 Application(s) Topical <User Schedule>  lidocaine   4% Patch 1 Patch Transdermal every 24 hours  mupirocin 2% Nasal 1 Application(s) Both Nostrils two times a day      IMAGING:  < from: Xray Chest 1 View- PORTABLE-Urgent (Xray Chest 1 View- PORTABLE-Urgent .) (24 @ 15:39) >    ACC: [id number] EXAM:  XR CHEST PORTABLE ROUTINE 1V   ORDERED BY: CHRISTINA UY     ACC: [id number] EXAM:  XR CHEST PORTABLE URGENT 1V   ORDERED BY: ABDULAZIZ HOFF     PROCEDURE DATE:  2024          INTERPRETATION:  EXAMINATION: XR CHEST URGENT, XR CHEST    CLINICAL INDICATION: Chest tube removal    TECHNIQUE: Single frontal, portable view of the chest was obtained.    COMPARISON: Chest x-ray 2024 6:31 AM    FINDINGS:  Chest x-ray 2024 6:35 AM  Right IJ approach central venous catheter with tip in the SVC.  Interval removal of endotracheal tube and enteric tube.  Left-sided chest tube in similar position.  The heart is not accurately assessed in this AP projection.  Unchanged, small left-sided pleural effusion with associated atelectasis.   Right lung is clear.  There is no pneumothorax or right-sided pleural effusion.  Redemonstration of left-sided rib fractures.    Chest x-ray 2024 3:08 PM  Interval removal of left-sided chest tube.  Increase in size of left-sidedpleural effusion. No pneumothorax.    IMPRESSION:  Slight increase in size of left-sided pleural effusion status post left   chest tube removal.    --- End of Report ---          DHRUV VU MD; Resident Radiologist  This document has been electronically signed.  DANG RAUSCH MD; Attending Radiologist  This document has been electronically signed. 2024  3:50PM    < end of copied text >   INTERVAL EVENTS:  - Seroquel 25 at bedtime  - Weaning phenobarbital  - Extubated to room air  - D/c mccormack, passed TOV  - Chest tube discontinued; post pull CXR with no PTX  - Duplex ordered for LLE swelling      SUBJECTIVE/ROS:  [ ] A ten-point review of systems was otherwise negative except as noted.  [ ] Due to altered mental status/intubation, subjective information were not able to be obtained from the patient. History was obtained, to the extent possible, from review of the chart and collateral sources of information.      NEURO  RASS:     GCS:     CAM ICU:  Exam: awake, alert  Meds: acetaminophen     Tablet .. 500 milliGRAM(s) Oral every 6 hours  dexMEDEtomidine Infusion 1 MICROgram(s)/kG/Hr IV Continuous <Continuous>  haloperidol    Injectable 5 milliGRAM(s) IV Push <User Schedule>  haloperidol    Injectable 2.5 milliGRAM(s) IntraMuscular <User Schedule>  HYDROmorphone  Injectable 1 milliGRAM(s) IV Push every 3 hours PRN Breakthrough  ibuprofen  Tablet. 400 milliGRAM(s) Oral every 4 hours PRN Mild Pain (1 - 3)  oxyCODONE    IR 5 milliGRAM(s) Oral every 4 hours PRN Moderate Pain (4 - 6)  oxyCODONE    IR 10 milliGRAM(s) Oral every 4 hours PRN Severe Pain (7 - 10)  PHENobarbital Injectable 130 milliGRAM(s) IV Push every 8 hours    [x] Adequacy of sedation and pain control has been assessed and adjusted      RESPIRATORY  RR: 49 (24 @ 23:00) (20 - 49)  SpO2: 93% (24 @ 23:00) (93% - 100%)  Wt(kg): --  Exam: tachypneic in 30s, breathing comfortably on room air, Left chest incision well approximated with staples, no erythema , Previous chest tube site well approximated with suture.    CARDIOVASCULAR  HR: 121 (24 @ 23:00) (92 - 128)  BP: 149/75 (24 @ 23:00) (89/50 - 162/76)  BP(mean): 98 (24 @ 23:00) (63 - 138)  VBG - ( 2024 22:16 )  pH: 7.46  /  pCO2: 34    /  pO2: 35    / HCO3: 24    / Base Excess: 0.5   /  SaO2: 63.8   Lactate: 1.8                Exam: regular rate and rhythm  Cardiac Rhythm: sinus  Perfusion     [x]Adequate   [ ]Inadequate  Mentation   [x]Normal       [ ]Reduced  Extremities  [x]Warm         [ ]Cool  Volume Status [ ]Hypervolemic [x]Euvolemic [ ]Hypovolemic  Meds:       GI/NUTRITION  Exam: soft, nontender, nondistended  Diet: regular  Meds:     GENITOURINARY  I&O's Detail     @ 07: @ 07:00  --------------------------------------------------------  IN:    Dexmedetomidine: 470.5 mL    Enteral Tube Flush: 60 mL    IV PiggyBack: 775 mL    IV PiggyBack: 475 mL    Miscellaneous Tube Feedin mL    Oral Fluid: 160 mL  Total IN: 2120.5 mL    OUT:    Chest Tube (mL): 50 mL    Indwelling Catheter - Urethral (mL): 755 mL    Norepinephrine: 0 mL    Voided (mL): 565 mL  Total OUT: 1370 mL    Total NET: 750.5 mL       @ 07:01  -  01-15 @ 00:33  --------------------------------------------------------  IN:    Dexmedetomidine: 309.3 mL    IV PiggyBack: 250 mL    IV PiggyBack: 175 mL  Total IN: 734.3 mL    OUT:  Total OUT: 0 mL    Total NET: 734.3 mL              135  |  103  |  11  ----------------------------<  107<H>  3.9   |  22  |  1.16    Ca    8.0<L>      2024 22:19  Phos  3.5       Mg     2.2           [ ] Mccormack catheter, indication: N/A  Meds: ascorbic acid 500 milliGRAM(s) Oral daily  cyanocobalamin 1000 MICROGram(s) Oral daily  folic acid 1 milliGRAM(s) Oral daily  multivitamin 1 Tablet(s) Oral daily  potassium chloride    Tablet ER 10 milliEquivalent(s) Oral once        HEMATOLOGIC  Meds: enoxaparin Injectable 40 milliGRAM(s) SubCutaneous every 24 hours  Exam: Left leg TTP, larger than right    [x] VTE Prophylaxis                        7.7    15.24 )-----------( 488      ( 2024 22:19 )             22.2     PT/INR - ( 2024 22:19 )   PT: 12.9 sec;   INR: 1.24 ratio         PTT - ( 2024 22:19 )  PTT:30.0 sec  Transfusion     [ ] PRBC   [ ] Platelets   [ ] FFP   [ ] Cryoprecipitate      INFECTIOUS DISEASES  WBC Count: 15.24 K/uL ( @ 22:19)    RECENT CULTURES:  Specimen Source: .Blood Blood-Peripheral  Date/Time:  @ 04:38  Culture Results:   No growth at 24 hours  Gram Stain:   Growth in aerobic bottle: Gram Positive Cocci in Clusters<!>  Organism: Blood Culture PCR<!>  Specimen Source: .Tissue Other  Date/Time: 01-10 @ 22:30  Culture Results:   Culture is being performed.  Gram Stain:   No polymorphonuclear cells seen per low power field  No organisms seen per oil power field  Organism: --    Meds: influenza   Vaccine 0.5 milliLiter(s) IntraMuscular once  vancomycin  IVPB 1000 milliGRAM(s) IV Intermittent every 8 hours        ENDOCRINE  CAPILLARY BLOOD GLUCOSE      OTHER MEDICATIONS:  chlorhexidine 2% Cloths 1 Application(s) Topical <User Schedule>  lidocaine   4% Patch 1 Patch Transdermal every 24 hours  mupirocin 2% Nasal 1 Application(s) Both Nostrils two times a day      IMAGING:  < from: Xray Chest 1 View- PORTABLE-Urgent (Xray Chest 1 View- PORTABLE-Urgent .) (24 @ 15:39) >    ACC: [id number] EXAM:  XR CHEST PORTABLE ROUTINE 1V   ORDERED BY: CHRISTINA YU     ACC: [id number] EXAM:  XR CHEST PORTABLE URGENT 1V   ORDERED BY: ABDULAZIZ HOFF     PROCEDURE DATE:  2024          INTERPRETATION:  EXAMINATION: XR CHEST URGENT, XR CHEST    CLINICAL INDICATION: Chest tube removal    TECHNIQUE: Single frontal, portable view of the chest was obtained.    COMPARISON: Chest x-ray 2024 6:31 AM    FINDINGS:  Chest x-ray 2024 6:35 AM  Right IJ approach central venous catheter with tip in the SVC.  Interval removal of endotracheal tube and enteric tube.  Left-sided chest tube in similar position.  The heart is not accurately assessed in this AP projection.  Unchanged, small left-sided pleural effusion with associated atelectasis.   Right lung is clear.  There is no pneumothorax or right-sided pleural effusion.  Redemonstration of left-sided rib fractures.    Chest x-ray 2024 3:08 PM  Interval removal of left-sided chest tube.  Increase in size of left-sidedpleural effusion. No pneumothorax.    IMPRESSION:  Slight increase in size of left-sided pleural effusion status post left   chest tube removal.    --- End of Report ---          DHRUV VU MD; Resident Radiologist  This document has been electronically signed.  DANG RAUSCH MD; Attending Radiologist  This document has been electronically signed. 2024  3:50PM    < end of copied text >

## 2024-01-15 NOTE — PROGRESS NOTE ADULT - SUBJECTIVE AND OBJECTIVE BOX
ACS Progress Note    S: Patient seen and examined. No acute events overnight. Pain well controlled with current regimen.   Denies nausea/vomiting.   Endorses passing gas and bowel movements.     O:  Vital Signs Last 24 Hrs  T(C): 37.2 (15 Dominic 2024 11:00), Max: 37.2 (15 Dominic 2024 11:00)  T(F): 99 (15 Dominic 2024 11:00), Max: 99 (15 Dominic 2024 11:00)  HR: 104 (15 Dominic 2024 13:00) (83 - 121)  BP: 94/52 (15 Dominic 2024 13:00) (84/50 - 162/76)  BP(mean): 70 (15 Dominic 2024 13:00) (60 - 107)  RR: 34 (15 Dominic 2024 13:00) (20 - 49)  SpO2: 92% (15 Dominic 2024 13:00) (91% - 100%)    Parameters below as of 15 Dominic 2024 07:00  Patient On (Oxygen Delivery Method): room air        I&O's Detail    14 Jan 2024 07:01  -  15 Dominic 2024 07:00  --------------------------------------------------------  IN:    Dexmedetomidine: 653.2 mL    IV PiggyBack: 275 mL    IV PiggyBack: 750 mL    Oral Fluid: 180 mL  Total IN: 1858.2 mL    OUT:  Total OUT: 0 mL    Total NET: 1858.2 mL      15 Dominic 2024 07:01  -  15 Dominic 2024 13:37  --------------------------------------------------------  IN:    Dexmedetomidine: 103.2 mL  Total IN: 103.2 mL    OUT:    Voided (mL): 600 mL  Total OUT: 600 mL    Total NET: -496.8 mL          MEDICATIONS  (STANDING):  acetaminophen     Tablet .. 500 milliGRAM(s) Oral every 6 hours  albuterol/ipratropium for Nebulization 3 milliLiter(s) Nebulizer every 6 hours  ascorbic acid 500 milliGRAM(s) Oral daily  chlorhexidine 2% Cloths 1 Application(s) Topical <User Schedule>  cyanocobalamin 1000 MICROGram(s) Oral daily  dexMEDEtomidine Infusion 1 MICROgram(s)/kG/Hr (22.9 mL/Hr) IV Continuous <Continuous>  enoxaparin Injectable 40 milliGRAM(s) SubCutaneous every 24 hours  folic acid 1 milliGRAM(s) Oral daily  haloperidol    Injectable 5 milliGRAM(s) IV Push <User Schedule>  haloperidol    Injectable 2.5 milliGRAM(s) IntraMuscular <User Schedule>  influenza   Vaccine 0.5 milliLiter(s) IntraMuscular once  lidocaine   4% Patch 1 Patch Transdermal every 24 hours  multivitamin 1 Tablet(s) Oral daily  PHENobarbital Injectable 130 milliGRAM(s) IV Push every 12 hours  piperacillin/tazobactam IVPB.- 3.375 Gram(s) IV Intermittent once  piperacillin/tazobactam IVPB.. 3.375 Gram(s) IV Intermittent every 8 hours  vancomycin  IVPB 1000 milliGRAM(s) IV Intermittent every 8 hours    MEDICATIONS  (PRN):  HYDROmorphone  Injectable 1 milliGRAM(s) IV Push every 3 hours PRN Breakthrough  ibuprofen  Tablet. 400 milliGRAM(s) Oral every 4 hours PRN Mild Pain (1 - 3)  oxyCODONE    IR 10 milliGRAM(s) Oral every 4 hours PRN Severe Pain (7 - 10)  oxyCODONE    IR 5 milliGRAM(s) Oral every 4 hours PRN Moderate Pain (4 - 6)                            7.7    15.24 )-----------( 488      ( 14 Jan 2024 22:19 )             22.2       01-15    135  |  101  |  13  ----------------------------<  113<H>  4.1   |  24  |  1.35<H>    Ca    7.9<L>      15 Dominic 2024 11:28  Phos  4.5     01-15  Mg     2.1     01-15        PHYSICAL EXAM:  Constitutional: NAD  Respiratory: non-labored breathing, patent airway  Gastrointestinal: abdomen soft, nontender, nondistended  Extremities: warm  Neurological: intact

## 2024-01-16 LAB
ANION GAP SERPL CALC-SCNC: 12 MMOL/L — SIGNIFICANT CHANGE UP (ref 5–17)
APTT BLD: 32.3 SEC — SIGNIFICANT CHANGE UP (ref 24.5–35.6)
BLD GP AB SCN SERPL QL: NEGATIVE — SIGNIFICANT CHANGE UP
BUN SERPL-MCNC: 12 MG/DL — SIGNIFICANT CHANGE UP (ref 7–23)
CALCIUM SERPL-MCNC: 7.7 MG/DL — LOW (ref 8.4–10.5)
CHLORIDE SERPL-SCNC: 103 MMOL/L — SIGNIFICANT CHANGE UP (ref 96–108)
CO2 SERPL-SCNC: 21 MMOL/L — LOW (ref 22–31)
CREAT SERPL-MCNC: 1.49 MG/DL — HIGH (ref 0.5–1.3)
CULTURE RESULTS: SIGNIFICANT CHANGE UP
EGFR: 58 ML/MIN/1.73M2 — LOW
GLUCOSE SERPL-MCNC: 93 MG/DL — SIGNIFICANT CHANGE UP (ref 70–99)
HCT VFR BLD CALC: 20.8 % — CRITICAL LOW (ref 39–50)
HCT VFR BLD CALC: 22.4 % — LOW (ref 39–50)
HGB BLD-MCNC: 7.2 G/DL — LOW (ref 13–17)
HGB BLD-MCNC: 7.8 G/DL — LOW (ref 13–17)
INR BLD: 1.25 RATIO — HIGH (ref 0.85–1.18)
MAGNESIUM SERPL-MCNC: 2 MG/DL — SIGNIFICANT CHANGE UP (ref 1.6–2.6)
MCHC RBC-ENTMCNC: 28.6 PG — SIGNIFICANT CHANGE UP (ref 27–34)
MCHC RBC-ENTMCNC: 28.6 PG — SIGNIFICANT CHANGE UP (ref 27–34)
MCHC RBC-ENTMCNC: 34.6 GM/DL — SIGNIFICANT CHANGE UP (ref 32–36)
MCHC RBC-ENTMCNC: 34.8 GM/DL — SIGNIFICANT CHANGE UP (ref 32–36)
MCV RBC AUTO: 82.1 FL — SIGNIFICANT CHANGE UP (ref 80–100)
MCV RBC AUTO: 82.5 FL — SIGNIFICANT CHANGE UP (ref 80–100)
NRBC # BLD: 0 /100 WBCS — SIGNIFICANT CHANGE UP (ref 0–0)
NRBC # BLD: 0 /100 WBCS — SIGNIFICANT CHANGE UP (ref 0–0)
PHOSPHATE SERPL-MCNC: 4.6 MG/DL — HIGH (ref 2.5–4.5)
PLATELET # BLD AUTO: 533 K/UL — HIGH (ref 150–400)
PLATELET # BLD AUTO: 537 K/UL — HIGH (ref 150–400)
POTASSIUM SERPL-MCNC: 3.7 MMOL/L — SIGNIFICANT CHANGE UP (ref 3.5–5.3)
POTASSIUM SERPL-SCNC: 3.7 MMOL/L — SIGNIFICANT CHANGE UP (ref 3.5–5.3)
PROTHROM AB SERPL-ACNC: 13.7 SEC — HIGH (ref 9.5–13)
RBC # BLD: 2.52 M/UL — LOW (ref 4.2–5.8)
RBC # BLD: 2.73 M/UL — LOW (ref 4.2–5.8)
RBC # FLD: 14.6 % — HIGH (ref 10.3–14.5)
RBC # FLD: 14.9 % — HIGH (ref 10.3–14.5)
RH IG SCN BLD-IMP: POSITIVE — SIGNIFICANT CHANGE UP
SODIUM SERPL-SCNC: 136 MMOL/L — SIGNIFICANT CHANGE UP (ref 135–145)
SPECIMEN SOURCE: SIGNIFICANT CHANGE UP
VANCOMYCIN TROUGH SERPL-MCNC: 22.8 UG/ML — HIGH (ref 10–20)
WBC # BLD: 12.45 K/UL — HIGH (ref 3.8–10.5)
WBC # BLD: 13.26 K/UL — HIGH (ref 3.8–10.5)
WBC # FLD AUTO: 12.45 K/UL — HIGH (ref 3.8–10.5)
WBC # FLD AUTO: 13.26 K/UL — HIGH (ref 3.8–10.5)

## 2024-01-16 PROCEDURE — 71045 X-RAY EXAM CHEST 1 VIEW: CPT | Mod: 26

## 2024-01-16 PROCEDURE — 76770 US EXAM ABDO BACK WALL COMP: CPT | Mod: 26

## 2024-01-16 PROCEDURE — 99233 SBSQ HOSP IP/OBS HIGH 50: CPT

## 2024-01-16 RX ORDER — QUETIAPINE FUMARATE 200 MG/1
25 TABLET, FILM COATED ORAL
Refills: 0 | Status: DISCONTINUED | OUTPATIENT
Start: 2024-01-17 | End: 2024-01-23

## 2024-01-16 RX ORDER — QUETIAPINE FUMARATE 200 MG/1
50 TABLET, FILM COATED ORAL AT BEDTIME
Refills: 0 | Status: DISCONTINUED | OUTPATIENT
Start: 2024-01-16 | End: 2024-01-18

## 2024-01-16 RX ORDER — QUETIAPINE FUMARATE 200 MG/1
25 TABLET, FILM COATED ORAL ONCE
Refills: 0 | Status: COMPLETED | OUTPATIENT
Start: 2024-01-16 | End: 2024-01-16

## 2024-01-16 RX ORDER — SODIUM CHLORIDE 9 MG/ML
500 INJECTION, SOLUTION INTRAVENOUS ONCE
Refills: 0 | Status: COMPLETED | OUTPATIENT
Start: 2024-01-16 | End: 2024-01-16

## 2024-01-16 RX ADMIN — ENOXAPARIN SODIUM 40 MILLIGRAM(S): 100 INJECTION SUBCUTANEOUS at 22:33

## 2024-01-16 RX ADMIN — DEXMEDETOMIDINE HYDROCHLORIDE IN 0.9% SODIUM CHLORIDE 22.9 MICROGRAM(S)/KG/HR: 4 INJECTION INTRAVENOUS at 16:55

## 2024-01-16 RX ADMIN — HALOPERIDOL DECANOATE 2.5 MILLIGRAM(S): 100 INJECTION INTRAMUSCULAR at 07:56

## 2024-01-16 RX ADMIN — Medication 3 MILLILITER(S): at 17:14

## 2024-01-16 RX ADMIN — Medication 500 MILLIGRAM(S): at 17:00

## 2024-01-16 RX ADMIN — QUETIAPINE FUMARATE 25 MILLIGRAM(S): 200 TABLET, FILM COATED ORAL at 09:38

## 2024-01-16 RX ADMIN — Medication 500 MILLIGRAM(S): at 06:40

## 2024-01-16 RX ADMIN — HYDROMORPHONE HYDROCHLORIDE 1 MILLIGRAM(S): 2 INJECTION INTRAMUSCULAR; INTRAVENOUS; SUBCUTANEOUS at 02:52

## 2024-01-16 RX ADMIN — PREGABALIN 1000 MICROGRAM(S): 225 CAPSULE ORAL at 12:09

## 2024-01-16 RX ADMIN — Medication 1 TABLET(S): at 12:09

## 2024-01-16 RX ADMIN — Medication 3 MILLILITER(S): at 11:48

## 2024-01-16 RX ADMIN — PIPERACILLIN AND TAZOBACTAM 25 GRAM(S): 4; .5 INJECTION, POWDER, LYOPHILIZED, FOR SOLUTION INTRAVENOUS at 06:25

## 2024-01-16 RX ADMIN — Medication 500 MILLIGRAM(S): at 12:08

## 2024-01-16 RX ADMIN — DEXMEDETOMIDINE HYDROCHLORIDE IN 0.9% SODIUM CHLORIDE 22.9 MICROGRAM(S)/KG/HR: 4 INJECTION INTRAVENOUS at 19:16

## 2024-01-16 RX ADMIN — SODIUM CHLORIDE 1000 MILLILITER(S): 9 INJECTION, SOLUTION INTRAVENOUS at 20:00

## 2024-01-16 RX ADMIN — Medication 130 MILLIGRAM(S): at 05:38

## 2024-01-16 RX ADMIN — OXYCODONE HYDROCHLORIDE 5 MILLIGRAM(S): 5 TABLET ORAL at 09:40

## 2024-01-16 RX ADMIN — PIPERACILLIN AND TAZOBACTAM 25 GRAM(S): 4; .5 INJECTION, POWDER, LYOPHILIZED, FOR SOLUTION INTRAVENOUS at 13:42

## 2024-01-16 RX ADMIN — QUETIAPINE FUMARATE 50 MILLIGRAM(S): 200 TABLET, FILM COATED ORAL at 22:36

## 2024-01-16 RX ADMIN — HYDROMORPHONE HYDROCHLORIDE 1 MILLIGRAM(S): 2 INJECTION INTRAMUSCULAR; INTRAVENOUS; SUBCUTANEOUS at 03:05

## 2024-01-16 RX ADMIN — Medication 250 MILLIGRAM(S): at 05:37

## 2024-01-16 RX ADMIN — OXYCODONE HYDROCHLORIDE 5 MILLIGRAM(S): 5 TABLET ORAL at 10:10

## 2024-01-16 RX ADMIN — Medication 3 MILLILITER(S): at 23:41

## 2024-01-16 RX ADMIN — Medication 3 MILLILITER(S): at 05:39

## 2024-01-16 RX ADMIN — PIPERACILLIN AND TAZOBACTAM 25 GRAM(S): 4; .5 INJECTION, POWDER, LYOPHILIZED, FOR SOLUTION INTRAVENOUS at 22:33

## 2024-01-16 RX ADMIN — Medication 500 MILLIGRAM(S): at 05:41

## 2024-01-16 RX ADMIN — Medication 500 MILLIGRAM(S): at 00:45

## 2024-01-16 RX ADMIN — Medication 500 MILLIGRAM(S): at 12:09

## 2024-01-16 RX ADMIN — CHLORHEXIDINE GLUCONATE 1 APPLICATION(S): 213 SOLUTION TOPICAL at 05:41

## 2024-01-16 RX ADMIN — Medication 1 MILLIGRAM(S): at 12:11

## 2024-01-16 RX ADMIN — Medication 130 MILLIGRAM(S): at 17:00

## 2024-01-16 NOTE — PROGRESS NOTE ADULT - SUBJECTIVE AND OBJECTIVE BOX
INTERVAL EVENTS:  - AM CXR stable  - phenobarb decreased from 130mg q8h --> q12h  - bladder scan AM --> 300cc's  - IVP lasix 20mg x1 for noted swelling;   - FE Urea  51.9 % suggests intrinsic renal disease  - duplex US negative for DVT  - PIV placed (patient removed PIV placed yesterday); central line removed  - Haldol x1 prn for agitation    SUBJECTIVE/ROS:  [ ] A ten-point review of systems was otherwise negative except as noted.  [ ] Due to altered mental status/intubation, subjective information were not able to be obtained from the patient. History was obtained, to the extent possible, from review of the chart and collateral sources of information.      NEURO  RASS:     GCS:     CAM ICU:  Exam: awake, alert  Meds: acetaminophen     Tablet .. 500 milliGRAM(s) Oral every 6 hours  dexMEDEtomidine Infusion 1 MICROgram(s)/kG/Hr IV Continuous <Continuous>  haloperidol    Injectable 2.5 milliGRAM(s) IntraMuscular <User Schedule>  haloperidol    Injectable 5 milliGRAM(s) IV Push <User Schedule>  HYDROmorphone  Injectable 1 milliGRAM(s) IV Push every 3 hours PRN Breakthrough  ibuprofen  Tablet. 400 milliGRAM(s) Oral every 4 hours PRN Mild Pain (1 - 3)  oxyCODONE    IR 10 milliGRAM(s) Oral every 4 hours PRN Severe Pain (7 - 10)  oxyCODONE    IR 5 milliGRAM(s) Oral every 4 hours PRN Moderate Pain (4 - 6)  PHENobarbital Injectable 130 milliGRAM(s) IV Push every 12 hours    [x] Adequacy of sedation and pain control has been assessed and adjusted      RESPIRATORY  RR: 32 (01-15-24 @ 23:00) (23 - 40)  SpO2: 100% (01-15-24 @ 23:55) (91% - 100%)  Wt(kg): --  Exam: no increased work of breathing   Meds: albuterol/ipratropium for Nebulization 3 milliLiter(s) Nebulizer every 6 hours        CARDIOVASCULAR  HR: 75 (01-15-24 @ 23:55) (75 - 110)  BP: 81/51 (01-15-24 @ 23:00) (71/42 - 119/66)  BP(mean): 61 (01-15-24 @ 23:00) (51 - 88)  VBG - ( 14 Jan 2024 22:16 )  pH: 7.46  /  pCO2: 34    /  pO2: 35    / HCO3: 24    / Base Excess: 0.5   /  SaO2: 63.8   Lactate: 1.8      Exam: regular rate and rhythm  Cardiac Rhythm: sinus  Perfusion     [x]Adequate   [ ]Inadequate  Mentation   [x]Normal       [ ]Reduced  Extremities  [x]Warm         [ ]Cool  Volume Status [ ]Hypervolemic [x]Euvolemic [ ]Hypovolemic  Meds:       GI/NUTRITION  Exam: soft, nontender, nondistended  Diet: Regular  Meds:     GENITOURINARY  I&O's Detail    01-14 @ 07:01  -  01-15 @ 07:00  --------------------------------------------------------  IN:    Dexmedetomidine: 653.2 mL    IV PiggyBack: 275 mL    IV PiggyBack: 750 mL    Oral Fluid: 180 mL  Total IN: 1858.2 mL    OUT:  Total OUT: 0 mL    Total NET: 1858.2 mL      01-15 @ 07:01 - 01-16 @ 00:14  --------------------------------------------------------  IN:    Dexmedetomidine: 412.8 mL    IV PiggyBack: 250 mL    IV PiggyBack: 100 mL  Total IN: 762.8 mL    OUT:    Voided (mL): 900 mL  Total OUT: 900 mL    Total NET: -137.2 mL          01-15    135  |  101  |  13  ----------------------------<  113<H>  4.1   |  24  |  1.35<H>    Ca    7.9<L>      15 Dominic 2024 11:28  Phos  4.5     01-15  Mg     2.1     01-15      [ ] Hendrix catheter, indication: N/A  Meds: ascorbic acid 500 milliGRAM(s) Oral daily  cyanocobalamin 1000 MICROGram(s) Oral daily  folic acid 1 milliGRAM(s) Oral daily  multivitamin 1 Tablet(s) Oral daily        HEMATOLOGIC  Meds: enoxaparin Injectable 40 milliGRAM(s) SubCutaneous every 24 hours    [x] VTE Prophylaxis                        7.7    15.24 )-----------( 488      ( 14 Jan 2024 22:19 )             22.2     PT/INR - ( 14 Jan 2024 22:19 )   PT: 12.9 sec;   INR: 1.24 ratio         PTT - ( 14 Jan 2024 22:19 )  PTT:30.0 sec  Transfusion     [ ] PRBC   [ ] Platelets   [ ] FFP   [ ] Cryoprecipitate      INFECTIOUS DISEASES    RECENT CULTURES:  Specimen Source: .Blood Blood-Peripheral  Date/Time: 01-14 @ 17:36  Culture Results:   No growth at 24 hours  Gram Stain: --  Organism: --  Specimen Source: .Blood Blood-Peripheral  Date/Time: 01-13 @ 04:38  Culture Results:   No growth at 48 Hours  Gram Stain:   Growth in aerobic bottle: Gram Positive Cocci in Clusters<!>  Organism: Blood Culture PCR<!>    Meds: influenza   Vaccine 0.5 milliLiter(s) IntraMuscular once  piperacillin/tazobactam IVPB.. 3.375 Gram(s) IV Intermittent every 8 hours  vancomycin  IVPB 1000 milliGRAM(s) IV Intermittent every 12 hours        ENDOCRINE  CAPILLARY BLOOD GLUCOSE        Meds:       OTHER MEDICATIONS:  chlorhexidine 2% Cloths 1 Application(s) Topical <User Schedule>  lidocaine   4% Patch 1 Patch Transdermal every 24 hours      CODE STATUS:      IMAGING:  < from: [facility] Duplex Lower Ext Vein Scan, Bilat (01.15.24 @ 19:34) >    ACC: [id number] EXAM:  DUPLEX SCAN EXT VEINS LOWER BI   ORDERED BY: CHRISTINA YU     PROCEDURE DATE:  01/15/2024          INTERPRETATION:  CLINICAL INFORMATION: High risk    COMPARISON: None available.    TECHNIQUE: Duplex sonography of the BILATERALLOWER extremity veins with   color and spectral Doppler, with and without compression.    FINDINGS:    RIGHT:  Normal compressibility of the RIGHT common femoral, femoral and popliteal   veins.  Doppler examination shows normal spontaneous and phasicflow.  No RIGHT calf vein thrombosis is detected.    LEFT:  Normal compressibility of the LEFT common femoral, femoral and popliteal   veins.  Doppler examination shows normal spontaneous and phasic flow.  No LEFT calf vein thrombosis is detected.    IMPRESSION:  No evidence of deep venous thrombosis in either lower extremity.  -- End of Report ---      TOMÁS CROOKS MD; Attending Radiologist  This document has been electronically signed. Dominic 15 2024  7:37PM    < end of copied text >    < from: Xray Chest 1 View- PORTABLE-Routine (Xray Chest 1 View- PORTABLE-Routine in AM.) (01.15.24 @ 07:33) >    ACC: [id number] EXAM:  XR CHEST PORTABLE ROUTINE 1V   ORDERED BY: CHRISTINA YU     PROCEDURE DATE:  01/15/2024          INTERPRETATION:  DATE OF STUDY: 1/15/24    PRIOR:1/14/24    CLINICAL INDICATION: Respiratory failure    TECHNIQUE: AP radiograph of the chest.    FINDINGS:  Right IJ central venous catheter tip in SVC.  The heart is not accurately assessed in this AP projection.  Right lung is clear.  Stable small left pleural effusion with associated atelectasis.  No pneumothorax.  Redemonstration of left-sided rib fractures.    IMPRESSION:    Stable small left-sided pleural effusion.    --- End of Report ---            MARA BADILLO MD; Attending Radiologist  This document has been electronically signed. Dominic 15 2024  9:42AM    < end of copied text >   INTERVAL EVENTS:  - AM CXR stable  - phenobarb decreased from 130mg q8h --> q12h  - bladder scan AM --> 300cc's  - IVP lasix 20mg x1 for noted swelling;   - FE Urea  51.9 % suggests intrinsic renal disease  - duplex US negative for DVT  - PIV placed (patient removed PIV placed yesterday); central line removed  - Haldol x1 prn for agitation    SUBJECTIVE/ROS:  [ ] A ten-point review of systems was otherwise negative except as noted.  [ ] Due to altered mental status/intubation, subjective information were not able to be obtained from the patient. History was obtained, to the extent possible, from review of the chart and collateral sources of information.      NEURO  RASS:     GCS:     CAM ICU:  Exam: awake, alert  Meds: acetaminophen     Tablet .. 500 milliGRAM(s) Oral every 6 hours  dexMEDEtomidine Infusion 1 MICROgram(s)/kG/Hr IV Continuous <Continuous>  haloperidol    Injectable 2.5 milliGRAM(s) IntraMuscular <User Schedule>  haloperidol    Injectable 5 milliGRAM(s) IV Push <User Schedule>  HYDROmorphone  Injectable 1 milliGRAM(s) IV Push every 3 hours PRN Breakthrough  ibuprofen  Tablet. 400 milliGRAM(s) Oral every 4 hours PRN Mild Pain (1 - 3)  oxyCODONE    IR 10 milliGRAM(s) Oral every 4 hours PRN Severe Pain (7 - 10)  oxyCODONE    IR 5 milliGRAM(s) Oral every 4 hours PRN Moderate Pain (4 - 6)  PHENobarbital Injectable 130 milliGRAM(s) IV Push every 12 hours    [x] Adequacy of sedation and pain control has been assessed and adjusted      RESPIRATORY  RR: 32 (01-15-24 @ 23:00) (23 - 40)  SpO2: 100% (01-15-24 @ 23:55) (91% - 100%)  Wt(kg): --  Exam: no increased work of breathing   Meds: albuterol/ipratropium for Nebulization 3 milliLiter(s) Nebulizer every 6 hours        CARDIOVASCULAR  HR: 75 (01-15-24 @ 23:55) (75 - 110)  BP: 81/51 (01-15-24 @ 23:00) (71/42 - 119/66)  BP(mean): 61 (01-15-24 @ 23:00) (51 - 88)  VBG - ( 14 Jan 2024 22:16 )  pH: 7.46  /  pCO2: 34    /  pO2: 35    / HCO3: 24    / Base Excess: 0.5   /  SaO2: 63.8   Lactate: 1.8      Exam: regular rate and rhythm  Cardiac Rhythm: sinus  Perfusion     [x]Adequate   [ ]Inadequate  Mentation   [x]Normal       [ ]Reduced  Extremities  [x]Warm         [ ]Cool  Volume Status [ ]Hypervolemic [x]Euvolemic [ ]Hypovolemic  Meds:       GI/NUTRITION  Exam: soft, nontender, nondistended  Diet: Regular  Meds:     GENITOURINARY  I&O's Detail    01-14 @ 07:01  -  01-15 @ 07:00  --------------------------------------------------------  IN:    Dexmedetomidine: 653.2 mL    IV PiggyBack: 275 mL    IV PiggyBack: 750 mL    Oral Fluid: 180 mL  Total IN: 1858.2 mL    OUT:  Total OUT: 0 mL    Total NET: 1858.2 mL      01-15 @ 07:01 - 01-16 @ 00:14  --------------------------------------------------------  IN:    Dexmedetomidine: 412.8 mL    IV PiggyBack: 250 mL    IV PiggyBack: 100 mL  Total IN: 762.8 mL    OUT:    Voided (mL): 900 mL  Total OUT: 900 mL    Total NET: -137.2 mL          01-15    135  |  101  |  13  ----------------------------<  113<H>  4.1   |  24  |  1.35<H>    Ca    7.9<L>      15 Dominic 2024 11:28  Phos  4.5     01-15  Mg     2.1     01-15      [ ] Hendrix catheter, indication: N/A  Meds: ascorbic acid 500 milliGRAM(s) Oral daily  cyanocobalamin 1000 MICROGram(s) Oral daily  folic acid 1 milliGRAM(s) Oral daily  multivitamin 1 Tablet(s) Oral daily        HEMATOLOGIC  Meds: enoxaparin Injectable 40 milliGRAM(s) SubCutaneous every 24 hours    [x] VTE Prophylaxis                        7.7    15.24 )-----------( 488      ( 14 Jan 2024 22:19 )             22.2     PT/INR - ( 14 Jan 2024 22:19 )   PT: 12.9 sec;   INR: 1.24 ratio         PTT - ( 14 Jan 2024 22:19 )  PTT:30.0 sec  Transfusion     [ ] PRBC   [ ] Platelets   [ ] FFP   [ ] Cryoprecipitate      INFECTIOUS DISEASES    RECENT CULTURES:  Specimen Source: .Blood Blood-Peripheral  Date/Time: 01-14 @ 17:36  Culture Results:   No growth at 24 hours  Gram Stain: --  Organism: --  Specimen Source: .Blood Blood-Peripheral  Date/Time: 01-13 @ 04:38  Culture Results:   No growth at 48 Hours  Gram Stain:   Growth in aerobic bottle: Gram Positive Cocci in Clusters<!>  Organism: Blood Culture PCR<!>    Meds: influenza   Vaccine 0.5 milliLiter(s) IntraMuscular once  piperacillin/tazobactam IVPB.. 3.375 Gram(s) IV Intermittent every 8 hours  vancomycin  IVPB 1000 milliGRAM(s) IV Intermittent every 12 hours        ENDOCRINE  CAPILLARY BLOOD GLUCOSE        Meds:       OTHER MEDICATIONS:  chlorhexidine 2% Cloths 1 Application(s) Topical <User Schedule>  lidocaine   4% Patch 1 Patch Transdermal every 24 hours      CODE STATUS:      IMAGING:  < from: VA Duplex Lower Ext Vein Scan, Bilat (01.15.24 @ 19:34) >    ACC: [id number] EXAM:  DUPLEX SCAN EXT VEINS LOWER BI   ORDERED BY: CHRISTINA YU     PROCEDURE DATE:  01/15/2024          INTERPRETATION:  CLINICAL INFORMATION: High risk    COMPARISON: None available.    TECHNIQUE: Duplex sonography of the BILATERALLOWER extremity veins with   color and spectral Doppler, with and without compression.    FINDINGS:    RIGHT:  Normal compressibility of the RIGHT common femoral, femoral and popliteal   veins.  Doppler examination shows normal spontaneous and phasicflow.  No RIGHT calf vein thrombosis is detected.    LEFT:  Normal compressibility of the LEFT common femoral, femoral and popliteal   veins.  Doppler examination shows normal spontaneous and phasic flow.  No LEFT calf vein thrombosis is detected.    IMPRESSION:  No evidence of deep venous thrombosis in either lower extremity.  -- End of Report ---      TOMÁS CROOKS MD; Attending Radiologist  This document has been electronically signed. Dominic 15 2024  7:37PM    < end of copied text >    < from: Xray Chest 1 View- PORTABLE-Routine (Xray Chest 1 View- PORTABLE-Routine in AM.) (01.15.24 @ 07:33) >    ACC: [id number] EXAM:  XR CHEST PORTABLE ROUTINE 1V   ORDERED BY: CHRISTINA YU     PROCEDURE DATE:  01/15/2024          INTERPRETATION:  DATE OF STUDY: 1/15/24    PRIOR:1/14/24    CLINICAL INDICATION: Respiratory failure    TECHNIQUE: AP radiograph of the chest.    FINDINGS:  Right IJ central venous catheter tip in SVC.  The heart is not accurately assessed in this AP projection.  Right lung is clear.  Stable small left pleural effusion with associated atelectasis.  No pneumothorax.  Redemonstration of left-sided rib fractures.    IMPRESSION:    Stable small left-sided pleural effusion.    --- End of Report ---            MARA BADILLO MD; Attending Radiologist  This document has been electronically signed. Dominic 15 2024  9:42AM    < end of copied text >

## 2024-01-16 NOTE — PROGRESS NOTE ADULT - ASSESSMENT
48y Male with PMHx hepatitis C (diagnosed many years ago, never treated), depression transferred from outside hospital for trauma eval. Patient found down by roommate after unknown amount of time, found with multiple 4-11 L sided rib fractures with flail chest. Chest tube placed there with >1L output (old blood). Patient also with tachypnea, concern for respiratory failure started on BiPAP. Level 1 called for transient hypotension, patient given 2 unit PRBC in [facility] ED. Rib score 3. Pt now s/p VATS, with partial lung decortication and Thoracoscopic removal of intrapleural foreign body.     Injuries:   - left posterolateral fourth, fifth, sixth, and 11th ribs, minimally displaced   - mildly displaced fractures of the left anterolateral fourth, fifth, sixth, and seventh ribs  - displaced fractures of the left posterior seventh, eighth, ninth, and 10th ribs.    PLAN  - wean phenobarbital   - 1/14- discontinued chest tube  - AM CXR  - Regular diet  - Rib fracture protocol   - IV abx  - ISS  - Appreciate excellent SICU care      Trauma Surgery  [phone]   48y Male with PMHx hepatitis C (diagnosed many years ago, never treated), depression transferred from outside hospital for trauma eval. Patient found down by roommate after unknown amount of time, found with multiple 4-11 L sided rib fractures with flail chest. Chest tube placed there with >1L output (old blood). Patient also with tachypnea, concern for respiratory failure started on BiPAP. Level 1 called for transient hypotension, patient given 2 unit PRBC in [facility] ED. Rib score 3. Pt now s/p VATS, with partial lung decortication and Thoracoscopic removal of intrapleural foreign body.     Injuries:   - left posterolateral fourth, fifth, sixth, and 11th ribs, minimally displaced   - mildly displaced fractures of the left anterolateral fourth, fifth, sixth, and seventh ribs  - displaced fractures of the left posterior seventh, eighth, ninth, and 10th ribs.    PLAN  - wean phenobarbital   - 1/14- discontinued chest tube  - AM CXR  - Regular diet  - Rib fracture protocol   - IV abx  - ISS  - Appreciate excellent SICU care      Trauma Surgery  [phone]

## 2024-01-16 NOTE — PROGRESS NOTE ADULT - SUBJECTIVE AND OBJECTIVE BOX
SURGERY DAILY PROGRESS NOTE:     Overnight Events:  Required haldol overnight 2/2 agitation. Phenobarb taper.    SUBJECTIVE: Patient seen and evaluated on AM rounds. Pt is resting comfortably in bed with no complaints.      OBJECTIVE:  Vital Signs Last 24 Hrs  T(C): 36.5 (15 Dominic 2024 23:00), Max: 37.3 (15 Dominic 2024 15:00)  T(F): 97.7 (15 Dominic 2024 23:00), Max: 99.1 (15 Dominic 2024 15:00)  HR: 120 (2024 06:00) (72 - 120)  BP: 125/80 (2024 05:00) (71/42 - 125/80)  BP(mean): 93 (2024 05:00) (51 - 96)  RR: 48 (2024 06:00) (27 - 48)  SpO2: 82% (2024 06:00) (82% - 100%)    Parameters below as of 2024 05:51  Patient On (Oxygen Delivery Method): room air      I&O's Detail    15 Dominic 2024 07:01  -  2024 07:00  --------------------------------------------------------  IN:    Dexmedetomidine: 412.8 mL    IV PiggyBack: 250 mL    IV PiggyBack: 100 mL  Total IN: 762.8 mL    OUT:    Voided (mL): 1700 mL  Total OUT: 1700 mL    Total NET: -937.2 mL        Daily     Daily Weight in k.5 (2024 01:00)    LABS:                        7.2    13.26 )-----------( 533      ( 2024 05:08 )             20.8     -    136  |  103  |  12  ----------------------------<  93  3.7   |  21<L>  |  1.49<H>    Ca    7.7<L>      2024 05:08  Phos  4.6       Mg     2.0           PT/INR - ( 2024 05:08 )   PT: 13.7 sec;   INR: 1.25 ratio         PTT - ( 2024 05:08 )  PTT:32.3 sec  Urinalysis Basic - ( 2024 05:08 )    Color: x / Appearance: x / SG: x / pH: x  Gluc: 93 mg/dL / Ketone: x  / Bili: x / Urobili: x   Blood: x / Protein: x / Nitrite: x   Leuk Esterase: x / RBC: x / WBC x   Sq Epi: x / Non Sq Epi: x / Bacteria: x      PHYSICAL EXAM:  Constitutional: NAD  Respiratory: non-labored breathing, patent airway  Gastrointestinal: abdomen soft, nontender, nondistended  Extremities: warm  Neurological: intact, on restriants

## 2024-01-16 NOTE — PROGRESS NOTE ADULT - ASSESSMENT
48M with PMHx hepatitis C (diagnosed many years ago, never treated), depression transferred from outside hospital for trauma eval. Patient found down by roommate after unknown amount of time, found with multiple 4-11 L sided rib fractures with flail chest. Chest tube placed there with >1L output (old blood). Patient also with tachypnea, concern for respiratory failure started on BiPAP. Level 1 called for transient hypotension, patient given 2 unit PRBC in [facility] ED. Patient admitted to SICU for hemodynamic monitoring, pain management.     Injuries:   - left posterolateral fourth, fifth, sixth, and 11th ribs, minimally displaced   - mildly displaced fx of L anterolateral 4-7th ribs  - displaced fx of the left posterior 7-10th ribs    1/10 s/p VATS, with partial lung decortication;   1/14 s/p chest tube removal    PLAN  Neurologic:  - Precedex gtt, Haldol 2.5 in AM + 5 in PM for agitation  - Phenobarb 130 q12 for ETOH withdrawal- weaning  - Lidocaine patch, oxycodone, Dilaudid PRN for breakthrough pain  - EEG negative prelim read  - Protected Sleep  Respiratory:  - s/p VATS 1/10 with discovery of Fibropurulent exudative adhesions and removal of retained foreign material likely the tip of a glove, s/p washout, with new 28f chest tube placed > removed 1/14  -RA, satting mid-90s  -Incentive spirometry  - CXR 1/15 stable (showing same left pleural effusion)  - Follow-up AM CXR    Cardiovascular:  - off pressors MAP >65  - Lactate 1.8 on 1/14    Gastrointestinal/Nutrition:  - Regular diet    Genitourinary/Renal:  - CPK cleared  - Supplement electrolytes PRN  - mccormack d/c'd 1/14  - Cre 1.35 (uptrending from 1.16, 0.88)  - FE Urea 51.9 % suggests intrinsic renal disease    Hematologic:  - Chemical VTE ppx with Lovenox  - Mechanical VTE ppx with SCDs  - s/p pRBC x1 unit 1/12  - duplex US 1/15 negative for DVT    Infectious Disease: Empyema   - Procal 9 on 1/7 - downtrended to 0.64 on 1/12  - Tissue culture 1/10 no organisms seen   - BCx 1/9 no growth to date; Bcx 1/13 with staph epi x1 bottle  - MRSA PCR 1/9 negative  - 1/13 Blood Cx NGTD; new cultures pending  - Continue Zosyn 1/7-  - Continue Vanc 1/10-    Endocrine:  - No active issues, no history of DM    Line:  -RIJ cvc 1/11, removed 1/15    Disposition: SICU     48M with PMHx hepatitis C (diagnosed many years ago, never treated), depression transferred from outside hospital for trauma eval. Patient found down by roommate after unknown amount of time, found with multiple 4-11 L sided rib fractures with flail chest. Chest tube placed there with >1L output (old blood). Patient also with tachypnea, concern for respiratory failure started on BiPAP. Level 1 called for transient hypotension, patient given 2 unit PRBC in [facility] ED. Patient admitted to SICU for hemodynamic monitoring, pain management.     Injuries:   - left posterolateral fourth, fifth, sixth, and 11th ribs, minimally displaced   - mildly displaced fx of L anterolateral 4-7th ribs  - displaced fx of the left posterior 7-10th ribs    1/10 s/p VATS, with partial lung decortication;   1/14 s/p chest tube removal    PLAN  Neurologic:  - Precedex gtt, Haldol 2.5 in AM + 5 in PM for agitation  - Phenobarb 130 q12 for ETOH withdrawal- weaning  - Lidocaine patch, oxycodone, Dilaudid PRN for breakthrough pain  - EEG negative prelim read  - Protected Sleep  Respiratory:  - s/p VATS 1/10 with discovery of Fibropurulent exudative adhesions and removal of retained foreign material likely the tip of a glove, s/p washout, with new 28f chest tube placed > removed 1/14  -RA, satting mid-90s  -Incentive spirometry  - CXR 1/15 stable (showing same left pleural effusion)  - Follow-up AM CXR    Cardiovascular:  - off pressors MAP >65  - Lactate 1.8 on 1/14    Gastrointestinal/Nutrition:  - Regular diet    Genitourinary/Renal:  - CPK cleared  - Supplement electrolytes PRN  - mccormack d/c'd 1/14  - Cre 1.35 (uptrending from 1.16, 0.88)  - FE Urea 51.9 % suggests intrinsic renal disease    Hematologic:  - Chemical VTE ppx with Lovenox  - Mechanical VTE ppx with SCDs  - s/p pRBC x1 unit 1/12  - duplex US 1/15 negative for DVT    Infectious Disease: Empyema   - Procal 9 on 1/7 - downtrended to 0.64 on 1/12  - Tissue culture 1/10 no organisms seen   - BCx 1/9 no growth to date; Bcx 1/13 with staph epi x1 bottle  - MRSA PCR 1/9 negative  - 1/13 Blood Cx NGTD; new cultures pending  - Continue Zosyn 1/7-  - Continue Vanc 1/10-    Endocrine:  - No active issues, no history of DM    Line:  -RIJ cvc 1/11, removed 1/15    Disposition: SICU

## 2024-01-17 LAB
ANION GAP SERPL CALC-SCNC: 11 MMOL/L — SIGNIFICANT CHANGE UP (ref 5–17)
ANION GAP SERPL CALC-SCNC: 15 MMOL/L — SIGNIFICANT CHANGE UP (ref 5–17)
APPEARANCE UR: CLEAR — SIGNIFICANT CHANGE UP
APTT BLD: 32.1 SEC — SIGNIFICANT CHANGE UP (ref 24.5–35.6)
BILIRUB UR-MCNC: NEGATIVE — SIGNIFICANT CHANGE UP
BUN SERPL-MCNC: 10 MG/DL — SIGNIFICANT CHANGE UP (ref 7–23)
BUN SERPL-MCNC: 10 MG/DL — SIGNIFICANT CHANGE UP (ref 7–23)
CALCIUM SERPL-MCNC: 7.6 MG/DL — LOW (ref 8.4–10.5)
CALCIUM SERPL-MCNC: 8.1 MG/DL — LOW (ref 8.4–10.5)
CHLORIDE SERPL-SCNC: 101 MMOL/L — SIGNIFICANT CHANGE UP (ref 96–108)
CHLORIDE SERPL-SCNC: 101 MMOL/L — SIGNIFICANT CHANGE UP (ref 96–108)
CO2 SERPL-SCNC: 18 MMOL/L — LOW (ref 22–31)
CO2 SERPL-SCNC: 20 MMOL/L — LOW (ref 22–31)
COLOR SPEC: YELLOW — SIGNIFICANT CHANGE UP
CREAT SERPL-MCNC: 1.56 MG/DL — HIGH (ref 0.5–1.3)
CREAT SERPL-MCNC: 1.57 MG/DL — HIGH (ref 0.5–1.3)
DIFF PNL FLD: NEGATIVE — SIGNIFICANT CHANGE UP
EGFR: 54 ML/MIN/1.73M2 — LOW
EGFR: 55 ML/MIN/1.73M2 — LOW
GLUCOSE SERPL-MCNC: 93 MG/DL — SIGNIFICANT CHANGE UP (ref 70–99)
GLUCOSE SERPL-MCNC: 96 MG/DL — SIGNIFICANT CHANGE UP (ref 70–99)
GLUCOSE UR QL: NEGATIVE MG/DL — SIGNIFICANT CHANGE UP
HCT VFR BLD CALC: 24.6 % — LOW (ref 39–50)
HGB BLD-MCNC: 8.4 G/DL — LOW (ref 13–17)
INR BLD: 1.37 RATIO — HIGH (ref 0.85–1.18)
KETONES UR-MCNC: NEGATIVE MG/DL — SIGNIFICANT CHANGE UP
LEUKOCYTE ESTERASE UR-ACNC: NEGATIVE — SIGNIFICANT CHANGE UP
MAGNESIUM SERPL-MCNC: 2 MG/DL — SIGNIFICANT CHANGE UP (ref 1.6–2.6)
MAGNESIUM SERPL-MCNC: 2.2 MG/DL — SIGNIFICANT CHANGE UP (ref 1.6–2.6)
MCHC RBC-ENTMCNC: 28.4 PG — SIGNIFICANT CHANGE UP (ref 27–34)
MCHC RBC-ENTMCNC: 34.1 GM/DL — SIGNIFICANT CHANGE UP (ref 32–36)
MCV RBC AUTO: 83.1 FL — SIGNIFICANT CHANGE UP (ref 80–100)
NITRITE UR-MCNC: NEGATIVE — SIGNIFICANT CHANGE UP
NRBC # BLD: 0 /100 WBCS — SIGNIFICANT CHANGE UP (ref 0–0)
PH UR: 6 — SIGNIFICANT CHANGE UP (ref 5–8)
PHOSPHATE SERPL-MCNC: 3.5 MG/DL — SIGNIFICANT CHANGE UP (ref 2.5–4.5)
PHOSPHATE SERPL-MCNC: 4.1 MG/DL — SIGNIFICANT CHANGE UP (ref 2.5–4.5)
PLATELET # BLD AUTO: 602 K/UL — HIGH (ref 150–400)
POTASSIUM SERPL-MCNC: 3.7 MMOL/L — SIGNIFICANT CHANGE UP (ref 3.5–5.3)
POTASSIUM SERPL-MCNC: 3.8 MMOL/L — SIGNIFICANT CHANGE UP (ref 3.5–5.3)
POTASSIUM SERPL-SCNC: 3.7 MMOL/L — SIGNIFICANT CHANGE UP (ref 3.5–5.3)
POTASSIUM SERPL-SCNC: 3.8 MMOL/L — SIGNIFICANT CHANGE UP (ref 3.5–5.3)
PROT UR-MCNC: SIGNIFICANT CHANGE UP MG/DL
PROTHROM AB SERPL-ACNC: 14.3 SEC — HIGH (ref 9.5–13)
RBC # BLD: 2.96 M/UL — LOW (ref 4.2–5.8)
RBC # FLD: 15.2 % — HIGH (ref 10.3–14.5)
SODIUM SERPL-SCNC: 132 MMOL/L — LOW (ref 135–145)
SODIUM SERPL-SCNC: 134 MMOL/L — LOW (ref 135–145)
SP GR SPEC: 1.01 — SIGNIFICANT CHANGE UP (ref 1–1.03)
UROBILINOGEN FLD QL: 0.2 MG/DL — SIGNIFICANT CHANGE UP (ref 0.2–1)
WBC # BLD: 13.07 K/UL — HIGH (ref 3.8–10.5)
WBC # FLD AUTO: 13.07 K/UL — HIGH (ref 3.8–10.5)

## 2024-01-17 PROCEDURE — 71045 X-RAY EXAM CHEST 1 VIEW: CPT | Mod: 26

## 2024-01-17 PROCEDURE — 93010 ELECTROCARDIOGRAM REPORT: CPT

## 2024-01-17 RX ORDER — SODIUM CHLORIDE 9 MG/ML
500 INJECTION, SOLUTION INTRAVENOUS ONCE
Refills: 0 | Status: COMPLETED | OUTPATIENT
Start: 2024-01-17 | End: 2024-01-17

## 2024-01-17 RX ORDER — FAMOTIDINE 10 MG/ML
20 INJECTION INTRAVENOUS DAILY
Refills: 0 | Status: DISCONTINUED | OUTPATIENT
Start: 2024-01-17 | End: 2024-01-25

## 2024-01-17 RX ORDER — METOPROLOL TARTRATE 50 MG
5 TABLET ORAL EVERY 6 HOURS
Refills: 0 | Status: DISCONTINUED | OUTPATIENT
Start: 2024-01-17 | End: 2024-01-17

## 2024-01-17 RX ORDER — ACETAMINOPHEN 500 MG
1000 TABLET ORAL ONCE
Refills: 0 | Status: COMPLETED | OUTPATIENT
Start: 2024-01-17 | End: 2024-01-17

## 2024-01-17 RX ORDER — ACETAMINOPHEN 500 MG
500 TABLET ORAL ONCE
Refills: 0 | Status: DISCONTINUED | OUTPATIENT
Start: 2024-01-17 | End: 2024-01-17

## 2024-01-17 RX ORDER — METOPROLOL TARTRATE 50 MG
25 TABLET ORAL EVERY 12 HOURS
Refills: 0 | Status: DISCONTINUED | OUTPATIENT
Start: 2024-01-17 | End: 2024-01-18

## 2024-01-17 RX ADMIN — Medication 130 MILLIGRAM(S): at 06:24

## 2024-01-17 RX ADMIN — PREGABALIN 1000 MICROGRAM(S): 225 CAPSULE ORAL at 11:29

## 2024-01-17 RX ADMIN — SODIUM CHLORIDE 2000 MILLILITER(S): 9 INJECTION, SOLUTION INTRAVENOUS at 09:53

## 2024-01-17 RX ADMIN — Medication 5 MILLIGRAM(S): at 11:29

## 2024-01-17 RX ADMIN — OXYCODONE HYDROCHLORIDE 10 MILLIGRAM(S): 5 TABLET ORAL at 09:30

## 2024-01-17 RX ADMIN — Medication 500 MILLIGRAM(S): at 18:57

## 2024-01-17 RX ADMIN — Medication 400 MILLIGRAM(S): at 04:50

## 2024-01-17 RX ADMIN — Medication 3 MILLILITER(S): at 05:03

## 2024-01-17 RX ADMIN — FAMOTIDINE 20 MILLIGRAM(S): 10 INJECTION INTRAVENOUS at 11:43

## 2024-01-17 RX ADMIN — HYDROMORPHONE HYDROCHLORIDE 1 MILLIGRAM(S): 2 INJECTION INTRAMUSCULAR; INTRAVENOUS; SUBCUTANEOUS at 05:46

## 2024-01-17 RX ADMIN — LIDOCAINE 1 PATCH: 4 CREAM TOPICAL at 07:06

## 2024-01-17 RX ADMIN — Medication 500 MILLIGRAM(S): at 17:31

## 2024-01-17 RX ADMIN — Medication 3 MILLILITER(S): at 17:33

## 2024-01-17 RX ADMIN — Medication 130 MILLIGRAM(S): at 17:31

## 2024-01-17 RX ADMIN — Medication 3 MILLILITER(S): at 11:10

## 2024-01-17 RX ADMIN — OXYCODONE HYDROCHLORIDE 10 MILLIGRAM(S): 5 TABLET ORAL at 09:02

## 2024-01-17 RX ADMIN — Medication 500 MILLIGRAM(S): at 11:42

## 2024-01-17 RX ADMIN — OXYCODONE HYDROCHLORIDE 10 MILLIGRAM(S): 5 TABLET ORAL at 03:35

## 2024-01-17 RX ADMIN — PIPERACILLIN AND TAZOBACTAM 25 GRAM(S): 4; .5 INJECTION, POWDER, LYOPHILIZED, FOR SOLUTION INTRAVENOUS at 06:25

## 2024-01-17 RX ADMIN — Medication 500 MILLIGRAM(S): at 23:55

## 2024-01-17 RX ADMIN — Medication 500 MILLIGRAM(S): at 23:25

## 2024-01-17 RX ADMIN — OXYCODONE HYDROCHLORIDE 10 MILLIGRAM(S): 5 TABLET ORAL at 03:50

## 2024-01-17 RX ADMIN — PIPERACILLIN AND TAZOBACTAM 25 GRAM(S): 4; .5 INJECTION, POWDER, LYOPHILIZED, FOR SOLUTION INTRAVENOUS at 21:34

## 2024-01-17 RX ADMIN — Medication 1000 MILLIGRAM(S): at 05:05

## 2024-01-17 RX ADMIN — PIPERACILLIN AND TAZOBACTAM 25 GRAM(S): 4; .5 INJECTION, POWDER, LYOPHILIZED, FOR SOLUTION INTRAVENOUS at 14:54

## 2024-01-17 RX ADMIN — Medication 1 MILLIGRAM(S): at 11:29

## 2024-01-17 RX ADMIN — LIDOCAINE 1 PATCH: 4 CREAM TOPICAL at 06:25

## 2024-01-17 RX ADMIN — Medication 500 MILLIGRAM(S): at 12:07

## 2024-01-17 RX ADMIN — Medication 25 MILLIGRAM(S): at 13:08

## 2024-01-17 RX ADMIN — Medication 500 MILLIGRAM(S): at 00:21

## 2024-01-17 RX ADMIN — QUETIAPINE FUMARATE 50 MILLIGRAM(S): 200 TABLET, FILM COATED ORAL at 21:34

## 2024-01-17 RX ADMIN — QUETIAPINE FUMARATE 25 MILLIGRAM(S): 200 TABLET, FILM COATED ORAL at 06:24

## 2024-01-17 RX ADMIN — Medication 25 MILLIGRAM(S): at 23:25

## 2024-01-17 RX ADMIN — HYDROMORPHONE HYDROCHLORIDE 1 MILLIGRAM(S): 2 INJECTION INTRAMUSCULAR; INTRAVENOUS; SUBCUTANEOUS at 06:00

## 2024-01-17 RX ADMIN — LIDOCAINE 1 PATCH: 4 CREAM TOPICAL at 18:57

## 2024-01-17 RX ADMIN — Medication 500 MILLIGRAM(S): at 11:29

## 2024-01-17 RX ADMIN — Medication 500 MILLIGRAM(S): at 01:30

## 2024-01-17 RX ADMIN — Medication 1 TABLET(S): at 11:29

## 2024-01-17 RX ADMIN — CHLORHEXIDINE GLUCONATE 1 APPLICATION(S): 213 SOLUTION TOPICAL at 06:26

## 2024-01-17 NOTE — PROGRESS NOTE ADULT - ASSESSMENT
PLAN  Neurologic:  - Precedex gtt, seroquel 25mg AM + 50mg PM for agitation  - Phenobarb 130 q12 for ETOH withdrawal- weaning  - multimodal pain control w/ lidocaine patch, Tylenol, oxycodone, Dilaudid PRN     Respiratory:  - s/p VATS 1/10 with discovery of Fibropurulent exudative adhesions and removal of retained foreign material likely the tip of a glove, s/p washout, with new 28f chest tube placed > removed 1/14  - RA, satting mid-90s  - Incentive spirometry  - AM CXR    Cardiovascular:  - off pressors MAP >65  - Lactate 1.8 on 1/14    Gastrointestinal/Nutrition:  - Regular diet    Genitourinary/Renal:  - CPK cleared  - Supplement electrolytes PRN  - mccormack d/c'd 1/14  - Cre uptrending  - FE Urea 51.9 % suggests intrinsic renal disease  - 1/16 Renal u/s unremarkable  - Follow-up Post-void residual    Hematologic:  - Chemical VTE ppx with Lovenox  - Mechanical VTE ppx with SCDs  - s/p pRBC x1 unit 1/12  - duplex US 1/15 negative for DVT    Infectious Disease: Empyema   - Procal 9 on 1/7 - downtrended to 0.64 on 1/12  - Tissue culture 1/10 no organisms seen   - BCx 1/9 no growth; MRSA PCR 1/9 negative  - Bcx 1/13 with staph epi x1 bottle  - BCx 1/14 and 1/15 NGTD  - Vanc 1/10-1/16  - Continue Zosyn 1/7-    Endocrine:  - No active issues, no history of DM    Line:  -RIJ cvc 1/11, removed 1/15    Disposition: SICU

## 2024-01-17 NOTE — PROGRESS NOTE ADULT - SUBJECTIVE AND OBJECTIVE BOX
Patient is a 57y old  Male who presents with a chief complaint of ARDS (18 Nov 2019 08:08)      HPI:  57M with severe MDD with prior psych admissions/ECT who was initially admitted to  10/28 after presenting with lethargy and hypoxemia. Patient reportedly was dealing with severe depression and expressed SI to his wife. His wife reported he was sleeping throughout the day and in the evening she heard a thump and found him on the floor. At  he became obtunded with hypercapnic respiratory failure and was intubated and admitted to the CCU. Initial imaging showed lower lobe consolidations and he was treated with CTX/Azithromycin. Poison control was contacted and he was received NaHCO3 in the setting of mild QRS widening on ECG and suspected overdose. His Utox returned positive for barbiturates; he reportedly had positive testing for barbiturates in the past thought secondary to OTC supplementations he takes from a [employer] for sleep. On the morning of admission, he was noted to have a 30 second tonic clonic seizure and received IV benzo and keppra load. Subsequent EEG did not reveal epileptiform discharges. He was extubated in the morning of 10/28 but required BiPAP in the setting of increased oxygenation needs. On 10/29 he was agitated and was started on Precedex. He developed fevers and was broadened to cefepime. Overnight (10/30), he had a Tmax of 102.6 and worsening hypoxemia requiring reintubation. Was started on propofol, precedex and paralyzed but remained difficult to oxygenate with traditional mechanical ventilation requiring frequent bagging and ALI consult for ECMO was placed. He was steroids, nimbex gtt, vancomycin and received Lasix 40mg IVP. His P/F was 60 at time of consult; improved somewhat to 82 at 4:30 this morning on AC/28/460/18/100. The patient was cannulated for VV-ECMO and transfer to [facility] (30 Oct 2019 12:10)    Subjective: patient denies complaint, states he has been working with PT and is improving. Wife at bedside.     REVIEW OF SYSTEMS  Constitutional - No fever, No weight loss, No fatigue  HEENT - No eye pain, No visual disturbances, No difficulty hearing, No tinnitus, No vertigo, No neck pain  Respiratory - No cough, No wheezing, No shortness of breath  Cardiovascular - No chest pain, No palpitations  Gastrointestinal - No abdominal pain, No nausea, No vomiting, No diarrhea, No constipation  Genitourinary - No dysuria, No frequency, No hematuria, No incontinence  Neurological - No headaches, No memory loss, + loss of strength, No numbness, No tremors  Skin - No itching, No rashes, No lesions   Endocrine - No temperature intolerance  Musculoskeletal - No joint pain, No joint swelling, No muscle pain  Psychiatric - No depression, No anxiety    PAST MEDICAL & SURGICAL HISTORY  Depression  Hepatitis  Left knee pain  No significant past surgical history      SOCIAL HISTORY  Smoking - Denied  EtOH - Denied   Drugs - Denied    FUNCTIONAL HISTORY  Lives   Independent    CURRENT FUNCTIONAL STATUS  T: min of 1   gait: 50' min of 1 with RW    FAMILY HISTORY   No significant family history      RECENT LABS/IMAGING  none new today      VITALS  T(C): 36.6 (11-18-19 @ 06:25), Max: 37.2 (11-17-19 @ 22:23)  HR: 91 (11-18-19 @ 06:25) (90 - 96)  BP: 141/85 (11-18-19 @ 06:25) (124/75 - 141/85)  RR: 18 (11-18-19 @ 06:25) (18 - 18)  SpO2: 95% (11-18-19 @ 06:25) (95% - 96%)  Wt(kg): --    ALLERGIES  No Known Allergies      MEDICATIONS   acetaminophen    Suspension .. 650 milliGRAM(s) Oral every 6 hours PRN  amLODIPine   Tablet 10 milliGRAM(s) Oral daily  artificial  tears Solution 1 Drop(s) Both EYES two times a day PRN  chlorhexidine 4% Liquid 1 Application(s) Topical daily  enoxaparin Injectable 60 milliGRAM(s) SubCutaneous daily  glucagon  Injectable 1 milliGRAM(s) IntraMuscular once PRN  haloperidol    Injectable 2.5 milliGRAM(s) IV Push every 6 hours PRN  lactobacillus acidophilus 1 Tablet(s) Oral three times a day  metoprolol tartrate 75 milliGRAM(s) Oral two times a day  nystatin Powder 1 Application(s) Topical every 12 hours  psyllium Powder 1 Packet(s) Oral daily  QUEtiapine 75 milliGRAM(s) Oral <User Schedule>  QUEtiapine 50 milliGRAM(s) Oral every 6 hours PRN  venlafaxine 37.5 milliGRAM(s) Oral daily      ----------------------------------------------------------------------------------------  PHYSICAL EXAM-  Constitutional - NAD, sitting in chair  HEENT - NCAT  Chest - Symmetric chest rise, no increased of breathing  Cardiovascular - warm, well perfused  Abdomen - Soft, NTND  Extremities -  No calf tenderness   Neurologic Exam -                    Cognitive - Awake, Alert, AAO to self, place and date. Answering questions appropriately, following simple commands     Communication - fluent, short sentences     Cranial Nerves - No facial asymmetry     Motor -                     LEFT    UE - ShAB 4/5, EF 4/5, EE 4/5, WE 4/5,  4/5                    RIGHT UE - ShAB 4/5, EF 4/5, EE 4/5, WE 4/5,  4/5                    LEFT    LE - HF 4/5, KE 4/5, DF 5/5, PF 5/5                    RIGHT LE - HF 4/5, KE 4/5, DF 5/5, PF 5/5        Sensory - Intact to LT  Psychiatric - mildly flat affect  ----------------------------------------------------------------------------------------        Assessment and Plan:   · Assessment		  57yMale with functional deficits after barbituate overdose with respiratory depression and ARDS s/p VV ECMO  Mood - Seroquel, Haldol  Pain - Tylenol, Fentanyl Patch, Ibuprofen  DVT PPX - Lovenox, SCDs  PT- ROM, Bed Mob, Transfers, Amb w AD and bracing as needed  OT- ADLs, bracing as needed  Prec- Falls  Skin- Turn q2 h  Dispo- Once medically stabilized, recommend ACUTE inpatient rehabilitation for the functional deficits consisting of 3 hours of therapy/day & 24 hour RN/daily PMR physician for comorbid medical management. Will continue to follow for ongoing rehab needs and recommendations. Patient will be able to tolerate 3 hours a day. INTERVAL EVENTS:  - RBC x1  - Bolus for hypoTN  -haldol replaced with seroquel (25mg in AM, 50mg in PM)  - Creatine uptrending  - motrin held,  vanco discontinued  - Renal US negative  - PVR pending    SUBJECTIVE/ROS:  [ ] A ten-point review of systems was otherwise negative except as noted.  [ ] Due to altered mental status/intubation, subjective information were not able to be obtained from the patient. History was obtained, to the extent possible, from review of the chart and collateral sources of information.      NEURO  RASS:     GCS:     CAM ICU:  Exam: awake, alert  Meds: acetaminophen     Tablet .. 500 milliGRAM(s) Oral every 6 hours  dexMEDEtomidine Infusion 1 MICROgram(s)/kG/Hr IV Continuous <Continuous>  HYDROmorphone  Injectable 1 milliGRAM(s) IV Push every 3 hours PRN Breakthrough  oxyCODONE    IR 5 milliGRAM(s) Oral every 4 hours PRN Moderate Pain (4 - 6)  oxyCODONE    IR 10 milliGRAM(s) Oral every 4 hours PRN Severe Pain (7 - 10)  PHENobarbital Injectable 130 milliGRAM(s) IV Push every 12 hours  QUEtiapine 50 milliGRAM(s) Oral at bedtime  QUEtiapine 25 milliGRAM(s) Oral <User Schedule>    [x] Adequacy of sedation and pain control has been assessed and adjusted      RESPIRATORY  RR: 20 (01-16-24 @ 20:00) (20 - 39)  SpO2: 100% (01-17-24 @ 00:00) (82% - 100%)  Wt(kg): --  Exam: no increased work of breathing   Meds: albuterol/ipratropium for Nebulization 3 milliLiter(s) Nebulizer every 6 hours        CARDIOVASCULAR  HR: 105 (01-17-24 @ 00:00) (72 - 120)  BP: 135/89 (01-16-24 @ 20:00) (75/53 - 147/86)  BP(mean): 101 (01-16-24 @ 20:00) (55 - 108)  ABP: --  ABP(mean): --  Wt(kg): --  CVP(cm H2O): --      Exam:   Cardiac Rhythm: sinus  Perfusion     [x]Adequate   [ ]Inadequate  Mentation   [x]Normal       [ ]Reduced  Extremities  [x]Warm         [ ]Cool  Volume Status [ ]Hypervolemic [x]Euvolemic [ ]Hypovolemic  Meds:       GI/NUTRITION  Exam: soft, nontender, nondistended  Diet: Reg  Meds:     GENITOURINARY  I&O's Detail    01-15 @ 07:01 - 01-16 @ 07:00  --------------------------------------------------------  IN:    Dexmedetomidine: 825.6 mL    IV PiggyBack: 100 mL    IV PiggyBack: 300 mL    Oral Fluid: 400 mL  Total IN: 1625.6 mL    OUT:    Voided (mL): 1700 mL  Total OUT: 1700 mL    Total NET: -74.4 mL      01-16 @ 07:01 - 01-17 @ 01:00  --------------------------------------------------------  IN:    Dexmedetomidine: 412.8 mL    IV PiggyBack: 50 mL    IV PiggyBack: 600 mL    Oral Fluid: 360 mL    PRBCs (Packed Red Blood Cells): 300 mL  Total IN: 1722.8 mL    OUT:    Voided (mL): 2025 mL  Total OUT: 2025 mL    Total NET: -302.2 mL          01-16    136  |  103  |  12  ----------------------------<  93  3.7   |  21<L>  |  1.49<H>    Ca    7.7<L>      16 Jan 2024 05:08  Phos  4.6     01-16  Mg     2.0     01-16      [ ] Hendrix catheter, indication: N/A  Meds: ascorbic acid 500 milliGRAM(s) Oral daily  cyanocobalamin 1000 MICROGram(s) Oral daily  folic acid 1 milliGRAM(s) Oral daily  multivitamin 1 Tablet(s) Oral daily        HEMATOLOGIC  Meds: enoxaparin Injectable 40 milliGRAM(s) SubCutaneous every 24 hours    [x] VTE Prophylaxis                        7.8    12.45 )-----------( 537      ( 16 Jan 2024 15:29 )             22.4     PT/INR - ( 16 Jan 2024 05:08 )   PT: 13.7 sec;   INR: 1.25 ratio         PTT - ( 16 Jan 2024 05:08 )  PTT:32.3 sec  Transfusion     [ ] PRBC   [ ] Platelets   [ ] FFP   [ ] Cryoprecipitate      INFECTIOUS DISEASES  WBC Count: 12.45 K/uL (01-16 @ 15:29)  WBC Count: 13.26 K/uL (01-16 @ 05:08)    RECENT CULTURES:  Specimen Source: .Blood Blood-Peripheral  Date/Time: 01-15 @ 11:40  Culture Results:   No growth at 24 hours  Gram Stain: --  Organism: --  Specimen Source: .Blood Blood-Peripheral  Date/Time: 01-14 @ 17:36  Culture Results:   No growth at 48 Hours  Gram Stain: --  Organism: --  Specimen Source: .Blood Blood-Peripheral  Date/Time: 01-13 @ 04:38  Culture Results:   No growth at 72 Hours  Gram Stain:   Growth in aerobic bottle: Gram Positive Cocci in Clusters<!>  Organism: Blood Culture PCR<!>    Meds: influenza   Vaccine 0.5 milliLiter(s) IntraMuscular once  piperacillin/tazobactam IVPB.. 3.375 Gram(s) IV Intermittent every 8 hours        ENDOCRINE  CAPILLARY BLOOD GLUCOSE        IMAGING:  < from: US Kidney and Bladder (01.16.24 @ 17:03) >    ACC: [id number] EXAM:  US KIDNEYS AND BLADDER   ORDERED BY: LIBBY BYRD     PROCEDURE DATE:  01/16/2024          INTERPRETATION:  CLINICAL INFORMATION: Elevated serum creatinine.    COMPARISON: CT chest, abdomen pelvis from 11/06/2024.    TECHNIQUE:Sonography of the kidneys and bladder.    FINDINGS:  Right kidney: 13 cm. No renal mass, hydronephrosis or calculi.    Left kidney: Poorly visualized due to body habitus.  11.9 cm. No renal   mass, hydronephrosis or calculi.    Urinary bladder: Within normal limits.    IMPRESSION:  No renal mass, hydronephrosis or calculus is visualized sonographically.        --- End of Report ---            RAFFI WOMACK MD; Attending Radiologist  This document has been electronically signed. Jan 16 2024  5:29PM    < end of copied text >  < from: Xray Chest 1 View- PORTABLE-Routine (Xray Chest 1 View- PORTABLE-Routine in AM.) (01.16.24 @ 07:13) >    ACC: [id number] EXAM:  XR CHEST PORTABLE ROUTINE 1V   ORDERED BY: CHRISTINA YU     PROCEDURE DATE:  01/16/2024          INTERPRETATION:  EXAMINATION: XR CHEST    CLINICAL INDICATION: pulm vasc congestion    TECHNIQUE: Single frontal, portable view ofthe chest was obtained.    COMPARISON: Chest x-ray 1/15/2024.    FINDINGS:    The heart is not accurately assessed in this AP projection.  Unchanged small left effusion with atelectasis.  There is no pneumothorax.  No acute bony abnormality.    IMPRESSION:  Unchanged small left effusion with atelectasis.    --- End of Report ---           CINTHIA JIANG MD; Resident Radiologist  This document has been electronically signed.  YANETH MONTOYA MD; Attending Interventional Radiologist  This document hasbeen electronically signed. Jan 16 2024  1:36PM    < end of copied text >

## 2024-01-17 NOTE — CHART NOTE - NSCHARTNOTEFT_GEN_A_CORE
Nutrition Follow Up Note  Patient seen for: malnutrition follow up     Chart reviewed, events noted. "48y Male with PMHx hepatitis C (diagnosed many years ago, never treated), depression transferred from outside hospital for trauma eval. Patient found down by roommate after unknown amount of time, found with multiple 4-11 L sided rib fractures with flail chest. Chest tube placed there with >1L output (old blood). Patient also with tachypnea, concern for respiratory failure started on BiPAP. Level 1 called for transient hypotension, patient given 2 unit PRBC in [facility] ED. Rib score 3. Pt now s/p VATS, with partial lung decortication and Thoracoscopic removal of intrapleural foreign body.     Interim Events:  - extubated ; EN discontinued  - diet advanced ; poor PO in setting of agitation, ETOH withdrawal    Source: [] Patient       [x] EMR        [] RN        [] Family at bedside       [x] Other: 8ICU Interdisciplinary Rounds, PCA (Constant Observation)    -If unable to interview patient: [] Trach/Vent/BiPAP  [x] Disoriented/confused/inappropriate to interview    Diet Order:   Diet, Regular (24)    - Is current order appropriate/adequate? [x] Yes      PO intake :   [] >75%  Adequate    [] 50-75%  Fair       [x] <50%  Poor  - Per PCA, pt refused all food this morning, consumed only clear liquids    Nutrition-related concerns:  - Hepatitis C; s/p trauma; ETOH withdrawal, agitation  - Renal: hyponatremia, sCr uptrending  - Supplementation: vit C, cyanocobalamin, folic acid, multivitamin    GI:    Last BM:  (x2) .   Bowel Regimen?  [x] No    Weights:  Height (cm): 185.4 (10 Dominic 2024 12:45)  Weight (kg): 91.7 (10 Dominic 2024 12:45)  BMI (kg/m2): 26.7 (10 Dominic 2024 12:45)  Daily Weight in k.5 (-), Weight in k.9 (01-15), Weight in k.2 ()    Nutritionally Pertinent Medications:  MEDICATIONS  (STANDING):  acetaminophen     Tablet .. 500 milliGRAM(s) Oral every 6 hours  albuterol/ipratropium for Nebulization 3 milliLiter(s) Nebulizer every 6 hours  ascorbic acid 500 milliGRAM(s) Oral daily  chlorhexidine 2% Cloths 1 Application(s) Topical <User Schedule>  cyanocobalamin 1000 MICROGram(s) Oral daily  enoxaparin Injectable 40 milliGRAM(s) SubCutaneous every 24 hours  folic acid 1 milliGRAM(s) Oral daily  influenza   Vaccine 0.5 milliLiter(s) IntraMuscular once  lidocaine   4% Patch 1 Patch Transdermal every 24 hours  multivitamin 1 Tablet(s) Oral daily  PHENobarbital Injectable 130 milliGRAM(s) IV Push every 12 hours  piperacillin/tazobactam IVPB.. 3.375 Gram(s) IV Intermittent every 8 hours  QUEtiapine 25 milliGRAM(s) Oral <User Schedule>  QUEtiapine 50 milliGRAM(s) Oral at bedtime    MEDICATIONS  (PRN):  HYDROmorphone  Injectable 1 milliGRAM(s) IV Push every 3 hours PRN Breakthrough  oxyCODONE    IR 5 milliGRAM(s) Oral every 4 hours PRN Moderate Pain (4 - 6)  oxyCODONE    IR 10 milliGRAM(s) Oral every 4 hours PRN Severe Pain (7 - 10)    Nutritionally Pertinent Labs:   @ 07:10: Sodium 134<L>, Potassium 3.8, Calcium 8.1<L>, Magnesium 2.2, Phosphorus 4.1, BUN 10, Creatinine 1.57<H>, Glucose 93   @ 06:26: Sodium 132<L>, Potassium 3.7, Calcium 7.6<L>, Magnesium 2.0, Phosphorus 3.5, BUN 10, Creatinine 1.56<H>, Glucose 96  Hemoglobin : 8.4 g/dL  Hematocrit : 24.6 %    Skin per nursing documentation: No pressure injuries documented   Edema: 3+ left foot, right arm    Estimated Needs: based on dosing wt 91.7 kg, with consideration for malnutrition  Energy: 8015-4559 (23-28 kcal/kg)  Protein: 101-119  (1.1-1.3 g/kg)    Previous Nutrition Diagnosis: 1) Increased Nutrient Needs 2) Severe (acute) Malnutrition  Nutrition Diagnosis is: [x] ongoing; addressed with Regular diet and micronutrient supplementation    New Nutrition Diagnosis: [x] Not applicable    Nutrition Care Plan:  [x] In Progress  [] Achieved  [] Not applicable    Nutrition Interventions:     Education Provided:       [] Yes:  [x] No:        Recommendations:      1. Continue Regular diet  2. Add 2 servings Ensure supplement  3. Continue micronutrient supplementation: vit C, cyanocobalamin, folic acid, multivitamin    Monitoring and Evaluation:   Continue to monitor nutritional intake, tolerance to diet prescription, weights, labs, skin integrity.  RD remains available upon request and will follow up per protocol,    Karrie Belle MS RD CDN  [facility]  Available on MS TEAMS

## 2024-01-17 NOTE — PROGRESS NOTE ADULT - ASSESSMENT
48y Male with PMHx hepatitis C (diagnosed many years ago, never treated), depression transferred from outside hospital for trauma eval. Patient found down by roommate after unknown amount of time, found with multiple 4-11 L sided rib fractures with flail chest. Chest tube placed there with >1L output (old blood). Patient also with tachypnea, concern for respiratory failure started on BiPAP. Level 1 called for transient hypotension, patient given 2 unit PRBC in [facility] ED. Rib score 3. Pt now s/p VATS, with partial lung decortication and Thoracoscopic removal of intrapleural foreign body.     Injuries:   - left posterolateral fourth, fifth, sixth, and 11th ribs, minimally displaced   - mildly displaced fractures of the left anterolateral fourth, fifth, sixth, and seventh ribs  - displaced fractures of the left posterior seventh, eighth, ninth, and 10th ribs.    PLAN  - wean phenobarbital   - 1/14- discontinued chest tube  - Regular diet  - Rib fracture protocol   - IV abx  - ISS  - Appreciate excellent SICU care      Trauma Surgery  [phone]

## 2024-01-17 NOTE — PROGRESS NOTE ADULT - SUBJECTIVE AND OBJECTIVE BOX
ACS Progress Note    S: Patient seen and examined. No acute events overnight. Pain well controlled with current regimen.   Denies nausea/vomiting.   Endorses passing gas and bowel movements.     O:  Vital Signs Last 24 Hrs  T(C): 37.2 (17 Jan 2024 07:00), Max: 38 (17 Jan 2024 03:00)  T(F): 98.9 (17 Jan 2024 07:00), Max: 100.4 (17 Jan 2024 03:00)  HR: 118 (17 Jan 2024 11:10) (93 - 137)  BP: 128/79 (17 Jan 2024 11:00) (80/41 - 146/89)  BP(mean): 99 (17 Jan 2024 11:00) (55 - 111)  RR: 34 (17 Jan 2024 11:00) (20 - 44)  SpO2: 95% (17 Jan 2024 11:10) (92% - 100%)    Parameters below as of 17 Jan 2024 11:10  Patient On (Oxygen Delivery Method): nasal cannula        I&O's Detail    16 Jan 2024 07:01  -  17 Jan 2024 07:00  --------------------------------------------------------  IN:    Dexmedetomidine: 756.8 mL    IV PiggyBack: 50 mL    IV PiggyBack: 750 mL    Oral Fluid: 600 mL    PRBCs (Packed Red Blood Cells): 300 mL  Total IN: 2456.8 mL    OUT:    Voided (mL): 2875 mL  Total OUT: 2875 mL    Total NET: -418.2 mL      17 Jan 2024 07:01  -  17 Jan 2024 11:59  --------------------------------------------------------  IN:    IV PiggyBack: 50 mL    Lactated Ringers Bolus: 500 mL  Total IN: 550 mL    OUT:  Total OUT: 0 mL    Total NET: 550 mL          MEDICATIONS  (STANDING):  acetaminophen     Tablet .. 500 milliGRAM(s) Oral every 6 hours  albuterol/ipratropium for Nebulization 3 milliLiter(s) Nebulizer every 6 hours  ascorbic acid 500 milliGRAM(s) Oral daily  chlorhexidine 2% Cloths 1 Application(s) Topical <User Schedule>  cyanocobalamin 1000 MICROGram(s) Oral daily  enoxaparin Injectable 40 milliGRAM(s) SubCutaneous every 24 hours  folic acid 1 milliGRAM(s) Oral daily  influenza   Vaccine 0.5 milliLiter(s) IntraMuscular once  lidocaine   4% Patch 1 Patch Transdermal every 24 hours  metoprolol tartrate Injectable 5 milliGRAM(s) IV Push every 6 hours  multivitamin 1 Tablet(s) Oral daily  PHENobarbital Injectable 130 milliGRAM(s) IV Push every 12 hours  piperacillin/tazobactam IVPB.. 3.375 Gram(s) IV Intermittent every 8 hours  QUEtiapine 25 milliGRAM(s) Oral <User Schedule>  QUEtiapine 50 milliGRAM(s) Oral at bedtime    MEDICATIONS  (PRN):  famotidine    Tablet 20 milliGRAM(s) Oral daily PRN Heartburn  HYDROmorphone  Injectable 1 milliGRAM(s) IV Push every 3 hours PRN Breakthrough  oxyCODONE    IR 10 milliGRAM(s) Oral every 4 hours PRN Severe Pain (7 - 10)  oxyCODONE    IR 5 milliGRAM(s) Oral every 4 hours PRN Moderate Pain (4 - 6)                            8.4    13.07 )-----------( 602      ( 17 Jan 2024 07:09 )             24.6       01-17    134<L>  |  101  |  10  ----------------------------<  93  3.8   |  18<L>  |  1.57<H>    Ca    8.1<L>      17 Jan 2024 07:10  Phos  4.1     01-17  Mg     2.2     01-17    PHYSICAL EXAM:  Constitutional: NAD  Respiratory: non-labored breathing, patent airway  Gastrointestinal: abdomen soft, nontender, nondistended  Extremities: warm  Neurological: intact, on restriants

## 2024-01-18 LAB
ANION GAP SERPL CALC-SCNC: 13 MMOL/L — SIGNIFICANT CHANGE UP (ref 5–17)
BUN SERPL-MCNC: 9 MG/DL — SIGNIFICANT CHANGE UP (ref 7–23)
CALCIUM SERPL-MCNC: 8.2 MG/DL — LOW (ref 8.4–10.5)
CHLORIDE SERPL-SCNC: 101 MMOL/L — SIGNIFICANT CHANGE UP (ref 96–108)
CO2 SERPL-SCNC: 20 MMOL/L — LOW (ref 22–31)
CREAT SERPL-MCNC: 1.39 MG/DL — HIGH (ref 0.5–1.3)
CULTURE RESULTS: SIGNIFICANT CHANGE UP
EGFR: 63 ML/MIN/1.73M2 — SIGNIFICANT CHANGE UP
GLUCOSE SERPL-MCNC: 82 MG/DL — SIGNIFICANT CHANGE UP (ref 70–99)
HCT VFR BLD CALC: 26.2 % — LOW (ref 39–50)
HGB BLD-MCNC: 8.7 G/DL — LOW (ref 13–17)
MAGNESIUM SERPL-MCNC: 2 MG/DL — SIGNIFICANT CHANGE UP (ref 1.6–2.6)
MCHC RBC-ENTMCNC: 27.9 PG — SIGNIFICANT CHANGE UP (ref 27–34)
MCHC RBC-ENTMCNC: 33.2 GM/DL — SIGNIFICANT CHANGE UP (ref 32–36)
MCV RBC AUTO: 84 FL — SIGNIFICANT CHANGE UP (ref 80–100)
NRBC # BLD: 0 /100 WBCS — SIGNIFICANT CHANGE UP (ref 0–0)
PHOSPHATE SERPL-MCNC: 3.6 MG/DL — SIGNIFICANT CHANGE UP (ref 2.5–4.5)
PLATELET # BLD AUTO: 633 K/UL — HIGH (ref 150–400)
POTASSIUM SERPL-MCNC: 3.7 MMOL/L — SIGNIFICANT CHANGE UP (ref 3.5–5.3)
POTASSIUM SERPL-SCNC: 3.7 MMOL/L — SIGNIFICANT CHANGE UP (ref 3.5–5.3)
PROCALCITONIN SERPL-MCNC: 0.55 NG/ML — HIGH (ref 0.02–0.1)
RBC # BLD: 3.12 M/UL — LOW (ref 4.2–5.8)
RBC # FLD: 15.1 % — HIGH (ref 10.3–14.5)
SODIUM SERPL-SCNC: 134 MMOL/L — LOW (ref 135–145)
SPECIMEN SOURCE: SIGNIFICANT CHANGE UP
WBC # BLD: 10.99 K/UL — HIGH (ref 3.8–10.5)
WBC # FLD AUTO: 10.99 K/UL — HIGH (ref 3.8–10.5)

## 2024-01-18 PROCEDURE — 71045 X-RAY EXAM CHEST 1 VIEW: CPT | Mod: 26

## 2024-01-18 PROCEDURE — 93970 EXTREMITY STUDY: CPT | Mod: 26

## 2024-01-18 PROCEDURE — 99222 1ST HOSP IP/OBS MODERATE 55: CPT

## 2024-01-18 RX ORDER — QUETIAPINE FUMARATE 200 MG/1
75 TABLET, FILM COATED ORAL
Refills: 0 | Status: DISCONTINUED | OUTPATIENT
Start: 2024-01-18 | End: 2024-01-26

## 2024-01-18 RX ORDER — CHLORHEXIDINE GLUCONATE 213 G/1000ML
1 SOLUTION TOPICAL DAILY
Refills: 0 | Status: DISCONTINUED | OUTPATIENT
Start: 2024-01-18 | End: 2024-01-23

## 2024-01-18 RX ORDER — QUETIAPINE FUMARATE 200 MG/1
50 TABLET, FILM COATED ORAL
Refills: 0 | Status: DISCONTINUED | OUTPATIENT
Start: 2024-01-18 | End: 2024-01-18

## 2024-01-18 RX ORDER — LANOLIN ALCOHOL/MO/W.PET/CERES
5 CREAM (GRAM) TOPICAL AT BEDTIME
Refills: 0 | Status: DISCONTINUED | OUTPATIENT
Start: 2024-01-18 | End: 2024-01-26

## 2024-01-18 RX ORDER — HYDROMORPHONE HYDROCHLORIDE 2 MG/ML
1 INJECTION INTRAMUSCULAR; INTRAVENOUS; SUBCUTANEOUS
Refills: 0 | Status: DISCONTINUED | OUTPATIENT
Start: 2024-01-18 | End: 2024-01-22

## 2024-01-18 RX ORDER — OXYCODONE HYDROCHLORIDE 5 MG/1
10 TABLET ORAL EVERY 4 HOURS
Refills: 0 | Status: DISCONTINUED | OUTPATIENT
Start: 2024-01-18 | End: 2024-01-23

## 2024-01-18 RX ORDER — THIAMINE MONONITRATE (VIT B1) 100 MG
100 TABLET ORAL DAILY
Refills: 0 | Status: DISCONTINUED | OUTPATIENT
Start: 2024-01-18 | End: 2024-01-26

## 2024-01-18 RX ORDER — METOPROLOL TARTRATE 50 MG
50 TABLET ORAL EVERY 12 HOURS
Refills: 0 | Status: DISCONTINUED | OUTPATIENT
Start: 2024-01-18 | End: 2024-01-24

## 2024-01-18 RX ORDER — OXYCODONE HYDROCHLORIDE 5 MG/1
5 TABLET ORAL EVERY 4 HOURS
Refills: 0 | Status: DISCONTINUED | OUTPATIENT
Start: 2024-01-18 | End: 2024-01-23

## 2024-01-18 RX ADMIN — FAMOTIDINE 20 MILLIGRAM(S): 10 INJECTION INTRAVENOUS at 15:00

## 2024-01-18 RX ADMIN — LIDOCAINE 1 PATCH: 4 CREAM TOPICAL at 07:00

## 2024-01-18 RX ADMIN — Medication 500 MILLIGRAM(S): at 17:39

## 2024-01-18 RX ADMIN — Medication 1 TABLET(S): at 13:37

## 2024-01-18 RX ADMIN — CHLORHEXIDINE GLUCONATE 1 APPLICATION(S): 213 SOLUTION TOPICAL at 05:20

## 2024-01-18 RX ADMIN — PIPERACILLIN AND TAZOBACTAM 25 GRAM(S): 4; .5 INJECTION, POWDER, LYOPHILIZED, FOR SOLUTION INTRAVENOUS at 13:35

## 2024-01-18 RX ADMIN — QUETIAPINE FUMARATE 25 MILLIGRAM(S): 200 TABLET, FILM COATED ORAL at 05:20

## 2024-01-18 RX ADMIN — PIPERACILLIN AND TAZOBACTAM 25 GRAM(S): 4; .5 INJECTION, POWDER, LYOPHILIZED, FOR SOLUTION INTRAVENOUS at 05:20

## 2024-01-18 RX ADMIN — Medication 500 MILLIGRAM(S): at 14:24

## 2024-01-18 RX ADMIN — LIDOCAINE 1 PATCH: 4 CREAM TOPICAL at 05:20

## 2024-01-18 RX ADMIN — Medication 100 MILLIGRAM(S): at 13:37

## 2024-01-18 RX ADMIN — Medication 50 MILLIGRAM(S): at 22:07

## 2024-01-18 RX ADMIN — PIPERACILLIN AND TAZOBACTAM 25 GRAM(S): 4; .5 INJECTION, POWDER, LYOPHILIZED, FOR SOLUTION INTRAVENOUS at 22:07

## 2024-01-18 RX ADMIN — CHLORHEXIDINE GLUCONATE 1 APPLICATION(S): 213 SOLUTION TOPICAL at 17:40

## 2024-01-18 RX ADMIN — QUETIAPINE FUMARATE 75 MILLIGRAM(S): 200 TABLET, FILM COATED ORAL at 19:50

## 2024-01-18 RX ADMIN — Medication 1 MILLIGRAM(S): at 13:36

## 2024-01-18 RX ADMIN — Medication 500 MILLIGRAM(S): at 05:20

## 2024-01-18 RX ADMIN — Medication 5 MILLIGRAM(S): at 01:51

## 2024-01-18 RX ADMIN — PREGABALIN 1000 MICROGRAM(S): 225 CAPSULE ORAL at 13:37

## 2024-01-18 RX ADMIN — Medication 500 MILLIGRAM(S): at 13:36

## 2024-01-18 RX ADMIN — Medication 50 MILLIGRAM(S): at 10:38

## 2024-01-18 RX ADMIN — Medication 3 MILLILITER(S): at 06:23

## 2024-01-18 RX ADMIN — LIDOCAINE 1 PATCH: 4 CREAM TOPICAL at 15:11

## 2024-01-18 RX ADMIN — Medication 500 MILLIGRAM(S): at 18:10

## 2024-01-18 RX ADMIN — Medication 5 MILLIGRAM(S): at 22:07

## 2024-01-18 RX ADMIN — Medication 500 MILLIGRAM(S): at 05:50

## 2024-01-18 RX ADMIN — Medication 500 MILLIGRAM(S): at 13:37

## 2024-01-18 NOTE — CHART NOTE - NSCHARTNOTEFT_GEN_A_CORE
SICU Transfer Note    Transfer from: SICU  [facility]  Accepting physician: Rocco      SICU COURSE: 48M with PMHx hepatitis C, depression who was transferred from outside hospital for trauma evaluation. Patient found down by roommate after unknown amount of time, found with multiple 4-11 L sided rib fractures with flail chest. Chest tube placed there with >1L output (old blood). Patient also with tachypnea, concern for respiratory failure started on BiPAP. Level 1 called for transient hypotension, patient given 2 unit PRBC in [facility] ED. Patient admitted to SICU for hemodynamic monitoring, pain management. Patient is status post VATS with discovery of fibropurlent exudative adhesions and removal of retained foreign material and is now s/p washout, placement of chest tube, which has now been removed (01/14). Patient is now off precedex and his phenobarb taper ended on 01/17. Patient is currently on seroquel 25mg AM + 50mg PM for agitation.     Vitals:  Vital Signs Last 24 Hrs  T(C): 36.2 (18 Jan 2024 11:36), Max: 37.7 (17 Jan 2024 22:00)  T(F): 97.1 (18 Jan 2024 11:36), Max: 99.8 (17 Jan 2024 22:00)  HR: 107 (18 Jan 2024 11:36) (75 - 125)  BP: 118/78 (18 Jan 2024 11:36) (98/58 - 151/78)  BP(mean): 90 (18 Jan 2024 11:00) (71 - 101)  RR: 20 (18 Jan 2024 11:36) (20 - 36)  SpO2: 98% (18 Jan 2024 11:36) (93% - 100%)    Parameters below as of 18 Jan 2024 11:36  Patient On (Oxygen Delivery Method): room air      I&O's Detail    17 Jan 2024 07:01  -  18 Jan 2024 07:00  --------------------------------------------------------  IN:    IV PiggyBack: 325 mL    Lactated Ringers Bolus: 500 mL    Oral Fluid: 200 mL  Total IN: 1025 mL    OUT:    Voided (mL): 1175 mL  Total OUT: 1175 mL    Total NET: -150 mL      18 Jan 2024 07:01  -  18 Jan 2024 12:48  --------------------------------------------------------  IN:    IV PiggyBack: 50 mL  Total IN: 50 mL    OUT:    Voided (mL): 400 mL  Total OUT: 400 mL    Total NET: -350 mL      PLAN:   - Seroquel 25mg AM + 50mg PM for agitation  - Staples out 01/24/24  - Antibiotics to finish 01/24/24  - multimodal pain control w/ lidocaine patch, Tylenol, oxycodone, Dilaudid PRN   - Regular diet  - metoprolol 50mg    - per sicu, can increase to metoprolol to 50mg in the morning, 25mg in the night for persistent tachycardia

## 2024-01-18 NOTE — PROGRESS NOTE ADULT - ASSESSMENT
48y Male with PMHx hepatitis C (diagnosed many years ago, never treated), depression transferred from outside hospital for trauma eval. Patient found down by roommate after unknown amount of time, found with multiple 4-11 L sided rib fractures with flail chest. Chest tube placed there with >1L output (old blood). Patient also with tachypnea, concern for respiratory failure started on BiPAP. Level 1 called for transient hypotension, patient given 2 unit PRBC in [facility] ED. Rib score 3. Pt now s/p VATS, with partial lung decortication and Thoracoscopic removal of intrapleural foreign body.     Injuries:   - left posterolateral fourth, fifth, sixth, and 11th ribs, minimally displaced   - mildly displaced fractures of the left anterolateral fourth, fifth, sixth, and seventh ribs  - displaced fractures of the left posterior seventh, eighth, ninth, and 10th ribs.    PLAN  - wean phenobarbital as tolerated  - Rib fracture protocol   - IV abx  - ISS  - Appreciate excellent SICU care      Trauma Surgery  [phone]

## 2024-01-18 NOTE — CONSULT NOTE ADULT - SUBJECTIVE AND OBJECTIVE BOX
Patient is a 48y old  Male who presents with a chief complaint of Trauma 1 activation (18 Jan 2024 09:31)    Admission HPI:  TRAUMA SERVICE (Acute Care Surgery / ACS - #7256) - CONSULT NOTE  --------------------------------------------------------------------------------------------    TRAUMA ACTIVATION LEVEL: 1    MECHANISM OF INJURY:   [] Blunt | [] MVC | [x] Fall | 	[] Pedestrian Struck| [] Motorcycle accident   [] Penetrating | [] Gun Shot Wound | [] Stab Wound    GCS: 15	E: 4	V: 5	M: 6    HPI  48y Male with PMHx hepatitis C (diagnosed many years ago, never treated), depression transferred from outside hospital for trauma eval. Patient found down by roommate after unknown amount of time, found with multiple 4-11 L sided rib fractures with flail chest. Chest tube placed there with >1L output (old blood). Patient also with tachypnea, concern for respiratory failure started on BiPAP. Level 1 called for transient hypotension, patient given 2 unit PRBC in [facility] ED.     PRIMARY SURVEY  A - airway intact  B - bilateral breath sounds and good chest rise  C - initial BP: -- , HR: -- , palpable pulses in all extremities  D - GCS 15 on arrival  Exposure obtained    Interval History:  Patient had imaging:  CT Head No Cont (01.06.24 @ 18:01) >  CT HEAD:  No acute hemorrhage, mass effect, or midline shift.  No acute calvarial fracture.    CT CERVICAL SPINE:  No acute fracture or subluxation.  Cervical spondylosis, as described above.  Partially imaged left hydropneumothorax. Please see concurrent CT chest   report.    CT Chest No Cont (01.06.24 @ 18:05) >   Multiple left rib fractures, as described, concerning for   flail chest. Left-sided chest tube with mild left hydropneumothorax.   Subtle L1 and L2 superior compression deformities.    Patient went to OR on 1/10 for VATS, with partial lung decortication 10-Dominic-2024 16:31:40  Mario WORTHINGTON  Thoracoscopic removal of intrapleural foreign body.    Patient w persistent functional deficits.     REVIEW OF SYSTEMS: + side and LBP, + difficulty walking, No chest pain, shortness of breath, nausea, vomiting or diarhea; other ROS neg     PAST MEDICAL & SURGICAL HISTORY  As above    FUNCTIONAL HISTORY:   Lives alone  PTA Independent    CURRENT FUNCTIONAL STATUS:  Mod A transfers and gait.    FAMILY HISTORY   No pertinent family history in first degree relatives    MEDICATIONS   acetaminophen     Tablet .. 500 milliGRAM(s) Oral every 6 hours  ascorbic acid 500 milliGRAM(s) Oral daily  cyanocobalamin 1000 MICROGram(s) Oral daily  enoxaparin Injectable 40 milliGRAM(s) SubCutaneous every 24 hours  famotidine    Tablet 20 milliGRAM(s) Oral daily PRN  folic acid 1 milliGRAM(s) Oral daily  HYDROmorphone  Injectable 1 milliGRAM(s) IV Push every 3 hours PRN  influenza   Vaccine 0.5 milliLiter(s) IntraMuscular once  lidocaine   4% Patch 1 Patch Transdermal every 24 hours  melatonin 5 milliGRAM(s) Oral at bedtime  metoprolol tartrate 50 milliGRAM(s) Oral every 12 hours  multivitamin 1 Tablet(s) Oral daily  oxyCODONE    IR 10 milliGRAM(s) Oral every 4 hours PRN  oxyCODONE    IR 5 milliGRAM(s) Oral every 4 hours PRN  piperacillin/tazobactam IVPB.. 3.375 Gram(s) IV Intermittent every 8 hours  QUEtiapine 75 milliGRAM(s) Oral <User Schedule>  QUEtiapine 25 milliGRAM(s) Oral <User Schedule>  thiamine 100 milliGRAM(s) Oral daily    ALLERGIES  No Known Allergies    VITALS  T(C): 36.9 (01-18-24 @ 07:00), Max: 37.7 (01-17-24 @ 22:00)  HR: 109 (01-18-24 @ 08:00) (75 - 126)  BP: 104/59 (01-18-24 @ 08:00) (98/58 - 151/78)  RR: 22 (01-18-24 @ 08:00) (22 - 39)  SpO2: 97% (01-18-24 @ 08:00) (93% - 100%)  Wt(kg): --    PHYSICAL EXAM  Constitutional - NAD, Comfortable  HEENT - NCAT, EOMI  Neck - Supple  Chest - No distress, no use of accessory muscles for respiration  Cardiovascular -Tachy, Well perfused  Abdomen - BS+, Soft, NTND  Extremities - No C/C/E, No calf tenderness   Neurologic Exam -                 AAO x 3  Speech clear/fluent/appropriate  Motor - nml grade bl UE, RLE 4/5, LLE 3/5- states causes rib pain.  No clonus  Sensation intact  Psychiatric - Mood stable, Affect WNL    RECENT LABS/IMAGING  CBC Full  -  ( 17 Jan 2024 07:09 )  WBC Count : 13.07 K/uL  RBC Count : 2.96 M/uL  Hemoglobin : 8.4 g/dL  Hematocrit : 24.6 %  Platelet Count - Automated : 602 K/uL  Mean Cell Volume : 83.1 fl  Mean Cell Hemoglobin : 28.4 pg  Mean Cell Hemoglobin Concentration : 34.1 gm/dL  Auto Neutrophil # : x  Auto Lymphocyte # : x  Auto Monocyte # : x  Auto Eosinophil # : x  Auto Basophil # : x  Auto Neutrophil % : x  Auto Lymphocyte % : x  Auto Monocyte % : x  Auto Eosinophil % : x  Auto Basophil % : x    01-17    134<L>  |  101  |  10  ----------------------------<  93  3.8   |  18<L>  |  1.57<H>    Ca    8.1<L>      17 Jan 2024 07:10  Phos  4.1     01-17  Mg     2.2     01-17      Urinalysis Basic - ( 17 Jan 2024 07:10 )    Color: x / Appearance: x / SG: x / pH: x  Gluc: 93 mg/dL / Ketone: x  / Bili: x / Urobili: x   Blood: x / Protein: x / Nitrite: x   Leuk Esterase: x / RBC: x / WBC x   Sq Epi: x / Non Sq Epi: x / Bacteria: x    Impression:  47 yo with functional deficits secondary to diagnosis of fall w multiple rib fxs, lumbar fxs    Plan:  PT- ROM, Bed Mob, Transfers, Amb w AD and bracing as needed  OT- ADLs, bracing  Prec- Falls, Cardiac, Pulm  Pain- Continue oxycodone and lidoderm  DVT Prophylaxis- Lovenox  Monitor HR, anemia, and Na+  Skin- Turn q2 h  Dispo- Acute Rehab- patient requires active and ongoing therapeutic interventions of multiple disciplines and can tolerate 3 hours of therapies x 4wks. Can actively participate and benefit from  an intensive rehabilitation program. Requires supervision by a rehabilitation physician and a coordinated interdisciplinary approach to providing rehabilitation.

## 2024-01-18 NOTE — PROGRESS NOTE ADULT - ASSESSMENT
48M with PMHx hepatitis C (diagnosed many years ago, never treated), depression transferred from outside hospital for trauma eval. Patient found down by roommate after unknown amount of time, found with multiple 4-11 L sided rib fractures with flail chest. Chest tube placed there with >1L output (old blood). Patient also with tachypnea, concern for respiratory failure started on BiPAP. Level 1 called for transient hypotension, patient given 2 unit PRBC in [facility] ED. Patient admitted to SICU for hemodynamic monitoring, pain management.     PLAN  Neurologic:  - Precedex gtt off   - Phenobarb taper ended 1/17  - Seroquel 25mg AM + 50mg PM for agitation  - multimodal pain control w/ lidocaine patch, Tylenol, oxycodone, Dilaudid PRN     Respiratory:  - s/p VATS 1/10 with discovery of Fibropurulent exudative adhesions and removal of retained foreign material likely the tip of a glove, s/p washout, with new 28f chest tube placed > removed 1/14  - Incentive spirometry, Duonebs   - AM CXR    Cardiovascular:  - off pressors MAP >65  - Metoprolol added for tachycardia 25mg [id number]    Gastrointestinal/Nutrition:  - Regular diet  - Pepcid    Genitourinary/Renal:  - Supplement electrolytes PRN  - Cre uptrending  - FE Urea 51.9 % suggests intrinsic renal disease  - 1/16 Renal u/s unremarkable    Hematologic:  - Chemical VTE ppx with Lovenox  - Mechanical VTE ppx with SCDs    Infectious Disease: Empyema   - Procal 9 on 1/7 - downtrended to 0.64 on 1/12  - Tissue culture 1/10 no organisms seen   - Bcx 1/13 with staph epi x1 bottle, however repeat BCx 1/14, 1/15 negative  - Vanc 1/10-1/16, stopped in setting of worsening renal failure  - Continue Zosyn 1/7, end date 1/24    Endocrine:  - No active issues, no history of DM    Disposition: SICU

## 2024-01-18 NOTE — PROGRESS NOTE ADULT - SUBJECTIVE AND OBJECTIVE BOX
24 HOUR EVENTS:  - Precedex gtt off   - Phenobarb taper ended 1/17  - Metoprolol added for tachycardia 25mg [id number]    SUBJECTIVE/ROS:  [ X ] A ten-point review of systems was otherwise negative except as noted.  [ ] Due to altered mental status/intubation, subjective information were not able to be obtained from the patient. History was obtained, to the extent possible, from review of the chart and collateral sources of information.      NEURO  Exam: AOx3. NAD. Follows commands. Moves all extremities. Strength and sensation intact.   Meds: acetaminophen     Tablet .. 500 milliGRAM(s) Oral every 6 hours  HYDROmorphone  Injectable 1 milliGRAM(s) IV Push every 3 hours PRN Breakthrough  oxyCODONE    IR 5 milliGRAM(s) Oral every 4 hours PRN Moderate Pain (4 - 6)  oxyCODONE    IR 10 milliGRAM(s) Oral every 4 hours PRN Severe Pain (7 - 10)  QUEtiapine 50 milliGRAM(s) Oral at bedtime  QUEtiapine 25 milliGRAM(s) Oral <User Schedule>    [x] Adequacy of sedation and pain control has been assessed and adjusted      RESPIRATORY  RR: 26 (01-18-24 @ 00:00) (22 - 44)  SpO2: 96% (01-18-24 @ 00:00) (93% - 100%)  Wt(kg): --  Exam: CTA b/l. No murmurs, rubs, gallops appreciated.   Mechanical Ventilation:     [ ] Extubation Readiness Assessed  Meds: albuterol/ipratropium for Nebulization 3 milliLiter(s) Nebulizer every 6 hours        CARDIOVASCULAR  HR: 110 (01-18-24 @ 00:00) (103 - 137)  BP: 119/75 (01-18-24 @ 00:00) (88/58 - 151/78)  BP(mean): 91 (01-18-24 @ 00:00) (68 - 111)  ABP: --  ABP(mean): --  Wt(kg): --  CVP(cm H2O): --      Exam: S1S2. No murmurs, rubs, gallops appreciated.  Cardiac Rhythm: Sinus tachycardia rate 104  Meds: metoprolol tartrate 25 milliGRAM(s) Oral every 12 hours        GI/NUTRITION  Exam: Soft, non-distended, non-tender.   Diet: Regular  Meds: famotidine    Tablet 20 milliGRAM(s) Oral daily PRN Heartburn      GENITOURINARY  I&O's Detail    01-16 @ 07:01 - 01-17 @ 07:00  --------------------------------------------------------  IN:    Dexmedetomidine: 756.8 mL    IV PiggyBack: 50 mL    IV PiggyBack: 750 mL    Oral Fluid: 600 mL    PRBCs (Packed Red Blood Cells): 300 mL  Total IN: 2456.8 mL    OUT:    Voided (mL): 2875 mL  Total OUT: 2875 mL    Total NET: -418.2 mL      01-17 @ 07:01 - 01-18 @ 01:33  --------------------------------------------------------  IN:    IV PiggyBack: 250 mL    Lactated Ringers Bolus: 500 mL    Oral Fluid: 100 mL  Total IN: 850 mL    OUT:    Voided (mL): 850 mL  Total OUT: 850 mL    Total NET: 0 mL          01-17    134<L>  |  101  |  10  ----------------------------<  93  3.8   |  18<L>  |  1.57<H>    Ca    8.1<L>      17 Jan 2024 07:10  Phos  4.1     01-17  Mg     2.2     01-17      [ ] Hendrix catheter, indication: N/A  Meds: ascorbic acid 500 milliGRAM(s) Oral daily  cyanocobalamin 1000 MICROGram(s) Oral daily  folic acid 1 milliGRAM(s) Oral daily  multivitamin 1 Tablet(s) Oral daily        HEMATOLOGIC  Meds: enoxaparin Injectable 40 milliGRAM(s) SubCutaneous every 24 hours    [x] VTE Prophylaxis                        8.4    13.07 )-----------( 602      ( 17 Jan 2024 07:09 )             24.6     PT/INR - ( 17 Jan 2024 06:26 )   PT: 14.3 sec;   INR: 1.37 ratio         PTT - ( 17 Jan 2024 06:26 )  PTT:32.1 sec  Transfusion     [ ] PRBC   [ ] Platelets   [ ] FFP   [ ] Cryoprecipitate      INFECTIOUS DISEASES  T(C): 37.7 (01-17-24 @ 22:00), Max: 38 (01-17-24 @ 03:00)  Wt(kg): --  WBC Count: 13.07 K/uL (01-17 @ 07:09)    Recent Cultures:  Specimen Source: .Blood Blood-Peripheral, 01-15 @ 11:40; Results   No growth at 48 Hours; Gram Stain: --; Organism: --  Specimen Source: .Blood Blood-Peripheral, 01-14 @ 17:36; Results   No growth at 72 Hours; Gram Stain: --; Organism: --  Specimen Source: .Blood Blood-Peripheral, 01-13 @ 04:38; Results   No growth at 4 days; Gram Stain:   Growth in aerobic bottle: Gram Positive Cocci in Clusters<!>; Organism: Blood Culture PCR<!>    Meds: influenza   Vaccine 0.5 milliLiter(s) IntraMuscular once  piperacillin/tazobactam IVPB.. 3.375 Gram(s) IV Intermittent every 8 hours        ENDOCRINE  Capillary Blood Glucose    Meds:       ACCESS DEVICES:  [  X ] Peripheral IV  [ ] Central Venous Line	[ ] R	[ ] L	[ ] IJ	[ ] Fem	[ ] SC	Placed:   [ ] Arterial Line		[ ] R	[ ] L	[ ] Fem	[ ] Rad	[ ] Ax	Placed:   [ ] PICC:					[ ] Mediport  [ ] Urinary Catheter, Date Placed:   [ ] Necessity of urinary, arterial, and venous catheters discussed    OTHER MEDICATIONS:  chlorhexidine 2% Cloths 1 Application(s) Topical <User Schedule>  lidocaine   4% Patch 1 Patch Transdermal every 24 hours      CODE STATUS:     IMAGING:

## 2024-01-18 NOTE — PROGRESS NOTE ADULT - SUBJECTIVE AND OBJECTIVE BOX
ACS Progress Note    S: Patient seen and examined. No acute events overnight. Pain well controlled with current regimen.   Denies nausea/vomiting.   Endorses passing gas and bowel movements.     O:  Vital Signs Last 24 Hrs  T(C): 36.9 (18 Jan 2024 07:00), Max: 37.7 (17 Jan 2024 22:00)  T(F): 98.5 (18 Jan 2024 07:00), Max: 99.8 (17 Jan 2024 22:00)  HR: 109 (18 Jan 2024 08:00) (75 - 132)  BP: 104/59 (18 Jan 2024 08:00) (98/58 - 151/78)  BP(mean): 75 (18 Jan 2024 08:00) (71 - 111)  RR: 22 (18 Jan 2024 08:00) (22 - 39)  SpO2: 97% (18 Jan 2024 08:00) (93% - 100%)    Parameters below as of 18 Jan 2024 07:00  Patient On (Oxygen Delivery Method): room air        I&O's Detail    17 Jan 2024 07:01  -  18 Jan 2024 07:00  --------------------------------------------------------  IN:    IV PiggyBack: 325 mL    Lactated Ringers Bolus: 500 mL    Oral Fluid: 200 mL  Total IN: 1025 mL    OUT:    Voided (mL): 1175 mL  Total OUT: 1175 mL    Total NET: -150 mL      18 Jan 2024 07:01  -  18 Jan 2024 09:32  --------------------------------------------------------  IN:    IV PiggyBack: 25 mL  Total IN: 25 mL    OUT:  Total OUT: 0 mL    Total NET: 25 mL          MEDICATIONS  (STANDING):  acetaminophen     Tablet .. 500 milliGRAM(s) Oral every 6 hours  ascorbic acid 500 milliGRAM(s) Oral daily  chlorhexidine 2% Cloths 1 Application(s) Topical <User Schedule>  cyanocobalamin 1000 MICROGram(s) Oral daily  enoxaparin Injectable 40 milliGRAM(s) SubCutaneous every 24 hours  folic acid 1 milliGRAM(s) Oral daily  influenza   Vaccine 0.5 milliLiter(s) IntraMuscular once  lidocaine   4% Patch 1 Patch Transdermal every 24 hours  melatonin 5 milliGRAM(s) Oral at bedtime  metoprolol tartrate 50 milliGRAM(s) Oral every 12 hours  multivitamin 1 Tablet(s) Oral daily  piperacillin/tazobactam IVPB.. 3.375 Gram(s) IV Intermittent every 8 hours  QUEtiapine 25 milliGRAM(s) Oral <User Schedule>  QUEtiapine 75 milliGRAM(s) Oral <User Schedule>  thiamine 100 milliGRAM(s) Oral daily    MEDICATIONS  (PRN):  famotidine    Tablet 20 milliGRAM(s) Oral daily PRN Heartburn  HYDROmorphone  Injectable 1 milliGRAM(s) IV Push every 3 hours PRN Breakthrough  oxyCODONE    IR 10 milliGRAM(s) Oral every 4 hours PRN Severe Pain (7 - 10)  oxyCODONE    IR 5 milliGRAM(s) Oral every 4 hours PRN Moderate Pain (4 - 6)                            8.4    13.07 )-----------( 602      ( 17 Jan 2024 07:09 )             24.6       01-17    134<L>  |  101  |  10  ----------------------------<  93  3.8   |  18<L>  |  1.57<H>    Ca    8.1<L>      17 Jan 2024 07:10  Phos  4.1     01-17  Mg     2.2     01-17

## 2024-01-19 ENCOUNTER — TRANSCRIPTION ENCOUNTER (OUTPATIENT)
Age: 49
End: 2024-01-19

## 2024-01-19 LAB
APPEARANCE UR: CLEAR — SIGNIFICANT CHANGE UP
BILIRUB UR-MCNC: NEGATIVE — SIGNIFICANT CHANGE UP
COLOR SPEC: YELLOW — SIGNIFICANT CHANGE UP
CULTURE RESULTS: SIGNIFICANT CHANGE UP
DIFF PNL FLD: NEGATIVE — SIGNIFICANT CHANGE UP
GLUCOSE UR QL: NEGATIVE MG/DL — SIGNIFICANT CHANGE UP
KETONES UR-MCNC: NEGATIVE MG/DL — SIGNIFICANT CHANGE UP
LEUKOCYTE ESTERASE UR-ACNC: NEGATIVE — SIGNIFICANT CHANGE UP
NITRITE UR-MCNC: NEGATIVE — SIGNIFICANT CHANGE UP
PH UR: 5.5 — SIGNIFICANT CHANGE UP (ref 5–8)
PROT UR-MCNC: SIGNIFICANT CHANGE UP MG/DL
SP GR SPEC: 1.01 — SIGNIFICANT CHANGE UP (ref 1–1.03)
SPECIMEN SOURCE: SIGNIFICANT CHANGE UP
UROBILINOGEN FLD QL: 0.2 MG/DL — SIGNIFICANT CHANGE UP (ref 0.2–1)

## 2024-01-19 PROCEDURE — 99232 SBSQ HOSP IP/OBS MODERATE 35: CPT

## 2024-01-19 PROCEDURE — 71045 X-RAY EXAM CHEST 1 VIEW: CPT | Mod: 26

## 2024-01-19 RX ORDER — SODIUM CHLORIDE 9 MG/ML
500 INJECTION INTRAMUSCULAR; INTRAVENOUS; SUBCUTANEOUS ONCE
Refills: 0 | Status: COMPLETED | OUTPATIENT
Start: 2024-01-19 | End: 2024-01-19

## 2024-01-19 RX ADMIN — CHLORHEXIDINE GLUCONATE 1 APPLICATION(S): 213 SOLUTION TOPICAL at 11:54

## 2024-01-19 RX ADMIN — Medication 50 MILLIGRAM(S): at 22:01

## 2024-01-19 RX ADMIN — Medication 1 MILLIGRAM(S): at 11:55

## 2024-01-19 RX ADMIN — Medication 500 MILLIGRAM(S): at 11:55

## 2024-01-19 RX ADMIN — QUETIAPINE FUMARATE 25 MILLIGRAM(S): 200 TABLET, FILM COATED ORAL at 05:14

## 2024-01-19 RX ADMIN — LIDOCAINE 1 PATCH: 4 CREAM TOPICAL at 07:58

## 2024-01-19 RX ADMIN — Medication 500 MILLIGRAM(S): at 17:49

## 2024-01-19 RX ADMIN — LIDOCAINE 1 PATCH: 4 CREAM TOPICAL at 19:59

## 2024-01-19 RX ADMIN — Medication 500 MILLIGRAM(S): at 06:14

## 2024-01-19 RX ADMIN — PIPERACILLIN AND TAZOBACTAM 25 GRAM(S): 4; .5 INJECTION, POWDER, LYOPHILIZED, FOR SOLUTION INTRAVENOUS at 05:16

## 2024-01-19 RX ADMIN — Medication 5 MILLIGRAM(S): at 22:01

## 2024-01-19 RX ADMIN — Medication 500 MILLIGRAM(S): at 18:19

## 2024-01-19 RX ADMIN — SODIUM CHLORIDE 1000 MILLILITER(S): 9 INJECTION INTRAMUSCULAR; INTRAVENOUS; SUBCUTANEOUS at 07:37

## 2024-01-19 RX ADMIN — Medication 100 MILLIGRAM(S): at 11:55

## 2024-01-19 RX ADMIN — PIPERACILLIN AND TAZOBACTAM 25 GRAM(S): 4; .5 INJECTION, POWDER, LYOPHILIZED, FOR SOLUTION INTRAVENOUS at 13:45

## 2024-01-19 RX ADMIN — QUETIAPINE FUMARATE 75 MILLIGRAM(S): 200 TABLET, FILM COATED ORAL at 22:00

## 2024-01-19 RX ADMIN — Medication 500 MILLIGRAM(S): at 11:54

## 2024-01-19 RX ADMIN — PREGABALIN 1000 MICROGRAM(S): 225 CAPSULE ORAL at 11:55

## 2024-01-19 RX ADMIN — Medication 500 MILLIGRAM(S): at 12:24

## 2024-01-19 RX ADMIN — LIDOCAINE 1 PATCH: 4 CREAM TOPICAL at 19:00

## 2024-01-19 RX ADMIN — Medication 1 TABLET(S): at 11:55

## 2024-01-19 RX ADMIN — Medication 500 MILLIGRAM(S): at 05:14

## 2024-01-19 RX ADMIN — Medication 50 MILLIGRAM(S): at 09:51

## 2024-01-19 NOTE — DISCHARGE NOTE PROVIDER - DETAILS OF MALNUTRITION DIAGNOSIS/DIAGNOSES
This patient has been assessed with a concern for Malnutrition and was treated during this hospitalization for the following Nutrition diagnosis/diagnoses:     -  01/11/2024: Severe protein-calorie malnutrition

## 2024-01-19 NOTE — DISCHARGE NOTE PROVIDER - NSDCACTIVITY_GEN_ALL_CORE
Do not drive or operate machinery/Showering allowed/Follow Instructions Provided by your Surgical Team

## 2024-01-19 NOTE — PROGRESS NOTE ADULT - SUBJECTIVE AND OBJECTIVE BOX
Wound Surgery Progress Note:    HPI:  48M with PMHx hepatitis C, depression who was transferred from outside hospital for trauma evaluation. Patient found down by roommate after unknown amount of time, found with multiple 4-11 L sided rib fractures with flail chest. Chest tube placed there with >1L output (old blood). Patient also with tachypnea, concern for respiratory failure started on BiPAP. Level 1 called for transient hypotension, patient given 2 unit PRBC in [facility] ED. Patient admitted to SICU for hemodynamic monitoring, pain management. Patient is status post VATS with discovery of fibropurlent exudative adhesions and removal of retained foreign material and is now s/p washout, placement of chest tube, which has now been removed (01/14). Patient now transferred to floor for continued management.    Follow up visit to patient for management of RIght buttock wound performed today. Mr. Salazar was encountered on an alternating air with low air loss surface lying on his Left side comfortably. He was easily arousable and cooperative with exam. He denied pain or discomfort associate with his right buttock wound. No further debridement indicated as the wound is free of necrotic tissue. PT wound being asked to evaluate patient for wound VAC for poor quality granulation tissue and to facilitate contraction.    PAST MEDICAL & SURGICAL HISTORY:  HEP C  Depression  No significant past surgical history    REVIEW OF SYSTEMS:  General: no weakness, no fever/chills, no weight loss/gain  Skin/Breast: no rash, no jaundice  Ophthalmologic: no vision changes, no dry eyes   Respiratory and Thorax: no cough, no wheezing, no hemoptysis, no dyspnea  Cardiovascular: no chest pain, no shortness of breath, no orthopnea  Gastrointestinal: no n/v/d, no abdominal pain, no dysphagia   Genitourinary: no dysuria, no frequency, no nocturia, no hematuria  Musculoskeletal: no trauma, no sprain/strain, no myalgias, no arthralgias, no fracture  Neurological: no HA, no dizziness, no weakness, no numbness  Psychiatric: no depression, no SI/HI  Hematology/Lymphatics: no easy bruising  Endocrine: no heat or cold intolerance. no weight gain or loss  Allergic/Immunologic: no allergy or recent reaction     MEDICATIONS  (STANDING):  acetaminophen     Tablet .. 500 milliGRAM(s) Oral every 6 hours  ascorbic acid 500 milliGRAM(s) Oral daily  chlorhexidine 2% Cloths 1 Application(s) Topical daily  cyanocobalamin 1000 MICROGram(s) Oral daily  enoxaparin Injectable 40 milliGRAM(s) SubCutaneous every 24 hours  folic acid 1 milliGRAM(s) Oral daily  influenza   Vaccine 0.5 milliLiter(s) IntraMuscular once  lidocaine   4% Patch 1 Patch Transdermal every 24 hours  melatonin 5 milliGRAM(s) Oral at bedtime  metoprolol tartrate 50 milliGRAM(s) Oral every 12 hours  multivitamin 1 Tablet(s) Oral daily  piperacillin/tazobactam IVPB.. 3.375 Gram(s) IV Intermittent every 8 hours  QUEtiapine 25 milliGRAM(s) Oral <User Schedule>  QUEtiapine 75 milliGRAM(s) Oral <User Schedule>  thiamine 100 milliGRAM(s) Oral daily    MEDICATIONS  (PRN):  famotidine    Tablet 20 milliGRAM(s) Oral daily PRN Heartburn  HYDROmorphone  Injectable 1 milliGRAM(s) IV Push every 3 hours PRN Breakthrough  oxyCODONE    IR 10 milliGRAM(s) Oral every 4 hours PRN Severe Pain (7 - 10)  oxyCODONE    IR 5 milliGRAM(s) Oral every 4 hours PRN Moderate Pain (4 - 6)    Allergies    No Known Allergies    Intolerances    Vital Signs Last 24 Hrs  T(C): 37.1 (19 Jan 2024 08:43), Max: 37.6 (19 Jan 2024 04:55)  T(F): 98.8 (19 Jan 2024 08:43), Max: 99.6 (19 Jan 2024 04:55)  HR: 106 (19 Jan 2024 08:43) (106 - 117)  BP: 133/80 (19 Jan 2024 08:43) (118/78 - 144/81)  BP(mean): --  RR: 20 (19 Jan 2024 04:55) (20 - 20)  SpO2: 95% (19 Jan 2024 04:55) (94% - 98%)    Parameters below as of 19 Jan 2024 04:55  Patient On (Oxygen Delivery Method): room air    Physical Exam:  General: Alert  Ophthamology: sclera clear  ENMT: moist mucous membranes, trachea midline  Respiratory: equal chest rise with respirations  Gastrointestinal: soft NT/ND  Neurology: verbal, following commands  Psych: calm  Musculoskeletal: no contractures  Vascular: BLE edema equal  Skin:  right buttocks with shallow erosion, pink, poor quality granulation tissue, no necrotic skin,  L 10cm X W 12cm x D 0.2cm, scant amount of serosanguinous drainage  No odor, erythema, increased warmth, tenderness, induration, fluctuance    LABS:  01-18    134<L>  |  101  |  9   ----------------------------<  82  3.7   |  20<L>  |  1.39<H>    Ca    8.2<L>      18 Jan 2024 11:41  Phos  3.6     01-18  Mg     2.0     01-18                            8.7    10.99 )-----------( 633      ( 18 Jan 2024 11:41 )             26.2       Urinalysis Basic - ( 18 Jan 2024 11:41 )    Color: x / Appearance: x / SG: x / pH: x  Gluc: 82 mg/dL / Ketone: x  / Bili: x / Urobili: x   Blood: x / Protein: x / Nitrite: x   Leuk Esterase: x / RBC: x / WBC x   Sq Epi: x / Non Sq Epi: x / Bacteria: x

## 2024-01-19 NOTE — PROGRESS NOTE ADULT - SUBJECTIVE AND OBJECTIVE BOX
TRAUMA SURGERY DAILY PROGRESS NOTE    24 Hour/Overnight Events: No acute events overnight    SUBJECTIVE: Patient seen and evaluated on AM rounds. Pt is resting comfortably in bed with no complaints.     ------------------------------------------------------------------------------------------------------------  OBJECTIVE:  Vital Signs Last 24 Hrs  T(C): 37.1 (19 Jan 2024 08:43), Max: 37.6 (19 Jan 2024 04:55)  T(F): 98.8 (19 Jan 2024 08:43), Max: 99.6 (19 Jan 2024 04:55)  HR: 106 (19 Jan 2024 08:43) (106 - 117)  BP: 133/80 (19 Jan 2024 08:43) (128/87 - 144/81)  BP(mean): --  RR: 20 (19 Jan 2024 04:55) (20 - 20)  SpO2: 95% (19 Jan 2024 04:55) (94% - 95%)    Parameters below as of 19 Jan 2024 04:55  Patient On (Oxygen Delivery Method): room air      I&O's Detail    18 Jan 2024 07:01  -  19 Jan 2024 07:00  --------------------------------------------------------  IN:    IV PiggyBack: 50 mL  Total IN: 50 mL    OUT:    Voided (mL): 400 mL  Total OUT: 400 mL    Total NET: -350 mL      19 Jan 2024 07:01  -  19 Jan 2024 12:27  --------------------------------------------------------  IN:    Oral Fluid: 200 mL    Sodium Chloride 0.9% Bolus: 500 mL  Total IN: 700 mL    OUT:    Voided (mL): 1100 mL  Total OUT: 1100 mL    Total NET: -400 mL      LABS:                        8.7    10.99 )-----------( 633      ( 18 Jan 2024 11:41 )             26.2     01-18    134<L>  |  101  |  9   ----------------------------<  82  3.7   |  20<L>  |  1.39<H>    Ca    8.2<L>      18 Jan 2024 11:41  Phos  3.6     01-18  Mg     2.0     01-18    Urinalysis Basic - ( 18 Jan 2024 11:41 )  Color: x / Appearance: x / SG: x / pH: x  Gluc: 82 mg/dL / Ketone: x  / Bili: x / Urobili: x   Blood: x / Protein: x / Nitrite: x   Leuk Esterase: x / RBC: x / WBC x   Sq Epi: x / Non Sq Epi: x / Bacteria: x      PHYSICAL EXAM:  Constitutional: Well developed, well nourished, NAD  Pulmonary: Symmetric chest rise bilaterally, no increased WOB  Gastrointestinal: Abdomen soft, nontender, nondistended  Extremities: Warm to touch, soft, normal strength, sensory and motor intact. No cyanosis/erythema/edema

## 2024-01-19 NOTE — DISCHARGE NOTE PROVIDER - CARE PROVIDER_API CALL
Elsy Sepulveda  Thoracic Surgery  [address] [place] [address], [address] [place] [address]  Phone: [phone]  Fax: [phone]  Follow Up Time: 2 weeks    Cresencio Wyatt  Orthopaedic Surgery  [address] [place] [address], [address]  [place] [address]  Phone: [phone]  Fax: [phone]  Follow Up Time: 2 weeks   Elsy Sepulveda  Thoracic Surgery  [address] [place] [address], Floor [address] [place] [address]  Phone: [phone]  Fax: [phone]  Follow Up Time: 1-3 days    Cresencio Wyatt  Orthopaedic Surgery  [address] [facility], [address]  [place] [address]  Phone: [phone]  Fax: [phone]  Follow Up Time: 2 weeks

## 2024-01-19 NOTE — PROGRESS NOTE ADULT - ASSESSMENT
48y Male with PMHx hepatitis C (diagnosed many years ago, never treated), depression transferred from outside hospital for trauma eval. Patient found down by roommate after unknown amount of time, found with multiple 4-11 L sided rib fractures with flail chest. Chest tube placed there with >1L output (old blood). Patient also with tachypnea, concern for respiratory failure started on BiPAP. Level 1 called for transient hypotension, patient given 2 unit PRBC in [facility] ED. Rib score 3. Pt now s/p VATS, with partial lung decortication and Thoracoscopic removal of intrapleural foreign body on 1/10; s/p chest tube removal on 1/14    Injuries:   - left posterolateral fourth, fifth, sixth, and 11th ribs, minimally displaced   - mildly displaced fractures of the left anterolateral fourth, fifth, sixth, and seventh ribs  - displaced fractures of the left posterior seventh, eighth, ninth, and 10th ribs.      PLAN:  - Rib fx protocol  - Constant observation discontinued as patient is no longer pulling at lines  - Pt refused AM labs this morning (1/19)  - Antibiotics (IV Zosyn)  - OOB as tolerated  - VTE ppx: Lovenox SQ  - PT/OT/PM&R: AR      Trauma Surgery  [phone]

## 2024-01-19 NOTE — DISCHARGE NOTE PROVIDER - CARE PROVIDERS DIRECT ADDRESSES
,[email or web address]

## 2024-01-19 NOTE — DISCHARGE NOTE PROVIDER - NSDCCPCAREPLAN_GEN_ALL_CORE_FT
PRINCIPAL DISCHARGE DIAGNOSIS  Diagnosis: Flail chest  Assessment and Plan of Treatment: s/p rib plating   please follow up with thoracic surgery Dr. Sepulveda in 1-2 weeks please call to schedule an appointment  Please follow up with your regular medical doctor in 1 week, please call to schedule an appointment to discuss recent hospitalization        SECONDARY DISCHARGE DIAGNOSES  Diagnosis: L1 vertebral fracture  Assessment and Plan of Treatment: follow up with ortho spine Dr. Wyatt in 1-2 weeks       Diagnosis: Acute hypotension  Assessment and Plan of Treatment:     Diagnosis: Severe dehydration  Assessment and Plan of Treatment:     Diagnosis: Multiple fractures of ribs of left side  Assessment and Plan of Treatment:     Diagnosis: Flail chest  Assessment and Plan of Treatment:      PRINCIPAL DISCHARGE DIAGNOSIS  Diagnosis: Flail chest  Assessment and Plan of Treatment: s/p rib plating   please follow up with thoracic surgery Dr. Sepulveda in 1-2 weeks please call to schedule an appointment  Please follow up with your regular medical doctor in 1 week, please call to schedule an appointment to discuss recent hospitalization        SECONDARY DISCHARGE DIAGNOSES  Diagnosis: L1 vertebral fracture  Assessment and Plan of Treatment: follow up with ortho spine Dr. Wyatt in 1-2 weeks       Diagnosis: Buttock wound  Assessment and Plan of Treatment: wound care ______________________  Upon [facility] [address] [place] [address] [phone]    Diagnosis: Acute hypotension  Assessment and Plan of Treatment:     Diagnosis: Severe dehydration  Assessment and Plan of Treatment:     Diagnosis: Multiple fractures of ribs of left side  Assessment and Plan of Treatment:     Diagnosis: Flail chest  Assessment and Plan of Treatment:      PRINCIPAL DISCHARGE DIAGNOSIS  Diagnosis: Flail chest  Assessment and Plan of Treatment: s/p rib plating   please follow up with thoracic surgery Dr. Sepulveda in 1 week please call to schedule an appointment  Please follow up with your regular medical doctor in 1 week, please call to schedule an appointment to discuss recent hospitalization  thoracotomy staples to be removed 2/9        SECONDARY DISCHARGE DIAGNOSES  Diagnosis: L1 vertebral fracture  Assessment and Plan of Treatment: follow up with ortho spine Dr. Wyatt in 1-2 weeks       Diagnosis: Buttock wound  Assessment and Plan of Treatment: wound care continue aquacel dressing daily  Upon discharge f/u as outpatient at [facility] [address] [place] [address] [phone]    Diagnosis: Acute hypotension  Assessment and Plan of Treatment:     Diagnosis: Severe dehydration  Assessment and Plan of Treatment:     Diagnosis: Multiple fractures of ribs of left side  Assessment and Plan of Treatment:     Diagnosis: Flail chest  Assessment and Plan of Treatment:      PRINCIPAL DISCHARGE DIAGNOSIS  Diagnosis: Flail chest  Assessment and Plan of Treatment: s/p rib plating   please follow up with thoracic surgery Dr. Sepulveda in 1 week please call to schedule an appointment  Please follow up with your regular medical doctor in 1 week, please call to schedule an appointment to discuss recent hospitalization  thoracotomy staples to be removed 2/9        SECONDARY DISCHARGE DIAGNOSES  Diagnosis: Flail chest  Assessment and Plan of Treatment:     Diagnosis: Acute hypotension  Assessment and Plan of Treatment:     Diagnosis: Severe dehydration  Assessment and Plan of Treatment:     Diagnosis: Multiple fractures of ribs of left side  Assessment and Plan of Treatment:     Diagnosis: L1 vertebral fracture  Assessment and Plan of Treatment: follow up with ortho spine Dr. Wyatt in 1-2 weeks       Diagnosis: Buttock wound  Assessment and Plan of Treatment: wound care continue aquacel dressing daily  Upon discharge f/u as outpatient at [facility] [address] [place] [address] [phone]

## 2024-01-19 NOTE — DISCHARGE NOTE PROVIDER - NSDCMRMEDTOKEN_GEN_ALL_CORE_FT
TLSO Brace: TLSO Brace  L1-2 VCF  Dr. Wyatt   acetaminophen 500 mg oral tablet: 1 tab(s) orally every 6 hours  ascorbic acid 500 mg oral tablet: 1 tab(s) orally once a day  cyanocobalamin 1000 mcg oral tablet: 1 tab(s) orally once a day  folic acid 1 mg oral tablet: 1 tab(s) orally once a day  Multiple Vitamins oral tablet: 1 tab(s) orally once a day  Outpatient Occupational Therapy: Outpatient Occupational Therapy  Outpatient Physical Therapy: Outpatient Physical Therapy  oxyCODONE 5 mg oral tablet: 1 tab(s) orally every 6 hours as needed for  severe pain MDD: 4  QUEtiapine 25 mg oral tablet: 3 tab(s) orally once a day (at bedtime)  Rolling Walker: Rolling Walker- Use as directed  thiamine 100 mg oral tablet: 1 tab(s) orally once a day

## 2024-01-19 NOTE — DISCHARGE NOTE PROVIDER - NSDCDCMDCOMP_GEN_ALL_CORE
Met with patient about diagnosis and discharge plan of care. Pt lives with spouse in ranch home. Independent prior to admit. Pt admit for exacerbation of COPD. Currently on iv solu medrol, o2 3l/nc. [facility] She wears 3-4L/NC at home. PCP is Dr Jesus Huffman. No need for home.  Will follow-O This document is complete and the patient is ready for discharge.

## 2024-01-19 NOTE — PROGRESS NOTE ADULT - ASSESSMENT
Impression:    Right Buttocks wound    Recommend:  1.) topical therapy: right buttock wound - cleanse with NS, pat dry, apply aquacel, cover with tegederm daily  PT wound evaluation for wound VAC  2.) Incontinence Management - incontinence cleanser, pads, pericare BID  3.) Maintain on an alternating air with low air loss surface  4.) Turn and reposition Q 2 hours  5.) Nutrition optimization - please add Tutu  6.) Offload heels/feet with complete cair air fluidized boots; ensure that the soles of the feet are not resting on the foot board of the bed.    Care as per medicine. Will not actively follow but will remain available. Please recall for new issues or deterioration.  Upon discharge f/u as outpatient at [facility] [address] [facility] [phone]  Thank you for this consult  Arlene Patel, NP-C, CWOCN via TEAMS

## 2024-01-19 NOTE — DISCHARGE NOTE PROVIDER - HOSPITAL COURSE
48y Male with PMHx hepatitis C (diagnosed many years ago, never treated), depression transferred from outside hospital for trauma eval. Patient found down by roommate after unknown amount of time, found with multiple 4-11 L sided rib fractures with flail chest. Chest tube placed there with >1L output (old blood). Patient also with tachypnea, concern for respiratory failure started on BiPAP. Level 1 called for transient hypotension, patient given 2 unit PRBC in [facility] ED.     PRIMARY SURVEY  A - airway intact  B - bilateral breath sounds and good chest rise  C - initial BP: -- , HR: -- , palpable pulses in all extremities  D - GCS 15 on arrival  Exposure obtained    SECONDARY SURVEY  General: NAD  HEENT: Normocephalic, atraumatic, EOMI, PEERLA.  Neck: Soft, midline trachea, C-collar in place  Chest: L chest tube in place  Cardiac: S1, S2, RRR  Respiratory: Bilateral breath sounds, clear and equal bilaterally. L chest tube in place  Abdomen: Soft, non-distended, non-tender, no rebound, no guarding, no masses palpated  Pelvis: Stable, non-tender, no ecchymosis  Ext: palp radial b/l UE, b/l DP palp in Lower Extrem, motor and sensory grossly intact in all 4 extremities  - LLE ecchymosis  - RLE ecchymosis   Back: no TTP, no palpable runoff/stepoff/deformity     at [facility]  febrile to 102  CT scan demonstrated large left hemothorax (reviewed over internet)  >1L blood drained from left chest tube    at [facility]  initially hypotensive, but hemodynamics quickly improved with IVF and 1u RBC transfusion  moderate left lateral chest wall tenderness  minimal paradoxical movement of left chest  chest tube drainage is now serosanguinous  no air leak seen in Pleur-evac    CXR - left chest tube is in basilar position. no residual hemothorax is seen. no pneumothorax is seen.  FAST - no free intraperitoneal fluid      WBC = 14  hgb - 11.5 g/dL  Na - 124  K - 5.4  HCO3 - 15  anion gap - 20  Cr - 1.9  AST - 107  ALT - 28  CPK - 349    CT scans done without contrast since Cr = 2.2 at [facility]  CT head / c-spine - no intracranial hemorrhage or c-spine fracture  CT chest / abd/pelvis w/o contrast - left 4-9 anterolateral displaced rib fractures, left 4-11 posterior displaced rib fractures, mild L1 and L2 vertebral compression deformities.      left 4-9 anterolateral displaced rib fractures  left 4-11 posterior displaced rib fractures  left flail chest  -We initially planned urgent surgical stabilization of left chest wall with VATS and cryoablation of intercostal nerves. Shortly after SICU admission and pain meds, he appears to be breathing comfortably. Also, INR = 5.2, and he has fever 102 at [facility] which raises concern for sepsis, possibly secondary to aspiration pneumonia, so he is a suboptimal candidate for surgery.    traumatic left hemopneumothorax  traumatic hemorrhagic shock  acute blood loss anemia  -Despite >1500 mL initial chest tube output, he has a delayed presentation, so will not do emergent VATS as blood is likely old without evidence of ongoing bleeding.  -appears mostly drained with the chest tube, the trace residual pneumothorax is likely secondary to basilar chest tube placement and is not clinically significant    possible sepsis  lactic acidosis  KEZIA with hyperkalemia  -fluid resuscitation  -f/u BCx  -care as per SICU    hyponatremia is likely hypovolemic, but could be secondary to chronic alcohol use  hypomagnesemia  elevate AST  -these lab abnormalities suggest significant alcohol use  -alcohol withdrawal syndrome is unlikely since he has already been abstinent for 2 days    L1/L2 vertebral compression deformities  -no tenderness to suggest acute fractures    Patient monitored in SICU    Neurology consulted EEG    1/9 increasing agitation, diaphoresis, tremor-on precedex,  -intubated for AMS and increase WOB 48y Male with PMHx hepatitis C (diagnosed many years ago, never treated), depression transferred from outside hospital for trauma eval. Patient found down by roommate after unknown amount of time, found with multiple 4-11 L sided rib fractures with flail chest. Chest tube placed there with >1L output (old blood). Patient also with tachypnea, concern for respiratory failure started on BiPAP. Level 1 called for transient hypotension, patient given 2 unit PRBC in [facility] ED.     PRIMARY SURVEY  A - airway intact  B - bilateral breath sounds and good chest rise  C - initial BP: -- , HR: -- , palpable pulses in all extremities  D - GCS 15 on arrival  Exposure obtained    SECONDARY SURVEY  General: NAD  HEENT: Normocephalic, atraumatic, EOMI, PEERLA.  Neck: Soft, midline trachea, C-collar in place  Chest: L chest tube in place  Cardiac: S1, S2, RRR  Respiratory: Bilateral breath sounds, clear and equal bilaterally. L chest tube in place  Abdomen: Soft, non-distended, non-tender, no rebound, no guarding, no masses palpated  Pelvis: Stable, non-tender, no ecchymosis  Ext: palp radial b/l UE, b/l DP palp in Lower Extrem, motor and sensory grossly intact in all 4 extremities  - LLE ecchymosis  - RLE ecchymosis   Back: no TTP, no palpable runoff/stepoff/deformity     at [facility]  febrile to 102  CT scan demonstrated large left hemothorax (reviewed over internet)  >1L blood drained from left chest tube    at [facility]  initially hypotensive, but hemodynamics quickly improved with IVF and 1u RBC transfusion  moderate left lateral chest wall tenderness  minimal paradoxical movement of left chest  chest tube drainage is now serosanguinous  no air leak seen in Pleur-evac    CXR - left chest tube is in basilar position. no residual hemothorax is seen. no pneumothorax is seen.  FAST - no free intraperitoneal fluid    CT scans done without contrast since Cr = 2.2 at [facility]  CT head / c-spine - no intracranial hemorrhage or c-spine fracture  CT chest / abd/pelvis w/o contrast - left 4-9 anterolateral displaced rib fractures, left 4-11 posterior displaced rib fractures, mild L1 and L2 vertebral compression deformities.    left 4-9 anterolateral displaced rib fractures  left 4-11 posterior displaced rib fractures  left flail chest  -We initially planned urgent surgical stabilization of left chest wall with VATS and cryoablation of intercostal nerves. Shortly after SICU admission and pain meds, he appears to be breathing comfortably. Also, INR = 5.2, and he has fever 102 at [facility] which raises concern for sepsis, possibly secondary to aspiration pneumonia, so he is a suboptimal candidate for surgery.    traumatic left hemopneumothorax  traumatic hemorrhagic shock  acute blood loss anemia  -Despite >1500 mL initial chest tube output, he has a delayed presentation, so will not do emergent VATS as blood is likely old without evidence of ongoing bleeding.  -appears mostly drained with the chest tube, the trace residual pneumothorax is likely secondary to basilar chest tube placement and is not clinically significant    possible sepsis  lactic acidosis  KEZIA with hyperkalemia  -fluid resuscitation  -f/u BCx  -care as per SICU    hyponatremia is likely hypovolemic, but could be secondary to chronic alcohol use  hypomagnesemia  elevate AST  -these lab abnormalities suggest significant alcohol use  -alcohol withdrawal syndrome is unlikely since he has already been abstinent for 2 days    L1/L2 vertebral compression deformities  -no tenderness to suggest acute fractures    Patient monitored in SICU    Neurology consulted EEG    1/9 increasing agitation, diaphoresis, tremor-on precedex,  -intubated for AMS and increase WOB    ortho spine consulted L1/L2 VCF. No evidence of instability on scans. Ok for log roll for rib plating procedure. measured for TLSO     thoracic consulted taken to OR 1/10 underwent Left VATS, Pneumolysis, Removal of foreign body, Partial decortication    extubated 1/13 1/14 Left pleural tube removed CXR ordered    1/15 IMPRESSION:  No evidence of deep venous thrombosis in either lower extremity.    patient with KEZIA renal us No renal mass, hydronephrosis or calculus is visualized sonographically.    Patient seen by PT/OT/ PMNR - acute rehab     1/18 transferred out of SICU to surgical floor          48y Male with PMHx hepatitis C (diagnosed many years ago, never treated), depression transferred from outside hospital for trauma eval. Patient found down by roommate after unknown amount of time, found with multiple 4-11 L sided rib fractures with flail chest. Chest tube placed there with >1L output (old blood). Patient also with tachypnea, concern for respiratory failure started on BiPAP. Level 1 called for transient hypotension, patient given 2 unit PRBC in [facility] ED.     PRIMARY SURVEY  A - airway intact  B - bilateral breath sounds and good chest rise  C - initial BP: -- , HR: -- , palpable pulses in all extremities  D - GCS 15 on arrival  Exposure obtained    SECONDARY SURVEY  General: NAD  HEENT: Normocephalic, atraumatic, EOMI, PEERLA.  Neck: Soft, midline trachea, C-collar in place  Chest: L chest tube in place  Cardiac: S1, S2, RRR  Respiratory: Bilateral breath sounds, clear and equal bilaterally. L chest tube in place  Abdomen: Soft, non-distended, non-tender, no rebound, no guarding, no masses palpated  Pelvis: Stable, non-tender, no ecchymosis  Ext: palp radial b/l UE, b/l DP palp in Lower Extrem, motor and sensory grossly intact in all 4 extremities  - LLE ecchymosis  - RLE ecchymosis   Back: no TTP, no palpable runoff/stepoff/deformity     at [facility]  febrile to 102  CT scan demonstrated large left hemothorax (reviewed over internet)  >1L blood drained from left chest tube    at [facility]  initially hypotensive, but hemodynamics quickly improved with IVF and 1u RBC transfusion  moderate left lateral chest wall tenderness  minimal paradoxical movement of left chest  chest tube drainage is now serosanguinous  no air leak seen in Pleur-evac    CXR - left chest tube is in basilar position. no residual hemothorax is seen. no pneumothorax is seen.  FAST - no free intraperitoneal fluid    CT scans done without contrast since Cr = 2.2 at [facility]  CT head / c-spine - no intracranial hemorrhage or c-spine fracture  CT chest / abd/pelvis w/o contrast - left 4-9 anterolateral displaced rib fractures, left 4-11 posterior displaced rib fractures, mild L1 and L2 vertebral compression deformities.    left 4-9 anterolateral displaced rib fractures  left 4-11 posterior displaced rib fractures  left flail chest  -We initially planned urgent surgical stabilization of left chest wall with VATS and cryoablation of intercostal nerves. Shortly after SICU admission and pain meds, he appears to be breathing comfortably. Also, INR = 5.2, and he has fever 102 at [facility] which raises concern for sepsis, possibly secondary to aspiration pneumonia, so he is a suboptimal candidate for surgery.    traumatic left hemopneumothorax  traumatic hemorrhagic shock  acute blood loss anemia  -Despite >1500 mL initial chest tube output, he has a delayed presentation, so will not do emergent VATS as blood is likely old without evidence of ongoing bleeding.  -appears mostly drained with the chest tube, the trace residual pneumothorax is likely secondary to basilar chest tube placement and is not clinically significant    possible sepsis  lactic acidosis  KEZIA with hyperkalemia  -fluid resuscitation  -f/u BCx  -care as per SICU    hyponatremia is likely hypovolemic, but could be secondary to chronic alcohol use  hypomagnesemia  elevate AST  -these lab abnormalities suggest significant alcohol use  -alcohol withdrawal syndrome is unlikely since he has already been abstinent for 2 days    L1/L2 vertebral compression deformities  -no tenderness to suggest acute fractures    Patient monitored in SICU    Neurology consulted EEG    1/9 increasing agitation, diaphoresis, tremor-on precedex,  -intubated for AMS and increase WOB    ortho spine consulted L1/L2 VCF. No evidence of instability on scans. Ok for log roll for rib plating procedure. measured for TLSO     thoracic consulted taken to OR 1/10 underwent Left VATS, Pneumolysis, Removal of foreign body, Partial decortication    extubated 1/13 1/14 Left pleural tube removed CXR ordered    1/15 IMPRESSION:  No evidence of deep venous thrombosis in either lower extremity.    patient with KEZIA renal us No renal mass, hydronephrosis or calculus is visualized sonographically.    Patient seen by PT/OT/ PMNR - acute rehab     1/18 transferred out of SICU to surgical floor 1/19 right buttock wound - cleanse with NS, pat dry, apply aquacel, cover with tegederm daily  PT wound evaluation for wound VAC         48y Male with PMHx hepatitis C (diagnosed many years ago, never treated), depression transferred from outside hospital for trauma eval. Patient found down by roommate after unknown amount of time, found with multiple 4-11 L sided rib fractures with flail chest. Chest tube placed there with >1L output (old blood). Patient also with tachypnea, concern for respiratory failure started on BiPAP. Level 1 called for transient hypotension, patient given 2 unit PRBC in [facility] ED.   PRIMARY SURVEY  A - airway intact  B - bilateral breath sounds and good chest rise  C - initial BP: -- , HR: -- , palpable pulses in all extremities  D - GCS 15 on arrival  Exposure obtained  SECONDARY SURVEY  General: NAD  HEENT: Normocephalic, atraumatic, EOMI, PEERLA.  Neck: Soft, midline trachea, C-collar in place  Chest: L chest tube in place  Cardiac: S1, S2, RRR  Respiratory: Bilateral breath sounds, clear and equal bilaterally. L chest tube in place  Abdomen: Soft, non-distended, non-tender, no rebound, no guarding, no masses palpated  Pelvis: Stable, non-tender, no ecchymosis  Ext: palp radial b/l UE, b/l DP palp in Lower Extrem, motor and sensory grossly intact in all 4 extremities  - LLE ecchymosis  - RLE ecchymosis   Back: no TTP, no palpable runoff/stepoff/deformity     at [facility]  febrile to 102  CT scan demonstrated large left hemothorax (reviewed over internet)  >1L blood drained from left chest tube  at [facility]  initially hypotensive, but hemodynamics quickly improved with IVF and 1u RBC transfusion  moderate left lateral chest wall tenderness  minimal paradoxical movement of left chest  chest tube drainage is now serosanguinous  no air leak seen in Pleur-evac    CXR - left chest tube is in basilar position. no residual hemothorax is seen. no pneumothorax is seen.  FAST - no free intraperitoneal fluid    CT scans done without contrast since Cr = 2.2 at [facility]  CT head / c-spine - no intracranial hemorrhage or c-spine fracture  CT chest / abd/pelvis w/o contrast - left 4-9 anterolateral displaced rib fractures, left 4-11 posterior displaced rib fractures, mild L1 and L2 vertebral compression deformities.    left 4-9 anterolateral displaced rib fractures  left 4-11 posterior displaced rib fractures  left flail chest  -We initially planned urgent surgical stabilization of left chest wall with VATS and cryoablation of intercostal nerves. Shortly after SICU admission and pain meds, he appears to be breathing comfortably. Also, INR = 5.2, and he has fever 102 at [facility] which raises concern for sepsis, possibly secondary to aspiration pneumonia, so he is a suboptimal candidate for surgery.    traumatic left hemopneumothorax  traumatic hemorrhagic shock  acute blood loss anemia  -Despite >1500 mL initial chest tube output, he has a delayed presentation, so will not do emergent VATS as blood is likely old without evidence of ongoing bleeding.  -appears mostly drained with the chest tube, the trace residual pneumothorax is likely secondary to basilar chest tube placement and is not clinically significant    possible sepsis  lactic acidosis  KEZIA with hyperkalemia  -fluid resuscitation  -f/u BCx  -care as per SICU    hyponatremia is likely hypovolemic, but could be secondary to chronic alcohol use  hypomagnesemia  elevate AST  -these lab abnormalities suggest significant alcohol use  -alcohol withdrawal syndrome is unlikely since he has already been abstinent for 2 days    L1/L2 vertebral compression deformities  -no tenderness to suggest acute fractures    Patient monitored in SICU    Neurology consulted EEG    1/9 increasing agitation, diaphoresis, tremor-on precedex,  -intubated for AMS and increase WOB  ortho spine consulted L1/L2 VCF. No evidence of instability on scans. Ok for log roll for rib plating procedure. measured for TLSO   thoracic consulted taken to OR 1/10 underwent Left VATS, Pneumolysis, Removal of foreign body, Partial decortication  extubated 1/13 1/14 Left pleural tube removed CXR ordered  1/15 IMPRESSION:  No evidence of deep venous thrombosis in either lower extremity.  patient with KEZIA renal us No renal mass, hydronephrosis or calculus is visualized sonographically.  Patient seen by PT/OT/ PMNR - acute rehab   1/18 transferred out of SICU to surgical floor 1/19 right buttock wound - cleanse with NS, pat dry, apply aquacel, cover with tegederm daily  PT wound evaluation for wound VAC  1/20 patient febrile patient sent back to CXR  CXR showed left lung opacification , CT chest Enlarging complex left pleural effusion, with internal foci of air and     1/22 underwent Left chest tube placement by IR 1/23 CT with minimal drainage, tpa infused by throacic 1/26 taken by thoracic Open left thoracotomy, thick layer of pleural peel s/p decortication and washout, cryoablation of ribs 3-8.  PCA for pain control   CThest tubes managed by thoracic     GI- anemia requiring transfusions. consulted for positive FOBTAt this time, risks of endoscopic procedures outweigh the benefit given acute illness, medical complexity and low likelihood of meaningful acute intervention.     1/31 taken to OR by thoraic again Chest tubes irrigated and clots/fibrinous exudate removed   patient monitored with daily cxr- ct removed by thoracic   2/4 tx out of SICU   patient with hyponatremia- monitored, fluid restriction resolved pt seen by pt inital rec acute rehab changed to outpatient 48y Male with PMHx hepatitis C (diagnosed many years ago, never treated), depression transferred from outside hospital for trauma eval. Patient found down by roommate after unknown amount of time, found with multiple 4-11 L sided rib fractures with flail chest. Chest tube placed there with >1L output (old blood). Patient also with tachypnea, concern for respiratory failure started on BiPAP. Level 1 called for transient hypotension, patient given 2 unit PRBC in [facility] ED.   PRIMARY SURVEY  A - airway intact  B - bilateral breath sounds and good chest rise  C - initial BP: -- , HR: -- , palpable pulses in all extremities  D - GCS 15 on arrival  SECONDARY SURVEY  General: NAD  HEENT: Normocephalic, atraumatic, EOMI, PEERLA.  Neck: Soft, midline trachea, C-collar in place  Chest: L chest tube in place  Cardiac: S1, S2, RRR  Respiratory: Bilateral breath sounds, clear and equal bilaterally. L chest tube in place  Abdomen: Soft, non-distended, non-tender, no rebound, no guarding, no masses palpated  Pelvis: Stable, non-tender, no ecchymosis  Ext: palp radial b/l UE, b/l DP palp in Lower Extrem, motor and sensory grossly intact in all 4 extremities  - LLE and RLE ecchymosis  Back: no TTP, no palpable runoff/stepoff/deformity     At [facility] patient was febrile to 102. CT scan demonstrated large left hemothorax (reviewed over internet) >1L blood drained from left chest tube at [facility]  initially hypotensive, but hemodynamics quickly improved with IVF and 1u RBC transfusion moderate left lateral chest wall tenderness minimal paradoxical movement of left chest chest tube drainage is now serosanguinous no air leak seen in Pleur-evac    CXR - left chest tube is in basilar position. no residual hemothorax is seen. no pneumothorax is seen.  FAST - no free intraperitoneal fluid    CT scans done without contrast since Cr = 2.2 at [facility]  CT head / c-spine - no intracranial hemorrhage or c-spine fracture  CT chest / abd/pelvis w/o contrast - left 4-9 anterolateral displaced rib fractures, left 4-11 posterior displaced rib fractures, mild L1 and L2 vertebral compression deformities.    left 4-9 anterolateral displaced rib fractures  left 4-11 posterior displaced rib fractures  left flail chest  -We initially planned urgent surgical stabilization of left chest wall with VATS and cryoablation of intercostal nerves. Shortly after SICU admission and pain meds, he appears to be breathing comfortably. Also, INR = 5.2, and he has fever 102 at [facility] which raises concern for sepsis, possibly secondary to aspiration pneumonia, so he is a suboptimal candidate for surgery.    traumatic left hemopneumothorax  traumatic hemorrhagic shock  acute blood loss anemia  -Despite >1500 mL initial chest tube output, he has a delayed presentation, so will not do emergent VATS as blood is likely old without evidence of ongoing bleeding.  -appears mostly drained with the chest tube, the trace residual pneumothorax is likely secondary to basilar chest tube placement and is not clinically significant    possible sepsis  lactic acidosis  KEZIA with hyperkalemia  -fluid resuscitation  -f/u BCx  -care as per SICU    hyponatremia is likely hypovolemic, but could be secondary to chronic alcohol use  hypomagnesemia  elevate AST  -these lab abnormalities suggest significant alcohol use  -alcohol withdrawal syndrome is unlikely since he has already been abstinent for 2 days    L1/L2 vertebral compression deformities  -no tenderness to suggest acute fractures    Patient monitored in SICU    Neurology consulted EEG    1/9 increasing agitation, diaphoresis, tremor-on precedex,  -intubated for AMS and increase WOB  ortho spine consulted L1/L2 VCF. No evidence of instability on scans. Ok for log roll for rib plating procedure. measured for TLSO   thoracic consulted taken to OR 1/10 underwent Left VATS, Pneumolysis, Removal of foreign body, Partial decortication  extubated 1/13 1/14 Left pleural tube removed CXR ordered  1/15 IMPRESSION:  No evidence of deep venous thrombosis in either lower extremity.  patient with KEZIA renal us No renal mass, hydronephrosis or calculus is visualized sonographically.  Patient seen by PT/OT/ PMNR - acute rehab   1/18 transferred out of SICU to surgical floor 1/19 right buttock wound - cleanse with NS, pat dry, apply aquacel, cover with tegederm daily  PT wound evaluation for wound VAC  1/20 patient febrile patient sent back to CXR  CXR showed left lung opacification , CT chest Enlarging complex left pleural effusion, with internal foci of air and     1/22 underwent L chest tube placement by IR 1/23 CT with minimal drainage, tpa infused by throacic 1/26 taken by thoracic Open L thoracotomy, thick layer of pleural peel s/p decortication and washout, cryoablation of ribs 3-8.  PCA for pain control. CThest tubes managed by thoracic     GI- anemia requiring transfusions. consulted for positive FOBTAt this time, risks of endoscopic procedures outweigh the benefit given acute illness, medical complexity and low likelihood of meaningful acute intervention.     1/31 taken to OR by thoraic again Chest tubes irrigated and clots/fibrinous exudate removed. Patient monitored with daily cxr- ct removed by thoracic    2/4 tx out of SICU     2/6 Echo performed for sinus tach. No sig findings.    Patient with hyponatremia- monitored, fluid [facility] to [facility] and OT with rolling walker.   At time of discharge, patient was stable. Patient to follow up with [facility], Dr. Sepulevda after discharge. Patient may follow up with Dr. Valiente as needed. Patient to follow up with their PCP after discharge in 1 week to repeat EKG.

## 2024-01-19 NOTE — DISCHARGE NOTE PROVIDER - PROVIDER TOKENS
PROVIDER:[TOKEN:[[id number]],FOLLOWUP:[2 weeks]],PROVIDER:[TOKEN:[[id number]],FOLLOWUP:[2 weeks]] PROVIDER:[TOKEN:[[id number]],FOLLOWUP:[1-3 days]],PROVIDER:[TOKEN:[[id number]],FOLLOWUP:[2 weeks]]

## 2024-01-20 DIAGNOSIS — F41.8 OTHER SPECIFIED ANXIETY DISORDERS: ICD-10-CM

## 2024-01-20 LAB
ALBUMIN SERPL ELPH-MCNC: 2.4 G/DL — LOW (ref 3.3–5)
ALBUMIN SERPL ELPH-MCNC: 2.6 G/DL — LOW (ref 3.3–5)
ALP SERPL-CCNC: 318 U/L — HIGH (ref 40–120)
ALP SERPL-CCNC: 394 U/L — HIGH (ref 40–120)
ALT FLD-CCNC: 135 U/L — HIGH (ref 10–45)
ALT FLD-CCNC: 159 U/L — HIGH (ref 10–45)
ANION GAP SERPL CALC-SCNC: 13 MMOL/L — SIGNIFICANT CHANGE UP (ref 5–17)
ANION GAP SERPL CALC-SCNC: 17 MMOL/L — SIGNIFICANT CHANGE UP (ref 5–17)
APPEARANCE UR: CLEAR — SIGNIFICANT CHANGE UP
APTT BLD: 36.4 SEC — HIGH (ref 24.5–35.6)
APTT BLD: 37.1 SEC — HIGH (ref 24.5–35.6)
AST SERPL-CCNC: 207 U/L — HIGH (ref 10–40)
AST SERPL-CCNC: 275 U/L — HIGH (ref 10–40)
BASE EXCESS BLDV CALC-SCNC: -4.4 MMOL/L — LOW (ref -2–3)
BASE EXCESS BLDV CALC-SCNC: -5.6 MMOL/L — LOW (ref -2–3)
BILIRUB SERPL-MCNC: 0.3 MG/DL — SIGNIFICANT CHANGE UP (ref 0.2–1.2)
BILIRUB SERPL-MCNC: 0.4 MG/DL — SIGNIFICANT CHANGE UP (ref 0.2–1.2)
BILIRUB UR-MCNC: NEGATIVE — SIGNIFICANT CHANGE UP
BUN SERPL-MCNC: 7 MG/DL — SIGNIFICANT CHANGE UP (ref 7–23)
BUN SERPL-MCNC: 7 MG/DL — SIGNIFICANT CHANGE UP (ref 7–23)
CA-I SERPL-SCNC: 1.12 MMOL/L — LOW (ref 1.15–1.33)
CA-I SERPL-SCNC: 1.15 MMOL/L — SIGNIFICANT CHANGE UP (ref 1.15–1.33)
CALCIUM SERPL-MCNC: 8 MG/DL — LOW (ref 8.4–10.5)
CALCIUM SERPL-MCNC: 8.6 MG/DL — SIGNIFICANT CHANGE UP (ref 8.4–10.5)
CHLORIDE BLDV-SCNC: 101 MMOL/L — SIGNIFICANT CHANGE UP (ref 96–108)
CHLORIDE BLDV-SCNC: 96 MMOL/L — SIGNIFICANT CHANGE UP (ref 96–108)
CHLORIDE SERPL-SCNC: 94 MMOL/L — LOW (ref 96–108)
CHLORIDE SERPL-SCNC: 97 MMOL/L — SIGNIFICANT CHANGE UP (ref 96–108)
CO2 BLDV-SCNC: 20 MMOL/L — LOW (ref 22–26)
CO2 BLDV-SCNC: 21 MMOL/L — LOW (ref 22–26)
CO2 SERPL-SCNC: 18 MMOL/L — LOW (ref 22–31)
CO2 SERPL-SCNC: 18 MMOL/L — LOW (ref 22–31)
COLOR SPEC: YELLOW — SIGNIFICANT CHANGE UP
CREAT SERPL-MCNC: 1.24 MG/DL — SIGNIFICANT CHANGE UP (ref 0.5–1.3)
CREAT SERPL-MCNC: 1.32 MG/DL — HIGH (ref 0.5–1.3)
CULTURE RESULTS: SIGNIFICANT CHANGE UP
DIFF PNL FLD: NEGATIVE — SIGNIFICANT CHANGE UP
EGFR: 67 ML/MIN/1.73M2 — SIGNIFICANT CHANGE UP
EGFR: 72 ML/MIN/1.73M2 — SIGNIFICANT CHANGE UP
GAS PNL BLDV: 126 MMOL/L — LOW (ref 136–145)
GAS PNL BLDV: 128 MMOL/L — LOW (ref 136–145)
GAS PNL BLDV: SIGNIFICANT CHANGE UP
GAS PNL BLDV: SIGNIFICANT CHANGE UP
GLUCOSE BLDV-MCNC: 104 MG/DL — HIGH (ref 70–99)
GLUCOSE BLDV-MCNC: 79 MG/DL — SIGNIFICANT CHANGE UP (ref 70–99)
GLUCOSE SERPL-MCNC: 104 MG/DL — HIGH (ref 70–99)
GLUCOSE SERPL-MCNC: 76 MG/DL — SIGNIFICANT CHANGE UP (ref 70–99)
GLUCOSE UR QL: NEGATIVE MG/DL — SIGNIFICANT CHANGE UP
HCO3 BLDV-SCNC: 19 MMOL/L — LOW (ref 22–29)
HCO3 BLDV-SCNC: 20 MMOL/L — LOW (ref 22–29)
HCT VFR BLD CALC: 23.2 % — LOW (ref 39–50)
HCT VFR BLD CALC: 26.9 % — LOW (ref 39–50)
HCT VFR BLDA CALC: 24 % — LOW (ref 39–51)
HCT VFR BLDA CALC: 36 % — LOW (ref 39–51)
HGB BLD CALC-MCNC: 12.1 G/DL — LOW (ref 12.6–17.4)
HGB BLD CALC-MCNC: 8.1 G/DL — LOW (ref 12.6–17.4)
HGB BLD-MCNC: 7.9 G/DL — LOW (ref 13–17)
HGB BLD-MCNC: 9.1 G/DL — LOW (ref 13–17)
HOROWITZ INDEX BLDV+IHG-RTO: 21 — SIGNIFICANT CHANGE UP
HOROWITZ INDEX BLDV+IHG-RTO: 21 — SIGNIFICANT CHANGE UP
INR BLD: 1.75 RATIO — HIGH (ref 0.85–1.18)
INR BLD: 2.05 RATIO — HIGH (ref 0.85–1.18)
KETONES UR-MCNC: 15 MG/DL
LACTATE BLDV-MCNC: 1 MMOL/L — SIGNIFICANT CHANGE UP (ref 0.5–2)
LACTATE BLDV-MCNC: 1.1 MMOL/L — SIGNIFICANT CHANGE UP (ref 0.5–2)
LEUKOCYTE ESTERASE UR-ACNC: NEGATIVE — SIGNIFICANT CHANGE UP
LIDOCAIN IGE QN: 47 U/L — SIGNIFICANT CHANGE UP (ref 7–60)
MAGNESIUM SERPL-MCNC: 1.9 MG/DL — SIGNIFICANT CHANGE UP (ref 1.6–2.6)
MAGNESIUM SERPL-MCNC: 2.2 MG/DL — SIGNIFICANT CHANGE UP (ref 1.6–2.6)
MCHC RBC-ENTMCNC: 28.1 PG — SIGNIFICANT CHANGE UP (ref 27–34)
MCHC RBC-ENTMCNC: 28.2 PG — SIGNIFICANT CHANGE UP (ref 27–34)
MCHC RBC-ENTMCNC: 33.8 GM/DL — SIGNIFICANT CHANGE UP (ref 32–36)
MCHC RBC-ENTMCNC: 34.1 GM/DL — SIGNIFICANT CHANGE UP (ref 32–36)
MCV RBC AUTO: 82.9 FL — SIGNIFICANT CHANGE UP (ref 80–100)
MCV RBC AUTO: 83 FL — SIGNIFICANT CHANGE UP (ref 80–100)
NITRITE UR-MCNC: NEGATIVE — SIGNIFICANT CHANGE UP
NRBC # BLD: 0 /100 WBCS — SIGNIFICANT CHANGE UP (ref 0–0)
NRBC # BLD: 0 /100 WBCS — SIGNIFICANT CHANGE UP (ref 0–0)
PCO2 BLDV: 31 MMHG — LOW (ref 42–55)
PCO2 BLDV: 35 MMHG — LOW (ref 42–55)
PH BLDV: 7.35 — SIGNIFICANT CHANGE UP (ref 7.32–7.43)
PH BLDV: 7.41 — SIGNIFICANT CHANGE UP (ref 7.32–7.43)
PH UR: 5 — SIGNIFICANT CHANGE UP (ref 5–8)
PHOSPHATE SERPL-MCNC: 2.7 MG/DL — SIGNIFICANT CHANGE UP (ref 2.5–4.5)
PHOSPHATE SERPL-MCNC: 3.4 MG/DL — SIGNIFICANT CHANGE UP (ref 2.5–4.5)
PLATELET # BLD AUTO: 579 K/UL — HIGH (ref 150–400)
PLATELET # BLD AUTO: 684 K/UL — HIGH (ref 150–400)
PO2 BLDV: 22 MMHG — LOW (ref 25–45)
PO2 BLDV: 25 MMHG — SIGNIFICANT CHANGE UP (ref 25–45)
POTASSIUM BLDV-SCNC: 3.4 MMOL/L — LOW (ref 3.5–5.1)
POTASSIUM BLDV-SCNC: 4.7 MMOL/L — SIGNIFICANT CHANGE UP (ref 3.5–5.1)
POTASSIUM SERPL-MCNC: 3.3 MMOL/L — LOW (ref 3.5–5.3)
POTASSIUM SERPL-MCNC: 4 MMOL/L — SIGNIFICANT CHANGE UP (ref 3.5–5.3)
POTASSIUM SERPL-SCNC: 3.3 MMOL/L — LOW (ref 3.5–5.3)
POTASSIUM SERPL-SCNC: 4 MMOL/L — SIGNIFICANT CHANGE UP (ref 3.5–5.3)
PROT SERPL-MCNC: 6.1 G/DL — SIGNIFICANT CHANGE UP (ref 6–8.3)
PROT SERPL-MCNC: 7.1 G/DL — SIGNIFICANT CHANGE UP (ref 6–8.3)
PROT UR-MCNC: SIGNIFICANT CHANGE UP MG/DL
PROTHROM AB SERPL-ACNC: 18.1 SEC — HIGH (ref 9.5–13)
PROTHROM AB SERPL-ACNC: 21.1 SEC — HIGH (ref 9.5–13)
RBC # BLD: 2.8 M/UL — LOW (ref 4.2–5.8)
RBC # BLD: 3.24 M/UL — LOW (ref 4.2–5.8)
RBC # FLD: 14.8 % — HIGH (ref 10.3–14.5)
RBC # FLD: 14.9 % — HIGH (ref 10.3–14.5)
SAO2 % BLDV: 25.8 % — LOW (ref 67–88)
SAO2 % BLDV: 35.6 % — LOW (ref 67–88)
SODIUM SERPL-SCNC: 128 MMOL/L — LOW (ref 135–145)
SODIUM SERPL-SCNC: 129 MMOL/L — LOW (ref 135–145)
SP GR SPEC: 1.03 — SIGNIFICANT CHANGE UP (ref 1–1.03)
SPECIMEN SOURCE: SIGNIFICANT CHANGE UP
UROBILINOGEN FLD QL: 0.2 MG/DL — SIGNIFICANT CHANGE UP (ref 0.2–1)
WBC # BLD: 8.41 K/UL — SIGNIFICANT CHANGE UP (ref 3.8–10.5)
WBC # BLD: 9.71 K/UL — SIGNIFICANT CHANGE UP (ref 3.8–10.5)
WBC # FLD AUTO: 8.41 K/UL — SIGNIFICANT CHANGE UP (ref 3.8–10.5)
WBC # FLD AUTO: 9.71 K/UL — SIGNIFICANT CHANGE UP (ref 3.8–10.5)

## 2024-01-20 PROCEDURE — 74177 CT ABD & PELVIS W/CONTRAST: CPT | Mod: 26

## 2024-01-20 PROCEDURE — 99254 IP/OBS CNSLTJ NEW/EST MOD 60: CPT | Mod: GC

## 2024-01-20 PROCEDURE — 99222 1ST HOSP IP/OBS MODERATE 55: CPT

## 2024-01-20 PROCEDURE — 99232 SBSQ HOSP IP/OBS MODERATE 35: CPT

## 2024-01-20 PROCEDURE — 71260 CT THORAX DX C+: CPT | Mod: 26

## 2024-01-20 RX ORDER — SODIUM CHLORIDE 9 MG/ML
500 INJECTION, SOLUTION INTRAVENOUS ONCE
Refills: 0 | Status: DISCONTINUED | OUTPATIENT
Start: 2024-01-20 | End: 2024-01-20

## 2024-01-20 RX ORDER — POTASSIUM CHLORIDE 20 MEQ
40 PACKET (EA) ORAL
Refills: 0 | Status: COMPLETED | OUTPATIENT
Start: 2024-01-20 | End: 2024-01-20

## 2024-01-20 RX ORDER — ACETAMINOPHEN 500 MG
500 TABLET ORAL EVERY 6 HOURS
Refills: 0 | Status: DISCONTINUED | OUTPATIENT
Start: 2024-01-20 | End: 2024-01-23

## 2024-01-20 RX ORDER — LIDOCAINE 4 G/100G
1 CREAM TOPICAL ONCE
Refills: 0 | Status: COMPLETED | OUTPATIENT
Start: 2024-01-20 | End: 2024-01-20

## 2024-01-20 RX ORDER — POTASSIUM CHLORIDE 20 MEQ
20 PACKET (EA) ORAL ONCE
Refills: 0 | Status: COMPLETED | OUTPATIENT
Start: 2024-01-20 | End: 2024-01-20

## 2024-01-20 RX ORDER — ACETAMINOPHEN 500 MG
1000 TABLET ORAL ONCE
Refills: 0 | Status: COMPLETED | OUTPATIENT
Start: 2024-01-20 | End: 2024-01-20

## 2024-01-20 RX ORDER — CALCIUM GLUCONATE 100 MG/ML
2 VIAL (ML) INTRAVENOUS ONCE
Refills: 0 | Status: COMPLETED | OUTPATIENT
Start: 2024-01-20 | End: 2024-01-20

## 2024-01-20 RX ORDER — METOPROLOL TARTRATE 50 MG
50 TABLET ORAL ONCE
Refills: 0 | Status: COMPLETED | OUTPATIENT
Start: 2024-01-20 | End: 2024-01-20

## 2024-01-20 RX ORDER — MAGNESIUM SULFATE 500 MG/ML
2 VIAL (ML) INJECTION ONCE
Refills: 0 | Status: COMPLETED | OUTPATIENT
Start: 2024-01-20 | End: 2024-01-20

## 2024-01-20 RX ORDER — SODIUM CHLORIDE 9 MG/ML
500 INJECTION INTRAMUSCULAR; INTRAVENOUS; SUBCUTANEOUS ONCE
Refills: 0 | Status: COMPLETED | OUTPATIENT
Start: 2024-01-20 | End: 2024-01-20

## 2024-01-20 RX ADMIN — QUETIAPINE FUMARATE 75 MILLIGRAM(S): 200 TABLET, FILM COATED ORAL at 19:30

## 2024-01-20 RX ADMIN — PIPERACILLIN AND TAZOBACTAM 25 GRAM(S): 4; .5 INJECTION, POWDER, LYOPHILIZED, FOR SOLUTION INTRAVENOUS at 13:15

## 2024-01-20 RX ADMIN — Medication 200 GRAM(S): at 23:27

## 2024-01-20 RX ADMIN — LIDOCAINE 1 PATCH: 4 CREAM TOPICAL at 16:06

## 2024-01-20 RX ADMIN — Medication 50 MILLIGRAM(S): at 17:36

## 2024-01-20 RX ADMIN — LIDOCAINE 1 PATCH: 4 CREAM TOPICAL at 04:45

## 2024-01-20 RX ADMIN — Medication 400 MILLIGRAM(S): at 00:17

## 2024-01-20 RX ADMIN — Medication 500 MILLIGRAM(S): at 05:21

## 2024-01-20 RX ADMIN — Medication 500 MILLIGRAM(S): at 11:11

## 2024-01-20 RX ADMIN — Medication 500 MILLIGRAM(S): at 06:23

## 2024-01-20 RX ADMIN — Medication 1000 MILLIGRAM(S): at 01:17

## 2024-01-20 RX ADMIN — LIDOCAINE 1 PATCH: 4 CREAM TOPICAL at 17:35

## 2024-01-20 RX ADMIN — Medication 50 MILLIGRAM(S): at 09:08

## 2024-01-20 RX ADMIN — Medication 5 MILLIGRAM(S): at 22:12

## 2024-01-20 RX ADMIN — HYDROMORPHONE HYDROCHLORIDE 1 MILLIGRAM(S): 2 INJECTION INTRAMUSCULAR; INTRAVENOUS; SUBCUTANEOUS at 15:15

## 2024-01-20 RX ADMIN — Medication 400 MILLIGRAM(S): at 22:28

## 2024-01-20 RX ADMIN — Medication 25 GRAM(S): at 19:29

## 2024-01-20 RX ADMIN — Medication 100 MILLIGRAM(S): at 11:13

## 2024-01-20 RX ADMIN — QUETIAPINE FUMARATE 25 MILLIGRAM(S): 200 TABLET, FILM COATED ORAL at 05:22

## 2024-01-20 RX ADMIN — LIDOCAINE 1 PATCH: 4 CREAM TOPICAL at 19:06

## 2024-01-20 RX ADMIN — HYDROMORPHONE HYDROCHLORIDE 1 MILLIGRAM(S): 2 INJECTION INTRAMUSCULAR; INTRAVENOUS; SUBCUTANEOUS at 15:00

## 2024-01-20 RX ADMIN — PIPERACILLIN AND TAZOBACTAM 25 GRAM(S): 4; .5 INJECTION, POWDER, LYOPHILIZED, FOR SOLUTION INTRAVENOUS at 05:20

## 2024-01-20 RX ADMIN — Medication 1 MILLIGRAM(S): at 11:13

## 2024-01-20 RX ADMIN — Medication 20 MILLIEQUIVALENT(S): at 16:37

## 2024-01-20 RX ADMIN — CHLORHEXIDINE GLUCONATE 1 APPLICATION(S): 213 SOLUTION TOPICAL at 11:14

## 2024-01-20 RX ADMIN — HYDROMORPHONE HYDROCHLORIDE 1 MILLIGRAM(S): 2 INJECTION INTRAMUSCULAR; INTRAVENOUS; SUBCUTANEOUS at 19:30

## 2024-01-20 RX ADMIN — SODIUM CHLORIDE 1000 MILLILITER(S): 9 INJECTION INTRAMUSCULAR; INTRAVENOUS; SUBCUTANEOUS at 23:20

## 2024-01-20 RX ADMIN — LIDOCAINE 1 PATCH: 4 CREAM TOPICAL at 07:25

## 2024-01-20 RX ADMIN — Medication 500 MILLIGRAM(S): at 11:12

## 2024-01-20 RX ADMIN — Medication 1000 MILLIGRAM(S): at 22:43

## 2024-01-20 RX ADMIN — PREGABALIN 1000 MICROGRAM(S): 225 CAPSULE ORAL at 11:12

## 2024-01-20 RX ADMIN — FAMOTIDINE 20 MILLIGRAM(S): 10 INJECTION INTRAVENOUS at 16:37

## 2024-01-20 RX ADMIN — Medication 50 MILLIGRAM(S): at 21:06

## 2024-01-20 RX ADMIN — PIPERACILLIN AND TAZOBACTAM 25 GRAM(S): 4; .5 INJECTION, POWDER, LYOPHILIZED, FOR SOLUTION INTRAVENOUS at 21:05

## 2024-01-20 RX ADMIN — Medication 1 TABLET(S): at 11:11

## 2024-01-20 RX ADMIN — HYDROMORPHONE HYDROCHLORIDE 1 MILLIGRAM(S): 2 INJECTION INTRAMUSCULAR; INTRAVENOUS; SUBCUTANEOUS at 19:45

## 2024-01-20 NOTE — BH CONSULTATION LIAISON ASSESSMENT NOTE - DESCRIPTION
States he is domiciled alone in apartment, per chart review has a roommate, +alcohol and cannabis use. 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

## 2024-01-20 NOTE — BH CONSULTATION LIAISON ASSESSMENT NOTE - NSBHATTESTCOMMENTATTENDFT_PSY_A_CORE
This is a 48-y.o. AM patient w/ PPHx depression and multiple past suicide attempts resulting in psychiatric admission to [facility], likely alcohol withdrawal seizures s/p phenobarbital taper, s/p VATS I/s/o multiple rib fractures, downgraded to medical floor from SICU, psychiatry consulted for positive PTSD screen. Interview limited by pain. Emotional distress understandable due to acute injury, pt may have underlying alcohol use disorder. Differential includes substance induced mood disorder, MDD.     I have seen and evaluated this patient myself. Chart, labs, meds reviewed. I agree with resident's assessment and plan.

## 2024-01-20 NOTE — PROGRESS NOTE ADULT - ASSESSMENT
This is a  56yo M w pmhx of Hep C and possible EtOH use disorder who presents after being found down. Intubated for respiratory distress and agitation 1/8. Pt has displaced 4-11 L rib fx with flail segment.     1/9 VSS: NPO after MN. Plan for Rib plating WED 1/10/24  1/10 s/p  VATS, with partial lung decortication  Thoracoscopic removal of intrapleural foreign body    1/11VSS intubated sedated on pressors  chest tube lws   1/12 VSS, pt still remains intubated/sedated, getting phenobarbital for agitation. Left chest tube output 75cc/24h placed on water seal this AM. 1U PRBC -H/H 7.1/20. Continue care per SICU team  1/13 Maintain L pleural tube waterseal Daily CXR Care as per SICU team  1/14 Left pleural tube removed CXR ordered; thoracic signed off  1/20 thoracic surgery reconsulted for left pleural effusion vs hemothorax; d/w Dr. Sepulveda; obtain ct chest to further evaluate     This is a  54yo M w pmhx of Hep C and possible EtOH use disorder who presents after being found down. Intubated for respiratory distress and agitation 1/8. Pt has displaced 4-11 L rib fx with flail segment.     1/9 VSS: NPO after MN. Plan for Rib plating WED 1/10/24  1/10 s/p  VATS, with partial lung decortication  Thoracoscopic removal of intrapleural foreign body    1/11VSS intubated sedated on pressors  chest tube lws   1/12 VSS, pt still remains intubated/sedated, getting phenobarbital for agitation. Left chest tube output 75cc/24h placed on water seal this AM. 1U PRBC -H/H 7.1/20. Continue care per SICU team  1/13 Maintain L pleural tube waterseal Daily CXR Care as per SICU team  1/14 Left pleural tube removed CXR ordered; thoracic signed off  1/20 thoracic surgery reconsulted for left pleural effusion vs hemothorax; d/w Dr. Sepulveda; chest ct done- Dr. Sepulveda reviewed; call IR for potential drainage monday left pocket

## 2024-01-20 NOTE — PROGRESS NOTE ADULT - SUBJECTIVE AND OBJECTIVE BOX
VITAL SIGNS      Vital Signs Last 24 Hrs  T(C): 37.2 (01-20-24 @ 08:15), Max: 39.1 (01-19-24 @ 23:55)  T(F): 98.9 (01-20-24 @ 08:15), Max: 102.3 (01-19-24 @ 23:55)  HR: 113 (01-20-24 @ 08:15) (105 - 117)  BP: 146/90 (01-20-24 @ 08:15) (117/70 - 151/92)  RR: 18 (01-20-24 @ 08:15) (18 - 18)  SpO2: 95% (01-20-24 @ 08:15) (94% - 95%)            01-19 @ 07:01  -  01-20 @ 07:00  --------------------------------------------------------  IN: 1420 mL / OUT: 1950 mL / NET: -530 mL    01-20 @ 07:01  -  01-20 @ 11:53  --------------------------------------------------------  IN: 480 mL / OUT: 0 mL / NET: 480 mL         Daily   Admit Wt: Drug Dosing Weight  Height (cm): 185.4 (10 Dominic 2024 12:45)  Weight (kg): 91.7 (10 Dominic 2024 12:45)  BMI (kg/m2): 26.7 (10 Dominic 2024 12:45)  BSA (m2): 2.16 (10 Dominic 2024 12:45)      CAPILLARY BLOOD GLUCOSE              LABS:    01-19 @ 07:01  -  01-20 @ 07:00  --------------------------------------------------------  IN: 1420 mL / OUT: 1950 mL / NET: -530 mL    01-20 @ 07:01 - 01-20 @ 11:53  --------------------------------------------------------  IN: 480 mL / OUT: 0 mL / NET: 480 mL    cret              acetaminophen     Tablet .. 500 milliGRAM(s) Oral every 6 hours  ascorbic acid 500 milliGRAM(s) Oral daily  chlorhexidine 2% Cloths 1 Application(s) Topical daily  cyanocobalamin 1000 MICROGram(s) Oral daily  enoxaparin Injectable 40 milliGRAM(s) SubCutaneous every 24 hours  famotidine    Tablet 20 milliGRAM(s) Oral daily PRN  folic acid 1 milliGRAM(s) Oral daily  HYDROmorphone  Injectable 1 milliGRAM(s) IV Push every 3 hours PRN  influenza   Vaccine 0.5 milliLiter(s) IntraMuscular once  lidocaine   4% Patch 1 Patch Transdermal every 24 hours  melatonin 5 milliGRAM(s) Oral at bedtime  metoprolol tartrate 50 milliGRAM(s) Oral every 12 hours  multivitamin 1 Tablet(s) Oral daily  oxyCODONE    IR 10 milliGRAM(s) Oral every 4 hours PRN  oxyCODONE    IR 5 milliGRAM(s) Oral every 4 hours PRN  piperacillin/tazobactam IVPB.. 3.375 Gram(s) IV Intermittent every 8 hours  QUEtiapine 75 milliGRAM(s) Oral <User Schedule>  QUEtiapine 25 milliGRAM(s) Oral <User Schedule>  thiamine 100 milliGRAM(s) Oral daily        PHYSICAL EXAM    Subjective: "I feel like I have pneumonia."   Neurology: alert and oriented x 3, nonfocal, no gross deficits  Lungs: diminished left mid-base; RR easy, unlabored   Abdomen: soft, nontender, nondistended, positive bowel sounds, + bowel movement   Neg N/V/D   :  pt voiding without difficulty   Extremities:   BIRD; edema, neg calf tenderness. PPP bilaterally                      VITAL SIGNS      Vital Signs Last 24 Hrs  T(C): 37.2 (01-20-24 @ 08:15), Max: 39.1 (01-19-24 @ 23:55)  T(F): 98.9 (01-20-24 @ 08:15), Max: 102.3 (01-19-24 @ 23:55)  HR: 113 (01-20-24 @ 08:15) (105 - 117)  BP: 146/90 (01-20-24 @ 08:15) (117/70 - 151/92)  RR: 18 (01-20-24 @ 08:15) (18 - 18)  SpO2: 95% (01-20-24 @ 08:15) (94% - 95%)            01-19 @ 07:01  -  01-20 @ 07:00  --------------------------------------------------------  IN: 1420 mL / OUT: 1950 mL / NET: -530 mL    01-20 @ 07:01  -  01-20 @ 11:53  --------------------------------------------------------  IN: 480 mL / OUT: 0 mL / NET: 480 mL         Daily   Admit Wt: Drug Dosing Weight  Height (cm): 185.4 (10 Dominic 2024 12:45)  Weight (kg): 91.7 (10 Dominic 2024 12:45)  BMI (kg/m2): 26.7 (10 Dominic 2024 12:45)  BSA (m2): 2.16 (10 Dominic 2024 12:45)      CAPILLARY BLOOD GLUCOSE              LABS:    01-19 @ 07:01  -  01-20 @ 07:00  --------------------------------------------------------  IN: 1420 mL / OUT: 1950 mL / NET: -530 mL    01-20 @ 07:01 - 01-20 @ 11:53  --------------------------------------------------------  IN: 480 mL / OUT: 0 mL / NET: 480 mL    cret              acetaminophen     Tablet .. 500 milliGRAM(s) Oral every 6 hours  ascorbic acid 500 milliGRAM(s) Oral daily  chlorhexidine 2% Cloths 1 Application(s) Topical daily  cyanocobalamin 1000 MICROGram(s) Oral daily  enoxaparin Injectable 40 milliGRAM(s) SubCutaneous every 24 hours  famotidine    Tablet 20 milliGRAM(s) Oral daily PRN  folic acid 1 milliGRAM(s) Oral daily  HYDROmorphone  Injectable 1 milliGRAM(s) IV Push every 3 hours PRN  influenza   Vaccine 0.5 milliLiter(s) IntraMuscular once  lidocaine   4% Patch 1 Patch Transdermal every 24 hours  melatonin 5 milliGRAM(s) Oral at bedtime  metoprolol tartrate 50 milliGRAM(s) Oral every 12 hours  multivitamin 1 Tablet(s) Oral daily  oxyCODONE    IR 10 milliGRAM(s) Oral every 4 hours PRN  oxyCODONE    IR 5 milliGRAM(s) Oral every 4 hours PRN  piperacillin/tazobactam IVPB.. 3.375 Gram(s) IV Intermittent every 8 hours  QUEtiapine 75 milliGRAM(s) Oral <User Schedule>  QUEtiapine 25 milliGRAM(s) Oral <User Schedule>  thiamine 100 milliGRAM(s) Oral daily        PHYSICAL EXAM    Subjective: "I feel like I have pneumonia."   Neurology: alert and oriented x 3, nonfocal, no gross deficits  Lungs: diminished left mid-base; RR easy, unlabored   Abdomen: soft, nontender, nondistended, positive bowel sounds, + bowel movement   Neg N/V/D   :  pt voiding without difficulty   Extremities:   BIRD; neg LE edema, neg calf tenderness. PPP bilaterally

## 2024-01-20 NOTE — PROVIDER CONTACT NOTE (OTHER) - ACTION/TREATMENT ORDERED:
Provider at bedside to speak with patient. Pt accepting of IV Tylenol. pt continues to refuse abx. No further interventions at this time. Nursing care ongoing.

## 2024-01-20 NOTE — PROGRESS NOTE ADULT - PROBLEM SELECTOR PLAN 1
Suspected fracture of rib on left side, closed. Left Rib Fx 4-11th /Hemothorax  1/10 underwent LT VATS, with partial lung decortication  Thoracoscopic removal of intrapleural foreign body    DCd left chest tube 1/14 1/20 thoracic surgery reconsulted for left pleural effusion vs hemothorax; d/w Dr. Sepulveda; obtain ct chest to further evaluate Suspected fracture of rib on left side, closed. Left Rib Fx 4-11th /Hemothorax  1/10 underwent LT VATS, with partial lung decortication  Thoracoscopic removal of intrapleural foreign body    DCd left chest tube 1/14 1/20 thoracic surgery reconsulted for left pleural effusion vs hemothorax  chest ct done- Dr. Sepulveda reviewed; call IR for potential drainage monday left pocket

## 2024-01-20 NOTE — BH CONSULTATION LIAISON ASSESSMENT NOTE - RISK ASSESSMENT
Pt at low acute risk of suicide, currently denying SI and denying that current presentation was intentional. Risk factors include active substance use, previous suicide attempts requiring inpatient psychiatric hospitalization, lack of treatment with psychiatric medication, lack of current employment, current acute medical condition. Protective factors include desire to live, residential stability, social support of roommate, lack of access to firearms.

## 2024-01-20 NOTE — BH CONSULTATION LIAISON ASSESSMENT NOTE - NSBHCHARTREVIEWVS_PSY_A_CORE FT
Vital Signs Last 24 Hrs  T(C): 37.2 (20 Jan 2024 08:15), Max: 39.1 (19 Jan 2024 23:55)  T(F): 98.9 (20 Jan 2024 08:15), Max: 102.3 (19 Jan 2024 23:55)  HR: 113 (20 Jan 2024 08:15) (105 - 117)  BP: 146/90 (20 Jan 2024 08:15) (117/70 - 151/92)  BP(mean): --  RR: 18 (20 Jan 2024 08:15) (18 - 18)  SpO2: 95% (20 Jan 2024 08:15) (94% - 95%)    Parameters below as of 20 Jan 2024 08:15  Patient On (Oxygen Delivery Method): room air

## 2024-01-20 NOTE — CHART NOTE - NSCHARTNOTEFT_GEN_A_CORE
Pt seen and examined after receiving page about 102 fever. Patient is currently refusing medications and lab draws. He refused his Zosyn even after discussion of concern for worsening infection as indicated by cxr and fever. Patient is also refusing blood cultures and labs. Discussed with patient the importance of labs to properly treat fever, still denying.

## 2024-01-20 NOTE — PHYSICAL THERAPY INITIAL EVALUATION ADULT - PLANNED THERAPY INTERVENTIONS, PT EVAL
Negative Pressure Wound Therapy
balance training/bed mobility training/gait training/strengthening/transfer training

## 2024-01-20 NOTE — BH CONSULTATION LIAISON ASSESSMENT NOTE - HPI (INCLUDE ILLNESS QUALITY, SEVERITY, DURATION, TIMING, CONTEXT, MODIFYING FACTORS, ASSOCIATED SIGNS AND SYMPTOMS)
48 M w/ PMHx untreated Hepatitis C, PPHx depression, multiple previous SAs per patient, multiple past inpatient psychiatric hospitalizations at [facility] for suicidal thoughts, possible EtOH use disorder c/b alcohol withdrawal seizures, found to have multiple broken ribs from unknown trauma s/p xfer from OSH, psychiatry consulted for positive PTSD screen. Pt now s/p VATS procedure, downgraded to medical floor from SICU. On interview with patient, he is cooperative and states that he has been feeling down because he is in a lot of pain. Pt mildly confused regarding events that brought him here but states "I had a seizure and fell, this is the fifth time it has happened to me." Per neurology note, pt evaluated with vEEG and deemed to have alcohol withdrawal seizures, completed phenobarbital taper. Pt states "I don't drink that much", unable to quantify his alcohol use. Endorses multiple suicide attempts "in any way you can think of", and states he had been admitted to a psychiatric pryor multiple times "many years ago" at [facility]. Endorses multiple previous medication trials, only remembers Zoloft, denies currently taking any psychiatric medication, not currently following with a psychiatrist. Denies current SI, HI, AVH. Pt with limited ability to engage in interview due to pain.

## 2024-01-20 NOTE — PROGRESS NOTE ADULT - ASSESSMENT
48yoM w/ PMHx hepatitis C and EtOH abuse was transferred from OSH on 1/6 for level 1 trauma eval. He was found down with multiple 4-11 left-sided rib fractures and flail chest, and had a chest tube placed at OSH. S/p VATS 1/10 w/ partial lung decortication and thoracoscopic removal of intrapleural foreign body. S/p chest tube removal 1/14, downgraded to floor 1/18, returned to SICU after fever 102.3 on 1/20.    Plan:  Neuro:  -A&Ox4  -c/w seroquel  -melatonin qhs  -c/w thiamine/folate/B12  -Tylenol, lidocaine patch, oxycodone, dilaudid prn for pain control    Resp:  -satting well on RA  -CXR 1/20 showed left lung white-out w/ pleural effusion  -f/u chest CT read  -per thoracic, plan to reach out to IR on 1/22 for possible drainage of left hemothorax     CV:   -off pressors  -lactate negative  -c/w metoprolol for persistent tachycardia    GI:  -regular diet  -c/w multivitamin supplementation  -monitor LFTs  -f/u CTAP    /renal:  -monitor I&Os, electrolytes, replete prn    Heme:  -Lovenox 48yoM w/ PMHx hepatitis C and EtOH abuse was transferred from OSH on 1/6 for level 1 trauma eval. He was found down with multiple 4-11 left-sided rib fractures and flail chest, and had a chest tube placed at OSH. S/p VATS 1/10 w/ partial lung decortication and thoracoscopic removal of intrapleural foreign body. S/p chest tube removal 1/14, downgraded to floor 1/18, returned to SICU after fever 102.3 on 1/20.    Plan:  Neuro:  -A&Ox4  -c/w seroquel  -melatonin qhs  -c/w thiamine/folate/B12  -Tylenol, lidocaine patch, oxycodone, dilaudid prn for pain control    Resp:  -satting well on RA  -CXR 1/20 showed left lung white-out w/ pleural effusion  -f/u chest CT read  -per thoracic, plan to reach out to IR on 1/22 for possible drainage of left hemothorax     CV:   -off pressors  -lactate negative  -c/w metoprolol for persistent tachycardia    GI:  -regular diet  -c/w multivitamin supplementation  -monitor LFTs  -f/u CTAP    /renal:  -monitor I&Os, electrolytes, replete prn    Heme:  -Lovenox for DVT ppx  -monitor CBC/coags    ID:   - 48yoM w/ PMHx hepatitis C and EtOH abuse was transferred from OSH on 1/6 for level 1 trauma eval. He was found down with multiple 4-11 left-sided rib fractures and flail chest, and had a chest tube placed at OSH. S/p VATS 1/10 w/ partial lung decortication and thoracoscopic removal of intrapleural foreign body. S/p chest tube removal 1/14, downgraded to floor 1/18, returned to SICU after fever 102.3 on 1/20.    Plan:  Neuro:  -A&Ox4  -c/w seroquel  -melatonin qhs  -c/w thiamine/folate/B12  -Tylenol, lidocaine patch, oxycodone, dilaudid prn for pain control    Resp:  -satting well on RA  -CXR 1/20 showed left lung white-out w/ pleural effusion  -f/u chest CT read  -per thoracic, plan to reach out to IR on 1/22 for possible drainage of left hemothorax     CV:   -off pressors  -lactate negative  -c/w metoprolol for persistent tachycardia    GI:  -regular diet  -c/w multivitamin supplementation  -monitor LFTs  -f/u CTAP    /renal:  -monitor I&Os, electrolytes, replete prn    Heme:  -Lovenox for DVT ppx  -monitor CBC/coags    ID:   -s/p vanc 1/10-1/16, stopped i/s/o worsening renal failure  -c/w zosyn 1/7-1/24  -BCx 1/13 with staph epi x1 bottle, however repeat BCx 1/14, 1/15, 1/17 negative  -tissue cx 1/10 negative  -f/u repeat BCx 1/20    Lines:  -PIV    Endo:  -no active issues

## 2024-01-20 NOTE — PROGRESS NOTE ADULT - SUBJECTIVE AND OBJECTIVE BOX
TEAM [ ACS ] Surgery Daily Progress Note  =====================================================  INTERVAL: Patient was febrile to 102.3 overnight. Patient at that time was refusing blood draws, blood cultures, IV antibiotics, Tylenol, and Lovenox. CXR showing white out of left lung.     SUBJECTIVE: Patient seen and examined at bedside on AM rounds.    ALLERGIES:  No Known Allergies    --------------------------------------------------------------------------------------    VITAL SIGNS:  Vital Signs Last 24 Hrs  T(C): 37.2 (20 Jan 2024 08:15), Max: 39.1 (19 Jan 2024 23:55)  T(F): 98.9 (20 Jan 2024 08:15), Max: 102.3 (19 Jan 2024 23:55)  HR: 113 (20 Jan 2024 08:15) (105 - 117)  BP: 146/90 (20 Jan 2024 08:15) (117/70 - 151/92)  BP(mean): --  RR: 18 (20 Jan 2024 08:15) (18 - 18)  SpO2: 95% (20 Jan 2024 08:15) (94% - 95%)    Parameters below as of 20 Jan 2024 08:15  Patient On (Oxygen Delivery Method): room air      --------------------------------------------------------------------------------------    EXAM    PHYSICAL EXAM:  Constitutional: Well developed, well nourished, NAD  Pulmonary: Symmetric chest rise bilaterally, no increased WOB  Gastrointestinal: Abdomen soft, nontender, nondistended  Extremities: Warm to touch, soft, normal strength, sensory and motor intact. No cyanosis/erythema/edema    --------------------------------------------------------------------------------------    LABS                        8.7    10.99 )-----------( 633      ( 18 Jan 2024 11:41 )             26.2   01-18    134<L>  |  101  |  9   ----------------------------<  82  3.7   |  20<L>  |  1.39<H>    Ca    8.2<L>      18 Jan 2024 11:41  Phos  3.6     01-18  Mg     2.0     01-18      --------------------------------------------------------------------------------------    INS AND OUTS:  I&O's Detail    19 Jan 2024 07:01  -  20 Jan 2024 07:00  --------------------------------------------------------  IN:    IV PiggyBack: 100 mL    Oral Fluid: 820 mL    Sodium Chloride 0.9% Bolus: 500 mL  Total IN: 1420 mL    OUT:    Voided (mL): 1950 mL  Total OUT: 1950 mL    Total NET: -530 mL      20 Jan 2024 07:01  -  20 Jan 2024 11:10  --------------------------------------------------------  IN:    Oral Fluid: 480 mL  Total IN: 480 mL    OUT:  Total OUT: 0 mL    Total NET: 480 mL        --------------------------------------------------------------------------------------

## 2024-01-20 NOTE — PHYSICAL THERAPY INITIAL EVALUATION ADULT - GENERAL OBSERVATIONS, REHAB EVAL
Rec'd left sidely, NAD, +IV
{t received supine in bed in NAD, +PIV, +chest tube, +intubated, +NGT, +BUE restraints.

## 2024-01-20 NOTE — BH CONSULTATION LIAISON ASSESSMENT NOTE - CURRENT MEDICATION
MEDICATIONS  (STANDING):  acetaminophen     Tablet .. 500 milliGRAM(s) Oral every 6 hours  ascorbic acid 500 milliGRAM(s) Oral daily  chlorhexidine 2% Cloths 1 Application(s) Topical daily  cyanocobalamin 1000 MICROGram(s) Oral daily  enoxaparin Injectable 40 milliGRAM(s) SubCutaneous every 24 hours  folic acid 1 milliGRAM(s) Oral daily  influenza   Vaccine 0.5 milliLiter(s) IntraMuscular once  lidocaine   4% Patch 1 Patch Transdermal every 24 hours  melatonin 5 milliGRAM(s) Oral at bedtime  metoprolol tartrate 50 milliGRAM(s) Oral every 12 hours  multivitamin 1 Tablet(s) Oral daily  piperacillin/tazobactam IVPB.. 3.375 Gram(s) IV Intermittent every 8 hours  QUEtiapine 75 milliGRAM(s) Oral <User Schedule>  QUEtiapine 25 milliGRAM(s) Oral <User Schedule>  thiamine 100 milliGRAM(s) Oral daily    MEDICATIONS  (PRN):  famotidine    Tablet 20 milliGRAM(s) Oral daily PRN Heartburn  HYDROmorphone  Injectable 1 milliGRAM(s) IV Push every 3 hours PRN Breakthrough  oxyCODONE    IR 5 milliGRAM(s) Oral every 4 hours PRN Moderate Pain (4 - 6)  oxyCODONE    IR 10 milliGRAM(s) Oral every 4 hours PRN Severe Pain (7 - 10)

## 2024-01-20 NOTE — PROGRESS NOTE ADULT - SUBJECTIVE AND OBJECTIVE BOX
HISTORY  48y Male    24 HOUR EVENTS:  -spiked 102F fever overnight  -CXR showed left lung opacification including pleural effusion  -thoracic surgery reconsulted by primary team, CT chest/abdomen/pelvis taken  -per thoracic, plan for calling IR on 1/22 for potential drainage of left hemothorax.    SUBJECTIVE/ROS:  [x] A ten-point review of systems was otherwise negative except as noted.  [ ] Due to altered mental status/intubation, subjective information were not able to be obtained from the patient. History was obtained, to the extent possible, from review of the chart and collateral sources of information.      NEURO  Exam: awake, alert, oriented  Meds: acetaminophen     Tablet .. 500 milliGRAM(s) Oral every 6 hours  HYDROmorphone  Injectable 1 milliGRAM(s) IV Push every 3 hours PRN Breakthrough  melatonin 5 milliGRAM(s) Oral at bedtime  oxyCODONE    IR 10 milliGRAM(s) Oral every 4 hours PRN Severe Pain (7 - 10)  oxyCODONE    IR 5 milliGRAM(s) Oral every 4 hours PRN Moderate Pain (4 - 6)  QUEtiapine 25 milliGRAM(s) Oral <User Schedule>  QUEtiapine 75 milliGRAM(s) Oral <User Schedule>    [x] Adequacy of sedation and pain control has been assessed and adjusted      RESPIRATORY  RR: 26 (01-20-24 @ 15:00) (18 - 26)  SpO2: 96% (01-20-24 @ 15:00) (94% - 96%)  Wt(kg): --  Exam: unlabored, normal respiratory effort  Mechanical Ventilation: n/a    [N/A] Extubation Readiness Assessed  Meds:       CARDIOVASCULAR  HR: 110 (01-20-24 @ 15:00) (105 - 117)  BP: 113/85 (01-20-24 @ 15:00) (112/70 - 151/92)  BP(mean): 95 (01-20-24 @ 15:00) (95 - 106)  ABP: --  ABP(mean): --  Wt(kg): --  CVP(cm H2O): --  VBG - ( 20 Jan 2024 14:59 )  pH: 7.35  /  pCO2: 35    /  pO2: 22    / HCO3: 19    / Base Excess: -5.6  /  SaO2: 25.8   Lactate: 1.0        Exam: tachycardic   Cardiac Rhythm: sinus  Perfusion     [x]Adequate   [ ]Inadequate  Mentation   [x]Normal       [ ]Reduced  Extremities  [x]Warm         [ ]Cool  Volume Status [ ]Hypervolemic [x]Euvolemic [ ]Hypovolemic  Meds: metoprolol tartrate 50 milliGRAM(s) Oral every 12 hours        GI/NUTRITION  Exam: soft, nontender, nondistended, incision C/D/I  Diet: regular  Meds: famotidine    Tablet 20 milliGRAM(s) Oral daily PRN Heartburn      GENITOURINARY  I&O's Detail    01-19 @ 07:01  -  01-20 @ 07:00  --------------------------------------------------------  IN:    IV PiggyBack: 100 mL    Oral Fluid: 820 mL    Sodium Chloride 0.9% Bolus: 500 mL  Total IN: 1420 mL    OUT:    Voided (mL): 1950 mL  Total OUT: 1950 mL    Total NET: -530 mL      01-20 @ 07:01 - 01-20 @ 15:48  --------------------------------------------------------  IN:    IV PiggyBack: 50 mL    Oral Fluid: 720 mL  Total IN: 770 mL    OUT:  Total OUT: 0 mL    Total NET: 770 mL          01-20    129<L>  |  94<L>  |  7   ----------------------------<  76  3.3<L>   |  18<L>  |  1.24    Ca    8.6      20 Jan 2024 15:13  Phos  3.4     01-20  Mg     1.9     01-20    TPro  7.1  /  Alb  2.6<L>  /  TBili  0.4  /  DBili  x   /  AST  275<H>  /  ALT  159<H>  /  AlkPhos  394<H>  01-20    [ ] Hendrix catheter, indication: N/A  Meds: ascorbic acid 500 milliGRAM(s) Oral daily  cyanocobalamin 1000 MICROGram(s) Oral daily  folic acid 1 milliGRAM(s) Oral daily  multivitamin 1 Tablet(s) Oral daily  thiamine 100 milliGRAM(s) Oral daily        HEMATOLOGIC  Meds: enoxaparin Injectable 40 milliGRAM(s) SubCutaneous every 24 hours    [x] VTE Prophylaxis                        9.1    9.71  )-----------( 684      ( 20 Jan 2024 15:13 )             26.9     PT/INR - ( 20 Jan 2024 15:13 )   PT: 18.1 sec;   INR: 1.75 ratio         PTT - ( 20 Jan 2024 15:13 )  PTT:37.1 sec  Transfusion     [ ] PRBC   [ ] Platelets   [ ] FFP   [ ] Cryoprecipitate      INFECTIOUS DISEASES  WBC Count: 9.71 K/uL (01-20 @ 15:13)    RECENT CULTURES:  Specimen Source: .Blood Blood-Peripheral  Date/Time: 01-17 @ 06:26  Culture Results:   No growth at 72 Hours  Gram Stain: --  Organism: --    Meds: influenza   Vaccine 0.5 milliLiter(s) IntraMuscular once  piperacillin/tazobactam IVPB.. 3.375 Gram(s) IV Intermittent every 8 hours        ENDOCRINE  CAPILLARY BLOOD GLUCOSE        Meds:       ACCESS DEVICES:  [x] Peripheral IV  [ ] Central Venous Line	[ ] R	[ ] L	[ ] IJ	[ ] Fem	[ ] SC	Placed:   [ ] Arterial Line		[ ] R	[ ] L	[ ] Fem	[ ] Rad	[ ] Ax	Placed:   [ ] PICC:					[ ] Mediport  [ ] Urinary Catheter, Date Placed:   [x] Necessity of urinary, arterial, and venous catheters discussed    OTHER MEDICATIONS:  chlorhexidine 2% Cloths 1 Application(s) Topical daily  lidocaine   4% Patch 1 Patch Transdermal every 24 hours      CODE STATUS: full     24 HOUR EVENTS:  -spiked 102F fever overnight  -CXR showed left lung opacification including pleural effusion  -thoracic surgery reconsulted by primary team, CT chest/abdomen/pelvis taken  -per thoracic, plan for calling IR on 1/22 for potential drainage of left hemothorax.    SUBJECTIVE/ROS:  [x] A ten-point review of systems was otherwise negative except as noted.  [ ] Due to altered mental status/intubation, subjective information were not able to be obtained from the patient. History was obtained, to the extent possible, from review of the chart and collateral sources of information.      NEURO  Exam: awake, alert, oriented  Meds: acetaminophen     Tablet .. 500 milliGRAM(s) Oral every 6 hours  HYDROmorphone  Injectable 1 milliGRAM(s) IV Push every 3 hours PRN Breakthrough  melatonin 5 milliGRAM(s) Oral at bedtime  oxyCODONE    IR 10 milliGRAM(s) Oral every 4 hours PRN Severe Pain (7 - 10)  oxyCODONE    IR 5 milliGRAM(s) Oral every 4 hours PRN Moderate Pain (4 - 6)  QUEtiapine 25 milliGRAM(s) Oral <User Schedule>  QUEtiapine 75 milliGRAM(s) Oral <User Schedule>    [x] Adequacy of sedation and pain control has been assessed and adjusted      RESPIRATORY  RR: 26 (01-20-24 @ 15:00) (18 - 26)  SpO2: 96% (01-20-24 @ 15:00) (94% - 96%)  Wt(kg): --  Exam: unlabored, normal respiratory effort  Mechanical Ventilation: n/a    [N/A] Extubation Readiness Assessed  Meds:       CARDIOVASCULAR  HR: 110 (01-20-24 @ 15:00) (105 - 117)  BP: 113/85 (01-20-24 @ 15:00) (112/70 - 151/92)  BP(mean): 95 (01-20-24 @ 15:00) (95 - 106)  ABP: --  ABP(mean): --  Wt(kg): --  CVP(cm H2O): --  VBG - ( 20 Jan 2024 14:59 )  pH: 7.35  /  pCO2: 35    /  pO2: 22    / HCO3: 19    / Base Excess: -5.6  /  SaO2: 25.8   Lactate: 1.0        Exam: tachycardic   Cardiac Rhythm: sinus  Perfusion     [x]Adequate   [ ]Inadequate  Mentation   [x]Normal       [ ]Reduced  Extremities  [x]Warm         [ ]Cool  Volume Status [ ]Hypervolemic [x]Euvolemic [ ]Hypovolemic  Meds: metoprolol tartrate 50 milliGRAM(s) Oral every 12 hours        GI/NUTRITION  Exam: soft, nontender, nondistended, incision C/D/I  Diet: regular  Meds: famotidine    Tablet 20 milliGRAM(s) Oral daily PRN Heartburn      GENITOURINARY  I&O's Detail    01-19 @ 07:01  -  01-20 @ 07:00  --------------------------------------------------------  IN:    IV PiggyBack: 100 mL    Oral Fluid: 820 mL    Sodium Chloride 0.9% Bolus: 500 mL  Total IN: 1420 mL    OUT:    Voided (mL): 1950 mL  Total OUT: 1950 mL    Total NET: -530 mL      01-20 @ 07:01 - 01-20 @ 15:48  --------------------------------------------------------  IN:    IV PiggyBack: 50 mL    Oral Fluid: 720 mL  Total IN: 770 mL    OUT:  Total OUT: 0 mL    Total NET: 770 mL          01-20    129<L>  |  94<L>  |  7   ----------------------------<  76  3.3<L>   |  18<L>  |  1.24    Ca    8.6      20 Jan 2024 15:13  Phos  3.4     01-20  Mg     1.9     01-20    TPro  7.1  /  Alb  2.6<L>  /  TBili  0.4  /  DBili  x   /  AST  275<H>  /  ALT  159<H>  /  AlkPhos  394<H>  01-20    [ ] Hendrix catheter, indication: N/A  Meds: ascorbic acid 500 milliGRAM(s) Oral daily  cyanocobalamin 1000 MICROGram(s) Oral daily  folic acid 1 milliGRAM(s) Oral daily  multivitamin 1 Tablet(s) Oral daily  thiamine 100 milliGRAM(s) Oral daily        HEMATOLOGIC  Meds: enoxaparin Injectable 40 milliGRAM(s) SubCutaneous every 24 hours    [x] VTE Prophylaxis                        9.1    9.71  )-----------( 684      ( 20 Jan 2024 15:13 )             26.9     PT/INR - ( 20 Jan 2024 15:13 )   PT: 18.1 sec;   INR: 1.75 ratio         PTT - ( 20 Jan 2024 15:13 )  PTT:37.1 sec  Transfusion     [ ] PRBC   [ ] Platelets   [ ] FFP   [ ] Cryoprecipitate      INFECTIOUS DISEASES  WBC Count: 9.71 K/uL (01-20 @ 15:13)    RECENT CULTURES:  Specimen Source: .Blood Blood-Peripheral  Date/Time: 01-17 @ 06:26  Culture Results:   No growth at 72 Hours  Gram Stain: --  Organism: --    Meds: influenza   Vaccine 0.5 milliLiter(s) IntraMuscular once  piperacillin/tazobactam IVPB.. 3.375 Gram(s) IV Intermittent every 8 hours        ENDOCRINE  CAPILLARY BLOOD GLUCOSE        Meds:       ACCESS DEVICES:  [x] Peripheral IV  [ ] Central Venous Line	[ ] R	[ ] L	[ ] IJ	[ ] Fem	[ ] SC	Placed:   [ ] Arterial Line		[ ] R	[ ] L	[ ] Fem	[ ] Rad	[ ] Ax	Placed:   [ ] PICC:					[ ] Mediport  [ ] Urinary Catheter, Date Placed:   [x] Necessity of urinary, arterial, and venous catheters discussed    OTHER MEDICATIONS:  chlorhexidine 2% Cloths 1 Application(s) Topical daily  lidocaine   4% Patch 1 Patch Transdermal every 24 hours      CODE STATUS: full

## 2024-01-20 NOTE — PHYSICAL THERAPY INITIAL EVALUATION ADULT - CRITERIA FOR SKILLED THERAPEUTIC INTERVENTIONS
impairments found
impairments found/rehab potential/therapy frequency/predicted duration of therapy intervention/anticipated discharge recommendation

## 2024-01-20 NOTE — BH CONSULTATION LIAISON ASSESSMENT NOTE - NSACTIVEVENT_PSY_ALL_CORE
Chronic pain or other acute medical condition/Substance intoxication or withdrawal/Inadequate social supports

## 2024-01-20 NOTE — PHYSICAL THERAPY INITIAL EVALUATION ADULT - PERTINENT HX OF CURRENT PROBLEM, REHAB EVAL
48y Male with PMHx hepatitis C (diagnosed many years ago, never treated), depression transferred from outside hospital for trauma eval. Patient found down by roommate after unknown amount of time, found with multiple 4-11 L sided rib fractures with flail chest. Chest tube placed there with >1L output (old blood). Patient also with tachypnea, concern for respiratory failure started on BiPAP. Level 1 called for transient hypotension, patient given 2 unit PRBC in [facility] ED. 1/9 VSS: NPO after MN. Plan for Rib plating WED 1/10/24 1/10 underwent VATS, with partial lung decortication  Thoracoscopic removal of intrapleural foreign body 1/11VSS intubated sedated on pressors  chest tube lws.

## 2024-01-20 NOTE — BH CONSULTATION LIAISON ASSESSMENT NOTE - SUMMARY
48 M w/ PPHx depression and multiple past suicide attempts resulting in psychiatric admission to [facility], likely alcohol withdrawal seizures s/p phenobarbital taper, s/p VATS I/s/o multiple rib fractures, downgraded to medical floor from SICU, psychiatry consulted for positive PTSD screen. Interview limited by pain. Emotional distress understandable due to acute injury, pt may have underlying alcohol use disorder. Differential includes substance induced mood disorder, MDD.

## 2024-01-20 NOTE — PROGRESS NOTE ADULT - ASSESSMENT
48y Male with PMHx hepatitis C (diagnosed many years ago, never treated), depression transferred from outside hospital for trauma eval. Patient found down by roommate after unknown amount of time, found with multiple 4-11 L sided rib fractures with flail chest. Chest tube placed there with >1L output (old blood). Patient also with tachypnea, concern for respiratory failure started on BiPAP. Level 1 called for transient hypotension, patient given 2 unit PRBC in [facility] ED. Rib score 3. Pt now s/p VATS, with partial lung decortication and Thoracoscopic removal of intrapleural foreign body on 1/10; s/p chest tube removal on 1/14, downgraded to the floors, was found to have a fever to 102.3 (01/20)    Injuries:   - left posterolateral fourth, fifth, sixth, and 11th ribs, minimally displaced   - mildly displaced fractures of the left anterolateral fourth, fifth, sixth, and seventh ribs  - displaced fractures of the left posterior seventh, eighth, ninth, and 10th ribs.      PLAN:  - Pt refused fever workup  - SICU Consult  - Thoracic Surgery consult  - Antibiotics (IV Zosyn)  - OOB as tolerated  - VTE ppx: Lovenox SQ  - PT/OT/PM&R: AR      Trauma Surgery  [phone]

## 2024-01-20 NOTE — BH CONSULTATION LIAISON ASSESSMENT NOTE - NSBHCONSULTRECOMMENDOTHER_PSY_A_CORE FT
- optimize pain control per primary team  - pt reports poor sleep, c/w melatonin and can add trazodone 50 mg qHS  - recommend DCing seroquel if not agitated, AM dose may be sedating  - can consider PETH level to assess level of alcohol dependence/SBIRT consult  - pt not within time frame of diagnosing PTSD from this event, can re-present to [facility] once discharged - optimize pain control per primary team  - pt reports poor sleep, c/w melatonin and can add paroxetine 20 mg qHS  - recommend DCing seroquel if not agitated, AM dose may be sedating  - can consider PETH level to assess level of alcohol dependence/SBIRT consult  - pt not within time frame of diagnosing PTSD from this event, can re-present to [facility] once discharged

## 2024-01-20 NOTE — PHYSICAL THERAPY INITIAL EVALUATION ADULT - FOLLOWS COMMANDS/ANSWERS QUESTIONS, REHAB EVAL
100% of the time/25% of the time/able to follow single-step instructions
25% of the time/able to follow single-step instructions

## 2024-01-21 LAB
ALBUMIN SERPL ELPH-MCNC: 2.1 G/DL — LOW (ref 3.3–5)
ALBUMIN SERPL ELPH-MCNC: 2.2 G/DL — LOW (ref 3.3–5)
ALP SERPL-CCNC: 283 U/L — HIGH (ref 40–120)
ALP SERPL-CCNC: 285 U/L — HIGH (ref 40–120)
ALT FLD-CCNC: 120 U/L — HIGH (ref 10–45)
ALT FLD-CCNC: 146 U/L — HIGH (ref 10–45)
ANION GAP SERPL CALC-SCNC: 13 MMOL/L — SIGNIFICANT CHANGE UP (ref 5–17)
ANION GAP SERPL CALC-SCNC: 14 MMOL/L — SIGNIFICANT CHANGE UP (ref 5–17)
APTT BLD: 33.1 SEC — SIGNIFICANT CHANGE UP (ref 24.5–35.6)
AST SERPL-CCNC: 168 U/L — HIGH (ref 10–40)
AST SERPL-CCNC: 280 U/L — HIGH (ref 10–40)
BASE EXCESS BLDV CALC-SCNC: -3.9 MMOL/L — LOW (ref -2–3)
BILIRUB SERPL-MCNC: 0.3 MG/DL — SIGNIFICANT CHANGE UP (ref 0.2–1.2)
BILIRUB SERPL-MCNC: 0.3 MG/DL — SIGNIFICANT CHANGE UP (ref 0.2–1.2)
BLD GP AB SCN SERPL QL: NEGATIVE — SIGNIFICANT CHANGE UP
BUN SERPL-MCNC: 8 MG/DL — SIGNIFICANT CHANGE UP (ref 7–23)
BUN SERPL-MCNC: 8 MG/DL — SIGNIFICANT CHANGE UP (ref 7–23)
CA-I SERPL-SCNC: 1.13 MMOL/L — LOW (ref 1.15–1.33)
CALCIUM SERPL-MCNC: 7.6 MG/DL — LOW (ref 8.4–10.5)
CALCIUM SERPL-MCNC: 7.6 MG/DL — LOW (ref 8.4–10.5)
CHLORIDE BLDV-SCNC: 101 MMOL/L — SIGNIFICANT CHANGE UP (ref 96–108)
CHLORIDE SERPL-SCNC: 96 MMOL/L — SIGNIFICANT CHANGE UP (ref 96–108)
CHLORIDE SERPL-SCNC: 97 MMOL/L — SIGNIFICANT CHANGE UP (ref 96–108)
CO2 BLDV-SCNC: 21 MMOL/L — LOW (ref 22–26)
CO2 SERPL-SCNC: 18 MMOL/L — LOW (ref 22–31)
CO2 SERPL-SCNC: 19 MMOL/L — LOW (ref 22–31)
CREAT ?TM UR-MCNC: 100 MG/DL — SIGNIFICANT CHANGE UP
CREAT SERPL-MCNC: 1.21 MG/DL — SIGNIFICANT CHANGE UP (ref 0.5–1.3)
CREAT SERPL-MCNC: 1.3 MG/DL — SIGNIFICANT CHANGE UP (ref 0.5–1.3)
EGFR: 68 ML/MIN/1.73M2 — SIGNIFICANT CHANGE UP
EGFR: 74 ML/MIN/1.73M2 — SIGNIFICANT CHANGE UP
GAS PNL BLDV: 125 MMOL/L — LOW (ref 136–145)
GAS PNL BLDV: SIGNIFICANT CHANGE UP
GAS PNL BLDV: SIGNIFICANT CHANGE UP
GLUCOSE BLDV-MCNC: 93 MG/DL — SIGNIFICANT CHANGE UP (ref 70–99)
GLUCOSE SERPL-MCNC: 94 MG/DL — SIGNIFICANT CHANGE UP (ref 70–99)
GLUCOSE SERPL-MCNC: 98 MG/DL — SIGNIFICANT CHANGE UP (ref 70–99)
HCO3 BLDV-SCNC: 20 MMOL/L — LOW (ref 22–29)
HCT VFR BLD CALC: 21.5 % — LOW (ref 39–50)
HCT VFR BLDA CALC: 23 % — LOW (ref 39–51)
HGB BLD CALC-MCNC: 7.6 G/DL — LOW (ref 12.6–17.4)
HGB BLD-MCNC: 7.3 G/DL — LOW (ref 13–17)
HOROWITZ INDEX BLDV+IHG-RTO: 21 — SIGNIFICANT CHANGE UP
INR BLD: 1.25 RATIO — HIGH (ref 0.85–1.18)
LACTATE BLDV-MCNC: 0.9 MMOL/L — SIGNIFICANT CHANGE UP (ref 0.5–2)
MAGNESIUM SERPL-MCNC: 1.8 MG/DL — SIGNIFICANT CHANGE UP (ref 1.6–2.6)
MAGNESIUM SERPL-MCNC: 1.9 MG/DL — SIGNIFICANT CHANGE UP (ref 1.6–2.6)
MCHC RBC-ENTMCNC: 28 PG — SIGNIFICANT CHANGE UP (ref 27–34)
MCHC RBC-ENTMCNC: 34 GM/DL — SIGNIFICANT CHANGE UP (ref 32–36)
MCV RBC AUTO: 82.4 FL — SIGNIFICANT CHANGE UP (ref 80–100)
NRBC # BLD: 0 /100 WBCS — SIGNIFICANT CHANGE UP (ref 0–0)
OSMOLALITY UR: 369 MOS/KG — SIGNIFICANT CHANGE UP (ref 300–900)
PCO2 BLDV: 30 MMHG — LOW (ref 42–55)
PH BLDV: 7.43 — SIGNIFICANT CHANGE UP (ref 7.32–7.43)
PHOSPHATE SERPL-MCNC: 2.8 MG/DL — SIGNIFICANT CHANGE UP (ref 2.5–4.5)
PHOSPHATE SERPL-MCNC: 3.6 MG/DL — SIGNIFICANT CHANGE UP (ref 2.5–4.5)
PLATELET # BLD AUTO: 533 K/UL — HIGH (ref 150–400)
PO2 BLDV: 33 MMHG — SIGNIFICANT CHANGE UP (ref 25–45)
POTASSIUM BLDV-SCNC: 3.7 MMOL/L — SIGNIFICANT CHANGE UP (ref 3.5–5.1)
POTASSIUM SERPL-MCNC: 3.6 MMOL/L — SIGNIFICANT CHANGE UP (ref 3.5–5.3)
POTASSIUM SERPL-MCNC: 3.7 MMOL/L — SIGNIFICANT CHANGE UP (ref 3.5–5.3)
POTASSIUM SERPL-SCNC: 3.6 MMOL/L — SIGNIFICANT CHANGE UP (ref 3.5–5.3)
POTASSIUM SERPL-SCNC: 3.7 MMOL/L — SIGNIFICANT CHANGE UP (ref 3.5–5.3)
PROT SERPL-MCNC: 5.9 G/DL — LOW (ref 6–8.3)
PROT SERPL-MCNC: 5.9 G/DL — LOW (ref 6–8.3)
PROTHROM AB SERPL-ACNC: 13.7 SEC — HIGH (ref 9.5–13)
RBC # BLD: 2.61 M/UL — LOW (ref 4.2–5.8)
RBC # FLD: 14.9 % — HIGH (ref 10.3–14.5)
RH IG SCN BLD-IMP: POSITIVE — SIGNIFICANT CHANGE UP
SAO2 % BLDV: 52.4 % — LOW (ref 67–88)
SODIUM SERPL-SCNC: 128 MMOL/L — LOW (ref 135–145)
SODIUM SERPL-SCNC: 129 MMOL/L — LOW (ref 135–145)
SODIUM UR-SCNC: 65 MMOL/L — SIGNIFICANT CHANGE UP
WBC # BLD: 7.29 K/UL — SIGNIFICANT CHANGE UP (ref 3.8–10.5)
WBC # FLD AUTO: 7.29 K/UL — SIGNIFICANT CHANGE UP (ref 3.8–10.5)

## 2024-01-21 PROCEDURE — 71045 X-RAY EXAM CHEST 1 VIEW: CPT | Mod: 26

## 2024-01-21 PROCEDURE — 99232 SBSQ HOSP IP/OBS MODERATE 35: CPT

## 2024-01-21 RX ORDER — CALCIUM CARBONATE 500(1250)
1 TABLET ORAL EVERY 4 HOURS
Refills: 0 | Status: DISCONTINUED | OUTPATIENT
Start: 2024-01-21 | End: 2024-01-25

## 2024-01-21 RX ORDER — PHYTONADIONE (VIT K1) 5 MG
10 TABLET ORAL ONCE
Refills: 0 | Status: COMPLETED | OUTPATIENT
Start: 2024-01-22 | End: 2024-01-22

## 2024-01-21 RX ORDER — ACETAMINOPHEN 500 MG
500 TABLET ORAL ONCE
Refills: 0 | Status: COMPLETED | OUTPATIENT
Start: 2024-01-21 | End: 2024-01-21

## 2024-01-21 RX ORDER — PHYTONADIONE (VIT K1) 5 MG
10 TABLET ORAL ONCE
Refills: 0 | Status: COMPLETED | OUTPATIENT
Start: 2024-01-21 | End: 2024-01-21

## 2024-01-21 RX ADMIN — Medication 1 TABLET(S): at 12:43

## 2024-01-21 RX ADMIN — Medication 1 MILLIGRAM(S): at 11:33

## 2024-01-21 RX ADMIN — LIDOCAINE 1 PATCH: 4 CREAM TOPICAL at 19:21

## 2024-01-21 RX ADMIN — PIPERACILLIN AND TAZOBACTAM 25 GRAM(S): 4; .5 INJECTION, POWDER, LYOPHILIZED, FOR SOLUTION INTRAVENOUS at 05:36

## 2024-01-21 RX ADMIN — CHLORHEXIDINE GLUCONATE 1 APPLICATION(S): 213 SOLUTION TOPICAL at 11:44

## 2024-01-21 RX ADMIN — Medication 102 MILLIGRAM(S): at 11:57

## 2024-01-21 RX ADMIN — PIPERACILLIN AND TAZOBACTAM 25 GRAM(S): 4; .5 INJECTION, POWDER, LYOPHILIZED, FOR SOLUTION INTRAVENOUS at 14:29

## 2024-01-21 RX ADMIN — LIDOCAINE 1 PATCH: 4 CREAM TOPICAL at 05:15

## 2024-01-21 RX ADMIN — QUETIAPINE FUMARATE 25 MILLIGRAM(S): 200 TABLET, FILM COATED ORAL at 05:36

## 2024-01-21 RX ADMIN — PIPERACILLIN AND TAZOBACTAM 25 GRAM(S): 4; .5 INJECTION, POWDER, LYOPHILIZED, FOR SOLUTION INTRAVENOUS at 21:45

## 2024-01-21 RX ADMIN — Medication 200 MILLIGRAM(S): at 06:32

## 2024-01-21 RX ADMIN — Medication 50 MILLIGRAM(S): at 21:45

## 2024-01-21 RX ADMIN — LIDOCAINE 1 PATCH: 4 CREAM TOPICAL at 05:40

## 2024-01-21 RX ADMIN — Medication 5 MILLIGRAM(S): at 21:45

## 2024-01-21 RX ADMIN — PREGABALIN 1000 MICROGRAM(S): 225 CAPSULE ORAL at 11:34

## 2024-01-21 RX ADMIN — Medication 63.75 MILLIMOLE(S): at 01:52

## 2024-01-21 RX ADMIN — Medication 100 MILLIGRAM(S): at 11:33

## 2024-01-21 RX ADMIN — Medication 1 TABLET(S): at 11:33

## 2024-01-21 RX ADMIN — Medication 50 MILLIGRAM(S): at 10:39

## 2024-01-21 RX ADMIN — QUETIAPINE FUMARATE 75 MILLIGRAM(S): 200 TABLET, FILM COATED ORAL at 21:44

## 2024-01-21 RX ADMIN — Medication 500 MILLIGRAM(S): at 11:32

## 2024-01-21 NOTE — PROGRESS NOTE ADULT - ASSESSMENT
This is a  54yo M w pmhx of Hep C and possible EtOH use disorder who presents after being found down. Intubated for respiratory distress and agitation 1/8. Pt has displaced 4-11 L rib fx with flail segment.     1/9 VSS: NPO after MN. Plan for Rib plating WED 1/10/24  1/10 s/p Left VATS, with partial lung decortication  Thoracoscopic removal of intrapleural foreign body    1/11VSS intubated sedated on pressors  chest tube lws   1/12 VSS, pt still remains intubated/sedated, getting phenobarbital for agitation. Left chest tube output 75cc/24h placed on water seal this AM. 1U PRBC -H/H 7.1/20. Continue care per SICU team  1/13 Maintain L pleural tube waterseal Daily CXR Care as per SICU team  1/14 Left pleural tube removed CXR ordered; thoracic signed off  1/20 thoracic surgery reconsulted for left pleural effusion vs hemothorax; d/w Dr. Sepulveda; chest ct done- Dr. Sepulveda reviewed; call IR for potential drainage monday left pocket   1/21 VSS, pt still febrile tmax 103, WBC 8.4, IR consulted for drainage of complex left pleural effusion on CT Chest. Plan for Monday.

## 2024-01-21 NOTE — PROGRESS NOTE ADULT - ASSESSMENT
48yoM w/ PMHx hepatitis C and EtOH abuse was transferred from OSH on 1/6 for level 1 trauma eval. He was found down with multiple 4-11 left-sided rib fractures and flail chest, and had a chest tube placed at OSH. S/p VATS 1/10 w/ partial lung decortication and thoracoscopic removal of intrapleural foreign body. S/p chest tube removal 1/14, downgraded to floor 1/18, returned to SICU after fever 104. 1/20 CT with complex collection L pleural space, pending drainage w/ IR 1/22.    Plan:  - pending drainage of collection L pleural space w/ IR 1/22.  - Antibiotics (IV Zosyn)  - OOB as tolerated  - VTE ppx: Lovenox SQ  - PT/OT/PM&R: AR    Trauma Surgery  [phone]     48yoM w/ PMHx hepatitis C and EtOH abuse was transferred from OSH on 1/6 for level 1 trauma eval. He was found down with multiple 4-11 left-sided rib fractures and flail chest, and had a chest tube placed at OSH. S/p VATS 1/10 w/ partial lung decortication and thoracoscopic removal of intrapleural foreign body. S/p chest tube removal 1/14, downgraded to floor 1/18, returned to SICU after fever 104. 1/20 CT with complex collection L pleural space, pending drainage w/ IR 1/22.    Plan:  - F/u IR recs pending drainage of collection L pleural space w/ IR 1/22.  - Antibiotics (IV Zosyn)  - OOB as tolerated  - VTE ppx: Lovenox SQ  - PT/OT/PM&R: AR  - Appreciate excellent care per SICU    Trauma Surgery  [phone]

## 2024-01-21 NOTE — PROGRESS NOTE ADULT - NS ATTEND AMEND GEN_ALL_CORE FT
discussed findings on ct with patient - including the findings during his prior VATS, foreign body retrieved   now with fevers/collection   plan for IR pigtail of collection. if patient fails pigtail/MIST would require thoracotomy for decortication, given the fractures and prior hemothorax/fibrothorax.

## 2024-01-21 NOTE — PROGRESS NOTE ADULT - SUBJECTIVE AND OBJECTIVE BOX
INTERVAL EVENTS:  -metoprolol 50mg q12h PO added  -febrile to 104  -tachycardic, soft bp: 500NS bolus  -consult IR this AM to drain L pleural collection    SUBJECTIVE/ROS:  [ ] A ten-point review of systems was otherwise negative except as noted.  [ ] Due to altered mental status/intubation, subjective information were not able to be obtained from the patient. History was obtained, to the extent possible, from review of the chart and collateral sources of information.      NEURO  Exam: awake, alert, oriented. Agitated at times, refuses meds/interventions  Meds: acetaminophen     Tablet .. 500 milliGRAM(s) Oral every 6 hours PRN Moderate Pain (4 - 6)  HYDROmorphone  Injectable 1 milliGRAM(s) IV Push every 3 hours PRN Breakthrough  melatonin 5 milliGRAM(s) Oral at bedtime  oxyCODONE    IR 10 milliGRAM(s) Oral every 4 hours PRN Severe Pain (7 - 10)  oxyCODONE    IR 5 milliGRAM(s) Oral every 4 hours PRN Moderate Pain (4 - 6)  QUEtiapine 75 milliGRAM(s) Oral <User Schedule>  QUEtiapine 25 milliGRAM(s) Oral <User Schedule>    [x] Adequacy of sedation and pain control has been assessed and adjusted      RESPIRATORY  RR: 35 (01-21-24 @ 00:00) (18 - 48)  SpO2: 95% (01-21-24 @ 00:00) (90% - 96%)  Exam: unlabored, clear to auscultation bilaterally      CARDIOVASCULAR  HR: 100 (01-21-24 @ 00:00) (100 - 135)  BP: 93/54 (01-21-24 @ 00:00) (85/46 - 151/92)  BP(mean): 71 (01-21-24 @ 00:00) (60 - 117)  VBG - ( 20 Jan 2024 22:33 )  pH: 7.41  /  pCO2: 31    /  pO2: 25    / HCO3: 20    / Base Excess: -4.4  /  SaO2: 35.6   Lactate: 1.1       Exam: regular rate and rhythm  Cardiac Rhythm: sinus  Perfusion     [x]Adequate   [ ]Inadequate  Mentation   [x]Normal       [ ]Reduced  Extremities  [x]Warm         [ ]Cool  Volume Status [ ]Hypervolemic [x]Euvolemic [ ]Hypovolemic  Meds: metoprolol tartrate 50 milliGRAM(s) Oral every 12 hours      GI/NUTRITION  Exam: soft, nontender, nondistended  Diet: reg  Meds: famotidine    Tablet 20 milliGRAM(s) Oral daily PRN Heartburn      GENITOURINARY  I&O's Detail    01-19 @ 07:01 - 01-20 @ 07:00  --------------------------------------------------------  IN:    IV PiggyBack: 100 mL    Oral Fluid: 820 mL    Sodium Chloride 0.9% Bolus: 500 mL  Total IN: 1420 mL    OUT:    Voided (mL): 1950 mL  Total OUT: 1950 mL    Total NET: -530 mL      01-20 @ 07:01 - 01-21 @ 00:26  --------------------------------------------------------  IN:    IV PiggyBack: 175 mL    Oral Fluid: 1300 mL  Total IN: 1475 mL    OUT:    VAC (Vacuum Assisted Closure) System (mL): 0 mL    Voided (mL): 650 mL  Total OUT: 650 mL    Total NET: 825 mL          01-20    128<L>  |  97  |  7   ----------------------------<  104<H>  4.0   |  18<L>  |  1.32<H>    Ca    8.0<L>      20 Jan 2024 22:42  Phos  2.7     01-20  Mg     2.2     01-20    TPro  6.1  /  Alb  2.4<L>  /  TBili  0.3  /  DBili  x   /  AST  207<H>  /  ALT  135<H>  /  AlkPhos  318<H>  01-20    [ ] Hendrix catheter, indication: N/A  Meds: ascorbic acid 500 milliGRAM(s) Oral daily  cyanocobalamin 1000 MICROGram(s) Oral daily  folic acid 1 milliGRAM(s) Oral daily  multivitamin 1 Tablet(s) Oral daily  sodium phosphate 15 milliMole(s)/250 mL IVPB 15 milliMole(s) IV Intermittent once  thiamine 100 milliGRAM(s) Oral daily        HEMATOLOGIC  Meds: enoxaparin Injectable 40 milliGRAM(s) SubCutaneous every 24 hours    [x] VTE Prophylaxis                        7.9    8.41  )-----------( 579      ( 20 Jan 2024 22:42 )             23.2     PT/INR - ( 20 Jan 2024 22:42 )   PT: 21.1 sec;   INR: 2.05 ratio         PTT - ( 20 Jan 2024 22:42 )  PTT:36.4 sec  Transfusion     [ ] PRBC   [ ] Platelets   [ ] FFP   [ ] Cryoprecipitate      INFECTIOUS DISEASES  WBC Count: 8.41 K/uL (01-20 @ 22:42)  WBC Count: 9.71 K/uL (01-20 @ 15:13)    RECENT CULTURES:  Specimen Source: .Blood Blood-Peripheral  Date/Time: 01-17 @ 06:26  Culture Results:   No growth at 72 Hours  Gram Stain: --  Organism: --    Meds: influenza   Vaccine 0.5 milliLiter(s) IntraMuscular once  piperacillin/tazobactam IVPB.. 3.375 Gram(s) IV Intermittent every 8 hours      ENDOCRINE  CAPILLARY BLOOD GLUCOSE      OTHER MEDICATIONS:  chlorhexidine 2% Cloths 1 Application(s) Topical daily  lidocaine   4% Patch 1 Patch Transdermal every 24 hours      CODE STATUS: full code

## 2024-01-21 NOTE — CONSULT NOTE ADULT - ASSESSMENT
-----------------------------------------------------------  Interventional Radiology Brief Consult Note  -----------------------------------------------------------    Reason for Referral: chest tube placement     Clinical Summary: 48y Male with pmh of ETOH abuse and hepatitis C who was transferred as a level 1 trauma with multiple rib fractures and flail chest s/p VATs, partial lung decortication and thoracoscopic removal of foreign body s/p chest tube removal 1/14. CT from 1/20 is significant for a complex collection in the left pleural space that IR is consulted to drain.     Vitals:  T(F): 100.1 (01-21-24 @ 07:00), Max: 105.8 (01-20-24 @ 22:25)  HR: 110 (01-21-24 @ 07:00) (94 - 135)  BP: 102/62 (01-21-24 @ 07:00) (78/44 - 149/97)  RR: 37 (01-21-24 @ 07:00) (18 - 48)  SpO2: 97% (01-21-24 @ 07:00) (90% - 98%)    Labs:           7.9  8.41)-----(579     (01-20-24 @ 22:42)         23.2     128 | 97 | 7  --------------------< 104     (01-20-24 @ 22:42)  4.0 | 18 | 1.32       PT: 21.1<H> 01-20-24 @ 22:42  aPTT: 36.4<H> 01-20-24 @ 22:42   INR: 2.05<H> 01-20-24 @ 22:42    Imaging: CT chest abdomen pelvis 1/20/24 was reviewed     Assessment: 48y Male with complex left pleural effusion s/p level 1 trauma requiring surgery. IR is consulted for pleural effusion drainage.     Recommendations:  - Plan for drainage of complex left pleural effusion on Monday. Please place IR procedure order under Dr. Paula.   - NPO at midnight. Updated cbc, bmp and coags.   - Hold prophylactic ac.        -----------------------------------------------------------  Interventional Radiology Brief Consult Note  -----------------------------------------------------------    Reason for Referral: chest tube placement     Clinical Summary: 48y Male with pmh of ETOH abuse and hepatitis C who was transferred as a level 1 trauma with multiple rib fractures and flail chest s/p VATs, partial lung decortication and thoracoscopic removal of foreign body s/p chest tube removal 1/14. CT from 1/20 is significant for a complex collection in the left pleural space that IR is consulted to drain.     Vitals:  T(F): 100.1 (01-21-24 @ 07:00), Max: 105.8 (01-20-24 @ 22:25)  HR: 110 (01-21-24 @ 07:00) (94 - 135)  BP: 102/62 (01-21-24 @ 07:00) (78/44 - 149/97)  RR: 37 (01-21-24 @ 07:00) (18 - 48)  SpO2: 97% (01-21-24 @ 07:00) (90% - 98%)    Labs:           7.9  8.41)-----(579     (01-20-24 @ 22:42)         23.2     128 | 97 | 7  --------------------< 104     (01-20-24 @ 22:42)  4.0 | 18 | 1.32       PT: 21.1<H> 01-20-24 @ 22:42  aPTT: 36.4<H> 01-20-24 @ 22:42   INR: 2.05<H> 01-20-24 @ 22:42    Imaging: CT chest abdomen pelvis 1/20/24 was reviewed     Assessment: 48y Male with complex left pleural effusion s/p level 1 trauma requiring surgery. IR is consulted for pleural effusion drainage.     Recommendations:  - Plan for drainage of complex left pleural effusion on Monday. Please place IR procedure order under Dr. Paula.   - NPO at midnight. Updated cbc, bmp and coags.   - Correct coagulopathy with goal INR <1.5  - Hold prophylactic ac.

## 2024-01-21 NOTE — PROGRESS NOTE ADULT - ASSESSMENT
48yoM w/ PMHx hepatitis C and EtOH abuse was transferred from OSH on 1/6 for level 1 trauma eval. He was found down with multiple 4-11 left-sided rib fractures and flail chest, and had a chest tube placed at OSH. S/p VATS 1/10 w/ partial lung decortication and thoracoscopic removal of intrapleural foreign body. S/p chest tube removal 1/14, downgraded to floor 1/18, returned to SICU after fever 102.3 on 1/20.    PLAN  Neurologic:  - Seroquel 75mg QHS  - multimodal pain control w/ lidocaine patch, Tylenol, oxycodone, Dilaudid PRN     Respiratory:  - s/p VATS 1/10 with discovery of Fibropurulent exudative adhesions and removal of retained foreign material likely the tip of a glove, s/p washout, with new 28f chest tube placed > removed 1/14  - Incentive spirometry, Duonebs   - AM CXR  - 1/20 CT with complex collection L pleural space, plan for IR consult today for drainage    Cardiovascular:  - off pressors MAP >65  - Metoprolol 50 BID    Gastrointestinal/Nutrition:  - Regular diet  - Pepcid    Genitourinary/Renal:  - Supplement electrolytes PRN  - 1/16: KEZIA, Renal u/s unremarkable, urine lytes intrinsic  - trend creatinine  - voiding spontaneously    Hematologic:  - Chemical VTE ppx with Lovenox  - Mechanical VTE ppx with SCDs    Infectious Disease: Empyema   - Procal 9 on 1/7 - downtrended to 0.64 on 1/12  - Tissue culture 1/10 no organisms seen   - Bcx 1/13 with staph epi x1 bottle, however repeat BCx 1/14, 1/15 negative  - Vanc 1/10-1/16, stopped in setting of worsening renal failure  - Continue Zosyn 1/7, end date 1/24    Endocrine:  - No active issues, no history of DM    MSK:   - sat on dee tray? wound right lateral buttox with vac

## 2024-01-21 NOTE — PROGRESS NOTE ADULT - SUBJECTIVE AND OBJECTIVE BOX
ACS Progress Note    S: Patient seen and examined. Patient tachy given IVF bolus. Febrile to 104 (rectal)      O:  Vital Signs Last 24 Hrs  T(C): 37.8 (21 Jan 2024 15:00), Max: 41 (20 Jan 2024 22:25)  T(F): 100.1 (21 Jan 2024 15:00), Max: 105.8 (20 Jan 2024 22:25)  HR: 114 (21 Jan 2024 19:00) (94 - 135)  BP: 107/61 (21 Jan 2024 19:00) (78/44 - 149/85)  BP(mean): 78 (21 Jan 2024 19:00) (56 - 112)  RR: 30 (21 Jan 2024 19:00) (20 - 48)  SpO2: 97% (21 Jan 2024 19:00) (89% - 100%)    Parameters below as of 21 Jan 2024 05:00  Patient On (Oxygen Delivery Method): room air        I&O's Detail    20 Jan 2024 07:01  -  21 Jan 2024 07:00  --------------------------------------------------------  IN:    IV PiggyBack: 600 mL    IV PiggyBack: 150 mL    IV PiggyBack: 575 mL    Oral Fluid: 1350 mL  Total IN: 2675 mL    OUT:    VAC (Vacuum Assisted Closure) System (mL): 0 mL    Voided (mL): 1050 mL  Total OUT: 1050 mL    Total NET: 1625 mL      21 Jan 2024 07:01  -  21 Jan 2024 19:52  --------------------------------------------------------  IN:    IV PiggyBack: 50 mL    IV PiggyBack: 150 mL  Total IN: 200 mL    OUT:    Voided (mL): 100 mL  Total OUT: 100 mL    Total NET: 100 mL          MEDICATIONS  (STANDING):  ascorbic acid 500 milliGRAM(s) Oral daily  chlorhexidine 2% Cloths 1 Application(s) Topical daily  cyanocobalamin 1000 MICROGram(s) Oral daily  folic acid 1 milliGRAM(s) Oral daily  influenza   Vaccine 0.5 milliLiter(s) IntraMuscular once  lidocaine   4% Patch 1 Patch Transdermal every 24 hours  melatonin 5 milliGRAM(s) Oral at bedtime  metoprolol tartrate 50 milliGRAM(s) Oral every 12 hours  multivitamin 1 Tablet(s) Oral daily  piperacillin/tazobactam IVPB.. 3.375 Gram(s) IV Intermittent every 8 hours  QUEtiapine 25 milliGRAM(s) Oral <User Schedule>  QUEtiapine 75 milliGRAM(s) Oral <User Schedule>  thiamine 100 milliGRAM(s) Oral daily    MEDICATIONS  (PRN):  acetaminophen     Tablet .. 500 milliGRAM(s) Oral every 6 hours PRN Moderate Pain (4 - 6)  calcium carbonate    500 mG (Tums) Chewable 1 Tablet(s) Chew every 4 hours PRN Heartburn  famotidine    Tablet 20 milliGRAM(s) Oral daily PRN Heartburn  HYDROmorphone  Injectable 1 milliGRAM(s) IV Push every 3 hours PRN Breakthrough  oxyCODONE    IR 5 milliGRAM(s) Oral every 4 hours PRN Moderate Pain (4 - 6)  oxyCODONE    IR 10 milliGRAM(s) Oral every 4 hours PRN Severe Pain (7 - 10)                            7.9    8.41  )-----------( 579      ( 20 Jan 2024 22:42 )             23.2       01-21    128<L>  |  97  |  8   ----------------------------<  98  3.6   |  18<L>  |  1.21    Ca    7.6<L>      21 Jan 2024 11:45  Phos  3.6     01-21  Mg     1.9     01-21    TPro  5.9<L>  /  Alb  2.1<L>  /  TBili  0.3  /  DBili  x   /  AST  168<H>  /  ALT  120<H>  /  AlkPhos  283<H>  01-21    PHYSICAL EXAM:  Constitutional: Well developed, well nourished, NAD  Pulmonary: Symmetric chest rise bilaterally, no increased WOB  Gastrointestinal: Abdomen soft, nontender, nondistended  Extremities: Warm to touch, soft, normal strength, sensory and motor intact. No cyanosis/erythema/edema

## 2024-01-21 NOTE — PROGRESS NOTE ADULT - SUBJECTIVE AND OBJECTIVE BOX
Patient is a 48y old  Male who presents with a chief complaint of Trauma 1 activation (2024 08:58)      Vital Signs Last 24 Hrs  T(C): 37.8 (24 @ 07:00), Max: 41 (24 @ 22:25)  T(F): 100.1 (24 @ 07:00), Max: 105.8 (24 @ 22:25)  HR: 108 (24 @ 09:00) (94 - 135)  BP: 113/68 (24 @ 08:00) (78/44 - 149/97)  RR: 46 (24 @ 09:00) (18 - 48)  SpO2: 98% (24 @ 09:00) (90% - 98%)            24 @ 07:01  -  24 @ 07:00  --------------------------------------------------------  IN: 2675 mL / OUT: 1050 mL / NET: 1625 mL      Daily Weight in k (2024 01:00)                          7.9    8.41  )-----------( 579      ( 2024 22:42 )             23.2     128<L>  |  97  |  7   ----------------------------<  104<H>  4.0   |  18<L>  |  1.32<H>          PHYSICAL EXAM  Neurology: A&Ox3, NAD  CV : RRR+S1S2  Lungs: Respirations non-labored, B/L BS clear, diminished at bases  L>R  Abdomen: Soft, NT/ND, +BSx4Q  Extremities: B/L LE warm, no edema, +PP             MEDICATIONS  acetaminophen     Tablet .. 500 milliGRAM(s) Oral every 6 hours PRN  ascorbic acid 500 milliGRAM(s) Oral daily  chlorhexidine 2% Cloths 1 Application(s) Topical daily  cyanocobalamin 1000 MICROGram(s) Oral daily  enoxaparin Injectable 40 milliGRAM(s) SubCutaneous every 24 hours  famotidine    Tablet 20 milliGRAM(s) Oral daily PRN  folic acid 1 milliGRAM(s) Oral daily  HYDROmorphone  Injectable 1 milliGRAM(s) IV Push every 3 hours PRN  influenza   Vaccine 0.5 milliLiter(s) IntraMuscular once  lidocaine   4% Patch 1 Patch Transdermal every 24 hours  melatonin 5 milliGRAM(s) Oral at bedtime  metoprolol tartrate 50 milliGRAM(s) Oral every 12 hours  multivitamin 1 Tablet(s) Oral daily  oxyCODONE    IR 5 milliGRAM(s) Oral every 4 hours PRN  oxyCODONE    IR 10 milliGRAM(s) Oral every 4 hours PRN  piperacillin/tazobactam IVPB.. 3.375 Gram(s) IV Intermittent every 8 hours  QUEtiapine 75 milliGRAM(s) Oral <User Schedule>  QUEtiapine 25 milliGRAM(s) Oral <User Schedule>  thiamine 100 milliGRAM(s) Oral daily

## 2024-01-21 NOTE — PROGRESS NOTE ADULT - PROBLEM SELECTOR PLAN 1
Suspected fracture of rib on left side, closed. Left Rib Fx 4-11th /Hemothorax  1/10 underwent LT VATS, with partial lung decortication  Thoracoscopic removal of intrapleural foreign body    DCd left chest tube 1/14 1/20 thoracic surgery reconsulted for left pleural effusion vs hemothorax  chest ct done- Dr. Sepulveda reviewed; no acute thoracic surgery intervention -rec IR consult  IR consulted for drainage of left complex effusion, planned for Monday 1/22.   Care as per primary team

## 2024-01-22 LAB
ALBUMIN SERPL ELPH-MCNC: 2 G/DL — LOW (ref 3.3–5)
ALBUMIN SERPL ELPH-MCNC: 2.2 G/DL — LOW (ref 3.3–5)
ALP SERPL-CCNC: 298 U/L — HIGH (ref 40–120)
ALP SERPL-CCNC: 306 U/L — HIGH (ref 40–120)
ALT FLD-CCNC: 183 U/L — HIGH (ref 10–45)
ALT FLD-CCNC: 312 U/L — HIGH (ref 10–45)
ANION GAP SERPL CALC-SCNC: 14 MMOL/L — SIGNIFICANT CHANGE UP (ref 5–17)
ANION GAP SERPL CALC-SCNC: 15 MMOL/L — SIGNIFICANT CHANGE UP (ref 5–17)
APTT BLD: 25.7 SEC — SIGNIFICANT CHANGE UP (ref 24.5–35.6)
APTT BLD: 34.7 SEC — SIGNIFICANT CHANGE UP (ref 24.5–35.6)
AST SERPL-CCNC: 403 U/L — HIGH (ref 10–40)
AST SERPL-CCNC: 845 U/L — HIGH (ref 10–40)
BASE EXCESS BLDV CALC-SCNC: -11.6 MMOL/L — LOW (ref -2–3)
BASE EXCESS BLDV CALC-SCNC: -6.2 MMOL/L — LOW (ref -2–3)
BILIRUB SERPL-MCNC: 0.3 MG/DL — SIGNIFICANT CHANGE UP (ref 0.2–1.2)
BILIRUB SERPL-MCNC: 0.4 MG/DL — SIGNIFICANT CHANGE UP (ref 0.2–1.2)
BUN SERPL-MCNC: 10 MG/DL — SIGNIFICANT CHANGE UP (ref 7–23)
BUN SERPL-MCNC: 7 MG/DL — SIGNIFICANT CHANGE UP (ref 7–23)
CA-I SERPL-SCNC: 1.04 MMOL/L — LOW (ref 1.15–1.33)
CA-I SERPL-SCNC: 1.23 MMOL/L — SIGNIFICANT CHANGE UP (ref 1.15–1.33)
CALCIUM SERPL-MCNC: 6.3 MG/DL — CRITICAL LOW (ref 8.4–10.5)
CALCIUM SERPL-MCNC: 8.3 MG/DL — LOW (ref 8.4–10.5)
CHLORIDE BLDV-SCNC: 100 MMOL/L — SIGNIFICANT CHANGE UP (ref 96–108)
CHLORIDE BLDV-SCNC: 106 MMOL/L — SIGNIFICANT CHANGE UP (ref 96–108)
CHLORIDE SERPL-SCNC: 104 MMOL/L — SIGNIFICANT CHANGE UP (ref 96–108)
CHLORIDE SERPL-SCNC: 96 MMOL/L — SIGNIFICANT CHANGE UP (ref 96–108)
CO2 BLDV-SCNC: 14 MMOL/L — LOW (ref 22–26)
CO2 BLDV-SCNC: 20 MMOL/L — LOW (ref 22–26)
CO2 SERPL-SCNC: 14 MMOL/L — LOW (ref 22–31)
CO2 SERPL-SCNC: 15 MMOL/L — LOW (ref 22–31)
CREAT SERPL-MCNC: 1.03 MG/DL — SIGNIFICANT CHANGE UP (ref 0.5–1.3)
CREAT SERPL-MCNC: 1.32 MG/DL — HIGH (ref 0.5–1.3)
CULTURE RESULTS: SIGNIFICANT CHANGE UP
CULTURE RESULTS: SIGNIFICANT CHANGE UP
EGFR: 67 ML/MIN/1.73M2 — SIGNIFICANT CHANGE UP
EGFR: 90 ML/MIN/1.73M2 — SIGNIFICANT CHANGE UP
GAS PNL BLDV: 125 MMOL/L — LOW (ref 136–145)
GAS PNL BLDV: 133 MMOL/L — LOW (ref 136–145)
GAS PNL BLDV: SIGNIFICANT CHANGE UP
GLUCOSE BLDV-MCNC: 105 MG/DL — HIGH (ref 70–99)
GLUCOSE BLDV-MCNC: 85 MG/DL — SIGNIFICANT CHANGE UP (ref 70–99)
GLUCOSE SERPL-MCNC: 106 MG/DL — HIGH (ref 70–99)
GLUCOSE SERPL-MCNC: 84 MG/DL — SIGNIFICANT CHANGE UP (ref 70–99)
GRAM STN FLD: SIGNIFICANT CHANGE UP
HCO3 BLDV-SCNC: 13 MMOL/L — LOW (ref 22–29)
HCO3 BLDV-SCNC: 19 MMOL/L — LOW (ref 22–29)
HCT VFR BLD CALC: 18.7 % — CRITICAL LOW (ref 39–50)
HCT VFR BLD CALC: 23.5 % — LOW (ref 39–50)
HCT VFR BLDA CALC: 20 % — CRITICAL LOW (ref 39–51)
HCT VFR BLDA CALC: 24 % — LOW (ref 39–51)
HGB BLD CALC-MCNC: 6.7 G/DL — CRITICAL LOW (ref 12.6–17.4)
HGB BLD CALC-MCNC: 8.1 G/DL — LOW (ref 12.6–17.4)
HGB BLD-MCNC: 6.4 G/DL — CRITICAL LOW (ref 13–17)
HGB BLD-MCNC: 7.9 G/DL — LOW (ref 13–17)
HOROWITZ INDEX BLDV+IHG-RTO: 21 — SIGNIFICANT CHANGE UP
HOROWITZ INDEX BLDV+IHG-RTO: 28 — SIGNIFICANT CHANGE UP
INR BLD: 1.13 RATIO — SIGNIFICANT CHANGE UP (ref 0.85–1.18)
INR BLD: 1.25 RATIO — HIGH (ref 0.85–1.18)
LACTATE BLDV-MCNC: 0.6 MMOL/L — SIGNIFICANT CHANGE UP (ref 0.5–2)
LACTATE BLDV-MCNC: 1 MMOL/L — SIGNIFICANT CHANGE UP (ref 0.5–2)
MAGNESIUM SERPL-MCNC: 1.5 MG/DL — LOW (ref 1.6–2.6)
MAGNESIUM SERPL-MCNC: 2.2 MG/DL — SIGNIFICANT CHANGE UP (ref 1.6–2.6)
MCHC RBC-ENTMCNC: 28.1 PG — SIGNIFICANT CHANGE UP (ref 27–34)
MCHC RBC-ENTMCNC: 28.3 PG — SIGNIFICANT CHANGE UP (ref 27–34)
MCHC RBC-ENTMCNC: 33.6 GM/DL — SIGNIFICANT CHANGE UP (ref 32–36)
MCHC RBC-ENTMCNC: 34.2 GM/DL — SIGNIFICANT CHANGE UP (ref 32–36)
MCV RBC AUTO: 82.7 FL — SIGNIFICANT CHANGE UP (ref 80–100)
MCV RBC AUTO: 83.6 FL — SIGNIFICANT CHANGE UP (ref 80–100)
NRBC # BLD: 0 /100 WBCS — SIGNIFICANT CHANGE UP (ref 0–0)
NRBC # BLD: 0 /100 WBCS — SIGNIFICANT CHANGE UP (ref 0–0)
PCO2 BLDV: 26 MMHG — LOW (ref 42–55)
PCO2 BLDV: 34 MMHG — LOW (ref 42–55)
PH BLDV: 7.32 — SIGNIFICANT CHANGE UP (ref 7.32–7.43)
PH BLDV: 7.35 — SIGNIFICANT CHANGE UP (ref 7.32–7.43)
PHOSPHATE SERPL-MCNC: 2.8 MG/DL — SIGNIFICANT CHANGE UP (ref 2.5–4.5)
PHOSPHATE SERPL-MCNC: 4.2 MG/DL — SIGNIFICANT CHANGE UP (ref 2.5–4.5)
PLATELET # BLD AUTO: 384 K/UL — SIGNIFICANT CHANGE UP (ref 150–400)
PLATELET # BLD AUTO: 507 K/UL — HIGH (ref 150–400)
PO2 BLDV: 39 MMHG — SIGNIFICANT CHANGE UP (ref 25–45)
PO2 BLDV: 46 MMHG — HIGH (ref 25–45)
POTASSIUM BLDV-SCNC: 2.8 MMOL/L — CRITICAL LOW (ref 3.5–5.1)
POTASSIUM BLDV-SCNC: 3.6 MMOL/L — SIGNIFICANT CHANGE UP (ref 3.5–5.1)
POTASSIUM SERPL-MCNC: 2.9 MMOL/L — CRITICAL LOW (ref 3.5–5.3)
POTASSIUM SERPL-MCNC: 3.6 MMOL/L — SIGNIFICANT CHANGE UP (ref 3.5–5.3)
POTASSIUM SERPL-SCNC: 2.9 MMOL/L — CRITICAL LOW (ref 3.5–5.3)
POTASSIUM SERPL-SCNC: 3.6 MMOL/L — SIGNIFICANT CHANGE UP (ref 3.5–5.3)
PROCALCITONIN SERPL-MCNC: 1.82 NG/ML — HIGH (ref 0.02–0.1)
PROT SERPL-MCNC: 5.2 G/DL — LOW (ref 6–8.3)
PROT SERPL-MCNC: 6.4 G/DL — SIGNIFICANT CHANGE UP (ref 6–8.3)
PROTHROM AB SERPL-ACNC: 12.4 SEC — SIGNIFICANT CHANGE UP (ref 9.5–13)
PROTHROM AB SERPL-ACNC: 13 SEC — SIGNIFICANT CHANGE UP (ref 9.5–13)
RBC # BLD: 2.26 M/UL — LOW (ref 4.2–5.8)
RBC # BLD: 2.81 M/UL — LOW (ref 4.2–5.8)
RBC # FLD: 15.3 % — HIGH (ref 10.3–14.5)
RBC # FLD: 15.4 % — HIGH (ref 10.3–14.5)
SAO2 % BLDV: 63.1 % — LOW (ref 67–88)
SAO2 % BLDV: 76.9 % — SIGNIFICANT CHANGE UP (ref 67–88)
SODIUM SERPL-SCNC: 126 MMOL/L — LOW (ref 135–145)
SODIUM SERPL-SCNC: 132 MMOL/L — LOW (ref 135–145)
SPECIMEN SOURCE: SIGNIFICANT CHANGE UP
UUN UR-MCNC: 324 MG/DL — SIGNIFICANT CHANGE UP
WBC # BLD: 5.5 K/UL — SIGNIFICANT CHANGE UP (ref 3.8–10.5)
WBC # BLD: 7.25 K/UL — SIGNIFICANT CHANGE UP (ref 3.8–10.5)
WBC # FLD AUTO: 5.5 K/UL — SIGNIFICANT CHANGE UP (ref 3.8–10.5)
WBC # FLD AUTO: 7.25 K/UL — SIGNIFICANT CHANGE UP (ref 3.8–10.5)

## 2024-01-22 PROCEDURE — 99233 SBSQ HOSP IP/OBS HIGH 50: CPT

## 2024-01-22 PROCEDURE — 71045 X-RAY EXAM CHEST 1 VIEW: CPT | Mod: 26,76

## 2024-01-22 PROCEDURE — 32557 INSERT CATH PLEURA W/ IMAGE: CPT | Mod: LT

## 2024-01-22 PROCEDURE — 99231 SBSQ HOSP IP/OBS SF/LOW 25: CPT

## 2024-01-22 RX ORDER — CALCIUM GLUCONATE 100 MG/ML
2 VIAL (ML) INTRAVENOUS ONCE
Refills: 0 | Status: COMPLETED | OUTPATIENT
Start: 2024-01-22 | End: 2024-01-22

## 2024-01-22 RX ORDER — POTASSIUM CHLORIDE 20 MEQ
40 PACKET (EA) ORAL EVERY 4 HOURS
Refills: 0 | Status: DISCONTINUED | OUTPATIENT
Start: 2024-01-22 | End: 2024-01-23

## 2024-01-22 RX ORDER — POTASSIUM PHOSPHATE, MONOBASIC POTASSIUM PHOSPHATE, DIBASIC 236; 224 MG/ML; MG/ML
30 INJECTION, SOLUTION INTRAVENOUS ONCE
Refills: 0 | Status: COMPLETED | OUTPATIENT
Start: 2024-01-22 | End: 2024-01-23

## 2024-01-22 RX ORDER — MAGNESIUM SULFATE 500 MG/ML
2 VIAL (ML) INJECTION ONCE
Refills: 0 | Status: COMPLETED | OUTPATIENT
Start: 2024-01-22 | End: 2024-01-22

## 2024-01-22 RX ORDER — ENOXAPARIN SODIUM 100 MG/ML
40 INJECTION SUBCUTANEOUS EVERY 24 HOURS
Refills: 0 | Status: DISCONTINUED | OUTPATIENT
Start: 2024-01-22 | End: 2024-01-26

## 2024-01-22 RX ORDER — POTASSIUM PHOSPHATE, MONOBASIC POTASSIUM PHOSPHATE, DIBASIC 236; 224 MG/ML; MG/ML
15 INJECTION, SOLUTION INTRAVENOUS ONCE
Refills: 0 | Status: COMPLETED | OUTPATIENT
Start: 2024-01-22 | End: 2024-01-22

## 2024-01-22 RX ORDER — SODIUM CHLORIDE 9 MG/ML
1000 INJECTION INTRAMUSCULAR; INTRAVENOUS; SUBCUTANEOUS
Refills: 0 | Status: DISCONTINUED | OUTPATIENT
Start: 2024-01-22 | End: 2024-01-23

## 2024-01-22 RX ORDER — HYDROMORPHONE HYDROCHLORIDE 2 MG/ML
0.5 INJECTION INTRAMUSCULAR; INTRAVENOUS; SUBCUTANEOUS
Refills: 0 | Status: DISCONTINUED | OUTPATIENT
Start: 2024-01-22 | End: 2024-01-26

## 2024-01-22 RX ADMIN — Medication 1 TABLET(S): at 11:28

## 2024-01-22 RX ADMIN — LIDOCAINE 1 PATCH: 4 CREAM TOPICAL at 17:35

## 2024-01-22 RX ADMIN — Medication 5 MILLIGRAM(S): at 22:17

## 2024-01-22 RX ADMIN — FAMOTIDINE 20 MILLIGRAM(S): 10 INJECTION INTRAVENOUS at 11:23

## 2024-01-22 RX ADMIN — PIPERACILLIN AND TAZOBACTAM 25 GRAM(S): 4; .5 INJECTION, POWDER, LYOPHILIZED, FOR SOLUTION INTRAVENOUS at 22:18

## 2024-01-22 RX ADMIN — Medication 500 MILLIGRAM(S): at 08:15

## 2024-01-22 RX ADMIN — HYDROMORPHONE HYDROCHLORIDE 0.5 MILLIGRAM(S): 2 INJECTION INTRAMUSCULAR; INTRAVENOUS; SUBCUTANEOUS at 17:30

## 2024-01-22 RX ADMIN — PIPERACILLIN AND TAZOBACTAM 25 GRAM(S): 4; .5 INJECTION, POWDER, LYOPHILIZED, FOR SOLUTION INTRAVENOUS at 13:22

## 2024-01-22 RX ADMIN — Medication 50 MILLIGRAM(S): at 22:17

## 2024-01-22 RX ADMIN — LIDOCAINE 1 PATCH: 4 CREAM TOPICAL at 07:25

## 2024-01-22 RX ADMIN — Medication 50 MILLIGRAM(S): at 11:23

## 2024-01-22 RX ADMIN — HYDROMORPHONE HYDROCHLORIDE 0.5 MILLIGRAM(S): 2 INJECTION INTRAMUSCULAR; INTRAVENOUS; SUBCUTANEOUS at 17:15

## 2024-01-22 RX ADMIN — QUETIAPINE FUMARATE 75 MILLIGRAM(S): 200 TABLET, FILM COATED ORAL at 22:17

## 2024-01-22 RX ADMIN — Medication 100 MILLIGRAM(S): at 11:22

## 2024-01-22 RX ADMIN — Medication 200 GRAM(S): at 02:14

## 2024-01-22 RX ADMIN — POTASSIUM PHOSPHATE, MONOBASIC POTASSIUM PHOSPHATE, DIBASIC 62.5 MILLIMOLE(S): 236; 224 INJECTION, SOLUTION INTRAVENOUS at 03:06

## 2024-01-22 RX ADMIN — PIPERACILLIN AND TAZOBACTAM 25 GRAM(S): 4; .5 INJECTION, POWDER, LYOPHILIZED, FOR SOLUTION INTRAVENOUS at 05:54

## 2024-01-22 RX ADMIN — Medication 50 GRAM(S): at 23:55

## 2024-01-22 RX ADMIN — Medication 500 MILLIGRAM(S): at 09:00

## 2024-01-22 RX ADMIN — HYDROMORPHONE HYDROCHLORIDE 0.5 MILLIGRAM(S): 2 INJECTION INTRAMUSCULAR; INTRAVENOUS; SUBCUTANEOUS at 22:17

## 2024-01-22 RX ADMIN — Medication 1 TABLET(S): at 11:22

## 2024-01-22 RX ADMIN — LIDOCAINE 1 PATCH: 4 CREAM TOPICAL at 05:54

## 2024-01-22 RX ADMIN — Medication 1 MILLIGRAM(S): at 11:22

## 2024-01-22 RX ADMIN — QUETIAPINE FUMARATE 25 MILLIGRAM(S): 200 TABLET, FILM COATED ORAL at 06:39

## 2024-01-22 RX ADMIN — Medication 500 MILLIGRAM(S): at 11:28

## 2024-01-22 RX ADMIN — Medication 25 GRAM(S): at 02:16

## 2024-01-22 RX ADMIN — SODIUM CHLORIDE 75 MILLILITER(S): 9 INJECTION INTRAMUSCULAR; INTRAVENOUS; SUBCUTANEOUS at 11:29

## 2024-01-22 RX ADMIN — CHLORHEXIDINE GLUCONATE 1 APPLICATION(S): 213 SOLUTION TOPICAL at 11:31

## 2024-01-22 RX ADMIN — SODIUM CHLORIDE 75 MILLILITER(S): 9 INJECTION INTRAMUSCULAR; INTRAVENOUS; SUBCUTANEOUS at 11:31

## 2024-01-22 RX ADMIN — LIDOCAINE 1 PATCH: 4 CREAM TOPICAL at 08:24

## 2024-01-22 RX ADMIN — HYDROMORPHONE HYDROCHLORIDE 0.5 MILLIGRAM(S): 2 INJECTION INTRAMUSCULAR; INTRAVENOUS; SUBCUTANEOUS at 22:32

## 2024-01-22 RX ADMIN — PREGABALIN 1000 MICROGRAM(S): 225 CAPSULE ORAL at 11:22

## 2024-01-22 RX ADMIN — ENOXAPARIN SODIUM 40 MILLIGRAM(S): 100 INJECTION SUBCUTANEOUS at 17:39

## 2024-01-22 NOTE — PROGRESS NOTE ADULT - SUBJECTIVE AND OBJECTIVE BOX
Patient is a 48y old  Male who presents with a chief complaint of Trauma 1 activation (2024 01:19)    Vital Signs Last 24 Hrs  T(C): 37 (24 @ 11:00), Max: 38.2 (24 @ 07:00)  T(F): 98.6 (24 @ 11:00), Max: 100.8 (24 @ 07:00)  HR: 103 (24 @ 11:00) (99 - 120)  BP: 125/73 (24 @ 11:00) (93/52 - 130/89)  RR: 27 (24 @ 11:00) (20 - 45)  SpO2: 97% (24 @ 11:00) (91% - 100%)                24 @ 07:01  -  24 @ 07:00  --------------------------------------------------------  IN: 775 mL / OUT: 400 mL / NET: 375 mL    24 @ 07:01  -  24 @ 11:48  --------------------------------------------------------  IN: 175 mL / OUT: 0 mL / NET: 175 mL        Daily     Daily Weight in k.8 (2024 05:00)                          7.9    7.25  )-----------( 507      ( 2024 06:02 )             23.5         126<L>  |  96  |  10  ----------------------------<  84  3.6   |  15<L>  |  1.32<H>    Ca    8.3<L>      2024 06:01  Phos  4.2       Mg     2.2         TPro  6.4  /  Alb  2.2<L>  /  TBili  0.4  /  DBili  x   /  AST  403<H>  /  ALT  183<H>  /  AlkPhos  306<H>            PHYSICAL EXAM  Neurology: A&Ox3, NAD, no gross deficits  CV : RRR+S1S2  Lungs: Respirations non-labored, B/L BS  Abdomen: Soft, NT/ND, +BSx4Q  Extremities: B/L LE edema, negative calf tenderness, +PP           CHEST TUBES:  Left CT     [  ]LWS  [  ]H2O seal  Right CT   [  ]LWS  [  ]H2O seal          MEDICATIONS  acetaminophen     Tablet .. 500 milliGRAM(s) Oral every 6 hours PRN  ascorbic acid 500 milliGRAM(s) Oral daily  calcium carbonate    500 mG (Tums) Chewable 1 Tablet(s) Chew every 4 hours PRN  chlorhexidine 2% Cloths 1 Application(s) Topical daily  cyanocobalamin 1000 MICROGram(s) Oral daily  famotidine    Tablet 20 milliGRAM(s) Oral daily PRN  folic acid 1 milliGRAM(s) Oral daily  HYDROmorphone  Injectable 0.5 milliGRAM(s) IV Push every 3 hours PRN  influenza   Vaccine 0.5 milliLiter(s) IntraMuscular once  lidocaine   4% Patch 1 Patch Transdermal every 24 hours  melatonin 5 milliGRAM(s) Oral at bedtime  metoprolol tartrate 50 milliGRAM(s) Oral every 12 hours  multivitamin 1 Tablet(s) Oral daily  oxyCODONE    IR 5 milliGRAM(s) Oral every 4 hours PRN  oxyCODONE    IR 10 milliGRAM(s) Oral every 4 hours PRN  piperacillin/tazobactam IVPB.. 3.375 Gram(s) IV Intermittent every 8 hours  QUEtiapine 75 milliGRAM(s) Oral <User Schedule>  QUEtiapine 25 milliGRAM(s) Oral <User Schedule>  sodium chloride 0.9%. 1000 milliLiter(s) IV Continuous <Continuous>  thiamine 100 milliGRAM(s) Oral daily

## 2024-01-22 NOTE — PROGRESS NOTE ADULT - SUBJECTIVE AND OBJECTIVE BOX
IR Post Procedure Note    Diagnosis: Left Empyema    Procedure: Left Chest Tube Placement    : Cipriano Gloria MD    Contrast: None    Anesthesia: 1% Lidocaine Subcutaneous, Sedation administered by Anesthesiology    Estimated Blood Loss: Less than 10cc    Specimens: Identified, Labeled, Confirmed and Sent to Lab. Adequacy of Specimen Confirmed by Cytopathology    Complications: No Immediate Complications    Anticoagulation: Resume in 24 Hours    Findings & Plan: 10Fr Chest Tube placed in Left/ Right hemithorax under US and fluoro guidance. Chest tube placed to water seal drainage, secured w sutures.      Please call Interventional Radiology with any questions, concerns, or issues.

## 2024-01-22 NOTE — PROGRESS NOTE ADULT - SUBJECTIVE AND OBJECTIVE BOX
INTERVAL EVENTS:  - wound vac taken down, xeroform and dry dressing placed on buttock wound  - consult IR to drain L pleural collection  - Vit K for INR 2 pre-IR drain placmement  - NPO at midnight for IR drain on 1/22    SUBJECTIVE/ROS:  [ ] A ten-point review of systems was otherwise negative except as noted.  [ ] Due to altered mental status/intubation, subjective information were not able to be obtained from the patient. History was obtained, to the extent possible, from review of the chart and collateral sources of information.      NEURO  Exam: awake, alert, oriented. Agitated at times  Meds: acetaminophen     Tablet .. 500 milliGRAM(s) Oral every 6 hours PRN Moderate Pain (4 - 6)  HYDROmorphone  Injectable 1 milliGRAM(s) IV Push every 3 hours PRN Breakthrough  melatonin 5 milliGRAM(s) Oral at bedtime  oxyCODONE    IR 5 milliGRAM(s) Oral every 4 hours PRN Moderate Pain (4 - 6)  oxyCODONE    IR 10 milliGRAM(s) Oral every 4 hours PRN Severe Pain (7 - 10)  QUEtiapine 25 milliGRAM(s) Oral <User Schedule>  QUEtiapine 75 milliGRAM(s) Oral <User Schedule>  [x] Adequacy of sedation and pain control has been assessed and adjusted      RESPIRATORY  RR: 30 (01-21-24 @ 23:00) (20 - 46)  SpO2: 91% (01-21-24 @ 23:00) (89% - 100%)  Exam: unlabored, clear to auscultation bilaterally      CARDIOVASCULAR  HR: 100 (01-21-24 @ 23:00) (99 - 124)  BP: 120/67 (01-21-24 @ 22:00) (94/51 - 149/85)  BP(mean): 85 (01-21-24 @ 22:00) (66 - 112)  VBG - ( 21 Jan 2024 21:35 )  pH: 7.43  /  pCO2: 30    /  pO2: 33    / HCO3: 20    / Base Excess: -3.9  /  SaO2: 52.4   Lactate: 0.9        Exam: regular rate and rhythm  Cardiac Rhythm: sinus  Perfusion     [x]Adequate   [ ]Inadequate  Mentation   [x]Normal       [ ]Reduced  Extremities  [x]Warm         [ ]Cool  Volume Status [ ]Hypervolemic [x]Euvolemic [ ]Hypovolemic  Meds: metoprolol tartrate 50 milliGRAM(s) Oral every 12 hours      GI/NUTRITION  Exam: soft, nontender, nondistended  Diet: regular diet / NPO after midnight  Meds: calcium carbonate    500 mG (Tums) Chewable 1 Tablet(s) Chew every 4 hours PRN Heartburn  famotidine    Tablet 20 milliGRAM(s) Oral daily PRN Heartburn      GENITOURINARY  I&O's Detail    01-20 @ 07:01 - 01-21 @ 07:00  --------------------------------------------------------  IN:    IV PiggyBack: 600 mL    IV PiggyBack: 150 mL    IV PiggyBack: 575 mL    Oral Fluid: 1350 mL  Total IN: 2675 mL    OUT:    VAC (Vacuum Assisted Closure) System (mL): 0 mL    Voided (mL): 1050 mL  Total OUT: 1050 mL    Total NET: 1625 mL      01-21 @ 07:01 - 01-22 @ 01:19  --------------------------------------------------------  IN:    IV PiggyBack: 50 mL    IV PiggyBack: 225 mL  Total IN: 275 mL    OUT:    Voided (mL): 100 mL  Total OUT: 100 mL    Total NET: 175 mL          01-21    129<L>  |  96  |  8   ----------------------------<  94  3.7   |  19<L>  |  1.30    Ca    7.6<L>      21 Jan 2024 21:55  Phos  2.8     01-21  Mg     1.8     01-21    TPro  5.9<L>  /  Alb  2.2<L>  /  TBili  0.3  /  DBili  x   /  AST  280<H>  /  ALT  146<H>  /  AlkPhos  285<H>  01-21    [ ] Hendrix catheter, indication: N/A  Meds: ascorbic acid 500 milliGRAM(s) Oral daily  calcium gluconate IVPB 2 Gram(s) IV Intermittent once  cyanocobalamin 1000 MICROGram(s) Oral daily  folic acid 1 milliGRAM(s) Oral daily  magnesium sulfate  IVPB 2 Gram(s) IV Intermittent once  multivitamin 1 Tablet(s) Oral daily  phytonadione  IVPB 10 milliGRAM(s) IV Intermittent once  potassium phosphate IVPB 15 milliMole(s) IV Intermittent once  thiamine 100 milliGRAM(s) Oral daily        HEMATOLOGIC  Meds:   [x] VTE Prophylaxis                        7.3    7.29  )-----------( 533      ( 21 Jan 2024 21:55 )             21.5     PT/INR - ( 21 Jan 2024 21:56 )   PT: 13.7 sec;   INR: 1.25 ratio         PTT - ( 21 Jan 2024 21:56 )  PTT:33.1 sec  Transfusion     [ ] PRBC   [ ] Platelets   [ ] FFP   [ ] Cryoprecipitate      INFECTIOUS DISEASES  WBC Count: 7.29 K/uL (01-21 @ 21:55)    RECENT CULTURES:  Specimen Source: .Blood Blood  Date/Time: 01-20 @ 14:55  Culture Results:   No growth at 24 hours  Gram Stain: --  Organism: --  Specimen Source: .Blood Blood  Date/Time: 01-20 @ 14:50  Culture Results:   No growth at 24 hours  Gram Stain: --  Organism: --  Specimen Source: .Blood Blood-Peripheral  Date/Time: 01-17 @ 06:26  Culture Results:   No growth at 4 days  Gram Stain: --  Organism: --    Meds: influenza   Vaccine 0.5 milliLiter(s) IntraMuscular once  piperacillin/tazobactam IVPB.. 3.375 Gram(s) IV Intermittent every 8 hours      ENDOCRINE  CAPILLARY BLOOD GLUCOSE      OTHER MEDICATIONS:  chlorhexidine 2% Cloths 1 Application(s) Topical daily  lidocaine   4% Patch 1 Patch Transdermal every 24 hours      CODE STATUS: full code

## 2024-01-22 NOTE — CHART NOTE - NSCHARTNOTEFT_GEN_A_CORE
General Surgery Post op Check    Pt seen and examined without complaints. Pain is controlled. Denies SOB/CP/N/V.     Vital Signs Last 24 Hrs  T(C): 36.7 (22 Jan 2024 19:00), Max: 39.4 (22 Jan 2024 14:11)  T(F): 98 (22 Jan 2024 19:00), Max: 102.9 (22 Jan 2024 14:11)  HR: 108 (22 Jan 2024 20:00) (100 - 120)  BP: 101/71 (22 Jan 2024 20:00) (93/52 - 158/90)  BP(mean): 82 (22 Jan 2024 20:00) (68 - 116)  RR: 30 (22 Jan 2024 20:00) (22 - 45)  SpO2: 99% (22 Jan 2024 20:00) (91% - 99%)    Parameters below as of 22 Jan 2024 19:00  Patient On (Oxygen Delivery Method): nasal cannula  O2 Flow (L/min): 2      I&O's Summary    21 Jan 2024 07:01  -  22 Jan 2024 07:00  --------------------------------------------------------  IN: 775 mL / OUT: 400 mL / NET: 375 mL    22 Jan 2024 07:01  -  22 Jan 2024 21:40  --------------------------------------------------------  IN: 890 mL / OUT: 755 mL / NET: 135 mL      Physical Exam  Gen: NAD, A&Ox3  Pulm: No respiratory distress, no subcostal retractions. saturating well on room air. NC around forehead. CT to suction, minimal output, no AL. Chest wall with staples and suture from VATS.   CV: RRR, no JVD  Abd: Soft, NT, ND  Extremities: warm and well perfused, no peripheral edema       A/P: 48y Male s/p CT placement by IR   -DVT prophylaxis w/ LVX  -Continue Abx   -Monitor CT output  -Analgesia and antiemetics as needed  -Regular Diet  -Monitor overnight  -Care per SICU     ACS/Trauma

## 2024-01-22 NOTE — PROGRESS NOTE ADULT - SUBJECTIVE AND OBJECTIVE BOX
ACS Progress Note      O:  Vital Signs Last 24 Hrs  T(C): 37 (22 Jan 2024 11:00), Max: 38.2 (22 Jan 2024 07:00)  T(F): 98.6 (22 Jan 2024 11:00), Max: 100.8 (22 Jan 2024 07:00)  HR: 103 (22 Jan 2024 11:00) (99 - 120)  BP: 125/73 (22 Jan 2024 11:00) (93/52 - 130/89)  BP(mean): 94 (22 Jan 2024 11:00) (66 - 104)  RR: 27 (22 Jan 2024 11:00) (20 - 45)  SpO2: 97% (22 Jan 2024 11:00) (91% - 100%)    Parameters below as of 22 Jan 2024 07:00  Patient On (Oxygen Delivery Method): room air        I&O's Detail    21 Jan 2024 07:01  -  22 Jan 2024 07:00  --------------------------------------------------------  IN:    IV PiggyBack: 550 mL    IV PiggyBack: 225 mL  Total IN: 775 mL    OUT:    Voided (mL): 400 mL  Total OUT: 400 mL    Total NET: 375 mL      22 Jan 2024 07:01  -  22 Jan 2024 11:59  --------------------------------------------------------  IN:    IV PiggyBack: 25 mL    sodium chloride 0.9%: 150 mL  Total IN: 175 mL    OUT:  Total OUT: 0 mL    Total NET: 175 mL          MEDICATIONS  (STANDING):  ascorbic acid 500 milliGRAM(s) Oral daily  chlorhexidine 2% Cloths 1 Application(s) Topical daily  cyanocobalamin 1000 MICROGram(s) Oral daily  folic acid 1 milliGRAM(s) Oral daily  influenza   Vaccine 0.5 milliLiter(s) IntraMuscular once  lidocaine   4% Patch 1 Patch Transdermal every 24 hours  melatonin 5 milliGRAM(s) Oral at bedtime  metoprolol tartrate 50 milliGRAM(s) Oral every 12 hours  multivitamin 1 Tablet(s) Oral daily  piperacillin/tazobactam IVPB.. 3.375 Gram(s) IV Intermittent every 8 hours  QUEtiapine 75 milliGRAM(s) Oral <User Schedule>  QUEtiapine 25 milliGRAM(s) Oral <User Schedule>  sodium chloride 0.9%. 1000 milliLiter(s) (75 mL/Hr) IV Continuous <Continuous>  thiamine 100 milliGRAM(s) Oral daily    MEDICATIONS  (PRN):  acetaminophen     Tablet .. 500 milliGRAM(s) Oral every 6 hours PRN Moderate Pain (4 - 6)  calcium carbonate    500 mG (Tums) Chewable 1 Tablet(s) Chew every 4 hours PRN Heartburn  famotidine    Tablet 20 milliGRAM(s) Oral daily PRN Heartburn  HYDROmorphone  Injectable 0.5 milliGRAM(s) IV Push every 3 hours PRN breakthrough pain  oxyCODONE    IR 5 milliGRAM(s) Oral every 4 hours PRN Moderate Pain (4 - 6)  oxyCODONE    IR 10 milliGRAM(s) Oral every 4 hours PRN Severe Pain (7 - 10)                            7.9    7.25  )-----------( 507      ( 22 Jan 2024 06:02 )             23.5       01-22    126<L>  |  96  |  10  ----------------------------<  84  3.6   |  15<L>  |  1.32<H>    Ca    8.3<L>      22 Jan 2024 06:01  Phos  4.2     01-22  Mg     2.2     01-22    TPro  6.4  /  Alb  2.2<L>  /  TBili  0.4  /  DBili  x   /  AST  403<H>  /  ALT  183<H>  /  AlkPhos  306<H>  01-22      Physical Exam:  Gen: Laying in bed, NAD, alert and oriented.   Resp: Unlabored breathing  Abd: soft, NTND    Lines:   IV: patent, in place.  ACS Progress Note    Patient seen and evaluated on AM rounds. Pt is resting comfortably in bed with no complaints.     O:  Vital Signs Last 24 Hrs  T(C): 37 (22 Jan 2024 11:00), Max: 38.2 (22 Jan 2024 07:00)  T(F): 98.6 (22 Jan 2024 11:00), Max: 100.8 (22 Jan 2024 07:00)  HR: 103 (22 Jan 2024 11:00) (99 - 120)  BP: 125/73 (22 Jan 2024 11:00) (93/52 - 130/89)  BP(mean): 94 (22 Jan 2024 11:00) (66 - 104)  RR: 27 (22 Jan 2024 11:00) (20 - 45)  SpO2: 97% (22 Jan 2024 11:00) (91% - 100%)    Parameters below as of 22 Jan 2024 07:00  Patient On (Oxygen Delivery Method): room air        I&O's Detail    21 Jan 2024 07:01  -  22 Jan 2024 07:00  --------------------------------------------------------  IN:    IV PiggyBack: 550 mL    IV PiggyBack: 225 mL  Total IN: 775 mL    OUT:    Voided (mL): 400 mL  Total OUT: 400 mL    Total NET: 375 mL      22 Jan 2024 07:01  -  22 Jan 2024 11:59  --------------------------------------------------------  IN:    IV PiggyBack: 25 mL    sodium chloride 0.9%: 150 mL  Total IN: 175 mL    OUT:  Total OUT: 0 mL    Total NET: 175 mL          MEDICATIONS  (STANDING):  ascorbic acid 500 milliGRAM(s) Oral daily  chlorhexidine 2% Cloths 1 Application(s) Topical daily  cyanocobalamin 1000 MICROGram(s) Oral daily  folic acid 1 milliGRAM(s) Oral daily  influenza   Vaccine 0.5 milliLiter(s) IntraMuscular once  lidocaine   4% Patch 1 Patch Transdermal every 24 hours  melatonin 5 milliGRAM(s) Oral at bedtime  metoprolol tartrate 50 milliGRAM(s) Oral every 12 hours  multivitamin 1 Tablet(s) Oral daily  piperacillin/tazobactam IVPB.. 3.375 Gram(s) IV Intermittent every 8 hours  QUEtiapine 75 milliGRAM(s) Oral <User Schedule>  QUEtiapine 25 milliGRAM(s) Oral <User Schedule>  sodium chloride 0.9%. 1000 milliLiter(s) (75 mL/Hr) IV Continuous <Continuous>  thiamine 100 milliGRAM(s) Oral daily    MEDICATIONS  (PRN):  acetaminophen     Tablet .. 500 milliGRAM(s) Oral every 6 hours PRN Moderate Pain (4 - 6)  calcium carbonate    500 mG (Tums) Chewable 1 Tablet(s) Chew every 4 hours PRN Heartburn  famotidine    Tablet 20 milliGRAM(s) Oral daily PRN Heartburn  HYDROmorphone  Injectable 0.5 milliGRAM(s) IV Push every 3 hours PRN breakthrough pain  oxyCODONE    IR 5 milliGRAM(s) Oral every 4 hours PRN Moderate Pain (4 - 6)  oxyCODONE    IR 10 milliGRAM(s) Oral every 4 hours PRN Severe Pain (7 - 10)                            7.9    7.25  )-----------( 507      ( 22 Jan 2024 06:02 )             23.5       01-22    126<L>  |  96  |  10  ----------------------------<  84  3.6   |  15<L>  |  1.32<H>    Ca    8.3<L>      22 Jan 2024 06:01  Phos  4.2     01-22  Mg     2.2     01-22    TPro  6.4  /  Alb  2.2<L>  /  TBili  0.4  /  DBili  x   /  AST  403<H>  /  ALT  183<H>  /  AlkPhos  306<H>  01-22    PHYSICAL EXAM  Neurology: A&Ox3, NAD  CV : RRR  Lungs: Respirations non-labored, B/L BS clear  Abdomen: Soft, NT/ND,  Extremities: B/L LE warm, no edema, +PP

## 2024-01-22 NOTE — PROGRESS NOTE ADULT - ASSESSMENT
This is a  54yo M w pmhx of Hep C and possible EtOH use disorder who presents after being found down. Intubated for respiratory distress and agitation 1/8. Pt has displaced 4-11 L rib fx with flail segment.     1/9 VSS: NPO after MN. Plan for Rib plating WED 1/10/24  1/10 s/p Left VATS, with partial lung decortication  Thoracoscopic removal of intrapleural foreign body    1/11VSS intubated sedated on pressors  chest tube lws   1/12 VSS, pt still remains intubated/sedated, getting phenobarbital for agitation. Left chest tube output 75cc/24h placed on water seal this AM. 1U PRBC -H/H 7.1/20. Continue care per SICU team  1/13 Maintain L pleural tube waterseal Daily CXR Care as per SICU team  1/14 Left pleural tube removed CXR ordered; thoracic signed off  1/20 thoracic surgery reconsulted for left pleural effusion vs hemothorax; d/w Dr. Sepulveda; chest ct done- Dr. Sepulveda reviewed; call IR for potential drainage monday left pocket   1/21 VSS, pt still febrile tmax 103, WBC 8.4, IR consulted for drainage of complex left pleural effusion on CT Chest. Plan for Monday.   1/22 VSS; Tm 100.8 this am. WBC WNL. IR pending drainage of L effusion today

## 2024-01-22 NOTE — PROGRESS NOTE ADULT - NS ATTEND AMEND GEN_ALL_CORE FT
Evaluated in multidisciplinary round in SICU. Clinical parameters,  labs, available imagine continued to be evaluated   48yoM w/ PMHx hepatitis C and EtOH abuse was transferred from OSH on 1/6 for level 1 trauma eval. He was found down with multiple 4-11 left-sided rib fractures and flail chest, and had a chest tube placed at OSH. S/p VATS 1/10 w/ partial lung decortication and thoracoscopic removal of intrapleural foreign body. S/p chest tube removal 1/14, downgraded to floor 1/18, returned to SICU after fever 102.3 on 1/20.  CT reviewed has small effusion at lt base. IR contacted for thoracentesis  NPO  Sinus tachycardia, likely intravascular depletion, start IVF with NS sec to hyponatremia. On BB  Monitor worsening LFT. T bili wnl  Abx Zosyn, cultures neg  Mech DVT ppx for now, survillence LE doppler. INR was high earlier

## 2024-01-22 NOTE — PROGRESS NOTE ADULT - ASSESSMENT
48yoM w/ PMHx hepatitis C and EtOH abuse was transferred from OSH on 1/6 for level 1 trauma eval. He was found down with multiple 4-11 left-sided rib fractures and flail chest, and had a chest tube placed at OSH. S/p VATS 1/10 w/ partial lung decortication and thoracoscopic removal of intrapleural foreign body. S/p chest tube removal 1/14, downgraded to floor 1/18, returned to SICU after fever 102.3 on 1/20.    PLAN  Neurologic:  - Seroquel 75mg QHS  - multimodal pain control w/ lidocaine patch, Tylenol, oxycodone, Dilaudid PRN     Respiratory:  - s/p VATS 1/10 with discovery of Fibropurulent exudative adhesions and removal of retained foreign material likely the tip of a glove, s/p washout, with new 28f chest tube placed > removed 1/14  - Incentive spirometry, Duonebs   - AM CXR  - 1/20 CT with complex collection L pleural space, plan for IR consult today for drainage    Cardiovascular:  - off pressors MAP >65  - Metoprolol 50 BID    Gastrointestinal/Nutrition:  - Regular diet / npo after midnight  - Pepcid    Genitourinary/Renal:  - Supplement electrolytes PRN  - 1/16: KEZIA, Renal u/s unremarkable, urine lytes intrinsic  - trend creatinine  - voiding spontaneously    Hematologic:  - Chemical VTE ppx with Lovenox  - Mechanical VTE ppx with SCDs    Infectious Disease: Empyema   - Procal 9 on 1/7 - downtrended to 0.64 on 1/12  - Tissue culture 1/10 no organisms seen   - Bcx 1/13 with staph epi x1 bottle, however repeat BCx 1/14, 1/15 negative  - Vanc 1/10-1/16, stopped in setting of worsening renal failure  - Continue Zosyn 1/7, end date 1/24    Endocrine:  - No active issues, no history of DM    MSK:   - sat on dee tray? wound right lateral buttox with dressing

## 2024-01-23 LAB
ALBUMIN SERPL ELPH-MCNC: 2 G/DL — LOW (ref 3.3–5)
ALBUMIN SERPL ELPH-MCNC: 2.4 G/DL — LOW (ref 3.3–5)
ALP SERPL-CCNC: 359 U/L — HIGH (ref 40–120)
ALP SERPL-CCNC: 462 U/L — HIGH (ref 40–120)
ALT FLD-CCNC: 405 U/L — HIGH (ref 10–45)
ALT FLD-CCNC: 541 U/L — HIGH (ref 10–45)
ANION GAP SERPL CALC-SCNC: 13 MMOL/L — SIGNIFICANT CHANGE UP (ref 5–17)
ANION GAP SERPL CALC-SCNC: 15 MMOL/L — SIGNIFICANT CHANGE UP (ref 5–17)
APTT BLD: 32.9 SEC — SIGNIFICANT CHANGE UP (ref 24.5–35.6)
APTT BLD: 33.2 SEC — SIGNIFICANT CHANGE UP (ref 24.5–35.6)
AST SERPL-CCNC: 1187 U/L — HIGH (ref 10–40)
AST SERPL-CCNC: 1467 U/L — HIGH (ref 10–40)
BASE EXCESS BLDV CALC-SCNC: -12.4 MMOL/L — LOW (ref -2–3)
BASE EXCESS BLDV CALC-SCNC: -8.1 MMOL/L — LOW (ref -2–3)
BILIRUB SERPL-MCNC: 0.8 MG/DL — SIGNIFICANT CHANGE UP (ref 0.2–1.2)
BILIRUB SERPL-MCNC: 1.1 MG/DL — SIGNIFICANT CHANGE UP (ref 0.2–1.2)
BUN SERPL-MCNC: 7 MG/DL — SIGNIFICANT CHANGE UP (ref 7–23)
BUN SERPL-MCNC: 7 MG/DL — SIGNIFICANT CHANGE UP (ref 7–23)
CA-I SERPL-SCNC: 1 MMOL/L — LOW (ref 1.15–1.33)
CA-I SERPL-SCNC: 1.21 MMOL/L — SIGNIFICANT CHANGE UP (ref 1.15–1.33)
CALCIUM SERPL-MCNC: 6.2 MG/DL — CRITICAL LOW (ref 8.4–10.5)
CALCIUM SERPL-MCNC: 8 MG/DL — LOW (ref 8.4–10.5)
CHLORIDE BLDV-SCNC: 105 MMOL/L — SIGNIFICANT CHANGE UP (ref 96–108)
CHLORIDE BLDV-SCNC: 108 MMOL/L — SIGNIFICANT CHANGE UP (ref 96–108)
CHLORIDE SERPL-SCNC: 102 MMOL/L — SIGNIFICANT CHANGE UP (ref 96–108)
CHLORIDE SERPL-SCNC: 105 MMOL/L — SIGNIFICANT CHANGE UP (ref 96–108)
CO2 BLDV-SCNC: 14 MMOL/L — LOW (ref 22–26)
CO2 BLDV-SCNC: 17 MMOL/L — LOW (ref 22–26)
CO2 SERPL-SCNC: 13 MMOL/L — LOW (ref 22–31)
CO2 SERPL-SCNC: 17 MMOL/L — LOW (ref 22–31)
CREAT SERPL-MCNC: 1.05 MG/DL — SIGNIFICANT CHANGE UP (ref 0.5–1.3)
CREAT SERPL-MCNC: 1.29 MG/DL — SIGNIFICANT CHANGE UP (ref 0.5–1.3)
EGFR: 69 ML/MIN/1.73M2 — SIGNIFICANT CHANGE UP
EGFR: 88 ML/MIN/1.73M2 — SIGNIFICANT CHANGE UP
GAS PNL BLDV: 128 MMOL/L — LOW (ref 136–145)
GAS PNL BLDV: 130 MMOL/L — LOW (ref 136–145)
GAS PNL BLDV: SIGNIFICANT CHANGE UP
GLUCOSE BLDV-MCNC: 92 MG/DL — SIGNIFICANT CHANGE UP (ref 70–99)
GLUCOSE BLDV-MCNC: 93 MG/DL — SIGNIFICANT CHANGE UP (ref 70–99)
GLUCOSE SERPL-MCNC: 90 MG/DL — SIGNIFICANT CHANGE UP (ref 70–99)
GLUCOSE SERPL-MCNC: 97 MG/DL — SIGNIFICANT CHANGE UP (ref 70–99)
HCO3 BLDV-SCNC: 13 MMOL/L — LOW (ref 22–29)
HCO3 BLDV-SCNC: 16 MMOL/L — LOW (ref 22–29)
HCT VFR BLD CALC: 20.5 % — CRITICAL LOW (ref 39–50)
HCT VFR BLD CALC: 23.8 % — LOW (ref 39–50)
HCT VFR BLDA CALC: 22 % — LOW (ref 39–51)
HCT VFR BLDA CALC: 25 % — LOW (ref 39–51)
HGB BLD CALC-MCNC: 7.3 G/DL — LOW (ref 12.6–17.4)
HGB BLD CALC-MCNC: 8.3 G/DL — LOW (ref 12.6–17.4)
HGB BLD-MCNC: 7.1 G/DL — LOW (ref 13–17)
HGB BLD-MCNC: 7.9 G/DL — LOW (ref 13–17)
HOROWITZ INDEX BLDV+IHG-RTO: 21 — SIGNIFICANT CHANGE UP
HOROWITZ INDEX BLDV+IHG-RTO: 28 — SIGNIFICANT CHANGE UP
INR BLD: 1.09 RATIO — SIGNIFICANT CHANGE UP (ref 0.85–1.18)
INR BLD: 1.19 RATIO — HIGH (ref 0.85–1.18)
LACTATE BLDV-MCNC: 0.5 MMOL/L — SIGNIFICANT CHANGE UP (ref 0.5–2)
LACTATE BLDV-MCNC: 0.8 MMOL/L — SIGNIFICANT CHANGE UP (ref 0.5–2)
MAGNESIUM SERPL-MCNC: 1.8 MG/DL — SIGNIFICANT CHANGE UP (ref 1.6–2.6)
MAGNESIUM SERPL-MCNC: 2.1 MG/DL — SIGNIFICANT CHANGE UP (ref 1.6–2.6)
MCHC RBC-ENTMCNC: 27.4 PG — SIGNIFICANT CHANGE UP (ref 27–34)
MCHC RBC-ENTMCNC: 28.6 PG — SIGNIFICANT CHANGE UP (ref 27–34)
MCHC RBC-ENTMCNC: 33.2 GM/DL — SIGNIFICANT CHANGE UP (ref 32–36)
MCHC RBC-ENTMCNC: 34.6 GM/DL — SIGNIFICANT CHANGE UP (ref 32–36)
MCV RBC AUTO: 82.6 FL — SIGNIFICANT CHANGE UP (ref 80–100)
MCV RBC AUTO: 82.7 FL — SIGNIFICANT CHANGE UP (ref 80–100)
NRBC # BLD: 0 /100 WBCS — SIGNIFICANT CHANGE UP (ref 0–0)
NRBC # BLD: 0 /100 WBCS — SIGNIFICANT CHANGE UP (ref 0–0)
PCO2 BLDV: 26 MMHG — LOW (ref 42–55)
PCO2 BLDV: 29 MMHG — LOW (ref 42–55)
PH BLDV: 7.3 — LOW (ref 7.32–7.43)
PH BLDV: 7.36 — SIGNIFICANT CHANGE UP (ref 7.32–7.43)
PHOSPHATE SERPL-MCNC: 2.9 MG/DL — SIGNIFICANT CHANGE UP (ref 2.5–4.5)
PHOSPHATE SERPL-MCNC: 3.5 MG/DL — SIGNIFICANT CHANGE UP (ref 2.5–4.5)
PLATELET # BLD AUTO: 425 K/UL — HIGH (ref 150–400)
PLATELET # BLD AUTO: 471 K/UL — HIGH (ref 150–400)
PO2 BLDV: 42 MMHG — SIGNIFICANT CHANGE UP (ref 25–45)
PO2 BLDV: 42 MMHG — SIGNIFICANT CHANGE UP (ref 25–45)
POTASSIUM BLDV-SCNC: 3.3 MMOL/L — LOW (ref 3.5–5.1)
POTASSIUM BLDV-SCNC: 4.7 MMOL/L — SIGNIFICANT CHANGE UP (ref 3.5–5.1)
POTASSIUM SERPL-MCNC: 3.3 MMOL/L — LOW (ref 3.5–5.3)
POTASSIUM SERPL-MCNC: 4.5 MMOL/L — SIGNIFICANT CHANGE UP (ref 3.5–5.3)
POTASSIUM SERPL-SCNC: 3.3 MMOL/L — LOW (ref 3.5–5.3)
POTASSIUM SERPL-SCNC: 4.5 MMOL/L — SIGNIFICANT CHANGE UP (ref 3.5–5.3)
PROT SERPL-MCNC: 5.2 G/DL — LOW (ref 6–8.3)
PROT SERPL-MCNC: 6.3 G/DL — SIGNIFICANT CHANGE UP (ref 6–8.3)
PROTHROM AB SERPL-ACNC: 12 SEC — SIGNIFICANT CHANGE UP (ref 9.5–13)
PROTHROM AB SERPL-ACNC: 12.4 SEC — SIGNIFICANT CHANGE UP (ref 9.5–13)
RBC # BLD: 2.48 M/UL — LOW (ref 4.2–5.8)
RBC # BLD: 2.88 M/UL — LOW (ref 4.2–5.8)
RBC # FLD: 15.2 % — HIGH (ref 10.3–14.5)
RBC # FLD: 15.6 % — HIGH (ref 10.3–14.5)
SAO2 % BLDV: 67 % — SIGNIFICANT CHANGE UP (ref 67–88)
SAO2 % BLDV: 71.5 % — SIGNIFICANT CHANGE UP (ref 67–88)
SODIUM SERPL-SCNC: 132 MMOL/L — LOW (ref 135–145)
SODIUM SERPL-SCNC: 133 MMOL/L — LOW (ref 135–145)
WBC # BLD: 5.77 K/UL — SIGNIFICANT CHANGE UP (ref 3.8–10.5)
WBC # BLD: 5.93 K/UL — SIGNIFICANT CHANGE UP (ref 3.8–10.5)
WBC # FLD AUTO: 5.77 K/UL — SIGNIFICANT CHANGE UP (ref 3.8–10.5)
WBC # FLD AUTO: 5.93 K/UL — SIGNIFICANT CHANGE UP (ref 3.8–10.5)

## 2024-01-23 PROCEDURE — 93971 EXTREMITY STUDY: CPT | Mod: 26,RT

## 2024-01-23 PROCEDURE — 99232 SBSQ HOSP IP/OBS MODERATE 35: CPT

## 2024-01-23 PROCEDURE — 71045 X-RAY EXAM CHEST 1 VIEW: CPT | Mod: 26

## 2024-01-23 PROCEDURE — 99233 SBSQ HOSP IP/OBS HIGH 50: CPT

## 2024-01-23 RX ORDER — POTASSIUM CHLORIDE 20 MEQ
40 PACKET (EA) ORAL ONCE
Refills: 0 | Status: COMPLETED | OUTPATIENT
Start: 2024-01-23 | End: 2024-01-23

## 2024-01-23 RX ORDER — CHLORHEXIDINE GLUCONATE 213 G/1000ML
1 SOLUTION TOPICAL
Refills: 0 | Status: DISCONTINUED | OUTPATIENT
Start: 2024-01-23 | End: 2024-01-26

## 2024-01-23 RX ORDER — OXYCODONE HYDROCHLORIDE 5 MG/1
10 TABLET ORAL EVERY 4 HOURS
Refills: 0 | Status: DISCONTINUED | OUTPATIENT
Start: 2024-01-23 | End: 2024-01-26

## 2024-01-23 RX ORDER — ACETAMINOPHEN 500 MG
500 TABLET ORAL EVERY 6 HOURS
Refills: 0 | Status: DISCONTINUED | OUTPATIENT
Start: 2024-01-23 | End: 2024-01-26

## 2024-01-23 RX ORDER — CALCIUM GLUCONATE 100 MG/ML
2 VIAL (ML) INTRAVENOUS ONCE
Refills: 0 | Status: COMPLETED | OUTPATIENT
Start: 2024-01-23 | End: 2024-01-23

## 2024-01-23 RX ORDER — SIMETHICONE 80 MG/1
80 TABLET, CHEWABLE ORAL ONCE
Refills: 0 | Status: DISCONTINUED | OUTPATIENT
Start: 2024-01-23 | End: 2024-01-23

## 2024-01-23 RX ORDER — POTASSIUM CHLORIDE 20 MEQ
10 PACKET (EA) ORAL
Refills: 0 | Status: COMPLETED | OUTPATIENT
Start: 2024-01-23 | End: 2024-01-23

## 2024-01-23 RX ORDER — SIMETHICONE 80 MG/1
80 TABLET, CHEWABLE ORAL EVERY 6 HOURS
Refills: 0 | Status: DISCONTINUED | OUTPATIENT
Start: 2024-01-23 | End: 2024-01-23

## 2024-01-23 RX ORDER — OXYCODONE HYDROCHLORIDE 5 MG/1
5 TABLET ORAL EVERY 4 HOURS
Refills: 0 | Status: DISCONTINUED | OUTPATIENT
Start: 2024-01-23 | End: 2024-01-26

## 2024-01-23 RX ORDER — SIMETHICONE 80 MG/1
80 TABLET, CHEWABLE ORAL EVERY 6 HOURS
Refills: 0 | Status: DISCONTINUED | OUTPATIENT
Start: 2024-01-23 | End: 2024-01-25

## 2024-01-23 RX ADMIN — PREGABALIN 1000 MICROGRAM(S): 225 CAPSULE ORAL at 11:05

## 2024-01-23 RX ADMIN — Medication 50 MILLIGRAM(S): at 23:17

## 2024-01-23 RX ADMIN — QUETIAPINE FUMARATE 25 MILLIGRAM(S): 200 TABLET, FILM COATED ORAL at 05:35

## 2024-01-23 RX ADMIN — OXYCODONE HYDROCHLORIDE 5 MILLIGRAM(S): 5 TABLET ORAL at 07:00

## 2024-01-23 RX ADMIN — Medication 50 MILLIGRAM(S): at 11:05

## 2024-01-23 RX ADMIN — Medication 1 MILLIGRAM(S): at 11:05

## 2024-01-23 RX ADMIN — SIMETHICONE 80 MILLIGRAM(S): 80 TABLET, CHEWABLE ORAL at 14:34

## 2024-01-23 RX ADMIN — OXYCODONE HYDROCHLORIDE 10 MILLIGRAM(S): 5 TABLET ORAL at 19:37

## 2024-01-23 RX ADMIN — CHLORHEXIDINE GLUCONATE 1 APPLICATION(S): 213 SOLUTION TOPICAL at 11:18

## 2024-01-23 RX ADMIN — Medication 40 MILLIEQUIVALENT(S): at 07:18

## 2024-01-23 RX ADMIN — Medication 200 GRAM(S): at 06:50

## 2024-01-23 RX ADMIN — ENOXAPARIN SODIUM 40 MILLIGRAM(S): 100 INJECTION SUBCUTANEOUS at 17:15

## 2024-01-23 RX ADMIN — PIPERACILLIN AND TAZOBACTAM 25 GRAM(S): 4; .5 INJECTION, POWDER, LYOPHILIZED, FOR SOLUTION INTRAVENOUS at 13:10

## 2024-01-23 RX ADMIN — Medication 100 MILLIEQUIVALENT(S): at 01:08

## 2024-01-23 RX ADMIN — Medication 1 TABLET(S): at 11:05

## 2024-01-23 RX ADMIN — OXYCODONE HYDROCHLORIDE 5 MILLIGRAM(S): 5 TABLET ORAL at 07:30

## 2024-01-23 RX ADMIN — LIDOCAINE 1 PATCH: 4 CREAM TOPICAL at 17:33

## 2024-01-23 RX ADMIN — OXYCODONE HYDROCHLORIDE 5 MILLIGRAM(S): 5 TABLET ORAL at 22:22

## 2024-01-23 RX ADMIN — QUETIAPINE FUMARATE 75 MILLIGRAM(S): 200 TABLET, FILM COATED ORAL at 20:22

## 2024-01-23 RX ADMIN — LIDOCAINE 1 PATCH: 4 CREAM TOPICAL at 07:56

## 2024-01-23 RX ADMIN — OXYCODONE HYDROCHLORIDE 5 MILLIGRAM(S): 5 TABLET ORAL at 21:05

## 2024-01-23 RX ADMIN — Medication 100 MILLIGRAM(S): at 11:05

## 2024-01-23 RX ADMIN — PIPERACILLIN AND TAZOBACTAM 25 GRAM(S): 4; .5 INJECTION, POWDER, LYOPHILIZED, FOR SOLUTION INTRAVENOUS at 23:17

## 2024-01-23 RX ADMIN — PIPERACILLIN AND TAZOBACTAM 25 GRAM(S): 4; .5 INJECTION, POWDER, LYOPHILIZED, FOR SOLUTION INTRAVENOUS at 05:36

## 2024-01-23 RX ADMIN — Medication 500 MILLIGRAM(S): at 11:06

## 2024-01-23 RX ADMIN — Medication 100 MILLIEQUIVALENT(S): at 03:31

## 2024-01-23 RX ADMIN — Medication 100 MILLIEQUIVALENT(S): at 02:20

## 2024-01-23 RX ADMIN — LIDOCAINE 1 PATCH: 4 CREAM TOPICAL at 05:35

## 2024-01-23 RX ADMIN — POTASSIUM PHOSPHATE, MONOBASIC POTASSIUM PHOSPHATE, DIBASIC 83.33 MILLIMOLE(S): 236; 224 INJECTION, SOLUTION INTRAVENOUS at 04:34

## 2024-01-23 RX ADMIN — OXYCODONE HYDROCHLORIDE 10 MILLIGRAM(S): 5 TABLET ORAL at 20:08

## 2024-01-23 RX ADMIN — SODIUM CHLORIDE 75 MILLILITER(S): 9 INJECTION INTRAMUSCULAR; INTRAVENOUS; SUBCUTANEOUS at 07:17

## 2024-01-23 NOTE — PROGRESS NOTE ADULT - SUBJECTIVE AND OBJECTIVE BOX
TRAUMA SURGERY DAILY PROGRESS NOTE    24 Hour/Overnight Events:   - IR placed pigtail in left lung, out came 50cc bloody fluid  - refusing lovenox, last duplex 1/18. Consider duplex today  - started on NS @75  - given 1 prbc    SUBJECTIVE: Patient seen and evaluated on AM rounds. Pt is resting comfortably in bed with no complaints. Pt reports appropriate surgical incisional pain, which is adequately controlled on current regimen. Denies fevers/chills, dyspnea, abdominal pain, nausea, vomiting, and diarrhea    ------------------------------------------------------------------------------------------------------------  OBJECTIVE:  Vital Signs Last 24 Hrs  T(C): 37.2 (23 Jan 2024 05:00), Max: 39.4 (22 Jan 2024 14:11)  T(F): 98.9 (23 Jan 2024 05:00), Max: 102.9 (22 Jan 2024 14:11)  HR: 120 (23 Jan 2024 07:00) (100 - 122)  BP: 112/60 (23 Jan 2024 07:00) (93/52 - 158/90)  BP(mean): 77 (23 Jan 2024 07:00) (69 - 116)  RR: 44 (23 Jan 2024 07:00) (22 - 45)  SpO2: 95% (23 Jan 2024 07:00) (92% - 99%)    Parameters below as of 22 Jan 2024 19:00  Patient On (Oxygen Delivery Method): nasal cannula  O2 Flow (L/min): 2      I&O's Detail    22 Jan 2024 07:01  -  23 Jan 2024 07:00  --------------------------------------------------------  IN:    IV PiggyBack: 150 mL    IV PiggyBack: 575 mL    IV PiggyBack: 333.2 mL    Oral Fluid: 240 mL    PRBCs (Packed Red Blood Cells): 300 mL    sodium chloride 0.9%: 1425 mL  Total IN: 3023.2 mL    OUT:    Chest Tube (mL): 20 mL    VAC (Vacuum Assisted Closure) System (mL): 0 mL    Voided (mL): 1850 mL  Total OUT: 1870 mL    Total NET: 1153.2 mL      LABS:                        7.1    5.93  )-----------( 425      ( 23 Jan 2024 05:57 )             20.5     01-23    133<L>  |  105  |  7   ----------------------------<  97  3.3<L>   |  13<L>  |  1.05    Ca    6.2<LL>      23 Jan 2024 05:57  Phos  2.9     01-23  Mg     1.8     01-23    TPro  5.2<L>  /  Alb  2.0<L>  /  TBili  0.8  /  DBili  x   /  AST  1187<H>  /  ALT  405<H>  /  AlkPhos  359<H>  01-23    LIVER FUNCTIONS - ( 23 Jan 2024 05:57 )  Alb: 2.0 g/dL / Pro: 5.2 g/dL / ALK PHOS: 359 U/L / ALT: 405 U/L / AST: 1187 U/L / GGT: x           PT/INR - ( 23 Jan 2024 05:57 )   PT: 12.4 sec;   INR: 1.19 ratio    PTT - ( 23 Jan 2024 05:57 )  PTT:32.9 sec    Urinalysis Basic - ( 23 Jan 2024 05:57 )  Color: x / Appearance: x / SG: x / pH: x  Gluc: 97 mg/dL / Ketone: x  / Bili: x / Urobili: x   Blood: x / Protein: x / Nitrite: x   Leuk Esterase: x / RBC: x / WBC x   Sq Epi: x / Non Sq Epi: x / Bacteria: x      PHYSICAL EXAM:  Constitutional: Well developed, well nourished, NAD  Pulmonary: Symmetric chest rise bilaterally, no increased WOB  Chest: (+) Chest wall staples and sutures c/d/i. (+) Left pigtail chest tube to suction with SS output  Gastrointestinal: Abdomen soft, nontender, nondistended  Extremities: Warm to touch, soft, normal strength, sensory and motor intact. No cyanosis/erythema/edema

## 2024-01-23 NOTE — PROGRESS NOTE ADULT - NS ATTEND AMEND GEN_ALL_CORE FT
Evaluated in multidisciplinary round in SICU. Clinical parameters,  labs, available imagine continued to be evaluated   48yoM w/ PMHx hepatitis C and EtOH abuse was transferred from OSH on 1/6 for level 1 trauma eval. He was found down with multiple 4-11 left-sided rib fractures and flail chest, and had a chest tube placed at OSH. S/p VATS 1/10 w/ partial lung decortication and thoracoscopic removal of intrapleural foreign body. S/p chest tube removal 1/14, downgraded to floor 1/18, returned to SICU after fever 102.3 on 1/20.    S/P Lt sided pleural space PIG tail cath drainage cath placement by IR. CXR reviewed, more aeration at let base. Pleural fluid output 40 cc only.  Continue pul toileting, IS  Hemodynamically stable  PO  Sinus tachycardia resolved. DC IVF  Monitor worsening LFT. T bili wnl. hepatobiliary US  Abx Zosyn, cultures neg  Transfused for drop in HCT. CT output is bloody but 40 cc only. Mech DVT ppx for now, surveillance LE doppler p  OOB/PT

## 2024-01-23 NOTE — PROGRESS NOTE ADULT - SUBJECTIVE AND OBJECTIVE BOX
INTERVAL EVENTS:  - IR placed pigtail in left lung, out came 50cc bloody fluid  - refusing lovenox, last duplex 1/18. Consider duplex today  - started on NS @75  - given 1 prbc    SUBJECTIVE/ROS:  [ ] A ten-point review of systems was otherwise negative except as noted.  [ ] Due to altered mental status/intubation, subjective information were not able to be obtained from the patient. History was obtained, to the extent possible, from review of the chart and collateral sources of information.      NEURO  Exam: awake, alert, oriented. Agitated at times  Meds: acetaminophen     Tablet .. 500 milliGRAM(s) Oral every 6 hours PRN Moderate Pain (4 - 6)  HYDROmorphone  Injectable 0.5 milliGRAM(s) IV Push every 3 hours PRN breakthrough pain  melatonin 5 milliGRAM(s) Oral at bedtime  oxyCODONE    IR 10 milliGRAM(s) Oral every 4 hours PRN Severe Pain (7 - 10)  oxyCODONE    IR 5 milliGRAM(s) Oral every 4 hours PRN Moderate Pain (4 - 6)  QUEtiapine 25 milliGRAM(s) Oral <User Schedule>  QUEtiapine 75 milliGRAM(s) Oral <User Schedule>  [x] Adequacy of sedation and pain control has been assessed and adjusted      RESPIRATORY  RR: 30 (01-23-24 @ 00:00) (22 - 45)  SpO2: 98% (01-23-24 @ 00:00) (92% - 99%)  Exam: unlabored, clear to auscultation bilaterally      CARDIOVASCULAR  HR: 105 (01-23-24 @ 00:00) (100 - 120)  BP: 110/66 (01-23-24 @ 00:00) (93/52 - 158/90)  BP(mean): 80 (01-23-24 @ 00:00) (69 - 116)  VBG - ( 22 Jan 2024 22:30 )  pH: 7.32  /  pCO2: 26    /  pO2: 39    / HCO3: 13    / Base Excess: -11.6 /  SaO2: 63.1   Lactate: 0.6        Exam: regular rate and rhythm  Cardiac Rhythm: sinus  Perfusion     [x]Adequate   [ ]Inadequate  Mentation   [x]Normal       [ ]Reduced  Extremities  [x]Warm         [ ]Cool  Volume Status [ ]Hypervolemic [x]Euvolemic [ ]Hypovolemic  Meds: metoprolol tartrate 50 milliGRAM(s) Oral every 12 hours      GI/NUTRITION  Exam: soft, nontender, nondistended, incision C/D/I  Diet: regular diet  Meds: calcium carbonate    500 mG (Tums) Chewable 1 Tablet(s) Chew every 4 hours PRN Heartburn  famotidine    Tablet 20 milliGRAM(s) Oral daily PRN Heartburn      GENITOURINARY  I&O's Detail    01-21 @ 07:01  -  01-22 @ 07:00  --------------------------------------------------------  IN:    IV PiggyBack: 550 mL    IV PiggyBack: 225 mL  Total IN: 775 mL    OUT:    Voided (mL): 400 mL  Total OUT: 400 mL    Total NET: 375 mL      01-22 @ 07:01  -  01-23 @ 01:34  --------------------------------------------------------  IN:    IV PiggyBack: 175 mL    IV PiggyBack: 100 mL    Oral Fluid: 240 mL    PRBCs (Packed Red Blood Cells): 300 mL    sodium chloride 0.9%: 975 mL  Total IN: 1790 mL    OUT:    Chest Tube (mL): 5 mL    VAC (Vacuum Assisted Closure) System (mL): 0 mL    Voided (mL): 1250 mL  Total OUT: 1255 mL    Total NET: 535 mL        Weight (kg): 91.7 (01-22 @ 14:11)  01-22    132<L>  |  104  |  7   ----------------------------<  106<H>  2.9<LL>   |  14<L>  |  1.03    Ca    6.3<LL>      22 Jan 2024 22:41  Phos  2.8     01-22  Mg     1.5     01-22    TPro  5.2<L>  /  Alb  2.0<L>  /  TBili  0.3  /  DBili  x   /  AST  845<H>  /  ALT  312<H>  /  AlkPhos  298<H>  01-22    [ ] Hendrix catheter, indication: N/A  Meds: ascorbic acid 500 milliGRAM(s) Oral daily  cyanocobalamin 1000 MICROGram(s) Oral daily  folic acid 1 milliGRAM(s) Oral daily  multivitamin 1 Tablet(s) Oral daily  potassium chloride  10 mEq/100 mL IVPB 10 milliEquivalent(s) IV Intermittent every 1 hour  potassium phosphate IVPB 30 milliMole(s) IV Intermittent once  sodium chloride 0.9%. 1000 milliLiter(s) IV Continuous <Continuous>  thiamine 100 milliGRAM(s) Oral daily      HEMATOLOGIC  Meds: enoxaparin Injectable 40 milliGRAM(s) SubCutaneous every 24 hours    [x] VTE Prophylaxis                        6.4    5.50  )-----------( 384      ( 22 Jan 2024 22:41 )             18.7     PT/INR - ( 22 Jan 2024 22:41 )   PT: 13.0 sec;   INR: 1.25 ratio         PTT - ( 22 Jan 2024 22:41 )  PTT:34.7 sec  Transfusion     [ ] PRBC   [ ] Platelets   [ ] FFP   [ ] Cryoprecipitate      INFECTIOUS DISEASES  WBC Count: 5.50 K/uL (01-22 @ 22:41)  WBC Count: 7.25 K/uL (01-22 @ 06:02)    RECENT CULTURES:  Specimen Source: .Abscess L empyema  Date/Time: 01-22 @ 17:37  Culture Results: --  Gram Stain:   Numerous polymorphonuclear leukocytes per low power field  No organisms seen per oil power field  Organism: --  Specimen Source: .Blood Blood  Date/Time: 01-20 @ 14:55  Culture Results:   No growth at 48 Hours  Gram Stain: --  Organism: --  Specimen Source: .Blood Blood  Date/Time: 01-20 @ 14:50  Culture Results:   No growth at 48 Hours  Gram Stain: --  Organism: --    Meds: influenza   Vaccine 0.5 milliLiter(s) IntraMuscular once  piperacillin/tazobactam IVPB.. 3.375 Gram(s) IV Intermittent every 8 hours      ENDOCRINE  CAPILLARY BLOOD GLUCOSE      OTHER MEDICATIONS:  chlorhexidine 2% Cloths 1 Application(s) Topical daily  lidocaine   4% Patch 1 Patch Transdermal every 24 hours      CODE STATUS: full code

## 2024-01-23 NOTE — PROVIDER CONTACT NOTE (CHANGE IN STATUS NOTIFICATION) - SITUATION
Pt. oral temp 100.4/38C
hypoxic 89% on RA, tachycardic to 120s, restless, SOB, refused lovenox for multiple days

## 2024-01-23 NOTE — PROGRESS NOTE ADULT - SUBJECTIVE AND OBJECTIVE BOX
Interventional Radiology Follow-Up Note    This is a 48y Male s/p ___________ on _________ in Interventional Radiology with  _________.     S: Patient seen and examined @ bedside. No complaints offered.     Medication:   enoxaparin Injectable: (01-22)  metoprolol tartrate: (01-22)  piperacillin/tazobactam IVPB..: (01-23)    Vitals:   T(F): 103, Max: 103 (07:00)  HR: 120  BP: 124/74  RR: 36  SpO2: 99%    Physical Exam:  General: Nontoxic, in NAD, A&O x3.  Abdomen: soft, NTND, no peritoneal signs.  Extremities:  ____ groin clean, dry and intact, soft with no evidence of hematoma, ___femoral/dp/pt pulses +___. No pedal edema or calf tenderness noted.  Drain Device: Drain intact attached to ____. Dressing clean, dry, intact.    24hr Drain output: ____    LABS:  WBC 5.93 / Hgb 7.1 / Hct 20.5 / Plt 425  Na 133 / K 3.3 / CO2 13 / Cl 105 / BUN 7 / Cr 1.05 / Glucose 97   / AST 1187 / Alk Phos 359 / Tbili 0.8  Ptt 32.9 / Pt 12.4 / INR 1.19          Assessment/Plan:  48y Male admitted with Hemothorax    Pt most recently s/p ___________.     -continue global management per primary team  -monitor h/h; transfuse as needed  -trend vs/labs  -flush drain with 5cc NS daily forward only; DO NOT aspirate  -change dressing q3 days or when dressing is saturated  -regarding outpatient follow up with IR, if the patient is d/c home with drainage catheter they can make an appointment with IR by calling the IR booking office at [phone]; recommend IR follow in _____wks/months for tube evaluation.  - Greater than 50% of the encounter was spent counseling and/or coordination of care on drain management and follow up.   -they will benefit from VNS service to help with drainage catheter care; they should continue same drainage catheter care as an outpatient.     Please call IR at extension [phone] with any questions, concerns, or issues regarding above.       Interventional Radiology Follow-Up Note    This is a 48y Male s/p left chest tube on 1/22/24in Interventional Radiology with Dr. Cipriano Gloria.     S: Patient seen and examined @ bedside. Pt states that his breathing has improved.     Medication:   enoxaparin Injectable: (01-22)  metoprolol tartrate: (01-22)  piperacillin/tazobactam IVPB..: (01-23)    Vitals:   T(F): 103, Max: 103 (07:00)  HR: 120  BP: 124/74  RR: 36  SpO2: 99%    Physical Exam:  General: Nontoxic, in NAD, A&O x3.  Thorax: Tenderness to palpation over the left chest wall. No evidence of subQ emphysema.  Drain Device: Drain intact attached to pleuravac and water seal. Dressing clean, dry, intact.    24hr Drain output: ____    LABS:  WBC 5.93 / Hgb 7.1 / Hct 20.5 / Plt 425  Na 133 / K 3.3 / CO2 13 / Cl 105 / BUN 7 / Cr 1.05 / Glucose 97   / AST 1187 / Alk Phos 359 / Tbili 0.8  Ptt 32.9 / Pt 12.4 / INR 1.19          Assessment/Plan:  48y Male admitted with Hemothorax    Pt most recently s/p ___________.     -continue global management per primary team  -monitor h/h; transfuse as needed  -trend vs/labs  -flush drain with 5cc NS daily forward only; DO NOT aspirate  -change dressing q3 days or when dressing is saturated  -regarding outpatient follow up with IR, if the patient is d/c home with drainage catheter they can make an appointment with IR by calling the IR booking office at [phone]; recommend IR follow in _____wks/months for tube evaluation.  - Greater than 50% of the encounter was spent counseling and/or coordination of care on drain management and follow up.   -they will benefit from VNS service to help with drainage catheter care; they should continue same drainage catheter care as an outpatient.     Please call IR at extension [phone] with any questions, concerns, or issues regarding above.       Interventional Radiology Follow-Up Note    This is a 48y Male s/p left chest tube on 1/22/24in Interventional Radiology with Dr. Cipriano Gloria.     S: Patient seen and examined @ bedside. Pt states that his breathing has improved.     Medication:   enoxaparin Injectable: (01-22)  metoprolol tartrate: (01-22)  piperacillin/tazobactam IVPB..: (01-23)    Vitals:   T(F): 103, Max: 103 (07:00)  HR: 120  BP: 124/74  RR: 36  SpO2: 99%    Physical Exam:  General: Nontoxic, in NAD, A&O x3.  Thorax: Tenderness to palpation over the left chest wall. No evidence of subQ emphysema.  Drain Device: Drain intact attached to pleuravac and water seal. No evidence of air leak in device. Serosanguinous fluid noted in device. Dressing clean, dry, intact.    24hr Drain output: 20 cc    LABS:  WBC 5.93 / Hgb 7.1 / Hct 20.5 / Plt 425  Na 133 / K 3.3 / CO2 13 / Cl 105 / BUN 7 / Cr 1.05 / Glucose 97   / AST 1187 / Alk Phos 359 / Tbili 0.8  Ptt 32.9 / Pt 12.4 / INR 1.19    Assessment/Plan: 48y Male with pmh of ETOH abuse and hepatitis C who was transferred as a level 1 trauma with multiple rib fractures and flail chest s/p VATs, partial lung decortication and thoracoscopic removal of foreign body s/p chest tube removal 1/14. CT from 1/20 is significant for a complex collection in the left pleural space.    1. Pleural effusion  - s/p left chest tube placement on 1/22/24 with Dr. Cipriano Gloria.  - continue global management per primary team  - trend H/H, Hgb 6.4 --> 7.1 and Hct 18.7 --> 20.5 s/p 1 u of pRBC.  - trend vs/labs  - Greater than 50% of the encounter was spent counseling and/or coordination of care on drain management and follow up.   - IR will continue to follow     Please call IR at extension [phone] with any questions, concerns, or issues regarding above.

## 2024-01-23 NOTE — PROGRESS NOTE ADULT - SUBJECTIVE AND OBJECTIVE BOX
Patient is a 48y old  Male who presents with a chief complaint of Trauma 1 activation (23 Jan 2024 08:47)      Vital Signs Last 24 Hrs  T(C): 39.4 (01-23-24 @ 07:00), Max: 39.4 (01-22-24 @ 14:11)  T(F): 103 (01-23-24 @ 07:00), Max: 103 (01-23-24 @ 07:00)  HR: 109 (01-23-24 @ 09:00) (100 - 122)  BP: 100/54 (01-23-24 @ 09:00) (93/52 - 158/90)  RR: 36 (01-23-24 @ 09:00) (22 - 36)  SpO2: 93% (01-23-24 @ 09:00) (92% - 99%)              01-22-24 @ 07:01  -  01-23-24 @ 07:00  --------------------------------------------------------  IN: 3023.2 mL / OUT: 2070 mL / NET: 953.2 mL                          7.1    5.93  )-----------( 425      ( 23 Jan 2024 05:57 )             20.5     133<L>  |  105  |  7   ----------------------------<  97  3.3<L>   |  13<L>  |  1.05        PHYSICAL EXAM  Neurology: A&Ox3, NAD  CV : RRR+S1S2  Lungs: Respirations non-labored, B/L BS clear, diminished at bases  L>R  +Left pigtail with bloody drainage to LWS, no air leak  Abdomen: Soft, NT/ND, +[id number]  Extremities: B/L LE warm, no edema, +PP             MEDICATIONS  acetaminophen     Tablet .. 500 milliGRAM(s) Oral every 6 hours PRN  ascorbic acid 500 milliGRAM(s) Oral daily  calcium carbonate    500 mG (Tums) Chewable 1 Tablet(s) Chew every 4 hours PRN  chlorhexidine 2% Cloths 1 Application(s) Topical daily  cyanocobalamin 1000 MICROGram(s) Oral daily  enoxaparin Injectable 40 milliGRAM(s) SubCutaneous every 24 hours  famotidine    Tablet 20 milliGRAM(s) Oral daily PRN  folic acid 1 milliGRAM(s) Oral daily  HYDROmorphone  Injectable 0.5 milliGRAM(s) IV Push every 3 hours PRN  influenza   Vaccine 0.5 milliLiter(s) IntraMuscular once  lidocaine   4% Patch 1 Patch Transdermal every 24 hours  melatonin 5 milliGRAM(s) Oral at bedtime  metoprolol tartrate 50 milliGRAM(s) Oral every 12 hours  multivitamin 1 Tablet(s) Oral daily  oxyCODONE    IR 5 milliGRAM(s) Oral every 4 hours PRN  oxyCODONE    IR 10 milliGRAM(s) Oral every 4 hours PRN  piperacillin/tazobactam IVPB.. 3.375 Gram(s) IV Intermittent every 8 hours  QUEtiapine 75 milliGRAM(s) Oral <User Schedule>  QUEtiapine 25 milliGRAM(s) Oral <User Schedule>  sodium chloride 0.9%. 1000 milliLiter(s) IV Continuous <Continuous>  thiamine 100 milliGRAM(s) Oral daily

## 2024-01-23 NOTE — PROGRESS NOTE ADULT - PROBLEM SELECTOR PLAN 1
Suspected fracture of rib on left side, closed. Left Rib Fx 4-11th /Hemothorax  1/10 LT VATS, with partial lung decortication  Thoracoscopic removal of intrapleural foreign body     left chest tube dc'd 1/14 1/20 thoracic surgery reconsulted for left pleural effusion vs hemothorax on chest ct- Dr. Sepulveda reviewed; no acute thoracic surgery intervention   1/22 s/p left pigtail by IR  Maintain left pigtail to LWS  Flush left pigtail daily  Daily CXR  Strict I & Os, monitor drainage  Plan for tPA MIST protocol Wed  Care as per primary team

## 2024-01-23 NOTE — PROGRESS NOTE ADULT - ASSESSMENT
48yoM w/ PMHx hepatitis C and EtOH abuse was transferred from OSH on 1/6 for level 1 trauma eval. He was found down with multiple 4-11 left-sided rib fractures and flail chest, and had a chest tube placed at OSH. S/p VATS 1/10 w/ partial lung decortication and thoracoscopic removal of intrapleural foreign body. S/p chest tube removal 1/14, downgraded to floor 1/18, returned to SICU after fever 102.3 on 1/20.    PLAN  Neurologic:  - Seroquel 75mg QHS  - multimodal pain control w/ lidocaine patch, Tylenol, oxycodone, Dilaudid PRN   - Dilaudid 1mg changed to 0.5mg q3h    Respiratory:  - s/p VATS 1/10 with discovery of Fibropurulent exudative adhesions and removal of retained foreign material likely the tip of a glove, s/p washout, with new 28f chest tube placed > removed 1/14  - Incentive spirometry, Duonebs   - AM CXR  - 1/20 CT with complex collection L pleural space, s/p drainage by IR 1/22, 20cc bloody fluid drained, pigtail placed    Cardiovascular:  - off pressors MAP >65  - Metoprolol 50 BID    Gastrointestinal/Nutrition:  - Regular diet / npo after midnight  - Pepcid    Genitourinary/Renal:  - Supplement electrolytes PRN  - 1/16: KEZIA, Renal u/s unremarkable, urine lytes intrinsic  - trend creatinine  - voiding spontaneously  - NS @75    Hematologic:  - Chemical VTE ppx with Lovenox (sometimes refuses)   - Mechanical VTE ppx with SCDs    Infectious Disease: Empyema   - Procal 9 on 1/7 - downtrended to 0.64 on 1/12  - Tissue culture 1/10 no organisms seen   - Bcx 1/13 with staph epi x1 bottle, however repeat BCx 1/14, 1/15 negative  - Vanc 1/10-1/16, stopped in setting of worsening renal failure  - Continue Zosyn 1/7, end date 1/24  - f/u L empyema cultures    Endocrine:  - No active issues, no history of DM    MSK:   - sat on dee tray(?) wound right lateral buttox with dressing

## 2024-01-23 NOTE — PROVIDER CONTACT NOTE (CHANGE IN STATUS NOTIFICATION) - ACTION/TREATMENT ORDERED:
Home Care Instructions  DILATATION AND CURETTAGE (D&C)  DR Gio Mcclain. Reji. Dilation of the cervix (the opening of the uterus) and scraping of the endometrial lining of the uterus for diagnostic and /or therapeutic purposes. IMPORTANT POINTS TO KNOW  ? You may experience a scratchy throat from the breathing tube used during general anesthesia. You may eat a diet as tolerated. ? Minimal activity is preferred for 2-3 days    ? It is important to wipe from front to back after elimination    ? No sexual intercourse, douching or use of tampons for 2 weeks    ? Vaginal bleeding or spotting may continue for a week after the procedure    ? Mild cramping may continue for 2-3 days after the procedure. A mild analgesic like Extra Strength Tylenol may be used to relieve the cramping. Return to clinic for follow-up appointment as instructed by physician. You may also use Motrin for discomfort. Wait to use ibuprofen until 3:30pm.    NOTIFY THE OFFICE IF:  ? Vaginal bleeding that is heavier than a menstrual period    ? Vaginal discharge that burns, irritated or has a foul odor    ? Chills or temperature over 101 degrees F    ? Severe lower abdominal cramps or pain    ? Frequency, urgency and/or burning with urination    NOTE: the above listed information is meant to be a guide only.  If you have problems or questions not answered on the sheet, please contact our office at [phone]    [facility]  [phone]
will bring up in rounds to discuss plan of care
MD aware will encourage incentive spirometer and monitor pt.
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
## 2024-01-24 LAB
ALBUMIN SERPL ELPH-MCNC: 2.3 G/DL — LOW (ref 3.3–5)
ALP SERPL-CCNC: 434 U/L — HIGH (ref 40–120)
ALT FLD-CCNC: 425 U/L — HIGH (ref 10–45)
ANION GAP SERPL CALC-SCNC: 10 MMOL/L — SIGNIFICANT CHANGE UP (ref 5–17)
APTT BLD: 35.6 SEC — SIGNIFICANT CHANGE UP (ref 24.5–35.6)
AST SERPL-CCNC: 735 U/L — HIGH (ref 10–40)
BILIRUB SERPL-MCNC: 0.8 MG/DL — SIGNIFICANT CHANGE UP (ref 0.2–1.2)
BUN SERPL-MCNC: 5 MG/DL — LOW (ref 7–23)
CALCIUM SERPL-MCNC: 8.2 MG/DL — LOW (ref 8.4–10.5)
CHLORIDE SERPL-SCNC: 102 MMOL/L — SIGNIFICANT CHANGE UP (ref 96–108)
CO2 SERPL-SCNC: 18 MMOL/L — LOW (ref 22–31)
CREAT SERPL-MCNC: 1.19 MG/DL — SIGNIFICANT CHANGE UP (ref 0.5–1.3)
EGFR: 76 ML/MIN/1.73M2 — SIGNIFICANT CHANGE UP
GAS PNL BLDV: SIGNIFICANT CHANGE UP
GLUCOSE SERPL-MCNC: 98 MG/DL — SIGNIFICANT CHANGE UP (ref 70–99)
HCT VFR BLD CALC: 24.3 % — LOW (ref 39–50)
HGB BLD-MCNC: 8.2 G/DL — LOW (ref 13–17)
INR BLD: 1.19 RATIO — HIGH (ref 0.85–1.18)
MAGNESIUM SERPL-MCNC: 1.9 MG/DL — SIGNIFICANT CHANGE UP (ref 1.6–2.6)
MCHC RBC-ENTMCNC: 27.8 PG — SIGNIFICANT CHANGE UP (ref 27–34)
MCHC RBC-ENTMCNC: 33.7 GM/DL — SIGNIFICANT CHANGE UP (ref 32–36)
MCV RBC AUTO: 82.4 FL — SIGNIFICANT CHANGE UP (ref 80–100)
NRBC # BLD: 0 /100 WBCS — SIGNIFICANT CHANGE UP (ref 0–0)
PHOSPHATE SERPL-MCNC: 2.4 MG/DL — LOW (ref 2.5–4.5)
PLATELET # BLD AUTO: 469 K/UL — HIGH (ref 150–400)
POTASSIUM SERPL-MCNC: 4.4 MMOL/L — SIGNIFICANT CHANGE UP (ref 3.5–5.3)
POTASSIUM SERPL-SCNC: 4.4 MMOL/L — SIGNIFICANT CHANGE UP (ref 3.5–5.3)
PROT SERPL-MCNC: 6.4 G/DL — SIGNIFICANT CHANGE UP (ref 6–8.3)
PROTHROM AB SERPL-ACNC: 13 SEC — SIGNIFICANT CHANGE UP (ref 9.5–13)
RBC # BLD: 2.95 M/UL — LOW (ref 4.2–5.8)
RBC # FLD: 15.4 % — HIGH (ref 10.3–14.5)
SODIUM SERPL-SCNC: 130 MMOL/L — LOW (ref 135–145)
WBC # BLD: 6.09 K/UL — SIGNIFICANT CHANGE UP (ref 3.8–10.5)
WBC # FLD AUTO: 6.09 K/UL — SIGNIFICANT CHANGE UP (ref 3.8–10.5)

## 2024-01-24 PROCEDURE — 93971 EXTREMITY STUDY: CPT | Mod: 26,LT

## 2024-01-24 PROCEDURE — 99233 SBSQ HOSP IP/OBS HIGH 50: CPT

## 2024-01-24 PROCEDURE — 76705 ECHO EXAM OF ABDOMEN: CPT | Mod: 26,59

## 2024-01-24 PROCEDURE — 71045 X-RAY EXAM CHEST 1 VIEW: CPT | Mod: 26

## 2024-01-24 PROCEDURE — 99232 SBSQ HOSP IP/OBS MODERATE 35: CPT

## 2024-01-24 PROCEDURE — 93975 VASCULAR STUDY: CPT | Mod: 26

## 2024-01-24 RX ORDER — DORNASE ALFA 1 MG/ML
5 SOLUTION RESPIRATORY (INHALATION) ONCE
Refills: 0 | Status: COMPLETED | OUTPATIENT
Start: 2024-01-24 | End: 2024-01-24

## 2024-01-24 RX ORDER — LOPERAMIDE HCL 2 MG
2 TABLET ORAL EVERY 8 HOURS
Refills: 0 | Status: DISCONTINUED | OUTPATIENT
Start: 2024-01-24 | End: 2024-01-26

## 2024-01-24 RX ORDER — MAGNESIUM SULFATE 500 MG/ML
1 VIAL (ML) INJECTION ONCE
Refills: 0 | Status: COMPLETED | OUTPATIENT
Start: 2024-01-24 | End: 2024-01-24

## 2024-01-24 RX ORDER — SODIUM CHLORIDE 9 MG/ML
50 INJECTION INTRAMUSCULAR; INTRAVENOUS; SUBCUTANEOUS ONCE
Refills: 0 | Status: COMPLETED | OUTPATIENT
Start: 2024-01-24 | End: 2024-01-24

## 2024-01-24 RX ORDER — ALTEPLASE 100 MG
10 KIT INTRAVENOUS ONCE
Refills: 0 | Status: COMPLETED | OUTPATIENT
Start: 2024-01-24 | End: 2024-01-24

## 2024-01-24 RX ORDER — METOPROLOL TARTRATE 50 MG
50 TABLET ORAL EVERY 8 HOURS
Refills: 0 | Status: DISCONTINUED | OUTPATIENT
Start: 2024-01-24 | End: 2024-01-26

## 2024-01-24 RX ORDER — ONDANSETRON 8 MG/1
4 TABLET, FILM COATED ORAL ONCE
Refills: 0 | Status: COMPLETED | OUTPATIENT
Start: 2024-01-24 | End: 2024-01-24

## 2024-01-24 RX ADMIN — Medication 500 MILLIGRAM(S): at 12:01

## 2024-01-24 RX ADMIN — OXYCODONE HYDROCHLORIDE 5 MILLIGRAM(S): 5 TABLET ORAL at 02:34

## 2024-01-24 RX ADMIN — OXYCODONE HYDROCHLORIDE 10 MILLIGRAM(S): 5 TABLET ORAL at 21:55

## 2024-01-24 RX ADMIN — Medication 1 MILLIGRAM(S): at 12:00

## 2024-01-24 RX ADMIN — ALTEPLASE 10 MILLIGRAM(S): KIT at 13:15

## 2024-01-24 RX ADMIN — OXYCODONE HYDROCHLORIDE 5 MILLIGRAM(S): 5 TABLET ORAL at 03:15

## 2024-01-24 RX ADMIN — LIDOCAINE 1 PATCH: 4 CREAM TOPICAL at 19:20

## 2024-01-24 RX ADMIN — Medication 63.75 MILLIMOLE(S): at 05:22

## 2024-01-24 RX ADMIN — PIPERACILLIN AND TAZOBACTAM 25 GRAM(S): 4; .5 INJECTION, POWDER, LYOPHILIZED, FOR SOLUTION INTRAVENOUS at 21:26

## 2024-01-24 RX ADMIN — FAMOTIDINE 20 MILLIGRAM(S): 10 INJECTION INTRAVENOUS at 11:23

## 2024-01-24 RX ADMIN — Medication 100 GRAM(S): at 05:22

## 2024-01-24 RX ADMIN — PIPERACILLIN AND TAZOBACTAM 25 GRAM(S): 4; .5 INJECTION, POWDER, LYOPHILIZED, FOR SOLUTION INTRAVENOUS at 13:41

## 2024-01-24 RX ADMIN — LIDOCAINE 1 PATCH: 4 CREAM TOPICAL at 20:16

## 2024-01-24 RX ADMIN — OXYCODONE HYDROCHLORIDE 10 MILLIGRAM(S): 5 TABLET ORAL at 06:45

## 2024-01-24 RX ADMIN — Medication 50 MILLIGRAM(S): at 13:42

## 2024-01-24 RX ADMIN — Medication 2 MILLIGRAM(S): at 13:41

## 2024-01-24 RX ADMIN — QUETIAPINE FUMARATE 75 MILLIGRAM(S): 200 TABLET, FILM COATED ORAL at 21:12

## 2024-01-24 RX ADMIN — Medication 2 MILLIGRAM(S): at 21:25

## 2024-01-24 RX ADMIN — Medication 1 TABLET(S): at 12:00

## 2024-01-24 RX ADMIN — SODIUM CHLORIDE 50 MILLILITER(S): 9 INJECTION INTRAMUSCULAR; INTRAVENOUS; SUBCUTANEOUS at 13:40

## 2024-01-24 RX ADMIN — OXYCODONE HYDROCHLORIDE 10 MILLIGRAM(S): 5 TABLET ORAL at 21:25

## 2024-01-24 RX ADMIN — OXYCODONE HYDROCHLORIDE 10 MILLIGRAM(S): 5 TABLET ORAL at 05:23

## 2024-01-24 RX ADMIN — Medication 100 MILLIGRAM(S): at 12:00

## 2024-01-24 RX ADMIN — PREGABALIN 1000 MICROGRAM(S): 225 CAPSULE ORAL at 12:00

## 2024-01-24 RX ADMIN — Medication 5 MILLIGRAM(S): at 21:12

## 2024-01-24 RX ADMIN — Medication 50 MILLIGRAM(S): at 21:12

## 2024-01-24 RX ADMIN — ENOXAPARIN SODIUM 40 MILLIGRAM(S): 100 INJECTION SUBCUTANEOUS at 18:29

## 2024-01-24 RX ADMIN — LIDOCAINE 1 PATCH: 4 CREAM TOPICAL at 08:00

## 2024-01-24 RX ADMIN — DORNASE ALFA 5 MILLIGRAM(S): 1 SOLUTION RESPIRATORY (INHALATION) at 13:15

## 2024-01-24 RX ADMIN — PIPERACILLIN AND TAZOBACTAM 25 GRAM(S): 4; .5 INJECTION, POWDER, LYOPHILIZED, FOR SOLUTION INTRAVENOUS at 05:22

## 2024-01-24 RX ADMIN — CHLORHEXIDINE GLUCONATE 1 APPLICATION(S): 213 SOLUTION TOPICAL at 05:09

## 2024-01-24 RX ADMIN — ONDANSETRON 4 MILLIGRAM(S): 8 TABLET, FILM COATED ORAL at 12:01
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
## 2024-01-24 NOTE — PROGRESS NOTE ADULT - SUBJECTIVE AND OBJECTIVE BOX
24 HOUR EVENTS:  - RUQ for AST >1400  - tmax 102.9    NEURO  RASS (if intubated): 		[facility] ICU (if concern for delirium):  Exam: AOx4  Meds: acetaminophen     Tablet .. 500 milliGRAM(s) Oral every 6 hours PRN Mild Pain (1 - 3)  HYDROmorphone  Injectable 0.5 milliGRAM(s) IV Push every 3 hours PRN breakthrough pain  melatonin 5 milliGRAM(s) Oral at bedtime  oxyCODONE    IR 10 milliGRAM(s) Oral every 4 hours PRN Severe Pain (7 - 10)  oxyCODONE    IR 5 milliGRAM(s) Oral every 4 hours PRN Moderate Pain (4 - 6)  QUEtiapine 75 milliGRAM(s) Oral <User Schedule>      RESPIRATORY  RR: 41 (01-23-24 @ 21:00) (27 - 47)  SpO2: 95% (01-23-24 @ 21:00) (92% - 99%)  Wt(kg): --  Exam: lung CTA b/l, left sided chest tube with minimal OP  Mechanical Ventilation:     Meds:     CARDIOVASCULAR  HR: 119 (01-23-24 @ 21:00) (107 - 122)  BP: 109/58 (01-23-24 @ 21:00) (100/54 - 141/84)  BP(mean): 77 (01-23-24 @ 21:00) (71 - 105)  ABP: --  ABP(mean): --  Wt(kg): --  CVP(cm H2O): --  VBG - ( 23 Jan 2024 11:55 )  pH: 7.36  /  pCO2: 29    /  pO2: 42    / HCO3: 16    / Base Excess: -8.1  /  SaO2: 71.5   Lactate: 0.8                Exam: normal S1/S2 w/o murmurs or rubs  Cardiac Rhythm: regular  Perfusion     [x ]Adequate   [ ]Inadequate  Mentation   [x ]Normal       [ ]Reduced  Extremities  [x ]Warm         [ ]Cool  Volume Status [ ]Hypervolemic [x ]Euvolemic [ ]Hypovolemic  Meds: metoprolol tartrate 50 milliGRAM(s) Oral every 12 hours      GI/NUTRITION  Exam: abd soft and non tender  Diet: regular  Meds: calcium carbonate    500 mG (Tums) Chewable 1 Tablet(s) Chew every 4 hours PRN Heartburn  famotidine    Tablet 20 milliGRAM(s) Oral daily PRN Heartburn  simethicone 80 milliGRAM(s) Chew every 6 hours PRN Heartburn      GENITOURINARY  I&O's Detail    01-22 @ 07:01 - 01-23 @ 07:00  --------------------------------------------------------  IN:    IV PiggyBack: 150 mL    IV PiggyBack: 575 mL    IV PiggyBack: 333.2 mL    Oral Fluid: 240 mL    PRBCs (Packed Red Blood Cells): 300 mL    sodium chloride 0.9%: 1425 mL  Total IN: 3023.2 mL    OUT:    Chest Tube (mL): 20 mL    VAC (Vacuum Assisted Closure) System (mL): 0 mL    Voided (mL): 2050 mL  Total OUT: 2070 mL    Total NET: 953.2 mL      01-23 @ 07:01 - 01-24 @ 00:26  --------------------------------------------------------  IN:    IV PiggyBack: 166.6 mL    IV PiggyBack: 150 mL    Oral Fluid: 1200 mL    sodium chloride 0.9%: 225 mL  Total IN: 1741.6 mL    OUT:    Chest Tube (mL): 30 mL    VAC (Vacuum Assisted Closure) System (mL): 0 mL    Voided (mL): 920 mL  Total OUT: 950 mL    Total NET: 791.6 mL          01-23    132<L>  |  102  |  7   ----------------------------<  90  4.5   |  17<L>  |  1.29    Ca    8.0<L>      23 Jan 2024 12:44  Phos  3.5     01-23  Mg     2.1     01-23    TPro  6.3  /  Alb  2.4<L>  /  TBili  1.1  /  DBili  x   /  AST  1467<H>  /  ALT  541<H>  /  AlkPhos  462<H>  01-23    Meds: ascorbic acid 500 milliGRAM(s) Oral daily  cyanocobalamin 1000 MICROGram(s) Oral daily  folic acid 1 milliGRAM(s) Oral daily  multivitamin 1 Tablet(s) Oral daily  thiamine 100 milliGRAM(s) Oral daily      HEMATOLOGIC  Meds: enoxaparin Injectable 40 milliGRAM(s) SubCutaneous every 24 hours                          7.9    5.77  )-----------( 471      ( 23 Jan 2024 12:43 )             23.8     PT/INR - ( 23 Jan 2024 12:44 )   PT: 12.0 sec;   INR: 1.09 ratio         PTT - ( 23 Jan 2024 12:44 )  PTT:33.2 sec    INFECTIOUS DISEASES  T(C): 38 (01-23-24 @ 19:00), Max: 39.4 (01-23-24 @ 07:00)  Wt(kg): --  WBC Count: 5.77 K/uL (01-23 @ 12:43)  WBC Count: 5.93 K/uL (01-23 @ 05:57)    Recent Cultures:  Specimen Source: .Abscess L empyema, 01-22 @ 17:37; Results   No growth to date.; Gram Stain:   Numerous polymorphonuclear leukocytes per low power field  No organisms seen per oil power field; Organism: --  Specimen Source: .Blood Blood, 01-20 @ 14:55; Results   No growth at 72 Hours; Gram Stain: --; Organism: --  Specimen Source: .Blood Blood, 01-20 @ 14:50; Results   No growth at 72 Hours; Gram Stain: --; Organism: --  Specimen Source: .Blood Blood-Peripheral, 01-17 @ 06:26; Results   No growth at 5 days; Gram Stain: --; Organism: --    Meds: influenza   Vaccine 0.5 milliLiter(s) IntraMuscular once  piperacillin/tazobactam IVPB.. 3.375 Gram(s) IV Intermittent every 8 hours      ENDOCRINE  Capillary Blood Glucose    Meds:     ACCESS DEVICES:  [ ] Peripheral IV  [ ] Central Venous Line		[ ] R	[ ] L	[ ] IJ	[ ] Fem	[ ] SC	Placed:   [ ] Arterial Line			[ ] R	[ ] L	[ ] Fem	[ ] Rad	[ ] Ax	Placed:   [ ] PICC:					[ ] Mediport  [ ] Urinary Catheter, Date Placed:   [ ] Necessity of urinary, arterial, and venous catheters discussed    OTHER MEDICATIONS:  chlorhexidine 2% Cloths 1 Application(s) Topical <User Schedule>  lidocaine   4% Patch 1 Patch Transdermal every 24 hours      IMAGING:
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
## 2024-01-24 NOTE — PROGRESS NOTE ADULT - SUBJECTIVE AND OBJECTIVE BOX
TEAM [ [facility] ] Surgery Daily Progress Note  =====================================================    SUBJECTIVE: Patient seen and examined at bedside on AM rounds. Patient reports that they're feeling well. Staples from VATS were removed on AM rounds.     PMH:  PAST MEDICAL & SURGICAL HISTORY:  No pertinent past medical history  No significant past surgical history    ALLERGIES:  No Known Allergies    --------------------------------------------------------------------------------------    VITAL SIGNS:  Vital Signs Last 24 Hrs  T(C): 37.9 (24 Jan 2024 07:00), Max: 38.2 (23 Jan 2024 15:00)  T(F): 100.2 (24 Jan 2024 07:00), Max: 100.8 (23 Jan 2024 15:00)  HR: 117 (24 Jan 2024 09:00) (101 - 122)  BP: 117/51 (24 Jan 2024 09:00) (101/58 - 141/86)  BP(mean): 73 (24 Jan 2024 09:00) (73 - 108)  RR: 37 (24 Jan 2024 09:00) (20 - 48)  SpO2: 99% (24 Jan 2024 09:00) (92% - 100%)    Parameters below as of 24 Jan 2024 07:00  Patient On (Oxygen Delivery Method): nasal cannula    O2 Concentration (%): 4  --------------------------------------------------------------------------------------    PHYSICAL EXAM:  Constitutional: Well developed, well nourished, NAD  Pulmonary: Symmetric chest rise bilaterally, no increased WOB  Chest: (+) Chest wall staples and sutures c/d/i. (+) Left pigtail chest tube to suction with SS output  Gastrointestinal: Abdomen soft, nontender, nondistended  Extremities: Warm to touch, soft, n    --------------------------------------------------------------------------------------    LABS                        8.2    6.09  )-----------( 469      ( 24 Jan 2024 02:20 )             24.3   01-24    130<L>  |  102  |  5<L>  ----------------------------<  98  4.4   |  18<L>  |  1.19    Ca    8.2<L>      24 Jan 2024 02:20  Phos  2.4     01-24  Mg     1.9     01-24    TPro  6.4  /  Alb  2.3<L>  /  TBili  0.8  /  DBili  x   /  AST  735<H>  /  ALT  425<H>  /  AlkPhos  434<H>  01-24      --------------------------------------------------------------------------------------    INS AND OUTS:  I&O's Detail    23 Jan 2024 07:01  -  24 Jan 2024 07:00  --------------------------------------------------------  IN:    IV PiggyBack: 166.6 mL    IV PiggyBack: 100 mL    IV PiggyBack: 425 mL    Oral Fluid: 1200 mL    sodium chloride 0.9%: 225 mL  Total IN: 2116.6 mL    OUT:    Chest Tube (mL): 30 mL    VAC (Vacuum Assisted Closure) System (mL): 0 mL    Voided (mL): 2070 mL  Total OUT: 2100 mL    Total NET: 16.6 mL      24 Jan 2024 07:01  -  24 Jan 2024 10:34  --------------------------------------------------------  IN:    IV PiggyBack: 25 mL    Oral Fluid: 300 mL  Total IN: 325 mL    OUT:    Chest Tube (mL): 20 mL    VAC (Vacuum Assisted Closure) System (mL): 0 mL    Voided (mL): 250 mL  Total OUT: 270 mL    Total NET: 55 mL        --------------------------------------------------------------------------------------

## 2024-01-24 NOTE — CHART NOTE - NSCHARTNOTEFT_GEN_A_CORE
instilled TPA/pulmozyne through Lt PTC   tolerated well.  Please unclamp and place back to lws   at 215 pm.   bedside RN aware of plan  A [facility]

## 2024-01-24 NOTE — PROGRESS NOTE ADULT - SUBJECTIVE AND OBJECTIVE BOX
VITAL SIGNS        Vital Signs Last 24 Hrs  T(C): 37.9 (24 @ 07:00), Max: 38.2 (24 @ 15:00)  T(F): 100.2 (24 @ 07:00), Max: 100.8 (24 @ 15:00)  HR: 117 (24 @ 09:00) (101 - 119)  BP: 117/51 (24 @ 09:00) (101/58 - 141/86)  RR: 37 (24 @ 09:00) (20 - 48)  SpO2: 99% (24 @ 09:00) (92% - 100%)                   Daily     Daily Weight in k (2024 00:00)      Bilirubin Total: 0.8 mg/dL ( @ 02:20)  Bilirubin Total: 1.1 mg/dL ( @ 12:44)    CAPILLARY BLOOD GLUCOSE              Drains:                     L Pleural  [  ]  Drainage:             minimal drainage                    PHYSICAL EXAM  s   no SOB  No CP"  Neurology: alert and oriented x 3, moves all extremities with no defecits  CV :  RRR    Lungs:  lt side diminished  Abdomen: soft, nontender, nondistended, positive bowel sounds,  Extremities:     no edema
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
## 2024-01-25 ENCOUNTER — TRANSCRIPTION ENCOUNTER (OUTPATIENT)
Age: 49
End: 2024-01-25

## 2024-01-25 LAB
ALBUMIN SERPL ELPH-MCNC: 2.3 G/DL — LOW (ref 3.3–5)
ALP SERPL-CCNC: 387 U/L — HIGH (ref 40–120)
ALT FLD-CCNC: 263 U/L — HIGH (ref 10–45)
ANION GAP SERPL CALC-SCNC: 12 MMOL/L — SIGNIFICANT CHANGE UP (ref 5–17)
APTT BLD: 33.5 SEC — SIGNIFICANT CHANGE UP (ref 24.5–35.6)
AST SERPL-CCNC: 216 U/L — HIGH (ref 10–40)
BASE EXCESS BLDV CALC-SCNC: -6.1 MMOL/L — LOW (ref -2–3)
BILIRUB SERPL-MCNC: 0.7 MG/DL — SIGNIFICANT CHANGE UP (ref 0.2–1.2)
BUN SERPL-MCNC: 5 MG/DL — LOW (ref 7–23)
CA-I SERPL-SCNC: 1.19 MMOL/L — SIGNIFICANT CHANGE UP (ref 1.15–1.33)
CALCIUM SERPL-MCNC: 8.3 MG/DL — LOW (ref 8.4–10.5)
CHLORIDE BLDV-SCNC: 102 MMOL/L — SIGNIFICANT CHANGE UP (ref 96–108)
CHLORIDE SERPL-SCNC: 98 MMOL/L — SIGNIFICANT CHANGE UP (ref 96–108)
CO2 BLDV-SCNC: 19 MMOL/L — LOW (ref 22–26)
CO2 SERPL-SCNC: 16 MMOL/L — LOW (ref 22–31)
CREAT SERPL-MCNC: 1.34 MG/DL — HIGH (ref 0.5–1.3)
CULTURE RESULTS: SIGNIFICANT CHANGE UP
CULTURE RESULTS: SIGNIFICANT CHANGE UP
EGFR: 66 ML/MIN/1.73M2 — SIGNIFICANT CHANGE UP
GAS PNL BLDV: 126 MMOL/L — LOW (ref 136–145)
GAS PNL BLDV: SIGNIFICANT CHANGE UP
GLUCOSE BLDC GLUCOMTR-MCNC: 127 MG/DL — HIGH (ref 70–99)
GLUCOSE BLDV-MCNC: 99 MG/DL — SIGNIFICANT CHANGE UP (ref 70–99)
GLUCOSE SERPL-MCNC: 98 MG/DL — SIGNIFICANT CHANGE UP (ref 70–99)
HCO3 BLDV-SCNC: 18 MMOL/L — LOW (ref 22–29)
HCT VFR BLD CALC: 26.9 % — LOW (ref 39–50)
HCT VFR BLDA CALC: 22 % — LOW (ref 39–51)
HGB BLD CALC-MCNC: 7.4 G/DL — LOW (ref 12.6–17.4)
HGB BLD-MCNC: 8.9 G/DL — LOW (ref 13–17)
HOROWITZ INDEX BLDV+IHG-RTO: 21 — SIGNIFICANT CHANGE UP
INR BLD: 1.25 RATIO — HIGH (ref 0.85–1.18)
LACTATE BLDV-MCNC: 0.8 MMOL/L — SIGNIFICANT CHANGE UP (ref 0.5–2)
MAGNESIUM SERPL-MCNC: 1.7 MG/DL — SIGNIFICANT CHANGE UP (ref 1.6–2.6)
MCHC RBC-ENTMCNC: 27.3 PG — SIGNIFICANT CHANGE UP (ref 27–34)
MCHC RBC-ENTMCNC: 33.1 GM/DL — SIGNIFICANT CHANGE UP (ref 32–36)
MCV RBC AUTO: 82.5 FL — SIGNIFICANT CHANGE UP (ref 80–100)
NRBC # BLD: 0 /100 WBCS — SIGNIFICANT CHANGE UP (ref 0–0)
PCO2 BLDV: 30 MMHG — LOW (ref 42–55)
PH BLDV: 7.39 — SIGNIFICANT CHANGE UP (ref 7.32–7.43)
PHOSPHATE SERPL-MCNC: 3.1 MG/DL — SIGNIFICANT CHANGE UP (ref 2.5–4.5)
PLATELET # BLD AUTO: 534 K/UL — HIGH (ref 150–400)
PO2 BLDV: 55 MMHG — HIGH (ref 25–45)
POTASSIUM BLDV-SCNC: 4.2 MMOL/L — SIGNIFICANT CHANGE UP (ref 3.5–5.1)
POTASSIUM SERPL-MCNC: 4.1 MMOL/L — SIGNIFICANT CHANGE UP (ref 3.5–5.3)
POTASSIUM SERPL-SCNC: 4.1 MMOL/L — SIGNIFICANT CHANGE UP (ref 3.5–5.3)
PROT SERPL-MCNC: 6.7 G/DL — SIGNIFICANT CHANGE UP (ref 6–8.3)
PROTHROM AB SERPL-ACNC: 13 SEC — SIGNIFICANT CHANGE UP (ref 9.5–13)
RBC # BLD: 3.26 M/UL — LOW (ref 4.2–5.8)
RBC # FLD: 15.5 % — HIGH (ref 10.3–14.5)
SAO2 % BLDV: 86.6 % — SIGNIFICANT CHANGE UP (ref 67–88)
SODIUM SERPL-SCNC: 126 MMOL/L — LOW (ref 135–145)
SPECIMEN SOURCE: SIGNIFICANT CHANGE UP
SPECIMEN SOURCE: SIGNIFICANT CHANGE UP
WBC # BLD: 6.94 K/UL — SIGNIFICANT CHANGE UP (ref 3.8–10.5)
WBC # FLD AUTO: 6.94 K/UL — SIGNIFICANT CHANGE UP (ref 3.8–10.5)

## 2024-01-25 PROCEDURE — 99232 SBSQ HOSP IP/OBS MODERATE 35: CPT

## 2024-01-25 PROCEDURE — 93010 ELECTROCARDIOGRAM REPORT: CPT

## 2024-01-25 PROCEDURE — 71045 X-RAY EXAM CHEST 1 VIEW: CPT | Mod: 26

## 2024-01-25 PROCEDURE — 99233 SBSQ HOSP IP/OBS HIGH 50: CPT

## 2024-01-25 RX ORDER — DORNASE ALFA 1 MG/ML
5 SOLUTION RESPIRATORY (INHALATION) EVERY 12 HOURS
Refills: 0 | Status: COMPLETED | OUTPATIENT
Start: 2024-01-25 | End: 2024-01-25

## 2024-01-25 RX ORDER — ALTEPLASE 100 MG
10 KIT INTRAVENOUS ONCE
Refills: 0 | Status: DISCONTINUED | OUTPATIENT
Start: 2024-01-25 | End: 2024-01-25

## 2024-01-25 RX ORDER — SODIUM CHLORIDE 9 MG/ML
50 INJECTION INTRAMUSCULAR; INTRAVENOUS; SUBCUTANEOUS ONCE
Refills: 0 | Status: COMPLETED | OUTPATIENT
Start: 2024-01-25 | End: 2024-01-25

## 2024-01-25 RX ORDER — DORNASE ALFA 1 MG/ML
5 SOLUTION RESPIRATORY (INHALATION) ONCE
Refills: 0 | Status: COMPLETED | OUTPATIENT
Start: 2024-01-25 | End: 2024-01-25

## 2024-01-25 RX ORDER — DORNASE ALFA 1 MG/ML
5 SOLUTION RESPIRATORY (INHALATION) ONCE
Refills: 0 | Status: DISCONTINUED | OUTPATIENT
Start: 2024-01-25 | End: 2024-01-25

## 2024-01-25 RX ORDER — ALTEPLASE 100 MG
10 KIT INTRAVENOUS ONCE
Refills: 0 | Status: COMPLETED | OUTPATIENT
Start: 2024-01-25 | End: 2024-01-25

## 2024-01-25 RX ORDER — SODIUM CHLORIDE 9 MG/ML
50 INJECTION INTRAMUSCULAR; INTRAVENOUS; SUBCUTANEOUS ONCE
Refills: 0 | Status: DISCONTINUED | OUTPATIENT
Start: 2024-01-25 | End: 2024-01-25

## 2024-01-25 RX ORDER — SODIUM CHLORIDE 9 MG/ML
500 INJECTION, SOLUTION INTRAVENOUS ONCE
Refills: 0 | Status: COMPLETED | OUTPATIENT
Start: 2024-01-25 | End: 2024-01-26

## 2024-01-25 RX ORDER — KETOROLAC TROMETHAMINE 30 MG/ML
15 SYRINGE (ML) INJECTION ONCE
Refills: 0 | Status: DISCONTINUED | OUTPATIENT
Start: 2024-01-25 | End: 2024-01-25

## 2024-01-25 RX ADMIN — Medication 500 MILLIGRAM(S): at 14:30

## 2024-01-25 RX ADMIN — Medication 500 MILLIGRAM(S): at 22:44

## 2024-01-25 RX ADMIN — LIDOCAINE 1 PATCH: 4 CREAM TOPICAL at 08:49

## 2024-01-25 RX ADMIN — ENOXAPARIN SODIUM 40 MILLIGRAM(S): 100 INJECTION SUBCUTANEOUS at 18:29

## 2024-01-25 RX ADMIN — LIDOCAINE 1 PATCH: 4 CREAM TOPICAL at 05:34

## 2024-01-25 RX ADMIN — DORNASE ALFA 5 MILLIGRAM(S): 1 SOLUTION RESPIRATORY (INHALATION) at 12:03

## 2024-01-25 RX ADMIN — Medication 500 MILLIGRAM(S): at 09:10

## 2024-01-25 RX ADMIN — Medication 500 MILLIGRAM(S): at 14:00

## 2024-01-25 RX ADMIN — CHLORHEXIDINE GLUCONATE 1 APPLICATION(S): 213 SOLUTION TOPICAL at 05:33

## 2024-01-25 RX ADMIN — LIDOCAINE 1 PATCH: 4 CREAM TOPICAL at 19:12

## 2024-01-25 RX ADMIN — ALTEPLASE 10 MILLIGRAM(S): KIT at 12:02

## 2024-01-25 RX ADMIN — QUETIAPINE FUMARATE 75 MILLIGRAM(S): 200 TABLET, FILM COATED ORAL at 21:13

## 2024-01-25 RX ADMIN — Medication 2 MILLIGRAM(S): at 05:33

## 2024-01-25 RX ADMIN — Medication 5 MILLIGRAM(S): at 21:14

## 2024-01-25 RX ADMIN — SODIUM CHLORIDE 50 MILLILITER(S): 9 INJECTION INTRAMUSCULAR; INTRAVENOUS; SUBCUTANEOUS at 12:03

## 2024-01-25 RX ADMIN — Medication 15 MILLIGRAM(S): at 15:00

## 2024-01-25 RX ADMIN — Medication 2 MILLIGRAM(S): at 21:12

## 2024-01-25 RX ADMIN — Medication 500 MILLIGRAM(S): at 08:40

## 2024-01-25 RX ADMIN — Medication 15 MILLIGRAM(S): at 14:50

## 2024-01-25 RX ADMIN — Medication 500 MILLIGRAM(S): at 23:15
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
## 2024-01-25 NOTE — PROGRESS NOTE ADULT - SUBJECTIVE AND OBJECTIVE BOX
INTERVAL EVENTS:  - started on imodium for diarrhea  - tpa in L pleural drain for 1 hr with tube clamped  - DVT duplex neg  - Abd u/s- mild hepatomegaly, redemonstration of evolving splenic hematomas  - staples removed    INTERVAL EVENTS:    SUBJECTIVE/ROS:  [X ] A ten-point review of systems was otherwise negative except as noted.  [ ] Due to altered mental status/intubation, subjective information were not able to be obtained from the patient. History was obtained, to the extent possible, from review of the chart and collateral sources of information.      NEURO:  Exam: awake, alert, oriented  Meds: acetaminophen     Tablet .. 500 milliGRAM(s) Oral every 6 hours PRN Mild Pain (1 - 3)  HYDROmorphone  Injectable 0.5 milliGRAM(s) IV Push every 3 hours PRN breakthrough pain  melatonin 5 milliGRAM(s) Oral at bedtime  oxyCODONE    IR 10 milliGRAM(s) Oral every 4 hours PRN Severe Pain (7 - 10)  oxyCODONE    IR 5 milliGRAM(s) Oral every 4 hours PRN Moderate Pain (4 - 6)  QUEtiapine 75 milliGRAM(s) Oral <User Schedule>    [x] Adequacy of sedation and pain control has been assessed and adjusted      RESPIRATORY  RR: 24 (01-24-24 @ 23:00) (20 - 48)  SpO2: 92% (01-24-24 @ 23:00) (92% - 100%)  Wt(kg): --  Exam: unlabored, clear to auscultation bilaterally  Mechanical Ventilation:     [N/A] Extubation Readiness Assessed  Meds:       CARDIOVASCULAR  HR: 101 (01-24-24 @ 23:00) (101 - 120)  BP: 93/47 (01-24-24 @ 23:00) (93/47 - 141/86)  BP(mean): 68 (01-24-24 @ 23:00) (68 - 111)  ABP: --  ABP(mean): --  Wt(kg): --  CVP(cm H2O): --  VBG - ( 24 Jan 2024 01:27 )  pH: 7.37  /  pCO2: 31    /  pO2: 42    / HCO3: 18    / Base Excess: -6.6  /  SaO2: 71.1   Lactate: 0.7      Exam: regular rate and rhythm  Cardiac Rhythm: sinus  Perfusion     [x]Adequate   [ ]Inadequate  Mentation   [x]Normal       [ ]Reduced  Extremities  [x]Warm         [ ]Cool  Volume Status [ ]Hypervolemic [x]Euvolemic [ ]Hypovolemic  Meds: metoprolol tartrate 50 milliGRAM(s) Oral every 8 hours        GI/NUTRITION  Exam: soft, nontender, nondistended, incision C/D/I  Diet:  Meds: calcium carbonate    500 mG (Tums) Chewable 1 Tablet(s) Chew every 4 hours PRN Heartburn  famotidine    Tablet 20 milliGRAM(s) Oral daily PRN Heartburn  loperamide 2 milliGRAM(s) Oral every 8 hours  simethicone 80 milliGRAM(s) Chew every 6 hours PRN Heartburn      GENITOURINARY  I&O's Detail    01-23 @ 07:01 - 01-24 @ 07:00  --------------------------------------------------------  IN:    IV PiggyBack: 166.6 mL    IV PiggyBack: 100 mL    IV PiggyBack: 425 mL    Oral Fluid: 1200 mL    sodium chloride 0.9%: 225 mL  Total IN: 2116.6 mL    OUT:    Chest Tube (mL): 30 mL    VAC (Vacuum Assisted Closure) System (mL): 0 mL    Voided (mL): 2070 mL  Total OUT: 2100 mL    Total NET: 16.6 mL      01-24 @ 07:01 - 01-25 @ 00:40  --------------------------------------------------------  IN:    IV PiggyBack: 175 mL    Oral Fluid: 600 mL  Total IN: 775 mL    OUT:    Chest Tube (mL): 130 mL    VAC (Vacuum Assisted Closure) System (mL): 0 mL    Voided (mL): 700 mL  Total OUT: 830 mL    Total NET: -55 mL          01-24    130<L>  |  102  |  5<L>  ----------------------------<  98  4.4   |  18<L>  |  1.19    Ca    8.2<L>      24 Jan 2024 02:20  Phos  2.4     01-24  Mg     1.9     01-24    TPro  6.4  /  Alb  2.3<L>  /  TBili  0.8  /  DBili  x   /  AST  735<H>  /  ALT  425<H>  /  AlkPhos  434<H>  01-24    [ ] Hendrix catheter, indication: N/A  Meds: ascorbic acid 500 milliGRAM(s) Oral daily  cyanocobalamin 1000 MICROGram(s) Oral daily  folic acid 1 milliGRAM(s) Oral daily  multivitamin 1 Tablet(s) Oral daily  thiamine 100 milliGRAM(s) Oral daily        HEMATOLOGIC  Meds: enoxaparin Injectable 40 milliGRAM(s) SubCutaneous every 24 hours    [x] VTE Prophylaxis                        8.2    6.09  )-----------( 469      ( 24 Jan 2024 02:20 )             24.3     PT/INR - ( 24 Jan 2024 02:20 )   PT: 13.0 sec;   INR: 1.19 ratio         PTT - ( 24 Jan 2024 02:20 )  PTT:35.6 sec  Transfusion     [ ] PRBC   [ ] Platelets   [ ] FFP   [ ] Cryoprecipitate      INFECTIOUS DISEASES  WBC Count: 6.09 K/uL (01-24 @ 02:20)    RECENT CULTURES:  Specimen Source: .Abscess L empyema  Date/Time: 01-22 @ 17:37  Culture Results:   No growth to date.  Gram Stain:   Numerous polymorphonuclear leukocytes per low power field  No organisms seen per oil power field  Organism: --  Specimen Source: .Blood Blood  Date/Time: 01-20 @ 14:55  Culture Results:   No growth at 4 days  Gram Stain: --  Organism: --  Specimen Source: .Blood Blood  Date/Time: 01-20 @ 14:50  Culture Results:   No growth at 4 days  Gram Stain: --  Organism: --    Meds: influenza   Vaccine 0.5 milliLiter(s) IntraMuscular once        ENDOCRINE  CAPILLARY BLOOD GLUCOSE      ACCESS DEVICES:  [ ] Peripheral IV  [ ] Central Venous Line	[ ] R	[ ] L	[ ] IJ	[ ] Fem	[ ] SC	Placed:   [ ] Arterial Line		[ ] R	[ ] L	[ ] Fem	[ ] Rad	[ ] Ax	Placed:   [ ] PICC:					[ ] Mediport  [ ] Urinary Catheter, Date Placed:   [x] Necessity of urinary, arterial, and venous catheters discussed    OTHER MEDICATIONS:  chlorhexidine 2% Cloths 1 Application(s) Topical <User Schedule>  lidocaine   4% Patch 1 Patch Transdermal every 24 hours
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
## 2024-01-25 NOTE — PROGRESS NOTE ADULT - ASSESSMENT
This is a  54yo M w pmhx of Hep C and possible EtOH use disorder who presents after being found down. Intubated for respiratory distress and agitation 1/8. Pt has displaced 4-11 L rib fx with flail segment.     1/9 VSS: NPO after MN. Plan for Rib plating WED 1/10/24  1/10 s/p Left VATS, with partial lung decortication  Thoracoscopic removal of intrapleural foreign body    1/11VSS intubated sedated on pressors  chest tube lws   1/12 VSS, pt still remains intubated/sedated, getting phenobarbital for agitation. Left chest tube output 75cc/24h placed on water seal this AM. 1U PRBC -H/H 7.1/20. Continue care per SICU team  1/13 Maintain L pleural tube waterseal Daily CXR Care as per SICU team  1/14 Left pleural tube removed CXR ordered; thoracic signed off  1/20 thoracic surgery reconsulted for left pleural effusion vs hemothorax; d/w Dr. Sepulveda; chest ct done- Dr. Sepulveda reviewed; call IR for potential drainage monday left pocket   1/21 VSS, pt still febrile tmax 103, WBC 8.4, IR consulted for drainage of complex left pleural effusion on CT Chest. Plan for Monday.   1/22 VSS; Tm 100.8 this am. WBC WNL. IR pending drainage of L effusion today   1/23 VSS, s/p left pigtail by IR yesterday, minimal drainage 20cc/24h. Recommend flushing pigtail and placing to LWS. Plan to do tPA MIST protocol tomorrow.   1/24     vss   min drainage from Lt  PTC  1/25 VSS left chest tube drainage 100/230   This is a  54yo M w pmhx of Hep C and possible EtOH use disorder who presents after being found down. Intubated for respiratory distress and agitation 1/8. Pt has displaced 4-11 L rib fx with flail segment.     1/9 VSS: NPO after MN. Plan for Rib plating WED 1/10/24  1/10 s/p Left VATS, with partial lung decortication  Thoracoscopic removal of intrapleural foreign body    1/11VSS intubated sedated on pressors  chest tube lws   1/12 VSS, pt still remains intubated/sedated, getting phenobarbital for agitation. Left chest tube output 75cc/24h placed on water seal this AM. 1U PRBC -H/H 7.1/20. Continue care per SICU team  1/13 Maintain L pleural tube waterseal Daily CXR Care as per SICU team  1/14 Left pleural tube removed CXR ordered; thoracic signed off  1/20 thoracic surgery reconsulted for left pleural effusion vs hemothorax; d/w Dr. Sepulveda; chest ct done- Dr. Sepulveda reviewed; call IR for potential drainage monday left pocket   1/21 VSS, pt still febrile tmax 103, WBC 8.4, IR consulted for drainage of complex left pleural effusion on CT Chest. Plan for Monday.   1/22 VSS; Tm 100.8 this am. WBC WNL. IR pending drainage of L effusion today   1/23 VSS, s/p left pigtail by IR yesterday, minimal drainage 20cc/24h. Recommend flushing pigtail and placing to LWS. Plan to do tPA MIST protocol tomorrow.   1/24     vss   min drainage from Lt  PTC  1/25 VSS left chest tube drainage 100/230  TPA today-   OR for  1/26 please type and screen Hibiclens  and NPO at midnight  hold  Lovenox

## 2024-01-25 NOTE — PROGRESS NOTE ADULT - SUBJECTIVE AND OBJECTIVE BOX
SURGERY DAILY PROGRESS NOTE:     Overnight Events:  Febrile overnight 101.7F and tachycardic.     SUBJECTIVE: Patient seen and evaluated on AM rounds. Pt is resting comfortably in bed.    OBJECTIVE:  Vital Signs Last 24 Hrs  T(C): 38.7 (2024 08:40), Max: 38.7 (2024 08:40)  T(F): 101.7 (2024 08:40), Max: 101.7 (2024 08:40)  HR: 110 (2024 08:40) (101 - 120)  BP: 111/58 (2024 08:02) (87/48 - 135/60)  BP(mean): 76 (2024 08:02) (62 - 111)  RR: 41 (2024 08:40) (22 - 43)  SpO2: 94% (2024 08:40) (92% - 100%)    Parameters below as of 2024 07:00  Patient On (Oxygen Delivery Method): room air      I&O's Detail    2024 07:01  -  2024 07:00  --------------------------------------------------------  IN:    IV PiggyBack: 275 mL    Oral Fluid: 600 mL  Total IN: 875 mL    OUT:    Chest Tube (mL): 230 mL    VAC (Vacuum Assisted Closure) System (mL): 0 mL    Voided (mL): 1150 mL  Total OUT: 1380 mL    Total NET: -505 mL        Daily     Daily Weight in k.9 (2024 07:00)    LABS:                        8.9    6.94  )-----------( 534      ( 2024 05:57 )             26.9         126<L>  |  98  |  5<L>  ----------------------------<  98  4.1   |  16<L>  |  1.34<H>    Ca    8.3<L>      2024 05:56  Phos  3.1     01-25  Mg     1.7         TPro  6.7  /  Alb  2.3<L>  /  TBili  0.7  /  DBili  x   /  AST  216<H>  /  ALT  263<H>  /  AlkPhos  387<H>      PT/INR - ( 2024 05:58 )   PT: 13.0 sec;   INR: 1.25 ratio         PTT - ( 2024 05:58 )  PTT:33.5 sec  Urinalysis Basic - ( 2024 05:56 )    Color: x / Appearance: x / SG: x / pH: x  Gluc: 98 mg/dL / Ketone: x  / Bili: x / Urobili: x   Blood: x / Protein: x / Nitrite: x   Leuk Esterase: x / RBC: x / WBC x   Sq Epi: x / Non Sq Epi: x / Bacteria: x        PHYSICAL EXAM:PHYSICAL EXAM:  Constitutional: Well developed, well nourished, NAD  Pulmonary: Symmetric chest rise bilaterally, no increased WOB  Chest: (+) Chest wall staples and sutures c/d/i. (+) Left pigtail chest tube to suction with SS output  Gastrointestinal: Abdomen soft, nontender, nondistended  Extremities: Warm to touch, soft

## 2024-01-25 NOTE — PROGRESS NOTE ADULT - SUBJECTIVE AND OBJECTIVE BOX
Subjective " I am ok today " "      Vital Signs Last 24 Hrs  T(C): 37.8 (24 @ 09:30), Max: 38.7 (24 @ 08:40)  T(F): 100.1 (24 @ 09:30), Max: 101.7 (24 @ 08:40)  HR: 107 (24 @ 09:00) (101 - 120)  BP: 101/63 (24 @ 09:00) (87/48 - 135/60)  RR: 43 (24 @ 09:00) (22 - 43)  SpO2: 94% (24 @ 09:00) (92% - 100%)              @ 07:01  -   @ 07:00  --------------------------------------------------------  IN: 875 mL / OUT: 1380 mL / NET: -505 mL    Daily Weight in k.9 (2024 07:00)                           8.9    6.94  )-----------( 534      ( 2024 05:57 )             26.9         126<L>  |  98  |  5<L>  ----------------------------<  98  4.1   |  16<L>  |  1.34<H>    Ca    8.3<L>      2024 05:56  Phos  3.1       Mg     1.7         TPro  6.7  /  Alb  2.3<L>  /  TBili  0.7  /  DBili  x   /  AST  216<H>  /  ALT  263<H>  /  AlkPhos  387<H>    MEDICATIONS  (STANDING):  ascorbic acid 500 milliGRAM(s) Oral daily  chlorhexidine 2% Cloths 1 Application(s) Topical <User Schedule>  cyanocobalamin 1000 MICROGram(s) Oral daily  enoxaparin Injectable 40 milliGRAM(s) SubCutaneous every 24 hours  folic acid 1 milliGRAM(s) Oral daily  influenza   Vaccine 0.5 milliLiter(s) IntraMuscular once  lidocaine   4% Patch 1 Patch Transdermal every 24 hours  loperamide 2 milliGRAM(s) Oral every 8 hours  melatonin 5 milliGRAM(s) Oral at bedtime  metoprolol tartrate 50 milliGRAM(s) Oral every 8 hours  multivitamin 1 Tablet(s) Oral daily  QUEtiapine 75 milliGRAM(s) Oral <User Schedule>  thiamine 100 milliGRAM(s) Oral daily    MEDICATIONS  (PRN):  acetaminophen     Tablet .. 500 milliGRAM(s) Oral every 6 hours PRN Mild Pain (1 - 3)  HYDROmorphone  Injectable 0.5 milliGRAM(s) IV Push every 3 hours PRN breakthrough pain  oxyCODONE    IR 5 milliGRAM(s) Oral every 4 hours PRN Moderate Pain (4 - 6)  oxyCODONE    IR 10 milliGRAM(s) Oral every 4 hours PRN Severe Pain (7 - 10)        CAPILLARY BLOOD GLUCOSE              Drains:        L Pleural    100/230                       PHYSICAL EXAM        Neurology: alert and oriented x 3, nonfocal, no gross deficits    CV :S1S2    Lungs: B/l breath on room air   left pigtail -pleuravac    Abdomen: soft, nontender, nondistended, positive bowel sounds,    :    voids           Extremities: warm well perfused equal strength throughout    b/lle + DP                                           Discussed with  Dr Sepulveda at AM rounds.

## 2024-01-26 ENCOUNTER — TRANSCRIPTION ENCOUNTER (OUTPATIENT)
Age: 49
End: 2024-01-26

## 2024-01-26 ENCOUNTER — APPOINTMENT (OUTPATIENT)
Dept: THORACIC SURGERY | Facility: HOSPITAL | Age: 49
End: 2024-01-26

## 2024-01-26 LAB
ALBUMIN SERPL ELPH-MCNC: 1.9 G/DL — LOW (ref 3.3–5)
ALBUMIN SERPL ELPH-MCNC: 1.9 G/DL — LOW (ref 3.3–5)
ALP SERPL-CCNC: 276 U/L — HIGH (ref 40–120)
ALP SERPL-CCNC: 289 U/L — HIGH (ref 40–120)
ALT FLD-CCNC: 112 U/L — HIGH (ref 10–45)
ALT FLD-CCNC: 160 U/L — HIGH (ref 10–45)
ANION GAP SERPL CALC-SCNC: 12 MMOL/L — SIGNIFICANT CHANGE UP (ref 5–17)
ANION GAP SERPL CALC-SCNC: 9 MMOL/L — SIGNIFICANT CHANGE UP (ref 5–17)
APTT BLD: 30.4 SEC — SIGNIFICANT CHANGE UP (ref 24.5–35.6)
APTT BLD: 32.6 SEC — SIGNIFICANT CHANGE UP (ref 24.5–35.6)
AST SERPL-CCNC: 55 U/L — HIGH (ref 10–40)
AST SERPL-CCNC: 98 U/L — HIGH (ref 10–40)
BILIRUB SERPL-MCNC: 0.4 MG/DL — SIGNIFICANT CHANGE UP (ref 0.2–1.2)
BILIRUB SERPL-MCNC: 0.4 MG/DL — SIGNIFICANT CHANGE UP (ref 0.2–1.2)
BLD GP AB SCN SERPL QL: NEGATIVE — SIGNIFICANT CHANGE UP
BUN SERPL-MCNC: 7 MG/DL — SIGNIFICANT CHANGE UP (ref 7–23)
BUN SERPL-MCNC: 8 MG/DL — SIGNIFICANT CHANGE UP (ref 7–23)
CALCIUM SERPL-MCNC: 7.6 MG/DL — LOW (ref 8.4–10.5)
CALCIUM SERPL-MCNC: 7.9 MG/DL — LOW (ref 8.4–10.5)
CHLORIDE SERPL-SCNC: 100 MMOL/L — SIGNIFICANT CHANGE UP (ref 96–108)
CHLORIDE SERPL-SCNC: 101 MMOL/L — SIGNIFICANT CHANGE UP (ref 96–108)
CO2 SERPL-SCNC: 16 MMOL/L — LOW (ref 22–31)
CO2 SERPL-SCNC: 18 MMOL/L — LOW (ref 22–31)
CREAT SERPL-MCNC: 1.2 MG/DL — SIGNIFICANT CHANGE UP (ref 0.5–1.3)
CREAT SERPL-MCNC: 1.32 MG/DL — HIGH (ref 0.5–1.3)
EGFR: 67 ML/MIN/1.73M2 — SIGNIFICANT CHANGE UP
EGFR: 75 ML/MIN/1.73M2 — SIGNIFICANT CHANGE UP
GAS PNL BLDA: SIGNIFICANT CHANGE UP
GLUCOSE SERPL-MCNC: 109 MG/DL — HIGH (ref 70–99)
GLUCOSE SERPL-MCNC: 161 MG/DL — HIGH (ref 70–99)
HCT VFR BLD CALC: 21.3 % — LOW (ref 39–50)
HCT VFR BLD CALC: 24.1 % — LOW (ref 39–50)
HCT VFR BLD CALC: 24.3 % — LOW (ref 39–50)
HGB BLD-MCNC: 7.1 G/DL — LOW (ref 13–17)
HGB BLD-MCNC: 8 G/DL — LOW (ref 13–17)
HGB BLD-MCNC: 8.3 G/DL — LOW (ref 13–17)
INR BLD: 1.22 RATIO — HIGH (ref 0.85–1.18)
INR BLD: 1.27 RATIO — HIGH (ref 0.85–1.18)
MAGNESIUM SERPL-MCNC: 1.6 MG/DL — SIGNIFICANT CHANGE UP (ref 1.6–2.6)
MAGNESIUM SERPL-MCNC: 2.5 MG/DL — SIGNIFICANT CHANGE UP (ref 1.6–2.6)
MCHC RBC-ENTMCNC: 27.4 PG — SIGNIFICANT CHANGE UP (ref 27–34)
MCHC RBC-ENTMCNC: 27.7 PG — SIGNIFICANT CHANGE UP (ref 27–34)
MCHC RBC-ENTMCNC: 28.6 PG — SIGNIFICANT CHANGE UP (ref 27–34)
MCHC RBC-ENTMCNC: 32.9 GM/DL — SIGNIFICANT CHANGE UP (ref 32–36)
MCHC RBC-ENTMCNC: 33.3 GM/DL — SIGNIFICANT CHANGE UP (ref 32–36)
MCHC RBC-ENTMCNC: 34.4 GM/DL — SIGNIFICANT CHANGE UP (ref 32–36)
MCV RBC AUTO: 82.2 FL — SIGNIFICANT CHANGE UP (ref 80–100)
MCV RBC AUTO: 83.1 FL — SIGNIFICANT CHANGE UP (ref 80–100)
MCV RBC AUTO: 84.1 FL — SIGNIFICANT CHANGE UP (ref 80–100)
NRBC # BLD: 0 /100 WBCS — SIGNIFICANT CHANGE UP (ref 0–0)
PHOSPHATE SERPL-MCNC: 2.7 MG/DL — SIGNIFICANT CHANGE UP (ref 2.5–4.5)
PHOSPHATE SERPL-MCNC: 4.9 MG/DL — HIGH (ref 2.5–4.5)
PLATELET # BLD AUTO: 436 K/UL — HIGH (ref 150–400)
PLATELET # BLD AUTO: 446 K/UL — HIGH (ref 150–400)
PLATELET # BLD AUTO: 533 K/UL — HIGH (ref 150–400)
POTASSIUM SERPL-MCNC: 4.1 MMOL/L — SIGNIFICANT CHANGE UP (ref 3.5–5.3)
POTASSIUM SERPL-MCNC: 5.1 MMOL/L — SIGNIFICANT CHANGE UP (ref 3.5–5.3)
POTASSIUM SERPL-SCNC: 4.1 MMOL/L — SIGNIFICANT CHANGE UP (ref 3.5–5.3)
POTASSIUM SERPL-SCNC: 5.1 MMOL/L — SIGNIFICANT CHANGE UP (ref 3.5–5.3)
PROT SERPL-MCNC: 5.4 G/DL — LOW (ref 6–8.3)
PROT SERPL-MCNC: 5.4 G/DL — LOW (ref 6–8.3)
PROTHROM AB SERPL-ACNC: 12.7 SEC — SIGNIFICANT CHANGE UP (ref 9.5–13)
PROTHROM AB SERPL-ACNC: 13.9 SEC — HIGH (ref 9.5–13)
RBC # BLD: 2.59 M/UL — LOW (ref 4.2–5.8)
RBC # BLD: 2.89 M/UL — LOW (ref 4.2–5.8)
RBC # BLD: 2.9 M/UL — LOW (ref 4.2–5.8)
RBC # FLD: 15.2 % — HIGH (ref 10.3–14.5)
RBC # FLD: 15.3 % — HIGH (ref 10.3–14.5)
RBC # FLD: 15.7 % — HIGH (ref 10.3–14.5)
RH IG SCN BLD-IMP: POSITIVE — SIGNIFICANT CHANGE UP
SODIUM SERPL-SCNC: 127 MMOL/L — LOW (ref 135–145)
SODIUM SERPL-SCNC: 129 MMOL/L — LOW (ref 135–145)
WBC # BLD: 12.24 K/UL — HIGH (ref 3.8–10.5)
WBC # BLD: 6 K/UL — SIGNIFICANT CHANGE UP (ref 3.8–10.5)
WBC # BLD: 6.77 K/UL — SIGNIFICANT CHANGE UP (ref 3.8–10.5)
WBC # FLD AUTO: 12.24 K/UL — HIGH (ref 3.8–10.5)
WBC # FLD AUTO: 6 K/UL — SIGNIFICANT CHANGE UP (ref 3.8–10.5)
WBC # FLD AUTO: 6.77 K/UL — SIGNIFICANT CHANGE UP (ref 3.8–10.5)

## 2024-01-26 PROCEDURE — 71045 X-RAY EXAM CHEST 1 VIEW: CPT | Mod: 26

## 2024-01-26 PROCEDURE — 32150 REMOVAL OF LUNG LESION(S): CPT | Mod: 22,78

## 2024-01-26 PROCEDURE — 32124 EXPLORE CHEST FREE ADHESIONS: CPT | Mod: 22,78

## 2024-01-26 PROCEDURE — 99233 SBSQ HOSP IP/OBS HIGH 50: CPT

## 2024-01-26 PROCEDURE — 0442T ABLTJ PERC PLEX/TRNCL NRV: CPT

## 2024-01-26 PROCEDURE — 32120 RE-EXPLORATION OF CHEST: CPT | Mod: 78

## 2024-01-26 PROCEDURE — 32220 RELEASE OF LUNG: CPT | Mod: 59,78

## 2024-01-26 PROCEDURE — 71045 X-RAY EXAM CHEST 1 VIEW: CPT | Mod: 26,77

## 2024-01-26 PROCEDURE — 31645 BRNCHSC W/THER ASPIR 1ST: CPT | Mod: 78

## 2024-01-26 PROCEDURE — 31624 DX BRONCHOSCOPE/LAVAGE: CPT | Mod: 78

## 2024-01-26 DEVICE — CHEST DRAIN THORACIC ARGYLE PVC 28FR RIGHT ANGLE: Type: IMPLANTABLE DEVICE | Status: FUNCTIONAL

## 2024-01-26 DEVICE — ARISTA 1GR: Type: IMPLANTABLE DEVICE | Status: FUNCTIONAL

## 2024-01-26 DEVICE — PROBE CRYOABLATION SPHERE 11CM: Type: IMPLANTABLE DEVICE | Status: FUNCTIONAL

## 2024-01-26 DEVICE — PROGEL PLEURAL AIR LEAK 4ML: Type: IMPLANTABLE DEVICE | Status: FUNCTIONAL

## 2024-01-26 RX ORDER — HYDROMORPHONE HYDROCHLORIDE 2 MG/ML
0.5 INJECTION INTRAMUSCULAR; INTRAVENOUS; SUBCUTANEOUS
Refills: 0 | Status: DISCONTINUED | OUTPATIENT
Start: 2024-01-26 | End: 2024-01-30

## 2024-01-26 RX ORDER — ASCORBIC ACID 60 MG
500 TABLET,CHEWABLE ORAL DAILY
Refills: 0 | Status: DISCONTINUED | OUTPATIENT
Start: 2024-01-26 | End: 2024-01-31

## 2024-01-26 RX ORDER — MAGNESIUM SULFATE 500 MG/ML
2 VIAL (ML) INJECTION
Refills: 0 | Status: COMPLETED | OUTPATIENT
Start: 2024-01-26 | End: 2024-01-26

## 2024-01-26 RX ORDER — OXYCODONE HYDROCHLORIDE 5 MG/1
5 TABLET ORAL EVERY 4 HOURS
Refills: 0 | Status: DISCONTINUED | OUTPATIENT
Start: 2024-01-26 | End: 2024-01-28

## 2024-01-26 RX ORDER — PIPERACILLIN AND TAZOBACTAM 4; .5 G/20ML; G/20ML
3.38 INJECTION, POWDER, LYOPHILIZED, FOR SOLUTION INTRAVENOUS ONCE
Refills: 0 | Status: COMPLETED | OUTPATIENT
Start: 2024-01-26 | End: 2024-01-26

## 2024-01-26 RX ORDER — THIAMINE MONONITRATE (VIT B1) 100 MG
100 TABLET ORAL DAILY
Refills: 0 | Status: DISCONTINUED | OUTPATIENT
Start: 2024-01-26 | End: 2024-01-31

## 2024-01-26 RX ORDER — SODIUM CHLORIDE 9 MG/ML
1000 INJECTION INTRAMUSCULAR; INTRAVENOUS; SUBCUTANEOUS
Refills: 0 | Status: DISCONTINUED | OUTPATIENT
Start: 2024-01-26 | End: 2024-01-26

## 2024-01-26 RX ORDER — BACITRACIN ZINC 500 UNIT/G
1 OINTMENT IN PACKET (EA) TOPICAL ONCE
Refills: 0 | Status: COMPLETED | OUTPATIENT
Start: 2024-01-26 | End: 2024-01-26

## 2024-01-26 RX ORDER — CHLORHEXIDINE GLUCONATE 213 G/1000ML
1 SOLUTION TOPICAL
Refills: 0 | Status: DISCONTINUED | OUTPATIENT
Start: 2024-01-26 | End: 2024-01-31

## 2024-01-26 RX ORDER — PREGABALIN 225 MG/1
1000 CAPSULE ORAL DAILY
Refills: 0 | Status: DISCONTINUED | OUTPATIENT
Start: 2024-01-26 | End: 2024-01-31

## 2024-01-26 RX ORDER — PIPERACILLIN AND TAZOBACTAM 4; .5 G/20ML; G/20ML
3.38 INJECTION, POWDER, LYOPHILIZED, FOR SOLUTION INTRAVENOUS EVERY 8 HOURS
Refills: 0 | Status: DISCONTINUED | OUTPATIENT
Start: 2024-01-26 | End: 2024-01-31

## 2024-01-26 RX ORDER — ENOXAPARIN SODIUM 100 MG/ML
40 INJECTION SUBCUTANEOUS EVERY 24 HOURS
Refills: 0 | Status: DISCONTINUED | OUTPATIENT
Start: 2024-01-27 | End: 2024-01-31

## 2024-01-26 RX ORDER — PHENYLEPHRINE HYDROCHLORIDE 10 MG/ML
0.4 INJECTION INTRAVENOUS
Qty: 40 | Refills: 0 | Status: DISCONTINUED | OUTPATIENT
Start: 2024-01-26 | End: 2024-01-26

## 2024-01-26 RX ORDER — LIDOCAINE 4 G/100G
1 CREAM TOPICAL EVERY 24 HOURS
Refills: 0 | Status: DISCONTINUED | OUTPATIENT
Start: 2024-01-26 | End: 2024-01-31

## 2024-01-26 RX ORDER — FOLIC ACID 0.8 MG
1 TABLET ORAL DAILY
Refills: 0 | Status: DISCONTINUED | OUTPATIENT
Start: 2024-01-26 | End: 2024-01-31

## 2024-01-26 RX ORDER — ACETAMINOPHEN 500 MG
500 TABLET ORAL EVERY 6 HOURS
Refills: 0 | Status: DISCONTINUED | OUTPATIENT
Start: 2024-01-26 | End: 2024-01-29

## 2024-01-26 RX ORDER — QUETIAPINE FUMARATE 200 MG/1
75 TABLET, FILM COATED ORAL
Refills: 0 | Status: DISCONTINUED | OUTPATIENT
Start: 2024-01-26 | End: 2024-01-31

## 2024-01-26 RX ORDER — SODIUM CHLORIDE 9 MG/ML
1000 INJECTION INTRAMUSCULAR; INTRAVENOUS; SUBCUTANEOUS
Refills: 0 | Status: DISCONTINUED | OUTPATIENT
Start: 2024-01-26 | End: 2024-01-27

## 2024-01-26 RX ORDER — PIPERACILLIN AND TAZOBACTAM 4; .5 G/20ML; G/20ML
3.38 INJECTION, POWDER, LYOPHILIZED, FOR SOLUTION INTRAVENOUS EVERY 8 HOURS
Refills: 0 | Status: DISCONTINUED | OUTPATIENT
Start: 2024-01-26 | End: 2024-01-26

## 2024-01-26 RX ORDER — OXYCODONE HYDROCHLORIDE 5 MG/1
10 TABLET ORAL EVERY 4 HOURS
Refills: 0 | Status: DISCONTINUED | OUTPATIENT
Start: 2024-01-26 | End: 2024-01-28

## 2024-01-26 RX ORDER — LANOLIN ALCOHOL/MO/W.PET/CERES
5 CREAM (GRAM) TOPICAL AT BEDTIME
Refills: 0 | Status: DISCONTINUED | OUTPATIENT
Start: 2024-01-26 | End: 2024-01-31

## 2024-01-26 RX ADMIN — Medication 500 MILLIGRAM(S): at 23:52

## 2024-01-26 RX ADMIN — Medication 500 MILLIGRAM(S): at 11:28

## 2024-01-26 RX ADMIN — Medication 5 MILLIGRAM(S): at 21:17

## 2024-01-26 RX ADMIN — PIPERACILLIN AND TAZOBACTAM 25 GRAM(S): 4; .5 INJECTION, POWDER, LYOPHILIZED, FOR SOLUTION INTRAVENOUS at 21:17

## 2024-01-26 RX ADMIN — OXYCODONE HYDROCHLORIDE 10 MILLIGRAM(S): 5 TABLET ORAL at 20:01

## 2024-01-26 RX ADMIN — Medication 255 MILLIMOLE(S): at 01:22

## 2024-01-26 RX ADMIN — CHLORHEXIDINE GLUCONATE 1 APPLICATION(S): 213 SOLUTION TOPICAL at 21:17

## 2024-01-26 RX ADMIN — LIDOCAINE 1 PATCH: 4 CREAM TOPICAL at 05:16

## 2024-01-26 RX ADMIN — OXYCODONE HYDROCHLORIDE 5 MILLIGRAM(S): 5 TABLET ORAL at 21:51

## 2024-01-26 RX ADMIN — PIPERACILLIN AND TAZOBACTAM 200 GRAM(S): 4; .5 INJECTION, POWDER, LYOPHILIZED, FOR SOLUTION INTRAVENOUS at 01:22

## 2024-01-26 RX ADMIN — Medication 100 MILLIGRAM(S): at 11:28

## 2024-01-26 RX ADMIN — CHLORHEXIDINE GLUCONATE 1 APPLICATION(S): 213 SOLUTION TOPICAL at 05:16

## 2024-01-26 RX ADMIN — SODIUM CHLORIDE 50 MILLILITER(S): 9 INJECTION INTRAMUSCULAR; INTRAVENOUS; SUBCUTANEOUS at 11:24

## 2024-01-26 RX ADMIN — Medication 1 TABLET(S): at 11:28

## 2024-01-26 RX ADMIN — QUETIAPINE FUMARATE 75 MILLIGRAM(S): 200 TABLET, FILM COATED ORAL at 21:17

## 2024-01-26 RX ADMIN — Medication 25 GRAM(S): at 03:49

## 2024-01-26 RX ADMIN — PREGABALIN 1000 MICROGRAM(S): 225 CAPSULE ORAL at 11:28

## 2024-01-26 RX ADMIN — Medication 25 GRAM(S): at 01:36

## 2024-01-26 RX ADMIN — OXYCODONE HYDROCHLORIDE 5 MILLIGRAM(S): 5 TABLET ORAL at 22:21

## 2024-01-26 RX ADMIN — PIPERACILLIN AND TAZOBACTAM 25 GRAM(S): 4; .5 INJECTION, POWDER, LYOPHILIZED, FOR SOLUTION INTRAVENOUS at 05:15

## 2024-01-26 RX ADMIN — LIDOCAINE 1 PATCH: 4 CREAM TOPICAL at 07:42

## 2024-01-26 RX ADMIN — SODIUM CHLORIDE 1000 MILLILITER(S): 9 INJECTION, SOLUTION INTRAVENOUS at 00:00

## 2024-01-26 RX ADMIN — Medication 1 MILLIGRAM(S): at 11:28

## 2024-01-26 NOTE — BRIEF OPERATIVE NOTE - BRIEF OP NOTE DRAINS
Anterior CT: Angled basilar,   Middle CT: Straight anterior apex  Posteior CT: Straight posterior apex
Left 28Fr Chest tube.

## 2024-01-26 NOTE — PROGRESS NOTE ADULT - SUBJECTIVE AND OBJECTIVE BOX
SURGERY DAILY PROGRESS NOTE:     Overnight Events:  No acute events overnight.    SUBJECTIVE: Patient seen and evaluated on AM rounds. Pt is resting comfortably in bed.    OBJECTIVE:  Vital Signs Last 24 Hrs  T(C): 37.4 (26 Jan 2024 07:00), Max: 37.8 (25 Jan 2024 09:30)  T(F): 99.4 (26 Jan 2024 07:00), Max: 100.1 (25 Jan 2024 09:30)  HR: 109 (26 Jan 2024 08:00) (95 - 144)  BP: 92/54 (26 Jan 2024 08:00) (73/48 - 150/78)  BP(mean): 67 (26 Jan 2024 08:00) (56 - 104)  RR: 32 (26 Jan 2024 08:00) (25 - 52)  SpO2: 100% (26 Jan 2024 08:00) (92% - 100%)    Parameters below as of 26 Jan 2024 03:00  Patient On (Oxygen Delivery Method): room air      I&O's Detail    25 Jan 2024 07:01  -  26 Jan 2024 07:00  --------------------------------------------------------  IN:    IV PiggyBack: 350 mL    IV PiggyBack: 200 mL    Lactated Ringers Bolus: 500 mL    Oral Fluid: 1240 mL    PRBCs (Packed Red Blood Cells): 300 mL  Total IN: 2590 mL    OUT:    Chest Tube (mL): 550 mL    VAC (Vacuum Assisted Closure) System (mL): 0 mL    Voided (mL): 1200 mL  Total OUT: 1750 mL    Total NET: 840 mL        Daily     Daily     LABS:                        8.3    6.77  )-----------( 436      ( 26 Jan 2024 05:52 )             24.1     01-25    127<L>  |  100  |  7   ----------------------------<  109<H>  4.1   |  18<L>  |  1.32<H>    Ca    7.6<L>      25 Jan 2024 23:45  Phos  2.7     01-25  Mg     1.6     01-25    TPro  5.4<L>  /  Alb  1.9<L>  /  TBili  0.4  /  DBili  x   /  AST  98<H>  /  ALT  160<H>  /  AlkPhos  289<H>  01-25    PT/INR - ( 25 Jan 2024 23:45 )   PT: 13.9 sec;   INR: 1.27 ratio         PTT - ( 25 Jan 2024 23:45 )  PTT:32.6 sec  Urinalysis Basic - ( 25 Jan 2024 23:45 )    Color: x / Appearance: x / SG: x / pH: x  Gluc: 109 mg/dL / Ketone: x  / Bili: x / Urobili: x   Blood: x / Protein: x / Nitrite: x   Leuk Esterase: x / RBC: x / WBC x   Sq Epi: x / Non Sq Epi: x / Bacteria: x      PHYSICAL EXAM:PHYSICAL EXAM:  Constitutional: Well developed, well nourished, NAD  Pulmonary: Symmetric chest rise bilaterally, no increased WOB  Chest: (+) Chest wall staples and sutures c/d/i. (+) Left pigtail chest tube to suction with SS output  Gastrointestinal: Abdomen soft, nontender, nondistended  Extremities: Warm to touch, soft

## 2024-01-26 NOTE — CHART NOTE - NSCHARTNOTEFT_GEN_A_CORE
General Surgery Post op Check    Pt seen and examined complaining of pain with respirations. Yonathan increased in room due to hypotension. Denies SOB/CP/N/V.     Vital Signs Last 24 Hrs  T(C): 36.7 (26 Jan 2024 19:00), Max: 37.5 (26 Jan 2024 11:00)  T(F): 98 (26 Jan 2024 19:00), Max: 99.5 (26 Jan 2024 11:00)  HR: 100 (26 Jan 2024 22:00) (93 - 121)  BP: 98/67 (26 Jan 2024 22:00) (73/48 - 116/67)  BP(mean): 78 (26 Jan 2024 22:00) (56 - 89)  RR: 28 (26 Jan 2024 22:00) (13 - 43)  SpO2: 99% (26 Jan 2024 22:00) (89% - 100%)    Parameters below as of 26 Jan 2024 20:00  Patient On (Oxygen Delivery Method): nasal cannula  O2 Flow (L/min): 2      I&O's Summary    25 Jan 2024 07:01  -  26 Jan 2024 07:00  --------------------------------------------------------  IN: 2590 mL / OUT: 1750 mL / NET: 840 mL    26 Jan 2024 07:01  -  26 Jan 2024 22:55  --------------------------------------------------------  IN: 450 mL / OUT: 300 mL / NET: 150 mL    Physical Exam  Gen: NAD, A&Ox3  Pulm: No respiratory distress, no subcostal retractions. On nasal cannula. 3 CT to suction. Poor respiratory effort, difficult to assess FEAL.   CV: Sinus tachycardia, no JVD  Abd: Soft, NT, ND  Extremities: warm and well perfused      A/P: 48y Male s/p L thoracotomy and decortication   -Hold DVT prophy for 24 hrs   -CT to suction for 48 hours   -Monitor CT output  -Analgesia and antiemetics as needed  -Continue abx   -Regular diet   -Care per SICU     ACS/Trauma

## 2024-01-26 NOTE — PROGRESS NOTE ADULT - ASSESSMENT
48yoM w/ PMHx hepatitis C and EtOH abuse was transferred from OSH on 1/6 for level 1 trauma eval. He was found down with multiple 4-11 left-sided rib fractures and flail chest, and had a chest tube placed at OSH. S/p VATS 1/10 w/ partial lung decortication and thoracoscopic removal of intrapleural foreign body. S/p chest tube removal 1/14, downgraded to floor 1/18, returned to SICU after fever 102.3 on 1/20.    Plan:  - Monitor left chest tube  - Plan for thoracotomy with thoracis surgery today  - Appreciate ID recs   - Appreciate excellent care per SICU      Trauma Surgery  [phone]

## 2024-01-26 NOTE — PROGRESS NOTE ADULT - NS ATTEND AMEND GEN_ALL_CORE FT
Evaluated in multidisciplinary round in SICU. Clinical parameters,  labs, available imagine continued to be evaluated   48yoM w/ PMHx hepatitis C and EtOH abuse was transferred from OSH on 1/6 for level 1 trauma eval. He was found down with multiple 4-11 left-sided rib fractures and flail chest, and had a chest tube placed at OSH. S/p VATS 1/10 w/ partial lung decortication and thoracoscopic removal of intrapleural foreign body. S/p chest tube removal 1/14, downgraded to floor 1/18, returned to SICU after fever 102.3 on 1/20.    S/P Lt sided pleural space PIG tail cath drainage cath placement by IR. S/p local tPA instillation with CXR reviewed, has persistent lt effusion  Pleural fluid output 450 cc only. F/U CXR unchanged Lt effusion  Continue pul toileting, IS  Hemodynamically stable  PO, antiproteinuric for diarrhea    Improving LFT. T bili wnl. hepatobiliary US no portal vein thrombosis, stable splenic hematoma  Abx Zosyn, cultures neg  Pharmacologic DVT ppx on hold for OR . Hb stable after transfusion  surveillance LE doppler neg for DVT  OOB/PT  To OR for VATS

## 2024-01-26 NOTE — PROGRESS NOTE ADULT - SUBJECTIVE AND OBJECTIVE BOX
HISTORY: 49 yo M w pmhx of Hep C and possible EtOH use disorder who presents after being found down. Intubated for respiratory distress and agitation 1/8. Pt has displaced 4-11 L rib fx with flail segment.     24 HOUR EVENTS:  - Hgb/Hct 7.1/21.3 1UPRBC ordered  - Hypotensive/Tachycardic 500 cc LR bolus x1, lopressor held  - CTS hold tpa in L pleural drain for tonight  - Plan VATS 1/26 hold overnight lovenox/obt type and screen   - Febrile in AM, Restart zosyn & Repeat BCx 1/26 febrile in AM 1/25  - Replete sodium phos + mag     SUBJECTIVE/ROS:  [ ] A ten-point review of systems was otherwise negative except as noted.  [ ] Due to altered mental status/intubation, subjective information were not able to be obtained from the patient. History was obtained, to the extent possible, from review of the chart and collateral sources of information.      NEURO  RASS:     GCS:     CAM ICU:  Exam: awake, alert, oriented  Meds: acetaminophen     Tablet .. 500 milliGRAM(s) Oral every 6 hours PRN Mild Pain (1 - 3)  HYDROmorphone  Injectable 0.5 milliGRAM(s) IV Push every 3 hours PRN breakthrough pain  melatonin 5 milliGRAM(s) Oral at bedtime  oxyCODONE    IR 10 milliGRAM(s) Oral every 4 hours PRN Severe Pain (7 - 10)  oxyCODONE    IR 5 milliGRAM(s) Oral every 4 hours PRN Moderate Pain (4 - 6)  QUEtiapine 75 milliGRAM(s) Oral <User Schedule>    [x] Adequacy of sedation and pain control has been assessed and adjusted      RESPIRATORY  RR: 25 (01-26-24 @ 01:00) (22 - 52)  SpO2: 100% (01-26-24 @ 01:00) (92% - 100%)  Wt(kg): --  Exam: unlabored, clear to auscultation bilaterally  Mechanical Ventilation:     [N/A] Extubation Readiness Assessed  Meds:       CARDIOVASCULAR  HR: 96 (01-26-24 @ 01:00) (95 - 144)  BP: 87/53 (01-26-24 @ 01:00) (73/48 - 150/78)  BP(mean): 65 (01-26-24 @ 01:00) (56 - 104)  ABP: --  ABP(mean): --  Wt(kg): --  CVP(cm H2O): --  VBG - ( 25 Jan 2024 23:30 )  pH: 7.39  /  pCO2: 30    /  pO2: 55    / HCO3: 18    / Base Excess: -6.1  /  SaO2: 86.6   Lactate: 0.8                Exam: regular rate and rhythm  Cardiac Rhythm: sinus  Perfusion     [x]Adequate   [ ]Inadequate  Mentation   [x]Normal       [ ]Reduced  Extremities  [x]Warm         [ ]Cool  Volume Status [ ]Hypervolemic [x]Euvolemic [ ]Hypovolemic  Meds: metoprolol tartrate 50 milliGRAM(s) Oral every 8 hours        GI/NUTRITION  Exam: soft, nontender, nondistended, incision C/D/I  Diet:  Meds:     GENITOURINARY  I&O's Detail    01-24 @ 07:01 - 01-25 @ 07:00  --------------------------------------------------------  IN:    IV PiggyBack: 275 mL    Oral Fluid: 600 mL  Total IN: 875 mL    OUT:    Chest Tube (mL): 230 mL    VAC (Vacuum Assisted Closure) System (mL): 0 mL    Voided (mL): 1150 mL  Total OUT: 1380 mL    Total NET: -505 mL      01-25 @ 07:01 - 01-26 @ 01:44  --------------------------------------------------------  IN:    Lactated Ringers Bolus: 500 mL    Oral Fluid: 1120 mL  Total IN: 1620 mL    OUT:    Chest Tube (mL): 500 mL    VAC (Vacuum Assisted Closure) System (mL): 0 mL    Voided (mL): 850 mL  Total OUT: 1350 mL    Total NET: 270 mL          01-25    127<L>  |  100  |  7   ----------------------------<  109<H>  4.1   |  18<L>  |  1.32<H>    Ca    7.6<L>      25 Jan 2024 23:45  Phos  2.7     01-25  Mg     1.6     01-25    TPro  5.4<L>  /  Alb  1.9<L>  /  TBili  0.4  /  DBili  x   /  AST  98<H>  /  ALT  160<H>  /  AlkPhos  289<H>  01-25    [ ] Hendrix catheter, indication: N/A  Meds: ascorbic acid 500 milliGRAM(s) Oral daily  cyanocobalamin 1000 MICROGram(s) Oral daily  folic acid 1 milliGRAM(s) Oral daily  magnesium sulfate  IVPB 2 Gram(s) IV Intermittent every 2 hours  multivitamin 1 Tablet(s) Oral daily  thiamine 100 milliGRAM(s) Oral daily        HEMATOLOGIC  Meds: enoxaparin Injectable 40 milliGRAM(s) SubCutaneous every 24 hours    [x] VTE Prophylaxis                        7.1    6.00  )-----------( 446      ( 25 Jan 2024 23:45 )             21.3     PT/INR - ( 25 Jan 2024 23:45 )   PT: 13.9 sec;   INR: 1.27 ratio         PTT - ( 25 Jan 2024 23:45 )  PTT:32.6 sec  Transfusion     [ ] PRBC   [ ] Platelets   [ ] FFP   [ ] Cryoprecipitate      INFECTIOUS DISEASES  WBC Count: 6.00 K/uL (01-25 @ 23:45)  WBC Count: 6.94 K/uL (01-25 @ 05:57)    RECENT CULTURES:  Specimen Source: .Abscess L empyema  Date/Time: 01-22 @ 17:37  Culture Results:   No growth to date.  Gram Stain:   Numerous polymorphonuclear leukocytes per low power field  No organisms seen per oil power field  Organism: --    Meds: influenza   Vaccine 0.5 milliLiter(s) IntraMuscular once  piperacillin/tazobactam IVPB.- 3.375 Gram(s) IV Intermittent once  piperacillin/tazobactam IVPB.. 3.375 Gram(s) IV Intermittent every 8 hours        ENDOCRINE  CAPILLARY BLOOD GLUCOSE      POCT Blood Glucose.: 127 mg/dL (25 Jan 2024 23:03)    Meds:       ACCESS DEVICES:  [ ] Peripheral IV  [ ] Central Venous Line	[ ] R	[ ] L	[ ] IJ	[ ] Fem	[ ] SC	Placed:   [ ] Arterial Line		[ ] R	[ ] L	[ ] Fem	[ ] Rad	[ ] Ax	Placed:   [ ] PICC:					[ ] Mediport  [ ] Urinary Catheter, Date Placed:   [x] Necessity of urinary, arterial, and venous catheters discussed    OTHER MEDICATIONS:  chlorhexidine 2% Cloths 1 Application(s) Topical <User Schedule>  lidocaine   4% Patch 1 Patch Transdermal every 24 hours      CODE STATUS:      IMAGING: HISTORY: 47 yo M w pmhx of Hep C and possible EtOH use disorder who presents after being found down. Intubated for respiratory distress and agitation 1/8. Pt has displaced 4-11 L rib fx with flail segment.     24 HOUR EVENTS:  - Hgb/Hct 7.1/21.3 1UPRBC ordered  - Hypotensive/Tachycardic 500 cc LR bolus x1, lopressor d/c  - CTS hold tpa in L pleural drain for tonight  - Plan VATS 1/26 hold overnight lovenox/obt type and screen   - Febrile in AM, Restart zosyn & Repeat BCx 1/26 febrile in AM 1/25  - Replete sodium phos + mag     SUBJECTIVE/ROS:  [ ] A ten-point review of systems was otherwise negative except as noted.  [ ] Due to altered mental status/intubation, subjective information were not able to be obtained from the patient. History was obtained, to the extent possible, from review of the chart and collateral sources of information.      NEURO  RASS:     GCS:     CAM ICU:  Exam: awake, alert, oriented  Meds: acetaminophen     Tablet .. 500 milliGRAM(s) Oral every 6 hours PRN Mild Pain (1 - 3)  HYDROmorphone  Injectable 0.5 milliGRAM(s) IV Push every 3 hours PRN breakthrough pain  melatonin 5 milliGRAM(s) Oral at bedtime  oxyCODONE    IR 10 milliGRAM(s) Oral every 4 hours PRN Severe Pain (7 - 10)  oxyCODONE    IR 5 milliGRAM(s) Oral every 4 hours PRN Moderate Pain (4 - 6)  QUEtiapine 75 milliGRAM(s) Oral <User Schedule>    [x] Adequacy of sedation and pain control has been assessed and adjusted      RESPIRATORY  RR: 25 (01-26-24 @ 01:00) (22 - 52)  SpO2: 100% (01-26-24 @ 01:00) (92% - 100%)  Wt(kg): --  Exam: unlabored, clear to auscultation bilaterally  Mechanical Ventilation:     [N/A] Extubation Readiness Assessed  Meds:       CARDIOVASCULAR  HR: 96 (01-26-24 @ 01:00) (95 - 144)  BP: 87/53 (01-26-24 @ 01:00) (73/48 - 150/78)  BP(mean): 65 (01-26-24 @ 01:00) (56 - 104)  ABP: --  ABP(mean): --  Wt(kg): --  CVP(cm H2O): --  VBG - ( 25 Jan 2024 23:30 )  pH: 7.39  /  pCO2: 30    /  pO2: 55    / HCO3: 18    / Base Excess: -6.1  /  SaO2: 86.6   Lactate: 0.8                Exam: regular rate and rhythm  Cardiac Rhythm: sinus  Perfusion     [x]Adequate   [ ]Inadequate  Mentation   [x]Normal       [ ]Reduced  Extremities  [x]Warm         [ ]Cool  Volume Status [ ]Hypervolemic [x]Euvolemic [ ]Hypovolemic  Meds: metoprolol tartrate 50 milliGRAM(s) Oral every 8 hours        GI/NUTRITION  Exam: soft, nontender, nondistended, incision C/D/I  Diet:  Meds:     GENITOURINARY  I&O's Detail    01-24 @ 07:01  -  01-25 @ 07:00  --------------------------------------------------------  IN:    IV PiggyBack: 275 mL    Oral Fluid: 600 mL  Total IN: 875 mL    OUT:    Chest Tube (mL): 230 mL    VAC (Vacuum Assisted Closure) System (mL): 0 mL    Voided (mL): 1150 mL  Total OUT: 1380 mL    Total NET: -505 mL      01-25 @ 07:01 - 01-26 @ 01:44  --------------------------------------------------------  IN:    Lactated Ringers Bolus: 500 mL    Oral Fluid: 1120 mL  Total IN: 1620 mL    OUT:    Chest Tube (mL): 500 mL    VAC (Vacuum Assisted Closure) System (mL): 0 mL    Voided (mL): 850 mL  Total OUT: 1350 mL    Total NET: 270 mL          01-25    127<L>  |  100  |  7   ----------------------------<  109<H>  4.1   |  18<L>  |  1.32<H>    Ca    7.6<L>      25 Jan 2024 23:45  Phos  2.7     01-25  Mg     1.6     01-25    TPro  5.4<L>  /  Alb  1.9<L>  /  TBili  0.4  /  DBili  x   /  AST  98<H>  /  ALT  160<H>  /  AlkPhos  289<H>  01-25    [ ] Hendrix catheter, indication: N/A  Meds: ascorbic acid 500 milliGRAM(s) Oral daily  cyanocobalamin 1000 MICROGram(s) Oral daily  folic acid 1 milliGRAM(s) Oral daily  magnesium sulfate  IVPB 2 Gram(s) IV Intermittent every 2 hours  multivitamin 1 Tablet(s) Oral daily  thiamine 100 milliGRAM(s) Oral daily        HEMATOLOGIC  Meds: enoxaparin Injectable 40 milliGRAM(s) SubCutaneous every 24 hours    [x] VTE Prophylaxis                        7.1    6.00  )-----------( 446      ( 25 Jan 2024 23:45 )             21.3     PT/INR - ( 25 Jan 2024 23:45 )   PT: 13.9 sec;   INR: 1.27 ratio         PTT - ( 25 Jan 2024 23:45 )  PTT:32.6 sec  Transfusion     [ ] PRBC   [ ] Platelets   [ ] FFP   [ ] Cryoprecipitate      INFECTIOUS DISEASES  WBC Count: 6.00 K/uL (01-25 @ 23:45)  WBC Count: 6.94 K/uL (01-25 @ 05:57)    RECENT CULTURES:  Specimen Source: .Abscess L empyema  Date/Time: 01-22 @ 17:37  Culture Results:   No growth to date.  Gram Stain:   Numerous polymorphonuclear leukocytes per low power field  No organisms seen per oil power field  Organism: --    Meds: influenza   Vaccine 0.5 milliLiter(s) IntraMuscular once  piperacillin/tazobactam IVPB.- 3.375 Gram(s) IV Intermittent once  piperacillin/tazobactam IVPB.. 3.375 Gram(s) IV Intermittent every 8 hours        ENDOCRINE  CAPILLARY BLOOD GLUCOSE      POCT Blood Glucose.: 127 mg/dL (25 Jan 2024 23:03)    Meds:       ACCESS DEVICES:  [ ] Peripheral IV  [ ] Central Venous Line	[ ] R	[ ] L	[ ] IJ	[ ] Fem	[ ] SC	Placed:   [ ] Arterial Line		[ ] R	[ ] L	[ ] Fem	[ ] Rad	[ ] Ax	Placed:   [ ] PICC:					[ ] Mediport  [ ] Urinary Catheter, Date Placed:   [x] Necessity of urinary, arterial, and venous catheters discussed    OTHER MEDICATIONS:  chlorhexidine 2% Cloths 1 Application(s) Topical <User Schedule>  lidocaine   4% Patch 1 Patch Transdermal every 24 hours      CODE STATUS:      IMAGING:

## 2024-01-26 NOTE — PROGRESS NOTE ADULT - ASSESSMENT
48yoM w/ PMHx hepatitis C and EtOH abuse was transferred from OSH on 1/6 for level 1 trauma eval. He was found down with multiple 4-11 left-sided rib fractures and flail chest, and had a chest tube placed at OSH. S/p VATS 1/10 w/ partial lung decortication and thoracoscopic removal of intrapleural foreign body. S/p chest tube removal 1/14, downgraded to floor 1/18, returned to SICU after fever 102.3 on 1/20.    PLAN  Neurologic:  - A&Ox4  - Seroquel 75mg QHS  - multimodal pain control w/ lidocaine patch, Tylenol, oxycodone, Dilaudid PRN     Respiratory:  - NC  - s/p VATS 1/10 with discovery of Fibropurulent exudative adhesions and removal of retained foreign material likely the tip of a glove, s/p washout, with new 28f chest tube placed > removed 1/14  -- 1/20 CT with complex collection L pleural space, s/p drainage by IR 1/22, 20cc bloody fluid drained, pigtail placed  - Incentive spirometry, Duonebs   - AM CXR  - CTS to hold tpa in L pleural drain for tonight    Cardiovascular: tachycardia  - 1U PRBC given hgb 7.1 symptomatic anemia   - off pressors MAP >65  - Metoprolol 50mg q8h for tachycardia    Gastrointestinal/Nutrition:  - Abd US 1/24 - mild hepatomegaly, redemonstration of evolving splenic hematomas  - Regular diet  - Pepcid  - d/c imodium    Genitourinary/Renal:  - Supplement electrolytes PRN  - trend creatinine  - voiding spontaneously    Hematologic:  - Hgb/Hct 7.1/21.3 1UPRBC   - Chemical VTE ppx with Lovenox held for VATS   - Mechanical VTE ppx with SCDs  - Duplex/doppler 1/23 negative    Infectious Disease: Empyema   - s/p zosyn 1/7-1/24  - 1/22 L empyema cultures NGTD  - 1/26 Restart zosyn. Repeat BCx f/u     Endocrine:  - No active issues, no history of DM    MSK:   - sat on dee tray(?) wound right lateral buttox with dressing

## 2024-01-27 LAB
ALBUMIN SERPL ELPH-MCNC: 2 G/DL — LOW (ref 3.3–5)
ALP SERPL-CCNC: 282 U/L — HIGH (ref 40–120)
ALT FLD-CCNC: 108 U/L — HIGH (ref 10–45)
ANION GAP SERPL CALC-SCNC: 11 MMOL/L — SIGNIFICANT CHANGE UP (ref 5–17)
APTT BLD: 28.4 SEC — SIGNIFICANT CHANGE UP (ref 24.5–35.6)
AST SERPL-CCNC: 53 U/L — HIGH (ref 10–40)
BILIRUB SERPL-MCNC: 0.4 MG/DL — SIGNIFICANT CHANGE UP (ref 0.2–1.2)
BUN SERPL-MCNC: 8 MG/DL — SIGNIFICANT CHANGE UP (ref 7–23)
CALCIUM SERPL-MCNC: 7.8 MG/DL — LOW (ref 8.4–10.5)
CHLORIDE SERPL-SCNC: 100 MMOL/L — SIGNIFICANT CHANGE UP (ref 96–108)
CO2 SERPL-SCNC: 17 MMOL/L — LOW (ref 22–31)
CREAT SERPL-MCNC: 0.75 MG/DL — SIGNIFICANT CHANGE UP (ref 0.5–1.3)
CULTURE RESULTS: SIGNIFICANT CHANGE UP
EGFR: 112 ML/MIN/1.73M2 — SIGNIFICANT CHANGE UP
GLUCOSE SERPL-MCNC: 118 MG/DL — HIGH (ref 70–99)
GRAM STN FLD: SIGNIFICANT CHANGE UP
GRAM STN FLD: SIGNIFICANT CHANGE UP
HCT VFR BLD CALC: 23.8 % — LOW (ref 39–50)
HGB BLD-MCNC: 8.1 G/DL — LOW (ref 13–17)
INR BLD: 1.11 RATIO — SIGNIFICANT CHANGE UP (ref 0.85–1.18)
MAGNESIUM SERPL-MCNC: 2.2 MG/DL — SIGNIFICANT CHANGE UP (ref 1.6–2.6)
MCHC RBC-ENTMCNC: 28 PG — SIGNIFICANT CHANGE UP (ref 27–34)
MCHC RBC-ENTMCNC: 34 GM/DL — SIGNIFICANT CHANGE UP (ref 32–36)
MCV RBC AUTO: 82.4 FL — SIGNIFICANT CHANGE UP (ref 80–100)
NIGHT BLUE STAIN TISS: SIGNIFICANT CHANGE UP
NIGHT BLUE STAIN TISS: SIGNIFICANT CHANGE UP
NRBC # BLD: 0 /100 WBCS — SIGNIFICANT CHANGE UP (ref 0–0)
PHOSPHATE SERPL-MCNC: 3.3 MG/DL — SIGNIFICANT CHANGE UP (ref 2.5–4.5)
PLATELET # BLD AUTO: 560 K/UL — HIGH (ref 150–400)
POTASSIUM SERPL-MCNC: 4.8 MMOL/L — SIGNIFICANT CHANGE UP (ref 3.5–5.3)
POTASSIUM SERPL-SCNC: 4.8 MMOL/L — SIGNIFICANT CHANGE UP (ref 3.5–5.3)
PROT SERPL-MCNC: 5.6 G/DL — LOW (ref 6–8.3)
PROTHROM AB SERPL-ACNC: 11.6 SEC — SIGNIFICANT CHANGE UP (ref 9.5–13)
RBC # BLD: 2.89 M/UL — LOW (ref 4.2–5.8)
RBC # FLD: 15.7 % — HIGH (ref 10.3–14.5)
SODIUM SERPL-SCNC: 128 MMOL/L — LOW (ref 135–145)
SPECIMEN SOURCE: SIGNIFICANT CHANGE UP
WBC # BLD: 11.13 K/UL — HIGH (ref 3.8–10.5)
WBC # FLD AUTO: 11.13 K/UL — HIGH (ref 3.8–10.5)

## 2024-01-27 PROCEDURE — 71045 X-RAY EXAM CHEST 1 VIEW: CPT | Mod: 26

## 2024-01-27 PROCEDURE — 99233 SBSQ HOSP IP/OBS HIGH 50: CPT

## 2024-01-27 PROCEDURE — 93010 ELECTROCARDIOGRAM REPORT: CPT

## 2024-01-27 RX ORDER — SODIUM CHLORIDE 9 MG/ML
1000 INJECTION INTRAMUSCULAR; INTRAVENOUS; SUBCUTANEOUS
Refills: 0 | Status: DISCONTINUED | OUTPATIENT
Start: 2024-01-27 | End: 2024-01-28

## 2024-01-27 RX ORDER — NALOXONE HYDROCHLORIDE 4 MG/.1ML
0.1 SPRAY NASAL
Refills: 0 | Status: DISCONTINUED | OUTPATIENT
Start: 2024-01-27 | End: 2024-01-31

## 2024-01-27 RX ORDER — SENNA PLUS 8.6 MG/1
2 TABLET ORAL AT BEDTIME
Refills: 0 | Status: DISCONTINUED | OUTPATIENT
Start: 2024-01-27 | End: 2024-01-31

## 2024-01-27 RX ORDER — SODIUM CHLORIDE 9 MG/ML
500 INJECTION INTRAMUSCULAR; INTRAVENOUS; SUBCUTANEOUS ONCE
Refills: 0 | Status: COMPLETED | OUTPATIENT
Start: 2024-01-27 | End: 2024-01-27

## 2024-01-27 RX ORDER — DIPHENHYDRAMINE HCL 50 MG
25 CAPSULE ORAL EVERY 4 HOURS
Refills: 0 | Status: DISCONTINUED | OUTPATIENT
Start: 2024-01-27 | End: 2024-01-31

## 2024-01-27 RX ORDER — PHENYLEPHRINE HYDROCHLORIDE 10 MG/ML
0.2 INJECTION INTRAVENOUS
Qty: 40 | Refills: 0 | Status: DISCONTINUED | OUTPATIENT
Start: 2024-01-27 | End: 2024-01-27

## 2024-01-27 RX ORDER — HYDROMORPHONE HYDROCHLORIDE 2 MG/ML
30 INJECTION INTRAMUSCULAR; INTRAVENOUS; SUBCUTANEOUS
Refills: 0 | Status: DISCONTINUED | OUTPATIENT
Start: 2024-01-27 | End: 2024-01-31

## 2024-01-27 RX ORDER — ONDANSETRON 8 MG/1
4 TABLET, FILM COATED ORAL EVERY 6 HOURS
Refills: 0 | Status: DISCONTINUED | OUTPATIENT
Start: 2024-01-27 | End: 2024-01-31

## 2024-01-27 RX ORDER — POLYETHYLENE GLYCOL 3350 17 G/17G
17 POWDER, FOR SOLUTION ORAL DAILY
Refills: 0 | Status: DISCONTINUED | OUTPATIENT
Start: 2024-01-27 | End: 2024-01-31

## 2024-01-27 RX ORDER — CALCIUM CARBONATE 500(1250)
2 TABLET ORAL ONCE
Refills: 0 | Status: COMPLETED | OUTPATIENT
Start: 2024-01-27 | End: 2024-01-27

## 2024-01-27 RX ORDER — ACETAMINOPHEN 500 MG
1000 TABLET ORAL ONCE
Refills: 0 | Status: COMPLETED | OUTPATIENT
Start: 2024-01-27 | End: 2024-01-27

## 2024-01-27 RX ADMIN — Medication 500 MILLIGRAM(S): at 00:22

## 2024-01-27 RX ADMIN — Medication 2 TABLET(S): at 04:45

## 2024-01-27 RX ADMIN — QUETIAPINE FUMARATE 75 MILLIGRAM(S): 200 TABLET, FILM COATED ORAL at 20:30

## 2024-01-27 RX ADMIN — ENOXAPARIN SODIUM 40 MILLIGRAM(S): 100 INJECTION SUBCUTANEOUS at 17:33

## 2024-01-27 RX ADMIN — HYDROMORPHONE HYDROCHLORIDE 30 MILLILITER(S): 2 INJECTION INTRAMUSCULAR; INTRAVENOUS; SUBCUTANEOUS at 16:49

## 2024-01-27 RX ADMIN — PREGABALIN 1000 MICROGRAM(S): 225 CAPSULE ORAL at 11:29

## 2024-01-27 RX ADMIN — OXYCODONE HYDROCHLORIDE 10 MILLIGRAM(S): 5 TABLET ORAL at 10:07

## 2024-01-27 RX ADMIN — PHENYLEPHRINE HYDROCHLORIDE 6.88 MICROGRAM(S)/KG/MIN: 10 INJECTION INTRAVENOUS at 09:41

## 2024-01-27 RX ADMIN — SODIUM CHLORIDE 50 MILLILITER(S): 9 INJECTION INTRAMUSCULAR; INTRAVENOUS; SUBCUTANEOUS at 09:42

## 2024-01-27 RX ADMIN — Medication 1000 MILLIGRAM(S): at 05:48

## 2024-01-27 RX ADMIN — PIPERACILLIN AND TAZOBACTAM 25 GRAM(S): 4; .5 INJECTION, POWDER, LYOPHILIZED, FOR SOLUTION INTRAVENOUS at 05:22

## 2024-01-27 RX ADMIN — Medication 1 TABLET(S): at 11:30

## 2024-01-27 RX ADMIN — HYDROMORPHONE HYDROCHLORIDE 30 MILLILITER(S): 2 INJECTION INTRAMUSCULAR; INTRAVENOUS; SUBCUTANEOUS at 19:40

## 2024-01-27 RX ADMIN — OXYCODONE HYDROCHLORIDE 10 MILLIGRAM(S): 5 TABLET ORAL at 02:05

## 2024-01-27 RX ADMIN — LIDOCAINE 1 PATCH: 4 CREAM TOPICAL at 21:35

## 2024-01-27 RX ADMIN — PIPERACILLIN AND TAZOBACTAM 25 GRAM(S): 4; .5 INJECTION, POWDER, LYOPHILIZED, FOR SOLUTION INTRAVENOUS at 14:08

## 2024-01-27 RX ADMIN — PIPERACILLIN AND TAZOBACTAM 25 GRAM(S): 4; .5 INJECTION, POWDER, LYOPHILIZED, FOR SOLUTION INTRAVENOUS at 21:50

## 2024-01-27 RX ADMIN — SODIUM CHLORIDE 2000 MILLILITER(S): 9 INJECTION INTRAMUSCULAR; INTRAVENOUS; SUBCUTANEOUS at 05:15

## 2024-01-27 RX ADMIN — Medication 100 MILLIGRAM(S): at 11:29

## 2024-01-27 RX ADMIN — SODIUM CHLORIDE 2000 MILLILITER(S): 9 INJECTION INTRAMUSCULAR; INTRAVENOUS; SUBCUTANEOUS at 01:35

## 2024-01-27 RX ADMIN — OXYCODONE HYDROCHLORIDE 10 MILLIGRAM(S): 5 TABLET ORAL at 14:38

## 2024-01-27 RX ADMIN — OXYCODONE HYDROCHLORIDE 10 MILLIGRAM(S): 5 TABLET ORAL at 09:37

## 2024-01-27 RX ADMIN — OXYCODONE HYDROCHLORIDE 10 MILLIGRAM(S): 5 TABLET ORAL at 14:08

## 2024-01-27 RX ADMIN — OXYCODONE HYDROCHLORIDE 10 MILLIGRAM(S): 5 TABLET ORAL at 01:35

## 2024-01-27 RX ADMIN — Medication 1 MILLIGRAM(S): at 11:29

## 2024-01-27 RX ADMIN — Medication 400 MILLIGRAM(S): at 05:22

## 2024-01-27 RX ADMIN — Medication 5 MILLIGRAM(S): at 21:36

## 2024-01-27 RX ADMIN — Medication 500 MILLIGRAM(S): at 11:30

## 2024-01-27 NOTE — PROGRESS NOTE ADULT - SUBJECTIVE AND OBJECTIVE BOX
INTERVAL EVENTS:  - NS @ 50/hr  - 500 NS bolus  - s/p 1/26 Open left thoracotomy, decortication and washout, 3 chest tubes placed    SUBJECTIVE/ROS:  [X ] A ten-point review of systems was otherwise negative except as noted.  [ ] Due to altered mental status/intubation, subjective information were not able to be obtained from the patient. History was obtained, to the extent possible, from review of the chart and collateral sources of information.      NEURO  Exam: awake, alert, oriented  Meds: acetaminophen     Tablet .. 500 milliGRAM(s) Oral every 6 hours PRN Mild Pain (1 - 3)  HYDROmorphone  Injectable 0.5 milliGRAM(s) IV Push every 3 hours PRN breakthrough pain  melatonin 5 milliGRAM(s) Oral at bedtime  oxyCODONE    IR 5 milliGRAM(s) Oral every 4 hours PRN Moderate Pain (4 - 6)  oxyCODONE    IR 10 milliGRAM(s) Oral every 4 hours PRN Severe Pain (7 - 10)  QUEtiapine 75 milliGRAM(s) Oral <User Schedule>    [x] Adequacy of sedation and pain control has been assessed and adjusted      RESPIRATORY  RR: 37 (01-27-24 @ 01:00) (13 - 43)  SpO2: 99% (01-27-24 @ 01:00) (89% - 100%)  Wt(kg): --  Exam: unlabored, clear to auscultation bilaterally  Mechanical Ventilation:   ABG - ( 26 Jan 2024 17:50 )  pH: 7.34  /  pCO2: 33    /  pO2: 93    / HCO3: 18    / Base Excess: -7.2  /  SaO2: 99.7    Lactate: x            [N/A] Extubation Readiness Assessed  Meds:       CARDIOVASCULAR  HR: 118 (01-27-24 @ 01:00) (93 - 132)  BP: 95/52 (01-27-24 @ 00:00) (81/54 - 116/67)  BP(mean): 66 (01-27-24 @ 00:00) (63 - 89)  ABP: 112/58 (01-27-24 @ 01:00) (88/38 - 123/68)  ABP(mean): 75 (01-27-24 @ 01:00) (54 - 85)  Wt(kg): --  CVP(cm H2O): --  VBG - ( 25 Jan 2024 23:30 )  pH: 7.39  /  pCO2: 30    /  pO2: 55    / HCO3: 18    / Base Excess: -6.1  /  SaO2: 86.6   Lactate: 0.8          Exam: regular rate and rhythm  Cardiac Rhythm: sinus  Perfusion     [x]Adequate   [ ]Inadequate  Mentation   [x]Normal       [ ]Reduced  Extremities  [x]Warm         [ ]Cool  Volume Status [ ]Hypervolemic [x]Euvolemic [ ]Hypovolemic  Meds: phenylephrine    Infusion 0.2 MICROgram(s)/kG/Min IV Continuous <Continuous>        GI/NUTRITION  Exam: soft, nontender, nondistended, incision C/D/I  Diet:  Meds:     GENITOURINARY  I&O's Detail    01-25 @ 07:01  -  01-26 @ 07:00  --------------------------------------------------------  IN:    IV PiggyBack: 350 mL    IV PiggyBack: 200 mL    Lactated Ringers Bolus: 500 mL    Oral Fluid: 1240 mL    PRBCs (Packed Red Blood Cells): 300 mL  Total IN: 2590 mL    OUT:    Chest Tube (mL): 550 mL    VAC (Vacuum Assisted Closure) System (mL): 0 mL    Voided (mL): 1200 mL  Total OUT: 1750 mL    Total NET: 840 mL      01-26 @ 07:01 - 01-27 @ 01:10  --------------------------------------------------------  IN:    IV PiggyBack: 100 mL    Phenylephrine: 24.1 mL    sodium chloride 0.9%: 50 mL    sodium chloride 0.9%: 400 mL    Sodium Chloride 0.9% Bolus: 500 mL  Total IN: 1074.1 mL    OUT:    Voided (mL): 1100 mL  Total OUT: 1100 mL    Total NET: -25.9 mL        Weight (kg): 91.7 (01-26 @ 10:20)  01-27    128<L>  |  100  |  8   ----------------------------<  118<H>  4.8   |  17<L>  |  0.75    Ca    7.8<L>      27 Jan 2024 00:29  Phos  3.3     01-27  Mg     2.2     01-27    TPro  5.6<L>  /  Alb  2.0<L>  /  TBili  0.4  /  DBili  x   /  AST  53<H>  /  ALT  108<H>  /  AlkPhos  282<H>  01-27    [ ] Hendrix catheter, indication: N/A  Meds: ascorbic acid 500 milliGRAM(s) Oral daily  cyanocobalamin 1000 MICROGram(s) Oral daily  folic acid 1 milliGRAM(s) Oral daily  multivitamin 1 Tablet(s) Oral daily  sodium chloride 0.9% Bolus 500 milliLiter(s) IV Bolus once  sodium chloride 0.9%. 1000 milliLiter(s) IV Continuous <Continuous>  thiamine 100 milliGRAM(s) Oral daily        HEMATOLOGIC  Meds: enoxaparin Injectable 40 milliGRAM(s) SubCutaneous every 24 hours    [x] VTE Prophylaxis                        8.1    11.13 )-----------( 560      ( 27 Jan 2024 00:29 )             23.8     PT/INR - ( 27 Jan 2024 00:29 )   PT: 11.6 sec;   INR: 1.11 ratio         PTT - ( 27 Jan 2024 00:29 )  PTT:28.4 sec  Transfusion     [ ] PRBC   [ ] Platelets   [ ] FFP   [ ] Cryoprecipitate      INFECTIOUS DISEASES  WBC Count: 11.13 K/uL (01-27 @ 00:29)  WBC Count: 12.24 K/uL (01-26 @ 17:53)  WBC Count: 6.77 K/uL (01-26 @ 05:52)    RECENT CULTURES:  Specimen Source: .Abscess L empyema  Date/Time: 01-22 @ 17:37  Culture Results:   No growth to date.  Gram Stain:   Numerous polymorphonuclear leukocytes per low power field  No organisms seen per oil power field  Organism: --    Meds: influenza   Vaccine 0.5 milliLiter(s) IntraMuscular once  piperacillin/tazobactam IVPB.. 3.375 Gram(s) IV Intermittent every 8 hours    ENDOCRINE  CAPILLARY BLOOD GLUCOSE      Meds:       ACCESS DEVICES:  [ ] Peripheral IV  [ ] Central Venous Line	[ ] R	[ ] L	[ ] IJ	[ ] Fem	[ ] SC	Placed:   [ ] Arterial Line		[ ] R	[ ] L	[ ] Fem	[ ] Rad	[ ] Ax	Placed:   [ ] PICC:					[ ] Mediport  [ ] Urinary Catheter, Date Placed:   [x] Necessity of urinary, arterial, and venous catheters discussed    OTHER MEDICATIONS:  chlorhexidine 2% Cloths 1 Application(s) Topical <User Schedule>  lidocaine   4% Patch 1 Patch Transdermal every 24 hours INTERVAL EVENTS:  - s/p 1/26 Open left thoracotomy, decortication and washout, 3 chest tubes placed  - given 1L NS, sammie gtt started    SUBJECTIVE/ROS:  [X ] A ten-point review of systems was otherwise negative except as noted.  [ ] Due to altered mental status/intubation, subjective information were not able to be obtained from the patient. History was obtained, to the extent possible, from review of the chart and collateral sources of information.      NEURO  Exam: awake, alert, oriented  Meds: acetaminophen     Tablet .. 500 milliGRAM(s) Oral every 6 hours PRN Mild Pain (1 - 3)  HYDROmorphone  Injectable 0.5 milliGRAM(s) IV Push every 3 hours PRN breakthrough pain  melatonin 5 milliGRAM(s) Oral at bedtime  oxyCODONE    IR 5 milliGRAM(s) Oral every 4 hours PRN Moderate Pain (4 - 6)  oxyCODONE    IR 10 milliGRAM(s) Oral every 4 hours PRN Severe Pain (7 - 10)  QUEtiapine 75 milliGRAM(s) Oral <User Schedule>    [x] Adequacy of sedation and pain control has been assessed and adjusted      RESPIRATORY  RR: 37 (01-27-24 @ 01:00) (13 - 43)  SpO2: 99% (01-27-24 @ 01:00) (89% - 100%)  Wt(kg): --  Exam: unlabored, clear to auscultation bilaterally  Mechanical Ventilation:   ABG - ( 26 Jan 2024 17:50 )  pH: 7.34  /  pCO2: 33    /  pO2: 93    / HCO3: 18    / Base Excess: -7.2  /  SaO2: 99.7    Lactate: x            [N/A] Extubation Readiness Assessed  Meds:       CARDIOVASCULAR  HR: 118 (01-27-24 @ 01:00) (93 - 132)  BP: 95/52 (01-27-24 @ 00:00) (81/54 - 116/67)  BP(mean): 66 (01-27-24 @ 00:00) (63 - 89)  ABP: 112/58 (01-27-24 @ 01:00) (88/38 - 123/68)  ABP(mean): 75 (01-27-24 @ 01:00) (54 - 85)  Wt(kg): --  CVP(cm H2O): --  VBG - ( 25 Jan 2024 23:30 )  pH: 7.39  /  pCO2: 30    /  pO2: 55    / HCO3: 18    / Base Excess: -6.1  /  SaO2: 86.6   Lactate: 0.8          Exam: regular rate and rhythm  Cardiac Rhythm: sinus  Perfusion     [x]Adequate   [ ]Inadequate  Mentation   [x]Normal       [ ]Reduced  Extremities  [x]Warm         [ ]Cool  Volume Status [ ]Hypervolemic [x]Euvolemic [ ]Hypovolemic  Meds: phenylephrine    Infusion 0.2 MICROgram(s)/kG/Min IV Continuous <Continuous>        GI/NUTRITION  Exam: soft, nontender, nondistended, incision C/D/I  Diet:  Meds:     GENITOURINARY  I&O's Detail    01-25 @ 07:01  -  01-26 @ 07:00  --------------------------------------------------------  IN:    IV PiggyBack: 350 mL    IV PiggyBack: 200 mL    Lactated Ringers Bolus: 500 mL    Oral Fluid: 1240 mL    PRBCs (Packed Red Blood Cells): 300 mL  Total IN: 2590 mL    OUT:    Chest Tube (mL): 550 mL    VAC (Vacuum Assisted Closure) System (mL): 0 mL    Voided (mL): 1200 mL  Total OUT: 1750 mL    Total NET: 840 mL      01-26 @ 07:01 - 01-27 @ 01:10  --------------------------------------------------------  IN:    IV PiggyBack: 100 mL    Phenylephrine: 24.1 mL    sodium chloride 0.9%: 50 mL    sodium chloride 0.9%: 400 mL    Sodium Chloride 0.9% Bolus: 500 mL  Total IN: 1074.1 mL    OUT:    Voided (mL): 1100 mL  Total OUT: 1100 mL    Total NET: -25.9 mL        Weight (kg): 91.7 (01-26 @ 10:20)  01-27    128<L>  |  100  |  8   ----------------------------<  118<H>  4.8   |  17<L>  |  0.75    Ca    7.8<L>      27 Jan 2024 00:29  Phos  3.3     01-27  Mg     2.2     01-27    TPro  5.6<L>  /  Alb  2.0<L>  /  TBili  0.4  /  DBili  x   /  AST  53<H>  /  ALT  108<H>  /  AlkPhos  282<H>  01-27    [ ] Hendirx catheter, indication: N/A  Meds: ascorbic acid 500 milliGRAM(s) Oral daily  cyanocobalamin 1000 MICROGram(s) Oral daily  folic acid 1 milliGRAM(s) Oral daily  multivitamin 1 Tablet(s) Oral daily  sodium chloride 0.9% Bolus 500 milliLiter(s) IV Bolus once  sodium chloride 0.9%. 1000 milliLiter(s) IV Continuous <Continuous>  thiamine 100 milliGRAM(s) Oral daily        HEMATOLOGIC  Meds: enoxaparin Injectable 40 milliGRAM(s) SubCutaneous every 24 hours    [x] VTE Prophylaxis                        8.1    11.13 )-----------( 560      ( 27 Jan 2024 00:29 )             23.8     PT/INR - ( 27 Jan 2024 00:29 )   PT: 11.6 sec;   INR: 1.11 ratio         PTT - ( 27 Jan 2024 00:29 )  PTT:28.4 sec  Transfusion     [ ] PRBC   [ ] Platelets   [ ] FFP   [ ] Cryoprecipitate      INFECTIOUS DISEASES  WBC Count: 11.13 K/uL (01-27 @ 00:29)  WBC Count: 12.24 K/uL (01-26 @ 17:53)  WBC Count: 6.77 K/uL (01-26 @ 05:52)    RECENT CULTURES:  Specimen Source: .Abscess L empyema  Date/Time: 01-22 @ 17:37  Culture Results:   No growth to date.  Gram Stain:   Numerous polymorphonuclear leukocytes per low power field  No organisms seen per oil power field  Organism: --    Meds: influenza   Vaccine 0.5 milliLiter(s) IntraMuscular once  piperacillin/tazobactam IVPB.. 3.375 Gram(s) IV Intermittent every 8 hours    ENDOCRINE  CAPILLARY BLOOD GLUCOSE      Meds:       ACCESS DEVICES:  [ ] Peripheral IV  [ ] Central Venous Line	[ ] R	[ ] L	[ ] IJ	[ ] Fem	[ ] SC	Placed:   [ ] Arterial Line		[ ] R	[ ] L	[ ] Fem	[ ] Rad	[ ] Ax	Placed:   [ ] PICC:					[ ] Mediport  [ ] Urinary Catheter, Date Placed:   [x] Necessity of urinary, arterial, and venous catheters discussed    OTHER MEDICATIONS:  chlorhexidine 2% Cloths 1 Application(s) Topical <User Schedule>  lidocaine   4% Patch 1 Patch Transdermal every 24 hours

## 2024-01-27 NOTE — PROGRESS NOTE ADULT - ASSESSMENT
A/P:   48yoM w/ PMHx hepatitis C and EtOH abuse was transferred from OSH on 1/6 for level 1 trauma eval. He was found down with multiple 4-11 left-sided rib fractures and flail chest, and had a chest tube placed at OSH. S/p VATS 1/10 w/ partial lung decortication and thoracoscopic removal of intrapleural foreign body. S/p chest tube removal 1/14, downgraded to floor 1/18, returned to SICU after fever 102.3 on 1/20- s/p L thoracotomy and decortication (1/26)    -Hold DVT prophy for 24 hrs   -CT to suction for 48 hours   -Monitor CT output  -Analgesia and antiemetics as needed  -Continue abx   -Regular diet   -Appreciate excellent care per SICU     Trauma Surgery #[id number]   48yoM w/ PMHx hepatitis C and EtOH abuse was transferred from OSH on 1/6 for level 1 trauma eval. He was found down with multiple 4-11 left-sided rib fractures and flail chest, and had a chest tube placed at OSH. S/p VATS 1/10 w/ partial lung decortication and thoracoscopic removal of intrapleural foreign body. S/p chest tube removal 1/14, downgraded to floor 1/18, returned to SICU after fever 102.3 on 1/20- s/p L thoracotomy and decortication (1/26)    -CT to suction for 48 hours   -Monitor CT output  -Analgesia and antiemetics as needed  -Continue abx   -Regular diet   -Appreciate excellent care per SICU     Trauma Surgery #[id number]

## 2024-01-27 NOTE — PROGRESS NOTE ADULT - NS ATTEND AMEND GEN_ALL_CORE FT
Evaluated in multidisciplinary round in SICU. Clinical parameters,  labs, available imagine continued to be evaluated   48yoM w/ PMHx hepatitis C and EtOH abuse was transferred from OSH on 1/6 for level 1 trauma eval. He was found down with multiple 4-11 left-sided rib fractures and flail chest, and had a chest tube placed at OSH. S/p VATS 1/10 w/ partial lung decortication and thoracoscopic removal of intrapleural foreign body. S/p chest tube removal 1/14, downgraded to floor 1/18, returned to SICU after fever 102.3 on 1/20.    S/P VATS and decortication with 3 CT placement on lt  CTs to suction, drained 145 cc, 45 cc and 170 cc/24 hrs  PCA consult, pain not well controlled  Hemodynamically stable  PO, antiproteinuric for diarrhea    Improving LFT. T bili wnl. hepatobiliary US no portal vein thrombosis, stable splenic hematoma  Abx Zosyn duration extended, new cultures P  Resume Pharmacologic DVT ppx . Hb Pl stable  surveillance LE doppler neg for DVT  OOB/PT

## 2024-01-27 NOTE — PROGRESS NOTE ADULT - ASSESSMENT
This is a  54yo M w pmhx of Hep C and possible EtOH use disorder who presents after being found down. Intubated for respiratory distress and agitation 1/8. Pt has displaced 4-11 L rib fx with flail segment.     1/9 VSS: NPO after MN. Plan for Rib plating WED 1/10/24  1/10 s/p Left VATS, with partial lung decortication  Thoracoscopic removal of intrapleural foreign body    1/11VSS intubated sedated on pressors  chest tube lws   1/12 VSS, pt still remains intubated/sedated, getting phenobarbital for agitation. Left chest tube output 75cc/24h placed on water seal this AM. 1U PRBC -H/H 7.1/20. Continue care per SICU team  1/13 Maintain L pleural tube waterseal Daily CXR Care as per SICU team  1/14 Left pleural tube removed CXR ordered; thoracic signed off  1/20 thoracic surgery reconsulted for left pleural effusion vs hemothorax; d/w Dr. Sepulveda; chest ct done- Dr. Sepulveda reviewed; call IR for potential drainage monday left pocket   1/21 VSS, pt still febrile tmax 103, WBC 8.4, IR consulted for drainage of complex left pleural effusion on CT Chest. Plan for Monday.   1/22 VSS; Tm 100.8 this am. WBC WNL. IR pending drainage of L effusion today   1/23 VSS, s/p left pigtail by IR yesterday, minimal drainage 20cc/24h. Recommend flushing pigtail and placing to LWS. Plan to do tPA MIST protocol tomorrow.   1/24     vss   min drainage from Lt  PTC  1/25 VSS left chest tube drainage 100/230  TPA today-   OR for  1/26 please type and screen Hibiclens  and NPO at midnight  hold  Lovenox   1/26 L Thoracotomy/Decort  3 chest tubes suction x 48 hours  1/27 L thoracotomy maintain 3 left pleural tubes to suction     Recommend fluid restrict for hyponatremia

## 2024-01-27 NOTE — PROGRESS NOTE ADULT - ASSESSMENT
48yoM w/ PMHx hepatitis C and EtOH abuse was transferred from OSH on 1/6 for level 1 trauma eval. He was found down with multiple 4-11 left-sided rib fractures and flail chest, and had a chest tube placed at OSH. S/p VATS 1/10 w/ partial lung decortication and thoracoscopic removal of intrapleural foreign body. S/p chest tube removal 1/14, downgraded to floor 1/18, returned to SICU after fever 102.3 on 1/20.    PLAN  Neurologic:  - A&Ox4  - Seroquel 75mg QHS  - multimodal pain control w/ lidocaine patch, Tylenol, oxycodone, Dilaudid PRN     Respiratory:  - RA  - s/p VATS 1/10 with discovery of Fibropurulent exudative adhesions and removal of retained foreign material likely the tip of a glove, s/p washout, with new 28f chest tube placed > removed 1/14  - 1/20 CT with complex collection L pleural space, s/p drainage by IR 1/22, 20cc bloody fluid drained, pigtail placed  - Incentive spirometry, Duonebs   - CTS OR 1/26 Open left thoracotomy, decortication and washout, 3 chest tubes placed  - chest tubes to suction, monitor output      Cardiovascular: tachycardia  - off pressors MAP >65  - Metoprolol 50mg q8h for tachycardia  - Hgb/Hct 7.1/21.3 1U PRBC, post 8.3  - Hypotensive/Tachycardic (, SPB 70s) 500 cc LR bolus x1, lopressor d/c; currently (-120, SBP 90)    Gastrointestinal/Nutrition:  - Abd US 1/24 - mild hepatomegaly, redemonstration of evolving splenic hematomas  - NPO for VATS 1/26  - Pepcid  - d/c imodium    Genitourinary/Renal:  - Supplement electrolytes PRN  - trend creatinine  - voiding spontaneously  - MIVF    Hematologic:  - Chemical VTE ppx with Lovenox held for 24 hrs postop  - Mechanical VTE ppx with SCDs  - Duplex/doppler 1/23 negative    Infectious Disease: Empyema   - s/p zosyn 1/7-1/24  - 1/22 L empyema cultures NGTD  - 1/26 Restart zosyn. Repeat BCx f/u     Endocrine:  - No active issues, no history of DM    MSK:   - sat on dee tray(?) wound right lateral buttox with dressing

## 2024-01-27 NOTE — PROGRESS NOTE ADULT - SUBJECTIVE AND OBJECTIVE BOX
Subjective: " You people are always a bother"  Sitting up in bed      V/S  T(C): 37.3 (01-27-24 @ 07:30), Max: 39.3 (01-27-24 @ 05:00)  HR: 115 (01-27-24 @ 11:00) (93 - 141)  BP: 119/71 (01-27-24 @ 11:00) (81/54 - 127/65)  ABP: 76/63 (01-27-24 @ 10:30) (66/48 - 130/64)  ABP(mean): 71 (01-27-24 @ 10:30) (54 - 92)  RR: 35 (01-27-24 @ 11:15) (13 - 51)  SpO2: 96% (01-27-24 @ 11:15) (89% - 100%)      CHEST TUBE:    3 Left chest tubes          Drainage:  serosang  AIR LEAKS:  [ ] YES [ x] NO    I&O's Detail    26 Jan 2024 07:01  -  27 Jan 2024 07:00  --------------------------------------------------------  IN:    IV PiggyBack: 200 mL    IV PiggyBack: 100 mL    Phenylephrine: 24.1 mL    Phenylephrine: 278.5 mL    sodium chloride 0.9%: 50 mL    sodium chloride 0.9%: 700 mL    Sodium Chloride 0.9% Bolus: 1000 mL  Total IN: 2352.6 mL    OUT:    Chest Tube (mL): 45 mL    Chest Tube (mL): 170 mL    Chest Tube (mL): 145 mL    Voided (mL): 1220 mL  Total OUT: 1580 mL    Total NET: 772.6 mL      27 Jan 2024 07:01  -  27 Jan 2024 11:51  --------------------------------------------------------  IN:    Oral Fluid: 120 mL    Phenylephrine: 168.5 mL    sodium chloride 0.9%: 100 mL    sodium chloride 0.9%: 200 mL  Total IN: 588.5 mL    OUT:    Voided (mL): 300 mL  Total OUT: 300 mL    Total NET: 288.5 mL          01-26-24 @ 07:01 - 01-27-24 @ 07:00  --------------------------------------------------------  IN: 2352.6 mL / OUT: 1580 mL / NET: 772.6 mL    01-27-24 @ 07:01 - 01-27-24 @ 11:51  --------------------------------------------------------  IN: 588.5 mL / OUT: 300 mL / NET: 288.5 mL      MEDICATIONS  (STANDING):  ascorbic acid 500 milliGRAM(s) Oral daily  chlorhexidine 2% Cloths 1 Application(s) Topical <User Schedule>  cyanocobalamin 1000 MICROGram(s) Oral daily  enoxaparin Injectable 40 milliGRAM(s) SubCutaneous every 24 hours  folic acid 1 milliGRAM(s) Oral daily  influenza   Vaccine 0.5 milliLiter(s) IntraMuscular once  lidocaine   4% Patch 1 Patch Transdermal every 24 hours  melatonin 5 milliGRAM(s) Oral at bedtime  multivitamin 1 Tablet(s) Oral daily  phenylephrine    Infusion 0.2 MICROgram(s)/kG/Min (6.88 mL/Hr) IV Continuous <Continuous>  piperacillin/tazobactam IVPB.. 3.375 Gram(s) IV Intermittent every 8 hours  polyethylene glycol 3350 17 Gram(s) Oral daily  QUEtiapine 75 milliGRAM(s) Oral <User Schedule>  senna 2 Tablet(s) Oral at bedtime  sodium chloride 0.9%. 1000 milliLiter(s) (100 mL/Hr) IV Continuous <Continuous>  thiamine 100 milliGRAM(s) Oral daily      01-27    128<L>  |  100  |  8   ----------------------------<  118<H>  4.8   |  17<L>  |  0.75    Ca    7.8<L>      27 Jan 2024 00:29  Phos  3.3     01-27  Mg     2.2     01-27    TPro  5.6<L>  /  Alb  2.0<L>  /  TBili  0.4  /  DBili  x   /  AST  53<H>  /  ALT  108<H>  /  AlkPhos  282<H>  01-27                               8.1    11.13 )-----------( 560      ( 27 Jan 2024 00:29 )             23.8        PT/INR - ( 27 Jan 2024 00:29 )   PT: 11.6 sec;   INR: 1.11 ratio         PTT - ( 27 Jan 2024 00:29 )  PTT:28.4 sec         CAPILLARY BLOOD GLUCOSE               CXR:      Physical Exam:    Neurology: alert and oriented x 3    CV :S1S2  RRR    Lungs: Diminished on left Supplemental O2   3 Left chest tubes to suction>no air leaks noted pigtail   DSD with old bldy drainage    Abdomen: soft, nontender, nondistended, positive bowel sounds,    :    voids           Extremities: warm well perfused equal strength throughout                   PAST MEDICAL & SURGICAL HISTORY:  No pertinent past medical history      No significant past surgical history

## 2024-01-27 NOTE — PROGRESS NOTE ADULT - SUBJECTIVE AND OBJECTIVE BOX
ACS Progress Note    S:  Pt seen and examined. Overnight endorsing pain with respirations. Yonathan increased in room due to hypotension. Denies SOB/CP/N/V.       O:  Vital Signs Last 24 Hrs  T(C): 37.3 (27 Jan 2024 07:30), Max: 39.3 (27 Jan 2024 05:00)  T(F): 99.2 (27 Jan 2024 07:30), Max: 102.7 (27 Jan 2024 05:00)  HR: 121 (27 Jan 2024 12:15) (93 - 141)  BP: 117/63 (27 Jan 2024 12:15) (81/54 - 136/87)  BP(mean): 84 (27 Jan 2024 12:15) (63 - 105)  RR: 45 (27 Jan 2024 12:15) (13 - 51)  SpO2: 99% (27 Jan 2024 12:15) (89% - 100%)    Parameters below as of 27 Jan 2024 07:00  Patient On (Oxygen Delivery Method): room air        I&O's Detail    26 Jan 2024 07:01  -  27 Jan 2024 07:00  --------------------------------------------------------  IN:    IV PiggyBack: 200 mL    IV PiggyBack: 100 mL    Phenylephrine: 24.1 mL    Phenylephrine: 278.5 mL    sodium chloride 0.9%: 50 mL    sodium chloride 0.9%: 700 mL    Sodium Chloride 0.9% Bolus: 1000 mL  Total IN: 2352.6 mL    OUT:    Chest Tube (mL): 45 mL    Chest Tube (mL): 170 mL    Chest Tube (mL): 145 mL    Voided (mL): 1220 mL  Total OUT: 1580 mL    Total NET: 772.6 mL      27 Jan 2024 07:01  -  27 Jan 2024 13:14  --------------------------------------------------------  IN:    Oral Fluid: 120 mL    Phenylephrine: 196 mL    sodium chloride 0.9%: 100 mL    sodium chloride 0.9%: 300 mL  Total IN: 716 mL    OUT:    Voided (mL): 300 mL  Total OUT: 300 mL    Total NET: 416 mL          MEDICATIONS  (STANDING):  ascorbic acid 500 milliGRAM(s) Oral daily  chlorhexidine 2% Cloths 1 Application(s) Topical <User Schedule>  cyanocobalamin 1000 MICROGram(s) Oral daily  enoxaparin Injectable 40 milliGRAM(s) SubCutaneous every 24 hours  folic acid 1 milliGRAM(s) Oral daily  influenza   Vaccine 0.5 milliLiter(s) IntraMuscular once  lidocaine   4% Patch 1 Patch Transdermal every 24 hours  melatonin 5 milliGRAM(s) Oral at bedtime  multivitamin 1 Tablet(s) Oral daily  phenylephrine    Infusion 0.2 MICROgram(s)/kG/Min (6.88 mL/Hr) IV Continuous <Continuous>  piperacillin/tazobactam IVPB.. 3.375 Gram(s) IV Intermittent every 8 hours  polyethylene glycol 3350 17 Gram(s) Oral daily  QUEtiapine 75 milliGRAM(s) Oral <User Schedule>  senna 2 Tablet(s) Oral at bedtime  sodium chloride 0.9%. 1000 milliLiter(s) (100 mL/Hr) IV Continuous <Continuous>  thiamine 100 milliGRAM(s) Oral daily    MEDICATIONS  (PRN):  acetaminophen     Tablet .. 500 milliGRAM(s) Oral every 6 hours PRN Mild Pain (1 - 3)  HYDROmorphone  Injectable 0.5 milliGRAM(s) IV Push every 3 hours PRN breakthrough pain  oxyCODONE    IR 5 milliGRAM(s) Oral every 4 hours PRN Moderate Pain (4 - 6)  oxyCODONE    IR 10 milliGRAM(s) Oral every 4 hours PRN Severe Pain (7 - 10)                            8.1    11.13 )-----------( 560      ( 27 Jan 2024 00:29 )             23.8       01-27    128<L>  |  100  |  8   ----------------------------<  118<H>  4.8   |  17<L>  |  0.75    Ca    7.8<L>      27 Jan 2024 00:29  Phos  3.3     01-27  Mg     2.2     01-27    TPro  5.6<L>  /  Alb  2.0<L>  /  TBili  0.4  /  DBili  x   /  AST  53<H>  /  ALT  108<H>  /  AlkPhos  282<H>  01-27      Physical Exam  Gen: NAD, A&Ox3  Pulm: No respiratory distress, no subcostal retractions. On nasal cannula. 3 CT to suction. Poor respiratory effort, difficult to assess FEAL.   CV: Sinus tachycardia, no JVD  Abd: Soft, NT, ND  Extremities: warm and well perfused   ACS Progress Note    S:  Pt seen and examined. Overnight endorsing pain with respirations. Yonathan increased in room due to hypotension. Denies SOB/CP/N/V.     INTERVAL EVENTS:  - s/p 1/26 Open left thoracotomy, decortication and washout, 3 chest tubes placed  - given 1L NS, yonathan gtt started    O:  Vital Signs Last 24 Hrs  T(C): 37.3 (27 Jan 2024 07:30), Max: 39.3 (27 Jan 2024 05:00)  T(F): 99.2 (27 Jan 2024 07:30), Max: 102.7 (27 Jan 2024 05:00)  HR: 121 (27 Jan 2024 12:15) (93 - 141)  BP: 117/63 (27 Jan 2024 12:15) (81/54 - 136/87)  BP(mean): 84 (27 Jan 2024 12:15) (63 - 105)  RR: 45 (27 Jan 2024 12:15) (13 - 51)  SpO2: 99% (27 Jan 2024 12:15) (89% - 100%)    Parameters below as of 27 Jan 2024 07:00  Patient On (Oxygen Delivery Method): room air        I&O's Detail    26 Jan 2024 07:01  -  27 Jan 2024 07:00  --------------------------------------------------------  IN:    IV PiggyBack: 200 mL    IV PiggyBack: 100 mL    Phenylephrine: 24.1 mL    Phenylephrine: 278.5 mL    sodium chloride 0.9%: 50 mL    sodium chloride 0.9%: 700 mL    Sodium Chloride 0.9% Bolus: 1000 mL  Total IN: 2352.6 mL    OUT:    Chest Tube (mL): 45 mL    Chest Tube (mL): 170 mL    Chest Tube (mL): 145 mL    Voided (mL): 1220 mL  Total OUT: 1580 mL    Total NET: 772.6 mL      27 Jan 2024 07:01  -  27 Jan 2024 13:14  --------------------------------------------------------  IN:    Oral Fluid: 120 mL    Phenylephrine: 196 mL    sodium chloride 0.9%: 100 mL    sodium chloride 0.9%: 300 mL  Total IN: 716 mL    OUT:    Voided (mL): 300 mL  Total OUT: 300 mL    Total NET: 416 mL          MEDICATIONS  (STANDING):  ascorbic acid 500 milliGRAM(s) Oral daily  chlorhexidine 2% Cloths 1 Application(s) Topical <User Schedule>  cyanocobalamin 1000 MICROGram(s) Oral daily  enoxaparin Injectable 40 milliGRAM(s) SubCutaneous every 24 hours  folic acid 1 milliGRAM(s) Oral daily  influenza   Vaccine 0.5 milliLiter(s) IntraMuscular once  lidocaine   4% Patch 1 Patch Transdermal every 24 hours  melatonin 5 milliGRAM(s) Oral at bedtime  multivitamin 1 Tablet(s) Oral daily  phenylephrine    Infusion 0.2 MICROgram(s)/kG/Min (6.88 mL/Hr) IV Continuous <Continuous>  piperacillin/tazobactam IVPB.. 3.375 Gram(s) IV Intermittent every 8 hours  polyethylene glycol 3350 17 Gram(s) Oral daily  QUEtiapine 75 milliGRAM(s) Oral <User Schedule>  senna 2 Tablet(s) Oral at bedtime  sodium chloride 0.9%. 1000 milliLiter(s) (100 mL/Hr) IV Continuous <Continuous>  thiamine 100 milliGRAM(s) Oral daily    MEDICATIONS  (PRN):  acetaminophen     Tablet .. 500 milliGRAM(s) Oral every 6 hours PRN Mild Pain (1 - 3)  HYDROmorphone  Injectable 0.5 milliGRAM(s) IV Push every 3 hours PRN breakthrough pain  oxyCODONE    IR 5 milliGRAM(s) Oral every 4 hours PRN Moderate Pain (4 - 6)  oxyCODONE    IR 10 milliGRAM(s) Oral every 4 hours PRN Severe Pain (7 - 10)                            8.1    11.13 )-----------( 560      ( 27 Jan 2024 00:29 )             23.8       01-27    128<L>  |  100  |  8   ----------------------------<  118<H>  4.8   |  17<L>  |  0.75    Ca    7.8<L>      27 Jan 2024 00:29  Phos  3.3     01-27  Mg     2.2     01-27    TPro  5.6<L>  /  Alb  2.0<L>  /  TBili  0.4  /  DBili  x   /  AST  53<H>  /  ALT  108<H>  /  AlkPhos  282<H>  01-27      Physical Exam  Gen: NAD, A&Ox3  Pulm: No respiratory distress, no subcostal retractions. On nasal cannula. 3 CT to suction. Poor respiratory effort, difficult to assess FEAL.   CV: Sinus tachycardia, no JVD  Abd: Soft, NT, ND  Extremities: warm and well perfused

## 2024-01-27 NOTE — PROGRESS NOTE ADULT - PROBLEM SELECTOR PLAN 1
Suspected fracture of rib on left side, closed. Left Rib Fx 4-11th /Hemothorax  1/10 LT VATS, with partial lung decortication  Thoracoscopic removal of intrapleural foreign body     left chest tube dc'd 1/14 1/20 thoracic surgery reconsulted for left pleural effusion vs hemothorax on chest ct  1/22 s/p left pigtail by IR  1/26 Thoracotomy Decortication w/ washout>CT x 3 placed  Maintain left CT x 3 to suction to LWS  Daily CXR  Strict I & Os, monitor drainage  care as per primary team

## 2024-01-28 LAB
ALBUMIN SERPL ELPH-MCNC: 1.7 G/DL — LOW (ref 3.3–5)
ALBUMIN SERPL ELPH-MCNC: 1.8 G/DL — LOW (ref 3.3–5)
ALP SERPL-CCNC: 211 U/L — HIGH (ref 40–120)
ALP SERPL-CCNC: 249 U/L — HIGH (ref 40–120)
ALT FLD-CCNC: 55 U/L — HIGH (ref 10–45)
ALT FLD-CCNC: 59 U/L — HIGH (ref 10–45)
ANION GAP SERPL CALC-SCNC: 11 MMOL/L — SIGNIFICANT CHANGE UP (ref 5–17)
ANION GAP SERPL CALC-SCNC: 8 MMOL/L — SIGNIFICANT CHANGE UP (ref 5–17)
APTT BLD: 31.3 SEC — SIGNIFICANT CHANGE UP (ref 24.5–35.6)
APTT BLD: 31.9 SEC — SIGNIFICANT CHANGE UP (ref 24.5–35.6)
AST SERPL-CCNC: 26 U/L — SIGNIFICANT CHANGE UP (ref 10–40)
AST SERPL-CCNC: 57 U/L — HIGH (ref 10–40)
BILIRUB SERPL-MCNC: 0.3 MG/DL — SIGNIFICANT CHANGE UP (ref 0.2–1.2)
BILIRUB SERPL-MCNC: 0.4 MG/DL — SIGNIFICANT CHANGE UP (ref 0.2–1.2)
BLD GP AB SCN SERPL QL: NEGATIVE — SIGNIFICANT CHANGE UP
BUN SERPL-MCNC: 6 MG/DL — LOW (ref 7–23)
BUN SERPL-MCNC: 7 MG/DL — SIGNIFICANT CHANGE UP (ref 7–23)
CALCIUM SERPL-MCNC: 6.7 MG/DL — LOW (ref 8.4–10.5)
CALCIUM SERPL-MCNC: 6.8 MG/DL — LOW (ref 8.4–10.5)
CHLORIDE SERPL-SCNC: 103 MMOL/L — SIGNIFICANT CHANGE UP (ref 96–108)
CHLORIDE SERPL-SCNC: 105 MMOL/L — SIGNIFICANT CHANGE UP (ref 96–108)
CO2 SERPL-SCNC: 17 MMOL/L — LOW (ref 22–31)
CO2 SERPL-SCNC: 19 MMOL/L — LOW (ref 22–31)
CREAT SERPL-MCNC: 0.87 MG/DL — SIGNIFICANT CHANGE UP (ref 0.5–1.3)
CREAT SERPL-MCNC: 0.97 MG/DL — SIGNIFICANT CHANGE UP (ref 0.5–1.3)
EGFR: 107 ML/MIN/1.73M2 — SIGNIFICANT CHANGE UP
EGFR: 97 ML/MIN/1.73M2 — SIGNIFICANT CHANGE UP
GLUCOSE SERPL-MCNC: 107 MG/DL — HIGH (ref 70–99)
GLUCOSE SERPL-MCNC: 107 MG/DL — HIGH (ref 70–99)
HCT VFR BLD CALC: 17.5 % — CRITICAL LOW (ref 39–50)
HCT VFR BLD CALC: 19.1 % — CRITICAL LOW (ref 39–50)
HCT VFR BLD CALC: 20.6 % — CRITICAL LOW (ref 39–50)
HCT VFR BLD CALC: 23.7 % — LOW (ref 39–50)
HGB BLD-MCNC: 5.9 G/DL — CRITICAL LOW (ref 13–17)
HGB BLD-MCNC: 6.4 G/DL — CRITICAL LOW (ref 13–17)
HGB BLD-MCNC: 6.9 G/DL — CRITICAL LOW (ref 13–17)
HGB BLD-MCNC: 8 G/DL — LOW (ref 13–17)
INR BLD: 1.2 RATIO — HIGH (ref 0.85–1.18)
INR BLD: 1.22 RATIO — HIGH (ref 0.85–1.18)
MAGNESIUM SERPL-MCNC: 1.7 MG/DL — SIGNIFICANT CHANGE UP (ref 1.6–2.6)
MAGNESIUM SERPL-MCNC: 2 MG/DL — SIGNIFICANT CHANGE UP (ref 1.6–2.6)
MCHC RBC-ENTMCNC: 27.9 PG — SIGNIFICANT CHANGE UP (ref 27–34)
MCHC RBC-ENTMCNC: 28.2 PG — SIGNIFICANT CHANGE UP (ref 27–34)
MCHC RBC-ENTMCNC: 28.2 PG — SIGNIFICANT CHANGE UP (ref 27–34)
MCHC RBC-ENTMCNC: 28.3 PG — SIGNIFICANT CHANGE UP (ref 27–34)
MCHC RBC-ENTMCNC: 33.5 GM/DL — SIGNIFICANT CHANGE UP (ref 32–36)
MCHC RBC-ENTMCNC: 33.5 GM/DL — SIGNIFICANT CHANGE UP (ref 32–36)
MCHC RBC-ENTMCNC: 33.7 GM/DL — SIGNIFICANT CHANGE UP (ref 32–36)
MCHC RBC-ENTMCNC: 33.8 GM/DL — SIGNIFICANT CHANGE UP (ref 32–36)
MCV RBC AUTO: 83.4 FL — SIGNIFICANT CHANGE UP (ref 80–100)
MCV RBC AUTO: 83.5 FL — SIGNIFICANT CHANGE UP (ref 80–100)
MCV RBC AUTO: 83.7 FL — SIGNIFICANT CHANGE UP (ref 80–100)
MCV RBC AUTO: 84.4 FL — SIGNIFICANT CHANGE UP (ref 80–100)
NRBC # BLD: 0 /100 WBCS — SIGNIFICANT CHANGE UP (ref 0–0)
OB PNL STL: POSITIVE
PHOSPHATE SERPL-MCNC: 1.5 MG/DL — LOW (ref 2.5–4.5)
PHOSPHATE SERPL-MCNC: 3.4 MG/DL — SIGNIFICANT CHANGE UP (ref 2.5–4.5)
PLATELET # BLD AUTO: 484 K/UL — HIGH (ref 150–400)
PLATELET # BLD AUTO: 497 K/UL — HIGH (ref 150–400)
PLATELET # BLD AUTO: 507 K/UL — HIGH (ref 150–400)
PLATELET # BLD AUTO: 518 K/UL — HIGH (ref 150–400)
POTASSIUM SERPL-MCNC: 4 MMOL/L — SIGNIFICANT CHANGE UP (ref 3.5–5.3)
POTASSIUM SERPL-MCNC: 4.1 MMOL/L — SIGNIFICANT CHANGE UP (ref 3.5–5.3)
POTASSIUM SERPL-SCNC: 4 MMOL/L — SIGNIFICANT CHANGE UP (ref 3.5–5.3)
POTASSIUM SERPL-SCNC: 4.1 MMOL/L — SIGNIFICANT CHANGE UP (ref 3.5–5.3)
PROT SERPL-MCNC: 4.7 G/DL — LOW (ref 6–8.3)
PROT SERPL-MCNC: 4.8 G/DL — LOW (ref 6–8.3)
PROTHROM AB SERPL-ACNC: 12.5 SEC — SIGNIFICANT CHANGE UP (ref 9.5–13)
PROTHROM AB SERPL-ACNC: 13.3 SEC — HIGH (ref 9.5–13)
RBC # BLD: 2.09 M/UL — LOW (ref 4.2–5.8)
RBC # BLD: 2.29 M/UL — LOW (ref 4.2–5.8)
RBC # BLD: 2.44 M/UL — LOW (ref 4.2–5.8)
RBC # BLD: 2.84 M/UL — LOW (ref 4.2–5.8)
RBC # FLD: 15.5 % — HIGH (ref 10.3–14.5)
RBC # FLD: 15.7 % — HIGH (ref 10.3–14.5)
RBC # FLD: 15.9 % — HIGH (ref 10.3–14.5)
RBC # FLD: 16.1 % — HIGH (ref 10.3–14.5)
RH IG SCN BLD-IMP: POSITIVE — SIGNIFICANT CHANGE UP
SODIUM SERPL-SCNC: 130 MMOL/L — LOW (ref 135–145)
SODIUM SERPL-SCNC: 133 MMOL/L — LOW (ref 135–145)
WBC # BLD: 10.21 K/UL — SIGNIFICANT CHANGE UP (ref 3.8–10.5)
WBC # BLD: 10.29 K/UL — SIGNIFICANT CHANGE UP (ref 3.8–10.5)
WBC # BLD: 11.54 K/UL — HIGH (ref 3.8–10.5)
WBC # BLD: 11.73 K/UL — HIGH (ref 3.8–10.5)
WBC # FLD AUTO: 10.21 K/UL — SIGNIFICANT CHANGE UP (ref 3.8–10.5)
WBC # FLD AUTO: 10.29 K/UL — SIGNIFICANT CHANGE UP (ref 3.8–10.5)
WBC # FLD AUTO: 11.54 K/UL — HIGH (ref 3.8–10.5)
WBC # FLD AUTO: 11.73 K/UL — HIGH (ref 3.8–10.5)

## 2024-01-28 PROCEDURE — 99233 SBSQ HOSP IP/OBS HIGH 50: CPT

## 2024-01-28 PROCEDURE — 71045 X-RAY EXAM CHEST 1 VIEW: CPT | Mod: 26

## 2024-01-28 PROCEDURE — 99232 SBSQ HOSP IP/OBS MODERATE 35: CPT

## 2024-01-28 RX ORDER — MAGNESIUM SULFATE 500 MG/ML
2 VIAL (ML) INJECTION ONCE
Refills: 0 | Status: COMPLETED | OUTPATIENT
Start: 2024-01-28 | End: 2024-01-28

## 2024-01-28 RX ORDER — SODIUM CHLORIDE 9 MG/ML
1000 INJECTION INTRAMUSCULAR; INTRAVENOUS; SUBCUTANEOUS
Refills: 0 | Status: DISCONTINUED | OUTPATIENT
Start: 2024-01-28 | End: 2024-01-30

## 2024-01-28 RX ORDER — PANTOPRAZOLE SODIUM 20 MG/1
40 TABLET, DELAYED RELEASE ORAL EVERY 12 HOURS
Refills: 0 | Status: DISCONTINUED | OUTPATIENT
Start: 2024-01-28 | End: 2024-01-31

## 2024-01-28 RX ADMIN — LIDOCAINE 1 PATCH: 4 CREAM TOPICAL at 21:31

## 2024-01-28 RX ADMIN — HYDROMORPHONE HYDROCHLORIDE 30 MILLILITER(S): 2 INJECTION INTRAMUSCULAR; INTRAVENOUS; SUBCUTANEOUS at 07:00

## 2024-01-28 RX ADMIN — PIPERACILLIN AND TAZOBACTAM 25 GRAM(S): 4; .5 INJECTION, POWDER, LYOPHILIZED, FOR SOLUTION INTRAVENOUS at 13:20

## 2024-01-28 RX ADMIN — HYDROMORPHONE HYDROCHLORIDE 30 MILLILITER(S): 2 INJECTION INTRAMUSCULAR; INTRAVENOUS; SUBCUTANEOUS at 19:00

## 2024-01-28 RX ADMIN — Medication 85 MILLIMOLE(S): at 13:20

## 2024-01-28 RX ADMIN — Medication 5 MILLIGRAM(S): at 21:30

## 2024-01-28 RX ADMIN — QUETIAPINE FUMARATE 75 MILLIGRAM(S): 200 TABLET, FILM COATED ORAL at 21:30

## 2024-01-28 RX ADMIN — PREGABALIN 1000 MICROGRAM(S): 225 CAPSULE ORAL at 11:26

## 2024-01-28 RX ADMIN — LIDOCAINE 1 PATCH: 4 CREAM TOPICAL at 08:32

## 2024-01-28 RX ADMIN — PIPERACILLIN AND TAZOBACTAM 25 GRAM(S): 4; .5 INJECTION, POWDER, LYOPHILIZED, FOR SOLUTION INTRAVENOUS at 21:31

## 2024-01-28 RX ADMIN — Medication 100 MILLIGRAM(S): at 11:26

## 2024-01-28 RX ADMIN — ENOXAPARIN SODIUM 40 MILLIGRAM(S): 100 INJECTION SUBCUTANEOUS at 18:00

## 2024-01-28 RX ADMIN — LIDOCAINE 1 PATCH: 4 CREAM TOPICAL at 06:27

## 2024-01-28 RX ADMIN — HYDROMORPHONE HYDROCHLORIDE 30 MILLILITER(S): 2 INJECTION INTRAMUSCULAR; INTRAVENOUS; SUBCUTANEOUS at 11:28

## 2024-01-28 RX ADMIN — SODIUM CHLORIDE 100 MILLILITER(S): 9 INJECTION INTRAMUSCULAR; INTRAVENOUS; SUBCUTANEOUS at 08:01

## 2024-01-28 RX ADMIN — Medication 25 GRAM(S): at 08:35

## 2024-01-28 RX ADMIN — CHLORHEXIDINE GLUCONATE 1 APPLICATION(S): 213 SOLUTION TOPICAL at 06:27

## 2024-01-28 RX ADMIN — Medication 500 MILLIGRAM(S): at 11:26

## 2024-01-28 RX ADMIN — Medication 1 TABLET(S): at 11:27

## 2024-01-28 RX ADMIN — PIPERACILLIN AND TAZOBACTAM 25 GRAM(S): 4; .5 INJECTION, POWDER, LYOPHILIZED, FOR SOLUTION INTRAVENOUS at 05:00

## 2024-01-28 RX ADMIN — Medication 255 MILLIMOLE(S): at 08:34

## 2024-01-28 RX ADMIN — Medication 1 MILLIGRAM(S): at 11:26

## 2024-01-28 NOTE — PROGRESS NOTE ADULT - SUBJECTIVE AND OBJECTIVE BOX
Subjective " I have pain where is my pump"        Vital Signs Last 24 Hrs  T(C): 36.8 (01-28-24 @ 07:00), Max: 37.4 (01-27-24 @ 12:15)  T(F): 98.2 (01-28-24 @ 07:00), Max: 99.3 (01-27-24 @ 12:15)  HR: 110 (01-28-24 @ 10:00) (110 - 137)  BP: 107/59 (01-28-24 @ 10:00) (84/45 - 136/87)  RR: 23 (01-28-24 @ 10:00) (23 - 55)  SpO2: 100% (01-28-24 @ 10:00) (89% - 100%)           01-27 @ 07:01  -  01-28 @ 07:00  --------------------------------------------------------  IN: 3736.5 mL / OUT: 2355 mL / NET: 1381.5 mL    01-28 @ 07:01  -  01-28 @ 10:38  --------------------------------------------------------  IN: 507.5 mL / OUT: 275 mL / NET: 232.5 mL                          6.4    11.54 )-----------( 518      ( 28 Jan 2024 08:15 )             19.1       01-28    130<L>  |  103  |  6<L>  ----------------------------<  107<H>  4.1   |  19<L>  |  0.97    Ca    6.7<L>      28 Jan 2024 06:27  Phos  1.5     01-28  Mg     1.7     01-28    TPro  4.7<L>  /  Alb  1.8<L>  /  TBili  0.3  /  DBili  x   /  AST  26  /  ALT  59<H>  /  AlkPhos  211<H>  01-28      MEDICATIONS  (STANDING):  ascorbic acid 500 milliGRAM(s) Oral daily  chlorhexidine 2% Cloths 1 Application(s) Topical <User Schedule>  cyanocobalamin 1000 MICROGram(s) Oral daily  enoxaparin Injectable 40 milliGRAM(s) SubCutaneous every 24 hours  folic acid 1 milliGRAM(s) Oral daily  HYDROmorphone PCA (1 mG/mL) 30 milliLiter(s) PCA Continuous PCA Continuous  influenza   Vaccine 0.5 milliLiter(s) IntraMuscular once  lidocaine   4% Patch 1 Patch Transdermal every 24 hours  melatonin 5 milliGRAM(s) Oral at bedtime  multivitamin 1 Tablet(s) Oral daily  piperacillin/tazobactam IVPB.. 3.375 Gram(s) IV Intermittent every 8 hours  polyethylene glycol 3350 17 Gram(s) Oral daily  QUEtiapine 75 milliGRAM(s) Oral <User Schedule>  senna 2 Tablet(s) Oral at bedtime  sodium chloride 0.9%. 1000 milliLiter(s) (20 mL/Hr) IV Continuous <Continuous>  sodium phosphate 30 milliMole(s)/500 mL IVPB 30 milliMole(s) IV Intermittent once  thiamine 100 milliGRAM(s) Oral daily    MEDICATIONS  (PRN):  acetaminophen     Tablet .. 500 milliGRAM(s) Oral every 6 hours PRN Mild Pain (1 - 3)  diphenhydrAMINE 25 milliGRAM(s) Oral every 4 hours PRN Pruritus  HYDROmorphone  Injectable 0.5 milliGRAM(s) IV Push every 3 hours PRN breakthrough pain  naloxone Injectable 0.1 milliGRAM(s) IV Push every 3 minutes PRN For ANY of the following changes in patient status:  A. RR LESS THAN 10 breaths per minute, B. Oxygen saturation LESS THAN 90%, C. Sedation score of 6  ondansetron Injectable 4 milliGRAM(s) IV Push every 6 hours PRN Nausea                    Drains:     #1 Left diaphragm 40/140  # 2 Left anterior 25/85  #3Left posterior 110/180                 PHYSICAL EXAM        Neurology: alert and oriented x 3, nonfocal, no gross deficits    CV : s1 S2     left thoracotomy chest tubes x3    Lungs: B/l breath sound s4 l nc    Abdomen: soft, nontender, nondistended, positive bowel sounds,    :  voids             Extremities:   warm well perfused equal strength throughout    B/ll e + DP no edemanoclaftenderness                                           Discussed with Cardiothoracic Team at AM rounds.

## 2024-01-28 NOTE — PROGRESS NOTE ADULT - ASSESSMENT
This is a  56yo M w pmhx of Hep C and possible EtOH use disorder who presents after being found down. Intubated for respiratory distress and agitation 1/8. Pt has displaced 4-11 L rib fx with flail segment.     1/9 VSS: NPO after MN. Plan for Rib plating WED 1/10/24  1/10 s/p Left VATS, with partial lung decortication  Thoracoscopic removal of intrapleural foreign body    1/11VSS intubated sedated on pressors  chest tube lws   1/12 VSS, pt still remains intubated/sedated, getting phenobarbital for agitation. Left chest tube output 75cc/24h placed on water seal this AM. 1U PRBC -H/H 7.1/20. Continue care per SICU team  1/13 Maintain L pleural tube waterseal Daily CXR Care as per SICU team  1/14 Left pleural tube removed CXR ordered; thoracic signed off  1/20 thoracic surgery reconsulted for left pleural effusion vs hemothorax; d/w Dr. Sepulveda; chest ct done- Dr. Sepulveda reviewed; call IR for potential drainage monday left pocket   1/21 VSS, pt still febrile tmax 103, WBC 8.4, IR consulted for drainage of complex left pleural effusion on CT Chest. Plan for Monday.   1/22 VSS; Tm 100.8 this am. WBC WNL. IR pending drainage of L effusion today   1/23 VSS, s/p left pigtail by IR yesterday, minimal drainage 20cc/24h. Recommend flushing pigtail and placing to LWS. Plan to do tPA MIST protocol tomorrow.   1/24     vss   min drainage from Lt  PTC  1/25 VSS left chest tube drainage 100/230  TPA today-   OR for  1/26 please type and screen Hibiclens  and NPO at midnight  hold  Lovenox   1/26 L Thoracotomy/Decort  3 chest tubes suction x 48 hours  1/27 L thoracotomy maintain 3 left pleural tubes to suction     Recommend fluid restrict for hyponatremia  1/28 VSSL thoracotomy maintain 3 left pleural tubes to suction    today

## 2024-01-28 NOTE — PROGRESS NOTE ADULT - ASSESSMENT
48M with PMHx hepatitis C (diagnosed many years ago, never treated), depression transferred from outside hospital for trauma eval. Patient found down by roommate after unknown amount of time, found with multiple 4-11 L sided rib fractures with flail chest. Chest tube placed there with >1L output (old blood). Patient also with tachypnea, concern for respiratory failure started on BiPAP. Level 1 called for transient hypotension, patient given 2 unit PRBC in [facility] ED. Patient admitted to SICU for hemodynamic monitoring, pain management.     Injuries:   - left posterolateral fourth, fifth, sixth, and 11th ribs, minimally displaced   - mildly displaced fx of L anterolateral 4-7th ribs  - displaced fx of the left posterior 7-10th ribs    1/10 s/p VATS, with partial lung decortication;   1/14 s/p chest tube removal  1/22 s/p IR Chest tube for Left effusion  1/26 s/p L Thoracotomy/Decort  3 chest tubes suction x 48 hours        PLAN  Neurologic:  - A&Ox4  - Seroquel 75mg QHS  - multimodal pain control w/ lidocaine patch, Tylenol, oxycodone, Dilaudid PRN     Respiratory:  - RA  - s/p VATS 1/10 with discovery of Fibropurulent exudative adhesions and removal of retained foreign material likely the tip of a glove, s/p washout, with new 28f chest tube placed > removed 1/14  - 1/20 CT with complex collection L pleural space, s/p drainage by IR 1/22, 20cc bloody fluid drained, pigtail placed  - Incentive spirometry, Duonebs   - CTS OR 1/26 Open left thoracotomy, decortication and washout, 3 chest tubes placed  - chest tubes to suction, monitor output > Follow-up water seal timing with thoracic    Cardiovascular: tachycardia  - sammie gtt, MAP >65  - Metoprolol 50mg q8h for tachycardia  - Hgb/Hct 7.1/21.3 1U PRBC, post 8.3  - Hypotensive/Tachycardic (, SPB 70s) 500 cc LR bolus x1, lopressor d/c; currently (-120, SBP 90)    Gastrointestinal/Nutrition:  - Abd US 1/24 - mild hepatomegaly, redemonstration of evolving splenic hematomas  - regular diet    Genitourinary/Renal:  - Supplement electrolytes PRN  - IVF @ 100    Hematologic:  - Chemical VTE ppx with Lovenox held for 24 hrs postop  - Mechanical VTE ppx with SCDs  - Duplex/doppler 1/23 negative    Infectious Disease: Empyema   - s/p zosyn 1/7-1/24  - 1/22 L empyema cultures NGTD  - 1/26 Restart zosyn. Repeat BCx f/u     Endocrine:  - No active issues, no history of DM    MSK:   - sat on dee tray(?) wound right lateral buttock - continue to monitor

## 2024-01-28 NOTE — PROGRESS NOTE ADULT - SUBJECTIVE AND OBJECTIVE BOX
NTERVAL EVENTS:  - s/p VATS 3 chest tubes continue to suction  - started PCA for pain  - Repeat BCx Follow-up     INTERVAL EVENTS:    SUBJECTIVE/ROS:  [ ] A ten-point review of systems was otherwise negative except as noted.  [ ] Due to altered mental status/intubation, subjective information were not able to be obtained from the patient. History was obtained, to the extent possible, from review of the chart and collateral sources of information.      NEURO  RASS:     GCS:     CAM ICU:  Exam: awake, alert, oriented  Meds: acetaminophen     Tablet .. 500 milliGRAM(s) Oral every 6 hours PRN Mild Pain (1 - 3)  diphenhydrAMINE 25 milliGRAM(s) Oral every 4 hours PRN Pruritus  HYDROmorphone  Injectable 0.5 milliGRAM(s) IV Push every 3 hours PRN breakthrough pain  HYDROmorphone PCA (1 mG/mL) 30 milliLiter(s) PCA Continuous PCA Continuous  melatonin 5 milliGRAM(s) Oral at bedtime  ondansetron Injectable 4 milliGRAM(s) IV Push every 6 hours PRN Nausea  oxyCODONE    IR 5 milliGRAM(s) Oral every 4 hours PRN Moderate Pain (4 - 6)  oxyCODONE    IR 10 milliGRAM(s) Oral every 4 hours PRN Severe Pain (7 - 10)  QUEtiapine 75 milliGRAM(s) Oral <User Schedule>    [x] Adequacy of sedation and pain control has been assessed and adjusted      RESPIRATORY  RR: 39 (01-28-24 @ 01:30) (26 - 52)  SpO2: 94% (01-28-24 @ 01:30) (92% - 100%)  Wt(kg): --  Exam: no increased work of breathing, Chest tube x3 to suction, SS output    CARDIOVASCULAR  HR: 126 (01-28-24 @ 01:30) (102 - 141)  BP: 112/57 (01-28-24 @ 01:30) (84/45 - 136/87)  BP(mean): 79 (01-28-24 @ 01:30) (60 - 105)  ABP: 76/63 (01-27-24 @ 10:30) (66/48 - 120/53)  ABP(mean): 71 (01-27-24 @ 10:30) (58 - 92)  Wt(kg): --  CVP(cm H2O): --      Exam: tachycardic 110 Cardiac Rhythm: sinus  Perfusion     [x]Adequate   [ ]Inadequate  Mentation   [x]Normal       [ ]Reduced  Extremities  [x]Warm         [ ]Cool  Volume Status [ ]Hypervolemic [x]Euvolemic [ ]Hypovolemic  Meds:       GI/NUTRITION  Exam: soft, nontender, nondistended  Diet:  Meds: polyethylene glycol 3350 17 Gram(s) Oral daily  senna 2 Tablet(s) Oral at bedtime      GENITOURINARY  I&O's Detail    01-26 @ 07:01  -  01-27 @ 07:00  --------------------------------------------------------  IN:    IV PiggyBack: 200 mL    IV PiggyBack: 100 mL    Phenylephrine: 24.1 mL    Phenylephrine: 278.5 mL    sodium chloride 0.9%: 50 mL    sodium chloride 0.9%: 700 mL    Sodium Chloride 0.9% Bolus: 1000 mL  Total IN: 2352.6 mL    OUT:    Chest Tube (mL): 45 mL    Chest Tube (mL): 170 mL    Chest Tube (mL): 145 mL    Voided (mL): 1220 mL  Total OUT: 1580 mL    Total NET: 772.6 mL      01-27 @ 07:01 - 01-28 @ 02:02  --------------------------------------------------------  IN:    IV PiggyBack: 125 mL    Oral Fluid: 300 mL    Phenylephrine: 271.5 mL    sodium chloride 0.9%: 100 mL    sodium chloride 0.9%: 1000 mL  Total IN: 1796.5 mL    OUT:    Chest Tube (mL): 60 mL    Chest Tube (mL): 70 mL    Chest Tube (mL): 100 mL    VAC (Vacuum Assisted Closure) System (mL): 0 mL    Voided (mL): 900 mL  Total OUT: 1130 mL    Total NET: 666.5 mL          01-27    128<L>  |  100  |  8   ----------------------------<  118<H>  4.8   |  17<L>  |  0.75    Ca    7.8<L>      27 Jan 2024 00:29  Phos  3.3     01-27  Mg     2.2     01-27    TPro  5.6<L>  /  Alb  2.0<L>  /  TBili  0.4  /  DBili  x   /  AST  53<H>  /  ALT  108<H>  /  AlkPhos  282<H>  01-27    [ ] Hendrix catheter, indication: N/A  Meds: ascorbic acid 500 milliGRAM(s) Oral daily  cyanocobalamin 1000 MICROGram(s) Oral daily  folic acid 1 milliGRAM(s) Oral daily  multivitamin 1 Tablet(s) Oral daily  sodium chloride 0.9%. 1000 milliLiter(s) IV Continuous <Continuous>  thiamine 100 milliGRAM(s) Oral daily        HEMATOLOGIC  Meds: enoxaparin Injectable 40 milliGRAM(s) SubCutaneous every 24 hours    [x] VTE Prophylaxis                        8.1    11.13 )-----------( 560      ( 27 Jan 2024 00:29 )             23.8     PT/INR - ( 27 Jan 2024 00:29 )   PT: 11.6 sec;   INR: 1.11 ratio         PTT - ( 27 Jan 2024 00:29 )  PTT:28.4 sec  Transfusion     [ ] PRBC   [ ] Platelets   [ ] FFP   [ ] Cryoprecipitate      INFECTIOUS DISEASES    RECENT CULTURES:  Specimen Source: .Tissue Other  Date/Time: 01-26 @ 22:03  Culture Results: --  Gram Stain:   No polymorphonuclear cells seen per low power field  No organisms seen per oil power field  Organism: --    Meds: influenza   Vaccine 0.5 milliLiter(s) IntraMuscular once  piperacillin/tazobactam IVPB.. 3.375 Gram(s) IV Intermittent every 8 hours        ENDOCRINE  CAPILLARY BLOOD GLUCOSE        Meds:       ACCESS DEVICES:  [ ] Peripheral IV  [ ] Central Venous Line	[ ] R	[ ] L	[ ] IJ	[ ] Fem	[ ] SC	Placed:   [ ] Arterial Line		[ ] R	[ ] L	[ ] Fem	[ ] Rad	[ ] Ax	Placed:   [ ] PICC:					[ ] Mediport  [ ] Urinary Catheter, Date Placed:   [x] Necessity of urinary, arterial, and venous catheters discussed    OTHER MEDICATIONS:  chlorhexidine 2% Cloths 1 Application(s) Topical <User Schedule>  lidocaine   4% Patch 1 Patch Transdermal every 24 hours  naloxone Injectable 0.1 milliGRAM(s) IV Push every 3 minutes PRN

## 2024-01-28 NOTE — PROGRESS NOTE ADULT - NS ATTEND AMEND GEN_ALL_CORE FT
Evaluated in multidisciplinary round in SICU. Clinical parameters,  labs, available imagine continued to be evaluated   48yoM w/ PMHx hepatitis C and EtOH abuse was transferred from OSH on 1/6 for level 1 trauma eval. He was found down with multiple 4-11 left-sided rib fractures and flail chest, and had a chest tube placed at OSH. S/p VATS 1/10 w/ partial lung decortication and thoracoscopic removal of intrapleural foreign body. S/p chest tube removal 1/14, downgraded to floor 1/18, returned to SICU after fever 102.3 on 1/20.    S/P VATS and decortication with 3 CT placement  CTs to suction, drained 140 cc, 85 cc and 180 cc/24 hrs  Pain control with PCA   Hemodynamically stable  PO, antiproteinuric for diarrhea    Improving LFT. T bili wnl. hepatobiliary US no portal vein thrombosis, stable splenic hematoma  Abx Zosyn duration extended, new cultures P or neg so far  Back on  Pharmacologic DVT ppx . Hb Pl stable  surveillance LE doppler neg for DVT  OOB/PT Evaluated in multidisciplinary round in SICU. Clinical parameters,  labs, available imagine continued to be evaluated   48yoM w/ PMHx hepatitis C and EtOH abuse was transferred from OSH on 1/6 for level 1 trauma eval. He was found down with multiple 4-11 left-sided rib fractures and flail chest, and had a chest tube placed at OSH. S/p VATS 1/10 w/ partial lung decortication and thoracoscopic removal of intrapleural foreign body. S/p chest tube removal 1/14, downgraded to floor 1/18, returned to SICU after fever 102.3 on 1/20.    S/P VATS and decortication with 3 CT placement  CTs to suction, drained 140 cc, 85 cc and 180 cc/24 hrs  Pain control with PCA   Hemodynamically improving, off vasopressor support with Yonathan  PO, antiproteinuric for diarrhea  Improving LFT. T bili wnl. hepatobiliary US no portal vein thrombosis, stable splenic hematoma  Abx Zosyn duration extended, new cultures P or neg so far  Stable renal function, DC IVF, replace PO4  Back on  Pharmacologic DVT ppx . Hb Pl stable  surveillance LE doppler neg for DVT  OOB/PT

## 2024-01-28 NOTE — PROGRESS NOTE ADULT - NS ATTEND OPT1A GEN_ALL_CORE
History/Exam/Medical decision making
Medical decision making
Medical decision making

## 2024-01-28 NOTE — PROGRESS NOTE ADULT - PROBLEM SELECTOR PLAN 1
Suspected fracture of rib on left side, closed. Left Rib Fx 4-11th /Hemothorax  1/10 LT VATS, with partial lung decortication  Thoracoscopic removal of intrapleural foreign body     left chest tube dc'd 1/14 1/20 thoracic surgery reconsulted for left pleural effusion vs hemothorax on chest ct  1/22 s/p left pigtail by IR  1/26 Thoracotomy Decortication w/ washout>CT x 3 placed  Maintain left CT x 3 to suction to LWS  Daily CXR  Strict I & Os, monitor drainage  care as per primary team Suspected fracture of rib on left side, closed. Left Rib Fx 4-11th /Hemothorax  1/10 LT VATS, with partial lung decortication  Thoracoscopic removal of intrapleural foreign body     left chest tube dc'd 1/14 1/20 thoracic surgery reconsulted for left pleural effusion vs hemothorax on chest ct  1/22 s/p left pigtail by IR  1/26 Thoracotomy Decortication w/ washout>CT x 3 placed  Maintain left CT x 3 to suction to LWS  Daily CXR  Incentive Spirometry   OOB to chair  ambulate   chest PT  Strict I & Os, monitor drainage  care as per primary team

## 2024-01-28 NOTE — PROGRESS NOTE ADULT - SUBJECTIVE AND OBJECTIVE BOX
TEAM [ ACS ] Surgery Daily Progress Note  =====================================================    SUBJECTIVE: Patient seen and examined at bedside on AM rounds.     PMH:  PAST MEDICAL & SURGICAL HISTORY:  No pertinent past medical history  No significant past surgical history      ALLERGIES:  No Known Allergies    --------------------------------------------------------------------------------------    VITAL SIGNS:  Vital Signs Last 24 Hrs  T(C): 37 (28 Jan 2024 15:00), Max: 37 (28 Jan 2024 15:00)  T(F): 98.6 (28 Jan 2024 15:00), Max: 98.6 (28 Jan 2024 15:00)  HR: 115 (28 Jan 2024 18:00) (107 - 130)  BP: 113/58 (28 Jan 2024 18:00) (84/45 - 132/67)  BP(mean): 77 (28 Jan 2024 18:00) (60 - 90)  RR: 26 (28 Jan 2024 18:00) (12 - 55)  SpO2: 96% (28 Jan 2024 18:00) (89% - 100%)      --------------------------------------------------------------------------------------    EXAM    Physical Exam  Gen: NAD, A&Ox3  Pulm: No respiratory distress, no subcostal retractions. On nasal cannula. 3 CT to suction. Poor respiratory effort, difficult to assess FEAL.   CV: Sinus tachycardia, no JVD  Abd: Soft, NT, ND  Extremities: warm and well perfused    --------------------------------------------------------------------------------------    LABS                        6.9    10.29 )-----------( 497      ( 28 Jan 2024 17:43 )             20.6   01-28    130<L>  |  103  |  6<L>  ----------------------------<  107<H>  4.1   |  19<L>  |  0.97    Ca    6.7<L>      28 Jan 2024 06:27  Phos  1.5     01-28  Mg     1.7     01-28    TPro  4.7<L>  /  Alb  1.8<L>  /  TBili  0.3  /  DBili  x   /  AST  26  /  ALT  59<H>  /  AlkPhos  211<H>  01-28      --------------------------------------------------------------------------------------    INS AND OUTS:  I&O's Detail    27 Jan 2024 07:01  -  28 Jan 2024 07:00  --------------------------------------------------------  IN:    IV PiggyBack: 125 mL    Oral Fluid: 1040 mL    Phenylephrine: 271.5 mL    sodium chloride 0.9%: 100 mL    sodium chloride 0.9%: 2200 mL  Total IN: 3736.5 mL    OUT:    Chest Tube (mL): 85 mL    Chest Tube (mL): 180 mL    Chest Tube (mL): 140 mL    VAC (Vacuum Assisted Closure) System (mL): 0 mL    Voided (mL): 1950 mL  Total OUT: 2355 mL    Total NET: 1381.5 mL      28 Jan 2024 07:01  -  28 Jan 2024 19:02  --------------------------------------------------------  IN:    IV PiggyBack: 200 mL    IV PiggyBack: 812.3 mL    PRBCs (Packed Red Blood Cells): 300 mL    sodium chloride 0.9%: 200 mL    sodium chloride 0.9%: 200 mL  Total IN: 1712.3 mL    OUT:    Chest Tube (mL): 30 mL    Chest Tube (mL): 50 mL    Chest Tube (mL): 20 mL    VAC (Vacuum Assisted Closure) System (mL): 0 mL    Voided (mL): 1450 mL  Total OUT: 1550 mL    Total NET: 162.3 mL        --------------------------------------------------------------------------------------

## 2024-01-28 NOTE — PROGRESS NOTE ADULT - ASSESSMENT
48yoM w/ PMHx hepatitis C and EtOH abuse was transferred from OSH on 1/6 for level 1 trauma eval. He was found down with multiple 4-11 left-sided rib fractures and flail chest, and had a chest tube placed at OSH. S/p VATS 1/10 w/ partial lung decortication and thoracoscopic removal of intrapleural foreign body. S/p chest tube removal 1/14, downgraded to floor 1/18, returned to SICU after fever 102.3 on 1/20- s/p L thoracotomy and decortication (1/26)    -Thoracic Surgery Recs  -Monitor CT output  -Analgesia and antiemetics as needed  -Continue abx   -Regular diet   -Appreciate excellent care per SICU     Trauma Surgery #[id number]

## 2024-01-29 LAB
ALBUMIN SERPL ELPH-MCNC: 2 G/DL — LOW (ref 3.3–5)
ALP SERPL-CCNC: 291 U/L — HIGH (ref 40–120)
ALT FLD-CCNC: 55 U/L — HIGH (ref 10–45)
ANION GAP SERPL CALC-SCNC: 9 MMOL/L — SIGNIFICANT CHANGE UP (ref 5–17)
APPEARANCE UR: CLEAR — SIGNIFICANT CHANGE UP
APTT BLD: 33.2 SEC — SIGNIFICANT CHANGE UP (ref 24.5–35.6)
AST SERPL-CCNC: 52 U/L — HIGH (ref 10–40)
BILIRUB SERPL-MCNC: 0.4 MG/DL — SIGNIFICANT CHANGE UP (ref 0.2–1.2)
BILIRUB UR-MCNC: NEGATIVE — SIGNIFICANT CHANGE UP
BUN SERPL-MCNC: 6 MG/DL — LOW (ref 7–23)
CALCIUM SERPL-MCNC: 7.4 MG/DL — LOW (ref 8.4–10.5)
CHLORIDE SERPL-SCNC: 106 MMOL/L — SIGNIFICANT CHANGE UP (ref 96–108)
CO2 SERPL-SCNC: 21 MMOL/L — LOW (ref 22–31)
COLOR SPEC: YELLOW — SIGNIFICANT CHANGE UP
CREAT ?TM UR-MCNC: 34 MG/DL — SIGNIFICANT CHANGE UP
CREAT SERPL-MCNC: 1.08 MG/DL — SIGNIFICANT CHANGE UP (ref 0.5–1.3)
DIFF PNL FLD: NEGATIVE — SIGNIFICANT CHANGE UP
EGFR: 85 ML/MIN/1.73M2 — SIGNIFICANT CHANGE UP
GLUCOSE SERPL-MCNC: 102 MG/DL — HIGH (ref 70–99)
GLUCOSE UR QL: NEGATIVE MG/DL — SIGNIFICANT CHANGE UP
HCT VFR BLD CALC: 23.8 % — LOW (ref 39–50)
HGB BLD-MCNC: 8.1 G/DL — LOW (ref 13–17)
INR BLD: 1.12 RATIO — SIGNIFICANT CHANGE UP (ref 0.85–1.18)
KETONES UR-MCNC: NEGATIVE MG/DL — SIGNIFICANT CHANGE UP
LEUKOCYTE ESTERASE UR-ACNC: NEGATIVE — SIGNIFICANT CHANGE UP
MAGNESIUM SERPL-MCNC: 1.9 MG/DL — SIGNIFICANT CHANGE UP (ref 1.6–2.6)
MCHC RBC-ENTMCNC: 28.3 PG — SIGNIFICANT CHANGE UP (ref 27–34)
MCHC RBC-ENTMCNC: 34 GM/DL — SIGNIFICANT CHANGE UP (ref 32–36)
MCV RBC AUTO: 83.2 FL — SIGNIFICANT CHANGE UP (ref 80–100)
NITRITE UR-MCNC: NEGATIVE — SIGNIFICANT CHANGE UP
NRBC # BLD: 0 /100 WBCS — SIGNIFICANT CHANGE UP (ref 0–0)
OSMOLALITY UR: 165 MOS/KG — LOW (ref 300–900)
PH UR: 5.5 — SIGNIFICANT CHANGE UP (ref 5–8)
PHOSPHATE SERPL-MCNC: 3.1 MG/DL — SIGNIFICANT CHANGE UP (ref 2.5–4.5)
PLATELET # BLD AUTO: 546 K/UL — HIGH (ref 150–400)
POTASSIUM SERPL-MCNC: 3.9 MMOL/L — SIGNIFICANT CHANGE UP (ref 3.5–5.3)
POTASSIUM SERPL-SCNC: 3.9 MMOL/L — SIGNIFICANT CHANGE UP (ref 3.5–5.3)
POTASSIUM UR-SCNC: 11 MMOL/L — SIGNIFICANT CHANGE UP
PROT ?TM UR-MCNC: 13 MG/DL — HIGH (ref 0–12)
PROT SERPL-MCNC: 5.2 G/DL — LOW (ref 6–8.3)
PROT UR-MCNC: NEGATIVE MG/DL — SIGNIFICANT CHANGE UP
PROT/CREAT UR-RTO: 0.4 RATIO — HIGH (ref 0–0.2)
PROTHROM AB SERPL-ACNC: 12.3 SEC — SIGNIFICANT CHANGE UP (ref 9.5–13)
RBC # BLD: 2.86 M/UL — LOW (ref 4.2–5.8)
RBC # FLD: 15.8 % — HIGH (ref 10.3–14.5)
SODIUM SERPL-SCNC: 136 MMOL/L — SIGNIFICANT CHANGE UP (ref 135–145)
SODIUM UR-SCNC: 42 MMOL/L — SIGNIFICANT CHANGE UP
SP GR SPEC: 1.01 — SIGNIFICANT CHANGE UP (ref 1–1.03)
UROBILINOGEN FLD QL: 0.2 MG/DL — SIGNIFICANT CHANGE UP (ref 0.2–1)
WBC # BLD: 11 K/UL — HIGH (ref 3.8–10.5)
WBC # FLD AUTO: 11 K/UL — HIGH (ref 3.8–10.5)

## 2024-01-29 PROCEDURE — 99233 SBSQ HOSP IP/OBS HIGH 50: CPT | Mod: GC

## 2024-01-29 PROCEDURE — 71045 X-RAY EXAM CHEST 1 VIEW: CPT | Mod: 26

## 2024-01-29 RX ORDER — POTASSIUM CHLORIDE 20 MEQ
10 PACKET (EA) ORAL ONCE
Refills: 0 | Status: COMPLETED | OUTPATIENT
Start: 2024-01-29 | End: 2024-01-30

## 2024-01-29 RX ORDER — ACETAMINOPHEN 500 MG
500 TABLET ORAL EVERY 6 HOURS
Refills: 0 | Status: COMPLETED | OUTPATIENT
Start: 2024-01-29 | End: 2024-01-30

## 2024-01-29 RX ORDER — MAGNESIUM SULFATE 500 MG/ML
1 VIAL (ML) INJECTION ONCE
Refills: 0 | Status: COMPLETED | OUTPATIENT
Start: 2024-01-29 | End: 2024-01-30

## 2024-01-29 RX ORDER — CALCIUM CARBONATE 500(1250)
1 TABLET ORAL EVERY 6 HOURS
Refills: 0 | Status: DISCONTINUED | OUTPATIENT
Start: 2024-01-29 | End: 2024-01-31

## 2024-01-29 RX ADMIN — QUETIAPINE FUMARATE 75 MILLIGRAM(S): 200 TABLET, FILM COATED ORAL at 21:44

## 2024-01-29 RX ADMIN — Medication 1 TABLET(S): at 11:07

## 2024-01-29 RX ADMIN — Medication 1 MILLIGRAM(S): at 11:07

## 2024-01-29 RX ADMIN — SODIUM CHLORIDE 20 MILLILITER(S): 9 INJECTION INTRAMUSCULAR; INTRAVENOUS; SUBCUTANEOUS at 06:05

## 2024-01-29 RX ADMIN — PIPERACILLIN AND TAZOBACTAM 25 GRAM(S): 4; .5 INJECTION, POWDER, LYOPHILIZED, FOR SOLUTION INTRAVENOUS at 06:00

## 2024-01-29 RX ADMIN — LIDOCAINE 1 PATCH: 4 CREAM TOPICAL at 21:45

## 2024-01-29 RX ADMIN — Medication 100 MILLIGRAM(S): at 11:08

## 2024-01-29 RX ADMIN — HYDROMORPHONE HYDROCHLORIDE 30 MILLILITER(S): 2 INJECTION INTRAMUSCULAR; INTRAVENOUS; SUBCUTANEOUS at 07:13

## 2024-01-29 RX ADMIN — SODIUM CHLORIDE 20 MILLILITER(S): 9 INJECTION INTRAMUSCULAR; INTRAVENOUS; SUBCUTANEOUS at 07:37

## 2024-01-29 RX ADMIN — Medication 500 MILLIGRAM(S): at 18:30

## 2024-01-29 RX ADMIN — CHLORHEXIDINE GLUCONATE 1 APPLICATION(S): 213 SOLUTION TOPICAL at 06:04

## 2024-01-29 RX ADMIN — LIDOCAINE 1 PATCH: 4 CREAM TOPICAL at 08:34

## 2024-01-29 RX ADMIN — Medication 200 MILLIGRAM(S): at 18:15

## 2024-01-29 RX ADMIN — Medication 5 MILLIGRAM(S): at 21:44

## 2024-01-29 RX ADMIN — PIPERACILLIN AND TAZOBACTAM 25 GRAM(S): 4; .5 INJECTION, POWDER, LYOPHILIZED, FOR SOLUTION INTRAVENOUS at 21:45

## 2024-01-29 RX ADMIN — Medication 500 MILLIGRAM(S): at 11:07

## 2024-01-29 RX ADMIN — ENOXAPARIN SODIUM 40 MILLIGRAM(S): 100 INJECTION SUBCUTANEOUS at 18:43

## 2024-01-29 RX ADMIN — HYDROMORPHONE HYDROCHLORIDE 30 MILLILITER(S): 2 INJECTION INTRAMUSCULAR; INTRAVENOUS; SUBCUTANEOUS at 19:04

## 2024-01-29 RX ADMIN — PIPERACILLIN AND TAZOBACTAM 25 GRAM(S): 4; .5 INJECTION, POWDER, LYOPHILIZED, FOR SOLUTION INTRAVENOUS at 14:13

## 2024-01-29 RX ADMIN — Medication 1 TABLET(S): at 14:13

## 2024-01-29 RX ADMIN — PREGABALIN 1000 MICROGRAM(S): 225 CAPSULE ORAL at 11:08

## 2024-01-29 RX ADMIN — SENNA PLUS 2 TABLET(S): 8.6 TABLET ORAL at 21:44

## 2024-01-29 RX ADMIN — PANTOPRAZOLE SODIUM 40 MILLIGRAM(S): 20 TABLET, DELAYED RELEASE ORAL at 06:00

## 2024-01-29 RX ADMIN — PANTOPRAZOLE SODIUM 40 MILLIGRAM(S): 20 TABLET, DELAYED RELEASE ORAL at 18:43

## 2024-01-29 RX ADMIN — LIDOCAINE 1 PATCH: 4 CREAM TOPICAL at 07:15

## 2024-01-29 NOTE — PROGRESS NOTE ADULT - ASSESSMENT
48yoM w/ PMHx hepatitis C and EtOH abuse was transferred from OSH on 1/6 for level 1 trauma eval. He was found down with multiple 4-11 left-sided rib fractures and flail chest, and had a chest tube placed at OSH. S/p VATS 1/10 w/ partial lung decortication and thoracoscopic removal of intrapleural foreign body. S/p chest tube removal 1/14, downgraded to floor 1/18, returned to SICU after fever 102.3 on 1/20- s/p L thoracotomy and decortication (1/26)    -Thoracic Surgery Recs  -Monitor CT output  -Analgesia and antiemetics as needed  -Continue abx   -Regular diet   -Appreciate excellent care per SICU     Trauma Surgery #[id number]

## 2024-01-29 NOTE — PROGRESS NOTE ADULT - SUBJECTIVE AND OBJECTIVE BOX
Patient is a 48y old  Male who presents with a chief complaint of Trauma 1 activation (29 Jan 2024 10:12)      Vital Signs Last 24 Hrs  T(C): 37.2 (01-29-24 @ 11:00), Max: 37.4 (01-29-24 @ 03:00)  T(F): 98.9 (01-29-24 @ 11:00), Max: 99.3 (01-29-24 @ 03:00)  HR: 114 (01-29-24 @ 12:00) (108 - 120)  BP: 141/79 (01-29-24 @ 12:00) (105/58 - 141/79)  RR: 46 (01-29-24 @ 12:00) (24 - 48)  SpO2: 94% (01-29-24 @ 12:00) (91% - 97%)                01-28-24 @ 07:01  -  01-29-24 @ 07:00  --------------------------------------------------------  IN: 2382.3 mL / OUT: 4325 mL / NET: -1942.7 mL    01-29-24 @ 07:01  -  01-29-24 @ 12:10  --------------------------------------------------------  IN: 600 mL / OUT: 1020 mL / NET: -420 mL        Daily     Daily                           8.0    11.73 )-----------( 507      ( 28 Jan 2024 22:04 )             23.7     01-28    133<L>  |  105  |  7   ----------------------------<  107<H>  4.0   |  17<L>  |  0.87    Ca    6.8<L>      28 Jan 2024 22:04  Phos  3.4     01-28  Mg     2.0     01-28    TPro  4.8<L>  /  Alb  1.7<L>  /  TBili  0.4  /  DBili  x   /  AST  57<H>  /  ALT  55<H>  /  AlkPhos  249<H>  01-28          PHYSICAL EXAM  Neurology: A&Ox3, NAD  CV : RRR+S1S2  Lungs: Respirations non-labored, B/L BS CTA  Abdomen: Soft, NT/ND, +BSx4Q  Extremities: B/L LE warm, no edema, +PP           Chest Tubes: R/L CT , Suction/Waterseal, Drainage:,     MEDICATIONS  acetaminophen     Tablet .. 500 milliGRAM(s) Oral every 6 hours PRN  ascorbic acid 500 milliGRAM(s) Oral daily  chlorhexidine 2% Cloths 1 Application(s) Topical <User Schedule>  cyanocobalamin 1000 MICROGram(s) Oral daily  diphenhydrAMINE 25 milliGRAM(s) Oral every 4 hours PRN  enoxaparin Injectable 40 milliGRAM(s) SubCutaneous every 24 hours  folic acid 1 milliGRAM(s) Oral daily  HYDROmorphone  Injectable 0.5 milliGRAM(s) IV Push every 3 hours PRN  HYDROmorphone PCA (1 mG/mL) 30 milliLiter(s) PCA Continuous PCA Continuous  influenza   Vaccine 0.5 milliLiter(s) IntraMuscular once  lidocaine   4% Patch 1 Patch Transdermal every 24 hours  melatonin 5 milliGRAM(s) Oral at bedtime  multivitamin 1 Tablet(s) Oral daily  naloxone Injectable 0.1 milliGRAM(s) IV Push every 3 minutes PRN  ondansetron Injectable 4 milliGRAM(s) IV Push every 6 hours PRN  pantoprazole  Injectable 40 milliGRAM(s) IV Push every 12 hours  piperacillin/tazobactam IVPB.. 3.375 Gram(s) IV Intermittent every 8 hours  polyethylene glycol 3350 17 Gram(s) Oral daily  QUEtiapine 75 milliGRAM(s) Oral <User Schedule>  senna 2 Tablet(s) Oral at bedtime  sodium chloride 0.9%. 1000 milliLiter(s) IV Continuous <Continuous>  thiamine 100 milliGRAM(s) Oral daily

## 2024-01-29 NOTE — PROGRESS NOTE ADULT - ATTENDING COMMENTS
etoh misuse with withdrawal, required washout of chest s/p chest tubes x3    ON: required transfusion    alert and awake, x4  seroquel nightly, PCA in place  standing tylenol  on RA  s/p thoracotomy 1/26  CT all to suction, minimal output. cardiothoracic follows   AM CXR concern for left sided effusion worsening  persistent tachycardia, sinus tach, stable hemodynamics  on diet , poor intake  ppi for FOBT  GI consulted, ? EGD when able. however given presence of chest tube w blood loss likley the source  no IV fluids  voiding 4.2 lit, balance -2 liters  lovenox VTE PPX  zosyn for empyema, no end date, afebrile  good glycemic control  buttock wound , wound vac in person  PT OT working with him  mother is family and will update     PIV only

## 2024-01-29 NOTE — PROGRESS NOTE ADULT - SUBJECTIVE AND OBJECTIVE BOX
Pain Management Attending Addendum    SUBJECTIVE: Patient doing well with IV PCA    Therapy:    [X] IV PCA         [ ] PRN Analgesics    OBJECTIVE:   [X] Pain appropriately controlled    [ ] Other:    Side Effects:  [] None	             [ ] Nausea              [X ] Pruritis                	[ ] Other:    ASSESSMENT/PLAN: Continue current therapy    Comments:

## 2024-01-29 NOTE — PROGRESS NOTE ADULT - ASSESSMENT
This is a  56yo M w pmhx of Hep C and possible EtOH use disorder who presents after being found down. Intubated for respiratory distress and agitation 1/8. Pt has displaced 4-11 L rib fx with flail segment.     1/9 VSS: NPO after MN. Plan for Rib plating WED 1/10/24  1/10 s/p Left VATS, with partial lung decortication  Thoracoscopic removal of intrapleural foreign body    1/11VSS intubated sedated on pressors  chest tube lws   1/12 VSS, pt still remains intubated/sedated, getting phenobarbital for agitation. Left chest tube output 75cc/24h placed on water seal this AM. 1U PRBC -H/H 7.1/20. Continue care per SICU team  1/13 Maintain L pleural tube waterseal Daily CXR Care as per SICU team  1/14 Left pleural tube removed CXR ordered; thoracic signed off  1/20 thoracic surgery reconsulted for left pleural effusion vs hemothorax; d/w Dr. Sepulveda; chest ct done- Dr. Sepulveda reviewed; call IR for potential drainage monday left pocket   1/21 VSS, pt still febrile tmax 103, WBC 8.4, IR consulted for drainage of complex left pleural effusion on CT Chest. Plan for Monday.   1/22 VSS; Tm 100.8 this am. WBC WNL. IR pending drainage of L effusion today   1/23 VSS, s/p left pigtail by IR yesterday, minimal drainage 20cc/24h. Recommend flushing pigtail and placing to LWS. Plan to do tPA MIST protocol tomorrow.   1/24     vss   min drainage from Lt  PTC  1/25 VSS left chest tube drainage 100/230  TPA today-   OR for  1/26 please type and screen Hibiclens  and NPO at midnight  hold  Lovenox   1/26 L Thoracotomy/Decort  3 chest tubes suction x 48 hours  1/27 L thoracotomy maintain 3 left pleural tubes to suction     Recommend fluid restrict for hyponatremia  1/28 VSSL thoracotomy maintain 3 left pleural tubes to suction    today   1/29 VSS, 2U PRBC yesterday for GIB, improving 8/23 chest tube output minimal maintain 3 chest tubes to LWS   This is a  54yo M w pmhx of Hep C and possible EtOH use disorder who presents after being found down. Intubated for respiratory distress and agitation 1/8. Pt has displaced 4-11 L rib fx with flail segment.     1/9 VSS: NPO after MN. Plan for Rib plating WED 1/10/24  1/10 s/p Left VATS, with partial lung decortication  Thoracoscopic removal of intrapleural foreign body    1/11VSS intubated sedated on pressors  chest tube lws   1/12 VSS, pt still remains intubated/sedated, getting phenobarbital for agitation. Left chest tube output 75cc/24h placed on water seal this AM. 1U PRBC -H/H 7.1/20. Continue care per SICU team  1/13 Maintain L pleural tube waterseal Daily CXR Care as per SICU team  1/14 Left pleural tube removed CXR ordered; thoracic signed off  1/20 thoracic surgery reconsulted for left pleural effusion vs hemothorax; d/w Dr. Sepulveda; chest ct done- Dr. Sepulveda reviewed; call IR for potential drainage monday left pocket   1/21 VSS, pt still febrile tmax 103, WBC 8.4, IR consulted for drainage of complex left pleural effusion on CT Chest. Plan for Monday.   1/22 VSS; Tm 100.8 this am. WBC WNL. IR pending drainage of L effusion today   1/23 VSS, s/p left pigtail by IR yesterday, minimal drainage 20cc/24h. Recommend flushing pigtail and placing to LWS. Plan to do tPA MIST protocol tomorrow.   1/24     vss   min drainage from Lt  PTC  1/25 VSS left chest tube drainage 100/230  TPA today-   OR for  1/26 please type and screen Hibiclens  and NPO at midnight  hold  Lovenox   1/26 L Thoracotomy/Decort  3 chest tubes suction x 48 hours  1/27 L thoracotomy maintain 3 left pleural tubes to suction     Recommend fluid restrict for hyponatremia  1/28 VSSL thoracotomy maintain 3 left pleural tubes to suction    today \  1/29 VSS, maintain 3 CT to suction,  today   1/29 VSS, 2U PRBC yesterday for GIB, improving 8/23 chest tube output minimal maintain 3 chest tubes to LWS

## 2024-01-29 NOTE — PROGRESS NOTE ADULT - SUBJECTIVE AND OBJECTIVE BOX
ACS Progress Note    S: Patient seen and examined. No acute events overnight. Pain well controlled with current regimen.   Denies nausea/vomiting.   Endorses passing gas and bowel movements.     O:  Vital Signs Last 24 Hrs  T(C): 37.1 (29 Jan 2024 07:00), Max: 37.4 (29 Jan 2024 03:00)  T(F): 98.8 (29 Jan 2024 07:00), Max: 99.3 (29 Jan 2024 03:00)  HR: 114 (29 Jan 2024 09:00) (107 - 120)  BP: 131/86 (29 Jan 2024 09:00) (105/58 - 133/81)  BP(mean): 103 (29 Jan 2024 09:00) (75 - 103)  RR: 48 (29 Jan 2024 09:00) (12 - 48)  SpO2: 92% (29 Jan 2024 09:00) (91% - 100%)    Parameters below as of 29 Jan 2024 08:00  Patient On (Oxygen Delivery Method): room air    I&O's Detail    28 Jan 2024 07:01  -  29 Jan 2024 07:00  --------------------------------------------------------  IN:    IV PiggyBack: 200 mL    IV PiggyBack: 962.3 mL    PRBCs (Packed Red Blood Cells): 600 mL    sodium chloride 0.9%: 200 mL    sodium chloride 0.9%: 420 mL  Total IN: 2382.3 mL    OUT:    Chest Tube (mL): 50 mL    Chest Tube (mL): 30 mL    Chest Tube (mL): 20 mL    VAC (Vacuum Assisted Closure) System (mL): 0 mL    Voided (mL): 4225 mL  Total OUT: 4325 mL    Total NET: -1942.7 mL      29 Jan 2024 07:01  -  29 Jan 2024 09:50  --------------------------------------------------------  IN:    IV PiggyBack: 50 mL    sodium chloride 0.9%: 40 mL  Total IN: 90 mL    OUT:    Chest Tube (mL): 30 mL    Chest Tube (mL): 20 mL    Chest Tube (mL): 0 mL    Voided (mL): 450 mL  Total OUT: 500 mL    Total NET: -410 mL      MEDICATIONS  (STANDING):  ascorbic acid 500 milliGRAM(s) Oral daily  chlorhexidine 2% Cloths 1 Application(s) Topical <User Schedule>  cyanocobalamin 1000 MICROGram(s) Oral daily  enoxaparin Injectable 40 milliGRAM(s) SubCutaneous every 24 hours  folic acid 1 milliGRAM(s) Oral daily  HYDROmorphone PCA (1 mG/mL) 30 milliLiter(s) PCA Continuous PCA Continuous  influenza   Vaccine 0.5 milliLiter(s) IntraMuscular once  lidocaine   4% Patch 1 Patch Transdermal every 24 hours  melatonin 5 milliGRAM(s) Oral at bedtime  multivitamin 1 Tablet(s) Oral daily  pantoprazole  Injectable 40 milliGRAM(s) IV Push every 12 hours  piperacillin/tazobactam IVPB.. 3.375 Gram(s) IV Intermittent every 8 hours  polyethylene glycol 3350 17 Gram(s) Oral daily  QUEtiapine 75 milliGRAM(s) Oral <User Schedule>  senna 2 Tablet(s) Oral at bedtime  sodium chloride 0.9%. 1000 milliLiter(s) (20 mL/Hr) IV Continuous <Continuous>  thiamine 100 milliGRAM(s) Oral daily    MEDICATIONS  (PRN):  acetaminophen     Tablet .. 500 milliGRAM(s) Oral every 6 hours PRN Mild Pain (1 - 3)  diphenhydrAMINE 25 milliGRAM(s) Oral every 4 hours PRN Pruritus  HYDROmorphone  Injectable 0.5 milliGRAM(s) IV Push every 3 hours PRN breakthrough pain  naloxone Injectable 0.1 milliGRAM(s) IV Push every 3 minutes PRN For ANY of the following changes in patient status:  A. RR LESS THAN 10 breaths per minute, B. Oxygen saturation LESS THAN 90%, C. Sedation score of 6  ondansetron Injectable 4 milliGRAM(s) IV Push every 6 hours PRN Nausea                          8.0    11.73 )-----------( 507      ( 28 Jan 2024 22:04 )             23.7       01-28    133<L>  |  105  |  7   ----------------------------<  107<H>  4.0   |  17<L>  |  0.87    Ca    6.8<L>      28 Jan 2024 22:04  Phos  3.4     01-28  Mg     2.0     01-28    TPro  4.8<L>  /  Alb  1.7<L>  /  TBili  0.4  /  DBili  x   /  AST  57<H>  /  ALT  55<H>  /  AlkPhos  249<H>  01-28      Physical Exam  Gen: NAD, A&Ox3  Pulm: No respiratory distress, no subcostal retractions. On nasal cannula. 3 CT to suction. Poor respiratory effort, difficult to assess FEAL.   CV: Sinus tachycardia, no JVD  Abd: Soft, NT, ND  Extremities: warm and well perfused

## 2024-01-29 NOTE — PROGRESS NOTE ADULT - SUBJECTIVE AND OBJECTIVE BOX
Day 3\2 of Anesthesia Pain Management Service    SUBJECTIVE: I'm doing ok    Pain Scale Score:	[X] Refer to charted pain scores    THERAPY:    [ ] IV PCA Morphine		[ ] 5 mg/mL	[ ] 1 mg/mL  [X] IV PCA Hydromorphone	[ ] 5 mg/mL	[X] 1 mg/mL  [ ] IV PCA Fentanyl		[ ] 50 micrograms/mL    Demand dose: 0.2 mg     Lockout: 6 minutes   Continuous Rate: 0 mg/hr  4 Hour Limit: 4 mg    MEDICATIONS  (STANDING):  ascorbic acid 500 milliGRAM(s) Oral daily  chlorhexidine 2% Cloths 1 Application(s) Topical <User Schedule>  cyanocobalamin 1000 MICROGram(s) Oral daily  enoxaparin Injectable 40 milliGRAM(s) SubCutaneous every 24 hours  folic acid 1 milliGRAM(s) Oral daily  HYDROmorphone PCA (1 mG/mL) 30 milliLiter(s) PCA Continuous PCA Continuous  influenza   Vaccine 0.5 milliLiter(s) IntraMuscular once  lidocaine   4% Patch 1 Patch Transdermal every 24 hours  melatonin 5 milliGRAM(s) Oral at bedtime  multivitamin 1 Tablet(s) Oral daily  pantoprazole  Injectable 40 milliGRAM(s) IV Push every 12 hours  piperacillin/tazobactam IVPB.. 3.375 Gram(s) IV Intermittent every 8 hours  polyethylene glycol 3350 17 Gram(s) Oral daily  QUEtiapine 75 milliGRAM(s) Oral <User Schedule>  senna 2 Tablet(s) Oral at bedtime  sodium chloride 0.9%. 1000 milliLiter(s) (20 mL/Hr) IV Continuous <Continuous>  thiamine 100 milliGRAM(s) Oral daily    MEDICATIONS  (PRN):  acetaminophen     Tablet .. 500 milliGRAM(s) Oral every 6 hours PRN Mild Pain (1 - 3)  diphenhydrAMINE 25 milliGRAM(s) Oral every 4 hours PRN Pruritus  HYDROmorphone  Injectable 0.5 milliGRAM(s) IV Push every 3 hours PRN breakthrough pain  naloxone Injectable 0.1 milliGRAM(s) IV Push every 3 minutes PRN For ANY of the following changes in patient status:  A. RR LESS THAN 10 breaths per minute, B. Oxygen saturation LESS THAN 90%, C. Sedation score of 6  ondansetron Injectable 4 milliGRAM(s) IV Push every 6 hours PRN Nausea      OBJECTIVE:    Sedation Score:	[ X] Alert 	[ ] Drowsy 	[ ] Arousable	[ ] Asleep	[ ] Unresponsive    Side Effects:	[X ] None	[ ] Nausea	[ ] Vomiting	[ ] Pruritus  		[ ] Other:    Vital Signs Last 24 Hrs  T(C): 37.1 (29 Jan 2024 07:00), Max: 37.4 (29 Jan 2024 03:00)  T(F): 98.8 (29 Jan 2024 07:00), Max: 99.3 (29 Jan 2024 03:00)  HR: 113 (29 Jan 2024 08:00) (107 - 120)  BP: 123/70 (29 Jan 2024 08:00) (105/58 - 133/81)  BP(mean): 92 (29 Jan 2024 08:00) (75 - 97)  RR: 36 (29 Jan 2024 08:00) (12 - 36)  SpO2: 93% (29 Jan 2024 08:00) (91% - 100%)    Parameters below as of 29 Jan 2024 08:00  Patient On (Oxygen Delivery Method): room air        ASSESSMENT/ PLAN    Therapy to  be:               [X] Continued   [ ] Discontinued   [ ] Changed to PRN Analgesics    Documentation and Verification of current medications:   [X] Done	[ ] Not done, not eligible    Comments: Endorsing good analgesia with PCA. Total PCA use 2.4mg / 24 hours. Reeducated to PCA use. +CT x3

## 2024-01-29 NOTE — PROGRESS NOTE ADULT - ASSESSMENT
48M with PMHx hepatitis C (diagnosed many years ago, never treated), depression transferred from outside hospital for trauma eval. Patient found down by roommate after unknown amount of time, found with multiple 4-11 L sided rib fractures with flail chest. Chest tube placed there with >1L output (old blood). Patient also with tachypnea, concern for respiratory failure started on BiPAP. Level 1 called for transient hypotension, patient given 2 unit PRBC in [facility] ED. Patient admitted to SICU for hemodynamic monitoring, pain management. S/p VATS 1/10 with partial lung decortication, chest tube removed 1/14. S/p chest tube placement 1/22 by IR for new left effusion, s/p L Thoracotomy/Decort 1/26 w/ 3 chest tubes to suction.    PLAN  Neurologic:  - A&Ox4  - Seroquel 75mg QHS  - IV PCA    Respiratory:  - RA  - s/p VATS 1/10 with discovery of Fibropurulent exudative adhesions and removal of retained foreign material likely the tip of a glove, s/p washout, with new 28f chest tube placed > removed 1/14  - 1/20 CT with complex collection L pleural space, s/p drainage by IR 1/22, 20cc bloody fluid drained, pigtail placed  - Incentive spirometry, Duonebs   - CTS OR 1/26 Open left thoracotomy, decortication and washout, 3 chest tubes placed  - chest tubes to suction, monitor output > Follow-up water seal timing with thoracic    Cardiovascular: tachycardia  - sammie gtt off, MAP >65  - Metoprolol 50mg q8h for tachycardia  - Hgb/Hct 7.1/21.3 1U PRBC, post 8.3  - Hypotensive/Tachycardic (, SPB 70s) 500 cc LR bolus x1, lopressor d/c; currently (-120, SBP 90)    Gastrointestinal/Nutrition:  - Abd US 1/24 - mild hepatomegaly, redemonstration of evolving splenic hematomas  - regular diet    Genitourinary/Renal:  - Supplement electrolytes PRN  - IVF @ 100    Hematologic:  - Chemical VTE ppx with Lovenox held for 24 hrs postop  - Mechanical VTE ppx with SCDs  - Duplex/doppler 1/23 negative    Infectious Disease: Empyema   - s/p zosyn 1/7-1/24  - 1/22 L empyema cultures NGTD  - 1/26-1/29 Restart zosyn. Repeat BCx f/u     Endocrine:  - No active issues, no history of DM    MSK:   - sat on dee tray(?) wound right lateral buttock - continue to monitor

## 2024-01-29 NOTE — PROGRESS NOTE ADULT - SUBJECTIVE AND OBJECTIVE BOX
24 HOUR EVENTS:  - 1unit pRBC  - Pantoprazole q12h 2/2 +occult blood, consider AM GI consult    SUBJECTIVE/ROS:  [x] A ten-point review of systems was otherwise negative except as noted.  [ ] Due to altered mental status/intubation, subjective information were not able to be obtained from the patient. History was obtained, to the extent possible, from review of the chart and collateral sources of information.      NEURO  Exam: awake, alert, oriented  Meds: acetaminophen     Tablet .. 500 milliGRAM(s) Oral every 6 hours PRN Mild Pain (1 - 3)  diphenhydrAMINE 25 milliGRAM(s) Oral every 4 hours PRN Pruritus  HYDROmorphone  Injectable 0.5 milliGRAM(s) IV Push every 3 hours PRN breakthrough pain  HYDROmorphone PCA (1 mG/mL) 30 milliLiter(s) PCA Continuous PCA Continuous  melatonin 5 milliGRAM(s) Oral at bedtime  ondansetron Injectable 4 milliGRAM(s) IV Push every 6 hours PRN Nausea  QUEtiapine 75 milliGRAM(s) Oral <User Schedule>    [x] Adequacy of sedation and pain control has been assessed and adjusted      RESPIRATORY  RR: 35 (01-28-24 @ 23:00) (12 - 55)  SpO2: 94% (01-28-24 @ 23:00) (89% - 100%)  Wt(kg): --  Exam: unlabored, clear to auscultation bilaterally      CARDIOVASCULAR  HR: 111 (01-28-24 @ 23:00) (107 - 130)  BP: 105/58 (01-28-24 @ 23:00) (96/60 - 132/67)  BP(mean): 75 (01-28-24 @ 23:00) (70 - 95)  ABP: --  ABP(mean): --  Wt(kg): --  CVP(cm H2O): --      Exam: regular rate and rhythm  Cardiac Rhythm: sinus  Perfusion     [x]Adequate   [ ]Inadequate  Mentation   [x]Normal       [ ]Reduced  Extremities  [x]Warm         [ ]Cool  Volume Status [ ]Hypervolemic [x]Euvolemic [ ]Hypovolemic      GI/NUTRITION  Exam: soft, nontender, nondistended, incision C/D/I  Diet: regular  Meds: pantoprazole  Injectable 40 milliGRAM(s) IV Push every 12 hours  polyethylene glycol 3350 17 Gram(s) Oral daily  senna 2 Tablet(s) Oral at bedtime      GENITOURINARY  I&O's Detail    01-27 @ 07:01 - 01-28 @ 07:00  --------------------------------------------------------  IN:    IV PiggyBack: 125 mL    Oral Fluid: 1040 mL    Phenylephrine: 271.5 mL    sodium chloride 0.9%: 100 mL    sodium chloride 0.9%: 2200 mL  Total IN: 3736.5 mL    OUT:    Chest Tube (mL): 85 mL    Chest Tube (mL): 180 mL    Chest Tube (mL): 140 mL    VAC (Vacuum Assisted Closure) System (mL): 0 mL    Voided (mL): 1950 mL  Total OUT: 2355 mL    Total NET: 1381.5 mL      01-28 @ 07:01 - 01-29 @ 00:04  --------------------------------------------------------  IN:    IV PiggyBack: 200 mL    IV PiggyBack: 887.3 mL    PRBCs (Packed Red Blood Cells): 600 mL    sodium chloride 0.9%: 200 mL    sodium chloride 0.9%: 280 mL  Total IN: 2167.3 mL    OUT:    Chest Tube (mL): 30 mL    Chest Tube (mL): 50 mL    Chest Tube (mL): 20 mL    VAC (Vacuum Assisted Closure) System (mL): 0 mL    Voided (mL): 2375 mL  Total OUT: 2475 mL    Total NET: -307.7 mL      01-28    133<L>  |  105  |  7   ----------------------------<  107<H>  4.0   |  17<L>  |  0.87    Ca    6.8<L>      28 Jan 2024 22:04  Phos  3.4     01-28  Mg     2.0     01-28    TPro  4.8<L>  /  Alb  1.7<L>  /  TBili  0.4  /  DBili  x   /  AST  57<H>  /  ALT  55<H>  /  AlkPhos  249<H>  01-28    [ ] Hendrix catheter, indication: N/A  Meds: ascorbic acid 500 milliGRAM(s) Oral daily  cyanocobalamin 1000 MICROGram(s) Oral daily  folic acid 1 milliGRAM(s) Oral daily  multivitamin 1 Tablet(s) Oral daily  sodium chloride 0.9%. 1000 milliLiter(s) IV Continuous <Continuous>  thiamine 100 milliGRAM(s) Oral daily        HEMATOLOGIC  Meds: enoxaparin Injectable 40 milliGRAM(s) SubCutaneous every 24 hours    [x] VTE Prophylaxis                        8.0    11.73 )-----------( 507      ( 28 Jan 2024 22:04 )             23.7     PT/INR - ( 28 Jan 2024 22:04 )   PT: 12.5 sec;   INR: 1.20 ratio         PTT - ( 28 Jan 2024 22:04 )  PTT:31.9 sec  Transfusion     [ ] PRBC   [ ] Platelets   [ ] FFP   [ ] Cryoprecipitate      INFECTIOUS DISEASES  WBC Count: 11.73 K/uL (01-28 @ 22:04)  WBC Count: 10.29 K/uL (01-28 @ 17:43)  WBC Count: 11.54 K/uL (01-28 @ 08:15)  WBC Count: 10.21 K/uL (01-28 @ 06:27)    RECENT CULTURES:  Specimen Source: .Tissue Other  Date/Time: 01-26 @ 22:03  Culture Results:   No growth to date.  Gram Stain:   No polymorphonuclear cells seen per low power field  No organisms seen per oil power field  Organism: --    Meds: influenza   Vaccine 0.5 milliLiter(s) IntraMuscular once  piperacillin/tazobactam IVPB.. 3.375 Gram(s) IV Intermittent every 8 hours      ENDOCRINE  CAPILLARY BLOOD GLUCOSE        ACCESS DEVICES:  [x] Peripheral IV  [ ] Central Venous Line	[ ] R	[ ] L	[ ] IJ	[ ] Fem	[ ] SC	Placed:   [ ] Arterial Line		[ ] R	[ ] L	[ ] Fem	[ ] Rad	[ ] Ax	Placed:   [ ] PICC:					[ ] Mediport  [ ] Urinary Catheter, Date Placed:   [x] Necessity of urinary, arterial, and venous catheters discussed    OTHER MEDICATIONS:  chlorhexidine 2% Cloths 1 Application(s) Topical <User Schedule>  lidocaine   4% Patch 1 Patch Transdermal every 24 hours  naloxone Injectable 0.1 milliGRAM(s) IV Push every 3 minutes PRN      CODE STATUS: full

## 2024-01-29 NOTE — PROGRESS NOTE ADULT - PROBLEM SELECTOR PLAN 1
Suspected fracture of rib on left side, closed. Left Rib Fx 4-11th /Hemothorax  1/10 LT VATS, with partial lung decortication  Thoracoscopic removal of intrapleural foreign body     left chest tube dc'd 1/14 1/20 thoracic surgery reconsulted for left pleural effusion vs hemothorax on chest ct  1/22 s/p left pigtail by IR  1/26 Thoracotomy Decortication w/ washout>CT x 3 placed  Maintain left CT x 3 to suction to LWS  Daily CXR  Incentive Spirometry   OOB to chair  ambulate   chest PT  Strict I & Os, monitor drainage  care as per primary team Suspected fracture of rib on left side, closed. Left Rib Fx 4-11th /Hemothorax  1/10 LT VATS, with partial lung decortication  Thoracoscopic removal of intrapleural foreign body     left chest tube dc'd 1/14 1/20 thoracic surgery reconsulted for left pleural effusion vs hemothorax on chest ct  1/22 s/p left pigtail by IR  1/26 Thoracotomy Decortication w/ washout>CT x 3 placed  Maintain left CT x 3 to suction to LWS  Daily CXR  Incentive Spirometry  OOB to chair  ambulate   chest PT  Strict I & Os, monitor drainage  care as per primary team

## 2024-01-30 ENCOUNTER — TRANSCRIPTION ENCOUNTER (OUTPATIENT)
Age: 49
End: 2024-01-30

## 2024-01-30 LAB
ALBUMIN SERPL ELPH-MCNC: 2 G/DL — LOW (ref 3.3–5)
ALBUMIN SERPL ELPH-MCNC: 2.3 G/DL — LOW (ref 3.3–5)
ALP SERPL-CCNC: 306 U/L — HIGH (ref 40–120)
ALP SERPL-CCNC: 399 U/L — HIGH (ref 40–120)
ALT FLD-CCNC: 49 U/L — HIGH (ref 10–45)
ALT FLD-CCNC: 66 U/L — HIGH (ref 10–45)
ANION GAP SERPL CALC-SCNC: 10 MMOL/L — SIGNIFICANT CHANGE UP (ref 5–17)
ANION GAP SERPL CALC-SCNC: 15 MMOL/L — SIGNIFICANT CHANGE UP (ref 5–17)
APPEARANCE UR: CLEAR — SIGNIFICANT CHANGE UP
AST SERPL-CCNC: 40 U/L — SIGNIFICANT CHANGE UP (ref 10–40)
AST SERPL-CCNC: 78 U/L — HIGH (ref 10–40)
BILIRUB SERPL-MCNC: 0.3 MG/DL — SIGNIFICANT CHANGE UP (ref 0.2–1.2)
BILIRUB SERPL-MCNC: 0.5 MG/DL — SIGNIFICANT CHANGE UP (ref 0.2–1.2)
BILIRUB UR-MCNC: NEGATIVE — SIGNIFICANT CHANGE UP
BUN SERPL-MCNC: 6 MG/DL — LOW (ref 7–23)
BUN SERPL-MCNC: 6 MG/DL — LOW (ref 7–23)
CALCIUM SERPL-MCNC: 7.6 MG/DL — LOW (ref 8.4–10.5)
CALCIUM SERPL-MCNC: 8.3 MG/DL — LOW (ref 8.4–10.5)
CHLORIDE SERPL-SCNC: 105 MMOL/L — SIGNIFICANT CHANGE UP (ref 96–108)
CHLORIDE SERPL-SCNC: 106 MMOL/L — SIGNIFICANT CHANGE UP (ref 96–108)
CO2 SERPL-SCNC: 16 MMOL/L — LOW (ref 22–31)
CO2 SERPL-SCNC: 20 MMOL/L — LOW (ref 22–31)
COLOR SPEC: YELLOW — SIGNIFICANT CHANGE UP
CREAT ?TM UR-MCNC: 18 MG/DL — SIGNIFICANT CHANGE UP
CREAT SERPL-MCNC: 0.93 MG/DL — SIGNIFICANT CHANGE UP (ref 0.5–1.3)
CREAT SERPL-MCNC: 1 MG/DL — SIGNIFICANT CHANGE UP (ref 0.5–1.3)
DIFF PNL FLD: NEGATIVE — SIGNIFICANT CHANGE UP
EGFR: 102 ML/MIN/1.73M2 — SIGNIFICANT CHANGE UP
EGFR: 93 ML/MIN/1.73M2 — SIGNIFICANT CHANGE UP
GLUCOSE SERPL-MCNC: 108 MG/DL — HIGH (ref 70–99)
GLUCOSE SERPL-MCNC: 127 MG/DL — HIGH (ref 70–99)
GLUCOSE UR QL: NEGATIVE MG/DL — SIGNIFICANT CHANGE UP
KETONES UR-MCNC: NEGATIVE MG/DL — SIGNIFICANT CHANGE UP
LEUKOCYTE ESTERASE UR-ACNC: NEGATIVE — SIGNIFICANT CHANGE UP
MAGNESIUM SERPL-MCNC: 2 MG/DL — SIGNIFICANT CHANGE UP (ref 1.6–2.6)
NITRITE UR-MCNC: NEGATIVE — SIGNIFICANT CHANGE UP
OSMOLALITY SERPL: 286 MOSMOL/KG — SIGNIFICANT CHANGE UP (ref 275–300)
OSMOLALITY UR: 133 MOS/KG — LOW (ref 300–900)
PH UR: 6.5 — SIGNIFICANT CHANGE UP (ref 5–8)
PHOSPHATE SERPL-MCNC: 2.6 MG/DL — SIGNIFICANT CHANGE UP (ref 2.5–4.5)
POTASSIUM SERPL-MCNC: 4 MMOL/L — SIGNIFICANT CHANGE UP (ref 3.5–5.3)
POTASSIUM SERPL-MCNC: 6 MMOL/L — HIGH (ref 3.5–5.3)
POTASSIUM SERPL-SCNC: 4 MMOL/L — SIGNIFICANT CHANGE UP (ref 3.5–5.3)
POTASSIUM SERPL-SCNC: 6 MMOL/L — HIGH (ref 3.5–5.3)
POTASSIUM UR-SCNC: 8 MMOL/L — SIGNIFICANT CHANGE UP
PROT ?TM UR-MCNC: <7 MG/DL — SIGNIFICANT CHANGE UP (ref 0–12)
PROT SERPL-MCNC: 5.3 G/DL — LOW (ref 6–8.3)
PROT SERPL-MCNC: 6.4 G/DL — SIGNIFICANT CHANGE UP (ref 6–8.3)
PROT UR-MCNC: NEGATIVE MG/DL — SIGNIFICANT CHANGE UP
PROT/CREAT UR-RTO: <0.4 RATIO — HIGH (ref 0–0.2)
SODIUM SERPL-SCNC: 135 MMOL/L — SIGNIFICANT CHANGE UP (ref 135–145)
SODIUM SERPL-SCNC: 137 MMOL/L — SIGNIFICANT CHANGE UP (ref 135–145)
SODIUM UR-SCNC: 42 MMOL/L — SIGNIFICANT CHANGE UP
SP GR SPEC: 1.01 — SIGNIFICANT CHANGE UP (ref 1–1.03)
UROBILINOGEN FLD QL: 0.2 MG/DL — SIGNIFICANT CHANGE UP (ref 0.2–1)
UUN UR-MCNC: 119 MG/DL — SIGNIFICANT CHANGE UP
UUN UR-MCNC: 59 MG/DL — SIGNIFICANT CHANGE UP

## 2024-01-30 PROCEDURE — 99222 1ST HOSP IP/OBS MODERATE 55: CPT | Mod: GC

## 2024-01-30 PROCEDURE — 99233 SBSQ HOSP IP/OBS HIGH 50: CPT | Mod: GC

## 2024-01-30 PROCEDURE — 71045 X-RAY EXAM CHEST 1 VIEW: CPT | Mod: 26

## 2024-01-30 PROCEDURE — 71260 CT THORAX DX C+: CPT | Mod: 26

## 2024-01-30 RX ORDER — DESMOPRESSIN ACETATE 0.1 MG/1
1 TABLET ORAL ONCE
Refills: 0 | Status: COMPLETED | OUTPATIENT
Start: 2024-01-30 | End: 2024-01-30

## 2024-01-30 RX ORDER — SODIUM,POTASSIUM PHOSPHATES 278-250MG
1 POWDER IN PACKET (EA) ORAL ONCE
Refills: 0 | Status: COMPLETED | OUTPATIENT
Start: 2024-01-30 | End: 2024-01-30

## 2024-01-30 RX ORDER — DESMOPRESSIN ACETATE 0.1 MG/1
2 TABLET ORAL ONCE
Refills: 0 | Status: DISCONTINUED | OUTPATIENT
Start: 2024-01-30 | End: 2024-01-30

## 2024-01-30 RX ORDER — SODIUM CHLORIDE 9 MG/ML
1000 INJECTION INTRAMUSCULAR; INTRAVENOUS; SUBCUTANEOUS
Refills: 0 | Status: DISCONTINUED | OUTPATIENT
Start: 2024-01-30 | End: 2024-01-31

## 2024-01-30 RX ADMIN — Medication 5 MILLIGRAM(S): at 21:26

## 2024-01-30 RX ADMIN — Medication 500 MILLIGRAM(S): at 12:47

## 2024-01-30 RX ADMIN — Medication 1 MILLIGRAM(S): at 11:46

## 2024-01-30 RX ADMIN — Medication 100 GRAM(S): at 01:06

## 2024-01-30 RX ADMIN — PIPERACILLIN AND TAZOBACTAM 25 GRAM(S): 4; .5 INJECTION, POWDER, LYOPHILIZED, FOR SOLUTION INTRAVENOUS at 05:09

## 2024-01-30 RX ADMIN — HYDROMORPHONE HYDROCHLORIDE 30 MILLILITER(S): 2 INJECTION INTRAMUSCULAR; INTRAVENOUS; SUBCUTANEOUS at 19:06

## 2024-01-30 RX ADMIN — Medication 1 TABLET(S): at 11:45

## 2024-01-30 RX ADMIN — Medication 500 MILLIGRAM(S): at 05:24

## 2024-01-30 RX ADMIN — Medication 100 MILLIEQUIVALENT(S): at 03:05

## 2024-01-30 RX ADMIN — PANTOPRAZOLE SODIUM 40 MILLIGRAM(S): 20 TABLET, DELAYED RELEASE ORAL at 17:56

## 2024-01-30 RX ADMIN — PANTOPRAZOLE SODIUM 40 MILLIGRAM(S): 20 TABLET, DELAYED RELEASE ORAL at 05:09

## 2024-01-30 RX ADMIN — PREGABALIN 1000 MICROGRAM(S): 225 CAPSULE ORAL at 11:45

## 2024-01-30 RX ADMIN — LIDOCAINE 1 PATCH: 4 CREAM TOPICAL at 07:13

## 2024-01-30 RX ADMIN — Medication 500 MILLIGRAM(S): at 11:45

## 2024-01-30 RX ADMIN — Medication 1 TABLET(S): at 11:46

## 2024-01-30 RX ADMIN — CHLORHEXIDINE GLUCONATE 1 APPLICATION(S): 213 SOLUTION TOPICAL at 05:09

## 2024-01-30 RX ADMIN — Medication 1 PACKET(S): at 20:46

## 2024-01-30 RX ADMIN — SENNA PLUS 2 TABLET(S): 8.6 TABLET ORAL at 21:25

## 2024-01-30 RX ADMIN — PIPERACILLIN AND TAZOBACTAM 25 GRAM(S): 4; .5 INJECTION, POWDER, LYOPHILIZED, FOR SOLUTION INTRAVENOUS at 14:06

## 2024-01-30 RX ADMIN — PIPERACILLIN AND TAZOBACTAM 25 GRAM(S): 4; .5 INJECTION, POWDER, LYOPHILIZED, FOR SOLUTION INTRAVENOUS at 21:25

## 2024-01-30 RX ADMIN — Medication 200 MILLIGRAM(S): at 05:09

## 2024-01-30 RX ADMIN — HYDROMORPHONE HYDROCHLORIDE 30 MILLILITER(S): 2 INJECTION INTRAMUSCULAR; INTRAVENOUS; SUBCUTANEOUS at 07:03

## 2024-01-30 RX ADMIN — POLYETHYLENE GLYCOL 3350 17 GRAM(S): 17 POWDER, FOR SOLUTION ORAL at 11:46

## 2024-01-30 RX ADMIN — LIDOCAINE 1 PATCH: 4 CREAM TOPICAL at 20:47

## 2024-01-30 RX ADMIN — Medication 200 MILLIGRAM(S): at 12:17

## 2024-01-30 RX ADMIN — Medication 100 MILLIGRAM(S): at 11:45

## 2024-01-30 RX ADMIN — Medication 200 MILLIGRAM(S): at 00:57

## 2024-01-30 RX ADMIN — QUETIAPINE FUMARATE 75 MILLIGRAM(S): 200 TABLET, FILM COATED ORAL at 20:46

## 2024-01-30 RX ADMIN — Medication 500 MILLIGRAM(S): at 01:11

## 2024-01-30 RX ADMIN — LIDOCAINE 1 PATCH: 4 CREAM TOPICAL at 09:56

## 2024-01-30 RX ADMIN — DESMOPRESSIN ACETATE 1 MICROGRAM(S): 0.1 TABLET ORAL at 21:26

## 2024-01-30 RX ADMIN — ENOXAPARIN SODIUM 40 MILLIGRAM(S): 100 INJECTION SUBCUTANEOUS at 17:56

## 2024-01-30 NOTE — PROGRESS NOTE ADULT - ASSESSMENT
48yoM w/ PMHx hepatitis C and EtOH abuse was transferred from OSH on 1/6 for level 1 trauma eval. He was found down with multiple 4-11 left-sided rib fractures and flail chest, and had a chest tube placed at OSH. S/p VATS 1/10 w/ partial lung decortication and thoracoscopic removal of intrapleural foreign body. S/p chest tube removal 1/14, downgraded to floor 1/18, returned to SICU after fever 102.3 on 1/20- s/p L thoracotomy and decortication (1/26).      Plan:  -Thoracic Surgery Recs for chest tubes  -Monitor CT output  -Analgesia and antiemetics as needed  -Continue abx   -Regular diet   -Appreciate excellent care per SICU     Trauma Surgery #[id number]

## 2024-01-30 NOTE — PROGRESS NOTE ADULT - SUBJECTIVE AND OBJECTIVE BOX
Day 4\3 of Anesthesia Pain Management Service    SUBJECTIVE: I'm doing ok    Pain Scale Score:	[X] Refer to charted pain scores    THERAPY:    [ ] IV PCA Morphine		[ ] 5 mg/mL	[ ] 1 mg/mL  [X] IV PCA Hydromorphone	[ ] 5 mg/mL	[X] 1 mg/mL  [ ] IV PCA Fentanyl		[ ] 50 micrograms/mL    Demand dose: 0.2 mg     Lockout: 6 minutes   Continuous Rate: 0 mg/hr  4 Hour Limit: 4 mg    MEDICATIONS  (STANDING):  acetaminophen   IVPB .. 500 milliGRAM(s) IV Intermittent every 6 hours  ascorbic acid 500 milliGRAM(s) Oral daily  chlorhexidine 2% Cloths 1 Application(s) Topical <User Schedule>  cyanocobalamin 1000 MICROGram(s) Oral daily  enoxaparin Injectable 40 milliGRAM(s) SubCutaneous every 24 hours  folic acid 1 milliGRAM(s) Oral daily  HYDROmorphone PCA (1 mG/mL) 30 milliLiter(s) PCA Continuous PCA Continuous  influenza   Vaccine 0.5 milliLiter(s) IntraMuscular once  lidocaine   4% Patch 1 Patch Transdermal every 24 hours  melatonin 5 milliGRAM(s) Oral at bedtime  multivitamin 1 Tablet(s) Oral daily  pantoprazole  Injectable 40 milliGRAM(s) IV Push every 12 hours  piperacillin/tazobactam IVPB.. 3.375 Gram(s) IV Intermittent every 8 hours  polyethylene glycol 3350 17 Gram(s) Oral daily  QUEtiapine 75 milliGRAM(s) Oral <User Schedule>  senna 2 Tablet(s) Oral at bedtime  sodium chloride 0.9%. 1000 milliLiter(s) (20 mL/Hr) IV Continuous <Continuous>  thiamine 100 milliGRAM(s) Oral daily    MEDICATIONS  (PRN):  calcium carbonate    500 mG (Tums) Chewable 1 Tablet(s) Chew every 6 hours PRN Heartburn  diphenhydrAMINE 25 milliGRAM(s) Oral every 4 hours PRN Pruritus  naloxone Injectable 0.1 milliGRAM(s) IV Push every 3 minutes PRN For ANY of the following changes in patient status:  A. RR LESS THAN 10 breaths per minute, B. Oxygen saturation LESS THAN 90%, C. Sedation score of 6  ondansetron Injectable 4 milliGRAM(s) IV Push every 6 hours PRN Nausea      OBJECTIVE:    Sedation Score:	[ X] Alert 	[ ] Drowsy 	[ ] Arousable	[ ] Asleep	[ ] Unresponsive    Side Effects:	[X ] None	[ ] Nausea	[ ] Vomiting	[ ] Pruritus  		[ ] Other:    Vital Signs Last 24 Hrs  T(C): 36.3 (30 Jan 2024 07:00), Max: 37.2 (29 Jan 2024 11:00)  T(F): 97.4 (30 Jan 2024 07:00), Max: 98.9 (29 Jan 2024 11:00)  HR: 108 (30 Jan 2024 09:00) (98 - 118)  BP: 146/86 (30 Jan 2024 09:00) (109/65 - 146/86)  BP(mean): 109 (30 Jan 2024 09:00) (80 - 111)  RR: 30 (30 Jan 2024 09:00) (24 - 46)  SpO2: 94% (30 Jan 2024 09:00) (92% - 98%)    Parameters below as of 30 Jan 2024 07:00  Patient On (Oxygen Delivery Method): room air        ASSESSMENT/ PLAN    Therapy to  be:               [X] Continued   [ ] Discontinued   [ ] Changed to PRN Analgesics    Documentation and Verification of current medications:   [X] Done	[ ] Not done, not eligible    Comments: Endorsing good analgesia with PCA. Total PCA use 18.6mg / 14.5hrs. Using 2-3x/hr. Reeducated to use. CTx3

## 2024-01-30 NOTE — PROGRESS NOTE ADULT - ASSESSMENT
This is a  54yo M w pmhx of Hep C and possible EtOH use disorder who presents after being found down. Intubated for respiratory distress and agitation 1/8. Pt has displaced 4-11 L rib fx with flail segment.     1/9 VSS: NPO after MN. Plan for Rib plating WED 1/10/24  1/10 s/p Left VATS, with partial lung decortication  Thoracoscopic removal of intrapleural foreign body    1/11VSS intubated sedated on pressors  chest tube lws   1/12 VSS, pt still remains intubated/sedated, getting phenobarbital for agitation. Left chest tube output 75cc/24h placed on water seal this AM. 1U PRBC -H/H 7.1/20. Continue care per SICU team  1/13 Maintain L pleural tube waterseal Daily CXR Care as per SICU team  1/14 Left pleural tube removed CXR ordered; thoracic signed off  1/20 thoracic surgery reconsulted for left pleural effusion vs hemothorax; d/w Dr. Sepulveda; chest ct done- Dr. Sepulveda reviewed; call IR for potential drainage monday left pocket   1/21 VSS, pt still febrile tmax 103, WBC 8.4, IR consulted for drainage of complex left pleural effusion on CT Chest. Plan for Monday.   1/22 VSS; Tm 100.8 this am. WBC WNL. IR pending drainage of L effusion today   1/23 VSS, s/p left pigtail by IR yesterday, minimal drainage 20cc/24h. Recommend flushing pigtail and placing to LWS. Plan to do tPA MIST protocol tomorrow.   1/24     vss   min drainage from Lt  PTC  1/25 VSS left chest tube drainage 100/230  TPA today-   OR for  1/26 please type and screen Hibiclens  and NPO at midnight  hold  Lovenox   1/26 L Thoracotomy/Decort  3 chest tubes suction x 48 hours  1/27 L thoracotomy maintain 3 left pleural tubes to suction     Recommend fluid restrict for hyponatremia  1/28 VSSL thoracotomy maintain 3 left pleural tubes to suction    today \  1/29 VSS, maintain 3 CT to suction,  today   1/29 VSS, 2U PRBC yesterday for GIB, improving 8/23 chest tube output minimal maintain 3 chest tubes to LWS  1/20     lt side chest xray  opacification, room air SAT 94 percent   min drainage   from 3 chest tubes  serosanguinous

## 2024-01-30 NOTE — CONSULT NOTE ADULT - ASSESSMENT
48M with PMHx hepatitis C (diagnosed many years ago, never treated), depression transferred from outside hospital for numerous L sided rib fractures with flail chest after being found down. S/p chest tube placement, hemothorax, pRBC transfusion and admitted to [facility] SICU for further management. S/p VATS 1/10 with partial lung decortication, chest tube removed 1/14. S/p chest tube placement 1/22 by IR for new left effusion, s/p L Thoracotomy/Decort 1/26 w/ 3 chest tubes to suction. Hospital course is complicated by anemia requiring transfusions. GI is consulted for positive FOBT.     1. Positive FOBT  2. Anemia  3. Flail chest, s/p VATS decortication and chest tube placement  4. Splenic laceration  5. Subcutaneous hematomas  Patient has anemia and positive FOBT without overt bleeding from the gastrointestinal tract. Do not suspect clinically significant GI bleeding at this time. Anemia is likely multifactorial and may be related to trauma, splenic laceration, multiple hematomas   PLT > 500k, coags normal  No prior endoscopy or colonoscopy.   At this time, risks of endoscopic procedures outweigh the benefit given acute illness, medical complexity and low likelihood of meaningful acute intervention.     Recommendations:  - Check pretransfusion iron studies & ferritin  - Monitor for clinical signs of GI bleeding, track stool output and color  - Trend CBC and transfuse as needed  - F/u outpatient GI for EGD/colonoscopy  - Remainder per primary     Follow up in Gastroenterology Clinic: [phone] ([facility] at [address] [place] [address]) or [phone] ([facility] at [address] [place] [address]) or [phone] ([facility] at [address] [place] [address])  or [place] Office: [phone] ([address] [place] [address]. [address] [place], [address])    Recommendations preliminary until signed by attending.     Sánchez Woodward MD  Gastroenterology/Hepatology Fellow  Available via [employer] Teams  Pager: [phone]    NON-URGENT CONSULTS:  Please email [email or web address]  [email or web address]  AT NIGHT AND ON WEEKENDS:  Contact on-call GI fellow via answering service ([phone]) from 5pm-8am and on weekends/holidays  MONDAY-FRIDAY 8AM-5PM:  Pager# [phone] ([facility]) or [phone] ([facility])   48M with PMHx hepatitis C (diagnosed many years ago, never treated), depression transferred from outside hospital for numerous L sided rib fractures with flail chest after being found down. S/p chest tube placement, hemothorax, pRBC transfusion and admitted to [facility] SICU for further management. S/p VATS 1/10 with partial lung decortication, chest tube removed 1/14. S/p chest tube placement 1/22 by IR for new left effusion, s/p L Thoracotomy/Decort 1/26 w/ 3 chest tubes to suction. Hospital course is complicated by anemia requiring transfusions. GI is consulted for positive FOBT.     1. Positive FOBT  2. Anemia  3. Flail chest, s/p VATS decortication and chest tube placement  4. Splenic laceration  5. Subcutaneous hematomas  Patient has anemia and positive FOBT without overt bleeding from the gastrointestinal tract. Do not suspect clinically significant GI bleeding at this time. Anemia is likely multifactorial and may be related to trauma, splenic laceration, multiple hematomas   PLT > 500k, coags normal  No prior endoscopy or colonoscopy.   At this time, risks of endoscopic procedures outweigh the benefit given acute illness, medical complexity and low likelihood of meaningful acute intervention.     Recommendations:  - Check pretransfusion iron studies & ferritin  - Monitor for clinical signs of GI bleeding, track stool output and color  - Trend CBC and transfuse as needed  - F/u outpatient GI for EGD/colonoscopy and age appropriate colon cancer screening  - Remainder per primary     Follow up in Gastroenterology Clinic: [phone] ([facility] at [address] [place] [address]) or [phone] ([facility] at [address] [place] [address]) or [phone] ([facility] at [address] [place] [address])  or [place] Office: [phone] ([address] [place] [address]. [address] [place], [address])    Recommendations preliminary until signed by attending.     Sánchez Woodward MD  Gastroenterology/Hepatology Fellow  Available via Microsoft Teams  Pager: [phone]    NON-URGENT CONSULTS:  Please email [email or web address]  [email or web address]  AT NIGHT AND ON WEEKENDS:  Contact on-call GI fellow via answering service ([phone]) from 5pm-8am and on weekends/holidays  MONDAY-FRIDAY 8AM-5PM:  Pager# [phone] ([facility]) or [phone] ([facility])

## 2024-01-30 NOTE — PROGRESS NOTE ADULT - SUBJECTIVE AND OBJECTIVE BOX
Plastic Surgery Progress Note (pg [facility]: [id number], [facility]: [phone])    SUBJECTIVE    The patient was seen and examined.     INTERVAL EVENTS:  - 1unit pRBC  - Pantoprazole q12h 2/2 +occult blood - held off on GI consult  - f/u urine lytes 1/29    OBJECTIVE  ___________________________________________________  VITAL SIGNS / I&O's   Vital Signs Last 24 Hrs  T(C): 36.7 (29 Jan 2024 19:00), Max: 37.4 (29 Jan 2024 03:00)  T(F): 98.1 (29 Jan 2024 19:00), Max: 99.3 (29 Jan 2024 03:00)  HR: 98 (29 Jan 2024 23:00) (98 - 118)  BP: 114/76 (29 Jan 2024 23:00) (109/57 - 141/91)  BP(mean): 89 (29 Jan 2024 23:00) (75 - 111)  RR: 30 (29 Jan 2024 23:00) (24 - 48)  SpO2: 95% (29 Jan 2024 23:00) (91% - 98%)    Parameters below as of 29 Jan 2024 19:00  Patient On (Oxygen Delivery Method): room air          28 Jan 2024 07:01  -  29 Jan 2024 07:00  --------------------------------------------------------  IN:    IV PiggyBack: 200 mL    IV PiggyBack: 962.3 mL    PRBCs (Packed Red Blood Cells): 600 mL    sodium chloride 0.9%: 200 mL    sodium chloride 0.9%: 420 mL  Total IN: 2382.3 mL    OUT:    Chest Tube (mL): 50 mL    Chest Tube (mL): 30 mL    Chest Tube (mL): 20 mL    VAC (Vacuum Assisted Closure) System (mL): 0 mL    Voided (mL): 4225 mL  Total OUT: 4325 mL    Total NET: -1942.7 mL      29 Jan 2024 07:01  -  30 Jan 2024 00:15  --------------------------------------------------------  IN:    IV PiggyBack: 150 mL    IV PiggyBack: 50 mL    Oral Fluid: 1350 mL    sodium chloride 0.9%: 340 mL  Total IN: 1890 mL    OUT:    Chest Tube (mL): 70 mL    Chest Tube (mL): 100 mL    Chest Tube (mL): 0 mL    Voided (mL): 2700 mL  Total OUT: 2870 mL    Total NET: -980 mL        ___________________________________________________  LABS                        8.1    11.00 )-----------( 546      ( 29 Jan 2024 22:15 )             23.8     29 Jan 2024 22:15    136    |  106    |  6      ----------------------------<  102    3.9     |  21     |  1.08     Ca    7.4        29 Jan 2024 22:15  Phos  3.1       29 Jan 2024 22:15  Mg     1.9       29 Jan 2024 22:15    TPro  5.2    /  Alb  2.0    /  TBili  0.4    /  DBili  x      /  AST  52     /  ALT  55     /  AlkPhos  291    29 Jan 2024 22:15    PT/INR - ( 29 Jan 2024 22:15 )   PT: 12.3 sec;   INR: 1.12 ratio         PTT - ( 29 Jan 2024 22:15 )  PTT:33.2 sec  CAPILLARY BLOOD GLUCOSE            Urinalysis Basic - ( 29 Jan 2024 22:15 )    Color: x / Appearance: x / SG: x / pH: x  Gluc: 102 mg/dL / Ketone: x  / Bili: x / Urobili: x   Blood: x / Protein: x / Nitrite: x   Leuk Esterase: x / RBC: x / WBC x   Sq Epi: x / Non Sq Epi: x / Bacteria: x      ___________________________________________________  MICRO  Recent Cultures:  Specimen Source: .Tissue Other, 01-26 @ 22:03; Results   Testing in progress; Gram Stain:   No polymorphonuclear cells seen per low power field  No organisms seen per oil power field; Organism: --    ___________________________________________________  MEDICATIONS  (STANDING):  acetaminophen   IVPB .. 500 milliGRAM(s) IV Intermittent every 6 hours  ascorbic acid 500 milliGRAM(s) Oral daily  chlorhexidine 2% Cloths 1 Application(s) Topical <User Schedule>  cyanocobalamin 1000 MICROGram(s) Oral daily  enoxaparin Injectable 40 milliGRAM(s) SubCutaneous every 24 hours  folic acid 1 milliGRAM(s) Oral daily  HYDROmorphone PCA (1 mG/mL) 30 milliLiter(s) PCA Continuous PCA Continuous  influenza   Vaccine 0.5 milliLiter(s) IntraMuscular once  lidocaine   4% Patch 1 Patch Transdermal every 24 hours  magnesium sulfate  IVPB 1 Gram(s) IV Intermittent once  melatonin 5 milliGRAM(s) Oral at bedtime  multivitamin 1 Tablet(s) Oral daily  pantoprazole  Injectable 40 milliGRAM(s) IV Push every 12 hours  piperacillin/tazobactam IVPB.. 3.375 Gram(s) IV Intermittent every 8 hours  polyethylene glycol 3350 17 Gram(s) Oral daily  potassium chloride  10 mEq/100 mL IVPB 10 milliEquivalent(s) IV Intermittent once  QUEtiapine 75 milliGRAM(s) Oral <User Schedule>  senna 2 Tablet(s) Oral at bedtime  sodium chloride 0.9%. 1000 milliLiter(s) (20 mL/Hr) IV Continuous <Continuous>  thiamine 100 milliGRAM(s) Oral daily    MEDICATIONS  (PRN):  calcium carbonate    500 mG (Tums) Chewable 1 Tablet(s) Chew every 6 hours PRN Heartburn  diphenhydrAMINE 25 milliGRAM(s) Oral every 4 hours PRN Pruritus  HYDROmorphone  Injectable 0.5 milliGRAM(s) IV Push every 3 hours PRN breakthrough pain  naloxone Injectable 0.1 milliGRAM(s) IV Push every 3 minutes PRN For ANY of the following changes in patient status:  A. RR LESS THAN 10 breaths per minute, B. Oxygen saturation LESS THAN 90%, C. Sedation score of 6  ondansetron Injectable 4 milliGRAM(s) IV Push every 6 hours PRN Nausea

## 2024-01-30 NOTE — PROGRESS NOTE ADULT - ASSESSMENT
48M with PMHx hepatitis C (diagnosed many years ago, never treated), depression transferred from outside hospital for trauma eval. Patient found down by roommate after unknown amount of time, found with multiple 4-11 L sided rib fractures with flail chest. Chest tube placed there with >1L output (old blood). Patient also with tachypnea, concern for respiratory failure started on BiPAP. Level 1 called for transient hypotension, patient given 2 unit PRBC in [facility] ED. Patient admitted to SICU for hemodynamic monitoring, pain management. S/p VATS 1/10 with partial lung decortication, chest tube removed 1/14. S/p chest tube placement 1/22 by IR for new left effusion, s/p L Thoracotomy/Decort 1/26 w/ 3 chest tubes to suction.    PLAN  Neurologic:  - A&Ox4  - Seroquel 75mg QHS  - Tylenol, IV PCA for pain from CTs     Respiratory:  - RA  - s/p VATS 1/10 with discovery of Fibropurulent exudative adhesions and removal of retained foreign material likely the tip of a glove, s/p washout, with new 28f chest tube placed > removed 1/14  - 1/20 CT with complex collection L pleural space, s/p drainage by IR 1/22, 20cc bloody fluid drained, pigtail placed  - Incentive spirometry, Duonebs   - CTS OR 1/26 Open left thoracotomy, decortication and washout, 3 chest tubes placed  - chest tubes to suction, monitor output > Follow-up water seal timing with thoracic    Cardiovascular: tachycardia  - sammie gtt off, MAP >65  - Metoprolol 50mg q8h for tachycardia - d/c'd 1/26  - Hgb/Hct 7.1/21.3 1U PRBC, post 8.3  - Hypotensive/Tachycardic (, SPB 70s) 500 cc LR bolus x1, lopressor d/c; currently (-120, SBP 90)    Gastrointestinal/Nutrition:  - Abd US 1/24 - mild hepatomegaly, redemonstration of evolving splenic hematomas  - regular diet    Genitourinary/Renal:  - Supplement electrolytes PRN  - IVF @ 100    Hematologic:  - Chemical VTE ppx with Lovenox   - Duplex/doppler 1/23 negative    Infectious Disease: Empyema   - s/p zosyn 1/7-1/24  - 1/22 L empyema cultures NGTD  - 1/26-1/29 Restart zosyn. Repeat BCx f/u     Endocrine:  - No active issues, no history of DM    MSK:   - Sat on dee tray(?) wound right lateral buttock - continue to monitor    lines:   - Peripheral IVs

## 2024-01-30 NOTE — CONSULT NOTE ADULT - SUBJECTIVE AND OBJECTIVE BOX
Chief Complaint:  Trauma 1 activation    HPI:    48M with PMHx hepatitis C (diagnosed many years ago, never treated), depression transferred from outside hospital for trauma eval. Patient found down by roommate after unknown amount of time, found with multiple 4-11 L sided rib fractures with flail chest. Chest tube placed there with >1L output (old blood). Patient was started on BiPAP for respiratory distress and Level 1 called for transient hypotension, patient given 2 unit PRBC in [facility] ED. Patient admitted to SICU for hemodynamic monitoring, pain management. S/p VATS 1/10 with partial lung decortication, chest tube removed 1/14. S/p chest tube placement 1/22 by IR for new left effusion, s/p L Thoracotomy/Decort 1/26 w/ 3 chest tubes to suction. Hospital course is complicated by anemia requiring transfusions. GI is consulted for positive FOBT. Patient is passing brown stool without reports of melena, hematochezia, abdominal pain, nausea or vomiting. He denies prior endoscopy or colonoscopy.     Allergies:  No Known Allergies    Hospital Medications:  ascorbic acid 500 milliGRAM(s) Oral daily  calcium carbonate    500 mG (Tums) Chewable 1 Tablet(s) Chew every 6 hours PRN  chlorhexidine 2% Cloths 1 Application(s) Topical <User Schedule>  cyanocobalamin 1000 MICROGram(s) Oral daily  diphenhydrAMINE 25 milliGRAM(s) Oral every 4 hours PRN  enoxaparin Injectable 40 milliGRAM(s) SubCutaneous every 24 hours  folic acid 1 milliGRAM(s) Oral daily  HYDROmorphone PCA (1 mG/mL) 30 milliLiter(s) PCA Continuous PCA Continuous  influenza   Vaccine 0.5 milliLiter(s) IntraMuscular once  lidocaine   4% Patch 1 Patch Transdermal every 24 hours  melatonin 5 milliGRAM(s) Oral at bedtime  multivitamin 1 Tablet(s) Oral daily  naloxone Injectable 0.1 milliGRAM(s) IV Push every 3 minutes PRN  ondansetron Injectable 4 milliGRAM(s) IV Push every 6 hours PRN  pantoprazole  Injectable 40 milliGRAM(s) IV Push every 12 hours  piperacillin/tazobactam IVPB.. 3.375 Gram(s) IV Intermittent every 8 hours  polyethylene glycol 3350 17 Gram(s) Oral daily  QUEtiapine 75 milliGRAM(s) Oral <User Schedule>  senna 2 Tablet(s) Oral at bedtime  sodium chloride 0.9%. 1000 milliLiter(s) IV Continuous <Continuous>  thiamine 100 milliGRAM(s) Oral daily      PMHX/PSHX:  No pertinent past medical history    No pertinent past medical history    No significant past surgical history    Family history:  No pertinent family history in first degree relatives    Social History: no smoking    ROS:   See HPI    PHYSICAL EXAM:   GENERAL:  NAD, resting comfortably in bed  HEENT:  Sclera anicteric  CHEST:  Increased effort. Multiple chest tubes in place with bloody output  HEART:  HDS  ABDOMEN:  Soft, non-tender, non-distended  EXTREMITIES:  No edema  SKIN:  Warm & Dry.   NEURO:  Alert, conversant, no focal deficit    Vital Signs:  Vital Signs Last 24 Hrs  T(C): 37.3 (30 Jan 2024 11:00), Max: 37.3 (30 Jan 2024 11:00)  T(F): 99.2 (30 Jan 2024 11:00), Max: 99.2 (30 Jan 2024 11:00)  HR: 118 (30 Jan 2024 12:00) (98 - 118)  BP: 145/88 (30 Jan 2024 12:00) (109/65 - 152/92)  BP(mean): 111 (30 Jan 2024 12:00) (80 - 117)  RR: 34 (30 Jan 2024 12:00) (24 - 46)  SpO2: 94% (30 Jan 2024 12:00) (92% - 98%)    Parameters below as of 30 Jan 2024 07:00  Patient On (Oxygen Delivery Method): room air      Daily     Daily     LABS:                        8.1    11.00 )-----------( 546      ( 29 Jan 2024 22:15 )             23.8     Mean Cell Volume: 83.2 fl (01-29-24 @ 22:15)    01-30    137  |  106  |  6<L>  ----------------------------<  108<H>  6.0<H>   |  16<L>  |  0.93    Ca    8.3<L>      30 Jan 2024 10:55  Phos  3.1     01-29  Mg     1.9     01-29    TPro  6.4  /  Alb  2.3<L>  /  TBili  0.5  /  DBili  x   /  AST  78<H>  /  ALT  66<H>  /  AlkPhos  399<H>  01-30    LIVER FUNCTIONS - ( 30 Jan 2024 10:55 )  Alb: 2.3 g/dL / Pro: 6.4 g/dL / ALK PHOS: 399 U/L / ALT: 66 U/L / AST: 78 U/L / GGT: x           PT/INR - ( 29 Jan 2024 22:15 )   PT: 12.3 sec;   INR: 1.12 ratio         PTT - ( 29 Jan 2024 22:15 )  PTT:33.2 sec  Urinalysis Basic - ( 30 Jan 2024 10:55 )    Color: x / Appearance: x / SG: x / pH: x  Gluc: 108 mg/dL / Ketone: x  / Bili: x / Urobili: x   Blood: x / Protein: x / Nitrite: x   Leuk Esterase: x / RBC: x / WBC x   Sq Epi: x / Non Sq Epi: x / Bacteria: x                              8.1    11.00 )-----------( 546      ( 29 Jan 2024 22:15 )             23.8                         8.0    11.73 )-----------( 507      ( 28 Jan 2024 22:04 )             23.7                         6.9    10.29 )-----------( 497      ( 28 Jan 2024 17:43 )             20.6                         6.4    11.54 )-----------( 518      ( 28 Jan 2024 08:15 )             19.1                         5.9    10.21 )-----------( 484      ( 28 Jan 2024 06:27 )             17.5     Imaging:    < from: CT Chest w/ IV Cont (01.30.24 @ 13:13) >  IMPRESSION:    1.  Secretions in the left lower lobe bronchus and in the segmental   airways the left lower lobe.  2.  The loculated left pleural effusion has decreased in size since   1/20/2024.  3.  Small amount of air in the left pleural space.  4.  Splenic laceration.  5.  No change in the left sided rib fractures since 1/20/2024.    < end of copied text >    < from: US Abdomen Doppler (01.24.24 @ 13:03) >  IMPRESSION:  Mild hepatomegaly.    No duplex evidence of portal or hepatic venous thrombosis.    Redemonstration of evolving splenic hematomas    < end of copied text >    < from: CT Abdomen and Pelvis w/ IV Cont (01.20.24 @ 15:53) >    IMPRESSION:    Enlarging complex left pleural effusion, with internal foci of air and   split pleural sign suspicious for empyema.    Left flank edema and subcutaneous hematomas.    Mediastinal and supraclavicular adenopathy.    Redemonstration of splenic lacerations and hematomas.    < end of copied text >

## 2024-01-30 NOTE — PROGRESS NOTE ADULT - PROBLEM SELECTOR PLAN 1
Suspected fracture of rib on left side, closed. Left Rib Fx 4-11th /Hemothorax  1/10 LT VATS, with partial lung decortication  Thoracoscopic removal of intrapleural foreign body       1/20 thoracic surgery reconsult f left pleural effusion vs hemothorax   1/22 s/p left pigtail by IR  1/26 Thoracotomy Decortication w/ washout>CT x 3 placed  Maintain left CT x 3 to suction to LWS  obtain urgent chest CT  to ro  bleed,  keep NPO for now  Dr Sepulveda to assess CT scan and see pt

## 2024-01-30 NOTE — PROGRESS NOTE ADULT - SUBJECTIVE AND OBJECTIVE BOX
VITAL SIGNS      Vital Signs Last 24 Hrs  T(C): 37.3 (01-30-24 @ 11:00), Max: 37.3 (01-30-24 @ 11:00)  T(F): 99.2 (01-30-24 @ 11:00), Max: 99.2 (01-30-24 @ 11:00)  HR: 118 (01-30-24 @ 12:00) (98 - 118)  BP: 145/88 (01-30-24 @ 12:00) (109/65 - 152/92)  RR: 34 (01-30-24 @ 12:00) (24 - 46)  SpO2: 94% (01-30-24 @ 12:00) (92% - 98%)                   Daily     Daily       Bilirubin Total: 0.5 mg/dL (01-30 @ 10:55)  Bilirubin Total: 0.4 mg/dL (01-29 @ 22:15)    CAPILLARY BLOOD GLUCOSE              Drains:              3         L Pleural  [  ]  Drainage:                             PHYSICAL EXAM    Neurology: alert and oriented x 3, moves all extremities with no defecits  CV :  RRR  lt thoracotomy  staples   CDI    Abdomen: soft, nontender, nondistended, positive bowel sounds,   Extremities:     no edema

## 2024-01-30 NOTE — PROGRESS NOTE ADULT - SUBJECTIVE AND OBJECTIVE BOX
SURGERY DAILY PROGRESS NOTE:     Overnight Events:  No acute events overnight.    SUBJECTIVE: Patient seen and evaluated on AM rounds. Pt is resting comfortably in bed with no complaints.    OBJECTIVE:  Vital Signs Last 24 Hrs  T(C): 36.9 (30 Jan 2024 15:00), Max: 37.3 (30 Jan 2024 11:00)  T(F): 98.5 (30 Jan 2024 15:00), Max: 99.2 (30 Jan 2024 11:00)  HR: 106 (30 Jan 2024 15:00) (98 - 118)  BP: 141/105 (30 Jan 2024 15:00) (109/65 - 152/92)  BP(mean): 117 (30 Jan 2024 15:00) (80 - 117)  RR: 28 (30 Jan 2024 15:00) (24 - 46)  SpO2: 95% (30 Jan 2024 15:00) (92% - 96%)    Parameters below as of 30 Jan 2024 07:00  Patient On (Oxygen Delivery Method): room air      I&O's Detail    29 Jan 2024 07:01  -  30 Jan 2024 07:00  --------------------------------------------------------  IN:    IV PiggyBack: 100 mL    IV PiggyBack: 200 mL    IV PiggyBack: 250 mL    Oral Fluid: 1590 mL    sodium chloride 0.9%: 480 mL  Total IN: 2620 mL    OUT:    Chest Tube (mL): 80 mL    Chest Tube (mL): 110 mL    Chest Tube (mL): 0 mL    VAC (Vacuum Assisted Closure) System (mL): 100 mL    Voided (mL): 3400 mL  Total OUT: 3690 mL    Total NET: -1070 mL      30 Jan 2024 07:01  -  30 Jan 2024 16:07  --------------------------------------------------------  IN:    IV PiggyBack: 50 mL    Oral Fluid: 360 mL    sodium chloride 0.9%: 160 mL  Total IN: 570 mL    OUT:    Voided (mL): 1300 mL  Total OUT: 1300 mL    Total NET: -730 mL        Daily     Daily     LABS:                        8.1    11.00 )-----------( 546      ( 29 Jan 2024 22:15 )             23.8     01-30    135  |  105  |  6<L>  ----------------------------<  127<H>  4.0   |  20<L>  |  1.00    Ca    7.6<L>      30 Jan 2024 14:27  Phos  2.6     01-30  Mg     2.0     01-30    TPro  5.3<L>  /  Alb  2.0<L>  /  TBili  0.3  /  DBili  x   /  AST  40  /  ALT  49<H>  /  AlkPhos  306<H>  01-30    PT/INR - ( 29 Jan 2024 22:15 )   PT: 12.3 sec;   INR: 1.12 ratio         PTT - ( 29 Jan 2024 22:15 )  PTT:33.2 sec  Urinalysis Basic - ( 30 Jan 2024 14:27 )    Color: x / Appearance: x / SG: x / pH: x  Gluc: 127 mg/dL / Ketone: x  / Bili: x / Urobili: x   Blood: x / Protein: x / Nitrite: x   Leuk Esterase: x / RBC: x / WBC x   Sq Epi: x / Non Sq Epi: x / Bacteria: x        PHYSICAL EXAM:  Gen: NAD, A&Ox3  Pulm: No respiratory distress, no subcostal retractions. On nasal cannula. 3 CT to suction.  CV: Sinus tachycardia, no JVD  Abd: Soft, NT, ND  Extremities: warm and well perfused

## 2024-01-30 NOTE — PROGRESS NOTE ADULT - ATTENDING COMMENTS
etoh misuse with withdrawal, required washout of chest s/p chest tubes x3    ON: no major events    alert and awake  seroquel nightly, PCA in place (0.4 mg hydromorphone)  standing tylenol  on RA  s/p thoracotomy 1/26  CT all to suction, minimal output. cardiothoracic follows , will discuss regarding output as well as   AM CXR left sided effusion worsening, concerning given low output  persistent tachycardia, sinus tach, stable hemodynamics  on diet , poor intake, add protein supplements  ppi for + FOBT  GI consulted, ? EGD when able  no IV fluids  voiding 3.4 lit, balance -1.2 liters. repeat send urine osm and lytes w serum and consult nephrology given concern for DI vs SIADH  lovenox VTE PPX  zosyn for empyema, no end date, afebrile  good glycemic control  buttock wound , wound vac , followed by PT  PT OT working with him    PIV only

## 2024-01-31 ENCOUNTER — TRANSCRIPTION ENCOUNTER (OUTPATIENT)
Age: 49
End: 2024-01-31

## 2024-01-31 LAB
ALBUMIN SERPL ELPH-MCNC: 2 G/DL — LOW (ref 3.3–5)
ALP SERPL-CCNC: 293 U/L — HIGH (ref 40–120)
ALT FLD-CCNC: 37 U/L — SIGNIFICANT CHANGE UP (ref 10–45)
ANION GAP SERPL CALC-SCNC: 8 MMOL/L — SIGNIFICANT CHANGE UP (ref 5–17)
APTT BLD: 30.1 SEC — SIGNIFICANT CHANGE UP (ref 24.5–35.6)
AST SERPL-CCNC: 25 U/L — SIGNIFICANT CHANGE UP (ref 10–40)
BILIRUB SERPL-MCNC: 0.3 MG/DL — SIGNIFICANT CHANGE UP (ref 0.2–1.2)
BUN SERPL-MCNC: 6 MG/DL — LOW (ref 7–23)
CALCIUM SERPL-MCNC: 7.9 MG/DL — LOW (ref 8.4–10.5)
CHLORIDE SERPL-SCNC: 106 MMOL/L — SIGNIFICANT CHANGE UP (ref 96–108)
CO2 SERPL-SCNC: 22 MMOL/L — SIGNIFICANT CHANGE UP (ref 22–31)
CREAT SERPL-MCNC: 1.03 MG/DL — SIGNIFICANT CHANGE UP (ref 0.5–1.3)
EGFR: 90 ML/MIN/1.73M2 — SIGNIFICANT CHANGE UP
GLUCOSE SERPL-MCNC: 96 MG/DL — SIGNIFICANT CHANGE UP (ref 70–99)
HCT VFR BLD CALC: 25.9 % — LOW (ref 39–50)
HGB BLD-MCNC: 8.5 G/DL — LOW (ref 13–17)
INR BLD: 1.14 RATIO — SIGNIFICANT CHANGE UP (ref 0.85–1.18)
MAGNESIUM SERPL-MCNC: 1.9 MG/DL — SIGNIFICANT CHANGE UP (ref 1.6–2.6)
MCHC RBC-ENTMCNC: 27.8 PG — SIGNIFICANT CHANGE UP (ref 27–34)
MCHC RBC-ENTMCNC: 32.8 GM/DL — SIGNIFICANT CHANGE UP (ref 32–36)
MCV RBC AUTO: 84.6 FL — SIGNIFICANT CHANGE UP (ref 80–100)
NRBC # BLD: 0 /100 WBCS — SIGNIFICANT CHANGE UP (ref 0–0)
PHOSPHATE SERPL-MCNC: 3.6 MG/DL — SIGNIFICANT CHANGE UP (ref 2.5–4.5)
PLATELET # BLD AUTO: 621 K/UL — HIGH (ref 150–400)
POTASSIUM SERPL-MCNC: 4.3 MMOL/L — SIGNIFICANT CHANGE UP (ref 3.5–5.3)
POTASSIUM SERPL-SCNC: 4.3 MMOL/L — SIGNIFICANT CHANGE UP (ref 3.5–5.3)
PROT SERPL-MCNC: 5.5 G/DL — LOW (ref 6–8.3)
PROTHROM AB SERPL-ACNC: 11.9 SEC — SIGNIFICANT CHANGE UP (ref 9.5–13)
RBC # BLD: 3.06 M/UL — LOW (ref 4.2–5.8)
RBC # FLD: 16 % — HIGH (ref 10.3–14.5)
SODIUM SERPL-SCNC: 136 MMOL/L — SIGNIFICANT CHANGE UP (ref 135–145)
WBC # BLD: 10.3 K/UL — SIGNIFICANT CHANGE UP (ref 3.8–10.5)
WBC # FLD AUTO: 10.3 K/UL — SIGNIFICANT CHANGE UP (ref 3.8–10.5)

## 2024-01-31 PROCEDURE — 99232 SBSQ HOSP IP/OBS MODERATE 35: CPT

## 2024-01-31 PROCEDURE — 31624 DX BRONCHOSCOPE/LAVAGE: CPT | Mod: 78

## 2024-01-31 PROCEDURE — 32561 LYSE CHEST FIBRIN INIT DAY: CPT | Mod: LT,78

## 2024-01-31 PROCEDURE — 99223 1ST HOSP IP/OBS HIGH 75: CPT | Mod: GC

## 2024-01-31 PROCEDURE — 99233 SBSQ HOSP IP/OBS HIGH 50: CPT

## 2024-01-31 PROCEDURE — 97605 NEG PRS WND THER DME<=50SQCM: CPT

## 2024-01-31 PROCEDURE — 71045 X-RAY EXAM CHEST 1 VIEW: CPT | Mod: 26

## 2024-01-31 PROCEDURE — 76770 US EXAM ABDO BACK WALL COMP: CPT | Mod: 26

## 2024-01-31 PROCEDURE — 93010 ELECTROCARDIOGRAM REPORT: CPT

## 2024-01-31 PROCEDURE — 99232 SBSQ HOSP IP/OBS MODERATE 35: CPT | Mod: 25

## 2024-01-31 PROCEDURE — 31645 BRNCHSC W/THER ASPIR 1ST: CPT | Mod: 78

## 2024-01-31 RX ORDER — LIDOCAINE 4 G/100G
1 CREAM TOPICAL EVERY 24 HOURS
Refills: 0 | Status: DISCONTINUED | OUTPATIENT
Start: 2024-01-31 | End: 2024-02-06

## 2024-01-31 RX ORDER — HYDROMORPHONE HYDROCHLORIDE 2 MG/ML
0.5 INJECTION INTRAMUSCULAR; INTRAVENOUS; SUBCUTANEOUS
Refills: 0 | Status: DISCONTINUED | OUTPATIENT
Start: 2024-01-31 | End: 2024-02-01

## 2024-01-31 RX ORDER — FOLIC ACID 0.8 MG
1 TABLET ORAL DAILY
Refills: 0 | Status: DISCONTINUED | OUTPATIENT
Start: 2024-01-31 | End: 2024-02-06

## 2024-01-31 RX ORDER — PREGABALIN 225 MG/1
1000 CAPSULE ORAL DAILY
Refills: 0 | Status: DISCONTINUED | OUTPATIENT
Start: 2024-01-31 | End: 2024-02-06

## 2024-01-31 RX ORDER — CHLORHEXIDINE GLUCONATE 213 G/1000ML
1 SOLUTION TOPICAL
Refills: 0 | Status: DISCONTINUED | OUTPATIENT
Start: 2024-01-31 | End: 2024-02-06

## 2024-01-31 RX ORDER — ONDANSETRON 8 MG/1
4 TABLET, FILM COATED ORAL EVERY 6 HOURS
Refills: 0 | Status: DISCONTINUED | OUTPATIENT
Start: 2024-01-31 | End: 2024-02-02

## 2024-01-31 RX ORDER — PIPERACILLIN AND TAZOBACTAM 4; .5 G/20ML; G/20ML
3.38 INJECTION, POWDER, LYOPHILIZED, FOR SOLUTION INTRAVENOUS EVERY 8 HOURS
Refills: 0 | Status: DISCONTINUED | OUTPATIENT
Start: 2024-01-31 | End: 2024-02-02

## 2024-01-31 RX ORDER — ASCORBIC ACID 60 MG
500 TABLET,CHEWABLE ORAL DAILY
Refills: 0 | Status: DISCONTINUED | OUTPATIENT
Start: 2024-01-31 | End: 2024-02-06

## 2024-01-31 RX ORDER — QUETIAPINE FUMARATE 200 MG/1
75 TABLET, FILM COATED ORAL
Refills: 0 | Status: DISCONTINUED | OUTPATIENT
Start: 2024-01-31 | End: 2024-02-06

## 2024-01-31 RX ORDER — LANOLIN ALCOHOL/MO/W.PET/CERES
5 CREAM (GRAM) TOPICAL AT BEDTIME
Refills: 0 | Status: DISCONTINUED | OUTPATIENT
Start: 2024-01-31 | End: 2024-02-06

## 2024-01-31 RX ORDER — POLYETHYLENE GLYCOL 3350 17 G/17G
17 POWDER, FOR SOLUTION ORAL DAILY
Refills: 0 | Status: DISCONTINUED | OUTPATIENT
Start: 2024-01-31 | End: 2024-02-06

## 2024-01-31 RX ORDER — THIAMINE MONONITRATE (VIT B1) 100 MG
100 TABLET ORAL DAILY
Refills: 0 | Status: DISCONTINUED | OUTPATIENT
Start: 2024-01-31 | End: 2024-02-06

## 2024-01-31 RX ORDER — MAGNESIUM SULFATE 500 MG/ML
1 VIAL (ML) INJECTION ONCE
Refills: 0 | Status: COMPLETED | OUTPATIENT
Start: 2024-01-31 | End: 2024-01-31

## 2024-01-31 RX ORDER — ENOXAPARIN SODIUM 100 MG/ML
40 INJECTION SUBCUTANEOUS EVERY 24 HOURS
Refills: 0 | Status: DISCONTINUED | OUTPATIENT
Start: 2024-01-31 | End: 2024-02-06

## 2024-01-31 RX ORDER — DIPHENHYDRAMINE HCL 50 MG
25 CAPSULE ORAL EVERY 4 HOURS
Refills: 0 | Status: DISCONTINUED | OUTPATIENT
Start: 2024-01-31 | End: 2024-02-02

## 2024-01-31 RX ORDER — NALOXONE HYDROCHLORIDE 4 MG/.1ML
0.1 SPRAY NASAL
Refills: 0 | Status: DISCONTINUED | OUTPATIENT
Start: 2024-01-31 | End: 2024-02-01

## 2024-01-31 RX ORDER — HYDROMORPHONE HYDROCHLORIDE 2 MG/ML
30 INJECTION INTRAMUSCULAR; INTRAVENOUS; SUBCUTANEOUS
Refills: 0 | Status: DISCONTINUED | OUTPATIENT
Start: 2024-01-31 | End: 2024-02-01

## 2024-01-31 RX ORDER — ACETAMINOPHEN 500 MG
500 TABLET ORAL EVERY 6 HOURS
Refills: 0 | Status: DISCONTINUED | OUTPATIENT
Start: 2024-01-31 | End: 2024-02-06

## 2024-01-31 RX ORDER — PANTOPRAZOLE SODIUM 20 MG/1
40 TABLET, DELAYED RELEASE ORAL EVERY 12 HOURS
Refills: 0 | Status: DISCONTINUED | OUTPATIENT
Start: 2024-01-31 | End: 2024-02-01

## 2024-01-31 RX ORDER — SODIUM CHLORIDE 9 MG/ML
1000 INJECTION INTRAMUSCULAR; INTRAVENOUS; SUBCUTANEOUS
Refills: 0 | Status: DISCONTINUED | OUTPATIENT
Start: 2024-01-31 | End: 2024-02-01

## 2024-01-31 RX ORDER — CALCIUM CARBONATE 500(1250)
1 TABLET ORAL EVERY 6 HOURS
Refills: 0 | Status: DISCONTINUED | OUTPATIENT
Start: 2024-01-31 | End: 2024-02-06

## 2024-01-31 RX ORDER — SENNA PLUS 8.6 MG/1
2 TABLET ORAL AT BEDTIME
Refills: 0 | Status: DISCONTINUED | OUTPATIENT
Start: 2024-01-31 | End: 2024-02-06

## 2024-01-31 RX ADMIN — Medication 500 MILLIGRAM(S): at 13:31

## 2024-01-31 RX ADMIN — HYDROMORPHONE HYDROCHLORIDE 30 MILLILITER(S): 2 INJECTION INTRAMUSCULAR; INTRAVENOUS; SUBCUTANEOUS at 16:37

## 2024-01-31 RX ADMIN — Medication 1 MILLIGRAM(S): at 13:31

## 2024-01-31 RX ADMIN — HYDROMORPHONE HYDROCHLORIDE 30 MILLILITER(S): 2 INJECTION INTRAMUSCULAR; INTRAVENOUS; SUBCUTANEOUS at 07:08

## 2024-01-31 RX ADMIN — HYDROMORPHONE HYDROCHLORIDE 30 MILLILITER(S): 2 INJECTION INTRAMUSCULAR; INTRAVENOUS; SUBCUTANEOUS at 19:05

## 2024-01-31 RX ADMIN — PANTOPRAZOLE SODIUM 40 MILLIGRAM(S): 20 TABLET, DELAYED RELEASE ORAL at 05:16

## 2024-01-31 RX ADMIN — Medication 5 MILLIGRAM(S): at 21:10

## 2024-01-31 RX ADMIN — PREGABALIN 1000 MICROGRAM(S): 225 CAPSULE ORAL at 13:31

## 2024-01-31 RX ADMIN — LIDOCAINE 1 PATCH: 4 CREAM TOPICAL at 08:00

## 2024-01-31 RX ADMIN — SENNA PLUS 2 TABLET(S): 8.6 TABLET ORAL at 21:11

## 2024-01-31 RX ADMIN — PIPERACILLIN AND TAZOBACTAM 25 GRAM(S): 4; .5 INJECTION, POWDER, LYOPHILIZED, FOR SOLUTION INTRAVENOUS at 13:31

## 2024-01-31 RX ADMIN — Medication 100 GRAM(S): at 05:16

## 2024-01-31 RX ADMIN — ENOXAPARIN SODIUM 40 MILLIGRAM(S): 100 INJECTION SUBCUTANEOUS at 18:12

## 2024-01-31 RX ADMIN — PIPERACILLIN AND TAZOBACTAM 25 GRAM(S): 4; .5 INJECTION, POWDER, LYOPHILIZED, FOR SOLUTION INTRAVENOUS at 21:12

## 2024-01-31 RX ADMIN — PIPERACILLIN AND TAZOBACTAM 25 GRAM(S): 4; .5 INJECTION, POWDER, LYOPHILIZED, FOR SOLUTION INTRAVENOUS at 05:16

## 2024-01-31 RX ADMIN — Medication 100 MILLIGRAM(S): at 13:31

## 2024-01-31 RX ADMIN — SODIUM CHLORIDE 20 MILLILITER(S): 9 INJECTION INTRAMUSCULAR; INTRAVENOUS; SUBCUTANEOUS at 13:32

## 2024-01-31 RX ADMIN — LIDOCAINE 1 PATCH: 4 CREAM TOPICAL at 07:30

## 2024-01-31 RX ADMIN — Medication 1 TABLET(S): at 13:31

## 2024-01-31 RX ADMIN — PANTOPRAZOLE SODIUM 40 MILLIGRAM(S): 20 TABLET, DELAYED RELEASE ORAL at 18:12

## 2024-01-31 RX ADMIN — QUETIAPINE FUMARATE 75 MILLIGRAM(S): 200 TABLET, FILM COATED ORAL at 21:11

## 2024-01-31 RX ADMIN — Medication 500 MILLIGRAM(S): at 18:12

## 2024-01-31 RX ADMIN — CHLORHEXIDINE GLUCONATE 1 APPLICATION(S): 213 SOLUTION TOPICAL at 05:16

## 2024-01-31 NOTE — CONSULT NOTE ADULT - ASSESSMENT
48yoM w/ PMHx hepatitis C and EtOH abuse was transferred from OSH on 1/6 for level 1 trauma eval. He was found down with multiple 4-11 left-sided rib fractures and flail chest, and had a chest tube placed at OSH. S/p VATS 1/10 w/ partial lung decortication and thoracoscopic removal of intrapleural foreign body. S/p chest tube removal 1/14, downgraded to floor 1/18, returned to SICU after fever 102.3 on 1/20- s/p L thoracotomy and decortication (1/26). Nephrology now consulted for new polyuria.       Plan:  Polyuria   · Assessment	  48yoM w/ PMHx hepatitis C and EtOH abuse was transferred from OSH on 1/6 for level 1 trauma eval. He was found down with multiple 4-11 left-sided rib fractures and flail chest, and had a chest tube placed at OSH. S/p VATS 1/10 w/ partial lung decortication and thoracoscopic removal of intrapleural foreign body. S/p chest tube removal 1/14, downgraded to floor 1/18, returned to SICU after fever 102.3 on 1/20- s/p L thoracotomy and decortication (1/26). Nephrology now consulted for new polyuria.        Problem/Plan - 1:  ·  Problem: Polyuria  ·  Plan: Pt with 3500 cc of urine output yesterday. Intake recorded as 1700 cc. Pt has been receiving maintenance fluids and fluids with zosyn. Pt also states he is drinking a lot. PO intake is likely underestimated. Sodium levels have been low-normal, currently at 136. Cr at baseline. On physical exam, pt appears euvolemic. Pt received ddavp yesterday. Polyuria likely due to increased fluids and po intake, unlikely due to DI as sodium is normal.    Recommendations:  - Strict I's and O's   - No indication for further ddavp at this time  - No indication for further workup of polyuria at this time  - Will monitor urine outputs today  - ctm kidney function and volume status

## 2024-01-31 NOTE — PROGRESS NOTE ADULT - SUBJECTIVE AND OBJECTIVE BOX
[facility]-- WOUND TEAM -- FOLLOW UP NOTE  --------------------------------------------------------------------------------    24 hour events/subjective:          Diet:  Diet, Regular:   Supplement Feeding Modality:  Oral  Ensure Plus High Protein Cans or Servings Per Day:  1       Frequency:  Two Times a day (01-31-24 @ 13:16)      ROS: General/ SKIN/ MSK see HPI  all other systems negative      ALLERGIES & MEDICATIONS  --------------------------------------------------------------------------------    No Known Allergies      STANDING INPATIENT MEDICATIONS  acetaminophen  Tablet 500 milliGRAM(s) Oral every 6 hours  ascorbic acid 500 milliGRAM(s) Oral daily  chlorhexidine 2% Cloths 1 Application(s) Topical <User Schedule>  cyanocobalamin 1000 MICROGram(s) Oral daily  enoxaparin Injectable 40 milliGRAM(s) SubCutaneous every 24 hours  folic acid 1 milliGRAM(s) Oral daily  HYDROmorphone PCA (1 mG/mL) 30 milliLiter(s) PCA Continuous PCA Continuous  influenza   Vaccine 0.5 milliLiter(s) IntraMuscular once  lidocaine   4% Patch 1 Patch Transdermal every 24 hours  melatonin 5 milliGRAM(s) Oral at bedtime  multivitamin 1 Tablet(s) Oral daily  pantoprazole  Injectable 40 milliGRAM(s) IV Push every 12 hours  piperacillin/tazobactam IVPB.. 3.375 Gram(s) IV Intermittent every 8 hours  polyethylene glycol 3350 17 Gram(s) Oral daily  QUEtiapine 75 milliGRAM(s) Oral <User Schedule>  senna 2 Tablet(s) Oral at bedtime  sodium chloride 0.9%. 1000 milliLiter(s) IV Continuous <Continuous>  thiamine 100 milliGRAM(s) Oral daily      PRN INPATIENT MEDICATION  calcium carbonate    500 mG (Tums) Chewable 1 Tablet(s) Chew every 6 hours PRN  diphenhydrAMINE 25 milliGRAM(s) Oral every 4 hours PRN  HYDROmorphone PCA (1 mG/mL) Rescue Clinician Bolus 0.5 milliGRAM(s) IV Push every 15 minutes PRN  naloxone Injectable 0.1 milliGRAM(s) IV Push every 3 minutes PRN  ondansetron Injectable 4 milliGRAM(s) IV Push every 6 hours PRN        VITALS/PHYSICAL EXAM  --------------------------------------------------------------------------------  T(C): 37.4 (01-31-24 @ 15:00), Max: 37.5 (01-30-24 @ 23:00)  HR: 115 (01-31-24 @ 15:00) (103 - 119)  BP: 126/76 (01-31-24 @ 15:00) (102/59 - 145/91)  RR: 16 (01-31-24 @ 15:00) (13 - 34)  SpO2: 91% (01-31-24 @ 15:00) (90% - 99%)  Wt(kg): --  Height (cm): 185.4 (01-31-24 @ 10:01)  Weight (kg): 91.7 (01-31-24 @ 10:01)  BMI (kg/m2): 26.7 (01-31-24 @ 10:01)  BSA (m2): 2.16 (01-31-24 @ 10:01)                LABS/ CULTURES/ RADIOLOGY:              8.5    10.30 >-----------<  621      [01-31-24 @ 03:24]              25.9     136  |  106  |  6   ----------------------------<  96      [01-31-24 @ 03:24]  4.3   |  22  |  1.03        Ca     7.9     [01-31-24 @ 03:24]      Mg     1.9     [01-31-24 @ 03:24]      Phos  3.6     [01-31-24 @ 03:24]    TPro  5.5  /  Alb  2.0  /  TBili  0.3  /  DBili  x   /  AST  25  /  ALT  37  /  AlkPhos  293  [01-31-24 @ 03:24]    PT/INR: PT 11.9 , INR 1.14       [01-31-24 @ 03:24]  PTT: 30.1       [01-31-24 @ 03:24]                     [facility]-- WOUND TEAM -- FOLLOW UP NOTE  --------------------------------------------------------------------------------    24 hour events/subjective:    afebrile  tolerating po  more mobile  ambulated w/ assist  tolerating dressing no odor      Diet:  Diet, Regular:   Supplement Feeding Modality:  Oral  Ensure Plus High Protein Cans or Servings Per Day:  1       Frequency:  Two Times a day (01-31-24 @ 13:16)      ROS: General/ SKIN/ MSK see HPI  all other systems negative      ALLERGIES & MEDICATIONS  --------------------------------------------------------------------------------    No Known Allergies      STANDING INPATIENT MEDICATIONS  acetaminophen  Tablet 500 milliGRAM(s) Oral every 6 hours  ascorbic acid 500 milliGRAM(s) Oral daily  chlorhexidine 2% Cloths 1 Application(s) Topical <User Schedule>  cyanocobalamin 1000 MICROGram(s) Oral daily  enoxaparin Injectable 40 milliGRAM(s) SubCutaneous every 24 hours  folic acid 1 milliGRAM(s) Oral daily  HYDROmorphone PCA (1 mG/mL) 30 milliLiter(s) PCA Continuous PCA Continuous  influenza   Vaccine 0.5 milliLiter(s) IntraMuscular once  lidocaine   4% Patch 1 Patch Transdermal every 24 hours  melatonin 5 milliGRAM(s) Oral at bedtime  multivitamin 1 Tablet(s) Oral daily  pantoprazole  Injectable 40 milliGRAM(s) IV Push every 12 hours  piperacillin/tazobactam IVPB.. 3.375 Gram(s) IV Intermittent every 8 hours  polyethylene glycol 3350 17 Gram(s) Oral daily  QUEtiapine 75 milliGRAM(s) Oral <User Schedule>  senna 2 Tablet(s) Oral at bedtime  sodium chloride 0.9%. 1000 milliLiter(s) IV Continuous <Continuous>  thiamine 100 milliGRAM(s) Oral daily      PRN INPATIENT MEDICATION  calcium carbonate    500 mG (Tums) Chewable 1 Tablet(s) Chew every 6 hours PRN  diphenhydrAMINE 25 milliGRAM(s) Oral every 4 hours PRN  HYDROmorphone PCA (1 mG/mL) Rescue Clinician Bolus 0.5 milliGRAM(s) IV Push every 15 minutes PRN  naloxone Injectable 0.1 milliGRAM(s) IV Push every 3 minutes PRN  ondansetron Injectable 4 milliGRAM(s) IV Push every 6 hours PRN        VITALS/PHYSICAL EXAM  --------------------------------------------------------------------------------  T(C): 37.4 (01-31-24 @ 15:00), Max: 37.5 (01-30-24 @ 23:00)  HR: 115 (01-31-24 @ 15:00) (103 - 119)  BP: 126/76 (01-31-24 @ 15:00) (102/59 - 145/91)  RR: 16 (01-31-24 @ 15:00) (13 - 34)  SpO2: 91% (01-31-24 @ 15:00) (90% - 99%)  Wt(kg): --  Height (cm): 185.4 (01-31-24 @ 10:01)  Weight (kg): 91.7 (01-31-24 @ 10:01)  BMI (kg/m2): 26.7 (01-31-24 @ 10:01)  BSA (m2): 2.16 (01-31-24 @ 10:01)    NAD A&Ox3   Progressa bed  HEENT: NC/AT sclera clear moist mucous membranes, trachea midline  Neurology: verbal, following commands  Psych: calm  Musculoskeletal: FROM no contractures  Vascular: BLE edema & warmth equal   Skin:  moist w/ good turgor  right buttocks wound  moist granulation tissue, no necrotic skin,     6cm X  8cm x 0.2cm,    serosanguinous drainage  No odor, erythema, increased warmth, tenderness, induration, fluctuance nor crepitus        LABS/ CULTURES/ RADIOLOGY:              8.5    10.30 >-----------<  621      [01-31-24 @ 03:24]              25.9     136  |  106  |  6   ----------------------------<  96      [01-31-24 @ 03:24]  4.3   |  22  |  1.03        Ca     7.9     [01-31-24 @ 03:24]      Mg     1.9     [01-31-24 @ 03:24]      Phos  3.6     [01-31-24 @ 03:24]    TPro  5.5  /  Alb  2.0  /  TBili  0.3  /  DBili  x   /  AST  25  /  ALT  37  /  AlkPhos  293  [01-31-24 @ 03:24]    PT/INR: PT 11.9 , INR 1.14       [01-31-24 @ 03:24]  PTT: 30.1       [01-31-24 @ 03:24]

## 2024-01-31 NOTE — PRE-ANESTHESIA EVALUATION ADULT - NSANTHPMHFT_GEN_ALL_CORE
54 yo M w/ PMHx of Hep C and possible EtOH use disorder who presents after being found down, intubated for respiratory distress and agitation, found to have displaced 4-11 L rib fx/Hemothorax s/p LT VATS, with partial lung decortication on 1/10/24.
48yoM w/ PMHx hepatitis C and EtOH abuse was transferred from OSH on 1/6 for level 1 trauma eval. He was found down with multiple 4-11 left-sided rib fractures and flail chest, and had a chest tube placed at OSH. S/p VATS 1/10 w/ partial lung decortication and thoracoscopic removal of intrapleural foreign body. S/p chest tube removal 1/14, downgraded to floor 1/18, returned to SICU after fever 102.3 on 1/20.
48M HCV (never treated), depression from OSH for trauma evaluation, found to have L sided rib fractures with flail chest, s/p chest tube output w > 1L output and transfused now for L VATS and rib plating.     Intubated and sedated - attempted to reach family however unable to reach, so two physician consent was obtained.

## 2024-01-31 NOTE — PROGRESS NOTE ADULT - ASSESSMENT
48M with PMHx hepatitis C (diagnosed many years ago, never treated), depression transferred from outside hospital for trauma eval. Patient found down by roommate after unknown amount of time, found with multiple 4-11 L sided rib fractures with flail chest. Chest tube placed there with >1L output (old blood). Patient also with tachypnea, concern for respiratory failure started on BiPAP. Level 1 called for transient hypotension, patient given 2 unit PRBC in [facility] ED. Patient admitted to SICU for hemodynamic monitoring, pain management. S/p VATS 1/10 with partial lung decortication, chest tube removed 1/14. S/p chest tube placement 1/22 by IR for new left effusion, s/p L Thoracotomy/Decort 1/26 w/ 3 chest tubes to suction.    Wound consult requested to assist w/ management of  Right Buttocks wound    Recommend:  1.)  right buttock wound -Wound VAC         wound improving consider stopping VAC and change to aquacel dressing daily  2.) Incontinence Management - incontinence cleanser, pads, pericare BID  3.) Maintain on an alternating air with low air loss surface  4.) Turn and reposition Q 2 hours  5.) Nutrition optimization - please add Tutu  6.) Offload heels/feet with complete cair air fluidized boots; ensure that the soles of the feet are not resting on the foot board of the bed.  Care as per sicu, will follow w/ you  Upon discharge f/u as outpatient at [facility] [address] [facility] [phone]  Soraya Coleman PA-C  I spent 35  minutes face to face w/ this pt of which more than 50% of the time was spent counseling & coordinating care of this pt.

## 2024-01-31 NOTE — PROGRESS NOTE ADULT - ASSESSMENT
48yoM w/ PMHx hepatitis C and EtOH abuse was transferred from OSH on 1/6 for level 1 trauma eval. He was found down with multiple 4-11 left-sided rib fractures and flail chest, and had a chest tube placed at OSH. S/p VATS 1/10 w/ partial lung decortication and thoracoscopic removal of intrapleural foreign body. S/p chest tube removal 1/14, downgraded to floor 1/18, returned to SICU after fever 102.3 on 1/20- s/p L thoracotomy and decortication (1/26).      Plan:  -Thoracic Surgery Recs for chest tubes  -Monitor CT output  -Analgesia and antiemetics as needed  -Continue abx   -Regular diet   -Appreciate excellent care per SICU     Trauma Surgery #[id number]

## 2024-01-31 NOTE — CONSULT NOTE ADULT - SUBJECTIVE AND OBJECTIVE BOX
[facility] DIVISION OF KIDNEY DISEASES AND HYPERTENSION -- [phone]  -- INITIAL CONSULT NOTE  --------------------------------------------------------------------------------  HPI:  48yoM w/ PMHx hepatitis C and EtOH abuse was transferred from OSH on 1/6 for level 1 trauma eval. He was found down with multiple 4-11 left-sided rib fractures and flail chest, and had a chest tube placed at OSH. S/p VATS 1/10 w/ partial lung decortication and thoracoscopic removal of intrapleural foreign body. S/p chest tube removal 1/14, downgraded to floor 1/18, returned to SICU after fever 102.3 on 1/20- s/p L thoracotomy and decortication (1/26). Nephrology now consulted for new polyuria.     Pt seen at bedside today. Pt is eating and drinking well. Pt states he has leg pain but otherwise no acute complaints. Pt denies dysuria or hematuria.       PAST HISTORY  --------------------------------------------------------------------------------  PAST MEDICAL & SURGICAL HISTORY:  No pertinent past medical history      No significant past surgical history        FAMILY HISTORY:  No pertinent family history in first degree relatives      PAST SOCIAL HISTORY:    ALLERGIES & MEDICATIONS  --------------------------------------------------------------------------------  Allergies    No Known Allergies    Intolerances      Standing Inpatient Medications  ascorbic acid 500 milliGRAM(s) Oral daily  chlorhexidine 2% Cloths 1 Application(s) Topical <User Schedule>  cyanocobalamin 1000 MICROGram(s) Oral daily  enoxaparin Injectable 40 milliGRAM(s) SubCutaneous every 24 hours  folic acid 1 milliGRAM(s) Oral daily  HYDROmorphone PCA (1 mG/mL) 30 milliLiter(s) PCA Continuous PCA Continuous  influenza   Vaccine 0.5 milliLiter(s) IntraMuscular once  lidocaine   4% Patch 1 Patch Transdermal every 24 hours  melatonin 5 milliGRAM(s) Oral at bedtime  multivitamin 1 Tablet(s) Oral daily  pantoprazole  Injectable 40 milliGRAM(s) IV Push every 12 hours  piperacillin/tazobactam IVPB.. 3.375 Gram(s) IV Intermittent every 8 hours  polyethylene glycol 3350 17 Gram(s) Oral daily  QUEtiapine 75 milliGRAM(s) Oral <User Schedule>  senna 2 Tablet(s) Oral at bedtime  sodium chloride 0.9%. 1000 milliLiter(s) IV Continuous <Continuous>  thiamine 100 milliGRAM(s) Oral daily    PRN Inpatient Medications  calcium carbonate    500 mG (Tums) Chewable 1 Tablet(s) Chew every 6 hours PRN  diphenhydrAMINE 25 milliGRAM(s) Oral every 4 hours PRN  naloxone Injectable 0.1 milliGRAM(s) IV Push every 3 minutes PRN  ondansetron Injectable 4 milliGRAM(s) IV Push every 6 hours PRN      REVIEW OF SYSTEMS  --------------------------------------------------------------------------------  Gen: No fevers/chills  Skin: No rashes  Head/Eyes/Ears: Normal hearing,   GI: No abdominal pain, diarrhea  : No dysuria, hematuria  MSK: pt with pain in legs  Heme: No easy bruising or bleeding  Psych: No significant depression    All other systems were reviewed and are negative, except as noted.    VITALS/PHYSICAL EXAM  --------------------------------------------------------------------------------  T(C): 36.9 (01-31-24 @ 10:01), Max: 37.5 (01-30-24 @ 23:00)  HR: 103 (01-31-24 @ 13:00) (103 - 119)  BP: 129/87 (01-31-24 @ 13:00) (102/59 - 145/91)  RR: 26 (01-31-24 @ 13:00) (13 - 34)  SpO2: 95% (01-31-24 @ 13:00) (90% - 99%)  Wt(kg): --  Height (cm): 185.4 (01-31-24 @ 10:01)  Weight (kg): 91.7 (01-31-24 @ 10:01)  BMI (kg/m2): 26.7 (01-31-24 @ 10:01)  BSA (m2): 2.16 (01-31-24 @ 10:01)      01-30-24 @ 07:01  -  01-31-24 @ 07:00  --------------------------------------------------------  IN: 1680 mL / OUT: 3590 mL / NET: -1910 mL    01-31-24 @ 07:01  -  01-31-24 @ 14:42  --------------------------------------------------------  IN: 275 mL / OUT: 275 mL / NET: 0 mL        PHYSICAL EXAM:  Gen: NAD, A&Ox3  Pulm: No respiratory distress, no subcostal retractions. 3x CT to suction.  CV: Sinus tachycardia, no JVD  Abd: Soft, NT, ND  Extremities: warm and well perfused    LABS/STUDIES  --------------------------------------------------------------------------------              8.5    10.30 >-----------<  621      [01-31-24 @ 03:24]              25.9     136  |  106  |  6   ----------------------------<  96      [01-31-24 @ 03:24]  4.3   |  22  |  1.03        Ca     7.9     [01-31-24 @ 03:24]      Mg     1.9     [01-31-24 @ 03:24]      Phos  3.6     [01-31-24 @ 03:24]    TPro  5.5  /  Alb  2.0  /  TBili  0.3  /  DBili  x   /  AST  25  /  ALT  37  /  AlkPhos  293  [01-31-24 @ 03:24]    PT/INR: PT 11.9 , INR 1.14       [01-31-24 @ 03:24]  PTT: 30.1       [01-31-24 @ 03:24]    Serum Osmolality 286      [01-30-24 @ 10:57]    Creatinine Trend:  SCr 1.03 [01-31 @ 03:24]  SCr 1.00 [01-30 @ 14:27]  SCr 0.93 [01-30 @ 10:55]  SCr 1.08 [01-29 @ 22:15]  SCr 0.87 [01-28 @ 22:04]    Urinalysis - [01-31-24 @ 03:24]      Color  / Appearance  / SG  / pH       Gluc 96 / Ketone   / Bili  / Urobili        Blood  / Protein  / Leuk Est  / Nitrite       RBC  / WBC  / Hyaline  / Gran  / Sq Epi  / Non Sq Epi  / Bacteria     Urine Creatinine 18      [01-30-24 @ 10:41]  Urine Protein <7      [01-30-24 @ 10:41]  Urine Sodium 42      [01-30-24 @ 10:41]  Urine Urea Nitrogen 59      [01-30-24 @ 13:23]  Urine Potassium 8      [01-30-24 @ 10:41]  Urine Osmolality 133      [01-30-24 @ 10:40]

## 2024-01-31 NOTE — BRIEF OPERATIVE NOTE - NSICDXBRIEFPREOP_GEN_ALL_CORE_FT
PRE-OP DIAGNOSIS:  Fracture of multiple ribs of left side 10-Dominic-2024 16:32:50  Mario WORTHINGTON  Hemothorax, left 10-Dominic-2024 16:33:08  Mario WORTHINGTON  H/O chest tube placement 10-Dominic-2024 16:36:47  Mario WORTHINGTON  Empyema lung 26-Jan-2024 17:43:22  Mario WORTHINGTON  
PRE-OP DIAGNOSIS:  Fracture of multiple ribs of left side 10-Dominic-2024 16:32:50  Mario WORTHINGTON  Hemothorax, left 10-Dominic-2024 16:33:08  Mario WORTHINGTON  H/O chest tube placement 10-Dominic-2024 16:36:47  Mario WORTHINGTON  
PRE-OP DIAGNOSIS:  Hemothorax, left 10-Dominic-2024 16:33:08  Mario WORTHINGTON  H/O chest tube placement 10-Dominic-2024 16:36:47  Mario WORTHINGTON

## 2024-01-31 NOTE — PROGRESS NOTE ADULT - PROBLEM SELECTOR PLAN 1
Suspected fracture of rib on left side, closed. Left Rib Fx 4-11th /Hemothorax  1/10 LT VATS, with partial lung decortication  Thoracoscopic removal of intrapleural foreign body       1/20 thoracic surgery reconsult f left pleural effusion vs hemothorax   1/22 s/p left pigtail by IR  1/26 Thoracotomy Decortication w/ washout>CT x 3 placed  Maintain left CT x 3 to suction to LWS  obtain urgent chest CT  to ro  bleed,  keep NPO for now  Dr Sepulveda to assess CT scan and see pt  1/31 NPO for Bronchoscopy today   all meals OOB  ambulated  tids   chest PT daily xray

## 2024-01-31 NOTE — PRE-ANESTHESIA EVALUATION ADULT - NSANTHPEFT_GEN_ALL_CORE
CV: RRR  Resp: on room air, non-labored respirations
General: Well appearing, no distress, sedated  CV: Regular  Pulm: Unlabored, intubated

## 2024-01-31 NOTE — PROGRESS NOTE ADULT - SUBJECTIVE AND OBJECTIVE BOX
24 HOUR EVENTS:   - DDAVP for high UOP  - poss bronch tomorrow for congested left lung      NEURO  RASS (if intubated): 		[facility] ICU (if concern for delirium):  Exam: AOx4  Meds: diphenhydrAMINE 25 milliGRAM(s) Oral every 4 hours PRN Pruritus  HYDROmorphone PCA (1 mG/mL) 30 milliLiter(s) PCA Continuous PCA Continuous  melatonin 5 milliGRAM(s) Oral at bedtime  ondansetron Injectable 4 milliGRAM(s) IV Push every 6 hours PRN Nausea  QUEtiapine 75 milliGRAM(s) Oral <User Schedule>      RESPIRATORY  RR: 26 (01-31-24 @ 00:00) (13 - 35)  SpO2: 91% (01-31-24 @ 00:00) (91% - 96%)  Wt(kg): --  Exam: lung sounds reduced, course on left side  Mechanical Ventilation:     Meds:     CARDIOVASCULAR  HR: 114 (01-31-24 @ 00:00) (101 - 118)  BP: 127/83 (01-31-24 @ 00:00) (109/65 - 152/92)  BP(mean): 99 (01-31-24 @ 00:00) (80 - 117)  ABP: --  ABP(mean): --  Wt(kg): --  CVP(cm H2O): --      Exam: regular S1/S2  Cardiac Rhythm: regular  Perfusion     [x]Adequate   [ ]Inadequate  Mentation    xNormal       [ ]Reduced  Extremities  [x]Warm         [ ]Cool  Volume Status [ ]Hypervolemic [x]Euvolemic [ ]Hypovolemic  Meds:     GI/NUTRITION  Exam: abd soft, non distended  Diet: regular  Meds: calcium carbonate    500 mG (Tums) Chewable 1 Tablet(s) Chew every 6 hours PRN Heartburn  pantoprazole  Injectable 40 milliGRAM(s) IV Push every 12 hours  polyethylene glycol 3350 17 Gram(s) Oral daily  senna 2 Tablet(s) Oral at bedtime      GENITOURINARY  I&O's Detail    01-29 @ 07:01  -  01-30 @ 07:00  --------------------------------------------------------  IN:    IV PiggyBack: 100 mL    IV PiggyBack: 200 mL    IV PiggyBack: 250 mL    Oral Fluid: 1590 mL    sodium chloride 0.9%: 480 mL  Total IN: 2620 mL    OUT:    Chest Tube (mL): 80 mL    Chest Tube (mL): 110 mL    Chest Tube (mL): 0 mL    VAC (Vacuum Assisted Closure) System (mL): 100 mL    Voided (mL): 3400 mL  Total OUT: 3690 mL    Total NET: -1070 mL      01-30 @ 07:01 - 01-31 @ 00:30  --------------------------------------------------------  IN:    IV PiggyBack: 300 mL    Oral Fluid: 840 mL    sodium chloride 0.9%: 260 mL    sodium chloride 0.9%: 80 mL  Total IN: 1480 mL    OUT:    Chest Tube (mL): 20 mL    Chest Tube (mL): 30 mL    Chest Tube (mL): 40 mL    VAC (Vacuum Assisted Closure) System (mL): 0 mL    Voided (mL): 2900 mL  Total OUT: 2990 mL    Total NET: -1510 mL          01-30    135  |  105  |  6<L>  ----------------------------<  127<H>  4.0   |  20<L>  |  1.00    Ca    7.6<L>      30 Jan 2024 14:27  Phos  2.6     01-30  Mg     2.0     01-30    TPro  5.3<L>  /  Alb  2.0<L>  /  TBili  0.3  /  DBili  x   /  AST  40  /  ALT  49<H>  /  AlkPhos  306<H>  01-30    Meds: ascorbic acid 500 milliGRAM(s) Oral daily  cyanocobalamin 1000 MICROGram(s) Oral daily  folic acid 1 milliGRAM(s) Oral daily  multivitamin 1 Tablet(s) Oral daily  sodium chloride 0.9%. 1000 milliLiter(s) IV Continuous <Continuous>  thiamine 100 milliGRAM(s) Oral daily      HEMATOLOGIC  Meds: enoxaparin Injectable 40 milliGRAM(s) SubCutaneous every 24 hours                          8.1    11.00 )-----------( 546      ( 29 Jan 2024 22:15 )             23.8     PT/INR - ( 29 Jan 2024 22:15 )   PT: 12.3 sec;   INR: 1.12 ratio         PTT - ( 29 Jan 2024 22:15 )  PTT:33.2 sec    INFECTIOUS DISEASES  T(C): 37.5 (01-30-24 @ 23:00), Max: 37.5 (01-30-24 @ 23:00)  Wt(kg): --    Recent Cultures:  Specimen Source: .Tissue Other, 01-26 @ 22:03; Results   Testing in progress; Gram Stain:   No polymorphonuclear cells seen per low power field  No organisms seen per oil power field; Organism: --    Meds: influenza   Vaccine 0.5 milliLiter(s) IntraMuscular once  piperacillin/tazobactam IVPB.. 3.375 Gram(s) IV Intermittent every 8 hours      ENDOCRINE  Capillary Blood Glucose    Meds:     ACCESS DEVICES:  [x] Peripheral IV  [ ] Central Venous Line		[ ] R	[ ] L	[ ] IJ	[ ] Fem	[ ] SC	Placed:   [ ] Arterial Line			[ ] R	[ ] L	[ ] Fem	[ ] Rad	[ ] Ax	Placed:   [ ] PICC:					[ ] Mediport  [ ] Urinary Catheter, Date Placed:   [ ] Necessity of urinary, arterial, and venous catheters discussed    OTHER MEDICATIONS:  chlorhexidine 2% Cloths 1 Application(s) Topical <User Schedule>  lidocaine   4% Patch 1 Patch Transdermal every 24 hours  naloxone Injectable 0.1 milliGRAM(s) IV Push every 3 minutes PRN      IMAGING:

## 2024-01-31 NOTE — PROGRESS NOTE ADULT - ASSESSMENT
48M with PMHx hepatitis C (diagnosed many years ago, never treated), depression transferred from outside hospital for trauma eval. Patient found down by roommate after unknown amount of time, found with multiple 4-11 L sided rib fractures with flail chest. Chest tube placed there with >1L output (old blood). Patient also with tachypnea, concern for respiratory failure started on BiPAP. Level 1 called for transient hypotension, patient given 2 unit PRBC in [facility] ED. Patient admitted to SICU for hemodynamic monitoring, pain management. S/p VATS 1/10 with partial lung decortication, chest tube removed 1/14. S/p chest tube placement 1/22 by IR for new left effusion, s/p L Thoracotomy/Decort 1/26 w/ 3 chest tubes to suction.    Neurologic:  - A&Ox4  - Seroquel 75mg QHS  - Tylenol, IV PCA for pain from CTs     Respiratory:  - RA  - s/p VATS 1/10 with discovery of Fibropurulent exudative adhesions and removal of retained foreign material likely the tip of a glove, s/p washout, with new 28f chest tube placed > removed 1/14  - 1/20 CT with complex collection L pleural space, s/p drainage by IR 1/22, 20cc bloody fluid drained, pigtail placed  - Incentive spirometry, Duonebs   - CTS OR 1/26 Open left thoracotomy, decortication and washout, 3 chest tubes placed  - chest tubes to suction, monitor output > Follow-up water seal timing with thoracic  - plan for bronch 1/31    Cardiovascular: tachycardia  - sammie gtt off, MAP >65  - Metoprolol 50mg q8h for tachycardia - d/c'd 1/26  - Hgb/Hct 7.1/21.3 1U PRBC, post 8.3  - Hypotensive/Tachycardic (, SPB 70s) 500 cc LR bolus x1, lopressor d/c; currently (-120, SBP 90)    Gastrointestinal/Nutrition:  - Abd US 1/24 - mild hepatomegaly, redemonstration of evolving splenic hematomas  - regular diet    Genitourinary/Renal:  - Supplement electrolytes PRN  - KVO    Hematologic:  - Chemical VTE ppx with Lovenox   - Duplex/doppler 1/23 negative    Infectious Disease: Empyema   - s/p zosyn 1/7-1/24  - 1/22 L empyema cultures NGTD  - 1/26-1/29 Restart zosyn. Repeat BCx f/u     Endocrine:  - No active issues, no history of DM  - DDAVP r/o DI    MSK:   - Sat on dee tray(?) wound right lateral buttock - continue to monitor    lines:   - Peripheral IVs      LINDSEY QuevedoC    [id number]

## 2024-01-31 NOTE — CONSULT NOTE ADULT - ATTENDING COMMENTS
Agree with above. Patient's anemia is likely multifactorial especially from hematomas from trauma. He has brown stools and no evidence of active GI luminal bleeding. Given positive FOBT, should have age appropriate colon cancer screening after this admission. No plans for endoscopic evaluation as inpatient.
Polyuria - due to excessive intake of oral and iv fluids. urine output is commensurate with intake.   Na is 136. No reason to suspect Diabetes insipidus.   no indication to use Desmopressin.     jose garner  nephrology attending   please contact me on TEAMS   Office- [phone]
48 year old male with L1/L2 VCF. No evidence of instability on scans. Ok for log roll for rib plating procedure. Will continue to monitor as he recovers from injuries
patient with multiple displaced rib fractures left side, recent intubation   plan for tentative rib plating tomorrow vs wed. with cryoablation . will d/w family
SICU ATTENDING ATTESTATION    I have seen and evaluated this patient at the time of SICU admission. I have reviewed all new labs, imaging and reports. I have participated in formulating the plan for the day, and have read and agree with the history, ROS, exam, assessment and plan as stated above.     Found down at home after a fall, likely more than a day ago.   Dx: left 4-11 rib fractures with flail segment; left hemothorax  Left hemothorax drained with chest tube at OSH --> 1L blood. CXR here shows good resolution of hemothorax with chest tube in adequate position.   Had respiratory distress on arrival --> BIPAP --> improved --> face mask --> to nasal canula, will wean as able.   Transfused 2 units PRBC for hypotension in trauma bay. Arrives to SICU with normal BP. Will follow up Hg.  Plan to monitor for chest tube output; thoracic surgery consult for flail segment; tox panel and CIWA to monitor for EtOH withdrawal.     Total time spent in the care of this patient today (excluding procedures and teaching): 75 min          Over 50% of the total time was spent in discussion and coordination of care with consulting services, dietary and rehab services.     Shelly Molina M.D., M.S.  Division of Acute Care Surgery

## 2024-01-31 NOTE — BRIEF OPERATIVE NOTE - NSICDXBRIEFPOSTOP_GEN_ALL_CORE_FT
POST-OP DIAGNOSIS:  Puncture wound of thorax with foreign body 10-Dominic-2024 16:36:34  Mario WORTHINGTON  H/O chest tube placement 10-Dominic-2024 16:36:56  Mario WORTHINGTON  Empyema 26-Jan-2024 17:43:33  Mario WORTHINGTON  
POST-OP DIAGNOSIS:  H/O chest tube placement 10-Dominic-2024 16:36:56  Mario WORTHINGTON  
POST-OP DIAGNOSIS:  Puncture wound of thorax with foreign body 10-Dominic-2024 16:36:34  Mario WORTHINGTON  H/O chest tube placement 10-Dominic-2024 16:36:56  Mario WORTHINGTON

## 2024-01-31 NOTE — BRIEF OPERATIVE NOTE - NSICDXBRIEFPROCEDURE_GEN_ALL_CORE_FT
PROCEDURES:  Bronchoscopy 31-Jan-2024 11:51:10  Vladimir Tapia  
PROCEDURES:  VATS, with partial lung decortication 10-Dominic-2024 16:31:40  Mario WORTHINGTON  Thoracoscopic removal of intrapleural foreign body 10-Dominic-2024 16:32:18  Mario WORTHINGTON  Thoracotomy, with bronchoscopy 26-Jan-2024 17:42:13  Mario WORTHINGTON  Lung decortication 26-Jan-2024 17:42:27  Mario WORTHINGTON  
PROCEDURES:  VATS, with partial lung decortication 10-Dominic-2024 16:31:40  Mario WORTHINGTON  Thoracoscopic removal of intrapleural foreign body 10-Dominic-2024 16:32:18  Mario WORTHINGTON

## 2024-01-31 NOTE — PROGRESS NOTE ADULT - NS ATTEND AMEND GEN_ALL_CORE FT
etoh misuse with withdrawal, required washout of chest s/p chest tubes x3    ON: prep for bronch in OR    alert and awake  seroquel nightly, PCA in place (0.4 mg hydromorphone)  standing tylenol  on RA  s/p thoracotomy 1/26  CT all to suction, minimal output  plan for OR bronch today, given persistent low output and possible bronch occlusive   AM CXR improved left sided opacities  on diet and supplements  ppi for + FOBT  GI consulted, ? EGD when able  voiding 3.4 lit, balance -1.9 liters  no response to desmopressin, will discuss with renal service  US kidney  lovenox VTE PPX  zosyn for empyema, no end date, afebrile  good glycemic control  buttock wound , wound vac , followed by PT  PT OT working with him    PIV only

## 2024-01-31 NOTE — PROGRESS NOTE ADULT - SUBJECTIVE AND OBJECTIVE BOX
Subjective ' hello I feel better"    Vital Signs Last 24 Hrs  T(C): 36.9 (24 @ 07:00), Max: 37.5 (24 @ 23:00)  T(F): 98.4 (24 @ 07:00), Max: 99.5 (24 @ 23:00)  HR: 109 (24 @ 09:00) (106 - 119)  BP: 115/77 (24 @ 09:00) (102/59 - 152/92)  RR: 17 (24 @ 09:00) (13 - 34)  SpO2: 93% (24 @ 09:00) (90% - 95%)            07:01  -   07:00  --------------------------------------------------------  IN: 1680 mL / OUT: 3590 mL / NET: -1910 mL   @ 07:01  -   @ 09:34  --------------------------------------------------------  IN: 90 mL / OUT: 0 mL / NET: 90 mL        Daily Weight in k.3 (2024 00:00)            136  |  106  |  6<L>  ----------------------------<  96  4.3   |  22  |  1.03    Ca    7.9<L>      2024 03:24  Phos  3.6       Mg     1.9         TPro  5.5<L>  /  Alb  2.0<L>  /  TBili  0.3  /  DBili  x   /  AST  25  /  ALT  37  /  AlkPhos  293<H>                 8.5    10.30 )-----------( 621      ( 2024 03:24 )             25.9             Bilirubin Total: 0.3 mg/dL ( @ 03:24)  Bilirubin Total: 0.3 mg/dL ( @ 14:27)  Bilirubin Total: 0.5 mg/dL ( @ 10:55)    CAPILLARY BLOOD GLUCOSE      MEDICATIONS  (STANDING):  ascorbic acid 500 milliGRAM(s) Oral daily  chlorhexidine 2% Cloths 1 Application(s) Topical <User Schedule>  cyanocobalamin 1000 MICROGram(s) Oral daily  enoxaparin Injectable 40 milliGRAM(s) SubCutaneous every 24 hours  folic acid 1 milliGRAM(s) Oral daily  HYDROmorphone PCA (1 mG/mL) 30 milliLiter(s) PCA Continuous PCA Continuous  influenza   Vaccine 0.5 milliLiter(s) IntraMuscular once  lidocaine   4% Patch 1 Patch Transdermal every 24 hours  melatonin 5 milliGRAM(s) Oral at bedtime  multivitamin 1 Tablet(s) Oral daily  pantoprazole  Injectable 40 milliGRAM(s) IV Push every 12 hours  piperacillin/tazobactam IVPB.. 3.375 Gram(s) IV Intermittent every 8 hours  polyethylene glycol 3350 17 Gram(s) Oral daily  QUEtiapine 75 milliGRAM(s) Oral <User Schedule>  senna 2 Tablet(s) Oral at bedtime  sodium chloride 0.9%. 1000 milliLiter(s) (20 mL/Hr) IV Continuous <Continuous>  thiamine 100 milliGRAM(s) Oral daily    MEDICATIONS  (PRN):  calcium carbonate    500 mG (Tums) Chewable 1 Tablet(s) Chew every 6 hours PRN Heartburn  diphenhydrAMINE 25 milliGRAM(s) Oral every 4 hours PRN Pruritus  naloxone Injectable 0.1 milliGRAM(s) IV Push every 3 minutes PRN For ANY of the following changes in patient status:  A. RR LESS THAN 10 breaths per minute, B. Oxygen saturation LESS THAN 90%, C. Sedation score of 6  ondansetron Injectable 4 milliGRAM(s) IV Push every 6 hours PRN Nausea              Drains:    LP diaphragm #1 10/24 left anterior  #2 10/12 Left posterior #3 0                      PHYSICAL EXAM        Neurology: alert and oriented x 3, nonfocal, no gross deficits    CV :S1S2    left thoracotomy staples    Lungs: B/l breaths sound son room air  chesttubes x3    Abdomen: soft, nontender, nondistended, positive bowel sounds,+ BM    : voids              Extremities:     equal strength throughout   B/lle + DP no edema no calf tenderness                                        Discussed with Cardiothoracic Team at AM rounds.

## 2024-01-31 NOTE — BRIEF OPERATIVE NOTE - OPERATION/FINDINGS
Open left thoracotomy, thick layer of pleural peel s/p decortication and washout, cryoablation of ribs 3-8.
Chest tubes irrigated and clots/fibrinous exudate removed   Bronchoscopy - left lower lobe secretions cleared
Prior placed chest tube removed and site washed out with betadine. VATS through new port with discovery of Fibropurulent exudative adhesions. Further dissection revealed a piece of retained foreign material likely the tip of a glove. Sent to path. Pleural peels sent to culture. Chest washed out and 28fr chest tube placed.

## 2024-01-31 NOTE — PROGRESS NOTE ADULT - SUBJECTIVE AND OBJECTIVE BOX
ACS Progress Note    Overnight Events:  No acute events overnight.    O:  Vital Signs Last 24 Hrs  T(C): 36.9 (31 Jan 2024 07:00), Max: 37.5 (30 Jan 2024 23:00)  T(F): 98.4 (31 Jan 2024 07:00), Max: 99.5 (30 Jan 2024 23:00)  HR: 112 (31 Jan 2024 08:00) (106 - 119)  BP: 118/84 (31 Jan 2024 08:00) (102/59 - 152/92)  BP(mean): 96 (31 Jan 2024 08:00) (77 - 117)  RR: 25 (31 Jan 2024 08:00) (13 - 34)  SpO2: 95% (31 Jan 2024 08:00) (90% - 95%)    Parameters below as of 31 Jan 2024 07:00  Patient On (Oxygen Delivery Method): room air        I&O's Detail    30 Jan 2024 07:01  -  31 Jan 2024 07:00  --------------------------------------------------------  IN:    IV PiggyBack: 400 mL    Oral Fluid: 840 mL    sodium chloride 0.9%: 260 mL    sodium chloride 0.9%: 180 mL  Total IN: 1680 mL    OUT:    Chest Tube (mL): 20 mL    Chest Tube (mL): 30 mL    Chest Tube (mL): 40 mL    VAC (Vacuum Assisted Closure) System (mL): 0 mL    Voided (mL): 3500 mL  Total OUT: 3590 mL    Total NET: -1910 mL      31 Jan 2024 07:01  -  31 Jan 2024 09:03  --------------------------------------------------------  IN:    IV PiggyBack: 50 mL    sodium chloride 0.9%: 40 mL  Total IN: 90 mL    OUT:  Total OUT: 0 mL    Total NET: 90 mL          MEDICATIONS  (STANDING):  ascorbic acid 500 milliGRAM(s) Oral daily  chlorhexidine 2% Cloths 1 Application(s) Topical <User Schedule>  cyanocobalamin 1000 MICROGram(s) Oral daily  enoxaparin Injectable 40 milliGRAM(s) SubCutaneous every 24 hours  folic acid 1 milliGRAM(s) Oral daily  HYDROmorphone PCA (1 mG/mL) 30 milliLiter(s) PCA Continuous PCA Continuous  influenza   Vaccine 0.5 milliLiter(s) IntraMuscular once  lidocaine   4% Patch 1 Patch Transdermal every 24 hours  melatonin 5 milliGRAM(s) Oral at bedtime  multivitamin 1 Tablet(s) Oral daily  pantoprazole  Injectable 40 milliGRAM(s) IV Push every 12 hours  piperacillin/tazobactam IVPB.. 3.375 Gram(s) IV Intermittent every 8 hours  polyethylene glycol 3350 17 Gram(s) Oral daily  QUEtiapine 75 milliGRAM(s) Oral <User Schedule>  senna 2 Tablet(s) Oral at bedtime  sodium chloride 0.9%. 1000 milliLiter(s) (20 mL/Hr) IV Continuous <Continuous>  thiamine 100 milliGRAM(s) Oral daily    MEDICATIONS  (PRN):  calcium carbonate    500 mG (Tums) Chewable 1 Tablet(s) Chew every 6 hours PRN Heartburn  diphenhydrAMINE 25 milliGRAM(s) Oral every 4 hours PRN Pruritus  naloxone Injectable 0.1 milliGRAM(s) IV Push every 3 minutes PRN For ANY of the following changes in patient status:  A. RR LESS THAN 10 breaths per minute, B. Oxygen saturation LESS THAN 90%, C. Sedation score of 6  ondansetron Injectable 4 milliGRAM(s) IV Push every 6 hours PRN Nausea                            8.5    10.30 )-----------( 621      ( 31 Jan 2024 03:24 )             25.9       01-31    136  |  106  |  6<L>  ----------------------------<  96  4.3   |  22  |  1.03    Ca    7.9<L>      31 Jan 2024 03:24  Phos  3.6     01-31  Mg     1.9     01-31    TPro  5.5<L>  /  Alb  2.0<L>  /  TBili  0.3  /  DBili  x   /  AST  25  /  ALT  37  /  AlkPhos  293<H>  01-31    PHYSICAL EXAM:  Gen: NAD, A&Ox3  Pulm: No respiratory distress, no subcostal retractions. 3x CT to suction.  CV: Sinus tachycardia, no JVD  Abd: Soft, NT, ND  Extremities: warm and well perfused   ACS Progress Note    Overnight Events:  No acute events overnight.    24 HOUR EVENTS:   - DDAVP for high UOP  - poss bronch tomorrow for congested left lung    O:  Vital Signs Last 24 Hrs  T(C): 36.9 (31 Jan 2024 07:00), Max: 37.5 (30 Jan 2024 23:00)  T(F): 98.4 (31 Jan 2024 07:00), Max: 99.5 (30 Jan 2024 23:00)  HR: 112 (31 Jan 2024 08:00) (106 - 119)  BP: 118/84 (31 Jan 2024 08:00) (102/59 - 152/92)  BP(mean): 96 (31 Jan 2024 08:00) (77 - 117)  RR: 25 (31 Jan 2024 08:00) (13 - 34)  SpO2: 95% (31 Jan 2024 08:00) (90% - 95%)    Parameters below as of 31 Jan 2024 07:00  Patient On (Oxygen Delivery Method): room air        I&O's Detail    30 Jan 2024 07:01  -  31 Jan 2024 07:00  --------------------------------------------------------  IN:    IV PiggyBack: 400 mL    Oral Fluid: 840 mL    sodium chloride 0.9%: 260 mL    sodium chloride 0.9%: 180 mL  Total IN: 1680 mL    OUT:    Chest Tube (mL): 20 mL    Chest Tube (mL): 30 mL    Chest Tube (mL): 40 mL    VAC (Vacuum Assisted Closure) System (mL): 0 mL    Voided (mL): 3500 mL  Total OUT: 3590 mL    Total NET: -1910 mL      31 Jan 2024 07:01  -  31 Jan 2024 09:03  --------------------------------------------------------  IN:    IV PiggyBack: 50 mL    sodium chloride 0.9%: 40 mL  Total IN: 90 mL    OUT:  Total OUT: 0 mL    Total NET: 90 mL          MEDICATIONS  (STANDING):  ascorbic acid 500 milliGRAM(s) Oral daily  chlorhexidine 2% Cloths 1 Application(s) Topical <User Schedule>  cyanocobalamin 1000 MICROGram(s) Oral daily  enoxaparin Injectable 40 milliGRAM(s) SubCutaneous every 24 hours  folic acid 1 milliGRAM(s) Oral daily  HYDROmorphone PCA (1 mG/mL) 30 milliLiter(s) PCA Continuous PCA Continuous  influenza   Vaccine 0.5 milliLiter(s) IntraMuscular once  lidocaine   4% Patch 1 Patch Transdermal every 24 hours  melatonin 5 milliGRAM(s) Oral at bedtime  multivitamin 1 Tablet(s) Oral daily  pantoprazole  Injectable 40 milliGRAM(s) IV Push every 12 hours  piperacillin/tazobactam IVPB.. 3.375 Gram(s) IV Intermittent every 8 hours  polyethylene glycol 3350 17 Gram(s) Oral daily  QUEtiapine 75 milliGRAM(s) Oral <User Schedule>  senna 2 Tablet(s) Oral at bedtime  sodium chloride 0.9%. 1000 milliLiter(s) (20 mL/Hr) IV Continuous <Continuous>  thiamine 100 milliGRAM(s) Oral daily    MEDICATIONS  (PRN):  calcium carbonate    500 mG (Tums) Chewable 1 Tablet(s) Chew every 6 hours PRN Heartburn  diphenhydrAMINE 25 milliGRAM(s) Oral every 4 hours PRN Pruritus  naloxone Injectable 0.1 milliGRAM(s) IV Push every 3 minutes PRN For ANY of the following changes in patient status:  A. RR LESS THAN 10 breaths per minute, B. Oxygen saturation LESS THAN 90%, C. Sedation score of 6  ondansetron Injectable 4 milliGRAM(s) IV Push every 6 hours PRN Nausea                            8.5    10.30 )-----------( 621      ( 31 Jan 2024 03:24 )             25.9       01-31    136  |  106  |  6<L>  ----------------------------<  96  4.3   |  22  |  1.03    Ca    7.9<L>      31 Jan 2024 03:24  Phos  3.6     01-31  Mg     1.9     01-31    TPro  5.5<L>  /  Alb  2.0<L>  /  TBili  0.3  /  DBili  x   /  AST  25  /  ALT  37  /  AlkPhos  293<H>  01-31    PHYSICAL EXAM:  Gen: NAD, A&Ox3  Pulm: No respiratory distress, no subcostal retractions. 3x CT to suction.  CV: Sinus tachycardia, no JVD  Abd: Soft, NT, ND  Extremities: warm and well perfused

## 2024-01-31 NOTE — PROGRESS NOTE ADULT - SUBJECTIVE AND OBJECTIVE BOX
Day 5\4 of Anesthesia Pain Management Service    SUBJECTIVE: I'm doing ok    Pain Scale Score:	[X] Refer to charted pain scores    THERAPY:    [ ] IV PCA Morphine		[ ] 5 mg/mL	[ ] 1 mg/mL  [X] IV PCA Hydromorphone	[ ] 5 mg/mL	[X] 1 mg/mL  [ ] IV PCA Fentanyl		[ ] 50 micrograms/mL    Demand dose: 0.2 mg     Lockout: 6 minutes   Continuous Rate: 0 mg/hr  4 Hour Limit: 4 mg    MEDICATIONS  (STANDING):  ascorbic acid 500 milliGRAM(s) Oral daily  chlorhexidine 2% Cloths 1 Application(s) Topical <User Schedule>  cyanocobalamin 1000 MICROGram(s) Oral daily  enoxaparin Injectable 40 milliGRAM(s) SubCutaneous every 24 hours  folic acid 1 milliGRAM(s) Oral daily  HYDROmorphone PCA (1 mG/mL) 30 milliLiter(s) PCA Continuous PCA Continuous  influenza   Vaccine 0.5 milliLiter(s) IntraMuscular once  lidocaine   4% Patch 1 Patch Transdermal every 24 hours  melatonin 5 milliGRAM(s) Oral at bedtime  multivitamin 1 Tablet(s) Oral daily  pantoprazole  Injectable 40 milliGRAM(s) IV Push every 12 hours  piperacillin/tazobactam IVPB.. 3.375 Gram(s) IV Intermittent every 8 hours  polyethylene glycol 3350 17 Gram(s) Oral daily  QUEtiapine 75 milliGRAM(s) Oral <User Schedule>  senna 2 Tablet(s) Oral at bedtime  sodium chloride 0.9%. 1000 milliLiter(s) (20 mL/Hr) IV Continuous <Continuous>  thiamine 100 milliGRAM(s) Oral daily    MEDICATIONS  (PRN):  calcium carbonate    500 mG (Tums) Chewable 1 Tablet(s) Chew every 6 hours PRN Heartburn  diphenhydrAMINE 25 milliGRAM(s) Oral every 4 hours PRN Pruritus  naloxone Injectable 0.1 milliGRAM(s) IV Push every 3 minutes PRN For ANY of the following changes in patient status:  A. RR LESS THAN 10 breaths per minute, B. Oxygen saturation LESS THAN 90%, C. Sedation score of 6  ondansetron Injectable 4 milliGRAM(s) IV Push every 6 hours PRN Nausea      OBJECTIVE:    Sedation Score:	[ X] Alert 	[ ] Drowsy 	[ ] Arousable	[ ] Asleep	[ ] Unresponsive    Side Effects:	[X ] None	[ ] Nausea	[ ] Vomiting	[ ] Pruritus  		[ ] Other:    Vital Signs Last 24 Hrs  T(C): 36.9 (31 Jan 2024 07:00), Max: 37.5 (30 Jan 2024 23:00)  T(F): 98.4 (31 Jan 2024 07:00), Max: 99.5 (30 Jan 2024 23:00)  HR: 109 (31 Jan 2024 09:00) (106 - 119)  BP: 115/77 (31 Jan 2024 09:00) (102/59 - 152/92)  BP(mean): 89 (31 Jan 2024 09:00) (77 - 117)  RR: 17 (31 Jan 2024 09:00) (13 - 34)  SpO2: 93% (31 Jan 2024 09:00) (90% - 95%)    Parameters below as of 31 Jan 2024 07:00  Patient On (Oxygen Delivery Method): room air        ASSESSMENT/ PLAN    Therapy to  be:               [X] Continued   [ ] Discontinued   [ ] Changed to PRN Analgesics    Documentation and Verification of current medications:   [X] Done	[ ] Not done, not eligible    Comments: Total PCA use 4.8mg / 24 hours, using 0-2x/hr with multiple attempts.  + CT x 3. + VAC

## 2024-01-31 NOTE — PROGRESS NOTE ADULT - ASSESSMENT
This is a  56yo M w pmhx of Hep C and possible EtOH use disorder who presents after being found down. Intubated for respiratory distress and agitation 1/8. Pt has displaced 4-11 L rib fx with flail segment.     1/9 VSS: NPO after MN. Plan for Rib plating WED 1/10/24  1/10 s/p Left VATS, with partial lung decortication  Thoracoscopic removal of intrapleural foreign body    1/11VSS intubated sedated on pressors  chest tube lws   1/12 VSS, pt still remains intubated/sedated, getting phenobarbital for agitation. Left chest tube output 75cc/24h placed on water seal this AM. 1U PRBC -H/H 7.1/20. Continue care per SICU team  1/13 Maintain L pleural tube waterseal Daily CXR Care as per SICU team  1/14 Left pleural tube removed CXR ordered; thoracic signed off  1/20 thoracic surgery reconsulted for left pleural effusion vs hemothorax; d/w Dr. Sepulveda; chest ct done- Dr. Sepulveda reviewed; call IR for potential drainage monday left pocket   1/21 VSS, pt still febrile tmax 103, WBC 8.4, IR consulted for drainage of complex left pleural effusion on CT Chest. Plan for Monday.   1/22 VSS; Tm 100.8 this am. WBC WNL. IR pending drainage of L effusion today   1/23 VSS, s/p left pigtail by IR yesterday, minimal drainage 20cc/24h. Recommend flushing pigtail and placing to LWS. Plan to do tPA MIST protocol tomorrow.   1/24     vss   min drainage from Lt  PTC  1/25 VSS left chest tube drainage 100/230  TPA today-   OR for  1/26 please type and screen Hibiclens  and NPO at midnight  hold  Lovenox   1/26 L Thoracotomy/Decort  3 chest tubes suction x 48 hours  1/27 L thoracotomy maintain 3 left pleural tubes to suction     Recommend fluid restrict for hyponatremia  1/28 VSSL thoracotomy maintain 3 left pleural tubes to suction    today \  1/29 VSS, maintain 3 CT to suction,  today   1/29 VSS, 2U PRBC yesterday for GIB, improving 8/23 chest tube output minimal maintain 3 chest tubes to LWS  1/30    lt side chest xray  opacification, room air SAT 94 percent   min drainage   from 3 chest tubes  serosanguinous  1/31 VSS  XRAY improved NPO for bronchoscopy today  m chest tubes minimal drainage LWS no air leak  all meals OOB  ambulate tid chest PT

## 2024-02-01 LAB
ALBUMIN SERPL ELPH-MCNC: 2.2 G/DL — LOW (ref 3.3–5)
ALP SERPL-CCNC: 286 U/L — HIGH (ref 40–120)
ALT FLD-CCNC: 31 U/L — SIGNIFICANT CHANGE UP (ref 10–45)
ANION GAP SERPL CALC-SCNC: 9 MMOL/L — SIGNIFICANT CHANGE UP (ref 5–17)
APTT BLD: 30.4 SEC — SIGNIFICANT CHANGE UP (ref 24.5–35.6)
AST SERPL-CCNC: 24 U/L — SIGNIFICANT CHANGE UP (ref 10–40)
BILIRUB SERPL-MCNC: 0.3 MG/DL — SIGNIFICANT CHANGE UP (ref 0.2–1.2)
BLD GP AB SCN SERPL QL: NEGATIVE — SIGNIFICANT CHANGE UP
BUN SERPL-MCNC: 5 MG/DL — LOW (ref 7–23)
CALCIUM SERPL-MCNC: 7.8 MG/DL — LOW (ref 8.4–10.5)
CHLORIDE SERPL-SCNC: 104 MMOL/L — SIGNIFICANT CHANGE UP (ref 96–108)
CO2 SERPL-SCNC: 22 MMOL/L — SIGNIFICANT CHANGE UP (ref 22–31)
CREAT SERPL-MCNC: 0.94 MG/DL — SIGNIFICANT CHANGE UP (ref 0.5–1.3)
CULTURE RESULTS: SIGNIFICANT CHANGE UP
CULTURE RESULTS: SIGNIFICANT CHANGE UP
EGFR: 101 ML/MIN/1.73M2 — SIGNIFICANT CHANGE UP
GLUCOSE SERPL-MCNC: 93 MG/DL — SIGNIFICANT CHANGE UP (ref 70–99)
HCT VFR BLD CALC: 26.2 % — LOW (ref 39–50)
HGB BLD-MCNC: 8.5 G/DL — LOW (ref 13–17)
INR BLD: 1.05 RATIO — SIGNIFICANT CHANGE UP (ref 0.85–1.18)
MAGNESIUM SERPL-MCNC: 1.9 MG/DL — SIGNIFICANT CHANGE UP (ref 1.6–2.6)
MCHC RBC-ENTMCNC: 27.5 PG — SIGNIFICANT CHANGE UP (ref 27–34)
MCHC RBC-ENTMCNC: 32.4 GM/DL — SIGNIFICANT CHANGE UP (ref 32–36)
MCV RBC AUTO: 84.8 FL — SIGNIFICANT CHANGE UP (ref 80–100)
NRBC # BLD: 0 /100 WBCS — SIGNIFICANT CHANGE UP (ref 0–0)
PHOSPHATE SERPL-MCNC: 3.7 MG/DL — SIGNIFICANT CHANGE UP (ref 2.5–4.5)
PLATELET # BLD AUTO: 650 K/UL — HIGH (ref 150–400)
POTASSIUM SERPL-MCNC: 3.6 MMOL/L — SIGNIFICANT CHANGE UP (ref 3.5–5.3)
POTASSIUM SERPL-SCNC: 3.6 MMOL/L — SIGNIFICANT CHANGE UP (ref 3.5–5.3)
PROT SERPL-MCNC: 5.7 G/DL — LOW (ref 6–8.3)
PROTHROM AB SERPL-ACNC: 11.5 SEC — SIGNIFICANT CHANGE UP (ref 9.5–13)
RBC # BLD: 3.09 M/UL — LOW (ref 4.2–5.8)
RBC # FLD: 15.9 % — HIGH (ref 10.3–14.5)
RH IG SCN BLD-IMP: POSITIVE — SIGNIFICANT CHANGE UP
SODIUM SERPL-SCNC: 135 MMOL/L — SIGNIFICANT CHANGE UP (ref 135–145)
SPECIMEN SOURCE: SIGNIFICANT CHANGE UP
SPECIMEN SOURCE: SIGNIFICANT CHANGE UP
WBC # BLD: 10.65 K/UL — HIGH (ref 3.8–10.5)
WBC # FLD AUTO: 10.65 K/UL — HIGH (ref 3.8–10.5)

## 2024-02-01 PROCEDURE — 71045 X-RAY EXAM CHEST 1 VIEW: CPT | Mod: 26,76

## 2024-02-01 PROCEDURE — 99233 SBSQ HOSP IP/OBS HIGH 50: CPT | Mod: GC

## 2024-02-01 PROCEDURE — 73620 X-RAY EXAM OF FOOT: CPT | Mod: 26,LT

## 2024-02-01 PROCEDURE — 99232 SBSQ HOSP IP/OBS MODERATE 35: CPT

## 2024-02-01 RX ORDER — POTASSIUM CHLORIDE 20 MEQ
20 PACKET (EA) ORAL ONCE
Refills: 0 | Status: COMPLETED | OUTPATIENT
Start: 2024-02-01 | End: 2024-02-01

## 2024-02-01 RX ORDER — OXYCODONE HYDROCHLORIDE 5 MG/1
5 TABLET ORAL EVERY 4 HOURS
Refills: 0 | Status: DISCONTINUED | OUTPATIENT
Start: 2024-02-01 | End: 2024-02-06

## 2024-02-01 RX ORDER — PANTOPRAZOLE SODIUM 20 MG/1
40 TABLET, DELAYED RELEASE ORAL EVERY 24 HOURS
Refills: 0 | Status: DISCONTINUED | OUTPATIENT
Start: 2024-02-01 | End: 2024-02-02

## 2024-02-01 RX ORDER — MAGNESIUM SULFATE 500 MG/ML
2 VIAL (ML) INJECTION ONCE
Refills: 0 | Status: COMPLETED | OUTPATIENT
Start: 2024-02-01 | End: 2024-02-01

## 2024-02-01 RX ORDER — OXYCODONE HYDROCHLORIDE 5 MG/1
10 TABLET ORAL EVERY 4 HOURS
Refills: 0 | Status: DISCONTINUED | OUTPATIENT
Start: 2024-02-01 | End: 2024-02-06

## 2024-02-01 RX ADMIN — PIPERACILLIN AND TAZOBACTAM 25 GRAM(S): 4; .5 INJECTION, POWDER, LYOPHILIZED, FOR SOLUTION INTRAVENOUS at 14:42

## 2024-02-01 RX ADMIN — HYDROMORPHONE HYDROCHLORIDE 30 MILLILITER(S): 2 INJECTION INTRAMUSCULAR; INTRAVENOUS; SUBCUTANEOUS at 07:24

## 2024-02-01 RX ADMIN — CHLORHEXIDINE GLUCONATE 1 APPLICATION(S): 213 SOLUTION TOPICAL at 05:24

## 2024-02-01 RX ADMIN — OXYCODONE HYDROCHLORIDE 10 MILLIGRAM(S): 5 TABLET ORAL at 17:22

## 2024-02-01 RX ADMIN — Medication 25 GRAM(S): at 05:24

## 2024-02-01 RX ADMIN — PREGABALIN 1000 MICROGRAM(S): 225 CAPSULE ORAL at 12:55

## 2024-02-01 RX ADMIN — Medication 500 MILLIGRAM(S): at 12:55

## 2024-02-01 RX ADMIN — Medication 5 MILLIGRAM(S): at 21:19

## 2024-02-01 RX ADMIN — Medication 100 MILLIGRAM(S): at 12:55

## 2024-02-01 RX ADMIN — PANTOPRAZOLE SODIUM 40 MILLIGRAM(S): 20 TABLET, DELAYED RELEASE ORAL at 05:24

## 2024-02-01 RX ADMIN — OXYCODONE HYDROCHLORIDE 10 MILLIGRAM(S): 5 TABLET ORAL at 17:52

## 2024-02-01 RX ADMIN — Medication 500 MILLIGRAM(S): at 14:42

## 2024-02-01 RX ADMIN — OXYCODONE HYDROCHLORIDE 10 MILLIGRAM(S): 5 TABLET ORAL at 13:55

## 2024-02-01 RX ADMIN — LIDOCAINE 1 PATCH: 4 CREAM TOPICAL at 07:45

## 2024-02-01 RX ADMIN — ENOXAPARIN SODIUM 40 MILLIGRAM(S): 100 INJECTION SUBCUTANEOUS at 17:23

## 2024-02-01 RX ADMIN — LIDOCAINE 1 PATCH: 4 CREAM TOPICAL at 07:15

## 2024-02-01 RX ADMIN — Medication 1 TABLET(S): at 12:55

## 2024-02-01 RX ADMIN — QUETIAPINE FUMARATE 75 MILLIGRAM(S): 200 TABLET, FILM COATED ORAL at 19:56

## 2024-02-01 RX ADMIN — Medication 1 MILLIGRAM(S): at 12:55

## 2024-02-01 RX ADMIN — LIDOCAINE 1 PATCH: 4 CREAM TOPICAL at 05:24

## 2024-02-01 RX ADMIN — Medication 500 MILLIGRAM(S): at 23:51

## 2024-02-01 RX ADMIN — Medication 500 MILLIGRAM(S): at 17:22

## 2024-02-01 RX ADMIN — Medication 500 MILLIGRAM(S): at 13:55

## 2024-02-01 RX ADMIN — Medication 500 MILLIGRAM(S): at 17:52

## 2024-02-01 RX ADMIN — OXYCODONE HYDROCHLORIDE 10 MILLIGRAM(S): 5 TABLET ORAL at 12:56

## 2024-02-01 RX ADMIN — PIPERACILLIN AND TAZOBACTAM 25 GRAM(S): 4; .5 INJECTION, POWDER, LYOPHILIZED, FOR SOLUTION INTRAVENOUS at 21:20

## 2024-02-01 RX ADMIN — OXYCODONE HYDROCHLORIDE 5 MILLIGRAM(S): 5 TABLET ORAL at 19:56

## 2024-02-01 RX ADMIN — SENNA PLUS 2 TABLET(S): 8.6 TABLET ORAL at 21:19

## 2024-02-01 RX ADMIN — PIPERACILLIN AND TAZOBACTAM 25 GRAM(S): 4; .5 INJECTION, POWDER, LYOPHILIZED, FOR SOLUTION INTRAVENOUS at 05:25

## 2024-02-01 RX ADMIN — Medication 20 MILLIEQUIVALENT(S): at 05:23

## 2024-02-01 RX ADMIN — OXYCODONE HYDROCHLORIDE 5 MILLIGRAM(S): 5 TABLET ORAL at 20:26

## 2024-02-01 RX ADMIN — Medication 500 MILLIGRAM(S): at 05:23

## 2024-02-01 NOTE — PROGRESS NOTE ADULT - ASSESSMENT
48yoM w/ PMHx hepatitis C and EtOH abuse was transferred from OSH on 1/6 for level 1 trauma eval. He was found down with multiple 4-11 left-sided rib fractures and flail chest, and had a chest tube placed at OSH. S/p VATS 1/10 w/ partial lung decortication and thoracoscopic removal of intrapleural foreign body. S/p chest tube removal 1/14, downgraded to floor 1/18, returned to SICU after fever 102.3 on 1/20- s/p L thoracotomy and decortication (1/26).      Plan:  -Monitor CT output  -Analgesia and antiemetics as needed  -Continue abx   -Regular diet   -Appreciate excellent care per SICU     Trauma Surgery #[id number]

## 2024-02-01 NOTE — PROGRESS NOTE ADULT - SUBJECTIVE AND OBJECTIVE BOX
Day 6\5 of Anesthesia Pain Management Service    SUBJECTIVE: I'm doing ok    Pain Scale Score:	[X] Refer to charted pain scores    THERAPY:    [ ] IV PCA Morphine		[ ] 5 mg/mL	[ ] 1 mg/mL  [X] IV PCA Hydromorphone	[ ] 5 mg/mL	[X] 1 mg/mL  [ ] IV PCA Fentanyl		[ ] 50 micrograms/mL    Demand dose: 0.2 mg     Lockout: 6 minutes   Continuous Rate: 0 mg/hr  4 Hour Limit: 4 mg    MEDICATIONS  (STANDING):  acetaminophen     Tablet .. 500 milliGRAM(s) Oral every 6 hours  ascorbic acid 500 milliGRAM(s) Oral daily  chlorhexidine 2% Cloths 1 Application(s) Topical <User Schedule>  cyanocobalamin 1000 MICROGram(s) Oral daily  enoxaparin Injectable 40 milliGRAM(s) SubCutaneous every 24 hours  folic acid 1 milliGRAM(s) Oral daily  HYDROmorphone PCA (1 mG/mL) 30 milliLiter(s) PCA Continuous PCA Continuous  influenza   Vaccine 0.5 milliLiter(s) IntraMuscular once  lidocaine   4% Patch 1 Patch Transdermal every 24 hours  melatonin 5 milliGRAM(s) Oral at bedtime  multivitamin 1 Tablet(s) Oral daily  pantoprazole  Injectable 40 milliGRAM(s) IV Push every 12 hours  piperacillin/tazobactam IVPB.. 3.375 Gram(s) IV Intermittent every 8 hours  polyethylene glycol 3350 17 Gram(s) Oral daily  QUEtiapine 75 milliGRAM(s) Oral <User Schedule>  senna 2 Tablet(s) Oral at bedtime  sodium chloride 0.9%. 1000 milliLiter(s) (20 mL/Hr) IV Continuous <Continuous>  thiamine 100 milliGRAM(s) Oral daily    MEDICATIONS  (PRN):  calcium carbonate    500 mG (Tums) Chewable 1 Tablet(s) Chew every 6 hours PRN Heartburn  diphenhydrAMINE 25 milliGRAM(s) Oral every 4 hours PRN Pruritus  HYDROmorphone PCA (1 mG/mL) Rescue Clinician Bolus 0.5 milliGRAM(s) IV Push every 15 minutes PRN for Pain Scale GREATER THAN 6  naloxone Injectable 0.1 milliGRAM(s) IV Push every 3 minutes PRN For ANY of the following changes in patient status:  A. RR LESS THAN 10 breaths per minute, B. Oxygen saturation LESS THAN 90%, C. Sedation score of 6  ondansetron Injectable 4 milliGRAM(s) IV Push every 6 hours PRN Nausea      OBJECTIVE:    Sedation Score:	[ X] Alert 	[ ] Drowsy 	[ ] Arousable	[ ] Asleep	[ ] Unresponsive    Side Effects:	[X ] None	[ ] Nausea	[ ] Vomiting	[ ] Pruritus  		[ ] Other:    Vital Signs Last 24 Hrs  T(C): 36.7 (01 Feb 2024 03:00), Max: 37.4 (31 Jan 2024 15:00)  T(F): 98.1 (01 Feb 2024 03:00), Max: 99.3 (31 Jan 2024 15:00)  HR: 102 (01 Feb 2024 07:00) (101 - 119)  BP: 119/74 (01 Feb 2024 07:00) (115/75 - 135/83)  BP(mean): 91 (01 Feb 2024 07:00) (89 - 109)  RR: 19 (01 Feb 2024 07:00) (16 - 34)  SpO2: 95% (01 Feb 2024 07:00) (91% - 99%)    Parameters below as of 01 Feb 2024 07:00  Patient On (Oxygen Delivery Method): room air        ASSESSMENT/ PLAN    Therapy to  be:               [X] Continued   [ ] Discontinued   [ ] Changed to PRN Analgesics    Documentation and Verification of current medications:   [X] Done	[ ] Not done, not eligible    Comments: Total 3.8mg / 24 hours, using 0-2xhr. Continue

## 2024-02-01 NOTE — CHART NOTE - NSCHARTNOTEFT_GEN_A_CORE
Nephrology Attending     we were called for the concern for DI in a patient with a urine output of 4 L and sodium of 136     His urine output is commensurate with the intake of oral+ iv fluids   Na is 136. No reason to suspect Diabetes insipidus.   no indication to use Desmopressin.   24 hour urine output 1.7 L     we will sign off. Please call us with questions     jose garner  nephrology attending   please contact me on TEAMS   Office- [phone]

## 2024-02-01 NOTE — PROGRESS NOTE ADULT - ATTENDING COMMENTS
Pt seen and examined  Chart reviewed  Resident note confirmed  Plan of care discussed with Dr. Ahumada  Management per SICU team

## 2024-02-01 NOTE — PROGRESS NOTE ADULT - SUBJECTIVE AND OBJECTIVE BOX
24 HOUR EVENTS:  - Bronch 1/31 -  Chest tubes irrigated and clots/fibrinous exudate removed and left lower lobe secretions cleared  - seen by nephro for polyuria, DDAVP not indicated at this time    SUBJECTIVE/ROS:  [x] A ten-point review of systems was otherwise negative except as noted.  [ ] Due to altered mental status/intubation, subjective information were not able to be obtained from the patient. History was obtained, to the extent possible, from review of the chart and collateral sources of information.      NEURO  Exam: awake, alert, oriented  Meds: acetaminophen     Tablet .. 500 milliGRAM(s) Oral every 6 hours  diphenhydrAMINE 25 milliGRAM(s) Oral every 4 hours PRN Pruritus  HYDROmorphone PCA (1 mG/mL) 30 milliLiter(s) PCA Continuous PCA Continuous  HYDROmorphone PCA (1 mG/mL) Rescue Clinician Bolus 0.5 milliGRAM(s) IV Push every 15 minutes PRN for Pain Scale GREATER THAN 6  melatonin 5 milliGRAM(s) Oral at bedtime  ondansetron Injectable 4 milliGRAM(s) IV Push every 6 hours PRN Nausea  QUEtiapine 75 milliGRAM(s) Oral <User Schedule>    [x] Adequacy of sedation and pain control has been assessed and adjusted      RESPIRATORY  RR: 18 (01-31-24 @ 23:00) (14 - 34)  SpO2: 95% (01-31-24 @ 23:00) (90% - 99%)  Wt(kg): --  Exam: unlabored, clear to auscultation bilaterally    [N/A] Extubation Readiness Assessed  Meds:       CARDIOVASCULAR  HR: 101 (01-31-24 @ 23:00) (101 - 119)  BP: 123/84 (01-31-24 @ 23:00) (102/59 - 143/92)  BP(mean): 100 (01-31-24 @ 23:00) (77 - 113)  ABP: --  ABP(mean): --  Wt(kg): --  CVP(cm H2O): --      Exam: regular rate and rhythm  Cardiac Rhythm: sinus  Perfusion     [x]Adequate   [ ]Inadequate  Mentation   [x]Normal       [ ]Reduced  Extremities  [x]Warm         [ ]Cool  Volume Status [ ]Hypervolemic [x]Euvolemic [ ]Hypovolemic  Meds:       GI/NUTRITION  Exam: soft, nontender, nondistended, incision C/D/I  Diet: regular  Meds: calcium carbonate    500 mG (Tums) Chewable 1 Tablet(s) Chew every 6 hours PRN Heartburn  pantoprazole  Injectable 40 milliGRAM(s) IV Push every 12 hours  polyethylene glycol 3350 17 Gram(s) Oral daily  senna 2 Tablet(s) Oral at bedtime      GENITOURINARY  I&O's Detail    01-30 @ 07:01 - 01-31 @ 07:00  --------------------------------------------------------  IN:    IV PiggyBack: 400 mL    Oral Fluid: 840 mL    sodium chloride 0.9%: 260 mL    sodium chloride 0.9%: 180 mL  Total IN: 1680 mL    OUT:    Chest Tube (mL): 40 mL    Chest Tube (mL): 50 mL    Chest Tube (mL): 30 mL    VAC (Vacuum Assisted Closure) System (mL): 0 mL    Voided (mL): 3500 mL  Total OUT: 3620 mL    Total NET: -1940 mL      01-31 @ 07:01 - 02-01 @ 00:57  --------------------------------------------------------  IN:    IV PiggyBack: 200 mL    Oral Fluid: 870 mL    sodium chloride 0.9%: 80 mL    sodium chloride 0.9%: 200 mL  Total IN: 1350 mL    OUT:    Chest Tube (mL): 30 mL    Chest Tube (mL): 30 mL    Chest Tube (mL): 10 mL    VAC (Vacuum Assisted Closure) System (mL): 0 mL    Voided (mL): 975 mL  Total OUT: 1045 mL    Total NET: 305 mL      Weight (kg): 91.7 (01-31 @ 10:01)  01-31    136  |  106  |  6<L>  ----------------------------<  96  4.3   |  22  |  1.03    Ca    7.9<L>      31 Jan 2024 03:24  Phos  3.6     01-31  Mg     1.9     01-31    TPro  5.5<L>  /  Alb  2.0<L>  /  TBili  0.3  /  DBili  x   /  AST  25  /  ALT  37  /  AlkPhos  293<H>  01-31    [ ] Hendrix catheter, indication: N/A  Meds: ascorbic acid 500 milliGRAM(s) Oral daily  cyanocobalamin 1000 MICROGram(s) Oral daily  folic acid 1 milliGRAM(s) Oral daily  multivitamin 1 Tablet(s) Oral daily  sodium chloride 0.9%. 1000 milliLiter(s) IV Continuous <Continuous>  thiamine 100 milliGRAM(s) Oral daily      HEMATOLOGIC  Meds: enoxaparin Injectable 40 milliGRAM(s) SubCutaneous every 24 hours    [x] VTE Prophylaxis                        8.5    10.30 )-----------( 621      ( 31 Jan 2024 03:24 )             25.9     PT/INR - ( 31 Jan 2024 03:24 )   PT: 11.9 sec;   INR: 1.14 ratio         PTT - ( 31 Jan 2024 03:24 )  PTT:30.1 sec  Transfusion     [ ] PRBC   [ ] Platelets   [ ] FFP   [ ] Cryoprecipitate      INFECTIOUS DISEASES  WBC Count: 10.30 K/uL (01-31 @ 03:24)    RECENT CULTURES:    Meds: influenza   Vaccine 0.5 milliLiter(s) IntraMuscular once  piperacillin/tazobactam IVPB.. 3.375 Gram(s) IV Intermittent every 8 hours      ENDOCRINE  CAPILLARY BLOOD GLUCOSE      ACCESS DEVICES:  [ ] Peripheral IV  [ ] Central Venous Line	[ ] R	[ ] L	[ ] IJ	[ ] Fem	[ ] SC	Placed:   [ ] Arterial Line		[ ] R	[ ] L	[ ] Fem	[ ] Rad	[ ] Ax	Placed:   [ ] PICC:					[ ] Mediport  [ ] Urinary Catheter, Date Placed:   [x] Necessity of urinary, arterial, and venous catheters discussed    OTHER MEDICATIONS:  chlorhexidine 2% Cloths 1 Application(s) Topical <User Schedule>  lidocaine   4% Patch 1 Patch Transdermal every 24 hours  naloxone Injectable 0.1 milliGRAM(s) IV Push every 3 minutes PRN      CODE STATUS: full

## 2024-02-01 NOTE — PROGRESS NOTE ADULT - NS ATTEND AMEND GEN_ALL_CORE FT
etoh misuse with withdrawal, required washout of chest s/p chest tubes x3    ON: bronch    alert and awake, sitting in chair  seroquel nightly, PCA in place (0.4 mg hydromorphone)  standing tylenol  on RA  AM CXR stable and clear  CT x2 to water seal, removed one and awaiting repeat CXR  stable hemodyanmics, tachycardia stable  on diet and supplements  daily protonix for + FOBT, GI consulted, no intervention  voiding 1.4 lit, balance even  renal consulted, recommend limit intake  lovenox VTE PPX  zosyn for empyema, no end date, afebrile  good glycemic control  buttock wound , wound vac , followed by PT  PT OT working with him    PIV only

## 2024-02-01 NOTE — PROGRESS NOTE ADULT - PROBLEM SELECTOR PLAN 1
Suspected fracture of rib on left side, closed. Left Rib Fx 4-11th /Hemothorax  1/10 LT VATS, with partial lung decortication  Thoracoscopic removal of intrapleural foreign body       1/20 thoracic surgery reconsult f left pleural effusion vs hemothorax   1/22 s/p left pigtail by IR  1/26 Thoracotomy Decortication w/ washout>CT x 3 placed    2/1 #2 left anterior chest tube d/c   maintain left diaphragm #1 and left posterior #3 to water seal daily xray OOB  d/c pca   all meals OOB  ambulated  tid   chest PT daily xray

## 2024-02-01 NOTE — PROGRESS NOTE ADULT - SUBJECTIVE AND OBJECTIVE BOX
ACS Progress Note    S: Patient seen and examined. No acute events overnight. Pain well controlled with current regimen.   24 HOUR EVENTS:  - Bronch 1/31 -  Chest tubes irrigated and clots/fibrinous exudate removed and left lower lobe secretions cleared  - seen by nephro for polyuria, DDAVP not indicated at this time    O:  Vital Signs Last 24 Hrs  T(C): 36.3 (01 Feb 2024 07:00), Max: 37.4 (31 Jan 2024 15:00)  T(F): 97.3 (01 Feb 2024 07:00), Max: 99.3 (31 Jan 2024 15:00)  HR: 97 (01 Feb 2024 10:00) (93 - 119)  BP: 129/87 (01 Feb 2024 10:00) (114/79 - 135/83)  BP(mean): 104 (01 Feb 2024 10:00) (89 - 109)  RR: 24 (01 Feb 2024 10:00) (16 - 34)  SpO2: 96% (01 Feb 2024 10:00) (91% - 99%)    Parameters below as of 01 Feb 2024 07:00  Patient On (Oxygen Delivery Method): room air        I&O's Detail    31 Jan 2024 07:01  -  01 Feb 2024 07:00  --------------------------------------------------------  IN:    IV PiggyBack: 200 mL    Oral Fluid: 870 mL    sodium chloride 0.9%: 80 mL    sodium chloride 0.9%: 380 mL  Total IN: 1530 mL    OUT:    Chest Tube (mL): 40 mL    Chest Tube (mL): 15 mL    Chest Tube (mL): 40 mL    VAC (Vacuum Assisted Closure) System (mL): 0 mL    Voided (mL): 1675 mL  Total OUT: 1770 mL    Total NET: -240 mL      01 Feb 2024 07:01  -  01 Feb 2024 11:19  --------------------------------------------------------  IN:    Oral Fluid: 240 mL    sodium chloride 0.9%: 80 mL  Total IN: 320 mL    OUT:    Chest Tube (mL): 10 mL    Voided (mL): 400 mL  Total OUT: 410 mL    Total NET: -90 mL          MEDICATIONS  (STANDING):  acetaminophen     Tablet .. 500 milliGRAM(s) Oral every 6 hours  ascorbic acid 500 milliGRAM(s) Oral daily  chlorhexidine 2% Cloths 1 Application(s) Topical <User Schedule>  cyanocobalamin 1000 MICROGram(s) Oral daily  enoxaparin Injectable 40 milliGRAM(s) SubCutaneous every 24 hours  folic acid 1 milliGRAM(s) Oral daily  HYDROmorphone PCA (1 mG/mL) 30 milliLiter(s) PCA Continuous PCA Continuous  influenza   Vaccine 0.5 milliLiter(s) IntraMuscular once  lidocaine   4% Patch 1 Patch Transdermal every 24 hours  melatonin 5 milliGRAM(s) Oral at bedtime  multivitamin 1 Tablet(s) Oral daily  pantoprazole  Injectable 40 milliGRAM(s) IV Push every 24 hours  piperacillin/tazobactam IVPB.. 3.375 Gram(s) IV Intermittent every 8 hours  polyethylene glycol 3350 17 Gram(s) Oral daily  QUEtiapine 75 milliGRAM(s) Oral <User Schedule>  senna 2 Tablet(s) Oral at bedtime  sodium chloride 0.9%. 1000 milliLiter(s) (20 mL/Hr) IV Continuous <Continuous>  thiamine 100 milliGRAM(s) Oral daily    MEDICATIONS  (PRN):  calcium carbonate    500 mG (Tums) Chewable 1 Tablet(s) Chew every 6 hours PRN Heartburn  diphenhydrAMINE 25 milliGRAM(s) Oral every 4 hours PRN Pruritus  HYDROmorphone PCA (1 mG/mL) Rescue Clinician Bolus 0.5 milliGRAM(s) IV Push every 15 minutes PRN for Pain Scale GREATER THAN 6  naloxone Injectable 0.1 milliGRAM(s) IV Push every 3 minutes PRN For ANY of the following changes in patient status:  A. RR LESS THAN 10 breaths per minute, B. Oxygen saturation LESS THAN 90%, C. Sedation score of 6  ondansetron Injectable 4 milliGRAM(s) IV Push every 6 hours PRN Nausea                            8.5    10.65 )-----------( 650      ( 01 Feb 2024 02:57 )             26.2       02-01    135  |  104  |  5<L>  ----------------------------<  93  3.6   |  22  |  0.94    Ca    7.8<L>      01 Feb 2024 02:57  Phos  3.7     02-01  Mg     1.9     02-01    TPro  5.7<L>  /  Alb  2.2<L>  /  TBili  0.3  /  DBili  x   /  AST  24  /  ALT  31  /  AlkPhos  286<H>  02-01

## 2024-02-01 NOTE — PROGRESS NOTE ADULT - SUBJECTIVE AND OBJECTIVE BOX
Subjective " hello I am feeling better"       Vital Signs Last 24 Hrs  T(C): 36.7 (02-01-24 @ 03:00), Max: 37.4 (01-31-24 @ 15:00)  T(F): 98.1 (02-01-24 @ 03:00), Max: 99.3 (01-31-24 @ 15:00)  HR: 102 (02-01-24 @ 07:00) (101 - 119)  BP: 119/74 (02-01-24 @ 07:00) (115/75 - 135/83)  RR: 19 (02-01-24 @ 07:00) (16 - 34)  SpO2: 95% (02-01-24 @ 07:00) (91% - 99%)         01-31 @ 07:01  -  02-01 @ 07:00  --------------------------------------------------------  IN: 1530 mL / OUT: 1770 mL / NET: -240 mL                          8.5    10.65 )-----------( 650      ( 01 Feb 2024 02:57 )             26.2       02-01    135  |  104  |  5<L>  ----------------------------<  93  3.6   |  22  |  0.94    Ca    7.8<L>      01 Feb 2024 02:57  Phos  3.7     02-01  Mg     1.9     02-01    TPro  5.7<L>  /  Alb  2.2<L>  /  TBili  0.3  /  DBili  x   /  AST  24  /  ALT  31  /  AlkPhos  286<H>  02-01      MEDICATIONS  (STANDING):  acetaminophen     Tablet .. 500 milliGRAM(s) Oral every 6 hours  ascorbic acid 500 milliGRAM(s) Oral daily  chlorhexidine 2% Cloths 1 Application(s) Topical <User Schedule>  cyanocobalamin 1000 MICROGram(s) Oral daily  enoxaparin Injectable 40 milliGRAM(s) SubCutaneous every 24 hours  folic acid 1 milliGRAM(s) Oral daily  HYDROmorphone PCA (1 mG/mL) 30 milliLiter(s) PCA Continuous PCA Continuous  influenza   Vaccine 0.5 milliLiter(s) IntraMuscular once  lidocaine   4% Patch 1 Patch Transdermal every 24 hours  melatonin 5 milliGRAM(s) Oral at bedtime  multivitamin 1 Tablet(s) Oral daily  pantoprazole  Injectable 40 milliGRAM(s) IV Push every 24 hours  piperacillin/tazobactam IVPB.. 3.375 Gram(s) IV Intermittent every 8 hours  polyethylene glycol 3350 17 Gram(s) Oral daily  QUEtiapine 75 milliGRAM(s) Oral <User Schedule>  senna 2 Tablet(s) Oral at bedtime  sodium chloride 0.9%. 1000 milliLiter(s) (20 mL/Hr) IV Continuous <Continuous>  thiamine 100 milliGRAM(s) Oral daily    MEDICATIONS  (PRN):  calcium carbonate    500 mG (Tums) Chewable 1 Tablet(s) Chew every 6 hours PRN Heartburn  diphenhydrAMINE 25 milliGRAM(s) Oral every 4 hours PRN Pruritus  HYDROmorphone PCA (1 mG/mL) Rescue Clinician Bolus 0.5 milliGRAM(s) IV Push every 15 minutes PRN for Pain Scale GREATER THAN 6  naloxone Injectable 0.1 milliGRAM(s) IV Push every 3 minutes PRN For ANY of the following changes in patient status:  A. RR LESS THAN 10 breaths per minute, B. Oxygen saturation LESS THAN 90%, C. Sedation score of 6  ondansetron Injectable 4 milliGRAM(s) IV Push every 6 hours PRN Nausea        Bilirubin Total: 0.3 mg/dL (02-01 @ 02:57)    CAPILLARY BLOOD GLUCOSE              Drains:   left #1  O/o left anterior 2 0/0  left posterior # 3   0/0           PHYSICAL EXAM  Neurology: alert and oriented x 3, nonfocal, no gross deficits    CV : S1S2  left lateral chest tube x3    Lungs: B/l breath sounds on room air     Abdomen: soft, nontender, nondistended, positive bowel sounds, + BM    :voids               Extremities: warm well perfused  equal strength throughout   b/le + DP n                                              Discussed with Cardiothoracic Team at AM rounds.

## 2024-02-01 NOTE — PROGRESS NOTE ADULT - ASSESSMENT
This is a  54yo M w pmhx of Hep C and possible EtOH use disorder who presents after being found down. Intubated for respiratory distress and agitation 1/8. Pt has displaced 4-11 L rib fx with flail segment.     1/9 VSS: NPO after MN. Plan for Rib plating WED 1/10/24  1/10 s/p Left VATS, with partial lung decortication  Thoracoscopic removal of intrapleural foreign body    1/11VSS intubated sedated on pressors  chest tube lws   1/12 VSS, pt still remains intubated/sedated, getting phenobarbital for agitation. Left chest tube output 75cc/24h placed on water seal this AM. 1U PRBC -H/H 7.1/20. Continue care per SICU team  1/13 Maintain L pleural tube waterseal Daily CXR Care as per SICU team  1/14 Left pleural tube removed CXR ordered; thoracic signed off  1/20 thoracic surgery reconsulted for left pleural effusion vs hemothorax; d/w Dr. Sepulveda; chest ct done- Dr. Sepulvdea reviewed; call IR for potential drainage monday left pocket   1/21 VSS, pt still febrile tmax 103, WBC 8.4, IR consulted for drainage of complex left pleural effusion on CT Chest. Plan for Monday.   1/22 VSS; Tm 100.8 this am. WBC WNL. IR pending drainage of L effusion today   1/23 VSS, s/p left pigtail by IR yesterday, minimal drainage 20cc/24h. Recommend flushing pigtail and placing to LWS. Plan to do tPA MIST protocol tomorrow.   1/24     vss   min drainage from Lt  PTC  1/25 VSS left chest tube drainage 100/230  TPA today-   OR for  1/26 please type and screen Hibiclens  and NPO at midnight  hold  Lovenox   1/26 L Thoracotomy/Decort  3 chest tubes suction x 48 hours  1/27 L thoracotomy maintain 3 left pleural tubes to suction     Recommend fluid restrict for hyponatremia  1/28 VSSL thoracotomy maintain 3 left pleural tubes to suction    today \  1/29 VSS, maintain 3 CT to suction,  today   1/29 VSS, 2U PRBC yesterday for GIB, improving 8/23 chest tube output minimal maintain 3 chest tubes to LWS  1/30    lt side chest xray  opacification, room air SAT 94 percent   min drainage   from 3 chest tubes  serosanguinous  1/31 VSS  XRAY improved NPO for bronchoscopy today  m chest tubes minimal drainage LWS no air leak  all meals OOB  ambulate tid chest PT   2/1 VSS #2 left anterior chest tube d/c maintain left diaphragm #1 and left posterior #3 to water seal daily xray OOB  d/c pca

## 2024-02-01 NOTE — PROGRESS NOTE ADULT - SUBJECTIVE AND OBJECTIVE BOX
Pain Management Attending Addendum    SUBJECTIVE: Patient doing well with IV PCA    Therapy:    [X] IV PCA         [ ] PRN Analgesics    OBJECTIVE:   [X] Pain appropriately controlled    [ ] Other:    Side Effects:  [X] None	             [ ] Nausea              [ ] Pruritis                	[ ] Other:    ASSESSMENT/PLAN: Continue current therapy    Comments: Pain well controlled on current regimen, will continue. Transition to PO pain regimen as able.

## 2024-02-01 NOTE — CHART NOTE - NSCHARTNOTEFT_GEN_A_CORE
Left foot x-rays reviewed   Recommend patient weight-bear to the left heel in surgical shoe   Follow-up w/ Dr. Freedman w/in 1 week of discharge. Please call [phone] to make an appointment.

## 2024-02-01 NOTE — PROGRESS NOTE ADULT - ASSESSMENT
48M with PMHx hepatitis C (diagnosed many years ago, never treated), depression transferred from outside hospital for trauma eval. Patient found down by roommate after unknown amount of time, found with multiple 4-11 L sided rib fractures with flail chest. Chest tube placed there with >1L output (old blood). Patient also with tachypnea, concern for respiratory failure started on BiPAP. Level 1 called for transient hypotension, patient given 2 unit PRBC in [facility] ED. Patient admitted to SICU for hemodynamic monitoring, pain management. S/p VATS 1/10 with partial lung decortication, chest tube removed 1/14. S/p chest tube placement 1/22 by IR for new left effusion, s/p L Thoracotomy/Decort 1/26 w/ 3 chest tubes to suction, s/p bronch 1/31.    PLAN  Neurologic:  - A&Ox4  - Seroquel 75mg QHS  - Tylenol, IV PCA for pain from CTs     Respiratory:  - RA  - s/p VATS 1/10 with discovery of Fibropurulent exudative adhesions and removal of retained foreign material likely the tip of a glove, s/p washout, with new 28f chest tube placed > removed 1/14  - 1/20 CT with complex collection L pleural space, s/p drainage by IR 1/22, 20cc bloody fluid drained, pigtail placed  - Incentive spirometry, Duonebs   - CTS OR 1/26 Open left thoracotomy, decortication and washout, 3 chest tubes placed  - chest tubes to suction, monitor output > Follow-up water seal timing with thoracic  - Cxray show worsening of pleural effusion, f/u with CT surg for management and further imaging; s/p bronch 1/31 - Chest tubes irrigated and clots/fibrinous exudate removed and left lower lobe secretions cleared    Cardiovascular: tachycardia  - off pressors  - Metoprolol 50mg q8h for tachycardia - d/c'd 1/26 for soft BP  - Hgb/Hct 7.1/21.3 1U PRBC, post 8.3  - Hypotensive/Tachycardic (, SPB 70s) 500 cc LR bolus x1, lopressor d/c; currently (-120, SBP 90)    Gastrointestinal/Nutrition:  - Abd US 1/24 - mild hepatomegaly, redemonstration of evolving splenic hematomas  - regular diet  - PPI 40 BID, FOBT +, GI consulted - no intervention indicated at this time    Genitourinary/Renal:  - Supplement electrolytes PRN  - IVF @ 100  - f/u repeat urine electrolytes, surum osm  - nephro consulted for elevated urine osm and polyuria - no concern for DI    Hematologic:  - Chemical VTE ppx with Lovenox   - Duplex/doppler 1/23 negative    Infectious Disease: Empyema   - s/p zosyn 1/7-1/24  - f/u with surgery for abx reecs  - 1/22 L empyema cultures NGTD  - 1/26- Restarted zosyn. Repeat BCx negative     Endocrine:  - No active issues, no history of DM

## 2024-02-01 NOTE — PROGRESS NOTE ADULT - NS ATTEND OPT1 GEN_ALL_CORE
I attest my time as attending is greater than 50% of the total combined time spent on qualifying patient care activities by the PA/NP and attending.
I independently performed the documented:
I attest my time as attending is greater than 50% of the total combined time spent on qualifying patient care activities by the PA/NP and attending.
I independently performed the documented:

## 2024-02-02 LAB
ALBUMIN SERPL ELPH-MCNC: 2.2 G/DL — LOW (ref 3.3–5)
ALP SERPL-CCNC: 292 U/L — HIGH (ref 40–120)
ALT FLD-CCNC: 29 U/L — SIGNIFICANT CHANGE UP (ref 10–45)
ANION GAP SERPL CALC-SCNC: 11 MMOL/L — SIGNIFICANT CHANGE UP (ref 5–17)
APTT BLD: 30.5 SEC — SIGNIFICANT CHANGE UP (ref 24.5–35.6)
AST SERPL-CCNC: 27 U/L — SIGNIFICANT CHANGE UP (ref 10–40)
BILIRUB SERPL-MCNC: 0.3 MG/DL — SIGNIFICANT CHANGE UP (ref 0.2–1.2)
BUN SERPL-MCNC: 5 MG/DL — LOW (ref 7–23)
CALCIUM SERPL-MCNC: 7.9 MG/DL — LOW (ref 8.4–10.5)
CHLORIDE SERPL-SCNC: 104 MMOL/L — SIGNIFICANT CHANGE UP (ref 96–108)
CO2 SERPL-SCNC: 20 MMOL/L — LOW (ref 22–31)
CREAT SERPL-MCNC: 0.86 MG/DL — SIGNIFICANT CHANGE UP (ref 0.5–1.3)
EGFR: 107 ML/MIN/1.73M2 — SIGNIFICANT CHANGE UP
GLUCOSE SERPL-MCNC: 96 MG/DL — SIGNIFICANT CHANGE UP (ref 70–99)
HCT VFR BLD CALC: 26 % — LOW (ref 39–50)
HGB BLD-MCNC: 8.6 G/DL — LOW (ref 13–17)
INR BLD: 1.07 RATIO — SIGNIFICANT CHANGE UP (ref 0.85–1.18)
MAGNESIUM SERPL-MCNC: 2 MG/DL — SIGNIFICANT CHANGE UP (ref 1.6–2.6)
MCHC RBC-ENTMCNC: 27.8 PG — SIGNIFICANT CHANGE UP (ref 27–34)
MCHC RBC-ENTMCNC: 33.1 GM/DL — SIGNIFICANT CHANGE UP (ref 32–36)
MCV RBC AUTO: 84.1 FL — SIGNIFICANT CHANGE UP (ref 80–100)
NRBC # BLD: 0 /100 WBCS — SIGNIFICANT CHANGE UP (ref 0–0)
PHOSPHATE SERPL-MCNC: 3.6 MG/DL — SIGNIFICANT CHANGE UP (ref 2.5–4.5)
PLATELET # BLD AUTO: 699 K/UL — HIGH (ref 150–400)
POTASSIUM SERPL-MCNC: 3.9 MMOL/L — SIGNIFICANT CHANGE UP (ref 3.5–5.3)
POTASSIUM SERPL-SCNC: 3.9 MMOL/L — SIGNIFICANT CHANGE UP (ref 3.5–5.3)
PROT SERPL-MCNC: 5.9 G/DL — LOW (ref 6–8.3)
PROTHROM AB SERPL-ACNC: 11.7 SEC — SIGNIFICANT CHANGE UP (ref 9.5–13)
RBC # BLD: 3.09 M/UL — LOW (ref 4.2–5.8)
RBC # FLD: 15.7 % — HIGH (ref 10.3–14.5)
SODIUM SERPL-SCNC: 135 MMOL/L — SIGNIFICANT CHANGE UP (ref 135–145)
WBC # BLD: 10.99 K/UL — HIGH (ref 3.8–10.5)
WBC # FLD AUTO: 10.99 K/UL — HIGH (ref 3.8–10.5)

## 2024-02-02 PROCEDURE — 99232 SBSQ HOSP IP/OBS MODERATE 35: CPT

## 2024-02-02 PROCEDURE — 71045 X-RAY EXAM CHEST 1 VIEW: CPT | Mod: 26,76

## 2024-02-02 PROCEDURE — 99232 SBSQ HOSP IP/OBS MODERATE 35: CPT | Mod: GC

## 2024-02-02 PROCEDURE — 99233 SBSQ HOSP IP/OBS HIGH 50: CPT | Mod: GC

## 2024-02-02 RX ORDER — PANTOPRAZOLE SODIUM 20 MG/1
40 TABLET, DELAYED RELEASE ORAL
Refills: 0 | Status: DISCONTINUED | OUTPATIENT
Start: 2024-02-02 | End: 2024-02-06

## 2024-02-02 RX ORDER — POTASSIUM CHLORIDE 20 MEQ
10 PACKET (EA) ORAL ONCE
Refills: 0 | Status: COMPLETED | OUTPATIENT
Start: 2024-02-02 | End: 2024-02-02

## 2024-02-02 RX ADMIN — SENNA PLUS 2 TABLET(S): 8.6 TABLET ORAL at 22:14

## 2024-02-02 RX ADMIN — ENOXAPARIN SODIUM 40 MILLIGRAM(S): 100 INJECTION SUBCUTANEOUS at 18:17

## 2024-02-02 RX ADMIN — Medication 100 MILLIGRAM(S): at 11:10

## 2024-02-02 RX ADMIN — QUETIAPINE FUMARATE 75 MILLIGRAM(S): 200 TABLET, FILM COATED ORAL at 19:35

## 2024-02-02 RX ADMIN — PIPERACILLIN AND TAZOBACTAM 25 GRAM(S): 4; .5 INJECTION, POWDER, LYOPHILIZED, FOR SOLUTION INTRAVENOUS at 05:54

## 2024-02-02 RX ADMIN — Medication 1 MILLIGRAM(S): at 11:11

## 2024-02-02 RX ADMIN — Medication 500 MILLIGRAM(S): at 23:32

## 2024-02-02 RX ADMIN — OXYCODONE HYDROCHLORIDE 5 MILLIGRAM(S): 5 TABLET ORAL at 05:36

## 2024-02-02 RX ADMIN — Medication 500 MILLIGRAM(S): at 11:10

## 2024-02-02 RX ADMIN — Medication 5 MILLIGRAM(S): at 22:14

## 2024-02-02 RX ADMIN — Medication 10 MILLIEQUIVALENT(S): at 11:10

## 2024-02-02 RX ADMIN — Medication 1 TABLET(S): at 11:10

## 2024-02-02 RX ADMIN — Medication 500 MILLIGRAM(S): at 19:17

## 2024-02-02 RX ADMIN — Medication 500 MILLIGRAM(S): at 11:41

## 2024-02-02 RX ADMIN — Medication 500 MILLIGRAM(S): at 06:06

## 2024-02-02 RX ADMIN — PREGABALIN 1000 MICROGRAM(S): 225 CAPSULE ORAL at 11:11

## 2024-02-02 RX ADMIN — OXYCODONE HYDROCHLORIDE 5 MILLIGRAM(S): 5 TABLET ORAL at 11:41

## 2024-02-02 RX ADMIN — Medication 500 MILLIGRAM(S): at 11:11

## 2024-02-02 RX ADMIN — Medication 500 MILLIGRAM(S): at 05:36

## 2024-02-02 RX ADMIN — OXYCODONE HYDROCHLORIDE 5 MILLIGRAM(S): 5 TABLET ORAL at 06:06

## 2024-02-02 RX ADMIN — Medication 500 MILLIGRAM(S): at 00:21

## 2024-02-02 RX ADMIN — POLYETHYLENE GLYCOL 3350 17 GRAM(S): 17 POWDER, FOR SOLUTION ORAL at 11:11

## 2024-02-02 RX ADMIN — OXYCODONE HYDROCHLORIDE 5 MILLIGRAM(S): 5 TABLET ORAL at 11:10

## 2024-02-02 RX ADMIN — Medication 500 MILLIGRAM(S): at 18:17

## 2024-02-02 RX ADMIN — PANTOPRAZOLE SODIUM 40 MILLIGRAM(S): 20 TABLET, DELAYED RELEASE ORAL at 05:37

## 2024-02-02 NOTE — PROGRESS NOTE ADULT - SUBJECTIVE AND OBJECTIVE BOX
HISTORY  48y Male    24 HOUR EVENTS:    INTERVAL EVENTS:  - PPI change to qdaily.  - f/u repeat chest xray   - f/u with CT surgery about zosyn  - f/u with pain for management of pain medication   - pca dc'd, swithced to oxy      NEURO  Exam: A&Ox4  Meds: acetaminophen     Tablet .. 500 milliGRAM(s) Oral every 6 hours  diphenhydrAMINE 25 milliGRAM(s) Oral every 4 hours PRN Pruritus  melatonin 5 milliGRAM(s) Oral at bedtime  ondansetron Injectable 4 milliGRAM(s) IV Push every 6 hours PRN Nausea  oxyCODONE    IR 5 milliGRAM(s) Oral every 4 hours PRN Moderate Pain (4 - 6)  oxyCODONE    IR 10 milliGRAM(s) Oral every 4 hours PRN Severe Pain (7 - 10)  QUEtiapine 75 milliGRAM(s) Oral <User Schedule>    [x] Adequacy of sedation and pain control has been assessed and adjusted      RESPIRATORY  RR: 28 (02-02-24 @ 00:00) (13 - 32)  SpO2: 97% (02-02-24 @ 00:00) (93% - 98%)  Wt(kg): --  Exam: unlabored, clear to auscultation bilaterally  Mechanical Ventilation:  Extubated and on room air    Meds:       CARDIOVASCULAR  HR: 109 (02-02-24 @ 00:00) (93 - 115)  BP: 143/89 (02-02-24 @ 00:00) (114/79 - 143/101)  BP(mean): 112 (02-02-24 @ 00:00) (87 - 116)  ABP: --  ABP(mean): --  Wt(kg): --  CVP(cm H2O): --      Exam: Stable off pressors      GI/NUTRITION  Diet: regular  Meds: calcium carbonate    500 mG (Tums) Chewable 1 Tablet(s) Chew every 6 hours PRN Heartburn  pantoprazole  Injectable 40 milliGRAM(s) IV Push every 24 hours  polyethylene glycol 3350 17 Gram(s) Oral daily  senna 2 Tablet(s) Oral at bedtime      GENITOURINARY  I&O's Detail    01-31 @ 07:01  -  02-01 @ 07:00  --------------------------------------------------------  IN:    IV PiggyBack: 200 mL    Oral Fluid: 870 mL    sodium chloride 0.9%: 80 mL    sodium chloride 0.9%: 380 mL  Total IN: 1530 mL    OUT:    Chest Tube (mL): 40 mL    Chest Tube (mL): 15 mL    Chest Tube (mL): 40 mL    VAC (Vacuum Assisted Closure) System (mL): 0 mL    Voided (mL): 1675 mL  Total OUT: 1770 mL    Total NET: -240 mL      02-01 @ 07:01  -  02-02 @ 01:27  --------------------------------------------------------  IN:    IV PiggyBack: 200 mL    Oral Fluid: 1140 mL    sodium chloride 0.9%: 80 mL  Total IN: 1420 mL    OUT:    Chest Tube (mL): 10 mL    Chest Tube (mL): 20 mL    Chest Tube (mL): 20 mL    Voided (mL): 1700 mL  Total OUT: 1750 mL    Total NET: -330 mL          02-01    135  |  104  |  5<L>  ----------------------------<  93  3.6   |  22  |  0.94    Ca    7.8<L>      01 Feb 2024 02:57  Phos  3.7     02-01  Mg     1.9     02-01    TPro  5.7<L>  /  Alb  2.2<L>  /  TBili  0.3  /  DBili  x   /  AST  24  /  ALT  31  /  AlkPhos  286<H>  02-01    [ ] Hendrix catheter, indication: N/A  Meds: ascorbic acid 500 milliGRAM(s) Oral daily  cyanocobalamin 1000 MICROGram(s) Oral daily  folic acid 1 milliGRAM(s) Oral daily  multivitamin 1 Tablet(s) Oral daily  thiamine 100 milliGRAM(s) Oral daily        HEMATOLOGIC  Meds: enoxaparin Injectable 40 milliGRAM(s) SubCutaneous every 24 hours    [x] VTE Prophylaxis                        8.5    10.65 )-----------( 650      ( 01 Feb 2024 02:57 )             26.2     PT/INR - ( 01 Feb 2024 02:57 )   PT: 11.5 sec;   INR: 1.05 ratio         PTT - ( 01 Feb 2024 02:57 )  PTT:30.4 sec  Transfusion     [ ] PRBC   [ ] Platelets   [ ] FFP   [ ] Cryoprecipitate      INFECTIOUS DISEASES  T(C): 36.8 (02-01-24 @ 23:00), Max: 36.8 (02-01-24 @ 15:00)  Wt(kg): --  WBC Count: 10.65 K/uL (02-01 @ 02:57)    Recent Cultures:  Specimen Source: .Tissue Other, 01-26 @ 22:03; Results   Culture is being performed.; Gram Stain:   No polymorphonuclear cells seen per low power field  No organisms seen per oil power field; Organism: --    Meds: influenza   Vaccine 0.5 milliLiter(s) IntraMuscular once  piperacillin/tazobactam IVPB.. 3.375 Gram(s) IV Intermittent every 8 hours        ENDOCRINE  Capillary Blood Glucose    Meds:       OTHER MEDICATIONS:  chlorhexidine 2% Cloths 1 Application(s) Topical <User Schedule>  lidocaine   4% Patch 1 Patch Transdermal every 24 hours      CODE STATUS:     IMAGING:    ____ minutes of critical care time spent providing medical care for patient's acute illness/conditions that impairs at least one vital organ system and/or poses a high risk of imminent or life threatening deterioration in the patient's condition. It includes time spent evaluating and treating the patient's acute illness as well as time spent reviewing labs, radiology, discussing goals of care with patient and/or patient's family, and discussing the case with a multidisciplinary team in an effort to prevent further life threatening deterioration or end organ damage. This time is independent of any procedures performed.

## 2024-02-02 NOTE — PROGRESS NOTE ADULT - PROBLEM SELECTOR PLAN 1
Suspected fracture of rib on left side, closed. Left Rib Fx 4-11th /Hemothorax  1/10 LT VATS, with partial lung decortication  Thoracoscopic removal of intrapleural foreign body       1/20 thoracic surgery reconsult f left pleural effusion vs hemothorax   1/22 s/p left pigtail by IR  1/26 Thoracotomy Decortication w/ washout>CT x 3 placed    2/1 #2 left anterior chest tube d/c   maintain left diaphragm #1 and left posterior #3 to water seal daily xray OOB  d/c pca   all meals OOB  ambulated  tid   chest PT daily xray Suspected fracture of rib on left side, closed. Left Rib Fx 4-11th /Hemothorax  1/10 LT VATS, with partial lung decortication  Thoracoscopic removal of intrapleural foreign body       1/20 thoracic surgery reconsult f left pleural effusion vs hemothorax   1/22 s/p left pigtail by IR  1/26 Thoracotomy Decortication w/ washout>CT x 3 placed    2/1 #2 left anterior chest tube d/c   maintain left diaphragm #1 and left posterior #3 to water seal daily xray OOB  d/c pca   2/2 D/c  posterior #3 chest tube   all meals OOB  ambulated  tid   chest PT daily xray

## 2024-02-02 NOTE — PROGRESS NOTE ADULT - ASSESSMENT
This is a  54yo M w pmhx of Hep C and possible EtOH use disorder who presents after being found down. Intubated for respiratory distress and agitation 1/8. Pt has displaced 4-11 L rib fx with flail segment.     1/9 VSS: NPO after MN. Plan for Rib plating WED 1/10/24  1/10 s/p Left VATS, with partial lung decortication  Thoracoscopic removal of intrapleural foreign body    1/11VSS intubated sedated on pressors  chest tube lws   1/12 VSS, pt still remains intubated/sedated, getting phenobarbital for agitation. Left chest tube output 75cc/24h placed on water seal this AM. 1U PRBC -H/H 7.1/20. Continue care per SICU team  1/13 Maintain L pleural tube waterseal Daily CXR Care as per SICU team  1/14 Left pleural tube removed CXR ordered; thoracic signed off  1/20 thoracic surgery reconsulted for left pleural effusion vs hemothorax; d/w Dr. Sepulveda; chest ct done- Dr. Sepulveda reviewed; call IR for potential drainage monday left pocket   1/21 VSS, pt still febrile tmax 103, WBC 8.4, IR consulted for drainage of complex left pleural effusion on CT Chest. Plan for Monday.   1/22 VSS; Tm 100.8 this am. WBC WNL. IR pending drainage of L effusion today   1/23 VSS, s/p left pigtail by IR yesterday, minimal drainage 20cc/24h. Recommend flushing pigtail and placing to LWS. Plan to do tPA MIST protocol tomorrow.   1/24     vss   min drainage from Lt  PTC  1/25 VSS left chest tube drainage 100/230  TPA today-   OR for  1/26 please type and screen Hibiclens  and NPO at midnight  hold  Lovenox   1/26 L Thoracotomy/Decort  3 chest tubes suction x 48 hours  1/27 L thoracotomy maintain 3 left pleural tubes to suction     Recommend fluid restrict for hyponatremia  1/28 VSSL thoracotomy maintain 3 left pleural tubes to suction    today \  1/29 VSS, maintain 3 CT to suction,  today   1/29 VSS, 2U PRBC yesterday for GIB, improving 8/23 chest tube output minimal maintain 3 chest tubes to LWS  1/30    lt side chest xray  opacification, room air SAT 94 percent   min drainage   from 3 chest tubes  serosanguinous  1/31 VSS  XRAY improved NPO for bronchoscopy today  m chest tubes minimal drainage LWS no air leak  all meals OOB  ambulate tid chest PT   2/1 VSS #2 left anterior chest tube d/c maintain left diaphragm #1 and left posterior #3 to water seal daily xray OOB  d/c pca   2/1 VSS amualte tid allmeals OOB chesttube water seal  This is a  56yo M w pmhx of Hep C and possible EtOH use disorder who presents after being found down. Intubated for respiratory distress and agitation 1/8. Pt has displaced 4-11 L rib fx with flail segment.     1/9 VSS: NPO after MN. Plan for Rib plating WED 1/10/24  1/10 s/p Left VATS, with partial lung decortication  Thoracoscopic removal of intrapleural foreign body    1/11VSS intubated sedated on pressors  chest tube lws   1/12 VSS, pt still remains intubated/sedated, getting phenobarbital for agitation. Left chest tube output 75cc/24h placed on water seal this AM. 1U PRBC -H/H 7.1/20. Continue care per SICU team  1/13 Maintain L pleural tube waterseal Daily CXR Care as per SICU team  1/14 Left pleural tube removed CXR ordered; thoracic signed off  1/20 thoracic surgery reconsulted for left pleural effusion vs hemothorax; d/w Dr. Sepulveda; chest ct done- Dr. Sepulveda reviewed; call IR for potential drainage monday left pocket   1/21 VSS, pt still febrile tmax 103, WBC 8.4, IR consulted for drainage of complex left pleural effusion on CT Chest. Plan for Monday.   1/22 VSS; Tm 100.8 this am. WBC WNL. IR pending drainage of L effusion today   1/23 VSS, s/p left pigtail by IR yesterday, minimal drainage 20cc/24h. Recommend flushing pigtail and placing to LWS. Plan to do tPA MIST protocol tomorrow.   1/24     vss   min drainage from Lt  PTC  1/25 VSS left chest tube drainage 100/230  TPA today-   OR for  1/26 please type and screen Hibiclens  and NPO at midnight  hold  Lovenox   1/26 L Thoracotomy/Decort  3 chest tubes suction x 48 hours  1/27 L thoracotomy maintain 3 left pleural tubes to suction     Recommend fluid restrict for hyponatremia  1/28 VSSL thoracotomy maintain 3 left pleural tubes to suction    today \  1/29 VSS, maintain 3 CT to suction,  today   1/29 VSS, 2U PRBC yesterday for GIB, improving 8/23 chest tube output minimal maintain 3 chest tubes to LWS  1/30    lt side chest xray  opacification, room air SAT 94 percent   min drainage   from 3 chest tubes  serosanguinous  1/31 VSS  XRAY improved NPO for bronchoscopy today  m chest tubes minimal drainage LWS no air leak  all meals OOB  ambulate tid chest PT   2/1 VSS #2 left anterior chest tube d/c maintain left diaphragm #1 and left posterior #3 to water seal daily xray OOB  d/c pca   2/1 VSS ambulate tid all meals OOB chest tube water seal d/c posterior chest tube

## 2024-02-02 NOTE — PROGRESS NOTE ADULT - SUBJECTIVE AND OBJECTIVE BOX
Subjective " hello I am ok today"  :      Vital Signs Last 24 Hrs  T(C): 36.8 (02-01-24 @ 23:00), Max: 36.8 (02-01-24 @ 15:00)  T(F): 98.2 (02-01-24 @ 23:00), Max: 98.2 (02-01-24 @ 15:00)  HR: 103 (02-02-24 @ 07:00) (93 - 115)  BP: 141/78 (02-02-24 @ 07:00) (114/79 - 143/101)  RR: 31 (02-02-24 @ 07:00) (13 - 32)  SpO2: 93% (02-02-24 @ 07:00) (93% - 98%)           02-01 @ 07:01  -  02-02 @ 07:00  --------------------------------------------------------  IN: 1620 mL / OUT: 2595 mL / NET: -975 mL                          8.6    10.99 )-----------( 699      ( 02 Feb 2024 06:05 )             26.0     02-02    135  |  104  |  5<L>  ----------------------------<  96  3.9   |  20<L>  |  0.86    Ca    7.9<L>      02 Feb 2024 06:05  Phos  3.6     02-02  Mg     2.0     02-02    TPro  5.9<L>  /  Alb  2.2<L>  /  TBili  0.3  /  DBili  x   /  AST  27  /  ALT  29  /  AlkPhos  292<H>  02-02    MEDICATIONS  (STANDING):  acetaminophen     Tablet .. 500 milliGRAM(s) Oral every 6 hours  ascorbic acid 500 milliGRAM(s) Oral daily  chlorhexidine 2% Cloths 1 Application(s) Topical <User Schedule>  cyanocobalamin 1000 MICROGram(s) Oral daily  enoxaparin Injectable 40 milliGRAM(s) SubCutaneous every 24 hours  folic acid 1 milliGRAM(s) Oral daily  influenza   Vaccine 0.5 milliLiter(s) IntraMuscular once  lidocaine   4% Patch 1 Patch Transdermal every 24 hours  melatonin 5 milliGRAM(s) Oral at bedtime  multivitamin 1 Tablet(s) Oral daily  pantoprazole  Injectable 40 milliGRAM(s) IV Push every 24 hours  piperacillin/tazobactam IVPB.. 3.375 Gram(s) IV Intermittent every 8 hours  polyethylene glycol 3350 17 Gram(s) Oral daily  potassium chloride    Tablet ER 10 milliEquivalent(s) Oral once  QUEtiapine 75 milliGRAM(s) Oral <User Schedule>  senna 2 Tablet(s) Oral at bedtime  thiamine 100 milliGRAM(s) Oral daily    MEDICATIONS  (PRN):  calcium carbonate    500 mG (Tums) Chewable 1 Tablet(s) Chew every 6 hours PRN Heartburn  diphenhydrAMINE 25 milliGRAM(s) Oral every 4 hours PRN Pruritus  ondansetron Injectable 4 milliGRAM(s) IV Push every 6 hours PRN Nausea  oxyCODONE    IR 5 milliGRAM(s) Oral every 4 hours PRN Moderate Pain (4 - 6)  oxyCODONE    IR 10 milliGRAM(s) Oral every 4 hours PRN Severe Pain (7 - 10)      Drains:     Left pleural diaphragm 10/20  left pleural posterior  10/21                               PHYSICAL EXAM        Neurology: alert and oriented x 3, nonfocal, no gross deficits    CV :S1S2    left lateral Wound :  CDI , Stable    Lungs:b/l cta on roomair   chesttubes x2    Abdomen: soft, nontender, nondistended, positive bowel sounds, + bm    : mccormack              Extremities: warm well perfused equal strength throughout  b/lle + Dp no edema                                           Discussed with Cardiothoracic Team at AM rounds.

## 2024-02-02 NOTE — PROGRESS NOTE ADULT - ASSESSMENT
48M with PMHx hepatitis C (diagnosed many years ago, never treated), depression transferred from outside hospital for trauma eval. Patient found down by roommate after unknown amount of time, found with multiple 4-11 L sided rib fractures with flail chest. Chest tube placed there with >1L output (old blood). Patient also with tachypnea, concern for respiratory failure started on BiPAP. Level 1 called for transient hypotension, patient given 2 unit PRBC in [facility] ED. Patient admitted to SICU for hemodynamic monitoring, pain management. S/p VATS 1/10 with partial lung decortication, chest tube removed 1/14. S/p chest tube placement 1/22 by IR for new left effusion, s/p L Thoracotomy/Decort 1/26 w/ 3 chest tubes to suction, s/p bronch 1/31.      PLAN  Neurologic:  - A&Ox4  - Seroquel 75mg QHS  - Tylenol, oxy PRN  - PCA d/c'd 2/1    Respiratory:  - RA, Incentive spirometry, Duonebs   - x1 CT removed, rest to water seal   - 1/31 s/p bronch xray show worsening of pleural effusion, f/u with CT surg for management and further imaging- Chest tubes irrigated and clots/fibrinous exudate removed and left lower lobe secretions cleared  - 1/30 CT Chest Secretions in the left lower lobe bronchus and in the segmental   airways the left lower lobe. The loculated left pleural effusion has decreased in size since   - 1/26 CTS OR 1Open left thoracotomy, decortication and washout, 3 chest tubes placed  - 1/20 CT with complex collection L pleural space, s/p drainage by IR 1/22, 20cc bloody fluid drained, pigtail placed  - 1/10 VATS with discovery of Fibropurulent exudative adhesions and removal of retained foreign material likely the tip of a glove, s/p washout, with new 28f chest tube placed > removed 1/14    Cardiovascular: tachycardia  - off pressors  - Metoprolol 50mg q8h for tachycardia - d/c'd 1/26 for soft BP  - Hgb/Hct 7.1/21.3 1U PRBC, post 8.5/25.9  - Hypotensive/Tachycardic (, SPB 70s) 500 cc LR bolus x1, lopressor d/c; currently (-120, SBP 90)    Gastrointestinal/Nutrition:  - Abd US 1/24 - mild hepatomegaly, redemonstration of evolving splenic hematomas  - regular diet  - PPI 40 qdaily, FOBT +, GI consulted - no intervention indicated at this time  - Monitor for clinical signs of GI bleeding, track stool output and color    Genitourinary/Renal:  - Supplement electrolytes PRN  - IVF @ 20cc/hr PCA  - nephro consulted for elevated urine osm and polyuria - no concern for DI  - renal U/S net obstruction/uropathy     Hematologic:  - Chemical VTE ppx with Lovenox   - Duplex/doppler 1/23 negative    Infectious Disease: Empyema   - s/p zosyn 1/7-1/24  - f/u with surgery for abx reecs  - 1/22 L empyema cultures NGTD  - 1/26- Restarted zosyn. Repeat BCx negative     Endocrine:  - No active issues, no history of DM

## 2024-02-02 NOTE — PROGRESS NOTE ADULT - ATTENDING COMMENTS
etoh misuse with withdrawal, required washout of chest s/p chest tubes x3    ON: no major events    alert and awake, sitting in bed  seroquel nightly, oxy PRN, standing tylenol  on RA  AM CXR much improved left opacities , improved pl effusion  CT x2 to water seal, post CT to be removed after discussing with thoracics  stable hemodynamics, tachycardia stable  on diet and supplements  daily protonix for + FOBT, GI consulted, no intervention  voiding 2.5 lit, balance negative 900cc  renal signed off  lovenox VTE PPX  thrombocytosis, likely reactive  zosyn for empyema, no end date, afebrile, discussed with Dr Jimenes, trial off antibiotics  good glycemic control  buttock wound , wound vac , followed by PT  PT OT working with him  boots for toe fx    PIV only

## 2024-02-02 NOTE — PROGRESS NOTE ADULT - SUBJECTIVE AND OBJECTIVE BOX
ACS Progress Note    S: Patient seen and examined. No acute events overnight.    O:  Vital Signs Last 24 Hrs  T(C): 36.7 (02 Feb 2024 07:00), Max: 36.8 (01 Feb 2024 15:00)  T(F): 98 (02 Feb 2024 07:00), Max: 98.2 (01 Feb 2024 15:00)  HR: 100 (02 Feb 2024 08:00) (93 - 115)  BP: 128/81 (02 Feb 2024 08:00) (117/69 - 143/101)  BP(mean): 98 (02 Feb 2024 08:00) (87 - 116)  RR: 23 (02 Feb 2024 08:00) (13 - 32)  SpO2: 96% (02 Feb 2024 08:00) (93% - 98%)    Parameters below as of 02 Feb 2024 07:00  Patient On (Oxygen Delivery Method): room air        I&O's Detail    01 Feb 2024 07:01  -  02 Feb 2024 07:00  --------------------------------------------------------  IN:    IV PiggyBack: 275 mL    Oral Fluid: 1290 mL    sodium chloride 0.9%: 80 mL  Total IN: 1645 mL    OUT:    Chest Tube (mL): 10 mL    Chest Tube (mL): 30 mL    Chest Tube (mL): 30 mL    VAC (Vacuum Assisted Closure) System (mL): 0 mL    Voided (mL): 2525 mL  Total OUT: 2595 mL    Total NET: -950 ml    MEDICATIONS  (STANDING):  acetaminophen     Tablet .. 500 milliGRAM(s) Oral every 6 hours  ascorbic acid 500 milliGRAM(s) Oral daily  chlorhexidine 2% Cloths 1 Application(s) Topical <User Schedule>  cyanocobalamin 1000 MICROGram(s) Oral daily  enoxaparin Injectable 40 milliGRAM(s) SubCutaneous every 24 hours  folic acid 1 milliGRAM(s) Oral daily  influenza   Vaccine 0.5 milliLiter(s) IntraMuscular once  lidocaine   4% Patch 1 Patch Transdermal every 24 hours  melatonin 5 milliGRAM(s) Oral at bedtime  multivitamin 1 Tablet(s) Oral daily  pantoprazole  Injectable 40 milliGRAM(s) IV Push every 24 hours  polyethylene glycol 3350 17 Gram(s) Oral daily  potassium chloride    Tablet ER 10 milliEquivalent(s) Oral once  QUEtiapine 75 milliGRAM(s) Oral <User Schedule>  senna 2 Tablet(s) Oral at bedtime  thiamine 100 milliGRAM(s) Oral daily    MEDICATIONS  (PRN):  calcium carbonate    500 mG (Tums) Chewable 1 Tablet(s) Chew every 6 hours PRN Heartburn  diphenhydrAMINE 25 milliGRAM(s) Oral every 4 hours PRN Pruritus  ondansetron Injectable 4 milliGRAM(s) IV Push every 6 hours PRN Nausea  oxyCODONE    IR 5 milliGRAM(s) Oral every 4 hours PRN Moderate Pain (4 - 6)  oxyCODONE    IR 10 milliGRAM(s) Oral every 4 hours PRN Severe Pain (7 - 10)                            8.6    10.99 )-----------( 699      ( 02 Feb 2024 06:05 )             26.0       02-02    135  |  104  |  5<L>  ----------------------------<  96  3.9   |  20<L>  |  0.86    Ca    7.9<L>      02 Feb 2024 06:05  Phos  3.6     02-02  Mg     2.0     02-02    TPro  5.9<L>  /  Alb  2.2<L>  /  TBili  0.3  /  DBili  x   /  AST  27  /  ALT  29  /  AlkPhos  292<H>  02-02      PHYSICAL EXAM:  Gen: NAD, A&Ox3  Pulm: No respiratory distress, no subcostal retractions. 2x CT to suction.  CV: Sinus tachycardia, no JVD  Abd: Soft, NT, ND  Extremities: warm and well perfused

## 2024-02-02 NOTE — PROGRESS NOTE ADULT - ASSESSMENT
48yoM w/ PMHx hepatitis C and EtOH abuse was transferred from OSH on 1/6 for level 1 trauma eval. He was found down with multiple 4-11 left-sided rib fractures and flail chest, and had a chest tube placed at OSH. S/p VATS 1/10 w/ partial lung decortication and thoracoscopic removal of intrapleural foreign body. S/p chest tube removal 1/14, downgraded to floor 1/18, returned to SICU after fever 102.3 on 1/20- s/p L thoracotomy and decortication (1/26).      Plan:  -Monitor CT output  -Analgesia and antiemetics as needed  -Continue abx   -Regular diet   -Appreciate excellent care per SICU     Trauma Surgery #[id number]

## 2024-02-03 LAB
ALBUMIN SERPL ELPH-MCNC: 2.4 G/DL — LOW (ref 3.3–5)
ALP SERPL-CCNC: 317 U/L — HIGH (ref 40–120)
ALT FLD-CCNC: 24 U/L — SIGNIFICANT CHANGE UP (ref 10–45)
ANION GAP SERPL CALC-SCNC: 12 MMOL/L — SIGNIFICANT CHANGE UP (ref 5–17)
APTT BLD: 32.8 SEC — SIGNIFICANT CHANGE UP (ref 24.5–35.6)
AST SERPL-CCNC: 21 U/L — SIGNIFICANT CHANGE UP (ref 10–40)
BILIRUB SERPL-MCNC: 0.2 MG/DL — SIGNIFICANT CHANGE UP (ref 0.2–1.2)
BUN SERPL-MCNC: 5 MG/DL — LOW (ref 7–23)
CALCIUM SERPL-MCNC: 8.5 MG/DL — SIGNIFICANT CHANGE UP (ref 8.4–10.5)
CHLORIDE SERPL-SCNC: 105 MMOL/L — SIGNIFICANT CHANGE UP (ref 96–108)
CO2 SERPL-SCNC: 22 MMOL/L — SIGNIFICANT CHANGE UP (ref 22–31)
CREAT SERPL-MCNC: 0.82 MG/DL — SIGNIFICANT CHANGE UP (ref 0.5–1.3)
EGFR: 109 ML/MIN/1.73M2 — SIGNIFICANT CHANGE UP
GLUCOSE SERPL-MCNC: 110 MG/DL — HIGH (ref 70–99)
HCT VFR BLD CALC: 28.5 % — LOW (ref 39–50)
HGB BLD-MCNC: 9.4 G/DL — LOW (ref 13–17)
INR BLD: 1.07 RATIO — SIGNIFICANT CHANGE UP (ref 0.85–1.18)
MAGNESIUM SERPL-MCNC: 1.8 MG/DL — SIGNIFICANT CHANGE UP (ref 1.6–2.6)
MCHC RBC-ENTMCNC: 27.8 PG — SIGNIFICANT CHANGE UP (ref 27–34)
MCHC RBC-ENTMCNC: 33 GM/DL — SIGNIFICANT CHANGE UP (ref 32–36)
MCV RBC AUTO: 84.3 FL — SIGNIFICANT CHANGE UP (ref 80–100)
NRBC # BLD: 0 /100 WBCS — SIGNIFICANT CHANGE UP (ref 0–0)
PHOSPHATE SERPL-MCNC: 3.4 MG/DL — SIGNIFICANT CHANGE UP (ref 2.5–4.5)
PLATELET # BLD AUTO: 678 K/UL — HIGH (ref 150–400)
POTASSIUM SERPL-MCNC: 3.6 MMOL/L — SIGNIFICANT CHANGE UP (ref 3.5–5.3)
POTASSIUM SERPL-SCNC: 3.6 MMOL/L — SIGNIFICANT CHANGE UP (ref 3.5–5.3)
PROT SERPL-MCNC: 6.4 G/DL — SIGNIFICANT CHANGE UP (ref 6–8.3)
PROTHROM AB SERPL-ACNC: 11.2 SEC — SIGNIFICANT CHANGE UP (ref 9.5–13)
RBC # BLD: 3.38 M/UL — LOW (ref 4.2–5.8)
RBC # FLD: 15.5 % — HIGH (ref 10.3–14.5)
SODIUM SERPL-SCNC: 139 MMOL/L — SIGNIFICANT CHANGE UP (ref 135–145)
WBC # BLD: 10.61 K/UL — HIGH (ref 3.8–10.5)
WBC # FLD AUTO: 10.61 K/UL — HIGH (ref 3.8–10.5)

## 2024-02-03 PROCEDURE — 71045 X-RAY EXAM CHEST 1 VIEW: CPT | Mod: 26

## 2024-02-03 PROCEDURE — 99231 SBSQ HOSP IP/OBS SF/LOW 25: CPT

## 2024-02-03 PROCEDURE — 99232 SBSQ HOSP IP/OBS MODERATE 35: CPT | Mod: GC

## 2024-02-03 RX ORDER — MAGNESIUM SULFATE 500 MG/ML
2 VIAL (ML) INJECTION ONCE
Refills: 0 | Status: COMPLETED | OUTPATIENT
Start: 2024-02-03 | End: 2024-02-03

## 2024-02-03 RX ORDER — POTASSIUM CHLORIDE 20 MEQ
40 PACKET (EA) ORAL ONCE
Refills: 0 | Status: COMPLETED | OUTPATIENT
Start: 2024-02-03 | End: 2024-02-03

## 2024-02-03 RX ORDER — SODIUM CHLORIDE 9 MG/ML
4 INJECTION INTRAMUSCULAR; INTRAVENOUS; SUBCUTANEOUS EVERY 6 HOURS
Refills: 0 | Status: COMPLETED | OUTPATIENT
Start: 2024-02-03 | End: 2024-02-06

## 2024-02-03 RX ORDER — IPRATROPIUM/ALBUTEROL SULFATE 18-103MCG
3 AEROSOL WITH ADAPTER (GRAM) INHALATION EVERY 6 HOURS
Refills: 0 | Status: COMPLETED | OUTPATIENT
Start: 2024-02-03 | End: 2024-02-06

## 2024-02-03 RX ADMIN — Medication 1 TABLET(S): at 11:27

## 2024-02-03 RX ADMIN — Medication 25 GRAM(S): at 09:49

## 2024-02-03 RX ADMIN — OXYCODONE HYDROCHLORIDE 10 MILLIGRAM(S): 5 TABLET ORAL at 20:31

## 2024-02-03 RX ADMIN — Medication 500 MILLIGRAM(S): at 05:14

## 2024-02-03 RX ADMIN — Medication 500 MILLIGRAM(S): at 00:02

## 2024-02-03 RX ADMIN — SENNA PLUS 2 TABLET(S): 8.6 TABLET ORAL at 22:02

## 2024-02-03 RX ADMIN — ENOXAPARIN SODIUM 40 MILLIGRAM(S): 100 INJECTION SUBCUTANEOUS at 17:25

## 2024-02-03 RX ADMIN — OXYCODONE HYDROCHLORIDE 5 MILLIGRAM(S): 5 TABLET ORAL at 18:17

## 2024-02-03 RX ADMIN — Medication 500 MILLIGRAM(S): at 11:27

## 2024-02-03 RX ADMIN — SODIUM CHLORIDE 4 MILLILITER(S): 9 INJECTION INTRAMUSCULAR; INTRAVENOUS; SUBCUTANEOUS at 17:59

## 2024-02-03 RX ADMIN — Medication 500 MILLIGRAM(S): at 05:44

## 2024-02-03 RX ADMIN — PREGABALIN 1000 MICROGRAM(S): 225 CAPSULE ORAL at 11:27

## 2024-02-03 RX ADMIN — OXYCODONE HYDROCHLORIDE 10 MILLIGRAM(S): 5 TABLET ORAL at 20:01

## 2024-02-03 RX ADMIN — Medication 500 MILLIGRAM(S): at 11:30

## 2024-02-03 RX ADMIN — Medication 1 MILLIGRAM(S): at 11:27

## 2024-02-03 RX ADMIN — Medication 500 MILLIGRAM(S): at 17:25

## 2024-02-03 RX ADMIN — Medication 500 MILLIGRAM(S): at 12:27

## 2024-02-03 RX ADMIN — Medication 5 MILLIGRAM(S): at 22:02

## 2024-02-03 RX ADMIN — Medication 500 MILLIGRAM(S): at 18:16

## 2024-02-03 RX ADMIN — OXYCODONE HYDROCHLORIDE 5 MILLIGRAM(S): 5 TABLET ORAL at 17:28

## 2024-02-03 RX ADMIN — Medication 3 MILLILITER(S): at 17:59

## 2024-02-03 RX ADMIN — QUETIAPINE FUMARATE 75 MILLIGRAM(S): 200 TABLET, FILM COATED ORAL at 19:46

## 2024-02-03 RX ADMIN — PANTOPRAZOLE SODIUM 40 MILLIGRAM(S): 20 TABLET, DELAYED RELEASE ORAL at 08:08

## 2024-02-03 RX ADMIN — Medication 100 MILLIGRAM(S): at 11:27

## 2024-02-03 RX ADMIN — Medication 40 MILLIEQUIVALENT(S): at 09:49

## 2024-02-03 NOTE — PROGRESS NOTE ADULT - SUBJECTIVE AND OBJECTIVE BOX
TEAM [ ACS ] Surgery Daily Progress Note  =====================================================    SUBJECTIVE: Patient seen and examined at bedside on AM rounds.    ALLERGIES:  No Known Allergies    --------------------------------------------------------------------------------------    VITAL SIGNS:  Vital Signs Last 24 Hrs  T(C): 37.5 (03 Feb 2024 11:00), Max: 37.5 (03 Feb 2024 11:00)  T(F): 99.5 (03 Feb 2024 11:00), Max: 99.5 (03 Feb 2024 11:00)  HR: 102 (03 Feb 2024 13:00) (95 - 128)  BP: 141/95 (03 Feb 2024 13:00) (96/61 - 171/111)  BP(mean): 113 (03 Feb 2024 13:00) (73 - 134)  RR: 27 (03 Feb 2024 13:00) (10 - 36)  SpO2: 96% (03 Feb 2024 13:00) (92% - 98%)    Parameters below as of 02 Feb 2024 19:00  Patient On (Oxygen Delivery Method): room air      --------------------------------------------------------------------------------------    EXAM    PHYSICAL EXAM:  Gen: NAD, A&Ox3  Pulm: No respiratory distress, no subcostal retractions. 1 chest tube to water seal.  CV: Sinus tachycardia, no JVD  Abd: Soft, NT, ND  Extremities: warm and well perfused    --------------------------------------------------------------------------------------    LABS                        9.4    10.61 )-----------( 678      ( 03 Feb 2024 05:43 )             28.5   02-03    139  |  105  |  5<L>  ----------------------------<  110<H>  3.6   |  22  |  0.82    Ca    8.5      03 Feb 2024 05:43  Phos  3.4     02-03  Mg     1.8     02-03    TPro  6.4  /  Alb  2.4<L>  /  TBili  0.2  /  DBili  x   /  AST  21  /  ALT  24  /  AlkPhos  317<H>  02-03      --------------------------------------------------------------------------------------    INS AND OUTS:  I&O's Detail    02 Feb 2024 07:01  -  03 Feb 2024 07:00  --------------------------------------------------------  IN:    IV PiggyBack: 25 mL    Oral Fluid: 750 mL  Total IN: 775 mL    OUT:    Chest Tube (mL): 20 mL    Voided (mL): 3425 mL  Total OUT: 3445 mL    Total NET: -2670 mL      03 Feb 2024 07:01  -  03 Feb 2024 14:27  --------------------------------------------------------  IN:    IV PiggyBack: 50 mL    Oral Fluid: 740 mL  Total IN: 790 mL    OUT:    Voided (mL): 975 mL  Total OUT: 975 mL    Total NET: -185 mL        --------------------------------------------------------------------------------------

## 2024-02-03 NOTE — PROGRESS NOTE ADULT - SUBJECTIVE AND OBJECTIVE BOX
Subjective:      PAST MEDICAL & SURGICAL HISTORY:  No pertinent past medical history      No significant past surgical history                       MEDICATIONS  acetaminophen     Tablet .. 500 milliGRAM(s) Oral every 6 hours  albuterol/ipratropium for Nebulization 3 milliLiter(s) Nebulizer every 6 hours  ascorbic acid 500 milliGRAM(s) Oral daily  calcium carbonate    500 mG (Tums) Chewable 1 Tablet(s) Chew every 6 hours PRN  chlorhexidine 2% Cloths 1 Application(s) Topical <User Schedule>  cyanocobalamin 1000 MICROGram(s) Oral daily  enoxaparin Injectable 40 milliGRAM(s) SubCutaneous every 24 hours  folic acid 1 milliGRAM(s) Oral daily  influenza   Vaccine 0.5 milliLiter(s) IntraMuscular once  lidocaine   4% Patch 1 Patch Transdermal every 24 hours  melatonin 5 milliGRAM(s) Oral at bedtime  multivitamin 1 Tablet(s) Oral daily  oxyCODONE    IR 5 milliGRAM(s) Oral every 4 hours PRN  oxyCODONE    IR 10 milliGRAM(s) Oral every 4 hours PRN  pantoprazole    Tablet 40 milliGRAM(s) Oral before breakfast  polyethylene glycol 3350 17 Gram(s) Oral daily  QUEtiapine 75 milliGRAM(s) Oral <User Schedule>  senna 2 Tablet(s) Oral at bedtime  sodium chloride 3%  Inhalation 4 milliLiter(s) Inhalation every 6 hours  thiamine 100 milliGRAM(s) Oral daily      Vital Signs Last 24 Hrs  T(C): 37.5 (03 Feb 2024 11:00), Max: 37.5 (03 Feb 2024 11:00)  T(F): 99.5 (03 Feb 2024 11:00), Max: 99.5 (03 Feb 2024 11:00)  HR: 102 (03 Feb 2024 13:00) (95 - 128)  BP: 141/95 (03 Feb 2024 13:00) (96/61 - 171/111)  BP(mean): 113 (03 Feb 2024 13:00) (73 - 134)  RR: 27 (03 Feb 2024 13:00) (10 - 36)  SpO2: 96% (03 Feb 2024 13:00) (92% - 98%)    Parameters below as of 02 Feb 2024 19:00  Patient On (Oxygen Delivery Method): room air          PHYSICAL EXAM:     CT: 20cc    CXR: increased opacification left    LABS  02-03    139  |  105  |  5<L>  ----------------------------<  110<H>  3.6   |  22  |  0.82    Ca    8.5      03 Feb 2024 05:43  Phos  3.4     02-03  Mg     1.8     02-03    TPro  6.4  /  Alb  2.4<L>  /  TBili  0.2  /  DBili  x   /  AST  21  /  ALT  24  /  AlkPhos  317<H>  02-03                                 9.4    10.61 )-----------( 678      ( 03 Feb 2024 05:43 )             28.5          PT/INR - ( 03 Feb 2024 05:43 )   PT: 11.2 sec;   INR: 1.07 ratio         PTT - ( 03 Feb 2024 05:43 )  PTT:32.8 sec                    PAST MEDICAL & SURGICAL HISTORY:  No pertinent past medical history      No significant past surgical history                      Subjective:  sitting in bed.  feels well. no complaints     PAST MEDICAL & SURGICAL HISTORY:  No pertinent past medical history      No significant past surgical history                       MEDICATIONS  acetaminophen     Tablet .. 500 milliGRAM(s) Oral every 6 hours  albuterol/ipratropium for Nebulization 3 milliLiter(s) Nebulizer every 6 hours  ascorbic acid 500 milliGRAM(s) Oral daily  calcium carbonate    500 mG (Tums) Chewable 1 Tablet(s) Chew every 6 hours PRN  chlorhexidine 2% Cloths 1 Application(s) Topical <User Schedule>  cyanocobalamin 1000 MICROGram(s) Oral daily  enoxaparin Injectable 40 milliGRAM(s) SubCutaneous every 24 hours  folic acid 1 milliGRAM(s) Oral daily  influenza   Vaccine 0.5 milliLiter(s) IntraMuscular once  lidocaine   4% Patch 1 Patch Transdermal every 24 hours  melatonin 5 milliGRAM(s) Oral at bedtime  multivitamin 1 Tablet(s) Oral daily  oxyCODONE    IR 5 milliGRAM(s) Oral every 4 hours PRN  oxyCODONE    IR 10 milliGRAM(s) Oral every 4 hours PRN  pantoprazole    Tablet 40 milliGRAM(s) Oral before breakfast  polyethylene glycol 3350 17 Gram(s) Oral daily  QUEtiapine 75 milliGRAM(s) Oral <User Schedule>  senna 2 Tablet(s) Oral at bedtime  sodium chloride 3%  Inhalation 4 milliLiter(s) Inhalation every 6 hours  thiamine 100 milliGRAM(s) Oral daily      Vital Signs Last 24 Hrs  T(C): 37.5 (03 Feb 2024 11:00), Max: 37.5 (03 Feb 2024 11:00)  T(F): 99.5 (03 Feb 2024 11:00), Max: 99.5 (03 Feb 2024 11:00)  HR: 102 (03 Feb 2024 13:00) (95 - 128)  BP: 141/95 (03 Feb 2024 13:00) (96/61 - 171/111)  BP(mean): 113 (03 Feb 2024 13:00) (73 - 134)  RR: 27 (03 Feb 2024 13:00) (10 - 36)  SpO2: 96% (03 Feb 2024 13:00) (92% - 98%)    Parameters below as of 02 Feb 2024 19:00  Patient On (Oxygen Delivery Method): room air          PHYSICAL EXAM:     CT: 20cc    CXR: increased opacification left    LABS  02-03    139  |  105  |  5<L>  ----------------------------<  110<H>  3.6   |  22  |  0.82    Ca    8.5      03 Feb 2024 05:43  Phos  3.4     02-03  Mg     1.8     02-03    TPro  6.4  /  Alb  2.4<L>  /  TBili  0.2  /  DBili  x   /  AST  21  /  ALT  24  /  AlkPhos  317<H>  02-03                                 9.4    10.61 )-----------( 678      ( 03 Feb 2024 05:43 )             28.5          PT/INR - ( 03 Feb 2024 05:43 )   PT: 11.2 sec;   INR: 1.07 ratio         PTT - ( 03 Feb 2024 05:43 )  PTT:32.8 sec                    PAST MEDICAL & SURGICAL HISTORY:  No pertinent past medical history      No significant past surgical history

## 2024-02-03 NOTE — PROGRESS NOTE ADULT - SUBJECTIVE AND OBJECTIVE BOX
24 HOUR EVENTS:  -posterior chest tube removed, CXR stable    SUBJECTIVE/ROS:  [x] A ten-point review of systems was otherwise negative except as noted.  [ ] Due to altered mental status/intubation, subjective information were not able to be obtained from the patient. History was obtained, to the extent possible, from review of the chart and collateral sources of information.      NEURO  Exam: awake, alert, oriented  Meds: acetaminophen     Tablet .. 500 milliGRAM(s) Oral every 6 hours  melatonin 5 milliGRAM(s) Oral at bedtime  oxyCODONE    IR 10 milliGRAM(s) Oral every 4 hours PRN Severe Pain (7 - 10)  oxyCODONE    IR 5 milliGRAM(s) Oral every 4 hours PRN Moderate Pain (4 - 6)  QUEtiapine 75 milliGRAM(s) Oral <User Schedule>    [x] Adequacy of sedation and pain control has been assessed and adjusted      RESPIRATORY  RR: 30 (02-03-24 @ 00:00) (10 - 32)  SpO2: 96% (02-03-24 @ 00:00) (92% - 98%)  Wt(kg): --  Exam: unlabored, clear to auscultation bilaterally    [N/A] Extubation Readiness Assessed  Meds:       CARDIOVASCULAR  HR: 104 (02-03-24 @ 00:00) (95 - 112)  BP: 136/94 (02-03-24 @ 00:00) (96/61 - 161/103)  BP(mean): 111 (02-03-24 @ 00:00) (73 - 129)  ABP: --  ABP(mean): --  Wt(kg): --  CVP(cm H2O): --      Exam: regular rate and rhythm  Cardiac Rhythm: sinus  Perfusion     [x]Adequate   [ ]Inadequate  Mentation   [x]Normal       [ ]Reduced  Extremities  [x]Warm         [ ]Cool  Volume Status [ ]Hypervolemic [x]Euvolemic [ ]Hypovolemic  Meds:       GI/NUTRITION  Exam: soft, nontender, nondistended  Diet: regular  Meds: calcium carbonate    500 mG (Tums) Chewable 1 Tablet(s) Chew every 6 hours PRN Heartburn  pantoprazole    Tablet 40 milliGRAM(s) Oral before breakfast  polyethylene glycol 3350 17 Gram(s) Oral daily  senna 2 Tablet(s) Oral at bedtime      GENITOURINARY  I&O's Detail    02-01 @ 07:01 - 02-02 @ 07:00  --------------------------------------------------------  IN:    IV PiggyBack: 275 mL    Oral Fluid: 1290 mL    sodium chloride 0.9%: 80 mL  Total IN: 1645 mL    OUT:    Chest Tube (mL): 10 mL    Chest Tube (mL): 30 mL    Chest Tube (mL): 30 mL    VAC (Vacuum Assisted Closure) System (mL): 0 mL    Voided (mL): 2525 mL  Total OUT: 2595 mL    Total NET: -950 mL      02-02 @ 07:01 - 02-03 @ 00:59  --------------------------------------------------------  IN:    IV PiggyBack: 25 mL    Oral Fluid: 450 mL  Total IN: 475 mL    OUT:    Chest Tube (mL): 20 mL    Voided (mL): 2100 mL  Total OUT: 2120 mL    Total NET: -1645 mL      02-02    135  |  104  |  5<L>  ----------------------------<  96  3.9   |  20<L>  |  0.86    Ca    7.9<L>      02 Feb 2024 06:05  Phos  3.6     02-02  Mg     2.0     02-02    TPro  5.9<L>  /  Alb  2.2<L>  /  TBili  0.3  /  DBili  x   /  AST  27  /  ALT  29  /  AlkPhos  292<H>  02-02    [ ] Hendrix catheter, indication: N/A  Meds: ascorbic acid 500 milliGRAM(s) Oral daily  cyanocobalamin 1000 MICROGram(s) Oral daily  folic acid 1 milliGRAM(s) Oral daily  multivitamin 1 Tablet(s) Oral daily  thiamine 100 milliGRAM(s) Oral daily      HEMATOLOGIC  Meds: enoxaparin Injectable 40 milliGRAM(s) SubCutaneous every 24 hours    [x] VTE Prophylaxis                        8.6    10.99 )-----------( 699      ( 02 Feb 2024 06:05 )             26.0     PT/INR - ( 02 Feb 2024 06:05 )   PT: 11.7 sec;   INR: 1.07 ratio         PTT - ( 02 Feb 2024 06:05 )  PTT:30.5 sec  Transfusion     [ ] PRBC   [ ] Platelets   [ ] FFP   [ ] Cryoprecipitate      INFECTIOUS DISEASES  WBC Count: 10.99 K/uL (02-02 @ 06:05)    RECENT CULTURES:    Meds: influenza   Vaccine 0.5 milliLiter(s) IntraMuscular once      ENDOCRINE  CAPILLARY BLOOD GLUCOSE      ACCESS DEVICES:  [ ] Peripheral IV  [ ] Central Venous Line	[ ] R	[ ] L	[ ] IJ	[ ] Fem	[ ] SC	Placed:   [ ] Arterial Line		[ ] R	[ ] L	[ ] Fem	[ ] Rad	[ ] Ax	Placed:   [ ] PICC:					[ ] Mediport  [ ] Urinary Catheter, Date Placed:   [x] Necessity of urinary, arterial, and venous catheters discussed    OTHER MEDICATIONS:  chlorhexidine 2% Cloths 1 Application(s) Topical <User Schedule>  lidocaine   4% Patch 1 Patch Transdermal every 24 hours      CODE STATUS: full

## 2024-02-03 NOTE — PROVIDER CONTACT NOTE (OTHER) - ACTION/TREATMENT ORDERED:
Education provided on the importance of diet orders. Patient continues to refuse recommendations. Will continue to monitor.

## 2024-02-03 NOTE — PROGRESS NOTE ADULT - ASSESSMENT
This is a  54yo M w pmhx of Hep C and possible EtOH use disorder who presents after being found down. Intubated for respiratory distress and agitation 1/8. Pt has displaced 4-11 L rib fx with flail segment.     1/9 VSS: NPO after MN. Plan for Rib plating WED 1/10/24  1/10 s/p Left VATS, with partial lung decortication  Thoracoscopic removal of intrapleural foreign body    1/11VSS intubated sedated on pressors  chest tube lws   1/12 VSS, pt still remains intubated/sedated, getting phenobarbital for agitation. Left chest tube output 75cc/24h placed on water seal this AM. 1U PRBC -H/H 7.1/20. Continue care per SICU team  1/13 Maintain L pleural tube waterseal Daily CXR Care as per SICU team  1/14 Left pleural tube removed CXR ordered; thoracic signed off  1/20 thoracic surgery reconsulted for left pleural effusion vs hemothorax; d/w Dr. Sepulveda; chest ct done- Dr. Sepulveda reviewed; call IR for potential drainage monday left pocket   1/21 VSS, pt still febrile tmax 103, WBC 8.4, IR consulted for drainage of complex left pleural effusion on CT Chest. Plan for Monday.   1/22 VSS; Tm 100.8 this am. WBC WNL. IR pending drainage of L effusion today   1/23 VSS, s/p left pigtail by IR yesterday, minimal drainage 20cc/24h. Recommend flushing pigtail and placing to LWS. Plan to do tPA MIST protocol tomorrow.   1/24     vss   min drainage from Lt  PTC  1/25 VSS left chest tube drainage 100/230  TPA today-   OR for  1/26 please type and screen Hibiclens  and NPO at midnight  hold  Lovenox   1/26 L Thoracotomy/Decort  3 chest tubes suction x 48 hours  1/27 L thoracotomy maintain 3 left pleural tubes to suction     Recommend fluid restrict for hyponatremia  1/28 VSSL thoracotomy maintain 3 left pleural tubes to suction    today \  1/29 VSS, maintain 3 CT to suction,  today   1/29 VSS, 2U PRBC yesterday for GIB, improving 8/23 chest tube output minimal maintain 3 chest tubes to LWS  1/30    lt side chest xray  opacification, room air SAT 94 percent   min drainage   from 3 chest tubes  serosanguinous  1/31 VSS  XRAY improved NPO for bronchoscopy today  m chest tubes minimal drainage LWS no air leak  all meals OOB  ambulate tid chest PT   2/1 VSS #2 left anterior chest tube d/c maintain left diaphragm #1 and left posterior #3 to water seal daily xray OOB  d/c pca   2/2  VSS ambulate tid all meals OOB chest tube water seal d/c posterior chest tube  2/3 VSS CT water seal.  CXR inc opac Left

## 2024-02-03 NOTE — PROGRESS NOTE ADULT - PROBLEM SELECTOR PLAN 1
Suspected fracture of rib on left side, closed. Left Rib Fx 4-11th /Hemothorax  1/10 LT VATS, with partial lung decortication  Thoracoscopic removal of intrapleural foreign body     1/20 thoracic surgery reconsult f left pleural effusion vs hemothorax   1/22 s/p left pigtail by IR  1/26 Thoracotomy Decortication w/ washout>CT x 3 placed  2/1 #2 left anterior chest tube d/c   maintain left diaphragm #1 and left posterior #3 to water seal daily xray OOB  d/c pca   2/2 D/c  posterior #3 chest tube    all meals OOB ambulated  tid chest PT daily xray Suspected fracture of rib on left side, closed. Left Rib Fx 4-11th /Hemothorax  1/10 LT VATS, with partial lung decortication  Thoracoscopic removal of intrapleural foreign body     1/20 thoracic surgery reconsult f left pleural effusion vs hemothorax   1/22 s/p left pigtail by IR  1/26 Thoracotomy Decortication w/ washout>CT x 3 placed  2/1 #2 left anterior chest tube d/c   maintain left diaphragm #1 and left posterior #3 to water seal daily xray OOB  d/c pca   2/2 D/c  posterior #3 chest tube    all meals OOB ambulated  tid chest PT daily xray  2/3 poss dc remaining CT Sunday

## 2024-02-03 NOTE — PROGRESS NOTE ADULT - ASSESSMENT
48M with PMHx hepatitis C (diagnosed many years ago, never treated), depression transferred from outside hospital for trauma eval. Patient found down by roommate after unknown amount of time, found with multiple 4-11 L sided rib fractures with flail chest. Chest tube placed there with >1L output (old blood). Patient also with tachypnea, concern for respiratory failure started on BiPAP. Level 1 called for transient hypotension, patient given 2 unit PRBC in [facility] ED. Patient admitted to SICU for hemodynamic monitoring, pain management. S/p VATS 1/10 with partial lung decortication, chest tube removed 1/14. S/p chest tube placement 1/22 by IR for new left effusion, s/p L Thoracotomy/Decort 1/26 w/ 3 chest tubes to suction, s/p bronch 1/31.    PLAN  Neurologic:  - A&Ox4  - Seroquel 75mg QHS  - Tylenol, oxy PRN    Respiratory:  - RA, Incentive spirometry, Duonebs   - 2/2, posterior chest tube removed     Cardiovascular: tachycardia  - maintain MAP>65  - tachycardia improving    Gastrointestinal/Nutrition:  - regular diet  - PPI 40 qdaily  - Monitor for clinical signs of GI bleeding, track stool output and color    Genitourinary/Renal:  - Supplement electrolytes PRN  - nephro consulted for elevated urine osm and polyuria - no concern for DI  - renal U/S net obstruction/uropathy     Hematologic:  - Chemical VTE ppx with Lovenox     Infectious Disease: Empyema   - s/p zosyn 1/7-1/24, 1/26 - 2/2  - 1/22 L empyema cultures NGTD    Endocrine:  - No active issues, no history of DM

## 2024-02-03 NOTE — PROGRESS NOTE ADULT - ATTENDING COMMENTS
etoh misuse with withdrawal, required washout of chest s/p chest tubes x3    ON: removed post CT    alert and awake, sleeping in bed during round  seroquel nightly, oxy PRN, standing tylenol  on RA  AM CXR slight worsening of left lung, no PTX  chest tube to water seal 20cc output, discuss with thoracic about removal of last chest tube  aggressive chest PT, bronchodilators, 3% hts  stable hemodynamics, tachycardia stable  on diet and supplements  regular BMs  daily protonix for + FOBT, GI consulted, no intervention  voiding 3.1 lit, balance negative 2.3 lit  unclear source of high urine output. his recorded weight is stable. question taking too much input, will do fluid restriction  lovenox VTE PPX  thrombocytosis, likely reactive  s/p zosyn, monitor off antibiotics, afebrile  good glycemic control  buttock wound , wound vac , followed by PT  PT OT working with him  boots for toe fused    PIV only

## 2024-02-03 NOTE — PROGRESS NOTE ADULT - ASSESSMENT
48yoM w/ PMHx hepatitis C and EtOH abuse was transferred from OSH on 1/6 for level 1 trauma eval. He was found down with multiple 4-11 left-sided rib fractures and flail chest, and had a chest tube placed at OSH. S/p VATS 1/10 w/ partial lung decortication and thoracoscopic removal of intrapleural foreign body. S/p chest tube removal 1/14, downgraded to floor 1/18, returned to SICU after fever 102.3 on 1/20- s/p L thoracotomy and decortication (1/26).      Plan:  -Monitor CT output  -Analgesia and antiemetics as needed  - Thoracic surgery recs  -Continue abx   -Regular diet   -Appreciate excellent care per SICU     Trauma Surgery #[id number]

## 2024-02-04 LAB
ALBUMIN SERPL ELPH-MCNC: 2.3 G/DL — LOW (ref 3.3–5)
ALP SERPL-CCNC: 299 U/L — HIGH (ref 40–120)
ALT FLD-CCNC: 23 U/L — SIGNIFICANT CHANGE UP (ref 10–45)
ANION GAP SERPL CALC-SCNC: 12 MMOL/L — SIGNIFICANT CHANGE UP (ref 5–17)
APTT BLD: 31.1 SEC — SIGNIFICANT CHANGE UP (ref 24.5–35.6)
AST SERPL-CCNC: 21 U/L — SIGNIFICANT CHANGE UP (ref 10–40)
BILIRUB SERPL-MCNC: 0.2 MG/DL — SIGNIFICANT CHANGE UP (ref 0.2–1.2)
BLD GP AB SCN SERPL QL: NEGATIVE — SIGNIFICANT CHANGE UP
BUN SERPL-MCNC: 5 MG/DL — LOW (ref 7–23)
CALCIUM SERPL-MCNC: 8.2 MG/DL — LOW (ref 8.4–10.5)
CHLORIDE SERPL-SCNC: 104 MMOL/L — SIGNIFICANT CHANGE UP (ref 96–108)
CO2 SERPL-SCNC: 21 MMOL/L — LOW (ref 22–31)
CREAT SERPL-MCNC: 0.82 MG/DL — SIGNIFICANT CHANGE UP (ref 0.5–1.3)
EGFR: 109 ML/MIN/1.73M2 — SIGNIFICANT CHANGE UP
GLUCOSE SERPL-MCNC: 96 MG/DL — SIGNIFICANT CHANGE UP (ref 70–99)
HCT VFR BLD CALC: 26.3 % — LOW (ref 39–50)
HGB BLD-MCNC: 8.6 G/DL — LOW (ref 13–17)
INR BLD: 1.08 RATIO — SIGNIFICANT CHANGE UP (ref 0.85–1.18)
MAGNESIUM SERPL-MCNC: 2 MG/DL — SIGNIFICANT CHANGE UP (ref 1.6–2.6)
MCHC RBC-ENTMCNC: 27.5 PG — SIGNIFICANT CHANGE UP (ref 27–34)
MCHC RBC-ENTMCNC: 32.7 GM/DL — SIGNIFICANT CHANGE UP (ref 32–36)
MCV RBC AUTO: 84 FL — SIGNIFICANT CHANGE UP (ref 80–100)
NRBC # BLD: 0 /100 WBCS — SIGNIFICANT CHANGE UP (ref 0–0)
PHOSPHATE SERPL-MCNC: 3.9 MG/DL — SIGNIFICANT CHANGE UP (ref 2.5–4.5)
PLATELET # BLD AUTO: 690 K/UL — HIGH (ref 150–400)
POTASSIUM SERPL-MCNC: 4 MMOL/L — SIGNIFICANT CHANGE UP (ref 3.5–5.3)
POTASSIUM SERPL-SCNC: 4 MMOL/L — SIGNIFICANT CHANGE UP (ref 3.5–5.3)
PROT SERPL-MCNC: 5.8 G/DL — LOW (ref 6–8.3)
PROTHROM AB SERPL-ACNC: 11.9 SEC — SIGNIFICANT CHANGE UP (ref 9.5–13)
RBC # BLD: 3.13 M/UL — LOW (ref 4.2–5.8)
RBC # FLD: 15.7 % — HIGH (ref 10.3–14.5)
RH IG SCN BLD-IMP: POSITIVE — SIGNIFICANT CHANGE UP
SODIUM SERPL-SCNC: 137 MMOL/L — SIGNIFICANT CHANGE UP (ref 135–145)
WBC # BLD: 12.29 K/UL — HIGH (ref 3.8–10.5)
WBC # FLD AUTO: 12.29 K/UL — HIGH (ref 3.8–10.5)

## 2024-02-04 PROCEDURE — 99232 SBSQ HOSP IP/OBS MODERATE 35: CPT

## 2024-02-04 PROCEDURE — 93010 ELECTROCARDIOGRAM REPORT: CPT

## 2024-02-04 PROCEDURE — 71045 X-RAY EXAM CHEST 1 VIEW: CPT | Mod: 26

## 2024-02-04 PROCEDURE — 71045 X-RAY EXAM CHEST 1 VIEW: CPT | Mod: 26,77

## 2024-02-04 RX ORDER — ACETAMINOPHEN 500 MG
975 TABLET ORAL ONCE
Refills: 0 | Status: DISCONTINUED | OUTPATIENT
Start: 2024-02-04 | End: 2024-02-04

## 2024-02-04 RX ADMIN — PANTOPRAZOLE SODIUM 40 MILLIGRAM(S): 20 TABLET, DELAYED RELEASE ORAL at 06:13

## 2024-02-04 RX ADMIN — CHLORHEXIDINE GLUCONATE 1 APPLICATION(S): 213 SOLUTION TOPICAL at 06:15

## 2024-02-04 RX ADMIN — SENNA PLUS 2 TABLET(S): 8.6 TABLET ORAL at 21:07

## 2024-02-04 RX ADMIN — Medication 3 MILLILITER(S): at 23:01

## 2024-02-04 RX ADMIN — Medication 3 MILLILITER(S): at 11:21

## 2024-02-04 RX ADMIN — Medication 3 MILLILITER(S): at 17:53

## 2024-02-04 RX ADMIN — Medication 500 MILLIGRAM(S): at 12:11

## 2024-02-04 RX ADMIN — OXYCODONE HYDROCHLORIDE 10 MILLIGRAM(S): 5 TABLET ORAL at 03:20

## 2024-02-04 RX ADMIN — Medication 1 TABLET(S): at 11:10

## 2024-02-04 RX ADMIN — QUETIAPINE FUMARATE 75 MILLIGRAM(S): 200 TABLET, FILM COATED ORAL at 20:15

## 2024-02-04 RX ADMIN — ENOXAPARIN SODIUM 40 MILLIGRAM(S): 100 INJECTION SUBCUTANEOUS at 17:07

## 2024-02-04 RX ADMIN — Medication 500 MILLIGRAM(S): at 06:13

## 2024-02-04 RX ADMIN — Medication 500 MILLIGRAM(S): at 11:11

## 2024-02-04 RX ADMIN — OXYCODONE HYDROCHLORIDE 10 MILLIGRAM(S): 5 TABLET ORAL at 03:50

## 2024-02-04 RX ADMIN — Medication 500 MILLIGRAM(S): at 06:43

## 2024-02-04 RX ADMIN — SODIUM CHLORIDE 4 MILLILITER(S): 9 INJECTION INTRAMUSCULAR; INTRAVENOUS; SUBCUTANEOUS at 17:53

## 2024-02-04 RX ADMIN — Medication 500 MILLIGRAM(S): at 17:44

## 2024-02-04 RX ADMIN — SODIUM CHLORIDE 4 MILLILITER(S): 9 INJECTION INTRAMUSCULAR; INTRAVENOUS; SUBCUTANEOUS at 23:01

## 2024-02-04 RX ADMIN — Medication 500 MILLIGRAM(S): at 11:10

## 2024-02-04 RX ADMIN — SODIUM CHLORIDE 4 MILLILITER(S): 9 INJECTION INTRAMUSCULAR; INTRAVENOUS; SUBCUTANEOUS at 11:21

## 2024-02-04 RX ADMIN — Medication 500 MILLIGRAM(S): at 23:00

## 2024-02-04 RX ADMIN — Medication 100 MILLIGRAM(S): at 11:10

## 2024-02-04 RX ADMIN — Medication 1 MILLIGRAM(S): at 11:10

## 2024-02-04 RX ADMIN — Medication 3 MILLILITER(S): at 06:09

## 2024-02-04 RX ADMIN — PREGABALIN 1000 MICROGRAM(S): 225 CAPSULE ORAL at 11:10

## 2024-02-04 RX ADMIN — Medication 500 MILLIGRAM(S): at 17:07

## 2024-02-04 RX ADMIN — SODIUM CHLORIDE 4 MILLILITER(S): 9 INJECTION INTRAMUSCULAR; INTRAVENOUS; SUBCUTANEOUS at 06:10

## 2024-02-04 RX ADMIN — Medication 5 MILLIGRAM(S): at 21:07

## 2024-02-04 RX ADMIN — Medication 500 MILLIGRAM(S): at 23:30

## 2024-02-04 NOTE — PROGRESS NOTE ADULT - SUBJECTIVE AND OBJECTIVE BOX
INTERVAL EVENTS:  - No acute events  - one chest tube continues to water seal  - listed for floor    SUBJECTIVE/ROS:  [ ] A ten-point review of systems was otherwise negative except as noted.  [ ] Due to altered mental status/intubation, subjective information were not able to be obtained from the patient. History was obtained, to the extent possible, from review of the chart and collateral sources of information.      NEURO  Exam: awake, alert, oriented  Meds: acetaminophen     Tablet .. 500 milliGRAM(s) Oral every 6 hours  melatonin 5 milliGRAM(s) Oral at bedtime  oxyCODONE    IR 5 milliGRAM(s) Oral every 4 hours PRN Moderate Pain (4 - 6)  oxyCODONE    IR 10 milliGRAM(s) Oral every 4 hours PRN Severe Pain (7 - 10)  QUEtiapine 75 milliGRAM(s) Oral <User Schedule>  [x] Adequacy of sedation and pain control has been assessed and adjusted      RESPIRATORY  RR: 28 (02-04-24 @ 02:00) (12 - 36)  SpO2: 97% (02-04-24 @ 02:00) (94% - 98%)  Exam: unlabored, clear to auscultation bilaterally  [N/A] Extubation Readiness Assessed  Meds: albuterol/ipratropium for Nebulization 3 milliLiter(s) Nebulizer every 6 hours  sodium chloride 3%  Inhalation 4 milliLiter(s) Inhalation every 6 hours      CARDIOVASCULAR  HR: 111 (02-04-24 @ 02:00) (97 - 128)  BP: 139/100 (02-04-24 @ 02:00) (115/72 - 177/115)  BP(mean): 115 (02-04-24 @ 02:00) (88 - 141)      Exam: regular rate and rhythm  Cardiac Rhythm: sinus  Perfusion     [x]Adequate   [ ]Inadequate  Mentation   [x]Normal       [ ]Reduced  Extremities  [x]Warm         [ ]Cool  Volume Status [ ]Hypervolemic [x]Euvolemic [ ]Hypovolemic      GI/NUTRITION  Exam: soft, nontender, nondistended  Diet: regular diet  Meds: calcium carbonate    500 mG (Tums) Chewable 1 Tablet(s) Chew every 6 hours PRN Heartburn  pantoprazole    Tablet 40 milliGRAM(s) Oral before breakfast  polyethylene glycol 3350 17 Gram(s) Oral daily  senna 2 Tablet(s) Oral at bedtime      GENITOURINARY  I&O's Detail    02-02 @ 07:01  -  02-03 @ 07:00  --------------------------------------------------------  IN:    IV PiggyBack: 25 mL    Oral Fluid: 750 mL  Total IN: 775 mL    OUT:    Chest Tube (mL): 20 mL    Voided (mL): 3425 mL  Total OUT: 3445 mL    Total NET: -2670 mL      02-03 @ 07:01 - 02-04 @ 02:58  --------------------------------------------------------  IN:    IV PiggyBack: 50 mL    Oral Fluid: 2170 mL  Total IN: 2220 mL    OUT:    Chest Tube (mL): 20 mL    Voided (mL): 2625 mL  Total OUT: 2645 mL    Total NET: -425 mL          02-03    139  |  105  |  5<L>  ----------------------------<  110<H>  3.6   |  22  |  0.82    Ca    8.5      03 Feb 2024 05:43  Phos  3.4     02-03  Mg     1.8     02-03    TPro  6.4  /  Alb  2.4<L>  /  TBili  0.2  /  DBili  x   /  AST  21  /  ALT  24  /  AlkPhos  317<H>  02-03    [ ] Hendrix catheter, indication: N/A  Meds: ascorbic acid 500 milliGRAM(s) Oral daily  cyanocobalamin 1000 MICROGram(s) Oral daily  folic acid 1 milliGRAM(s) Oral daily  multivitamin 1 Tablet(s) Oral daily  thiamine 100 milliGRAM(s) Oral daily        HEMATOLOGIC  Meds: enoxaparin Injectable 40 milliGRAM(s) SubCutaneous every 24 hours    [x] VTE Prophylaxis                        9.4    10.61 )-----------( 678      ( 03 Feb 2024 05:43 )             28.5     PT/INR - ( 03 Feb 2024 05:43 )   PT: 11.2 sec;   INR: 1.07 ratio         PTT - ( 03 Feb 2024 05:43 )  PTT:32.8 sec  Transfusion     [ ] PRBC   [ ] Platelets   [ ] FFP   [ ] Cryoprecipitate      INFECTIOUS DISEASES  WBC Count: 10.61 K/uL (02-03 @ 05:43)    RECENT CULTURES:  Meds: influenza   Vaccine 0.5 milliLiter(s) IntraMuscular once      ENDOCRINE  CAPILLARY BLOOD GLUCOSE      OTHER MEDICATIONS:  chlorhexidine 2% Cloths 1 Application(s) Topical <User Schedule>  lidocaine   4% Patch 1 Patch Transdermal every 24 hours      CODE STATUS: full code

## 2024-02-04 NOTE — PROGRESS NOTE ADULT - ASSESSMENT
48yoM w/ PMHx hepatitis C and EtOH abuse was transferred from OSH on 1/6 for level 1 trauma eval. He was found down with multiple 4-11 left-sided rib fractures and flail chest, and had a chest tube placed at OSH. S/p VATS 1/10 w/ partial lung decortication and thoracoscopic removal of intrapleural foreign body. S/p chest tube removal 1/14, downgraded to floor 1/18, returned to SICU after fever 102.3 on 1/20- s/p L thoracotomy and decortication (1/26).      Plan:  -Monitor CT output  -Analgesia and antiemetics as needed  - Thoracic surgery recs  -Continue abx   -Regular diet   -Appreciate excellent care per SICU     Trauma Surgery #[id number]   48yoM w/ PMHx hepatitis C and EtOH abuse was transferred from OSH on 1/6 for level 1 trauma eval. He was found down with multiple 4-11 left-sided rib fractures and flail chest, and had a chest tube placed at OSH. S/p VATS 1/10 w/ partial lung decortication and thoracoscopic removal of intrapleural foreign body. S/p chest tube removal 1/14, downgraded to floor 1/18, returned to SICU after fever 102.3 on 1/20- s/p L thoracotomy and decortication (1/26).      Plan:  - PT eval  - listed for floor; dc planning   -Monitor CT output  -Analgesia and antiemetics as needed  - Thoracic surgery recs  -Continue abx   -Regular diet   -Appreciate excellent care per SICU     Trauma Surgery #[id number]

## 2024-02-04 NOTE — PROGRESS NOTE ADULT - ATTENDING COMMENTS
etoh misuse with withdrawal, required washout of chest s/p chest tubes x3    ON: stable    alert and awake, sleeping in bed during round  seroquel nightly, oxy PRN, standing tylenol  on RA  AM CXR no PTX, stable  chest tube to water seal 20cc output, planned removal today by thoracics  aggressive chest PT, bronchodilators, 3% hts  stable hemodynamics, resolved tachycardia  on diet and supplements  regular BMs  daily protonix for + FOBT  voiding 2.6 lit, balance negative 700 lit  responded to fluid restriction, however patient is non compliant, weighing daily  lovenox VTE PPX  thrombocytosis, likely reactive  s/p zosyn, monitor off antibiotics, afebrile  good glycemic control  buttock wound , wound vac , followed by PT  PT OT working with him  boots for toe fused    PIV only  may go to floor

## 2024-02-04 NOTE — PROGRESS NOTE ADULT - PROBLEM SELECTOR PLAN 1
Suspected fracture of rib on left side, closed. Left Rib Fx 4-11th /Hemothorax  1/10 LT VATS, with partial lung decortication  Thoracoscopic removal of intrapleural foreign body     1/20 thoracic surgery reconsult f left pleural effusion vs hemothorax   1/22 s/p left pigtail by IR  1/26 Thoracotomy Decortication w/ washout>CT x 3 placed  2/1 #2 left anterior chest tube d/c   maintain left diaphragm #1 and left posterior #3 to water seal daily xray OOB  d/c pca   2/2 D/c  posterior #3 chest tube    all meals OOB ambulated  tid chest PT daily xray  2/3 poss dc remaining CT Sunday Suspected fracture of rib on left side, closed. Left Rib Fx 4-11th /Hemothorax  1/10 LT VATS, with partial lung decortication  Thoracoscopic removal of intrapleural foreign body     1/20 thoracic surgery reconsult f left pleural effusion vs hemothorax   1/22 s/p left pigtail by IR  1/26 Thoracotomy Decortication w/ washout>CT x 3 placed  2/1 #2 left anterior chest tube d/c   maintain left diaphragm #1 and left posterior #3 to water seal daily xray OOB  d/c pca   2/2 D/c  posterior #3 chest tube    all meals OOB ambulated  tid chest PT daily xray  2/3 poss dc remaining CT Sunday  lt ct d/c today; ck f/u chest xray post removal and in am; thoracotomy staples to be removed 2/9

## 2024-02-04 NOTE — PROGRESS NOTE ADULT - ATTENDING COMMENTS
Pt seen and examined  Chart reviewed  Resident note confirmed  Plan of care discussed with Dr. Ahumada  Management per SICU team .

## 2024-02-04 NOTE — PROGRESS NOTE ADULT - ASSESSMENT
This is a  56yo M w pmhx of Hep C and possible EtOH use disorder who presents after being found down. Intubated for respiratory distress and agitation 1/8. Pt has displaced 4-11 L rib fx with flail segment.     1/9 VSS: NPO after MN. Plan for Rib plating WED 1/10/24  1/10 s/p Left VATS, with partial lung decortication  Thoracoscopic removal of intrapleural foreign body    1/11VSS intubated sedated on pressors  chest tube lws   1/12 VSS, pt still remains intubated/sedated, getting phenobarbital for agitation. Left chest tube output 75cc/24h placed on water seal this AM. 1U PRBC -H/H 7.1/20. Continue care per SICU team  1/13 Maintain L pleural tube waterseal Daily CXR Care as per SICU team  1/14 Left pleural tube removed CXR ordered; thoracic signed off  1/20 thoracic surgery reconsulted for left pleural effusion vs hemothorax; d/w Dr. Sepulveda; chest ct done- Dr. Sepulveda reviewed; call IR for potential drainage monday left pocket   1/21 VSS, pt still febrile tmax 103, WBC 8.4, IR consulted for drainage of complex left pleural effusion on CT Chest. Plan for Monday.   1/22 VSS; Tm 100.8 this am. WBC WNL. IR pending drainage of L effusion today   1/23 VSS, s/p left pigtail by IR yesterday, minimal drainage 20cc/24h. Recommend flushing pigtail and placing to LWS. Plan to do tPA MIST protocol tomorrow.   1/24     vss   min drainage from Lt  PTC  1/25 VSS left chest tube drainage 100/230  TPA today-   OR for  1/26 please type and screen Hibiclens  and NPO at midnight  hold  Lovenox   1/26 L Thoracotomy/Decort  3 chest tubes suction x 48 hours  1/27 L thoracotomy maintain 3 left pleural tubes to suction     Recommend fluid restrict for hyponatremia  1/28 VSSL thoracotomy maintain 3 left pleural tubes to suction    today \  1/29 VSS, maintain 3 CT to suction,  today   1/29 VSS, 2U PRBC yesterday for GIB, improving 8/23 chest tube output minimal maintain 3 chest tubes to LWS  1/30    lt side chest xray  opacification, room air SAT 94 percent   min drainage   from 3 chest tubes  serosanguinous  1/31 VSS  XRAY improved NPO for bronchoscopy today  m chest tubes minimal drainage LWS no air leak  all meals OOB  ambulate tid chest PT   2/1 VSS #2 left anterior chest tube d/c maintain left diaphragm #1 and left posterior #3 to water seal daily xray OOB  d/c pca   2/2  VSS ambulate tid all meals OOB chest tube water seal d/c posterior chest tube  2/3 VSS CT water seal.  CXR inc opac Left    This is a  54yo M w pmhx of Hep C and possible EtOH use disorder who presents after being found down. Intubated for respiratory distress and agitation 1/8. Pt has displaced 4-11 L rib fx with flail segment.     1/9 VSS: NPO after MN. Plan for Rib plating WED 1/10/24  1/10 s/p Left VATS, with partial lung decortication  Thoracoscopic removal of intrapleural foreign body    1/11VSS intubated sedated on pressors  chest tube lws   1/12 VSS, pt still remains intubated/sedated, getting phenobarbital for agitation. Left chest tube output 75cc/24h placed on water seal this AM. 1U PRBC -H/H 7.1/20. Continue care per SICU team  1/13 Maintain L pleural tube waterseal Daily CXR Care as per SICU team  1/14 Left pleural tube removed CXR ordered; thoracic signed off  1/20 thoracic surgery reconsulted for left pleural effusion vs hemothorax; d/w Dr. Sepulveda; chest ct done- Dr. Sepulveda reviewed; call IR for potential drainage monday left pocket   1/21 VSS, pt still febrile tmax 103, WBC 8.4, IR consulted for drainage of complex left pleural effusion on CT Chest. Plan for Monday.   1/22 VSS; Tm 100.8 this am. WBC WNL. IR pending drainage of L effusion today   1/23 VSS, s/p left pigtail by IR yesterday, minimal drainage 20cc/24h. Recommend flushing pigtail and placing to LWS. Plan to do tPA MIST protocol tomorrow.   1/24     vss   min drainage from Lt  PTC  1/25 VSS left chest tube drainage 100/230  TPA today-   OR for  1/26 please type and screen Hibiclens  and NPO at midnight  hold  Lovenox   1/26 L Thoracotomy/Decort  3 chest tubes suction x 48 hours  1/27 L thoracotomy maintain 3 left pleural tubes to suction     Recommend fluid restrict for hyponatremia  1/28 VSSL thoracotomy maintain 3 left pleural tubes to suction    today \  1/29 VSS, maintain 3 CT to suction,  today   1/29 VSS, 2U PRBC yesterday for GIB, improving 8/23 chest tube output minimal maintain 3 chest tubes to LWS  1/30    lt side chest xray  opacification, room air SAT 94 percent   min drainage   from 3 chest tubes  serosanguinous  1/31 VSS  XRAY improved NPO for bronchoscopy today  m chest tubes minimal drainage LWS no air leak  all meals OOB  ambulate tid chest PT   2/1 VSS #2 left anterior chest tube d/c maintain left diaphragm #1 and left posterior #3 to water seal daily xray OOB  d/c pca   2/2  VSS ambulate tid all meals OOB chest tube water seal d/c posterior chest tube  2/3 VSS CT water seal.  CXR inc opac Left  2/4 VSS; lt ct d/c today; ck f/u chest xray post removal and in am; thoracotomy staples to be removed 2/9

## 2024-02-04 NOTE — PROGRESS NOTE ADULT - ASSESSMENT
48M with PMHx hepatitis C (diagnosed many years ago, never treated), depression transferred from outside hospital for trauma eval. Patient found down by roommate after unknown amount of time, found with multiple 4-11 L sided rib fractures with flail chest. Chest tube placed there with >1L output (old blood). Patient also with tachypnea, concern for respiratory failure started on BiPAP. Level 1 called for transient hypotension, patient given 2 unit PRBC in [facility] ED. Patient admitted to SICU for hemodynamic monitoring, pain management. S/p VATS 1/10 with partial lung decortication, chest tube removed 1/14. S/p chest tube placement 1/22 by IR for new left effusion, s/p L Thoracotomy/Decort 1/26 w/ 3 chest tubes to suction, s/p bronch 1/31.    PLAN  Neurologic:  - A&Ox4  - Seroquel 75mg QHS  - Tylenol, oxy PRN  - PCA d/c'd 2/1    Respiratory:  - RA, Incentive spirometry, Duonebs   - x1 Anterior CT removed, 2 & 3 to water seal   - 1/31 s/p bronch xray show worsening of pleural effusion, f/u with CT surg for management and further imaging- Chest tubes irrigated and clots/fibrinous exudate removed and left lower lobe secretions cleared  - 1/30 CT Chest Secretions in the left lower lobe bronchus and in the segmental   airways the left lower lobe. The loculated left pleural effusion has decreased in size since   - 1/26 CTS OR 1Open left thoracotomy, decortication and washout, 3 chest tubes placed  - 1/20 CT with complex collection L pleural space, s/p drainage by IR 1/22, 20cc bloody fluid drained, pigtail placed  - 1/10 VATS with discovery of Fibropurulent exudative adhesions and removal of retained foreign material likely the tip of a glove, s/p washout, with new 28f chest tube placed > removed 1/14  - 2/2, posterior chest tube removed f/u chest xray at 4pm     Cardiovascular: tachycardia  - Metoprolol 50mg q8h for tachycardia - d/c'd 1/26 for soft BP  - Hgb/Hct 7.1/21.3 1U PRBC, post 8.5/25.9  - Hypotensive/Tachycardic (, SPB 70s) 500 cc LR bolus x1, lopressor d/c; currently (-120, SBP 90)    Gastrointestinal/Nutrition:  - Abd US 1/24 - mild hepatomegaly, redemonstration of evolving splenic hematomas  - regular diet  - PPI 40 qdaily, FOBT +, GI consulted - no intervention indicated at this time  - Monitor for clinical signs of GI bleeding, track stool output and color    Genitourinary/Renal:  - Supplement electrolytes PRN  - IVF @ 20cc/hr PCA  - nephro consulted for elevated urine osm and polyuria - no concern for DI  - renal U/S net obstruction/uropathy     Hematologic:  - Chemical VTE ppx with Lovenox   - Duplex/doppler 1/23 negative    Infectious Disease: Empyema   - s/p zosyn 1/7-1/24, 1/26 - 2/2  - f/u with surgery for abx reecs  - 1/22 L empyema cultures NGTD    Endocrine:  - No active issues, no history of DM

## 2024-02-04 NOTE — PROGRESS NOTE ADULT - SUBJECTIVE AND OBJECTIVE BOX
VITAL SIGNS    Telemetry:    Vital Signs Last 24 Hrs  T(C): 37 (24 @ 11:00), Max: 37.2 (24 @ 15:00)  T(F): 98.6 (24 @ 11:00), Max: 99 (24 @ 15:00)  HR: 117 (24 @ 12:00) (97 - 117)  BP: 130/87 (24 @ 12:00) (115/72 - 177/115)  RR: 31 (24 @ 12:00) (12 - 36)  SpO2: 94% (24 @ 12:00) (94% - 98%)            :01  -   07:00  --------------------------------------------------------  IN: 2620 mL / OUT: 3350 mL / NET: -730 mL     07:01  -   @ 12:35  --------------------------------------------------------  IN: 240 mL / OUT: 650 mL / NET: -410 mL       Daily     Daily Weight in k (2024 12:00)  Admit Wt: Drug Dosing Weight  Height (cm): 185.4 (2024 10:01)  Weight (kg): 91.7 (2024 10:01)  BMI (kg/m2): 26.7 (2024 10:01)  BSA (m2): 2.16 (2024 10:01)    Bilirubin Total: 0.2 mg/dL ( @ 06:48)    CAPILLARY BLOOD GLUCOSE              LABS:     @ 07:01  -   @ 07:00  --------------------------------------------------------  IN: 2620 mL / OUT: 3350 mL / NET: -730 mL     @ 07:01  -   @ 12:35  --------------------------------------------------------  IN: 240 mL / OUT: 650 mL / NET: -410 mL    cret                        8.6    12.29 )-----------( 690      ( 2024 06:53 )             26.3         137  |  104  |  5<L>  ----------------------------<  96  4.0   |  21<L>  |  0.82    Ca    8.2<L>      2024 06:48  Phos  3.9       Mg     2.0         TPro  5.8<L>  /  Alb  2.3<L>  /  TBili  0.2  /  DBili  x   /  AST  21  /  ALT  23  /  AlkPhos  299<H>      PT/INR - ( 2024 06:57 )   PT: 11.9 sec;   INR: 1.08 ratio         PTT - ( 2024 06:57 )  PTT:31.1 sec    acetaminophen     Tablet .. 500 milliGRAM(s) Oral every 6 hours  albuterol/ipratropium for Nebulization 3 milliLiter(s) Nebulizer every 6 hours  ascorbic acid 500 milliGRAM(s) Oral daily  calcium carbonate    500 mG (Tums) Chewable 1 Tablet(s) Chew every 6 hours PRN  chlorhexidine 2% Cloths 1 Application(s) Topical <User Schedule>  cyanocobalamin 1000 MICROGram(s) Oral daily  enoxaparin Injectable 40 milliGRAM(s) SubCutaneous every 24 hours  folic acid 1 milliGRAM(s) Oral daily  influenza   Vaccine 0.5 milliLiter(s) IntraMuscular once  lidocaine   4% Patch 1 Patch Transdermal every 24 hours  melatonin 5 milliGRAM(s) Oral at bedtime  multivitamin 1 Tablet(s) Oral daily  oxyCODONE    IR 5 milliGRAM(s) Oral every 4 hours PRN  oxyCODONE    IR 10 milliGRAM(s) Oral every 4 hours PRN  pantoprazole    Tablet 40 milliGRAM(s) Oral before breakfast  polyethylene glycol 3350 17 Gram(s) Oral daily  QUEtiapine 75 milliGRAM(s) Oral <User Schedule>  senna 2 Tablet(s) Oral at bedtime  sodium chloride 3%  Inhalation 4 milliLiter(s) Inhalation every 6 hours  thiamine 100 milliGRAM(s) Oral daily      PHYSICAL EXAM    Subjective: "Hi.   Neurology: alert and oriented x 3, nonfocal, no gross deficits  CV : tele:  RSR  Sternal Wound :  CDI with dressing , Stable  Lungs: clear. RR easy, unlabored   Abdomen: soft, nontender, nondistended, positive bowel sounds, bowel movement   Neg N/V/D   :  pt voiding without difficulty   Extremities:   BIRD; edema, neg calf tenderness.   PPP bilaterally      PW:  Chest tubes:                 VITAL SIGNS      Vital Signs Last 24 Hrs  T(C): 37 (24 @ 11:00), Max: 37.2 (24 @ 15:00)  T(F): 98.6 (24 @ 11:00), Max: 99 (24 @ 15:00)  HR: 117 (24 @ 12:00) (97 - 117)  BP: 130/87 (24 @ 12:00) (115/72 - 177/115)  RR: 31 (24 @ 12:00) (12 - 36)  SpO2: 94% (24 @ 12:00) (94% - 98%)             07:01  -   07:00  --------------------------------------------------------  IN: 2620 mL / OUT: 3350 mL / NET: -730 mL     07:01  -   @ 12:35  --------------------------------------------------------  IN: 240 mL / OUT: 650 mL / NET: -410 mL       Daily     Daily Weight in k (2024 12:00)  Admit Wt: Drug Dosing Weight  Height (cm): 185.4 (2024 10:01)  Weight (kg): 91.7 (2024 10:01)  BMI (kg/m2): 26.7 (2024 10:01)  BSA (m2): 2.16 (2024 10:01)    Bilirubin Total: 0.2 mg/dL ( @ 06:48)    CAPILLARY BLOOD GLUCOSE              LABS:     07:01  -   07:00  --------------------------------------------------------  IN: 2620 mL / OUT: 3350 mL / NET: -730 mL     @ 07:01  -   @ 12:35  --------------------------------------------------------  IN: 240 mL / OUT: 650 mL / NET: -410 mL    cret                        8.6    12.29 )-----------( 690      ( 2024 06:53 )             26.3         137  |  104  |  5<L>  ----------------------------<  96  4.0   |  21<L>  |  0.82    Ca    8.2<L>      2024 06:48  Phos  3.9       Mg     2.0         TPro  5.8<L>  /  Alb  2.3<L>  /  TBili  0.2  /  DBili  x   /  AST  21  /  ALT  23  /  AlkPhos  299<H>      PT/INR - ( 2024 06:57 )   PT: 11.9 sec;   INR: 1.08 ratio         PTT - ( 2024 06:57 )  PTT:31.1 sec    acetaminophen     Tablet .. 500 milliGRAM(s) Oral every 6 hours  albuterol/ipratropium for Nebulization 3 milliLiter(s) Nebulizer every 6 hours  ascorbic acid 500 milliGRAM(s) Oral daily  calcium carbonate    500 mG (Tums) Chewable 1 Tablet(s) Chew every 6 hours PRN  chlorhexidine 2% Cloths 1 Application(s) Topical <User Schedule>  cyanocobalamin 1000 MICROGram(s) Oral daily  enoxaparin Injectable 40 milliGRAM(s) SubCutaneous every 24 hours  folic acid 1 milliGRAM(s) Oral daily  influenza   Vaccine 0.5 milliLiter(s) IntraMuscular once  lidocaine   4% Patch 1 Patch Transdermal every 24 hours  melatonin 5 milliGRAM(s) Oral at bedtime  multivitamin 1 Tablet(s) Oral daily  oxyCODONE    IR 5 milliGRAM(s) Oral every 4 hours PRN  oxyCODONE    IR 10 milliGRAM(s) Oral every 4 hours PRN  pantoprazole    Tablet 40 milliGRAM(s) Oral before breakfast  polyethylene glycol 3350 17 Gram(s) Oral daily  QUEtiapine 75 milliGRAM(s) Oral <User Schedule>  senna 2 Tablet(s) Oral at bedtime  sodium chloride 3%  Inhalation 4 milliLiter(s) Inhalation every 6 hours  thiamine 100 milliGRAM(s) Oral daily      PHYSICAL EXAM    Subjective: "So happy this tube is coming out."   Neurology: alert and oriented x 3, nonfocal, no gross deficits  Lungs: clear. lt thoracotomy site cdi lucina w/ staples   Abdomen: soft, nontender, nondistended, positive bowel sounds, + bowel movement   Neg N/V/D   :  pt voiding without difficulty   Extremities:   BIRD; neg LE edema, neg calf tenderness.   PPP bilaterally      Chest tubes: left ct d/c today - neg a/l; minimal drainage noted

## 2024-02-04 NOTE — PROGRESS NOTE ADULT - SUBJECTIVE AND OBJECTIVE BOX
ACS Progress Note    S: Patient seen and examined. No acute events overnight.     O:  Vital Signs Last 24 Hrs  T(C): 36.3 (04 Feb 2024 07:00), Max: 37.5 (03 Feb 2024 11:00)  T(F): 97.3 (04 Feb 2024 07:00), Max: 99.5 (03 Feb 2024 11:00)  HR: 100 (04 Feb 2024 09:00) (97 - 128)  BP: 127/90 (04 Feb 2024 09:00) (115/72 - 177/115)  BP(mean): 105 (04 Feb 2024 09:00) (88 - 141)  RR: 25 (04 Feb 2024 09:00) (12 - 36)  SpO2: 96% (04 Feb 2024 09:00) (94% - 98%)    Parameters below as of 04 Feb 2024 07:00  Patient On (Oxygen Delivery Method): room air        I&O's Detail    03 Feb 2024 07:01  -  04 Feb 2024 07:00  --------------------------------------------------------  IN:    IV PiggyBack: 50 mL    Oral Fluid: 2570 mL  Total IN: 2620 mL    OUT:    Chest Tube (mL): 25 mL    Voided (mL): 3325 mL  Total OUT: 3350 mL    Total NET: -730 mL    MEDICATIONS  (STANDING):  acetaminophen     Tablet .. 500 milliGRAM(s) Oral every 6 hours  albuterol/ipratropium for Nebulization 3 milliLiter(s) Nebulizer every 6 hours  ascorbic acid 500 milliGRAM(s) Oral daily  chlorhexidine 2% Cloths 1 Application(s) Topical <User Schedule>  cyanocobalamin 1000 MICROGram(s) Oral daily  enoxaparin Injectable 40 milliGRAM(s) SubCutaneous every 24 hours  folic acid 1 milliGRAM(s) Oral daily  influenza   Vaccine 0.5 milliLiter(s) IntraMuscular once  lidocaine   4% Patch 1 Patch Transdermal every 24 hours  melatonin 5 milliGRAM(s) Oral at bedtime  multivitamin 1 Tablet(s) Oral daily  pantoprazole    Tablet 40 milliGRAM(s) Oral before breakfast  polyethylene glycol 3350 17 Gram(s) Oral daily  QUEtiapine 75 milliGRAM(s) Oral <User Schedule>  senna 2 Tablet(s) Oral at bedtime  sodium chloride 3%  Inhalation 4 milliLiter(s) Inhalation every 6 hours  thiamine 100 milliGRAM(s) Oral daily    MEDICATIONS  (PRN):  calcium carbonate    500 mG (Tums) Chewable 1 Tablet(s) Chew every 6 hours PRN Heartburn  oxyCODONE    IR 10 milliGRAM(s) Oral every 4 hours PRN Severe Pain (7 - 10)  oxyCODONE    IR 5 milliGRAM(s) Oral every 4 hours PRN Moderate Pain (4 - 6)                        8.6    12.29 )-----------( 690      ( 04 Feb 2024 06:53 )             26.3       02-04    137  |  104  |  5<L>  ----------------------------<  96  4.0   |  21<L>  |  0.82    Ca    8.2<L>      04 Feb 2024 06:48  Phos  3.9     02-04  Mg     2.0     02-04    TPro  5.8<L>  /  Alb  2.3<L>  /  TBili  0.2  /  DBili  x   /  AST  21  /  ALT  23  /  AlkPhos  299<H>  02-04    PHYSICAL EXAM:  Gen: NAD, A&Ox3  Pulm: No respiratory distress, no subcostal retractions. 1 chest tube to water seal.  CV: Sinus tachycardia, no JVD  Abd: Soft, NT, ND  Extremities: warm and well perfused ACS Progress Note    S: Patient seen and examined. No acute events overnight.   INTERVAL EVENTS:  - No acute events  - one chest tube continues to water seal  - listed for floor    O:  Vital Signs Last 24 Hrs  T(C): 36.3 (04 Feb 2024 07:00), Max: 37.5 (03 Feb 2024 11:00)  T(F): 97.3 (04 Feb 2024 07:00), Max: 99.5 (03 Feb 2024 11:00)  HR: 100 (04 Feb 2024 09:00) (97 - 128)  BP: 127/90 (04 Feb 2024 09:00) (115/72 - 177/115)  BP(mean): 105 (04 Feb 2024 09:00) (88 - 141)  RR: 25 (04 Feb 2024 09:00) (12 - 36)  SpO2: 96% (04 Feb 2024 09:00) (94% - 98%)    Parameters below as of 04 Feb 2024 07:00  Patient On (Oxygen Delivery Method): room air        I&O's Detail    03 Feb 2024 07:01  -  04 Feb 2024 07:00  --------------------------------------------------------  IN:    IV PiggyBack: 50 mL    Oral Fluid: 2570 mL  Total IN: 2620 mL    OUT:    Chest Tube (mL): 25 mL    Voided (mL): 3325 mL  Total OUT: 3350 mL    Total NET: -730 mL    MEDICATIONS  (STANDING):  acetaminophen     Tablet .. 500 milliGRAM(s) Oral every 6 hours  albuterol/ipratropium for Nebulization 3 milliLiter(s) Nebulizer every 6 hours  ascorbic acid 500 milliGRAM(s) Oral daily  chlorhexidine 2% Cloths 1 Application(s) Topical <User Schedule>  cyanocobalamin 1000 MICROGram(s) Oral daily  enoxaparin Injectable 40 milliGRAM(s) SubCutaneous every 24 hours  folic acid 1 milliGRAM(s) Oral daily  influenza   Vaccine 0.5 milliLiter(s) IntraMuscular once  lidocaine   4% Patch 1 Patch Transdermal every 24 hours  melatonin 5 milliGRAM(s) Oral at bedtime  multivitamin 1 Tablet(s) Oral daily  pantoprazole    Tablet 40 milliGRAM(s) Oral before breakfast  polyethylene glycol 3350 17 Gram(s) Oral daily  QUEtiapine 75 milliGRAM(s) Oral <User Schedule>  senna 2 Tablet(s) Oral at bedtime  sodium chloride 3%  Inhalation 4 milliLiter(s) Inhalation every 6 hours  thiamine 100 milliGRAM(s) Oral daily    MEDICATIONS  (PRN):  calcium carbonate    500 mG (Tums) Chewable 1 Tablet(s) Chew every 6 hours PRN Heartburn  oxyCODONE    IR 10 milliGRAM(s) Oral every 4 hours PRN Severe Pain (7 - 10)  oxyCODONE    IR 5 milliGRAM(s) Oral every 4 hours PRN Moderate Pain (4 - 6)                        8.6    12.29 )-----------( 690      ( 04 Feb 2024 06:53 )             26.3       02-04    137  |  104  |  5<L>  ----------------------------<  96  4.0   |  21<L>  |  0.82    Ca    8.2<L>      04 Feb 2024 06:48  Phos  3.9     02-04  Mg     2.0     02-04    TPro  5.8<L>  /  Alb  2.3<L>  /  TBili  0.2  /  DBili  x   /  AST  21  /  ALT  23  /  AlkPhos  299<H>  02-04    PHYSICAL EXAM:  Gen: NAD, A&Ox3  Pulm: No respiratory distress, no subcostal retractions. 1 chest tube to water seal.  CV: Sinus tachycardia, no JVD  Abd: Soft, NT, ND  Extremities: warm and well perfused

## 2024-02-04 NOTE — CHART NOTE - NSCHARTNOTEFT_GEN_A_CORE
SICU Transfer Note    Transfer from: SICU  Transfer to: [address] [place] [address]    SIC COURSE: Patient admitted to SICU as a transfer from OSH after being found down, imaging notable for L 4-11 rib fractures and flail chest. A CT was placed at the OSH for >1L old blood, transferred for further management and admitted to SICU. Underwent VATS 1/10 with partial lung decortication. CT removed 1/14, downgraded to floor 1/18 but returned to SICU after febrile to 102.3 1/20. A new CT placed 1/22 for new left pleural effusion. Underwent thoracotomy/decortication 1/26.     Physical exam:  Gen: NAD, resting comfortably  CV: Hemodynamically stable  Pulm: Nonlabored breathing on room air  Extremities: WWP      ASSESSMENT & PLAN: 47yo M with history of hepatitis C and EtOH abuse, transferred from OSH after being found down. Transferred to floor today. Currently afebrile and hemodynamically stable, all chest tubes removed. Saturating well on room air.    For Follow-Up:  - CXR in AM s/p CT removal  - Thoracotomy staples out 2/9 per thoracic      Vital Signs Last 24 Hrs  T(C): 37.3 (04 Feb 2024 21:27), Max: 37.3 (04 Feb 2024 21:27)  T(F): 99.2 (04 Feb 2024 21:27), Max: 99.2 (04 Feb 2024 21:27)  HR: 112 (04 Feb 2024 21:27) (100 - 120)  BP: 136/90 (04 Feb 2024 21:27) (105/76 - 165/105)  BP(mean): 120 (04 Feb 2024 18:00) (87 - 130)  RR: 20 (04 Feb 2024 21:27) (16 - 33)  SpO2: 97% (04 Feb 2024 21:27) (94% - 100%)    Parameters below as of 04 Feb 2024 21:27  Patient On (Oxygen Delivery Method): room air      I&O's Summary    03 Feb 2024 07:01  -  04 Feb 2024 07:00  --------------------------------------------------------  IN: 2620 mL / OUT: 3350 mL / NET: -730 mL    04 Feb 2024 07:01  -  04 Feb 2024 22:46  --------------------------------------------------------  IN: 1575 mL / OUT: 1300 mL / NET: 275 mL          MEDICATIONS  (STANDING):  acetaminophen     Tablet .. 500 milliGRAM(s) Oral every 6 hours  albuterol/ipratropium for Nebulization 3 milliLiter(s) Nebulizer every 6 hours  ascorbic acid 500 milliGRAM(s) Oral daily  chlorhexidine 2% Cloths 1 Application(s) Topical <User Schedule>  cyanocobalamin 1000 MICROGram(s) Oral daily  enoxaparin Injectable 40 milliGRAM(s) SubCutaneous every 24 hours  folic acid 1 milliGRAM(s) Oral daily  influenza   Vaccine 0.5 milliLiter(s) IntraMuscular once  lidocaine   4% Patch 1 Patch Transdermal every 24 hours  melatonin 5 milliGRAM(s) Oral at bedtime  multivitamin 1 Tablet(s) Oral daily  pantoprazole    Tablet 40 milliGRAM(s) Oral before breakfast  polyethylene glycol 3350 17 Gram(s) Oral daily  QUEtiapine 75 milliGRAM(s) Oral <User Schedule>  senna 2 Tablet(s) Oral at bedtime  sodium chloride 3%  Inhalation 4 milliLiter(s) Inhalation every 6 hours  thiamine 100 milliGRAM(s) Oral daily    MEDICATIONS  (PRN):  calcium carbonate    500 mG (Tums) Chewable 1 Tablet(s) Chew every 6 hours PRN Heartburn  oxyCODONE    IR 5 milliGRAM(s) Oral every 4 hours PRN Moderate Pain (4 - 6)  oxyCODONE    IR 10 milliGRAM(s) Oral every 4 hours PRN Severe Pain (7 - 10)        LABS                                            8.6                   Neurophils% (auto):   x      (02-04 @ 06:53):    12.29)-----------(690          Lymphocytes% (auto):  x                                             26.3                   Eosinphils% (auto):   x        Manual%: Neutrophils x    ; Lymphocytes x    ; Eosinophils x    ; Bands%: x    ; Blasts x                                    137    |  104    |  5                   Calcium: 8.2   / iCa: x      (02-04 @ 06:48)    ----------------------------<  96        Magnesium: 2.0                              4.0     |  21     |  0.82             Phosphorous: 3.9      TPro  5.8    /  Alb  2.3    /  TBili  0.2    /  DBili  x      /  AST  21     /  ALT  23     /  AlkPhos  299    04 Feb 2024 06:48    ( 02-04 @ 06:57 )   PT: 11.9 sec;   INR: 1.08 ratio  aPTT: 31.1 sec

## 2024-02-05 LAB
ALBUMIN SERPL ELPH-MCNC: 2.6 G/DL — LOW (ref 3.3–5)
ALP SERPL-CCNC: 307 U/L — HIGH (ref 40–120)
ALT FLD-CCNC: 17 U/L — SIGNIFICANT CHANGE UP (ref 10–45)
ANION GAP SERPL CALC-SCNC: 11 MMOL/L — SIGNIFICANT CHANGE UP (ref 5–17)
AST SERPL-CCNC: 17 U/L — SIGNIFICANT CHANGE UP (ref 10–40)
BILIRUB SERPL-MCNC: 0.2 MG/DL — SIGNIFICANT CHANGE UP (ref 0.2–1.2)
BUN SERPL-MCNC: 6 MG/DL — LOW (ref 7–23)
CALCIUM SERPL-MCNC: 8.2 MG/DL — LOW (ref 8.4–10.5)
CHLORIDE SERPL-SCNC: 108 MMOL/L — SIGNIFICANT CHANGE UP (ref 96–108)
CO2 SERPL-SCNC: 21 MMOL/L — LOW (ref 22–31)
CREAT SERPL-MCNC: 0.83 MG/DL — SIGNIFICANT CHANGE UP (ref 0.5–1.3)
EGFR: 109 ML/MIN/1.73M2 — SIGNIFICANT CHANGE UP
GLUCOSE SERPL-MCNC: 87 MG/DL — SIGNIFICANT CHANGE UP (ref 70–99)
HCT VFR BLD CALC: 28 % — LOW (ref 39–50)
HGB BLD-MCNC: 8.9 G/DL — LOW (ref 13–17)
MAGNESIUM SERPL-MCNC: 1.8 MG/DL — SIGNIFICANT CHANGE UP (ref 1.6–2.6)
MCHC RBC-ENTMCNC: 27.1 PG — SIGNIFICANT CHANGE UP (ref 27–34)
MCHC RBC-ENTMCNC: 31.8 GM/DL — LOW (ref 32–36)
MCV RBC AUTO: 85.1 FL — SIGNIFICANT CHANGE UP (ref 80–100)
NRBC # BLD: 0 /100 WBCS — SIGNIFICANT CHANGE UP (ref 0–0)
PHOSPHATE SERPL-MCNC: 3.6 MG/DL — SIGNIFICANT CHANGE UP (ref 2.5–4.5)
PLATELET # BLD AUTO: 688 K/UL — HIGH (ref 150–400)
POTASSIUM SERPL-MCNC: 3.6 MMOL/L — SIGNIFICANT CHANGE UP (ref 3.5–5.3)
POTASSIUM SERPL-SCNC: 3.6 MMOL/L — SIGNIFICANT CHANGE UP (ref 3.5–5.3)
PROT SERPL-MCNC: 6 G/DL — SIGNIFICANT CHANGE UP (ref 6–8.3)
RBC # BLD: 3.29 M/UL — LOW (ref 4.2–5.8)
RBC # FLD: 16 % — HIGH (ref 10.3–14.5)
SODIUM SERPL-SCNC: 140 MMOL/L — SIGNIFICANT CHANGE UP (ref 135–145)
WBC # BLD: 12.13 K/UL — HIGH (ref 3.8–10.5)
WBC # FLD AUTO: 12.13 K/UL — HIGH (ref 3.8–10.5)

## 2024-02-05 PROCEDURE — 99232 SBSQ HOSP IP/OBS MODERATE 35: CPT

## 2024-02-05 PROCEDURE — 71045 X-RAY EXAM CHEST 1 VIEW: CPT | Mod: 26

## 2024-02-05 RX ORDER — POTASSIUM CHLORIDE 20 MEQ
40 PACKET (EA) ORAL ONCE
Refills: 0 | Status: COMPLETED | OUTPATIENT
Start: 2024-02-05 | End: 2024-02-05

## 2024-02-05 RX ORDER — MAGNESIUM SULFATE 500 MG/ML
1 VIAL (ML) INJECTION ONCE
Refills: 0 | Status: COMPLETED | OUTPATIENT
Start: 2024-02-05 | End: 2024-02-05

## 2024-02-05 RX ORDER — LIDOCAINE 4 G/100G
1 CREAM TOPICAL ONCE
Refills: 0 | Status: DISCONTINUED | OUTPATIENT
Start: 2024-02-05 | End: 2024-02-06

## 2024-02-05 RX ADMIN — Medication 500 MILLIGRAM(S): at 14:29

## 2024-02-05 RX ADMIN — Medication 500 MILLIGRAM(S): at 05:05

## 2024-02-05 RX ADMIN — Medication 5 MILLIGRAM(S): at 21:25

## 2024-02-05 RX ADMIN — Medication 3 MILLILITER(S): at 05:05

## 2024-02-05 RX ADMIN — Medication 3 MILLILITER(S): at 18:05

## 2024-02-05 RX ADMIN — QUETIAPINE FUMARATE 75 MILLIGRAM(S): 200 TABLET, FILM COATED ORAL at 21:25

## 2024-02-05 RX ADMIN — PREGABALIN 1000 MICROGRAM(S): 225 CAPSULE ORAL at 14:41

## 2024-02-05 RX ADMIN — ENOXAPARIN SODIUM 40 MILLIGRAM(S): 100 INJECTION SUBCUTANEOUS at 18:12

## 2024-02-05 RX ADMIN — Medication 500 MILLIGRAM(S): at 05:35

## 2024-02-05 RX ADMIN — Medication 1 MILLIGRAM(S): at 14:40

## 2024-02-05 RX ADMIN — Medication 500 MILLIGRAM(S): at 21:25

## 2024-02-05 RX ADMIN — SODIUM CHLORIDE 4 MILLILITER(S): 9 INJECTION INTRAMUSCULAR; INTRAVENOUS; SUBCUTANEOUS at 05:05

## 2024-02-05 RX ADMIN — Medication 100 GRAM(S): at 18:14

## 2024-02-05 RX ADMIN — Medication 100 MILLIGRAM(S): at 14:29

## 2024-02-05 RX ADMIN — Medication 1 TABLET(S): at 14:32

## 2024-02-05 RX ADMIN — Medication 500 MILLIGRAM(S): at 21:55

## 2024-02-05 RX ADMIN — SODIUM CHLORIDE 4 MILLILITER(S): 9 INJECTION INTRAMUSCULAR; INTRAVENOUS; SUBCUTANEOUS at 18:04

## 2024-02-05 RX ADMIN — Medication 40 MILLIEQUIVALENT(S): at 18:13

## 2024-02-05 NOTE — PROGRESS NOTE ADULT - ASSESSMENT
This is a  56yo M w pmhx of Hep C and possible EtOH use disorder who presents after being found down. Intubated for respiratory distress and agitation 1/8. Pt has displaced 4-11 L rib fx with flail segment.     1/9 VSS: NPO after MN. Plan for Rib plating WED 1/10/24  1/10 s/p Left VATS, with partial lung decortication  Thoracoscopic removal of intrapleural foreign body    1/11VSS intubated sedated on pressors  chest tube lws   1/12 VSS, pt still remains intubated/sedated, getting phenobarbital for agitation. Left chest tube output 75cc/24h placed on water seal this AM. 1U PRBC -H/H 7.1/20. Continue care per SICU team  1/13 Maintain L pleural tube waterseal Daily CXR Care as per SICU team  1/14 Left pleural tube removed CXR ordered; thoracic signed off  1/20 thoracic surgery reconsulted for left pleural effusion vs hemothorax; d/w Dr. Sepulveda; chest ct done- Dr. Sepulveda reviewed; call IR for potential drainage monday left pocket   1/21 VSS, pt still febrile tmax 103, WBC 8.4, IR consulted for drainage of complex left pleural effusion on CT Chest. Plan for Monday.   1/22 VSS; Tm 100.8 this am. WBC WNL. IR pending drainage of L effusion today   1/23 VSS, s/p left pigtail by IR yesterday, minimal drainage 20cc/24h. Recommend flushing pigtail and placing to LWS. Plan to do tPA MIST protocol tomorrow.   1/24     vss   min drainage from Lt  PTC  1/25 VSS left chest tube drainage 100/230  TPA today-   OR for  1/26 please type and screen Hibiclens  and NPO at midnight  hold  Lovenox   1/26 L Thoracotomy/Decort  3 chest tubes suction x 48 hours  1/27 L thoracotomy maintain 3 left pleural tubes to suction     Recommend fluid restrict for hyponatremia  1/28 VSSL thoracotomy maintain 3 left pleural tubes to suction    today \  1/29 VSS, maintain 3 CT to suction,  today   1/29 VSS, 2U PRBC yesterday for GIB, improving 8/23 chest tube output minimal maintain 3 chest tubes to LWS  1/30    lt side chest xray  opacification, room air SAT 94 percent   min drainage   from 3 chest tubes  serosanguinous  1/31 VSS  XRAY improved NPO for bronchoscopy today  m chest tubes minimal drainage LWS no air leak  all meals OOB  ambulate tid chest PT   2/1 VSS #2 left anterior chest tube d/c maintain left diaphragm #1 and left posterior #3 to water seal daily xray OOB  d/c pca   2/2  VSS ambulate tid all meals OOB chest tube water seal d/c posterior chest tube  2/3 VSS CT water seal.  CXR inc opac Left  2/4 VSS; lt ct d/c today; ck f/u chest xray post removal and in am; thoracotomy staples to be removed 2/9 2/5 CXR stable   Thoracic surgery signing off  Please reconsult if needed  [id number]

## 2024-02-05 NOTE — PROGRESS NOTE ADULT - PROBLEM SELECTOR PROBLEM 1
Hemothorax
Suspected fracture of rib on left side, closed
Hemothorax

## 2024-02-05 NOTE — PROVIDER CONTACT NOTE (OTHER) - ACTION/TREATMENT ORDERED:
MD stated team is aware of HR throughout day and to monitor pts VS throughout shift and let MD made aware of any changes. plan of care continues.

## 2024-02-05 NOTE — PROGRESS NOTE ADULT - ASSESSMENT
48yoM w/ PMHx hepatitis C and EtOH abuse was transferred from OSH on 1/6 for level 1 trauma eval. He was found down with multiple 4-11 left-sided rib fractures and flail chest, and had a chest tube placed at OSH. S/p VATS 1/10 w/ partial lung decortication and thoracoscopic removal of intrapleural foreign body. S/p chest tube removal 1/14, downgraded to floor 1/18, returned to SICU after fever 102.3 on 1/20- s/p L thoracotomy and decortication (1/26). Downgraded to floor 2/4.    Plan:  - PT rec- AR  - Pending PMNR   -Analgesia and antiemetics as needed  - Thoracic surgery recs  -Regular diet       Trauma Surgery #[id number]         48yoM w/ PMHx hepatitis C and EtOH abuse was transferred from OSH on 1/6 for level 1 trauma eval. He was found down with multiple 4-11 left-sided rib fractures and flail chest, and had a chest tube placed at OSH. S/p VATS 1/10 w/ partial lung decortication and thoracoscopic removal of intrapleural foreign body. S/p chest tube removal 1/14, downgraded to floor 1/18, returned to SICU after fever 102.3 on 1/20- s/p L thoracotomy and decortication (1/26). Downgraded to floor 2/4.    Plan:  - PT rec- AR  - Pending PMNR   - discuss with medicine concerning AC plan for discharge  -Analgesia and antiemetics as needed  - Thoracic surgery recs  -Regular diet       Trauma Surgery #[id number]         48yoM w/ PMHx hepatitis C and EtOH abuse was transferred from OSH on 1/6 for level 1 trauma eval. He was found down with multiple 4-11 left-sided rib fractures and flail chest, and had a chest tube placed at OSH. S/p VATS 1/10 w/ partial lung decortication and thoracoscopic removal of intrapleural foreign body. S/p chest tube removal 1/14, downgraded to floor 1/18, returned to SICU after fever 102.3 on 1/20- s/p L thoracotomy and decortication (1/26). Downgraded to floor 2/4.    Plan:  - PT rec- AR  - Pending PMNR   -Analgesia and antiemetics as needed  - Thoracic surgery recs  -Regular diet       Trauma Surgery #[id number]

## 2024-02-05 NOTE — PROVIDER CONTACT NOTE (OTHER) - ASSESSMENT
Patient is refusing to follow fluid restriction orders.
T 100.4 Oral    RR 41  02 96% on 2 l NC  /81
on assessment pt is showing no signs of distress. all other VSS. pts HR has been elevated throughout day shift.
pt refusing protonix and lidocaine, all VSS
Patient becoming extremely agitated. Patient trying to actively get out of bed. Patient is tachypenic and tachycardic at this time (RR:40-50s and HR is 120s). Patient received 2 doses of Ativan at this time.
Patient lying in bed complaining of chest pain, pt states "it feels like crushing pain on my sternum when I take a breath" Patient is tachypenic and tachycardic at this time but other vitals are within range
Patient presenting with right pupil 3mm and left pupil 2mm. Patient is alert and oriented x4. No focal deficits noted.
Pt agitated and restless, active bowel sounds
Pt febrile, sedated on propofol d/t restlessness.
Pt has a 102.3 fever taken orally, , /80. Patient resting comfortably in bed, pt noncompliant w/ care. Pt refusing PO Tylenol. Blood cultures and labs ordered pt refusing as per provider ok to draw labs in AM
/98 and , pt asymptomatic, denies chest pain or pain
denies pain
pt educated on importance of VS, still refusing. if pt wakes up throughout night RN will do a set of vitals on pt.
Patient is SOB, making a snoring sound with his breaths. Patient is also tachypneic and tachycardic (RR: 40-50s and HR in 120s). Patient states he is having trouble breathing.
Patient extremely agitated and pulling EEG off and actively trying to get out of bed
Pt remains agitated despite constant intervention with ordered PRNs. Additional medications phenobarbital and midazolam given per orders, pt unable to remain calm despite intervention. Tube feeds turned OFF while pt is gagging. Pt levitating in bed reaching for ETT.
pt sleeping  BP 84/50 map 60

## 2024-02-05 NOTE — PROGRESS NOTE ADULT - SUBJECTIVE AND OBJECTIVE BOX
[facility]-- WOUND TEAM -- FOLLOW UP NOTE  --------------------------------------------------------------------------------    24 hour events/subjective:          Diet:  Diet, Regular:   1500mL Fluid Restriction ([id number])  Supplement Feeding Modality:  Oral  Ensure Plus High Protein Cans or Servings Per Day:  1       Frequency:  Two Times a day (02-03-24 @ 09:27)      ROS: General/ SKIN/ MSK/ Neuro/ GI see HPI  all other systems negative  pt unable to offer    ALLERGIES & MEDICATIONS  --------------------------------------------------------------------------------  Allergies    No Known Allergies    Intolerances          STANDING INPATIENT MEDICATIONS    acetaminophen     Tablet .. 500 milliGRAM(s) Oral every 6 hours  albuterol/ipratropium for Nebulization 3 milliLiter(s) Nebulizer every 6 hours  ascorbic acid 500 milliGRAM(s) Oral daily  chlorhexidine 2% Cloths 1 Application(s) Topical <User Schedule>  cyanocobalamin 1000 MICROGram(s) Oral daily  enoxaparin Injectable 40 milliGRAM(s) SubCutaneous every 24 hours  folic acid 1 milliGRAM(s) Oral daily  influenza   Vaccine 0.5 milliLiter(s) IntraMuscular once  lidocaine   4% Patch 1 Patch Transdermal every 24 hours  lidocaine   4% Patch 1 Patch Transdermal once  magnesium sulfate  IVPB 1 Gram(s) IV Intermittent once  melatonin 5 milliGRAM(s) Oral at bedtime  multivitamin 1 Tablet(s) Oral daily  pantoprazole    Tablet 40 milliGRAM(s) Oral before breakfast  polyethylene glycol 3350 17 Gram(s) Oral daily  potassium chloride    Tablet ER 40 milliEquivalent(s) Oral once  QUEtiapine 75 milliGRAM(s) Oral <User Schedule>  senna 2 Tablet(s) Oral at bedtime  sodium chloride 3%  Inhalation 4 milliLiter(s) Inhalation every 6 hours  thiamine 100 milliGRAM(s) Oral daily      PRN INPATIENT MEDICATION  calcium carbonate    500 mG (Tums) Chewable 1 Tablet(s) Chew every 6 hours PRN  oxyCODONE    IR 5 milliGRAM(s) Oral every 4 hours PRN  oxyCODONE    IR 10 milliGRAM(s) Oral every 4 hours PRN        VITALS/PHYSICAL EXAM  --------------------------------------------------------------------------------  T(C): 36.2 (02-05-24 @ 16:01), Max: 37.3 (02-04-24 @ 21:27)  HR: 110 (02-05-24 @ 16:01) (100 - 118)  BP: 133/98 (02-05-24 @ 16:01) (133/98 - 155/106)  RR: 18 (02-05-24 @ 16:01) (16 - 30)  SpO2: 97% (02-05-24 @ 16:01) (95% - 100%)  Wt(kg): --        02-04-24 @ 07:01  -  02-05-24 @ 07:00  --------------------------------------------------------  IN: 1575 mL / OUT: 2600 mL / NET: -1025 mL    02-05-24 @ 07:01  -  02-05-24 @ 16:06  --------------------------------------------------------  IN: 440 mL / OUT: 1600 mL / NET: -1160 mL            LABS/ CULTURES/ RADIOLOGY:              8.9    12.13 >-----------<  688      [02-05-24 @ 07:26]              28.0     140  |  108  |  6   ----------------------------<  87      [02-05-24 @ 07:26]  3.6   |  21  |  0.83        Ca     8.2     [02-05-24 @ 07:26]      Mg     1.8     [02-05-24 @ 07:26]      Phos  3.6     [02-05-24 @ 07:26]    TPro  6.0  /  Alb  2.6  /  TBili  0.2  /  DBili  x   /  AST  17  /  ALT  17  /  AlkPhos  307  [02-05-24 @ 07:26]    PT/INR: PT 11.9 , INR 1.08       [02-04-24 @ 06:57]  PTT: 31.1       [02-04-24 @ 06:57]          CAPILLARY BLOOD GLUCOSE                           [facility]-- WOUND TEAM -- FOLLOW UP NOTE  --------------------------------------------------------------------------------    24 hour events/subjective:    afebrile  now on med/surg floor  tolerating po  more mobile  ambulated w/ assist  tolerating dressing no odor  dc planning for VAN    Diet:  Diet, Regular:   1500mL Fluid Restriction ([id number])  Supplement Feeding Modality:  Oral  Ensure Plus High Protein Cans or Servings Per Day:  1       Frequency:  Two Times a day (02-03-24 @ 09:27)      ROS: General/ SKIN/ MSK see HPI  all other systems negative      ALLERGIES & MEDICATIONS  --------------------------------------------------------------------------------  No Known Allergies      STANDING INPATIENT MEDICATIONS  acetaminophen  Tablet 500 milliGRAM(s) Oral every 6 hours  albuterol/ipratropium for Nebulization 3 milliLiter(s) Nebulizer every 6 hours  ascorbic acid 500 milliGRAM(s) Oral daily  chlorhexidine 2% Cloths 1 Application(s) Topical <User Schedule>  cyanocobalamin 1000 MICROGram(s) Oral daily  enoxaparin Injectable 40 milliGRAM(s) SubCutaneous every 24 hours  folic acid 1 milliGRAM(s) Oral daily  influenza   Vaccine 0.5 milliLiter(s) IntraMuscular once  lidocaine   4% Patch 1 Patch Transdermal every 24 hours  lidocaine   4% Patch 1 Patch Transdermal once  magnesium sulfate  IVPB 1 Gram(s) IV Intermittent once  melatonin 5 milliGRAM(s) Oral at bedtime  multivitamin 1 Tablet(s) Oral daily  pantoprazole    Tablet 40 milliGRAM(s) Oral before breakfast  polyethylene glycol 3350 17 Gram(s) Oral daily  potassium chloride    Tablet ER 40 milliEquivalent(s) Oral once  QUEtiapine 75 milliGRAM(s) Oral <User Schedule>  senna 2 Tablet(s) Oral at bedtime  sodium chloride 3%  Inhalation 4 milliLiter(s) Inhalation every 6 hours  thiamine 100 milliGRAM(s) Oral daily      PRN INPATIENT MEDICATION  calcium carbonate    500 mG (Tums) Chewable 1 Tablet(s) Chew every 6 hours PRN  oxyCODONE    IR 5 milliGRAM(s) Oral every 4 hours PRN  oxyCODONE    IR 10 milliGRAM(s) Oral every 4 hours PRN        VITALS/PHYSICAL EXAM  --------------------------------------------------------------------------------  T(C): 36.2 (02-05-24 @ 16:01), Max: 37.3 (02-04-24 @ 21:27)  HR: 110 (02-05-24 @ 16:01) (100 - 118)  BP: 133/98 (02-05-24 @ 16:01) (133/98 - 155/106)  RR: 18 (02-05-24 @ 16:01) (16 - 30)  SpO2: 97% (02-05-24 @ 16:01) (95% - 100%)  Wt(kg): --        NAD A&Ox3   Progressa bed  HEENT: NC/AT sclera clear moist mucous membranes, trachea midline  Neurology: verbal, following commands  Psych: calm  Musculoskeletal: FROM no contractures  Vascular: BLE edema & warmth equal   Skin:  moist w/ good turgor  right buttocks wound  one larger wound and 2 smaller 'satellite' wounds  moist granulation tissue, no necrotic skin,     6cm X  6cm x 0.1cm,    (+)serosanguinous drainage, no blistering  No odor, erythema, increased warmth, tenderness, induration, fluctuance nor crepitus        LABS/ CULTURES/ RADIOLOGY:              8.9    12.13 >-----------<  688      [02-05-24 @ 07:26]              28.0     140  |  108  |  6   ----------------------------<  87      [02-05-24 @ 07:26]  3.6   |  21  |  0.83        Ca     8.2     [02-05-24 @ 07:26]      Mg     1.8     [02-05-24 @ 07:26]      Phos  3.6     [02-05-24 @ 07:26]    TPro  6.0  /  Alb  2.6  /  TBili  0.2  /  DBili  x   /  AST  17  /  ALT  17  /  AlkPhos  307  [02-05-24 @ 07:26]    PT/INR: PT 11.9 , INR 1.08       [02-04-24 @ 06:57]  PTT: 31.1       [02-04-24 @ 06:57]

## 2024-02-05 NOTE — PROVIDER CONTACT NOTE (OTHER) - BACKGROUND
Patient admitted s/p fall with multiple L sided rib fractures. Patient on CIWA protocols
Pt admitted to 8ICU for chest tube placement, remains intubated and sedated for agitation during presumed alcohol withdrawal.
patient admitted s/p fall and L rib fractures. Patient also on CIWA precautions
See H&P
pt agitated and delirious  on precedex 1.5  given haldol 90 min prior
Patient is status post fall with multiple rib fractures.
pt s/p fall with chest tube placement
pt s/p fall
Patient admitted for multiple 4-11 L sided rib fractures s/p fall.
Patient is status post fall with multiple rib fractures.
pt s/p fall
s/p VATS, s/p FALL
Pt admitted s/p fall, 4-11 rib fx
Patient admitted s/p fall with multiple L sided rib fractures. Patient is on CIWA protocol
Pt on Yonathan, received @ dose rate of 0.8. Pt febrile, sedated on propofol

## 2024-02-05 NOTE — PROVIDER CONTACT NOTE (OTHER) - DATE AND TIME:
08-Jan-2024 01:10
11-Jan-2024 17:00
05-Feb-2024 06:19
08-Jan-2024 03:00
17-Jan-2024 03:15
05-Feb-2024 23:32
04-Feb-2024 19:49
03-Feb-2024 15:17
05-Feb-2024 20:22
08-Jan-2024 02:35
03-Feb-2024 19:30
03-Feb-2024 19:30
10-Dominic-2024 23:53
20-Jan-2024 00:24
13-Jan-2024 04:00
08-Jan-2024 02:25
15-Dominic-2024 05:03

## 2024-02-05 NOTE — PROGRESS NOTE ADULT - SUBJECTIVE AND OBJECTIVE BOX
SURGERY DAILY PROGRESS NOTE:     Overnight Events:  No acute events overnight.    SUBJECTIVE: Patient seen and evaluated on AM rounds. Pt is resting comfortably in bed with no complaints. Denies any shortness of breath    OBJECTIVE:  Vital Signs Last 24 Hrs  T(C): 37.1 (2024 05:35), Max: 37.3 (2024 21:27)  T(F): 98.8 (2024 05:35), Max: 99.2 (2024 21:27)  HR: 113 (2024 05:35) (100 - 120)  BP: 150/97 (2024 05:35) (105/76 - 165/105)  BP(mean): 120 (2024 18:00) (87 - 130)  RR: 18 (2024 05:35) (16 - 33)  SpO2: 95% (2024 05:35) (94% - 100%)    Parameters below as of 2024 05:35  Patient On (Oxygen Delivery Method): room air      I&O's Detail    2024 07:01  -  2024 07:00  --------------------------------------------------------  IN:    Oral Fluid: 1575 mL  Total IN: 1575 mL    OUT:    Blood Loss (mL): 0 mL    Voided (mL): 2600 mL  Total OUT: 2600 mL    Total NET: -1025 mL        Daily     Daily Weight in k (2024 12:00)    LABS:                        8.6    12.29 )-----------( 690      ( 2024 06:53 )             26.3     02-04    137  |  104  |  5<L>  ----------------------------<  96  4.0   |  21<L>  |  0.82    Ca    8.2<L>      2024 06:48  Phos  3.9     02-04  Mg     2.0     02-04    TPro  5.8<L>  /  Alb  2.3<L>  /  TBili  0.2  /  DBili  x   /  AST  21  /  ALT  23  /  AlkPhos  299<H>  -    PT/INR - ( 2024 06:57 )   PT: 11.9 sec;   INR: 1.08 ratio         PTT - ( 2024 06:57 )  PTT:31.1 sec  Urinalysis Basic - ( 2024 06:48 )    Color: x / Appearance: x / SG: x / pH: x  Gluc: 96 mg/dL / Ketone: x  / Bili: x / Urobili: x   Blood: x / Protein: x / Nitrite: x   Leuk Esterase: x / RBC: x / WBC x   Sq Epi: x / Non Sq Epi: x / Bacteria: x      PHYSICAL EXAM:  Gen: NAD, A&Ox3  Pulm: No respiratory distress, no subcostal retractions, all three chest tubes removed  CV: Sinus tachycardia, no JVD  Abd: Soft, NT, ND  Extremities: warm and well perfused

## 2024-02-05 NOTE — PROGRESS NOTE ADULT - PROBLEM SELECTOR PLAN 1
Suspected fracture of rib on left side, closed. Left Rib Fx 4-11th /Hemothorax  1/10 LT VATS, with partial lung decortication  Thoracoscopic removal of intrapleural foreign body     1/20 thoracic surgery reconsult f left pleural effusion vs hemothorax   1/22 s/p left pigtail by IR  1/26 Thoracotomy Decortication w/ washout>CT x 3 placed  2/1 #2 left anterior chest tube d/c   maintain left diaphragm #1 and left posterior #3 to water seal daily xray OOB  d/c pca   2/2 D/c  posterior #3 chest tube    all meals OOB ambulated  tid chest PT daily xray  2/3 poss dc remaining CT Sunday 2/4 ct d/c today; ck f/u chest xray post removal and in am; thoracotomy staples to be removed 2/9 2/5 CXR reviewed No further Thoracic needs>signing off reconsult if needed [id number]

## 2024-02-05 NOTE — PROGRESS NOTE ADULT - SUBJECTIVE AND OBJECTIVE BOX
Patient is a 48y old  Male who presents with a chief complaint of Trauma 1 activation (18 Jan 2024 09:31)    Patient resting in bed.  No c/os- comfortable other than mild side pain which is controlled on medications.  No SOB, no N/V.    MEDICATIONS  (STANDING):  acetaminophen     Tablet .. 500 milliGRAM(s) Oral every 6 hours  albuterol/ipratropium for Nebulization 3 milliLiter(s) Nebulizer every 6 hours  ascorbic acid 500 milliGRAM(s) Oral daily  chlorhexidine 2% Cloths 1 Application(s) Topical <User Schedule>  cyanocobalamin 1000 MICROGram(s) Oral daily  enoxaparin Injectable 40 milliGRAM(s) SubCutaneous every 24 hours  folic acid 1 milliGRAM(s) Oral daily  influenza   Vaccine 0.5 milliLiter(s) IntraMuscular once  lidocaine   4% Patch 1 Patch Transdermal every 24 hours  lidocaine   4% Patch 1 Patch Transdermal once  magnesium sulfate  IVPB 1 Gram(s) IV Intermittent once  melatonin 5 milliGRAM(s) Oral at bedtime  multivitamin 1 Tablet(s) Oral daily  pantoprazole    Tablet 40 milliGRAM(s) Oral before breakfast  polyethylene glycol 3350 17 Gram(s) Oral daily  potassium chloride    Tablet ER 40 milliEquivalent(s) Oral once  QUEtiapine 75 milliGRAM(s) Oral <User Schedule>  senna 2 Tablet(s) Oral at bedtime  sodium chloride 3%  Inhalation 4 milliLiter(s) Inhalation every 6 hours  thiamine 100 milliGRAM(s) Oral daily    MEDICATIONS  (PRN):  calcium carbonate    500 mG (Tums) Chewable 1 Tablet(s) Chew every 6 hours PRN Heartburn  oxyCODONE    IR 5 milliGRAM(s) Oral every 4 hours PRN Moderate Pain (4 - 6)  oxyCODONE    IR 10 milliGRAM(s) Oral every 4 hours PRN Severe Pain (7 - 10)    Vital Signs Last 24 Hrs  T(C): 36.7 (05 Feb 2024 13:03), Max: 37.3 (04 Feb 2024 21:27)  T(F): 98.1 (05 Feb 2024 13:03), Max: 99.2 (04 Feb 2024 21:27)  HR: 112 (05 Feb 2024 13:03) (100 - 118)  BP: 138/95 (05 Feb 2024 13:03) (105/76 - 155/106)  BP(mean): 120 (04 Feb 2024 18:00) (87 - 121)  RR: 18 (05 Feb 2024 13:03) (16 - 33)  SpO2: 97% (05 Feb 2024 13:03) (95% - 100%)    PHYSICAL EXAM  Constitutional - NAD, Comfortable  HEENT - NCAT, EOMI  Neck - Supple  Chest - No distress, no use of accessory muscles for respiration  Cardiovascular -Tachy, Well perfused  Abdomen - BS+, Soft, NTND  Extremities - No C/C/E, No calf tenderness   Neurologic Exam -                 AAO x 3  Speech clear/fluent/appropriate  Motor - non-focal 4/5 bl UE and LEs.  No clonus  Sensation intact  Psychiatric - Mood stable, Affect WNL                          8.9    12.13 )-----------( 688      ( 05 Feb 2024 07:26 )             28.0       02-05    140  |  108  |  6<L>  ----------------------------<  87  3.6   |  21<L>  |  0.83    Ca    8.2<L>      05 Feb 2024 07:26  Phos  3.6     02-05  Mg     1.8     02-05    TPro  6.0  /  Alb  2.6<L>  /  TBili  0.2  /  DBili  x   /  AST  17  /  ALT  17  /  AlkPhos  307<H>  02-05      Impression:  47 yo with functional deficits secondary to diagnosis of fall w multiple rib fxs, lumbar fxs    Plan:  PT- ROM, Bed Mob, Transfers, Amb w AD and bracing as needed  OT- ADLs, bracing  Prec- Falls, Cardiac, Pulm  Pain- Continue oxycodone and lidoderm  DVT Prophylaxis- Lovenox  Monitor HR, plts  Skin- Turn q2 h  Dispo- Acute Rehab- patient requires active and ongoing therapeutic interventions of multiple disciplines and can tolerate 3 hours of therapies x 4wks. Can actively participate and benefit from  an intensive rehabilitation program. Requires supervision by a rehabilitation physician and a coordinated interdisciplinary approach to providing rehabilitation.

## 2024-02-05 NOTE — PROVIDER CONTACT NOTE (OTHER) - RECOMMENDATIONS
Provider to bedside? Central line? Labs?
I recommended team to bedside
Notify provider
continue to monitor BP notify PA if sBP is greater than 180
start sammie   lower precedex gtt?
MD made aware.
MD dockery
MD made aware
I recommend team to bedside.
Keep tube feeds off while pt agitated. Cooling blanket. Additional sedation, change Precedex to propofol or ketamine. Add fentanyl gtt. Add vest restraint.
Stop tube feeds, recheck OG tube placement
cultures? IV tylenol
ILEANA Alvarez aware.
ILEANA Alvarez made aware.
MD dockery
Notify SICU providers
I recommended team to bedside to assess patient and his respiratory status. I had already started titrating precedex down due to respiratory status,

## 2024-02-05 NOTE — PROGRESS NOTE ADULT - SUBJECTIVE AND OBJECTIVE BOX
Subjective:   "Hello..those stitches itch"  Sitting up in bed      V/S  T(C): 36.3 (02-05-24 @ 09:27), Max: 37.3 (02-04-24 @ 21:27)  HR: 103 (02-05-24 @ 09:27) (100 - 120)  BP: 155/106 (02-05-24 @ 09:27) (105/76 - 155/106)  RR: 18 (02-05-24 @ 09:27) (16 - 33)  SpO2: 98% (02-05-24 @ 09:27) (95% - 100%)    I&O's Detail    04 Feb 2024 07:01  -  05 Feb 2024 07:00  --------------------------------------------------------  IN:    Oral Fluid: 1575 mL  Total IN: 1575 mL    OUT:    Blood Loss (mL): 0 mL    Voided (mL): 2600 mL  Total OUT: 2600 mL    Total NET: -1025 mL      05 Feb 2024 07:01  -  05 Feb 2024 12:27  --------------------------------------------------------  IN:  Total IN: 0 mL    OUT:    Voided (mL): 700 mL  Total OUT: 700 mL    Total NET: -700 mL          02-04-24 @ 07:01  -  02-05-24 @ 07:00  --------------------------------------------------------  IN: 1575 mL / OUT: 2600 mL / NET: -1025 mL    02-05-24 @ 07:01  -  02-05-24 @ 12:27  --------------------------------------------------------  IN: 0 mL / OUT: 700 mL / NET: -700 mL      MEDICATIONS  (STANDING):  acetaminophen     Tablet .. 500 milliGRAM(s) Oral every 6 hours  albuterol/ipratropium for Nebulization 3 milliLiter(s) Nebulizer every 6 hours  ascorbic acid 500 milliGRAM(s) Oral daily  chlorhexidine 2% Cloths 1 Application(s) Topical <User Schedule>  cyanocobalamin 1000 MICROGram(s) Oral daily  enoxaparin Injectable 40 milliGRAM(s) SubCutaneous every 24 hours  folic acid 1 milliGRAM(s) Oral daily  influenza   Vaccine 0.5 milliLiter(s) IntraMuscular once  lidocaine   4% Patch 1 Patch Transdermal once  lidocaine   4% Patch 1 Patch Transdermal every 24 hours  magnesium sulfate  IVPB 1 Gram(s) IV Intermittent once  melatonin 5 milliGRAM(s) Oral at bedtime  multivitamin 1 Tablet(s) Oral daily  pantoprazole    Tablet 40 milliGRAM(s) Oral before breakfast  polyethylene glycol 3350 17 Gram(s) Oral daily  potassium chloride    Tablet ER 40 milliEquivalent(s) Oral once  QUEtiapine 75 milliGRAM(s) Oral <User Schedule>  senna 2 Tablet(s) Oral at bedtime  sodium chloride 3%  Inhalation 4 milliLiter(s) Inhalation every 6 hours  thiamine 100 milliGRAM(s) Oral daily      02-05    140  |  108  |  6<L>  ----------------------------<  87  3.6   |  21<L>  |  0.83    Ca    8.2<L>      05 Feb 2024 07:26  Phos  3.6     02-05  Mg     1.8     02-05    TPro  6.0  /  Alb  2.6<L>  /  TBili  0.2  /  DBili  x   /  AST  17  /  ALT  17  /  AlkPhos  307<H>  02-05                               8.9    12.13 )-----------( 688      ( 05 Feb 2024 07:26 )             28.0        PT/INR - ( 04 Feb 2024 06:57 )   PT: 11.9 sec;   INR: 1.08 ratio         PTT - ( 04 Feb 2024 06:57 )  PTT:31.1 sec         CAPILLARY BLOOD GLUCOSE               Physical Exam:    Neurology: alert and oriented x 3, nonfocal, no gross deficits  Lungs: clear. lt thoracotomy site cdi lucina w/ staples >prolene sutures noted>no drainage  Abdomen: soft, nontender, nondistended, positive bowel sounds, + bowel movement   Neg N/V/D   :  pt voiding without difficulty   Extremities:   BIRD; neg LE edema, neg calf tenderness.   PPP bilaterally            PAST MEDICAL & SURGICAL HISTORY:  No pertinent past medical history      No significant past surgical history

## 2024-02-05 NOTE — PROGRESS NOTE ADULT - ASSESSMENT
48M with PMHx hepatitis C (diagnosed many years ago, never treated), depression transferred from outside hospital for trauma eval. Patient found down by roommate after unknown amount of time, found with multiple 4-11 L sided rib fractures with flail chest. Chest tube placed there with >1L output (old blood). Patient also with tachypnea, concern for respiratory failure started on BiPAP. Level 1 called for transient hypotension, patient given 2 unit PRBC in [facility] ED. Patient admitted to SICU for hemodynamic monitoring, pain management. S/p VATS 1/10 with partial lung decortication, chest tube removed 1/14. S/p chest tube placement 1/22 by IR for new left effusion, s/p L Thoracotomy/Decort 1/26 w/ 3 chest tubes to suction.    Wound consult requested to assist w/ management of  Right Buttocks wound    Recommend:  1.)  right buttock wound -wound improving -continue aquacel dressing daily  2.) Incontinence Management - incontinence cleanser, pads, pericare BID  3.) Maintain on an alternating air with low air loss surface  4.) Turn and reposition Q 2 hours  5.) Nutrition optimization - please add Tutu  6.) Offload heels/feet with complete cair air fluidized boots; ensure that the soles of the feet are not resting on the foot board of the bed.  Care as per surgery, will follow w/ you  Upon discharge f/u as outpatient at [facility] [address] [facility] [phone]  Soraya Coleman PA-C  I spent 35  minutes face to face w/ this pt of which more than 50% of the time was spent counseling & coordinating care of this pt.

## 2024-02-05 NOTE — PROVIDER CONTACT NOTE (OTHER) - NAME OF MD/NP/PA/DO NOTIFIED:
ILEANA Alvarez
ILEANA Handy
Renetta TEJEDA
Davida Fam
ILEANA Handy
MD Rojas George
danny pereyra
Trent Mcgill
danny pereyra
ILEANA Alvarez
rosibel Allan
ILEANA Handy
ILEANA Handy
ILEANA Gregory
ILEANA Handy
EMMA Gong
ILEANA Alvarez

## 2024-02-05 NOTE — PROVIDER CONTACT NOTE (OTHER) - REASON
Elevated 
pt refusing midnight VS.
Increased pressor requirement
Fluid restriction refusal
Patient extremely agitated and going through withdrawal
Pt RASS +2 to +3 for several hours, gagging when agitated, febrile to 39.8, almost pulled ETT 3x
Change in pupils
pt febrile with tachypnea and tachycardia
pt refusing protonix and lidocaine
/98 and 
patient SOB and tachypneic
Pt febrile
pts HR is elevated to 115.
OG tube feeds backing up into air vent
Patient extremely agitated
patient complaining of 8/10 crushing chest pain
pt is hypotensive

## 2024-02-06 ENCOUNTER — TRANSCRIPTION ENCOUNTER (OUTPATIENT)
Age: 49
End: 2024-02-06

## 2024-02-06 VITALS
RESPIRATION RATE: 18 BRPM | TEMPERATURE: 98 F | DIASTOLIC BLOOD PRESSURE: 95 MMHG | HEART RATE: 112 BPM | SYSTOLIC BLOOD PRESSURE: 137 MMHG | OXYGEN SATURATION: 97 %

## 2024-02-06 DIAGNOSIS — F10.11 ALCOHOL ABUSE, IN REMISSION: ICD-10-CM

## 2024-02-06 DIAGNOSIS — S22.42XA MULTIPLE FRACTURES OF RIBS, LEFT SIDE, INITIAL ENCOUNTER FOR CLOSED FRACTURE: ICD-10-CM

## 2024-02-06 DIAGNOSIS — R00.0 TACHYCARDIA, UNSPECIFIED: ICD-10-CM

## 2024-02-06 LAB
ANION GAP SERPL CALC-SCNC: 12 MMOL/L — SIGNIFICANT CHANGE UP (ref 5–17)
BUN SERPL-MCNC: 10 MG/DL — SIGNIFICANT CHANGE UP (ref 7–23)
CALCIUM SERPL-MCNC: 9.3 MG/DL — SIGNIFICANT CHANGE UP (ref 8.4–10.5)
CHLORIDE SERPL-SCNC: 106 MMOL/L — SIGNIFICANT CHANGE UP (ref 96–108)
CO2 SERPL-SCNC: 23 MMOL/L — SIGNIFICANT CHANGE UP (ref 22–31)
CREAT SERPL-MCNC: 0.94 MG/DL — SIGNIFICANT CHANGE UP (ref 0.5–1.3)
EGFR: 101 ML/MIN/1.73M2 — SIGNIFICANT CHANGE UP
GLUCOSE SERPL-MCNC: 105 MG/DL — HIGH (ref 70–99)
HCT VFR BLD CALC: 29.6 % — LOW (ref 39–50)
HGB BLD-MCNC: 9.5 G/DL — LOW (ref 13–17)
MAGNESIUM SERPL-MCNC: 2.1 MG/DL — SIGNIFICANT CHANGE UP (ref 1.6–2.6)
MCHC RBC-ENTMCNC: 27.2 PG — SIGNIFICANT CHANGE UP (ref 27–34)
MCHC RBC-ENTMCNC: 32.1 GM/DL — SIGNIFICANT CHANGE UP (ref 32–36)
MCV RBC AUTO: 84.8 FL — SIGNIFICANT CHANGE UP (ref 80–100)
NRBC # BLD: 0 /100 WBCS — SIGNIFICANT CHANGE UP (ref 0–0)
PHOSPHATE SERPL-MCNC: 4.2 MG/DL — SIGNIFICANT CHANGE UP (ref 2.5–4.5)
PLATELET # BLD AUTO: 673 K/UL — HIGH (ref 150–400)
POTASSIUM SERPL-MCNC: 4.3 MMOL/L — SIGNIFICANT CHANGE UP (ref 3.5–5.3)
POTASSIUM SERPL-SCNC: 4.3 MMOL/L — SIGNIFICANT CHANGE UP (ref 3.5–5.3)
RBC # BLD: 3.49 M/UL — LOW (ref 4.2–5.8)
RBC # FLD: 15.9 % — HIGH (ref 10.3–14.5)
SODIUM SERPL-SCNC: 141 MMOL/L — SIGNIFICANT CHANGE UP (ref 135–145)
WBC # BLD: 11.93 K/UL — HIGH (ref 3.8–10.5)
WBC # FLD AUTO: 11.93 K/UL — HIGH (ref 3.8–10.5)

## 2024-02-06 PROCEDURE — 94660 CPAP INITIATION&MGMT: CPT

## 2024-02-06 PROCEDURE — 71260 CT THORAX DX C+: CPT

## 2024-02-06 PROCEDURE — 82803 BLOOD GASES ANY COMBINATION: CPT

## 2024-02-06 PROCEDURE — 93971 EXTREMITY STUDY: CPT

## 2024-02-06 PROCEDURE — 80184 ASSAY OF PHENOBARBITAL: CPT

## 2024-02-06 PROCEDURE — 95711 VEEG 2-12 HR UNMONITORED: CPT

## 2024-02-06 PROCEDURE — C9399: CPT

## 2024-02-06 PROCEDURE — 87116 MYCOBACTERIA CULTURE: CPT

## 2024-02-06 PROCEDURE — 85018 HEMOGLOBIN: CPT

## 2024-02-06 PROCEDURE — 72170 X-RAY EXAM OF PELVIS: CPT

## 2024-02-06 PROCEDURE — 32557 INSERT CATH PLEURA W/ IMAGE: CPT

## 2024-02-06 PROCEDURE — 70450 CT HEAD/BRAIN W/O DYE: CPT | Mod: MA

## 2024-02-06 PROCEDURE — 0225U NFCT DS DNA&RNA 21 SARSCOV2: CPT

## 2024-02-06 PROCEDURE — 97605 NEG PRS WND THER DME<=50SQCM: CPT

## 2024-02-06 PROCEDURE — 82947 ASSAY GLUCOSE BLOOD QUANT: CPT

## 2024-02-06 PROCEDURE — 82565 ASSAY OF CREATININE: CPT

## 2024-02-06 PROCEDURE — 97165 OT EVAL LOW COMPLEX 30 MIN: CPT

## 2024-02-06 PROCEDURE — 97530 THERAPEUTIC ACTIVITIES: CPT

## 2024-02-06 PROCEDURE — 82962 GLUCOSE BLOOD TEST: CPT

## 2024-02-06 PROCEDURE — 97162 PT EVAL MOD COMPLEX 30 MIN: CPT

## 2024-02-06 PROCEDURE — 97116 GAIT TRAINING THERAPY: CPT

## 2024-02-06 PROCEDURE — 94640 AIRWAY INHALATION TREATMENT: CPT

## 2024-02-06 PROCEDURE — 76770 US EXAM ABDO BACK WALL COMP: CPT

## 2024-02-06 PROCEDURE — 85014 HEMATOCRIT: CPT

## 2024-02-06 PROCEDURE — 36415 COLL VENOUS BLD VENIPUNCTURE: CPT

## 2024-02-06 PROCEDURE — 88300 SURGICAL PATH GROSS: CPT

## 2024-02-06 PROCEDURE — 80202 ASSAY OF VANCOMYCIN: CPT

## 2024-02-06 PROCEDURE — 71250 CT THORAX DX C-: CPT | Mod: MA

## 2024-02-06 PROCEDURE — 97164 PT RE-EVAL EST PLAN CARE: CPT

## 2024-02-06 PROCEDURE — 82570 ASSAY OF URINE CREATININE: CPT

## 2024-02-06 PROCEDURE — 84540 ASSAY OF URINE/UREA-N: CPT

## 2024-02-06 PROCEDURE — 94799 UNLISTED PULMONARY SVC/PX: CPT

## 2024-02-06 PROCEDURE — 74177 CT ABD & PELVIS W/CONTRAST: CPT

## 2024-02-06 PROCEDURE — 85027 COMPLETE CBC AUTOMATED: CPT

## 2024-02-06 PROCEDURE — 85730 THROMBOPLASTIN TIME PARTIAL: CPT

## 2024-02-06 PROCEDURE — 93306 TTE W/DOPPLER COMPLETE: CPT | Mod: 26

## 2024-02-06 PROCEDURE — 87075 CULTR BACTERIA EXCEPT BLOOD: CPT

## 2024-02-06 PROCEDURE — 94003 VENT MGMT INPAT SUBQ DAY: CPT

## 2024-02-06 PROCEDURE — 85025 COMPLETE CBC W/AUTO DIFF WBC: CPT

## 2024-02-06 PROCEDURE — 80307 DRUG TEST PRSMV CHEM ANLYZR: CPT

## 2024-02-06 PROCEDURE — 83930 ASSAY OF BLOOD OSMOLALITY: CPT

## 2024-02-06 PROCEDURE — 84133 ASSAY OF URINE POTASSIUM: CPT

## 2024-02-06 PROCEDURE — 84295 ASSAY OF SERUM SODIUM: CPT

## 2024-02-06 PROCEDURE — 93970 EXTREMITY STUDY: CPT

## 2024-02-06 PROCEDURE — 81001 URINALYSIS AUTO W/SCOPE: CPT

## 2024-02-06 PROCEDURE — 36430 TRANSFUSION BLD/BLD COMPNT: CPT

## 2024-02-06 PROCEDURE — 82272 OCCULT BLD FECES 1-3 TESTS: CPT

## 2024-02-06 PROCEDURE — 86850 RBC ANTIBODY SCREEN: CPT

## 2024-02-06 PROCEDURE — 80053 COMPREHEN METABOLIC PANEL: CPT

## 2024-02-06 PROCEDURE — 87206 SMEAR FLUORESCENT/ACID STAI: CPT

## 2024-02-06 PROCEDURE — 74176 CT ABD & PELVIS W/O CONTRAST: CPT | Mod: MA

## 2024-02-06 PROCEDURE — 87015 SPECIMEN INFECT AGNT CONCNTJ: CPT

## 2024-02-06 PROCEDURE — 87040 BLOOD CULTURE FOR BACTERIA: CPT

## 2024-02-06 PROCEDURE — 81003 URINALYSIS AUTO W/O SCOPE: CPT

## 2024-02-06 PROCEDURE — P9016: CPT

## 2024-02-06 PROCEDURE — 84300 ASSAY OF URINE SODIUM: CPT

## 2024-02-06 PROCEDURE — 87150 DNA/RNA AMPLIFIED PROBE: CPT

## 2024-02-06 PROCEDURE — 87070 CULTURE OTHR SPECIMN AEROBIC: CPT

## 2024-02-06 PROCEDURE — 80048 BASIC METABOLIC PNL TOTAL CA: CPT

## 2024-02-06 PROCEDURE — 82330 ASSAY OF CALCIUM: CPT

## 2024-02-06 PROCEDURE — 83735 ASSAY OF MAGNESIUM: CPT

## 2024-02-06 PROCEDURE — 86901 BLOOD TYPING SEROLOGIC RH(D): CPT

## 2024-02-06 PROCEDURE — 72125 CT NECK SPINE W/O DYE: CPT | Mod: MA

## 2024-02-06 PROCEDURE — 99223 1ST HOSP IP/OBS HIGH 75: CPT

## 2024-02-06 PROCEDURE — 83690 ASSAY OF LIPASE: CPT

## 2024-02-06 PROCEDURE — C8929: CPT

## 2024-02-06 PROCEDURE — 97535 SELF CARE MNGMENT TRAINING: CPT

## 2024-02-06 PROCEDURE — C1889: CPT

## 2024-02-06 PROCEDURE — 76705 ECHO EXAM OF ABDOMEN: CPT

## 2024-02-06 PROCEDURE — 84100 ASSAY OF PHOSPHORUS: CPT

## 2024-02-06 PROCEDURE — 86900 BLOOD TYPING SEROLOGIC ABO: CPT

## 2024-02-06 PROCEDURE — 97602 WOUND(S) CARE NON-SELECTIVE: CPT

## 2024-02-06 PROCEDURE — 86923 COMPATIBILITY TEST ELECTRIC: CPT

## 2024-02-06 PROCEDURE — 87077 CULTURE AEROBIC IDENTIFY: CPT

## 2024-02-06 PROCEDURE — 93975 VASCULAR STUDY: CPT

## 2024-02-06 PROCEDURE — P9040: CPT

## 2024-02-06 PROCEDURE — 95714 VEEG EA 12-26 HR UNMNTR: CPT

## 2024-02-06 PROCEDURE — 93005 ELECTROCARDIOGRAM TRACING: CPT

## 2024-02-06 PROCEDURE — 95700 EEG CONT REC W/VID EEG TECH: CPT

## 2024-02-06 PROCEDURE — 82435 ASSAY OF BLOOD CHLORIDE: CPT

## 2024-02-06 PROCEDURE — 73620 X-RAY EXAM OF FOOT: CPT

## 2024-02-06 PROCEDURE — 99221 1ST HOSP IP/OBS SF/LOW 40: CPT

## 2024-02-06 PROCEDURE — 84132 ASSAY OF SERUM POTASSIUM: CPT

## 2024-02-06 PROCEDURE — 97110 THERAPEUTIC EXERCISES: CPT

## 2024-02-06 PROCEDURE — 87205 SMEAR GRAM STAIN: CPT

## 2024-02-06 PROCEDURE — 99291 CRITICAL CARE FIRST HOUR: CPT | Mod: 25

## 2024-02-06 PROCEDURE — 82550 ASSAY OF CK (CPK): CPT

## 2024-02-06 PROCEDURE — 71045 X-RAY EXAM CHEST 1 VIEW: CPT

## 2024-02-06 PROCEDURE — 83935 ASSAY OF URINE OSMOLALITY: CPT

## 2024-02-06 PROCEDURE — 87102 FUNGUS ISOLATION CULTURE: CPT

## 2024-02-06 PROCEDURE — 87641 MR-STAPH DNA AMP PROBE: CPT

## 2024-02-06 PROCEDURE — 87640 STAPH A DNA AMP PROBE: CPT

## 2024-02-06 PROCEDURE — 83605 ASSAY OF LACTIC ACID: CPT

## 2024-02-06 PROCEDURE — 84145 PROCALCITONIN (PCT): CPT

## 2024-02-06 PROCEDURE — 84156 ASSAY OF PROTEIN URINE: CPT

## 2024-02-06 PROCEDURE — 85610 PROTHROMBIN TIME: CPT

## 2024-02-06 RX ORDER — ASCORBIC ACID 60 MG
1 TABLET,CHEWABLE ORAL
Qty: 0 | Refills: 0 | DISCHARGE
Start: 2024-02-06

## 2024-02-06 RX ORDER — OXYCODONE HYDROCHLORIDE 5 MG/1
1 TABLET ORAL
Qty: 5 | Refills: 0
Start: 2024-02-06

## 2024-02-06 RX ORDER — THIAMINE MONONITRATE (VIT B1) 100 MG
1 TABLET ORAL
Qty: 0 | Refills: 0 | DISCHARGE
Start: 2024-02-06

## 2024-02-06 RX ORDER — ACETAMINOPHEN 500 MG
1 TABLET ORAL
Qty: 0 | Refills: 0 | DISCHARGE
Start: 2024-02-06

## 2024-02-06 RX ORDER — FOLIC ACID 0.8 MG
1 TABLET ORAL
Qty: 0 | Refills: 0 | DISCHARGE
Start: 2024-02-06

## 2024-02-06 RX ORDER — PREGABALIN 225 MG/1
1 CAPSULE ORAL
Qty: 0 | Refills: 0 | DISCHARGE
Start: 2024-02-06

## 2024-02-06 RX ORDER — QUETIAPINE FUMARATE 200 MG/1
3 TABLET, FILM COATED ORAL
Qty: 42 | Refills: 0
Start: 2024-02-06 | End: 2024-02-19

## 2024-02-06 RX ADMIN — Medication 500 MILLIGRAM(S): at 12:36

## 2024-02-06 RX ADMIN — OXYCODONE HYDROCHLORIDE 5 MILLIGRAM(S): 5 TABLET ORAL at 06:05

## 2024-02-06 RX ADMIN — Medication 1 TABLET(S): at 12:37

## 2024-02-06 RX ADMIN — LIDOCAINE 1 PATCH: 4 CREAM TOPICAL at 05:12

## 2024-02-06 RX ADMIN — SODIUM CHLORIDE 4 MILLILITER(S): 9 INJECTION INTRAMUSCULAR; INTRAVENOUS; SUBCUTANEOUS at 05:12

## 2024-02-06 RX ADMIN — Medication 100 MILLIGRAM(S): at 12:38

## 2024-02-06 RX ADMIN — OXYCODONE HYDROCHLORIDE 5 MILLIGRAM(S): 5 TABLET ORAL at 05:35

## 2024-02-06 RX ADMIN — Medication 1 MILLIGRAM(S): at 12:38

## 2024-02-06 RX ADMIN — PANTOPRAZOLE SODIUM 40 MILLIGRAM(S): 20 TABLET, DELAYED RELEASE ORAL at 05:12

## 2024-02-06 RX ADMIN — PREGABALIN 1000 MICROGRAM(S): 225 CAPSULE ORAL at 12:38

## 2024-02-06 RX ADMIN — Medication 3 MILLILITER(S): at 05:12

## 2024-02-06 NOTE — PROGRESS NOTE ADULT - NUTRITIONAL ASSESSMENT
This patient has been assessed with a concern for Malnutrition and has been determined to have a diagnosis/diagnoses of Severe protein-calorie malnutrition.    This patient is being managed with:   Diet NPO after Midnight-     NPO Start Date: 25-Jan-2024   NPO Start Time: 23:59  Except Medications  Entered: Jan 25 2024 10:18AM    Diet Regular-  Supplement Feeding Modality:  Oral  Ensure Plus High Protein Cans or Servings Per Day:  1       Frequency:  Two Times a day  Entered: Jan 23 2024  9:04AM  
This patient has been assessed with a concern for Malnutrition and has been determined to have a diagnosis/diagnoses of Severe protein-calorie malnutrition.    This patient is being managed with:   Diet NPO after Midnight-     NPO Start Date: 30-Jan-2024   NPO Start Time: 23:59  Except Medications  Entered: Jan 30 2024  2:23PM    Diet Regular-  Supplement Feeding Modality:  Oral  Ensure Plus High Protein Cans or Servings Per Day:  1       Frequency:  Two Times a day  Entered: Jan 26 2024  2:41PM  
This patient has been assessed with a concern for Malnutrition and has been determined to have a diagnosis/diagnoses of Severe protein-calorie malnutrition.    This patient is being managed with:   Diet NPO with Tube Feed-  Tube Feeding Modality: Orogastric  Liss[employer]ms Peptide 1.5  Total Volume for 24 Hours (mL): 1440  Continuous  Starting Tube Feed Rate {mL per Hour}: 60  Until Goal Tube Feed Rate (mL per Hour): 60  Tube Feed Duration (in Hours): 24  Tube Feed Start Time: 11:00  No Carb Prosource TF     Qty per Day:  1  Entered: Jan 11 2024 10:25AM  
This patient has been assessed with a concern for Malnutrition and has been determined to have a diagnosis/diagnoses of Severe protein-calorie malnutrition.    This patient is being managed with:   Diet NPO with Tube Feed-  Tube Feeding Modality: Orogastric  Lisseth Farms Peptide 1.5  Total Volume for 24 Hours (mL): 1440  Continuous  Starting Tube Feed Rate {mL per Hour}: 60  Until Goal Tube Feed Rate (mL per Hour): 60  Tube Feed Duration (in Hours): 24  Tube Feed Start Time: 11:00  No Carb Prosource TF     Qty per Day:  1  Entered: Jan 11 2024 10:25AM  
This patient has been assessed with a concern for Malnutrition and has been determined to have a diagnosis/diagnoses of Severe protein-calorie malnutrition.    This patient is being managed with:   Diet Regular-  1500mL Fluid Restriction ([id number])  Supplement Feeding Modality:  Oral  Ensure Plus High Protein Cans or Servings Per Day:  1       Frequency:  Two Times a day  Entered: Feb  3 2024  9:26AM  
This patient has been assessed with a concern for Malnutrition and has been determined to have a diagnosis/diagnoses of Severe protein-calorie malnutrition.    This patient is being managed with:   Diet Regular-  Entered: Jan 13 2024  1:18PM  
This patient has been assessed with a concern for Malnutrition and has been determined to have a diagnosis/diagnoses of Severe protein-calorie malnutrition.    This patient is being managed with:   Diet Regular-  Supplement Feeding Modality:  Oral  Ensure Plus High Protein Cans or Servings Per Day:  1       Frequency:  Two Times a day  Entered: Jan 26 2024  2:41PM  
This patient has been assessed with a concern for Malnutrition and has been determined to have a diagnosis/diagnoses of Severe protein-calorie malnutrition.    This patient is being managed with:   Diet Regular-  Supplement Feeding Modality:  Oral  Ensure Plus High Protein Cans or Servings Per Day:  1       Frequency:  Two Times a day  Entered: Jan 31 2024  1:14PM  
This patient has been assessed with a concern for Malnutrition and has been determined to have a diagnosis/diagnoses of Severe protein-calorie malnutrition.    This patient is being managed with:   Diet NPO with Tube Feed-  Tube Feeding Modality: Orogastric  Lisseth Farms Peptide 1.5  Total Volume for 24 Hours (mL): 1440  Continuous  Starting Tube Feed Rate {mL per Hour}: 60  Until Goal Tube Feed Rate (mL per Hour): 60  Tube Feed Duration (in Hours): 24  Tube Feed Start Time: 11:00  No Carb Prosource TF     Qty per Day:  1  Entered: Jan 11 2024 10:25AM  
This patient has been assessed with a concern for Malnutrition and has been determined to have a diagnosis/diagnoses of Severe protein-calorie malnutrition.    This patient is being managed with:   Diet Regular-  1500mL Fluid Restriction ([id number])  Supplement Feeding Modality:  Oral  Ensure Plus High Protein Cans or Servings Per Day:  1       Frequency:  Two Times a day  Entered: Feb  3 2024  9:26AM  
This patient has been assessed with a concern for Malnutrition and has been determined to have a diagnosis/diagnoses of Severe protein-calorie malnutrition.    This patient is being managed with:   Diet Regular-  Entered: Jan 13 2024  1:18PM  
This patient has been assessed with a concern for Malnutrition and has been determined to have a diagnosis/diagnoses of Severe protein-calorie malnutrition.    This patient is being managed with:   Diet Regular-  Supplement Feeding Modality:  Oral  Ensure Plus High Protein Cans or Servings Per Day:  1       Frequency:  Two Times a day  Entered: Jan 26 2024  2:41PM  
This patient has been assessed with a concern for Malnutrition and has been determined to have a diagnosis/diagnoses of Severe protein-calorie malnutrition.    This patient is being managed with:   Diet Regular-  Supplement Feeding Modality:  Oral  Ensure Plus High Protein Cans or Servings Per Day:  1       Frequency:  Two Times a day  Entered: Jan 26 2024  2:41PM  
This patient has been assessed with a concern for Malnutrition and has been determined to have a diagnosis/diagnoses of Severe protein-calorie malnutrition.    This patient is being managed with:   Diet NPO after Midnight-     NPO Start Date: 21-Jan-2024   NPO Start Time: 23:59  Entered: Jan 21 2024 11:08AM    Diet Regular-  Entered: Jan 13 2024  1:18PM  
This patient has been assessed with a concern for Malnutrition and has been determined to have a diagnosis/diagnoses of Severe protein-calorie malnutrition.    This patient is being managed with:   Diet NPO after Midnight-     NPO Start Date: 21-Jan-2024   NPO Start Time: 23:59  Entered: Jan 21 2024 11:08AM    Diet Regular-  Entered: Jan 13 2024  1:18PM  
This patient has been assessed with a concern for Malnutrition and has been determined to have a diagnosis/diagnoses of Severe protein-calorie malnutrition.    This patient is being managed with:   Diet Regular-  1500mL Fluid Restriction ([id number])  Supplement Feeding Modality:  Oral  Ensure Plus High Protein Cans or Servings Per Day:  1       Frequency:  Two Times a day  Entered: Feb  3 2024  9:26AM  
This patient has been assessed with a concern for Malnutrition and has been determined to have a diagnosis/diagnoses of Severe protein-calorie malnutrition.    This patient is being managed with:   Diet Regular-  Entered: Jan 13 2024  1:18PM  
This patient has been assessed with a concern for Malnutrition and has been determined to have a diagnosis/diagnoses of Severe protein-calorie malnutrition.    This patient is being managed with:   Diet Regular-  Supplement Feeding Modality:  Oral  Ensure Plus High Protein Cans or Servings Per Day:  1       Frequency:  Two Times a day  Entered: Jan 26 2024  2:41PM  
This patient has been assessed with a concern for Malnutrition and has been determined to have a diagnosis/diagnoses of Severe protein-calorie malnutrition.    This patient is being managed with:   Diet Regular-  Supplement Feeding Modality:  Oral  Ensure Plus High Protein Cans or Servings Per Day:  1       Frequency:  Two Times a day  Entered: Jan 26 2024  2:41PM  
This patient has been assessed with a concern for Malnutrition and has been determined to have a diagnosis/diagnoses of Severe protein-calorie malnutrition.    This patient is being managed with:   Diet Regular-  Supplement Feeding Modality:  Oral  Ensure Plus High Protein Cans or Servings Per Day:  1       Frequency:  Two Times a day  Entered: Jan 31 2024  1:14PM  
This patient has been assessed with a concern for Malnutrition and has been determined to have a diagnosis/diagnoses of Severe protein-calorie malnutrition.    This patient is being managed with:   Diet Regular-  Supplement Feeding Modality:  Oral  Ensure Plus High Protein Cans or Servings Per Day:  1       Frequency:  Two Times a day  Entered: Jan 31 2024  1:14PM  
This patient has been assessed with a concern for Malnutrition and has been determined to have a diagnosis/diagnoses of Severe protein-calorie malnutrition.    This patient is being managed with:   Diet NPO after Midnight-     NPO Start Date: 21-Jan-2024   NPO Start Time: 23:59  Entered: Jan 21 2024 11:08AM    Diet Regular-  Entered: Jan 13 2024  1:18PM  
This patient has been assessed with a concern for Malnutrition and has been determined to have a diagnosis/diagnoses of Severe protein-calorie malnutrition.    This patient is being managed with:   Diet NPO after Midnight-     NPO Start Date: 21-Jan-2024   NPO Start Time: 23:59  Entered: Jan 21 2024 11:08AM    Diet Regular-  Entered: Jan 13 2024  1:18PM  
This patient has been assessed with a concern for Malnutrition and has been determined to have a diagnosis/diagnoses of Severe protein-calorie malnutrition.    This patient is being managed with:   Diet NPO after Midnight-     NPO Start Date: 25-Jan-2024   NPO Start Time: 23:59  Except Medications  Entered: Jan 25 2024 10:18AM    Diet Regular-  Supplement Feeding Modality:  Oral  Ensure Plus High Protein Cans or Servings Per Day:  1       Frequency:  Two Times a day  Entered: Jan 23 2024  9:04AM  
This patient has been assessed with a concern for Malnutrition and has been determined to have a diagnosis/diagnoses of Severe protein-calorie malnutrition.    This patient is being managed with:   Diet NPO after Midnight-     NPO Start Date: 30-Jan-2024   NPO Start Time: 23:59  Except Medications  Entered: Jan 30 2024  2:23PM    Diet Regular-  Supplement Feeding Modality:  Oral  Ensure Plus High Protein Cans or Servings Per Day:  1       Frequency:  Two Times a day  Entered: Jan 26 2024  2:41PM  
This patient has been assessed with a concern for Malnutrition and has been determined to have a diagnosis/diagnoses of Severe protein-calorie malnutrition.    This patient is being managed with:   Diet Regular-  1500mL Fluid Restriction ([id number])  Supplement Feeding Modality:  Oral  Ensure Plus High Protein Cans or Servings Per Day:  1       Frequency:  Two Times a day  Entered: Feb  3 2024  9:26AM  
This patient has been assessed with a concern for Malnutrition and has been determined to have a diagnosis/diagnoses of Severe protein-calorie malnutrition.    This patient is being managed with:   Diet Regular-  1500mL Fluid Restriction ([id number])  Supplement Feeding Modality:  Oral  Ensure Plus High Protein Cans or Servings Per Day:  1       Frequency:  Two Times a day  Entered: Feb  3 2024  9:26AM  
This patient has been assessed with a concern for Malnutrition and has been determined to have a diagnosis/diagnoses of Severe protein-calorie malnutrition.    This patient is being managed with:   Diet Regular-  1500mL Fluid Restriction ([id number])  Supplement Feeding Modality:  Oral  Ensure Plus High Protein Cans or Servings Per Day:  1       Frequency:  Two Times a day  Entered: Feb  3 2024  9:26AM  
This patient has been assessed with a concern for Malnutrition and has been determined to have a diagnosis/diagnoses of Severe protein-calorie malnutrition.    This patient is being managed with:   Diet Regular-  Supplement Feeding Modality:  Oral  Ensure Plus High Protein Cans or Servings Per Day:  1       Frequency:  Two Times a day  Entered: Jan 26 2024  2:41PM  
This patient has been assessed with a concern for Malnutrition and has been determined to have a diagnosis/diagnoses of Severe protein-calorie malnutrition.    This patient is being managed with:   Diet Regular-  Supplement Feeding Modality:  Oral  Ensure Plus High Protein Cans or Servings Per Day:  1       Frequency:  Two Times a day  Entered: Jan 26 2024  2:41PM  
This patient has been assessed with a concern for Malnutrition and has been determined to have a diagnosis/diagnoses of Severe protein-calorie malnutrition.    This patient is being managed with:   Diet Regular-  Supplement Feeding Modality:  Oral  Ensure Plus High Protein Cans or Servings Per Day:  1       Frequency:  Two Times a day  Entered: Jan 31 2024  1:14PM  
This patient has been assessed with a concern for Malnutrition and has been determined to have a diagnosis/diagnoses of Severe protein-calorie malnutrition.    This patient is being managed with:   Diet NPO after Midnight-     NPO Start Date: 21-Jan-2024   NPO Start Time: 23:59  Entered: Jan 21 2024 11:08AM    Diet Regular-  Entered: Jan 13 2024  1:18PM  
This patient has been assessed with a concern for Malnutrition and has been determined to have a diagnosis/diagnoses of Severe protein-calorie malnutrition.    This patient is being managed with:   Diet NPO after Midnight-     NPO Start Date: 21-Jan-2024   NPO Start Time: 23:59  Entered: Jan 21 2024 11:08AM    Diet Regular-  Entered: Jan 13 2024  1:18PM  
This patient has been assessed with a concern for Malnutrition and has been determined to have a diagnosis/diagnoses of Severe protein-calorie malnutrition.    This patient is being managed with:   Diet NPO after Midnight-     NPO Start Date: 30-Jan-2024   NPO Start Time: 23:59  Except Medications  Entered: Jan 30 2024  2:23PM    Diet Regular-  Supplement Feeding Modality:  Oral  Ensure Plus High Protein Cans or Servings Per Day:  1       Frequency:  Two Times a day  Entered: Jan 26 2024  2:41PM  
This patient has been assessed with a concern for Malnutrition and has been determined to have a diagnosis/diagnoses of Severe protein-calorie malnutrition.    This patient is being managed with:   Diet Regular-  Entered: Jan 13 2024  1:18PM  
This patient has been assessed with a concern for Malnutrition and has been determined to have a diagnosis/diagnoses of Severe protein-calorie malnutrition.    This patient is being managed with:   Diet Regular-  Supplement Feeding Modality:  Oral  Ensure Plus High Protein Cans or Servings Per Day:  1       Frequency:  Two Times a day  Entered: Jan 23 2024  9:04AM  
This patient has been assessed with a concern for Malnutrition and has been determined to have a diagnosis/diagnoses of Severe protein-calorie malnutrition.    This patient is being managed with:   Diet Regular-  Supplement Feeding Modality:  Oral  Ensure Plus High Protein Cans or Servings Per Day:  1       Frequency:  Two Times a day  Entered: Jan 23 2024  9:04AM    This patient has been assessed with a concern for Malnutrition and has been determined to have a diagnosis/diagnoses of Severe protein-calorie malnutrition.    This patient is being managed with:   Diet Regular-  Supplement Feeding Modality:  Oral  Ensure Plus High Protein Cans or Servings Per Day:  1       Frequency:  Two Times a day  Entered: Jan 23 2024  9:04AM  
This patient has been assessed with a concern for Malnutrition and has been determined to have a diagnosis/diagnoses of Severe protein-calorie malnutrition.    This patient is being managed with:   Diet Regular-  Supplement Feeding Modality:  Oral  Ensure Plus High Protein Cans or Servings Per Day:  1       Frequency:  Two Times a day  Entered: Jan 31 2024  1:14PM  
This patient has been assessed with a concern for Malnutrition and has been determined to have a diagnosis/diagnoses of Severe protein-calorie malnutrition.    This patient is being managed with:   Diet Regular-  1500mL Fluid Restriction ([id number])  Supplement Feeding Modality:  Oral  Ensure Plus High Protein Cans or Servings Per Day:  1       Frequency:  Two Times a day  Entered: Feb  3 2024  9:26AM  
This patient has been assessed with a concern for Malnutrition and has been determined to have a diagnosis/diagnoses of Severe protein-calorie malnutrition.    This patient is being managed with:   Diet Regular-  Supplement Feeding Modality:  Oral  Ensure Plus High Protein Cans or Servings Per Day:  1       Frequency:  Two Times a day  Entered: Jan 26 2024  2:41PM  
This patient has been assessed with a concern for Malnutrition and has been determined to have a diagnosis/diagnoses of Severe protein-calorie malnutrition.    This patient is being managed with:   Diet NPO with Tube Feed-  Tube Feeding Modality: Orogastric  Lisseth Farms Peptide 1.5  Total Volume for 24 Hours (mL): 1440  Continuous  Starting Tube Feed Rate {mL per Hour}: 60  Until Goal Tube Feed Rate (mL per Hour): 60  Tube Feed Duration (in Hours): 24  Tube Feed Start Time: 11:00  No Carb Prosource TF     Qty per Day:  1  Entered: Jan 11 2024 10:25AM  
This patient has been assessed with a concern for Malnutrition and has been determined to have a diagnosis/diagnoses of Severe protein-calorie malnutrition.    This patient is being managed with:   Diet Regular-  Entered: Jan 13 2024  1:18PM  
This patient has been assessed with a concern for Malnutrition and has been determined to have a diagnosis/diagnoses of Severe protein-calorie malnutrition.    This patient is being managed with:   Diet Regular-  Entered: Jan 13 2024  1:18PM  
This patient has been assessed with a concern for Malnutrition and has been determined to have a diagnosis/diagnoses of Severe protein-calorie malnutrition.    This patient is being managed with:   Diet Regular-  Supplement Feeding Modality:  Oral  Ensure Plus High Protein Cans or Servings Per Day:  1       Frequency:  Two Times a day  Entered: Jan 26 2024  2:41PM  
This patient has been assessed with a concern for Malnutrition and has been determined to have a diagnosis/diagnoses of Severe protein-calorie malnutrition.    This patient is being managed with:   Diet Regular-  Supplement Feeding Modality:  Oral  Ensure Plus High Protein Cans or Servings Per Day:  1       Frequency:  Two Times a day  Entered: Jan 26 2024  2:41PM  
This patient has been assessed with a concern for Malnutrition and has been determined to have a diagnosis/diagnoses of Severe protein-calorie malnutrition.    This patient is being managed with:   Diet NPO after Midnight-     NPO Start Date: 21-Jan-2024   NPO Start Time: 23:59  Entered: Jan 21 2024 11:08AM    Diet Regular-  Entered: Jan 13 2024  1:18PM  
This patient has been assessed with a concern for Malnutrition and has been determined to have a diagnosis/diagnoses of Severe protein-calorie malnutrition.    This patient is being managed with:   Diet Regular-  Entered: Jan 13 2024  1:18PM  
This patient has been assessed with a concern for Malnutrition and has been determined to have a diagnosis/diagnoses of Severe protein-calorie malnutrition.    This patient is being managed with:   Diet Regular-  Supplement Feeding Modality:  Oral  Ensure Plus High Protein Cans or Servings Per Day:  1       Frequency:  Two Times a day  Entered: Jan 26 2024  2:41PM  
This patient has been assessed with a concern for Malnutrition and has been determined to have a diagnosis/diagnoses of Severe protein-calorie malnutrition.    This patient is being managed with:   Diet NPO with Tube Feed-  Tube Feeding Modality: Orogastric  Lisseth Farms Peptide 1.5  Total Volume for 24 Hours (mL): 1440  Continuous  Starting Tube Feed Rate {mL per Hour}: 60  Until Goal Tube Feed Rate (mL per Hour): 60  Tube Feed Duration (in Hours): 24  Tube Feed Start Time: 11:00  No Carb Prosource TF     Qty per Day:  1  Entered: Jan 11 2024 10:25AM

## 2024-02-06 NOTE — PROVIDER CONTACT NOTE (MEDICATION) - ASSESSMENT
pt educated on importance of medication, still refusing. pt doesn't want to be woken up.
Pt was scheduled for Zosyn and Lovenox when trying to administer meds he refused - pt educated that Zosyn is an IV medication and goes through the IV and pt still refused med. Pt stated that he does not have an infection therefore he does not need abx. Pt also stated he does not want to be stuck with needles anymore. Pt A&Ox4. Resting comfortably in bed.
pt refusing tylenol

## 2024-02-06 NOTE — CONSULT NOTE ADULT - CONSULT REQUESTED DATE/TIME
18-Jan-2024 11:14
07-Jan-2024 19:33
21-Jan-2024 08:59
27-Jan-2024 14:23
31-Jan-2024
10-Dominic-2024 12:36
12-Jan-2024 10:51
08-Jan-2024 13:52
06-Jan-2024 19:57
06-Feb-2022
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
## 2024-02-06 NOTE — DISCHARGE NOTE NURSING/CASE MANAGEMENT/SOCIAL WORK - PATIENT PORTAL LINK FT
You can access the FollowMyHealth Patient Portal offered by [facility] by registering at the following website: [email or web address]. By joining [facility]’s FollowMyHealth portal, you will also be able to view your health information using other applications (apps) compatible with our system.
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
## 2024-02-06 NOTE — CHART NOTE - NSCHARTNOTESELECT_GEN_ALL_CORE
Event Note
Event Note
Neurology
Nutrition Services
Nutrition Services
Post-procedure note/Event Note
Tertiary
Thoracic/Off Service Note
Chart note/Event Note
Event Note
NEPHROLOGY/Off Service Note
Nutrition Services
Nutrition Services
Post-op check/Event Note
Transfer Note
Transfer Note
Orthopedics/Event Note

## 2024-02-06 NOTE — CONSULT NOTE ADULT - SUBJECTIVE AND OBJECTIVE BOX
HPI:  48M w/ PMHx hepatitis C and EtOH abuse was transferred from OSH on 1/6 for level 1 trauma eval. He was found down with multiple 4-11 left-sided rib fractures and flail chest, and had a chest tube placed at OSH. S/p VATS 1/10 w/ partial lung decortication and thoracoscopic removal of intrapleural foreign body. S/p chest tube removal 1/14, downgraded to floor 1/18, returned to SICU after fever 102.3F on 1/20- s/p L thoracotomy and decortication (1/26). Downgraded to floor 2/4.  Medicine consult called for persistent tachycardia.  Patient denies fevers/chills, lightheadedness/dizziness, palpitations, SOB/chest pain,  nausea/vomiting,  constipation/diarrhea.             PAST MEDICAL & SURGICAL HISTORY:  No pertinent past medical history      No significant past surgical history          Review of Systems:   CONSTITUTIONAL: No fever, weight loss, or fatigue  RESPIRATORY: No cough, wheezing, chills or hemoptysis; No shortness of breath  CARDIOVASCULAR: No chest pain, palpitations, dizziness, or leg swelling  GASTROINTESTINAL: No abdominal or epigastric pain. No nausea, vomiting, or hematemesis; No diarrhea or constipation. No melena or hematochezia.  GENITOURINARY: No dysuria, frequency, hematuria, or incontinence  NEUROLOGICAL: No headaches, memory loss, loss of strength, numbness, or tremors  SKIN: No itching, burning, rashes, or lesions   MUSCULOSKELETAL: No joint pain or swelling; No muscle, back, or extremity pain  PSYCHIATRIC: No depression, anxiety, mood swings, or difficulty sleeping  HEME/LYMPH: No easy bruising, or bleeding gums    Allergies    No Known Allergies    Intolerances      Social History: h/o Etoh abuse, denies smoking    FAMILY HISTORY:  No pertinent family history in first degree relatives        MEDICATIONS  (STANDING):  acetaminophen     Tablet .. 500 milliGRAM(s) Oral every 6 hours  ascorbic acid 500 milliGRAM(s) Oral daily  chlorhexidine 2% Cloths 1 Application(s) Topical <User Schedule>  cyanocobalamin 1000 MICROGram(s) Oral daily  enoxaparin Injectable 40 milliGRAM(s) SubCutaneous every 24 hours  folic acid 1 milliGRAM(s) Oral daily  influenza   Vaccine 0.5 milliLiter(s) IntraMuscular once  lidocaine   4% Patch 1 Patch Transdermal once  lidocaine   4% Patch 1 Patch Transdermal every 24 hours  melatonin 5 milliGRAM(s) Oral at bedtime  multivitamin 1 Tablet(s) Oral daily  pantoprazole    Tablet 40 milliGRAM(s) Oral before breakfast  polyethylene glycol 3350 17 Gram(s) Oral daily  QUEtiapine 75 milliGRAM(s) Oral <User Schedule>  senna 2 Tablet(s) Oral at bedtime  thiamine 100 milliGRAM(s) Oral daily    MEDICATIONS  (PRN):  calcium carbonate    500 mG (Tums) Chewable 1 Tablet(s) Chew every 6 hours PRN Heartburn  oxyCODONE    IR 5 milliGRAM(s) Oral every 4 hours PRN Moderate Pain (4 - 6)  oxyCODONE    IR 10 milliGRAM(s) Oral every 4 hours PRN Severe Pain (7 - 10)      Vital Signs Last 24 Hrs  T(C): 36.6 (06 Feb 2024 09:07), Max: 37 (05 Feb 2024 21:00)  T(F): 97.8 (06 Feb 2024 09:07), Max: 98.6 (05 Feb 2024 21:00)  HR: 110 (06 Feb 2024 09:07) (109 - 115)  BP: 126/82 (06 Feb 2024 09:07) (126/82 - 144/93)  BP(mean): --  RR: 18 (06 Feb 2024 09:07) (18 - 18)  SpO2: 96% (06 Feb 2024 09:07) (95% - 98%)    Parameters below as of 06 Feb 2024 09:07  Patient On (Oxygen Delivery Method): room air      CAPILLARY BLOOD GLUCOSE        I&O's Summary    05 Feb 2024 07:01  -  06 Feb 2024 07:00  --------------------------------------------------------  IN: 440 mL / OUT: 3550 mL / NET: -3110 mL    06 Feb 2024 07:01  -  06 Feb 2024 12:41  --------------------------------------------------------  IN: 0 mL / OUT: 650 mL / NET: -650 mL        PHYSICAL EXAM:  GENERAL: NAD, well-groomed  HEAD:  Atraumatic, Normocephalic  EYES:  conjunctiva and sclera clear  NECK: Supple, No JVD  CHEST/LUNG: Clear to auscultation bilaterally; No wheeze  HEART: Regular rate and rhythm; No murmurs, rubs, or gallops  ABDOMEN: Soft, Nontender, Nondistended; Bowel sounds present  EXTREMITIES:  2+ Peripheral Pulses, No clubbing, cyanosis, or edema  PSYCH: AAOx3  NEUROLOGY: non-focal  SKIN: No rashes or lesions    LABS:                        9.5    11.93 )-----------( 673      ( 06 Feb 2024 08:31 )             29.6     02-06    141  |  106  |  10  ----------------------------<  105<H>  4.3   |  23  |  0.94    Ca    9.3      06 Feb 2024 08:31  Phos  4.2     02-06  Mg     2.1     02-06    TPro  6.0  /  Alb  2.6<L>  /  TBili  0.2  /  DBili  x   /  AST  17  /  ALT  17  /  AlkPhos  307<H>  02-05          Urinalysis Basic - ( 06 Feb 2024 08:31 )    Color: x / Appearance: x / SG: x / pH: x  Gluc: 105 mg/dL / Ketone: x  / Bili: x / Urobili: x   Blood: x / Protein: x / Nitrite: x   Leuk Esterase: x / RBC: x / WBC x   Sq Epi: x / Non Sq Epi: x / Bacteria: x        RADIOLOGY & ADDITIONAL TESTS:    Imaging Personally Reviewed:  EKGs reviewed since admission : sinus tachycardia 101- 130s bpm

## 2024-02-06 NOTE — CHART NOTE - NSCHARTNOTEFT_GEN_A_CORE
Nutrition Follow Up Note  Patient seen for: nutrition follow-up     Chart reviewed, events noted.    Source: [] Patient       [x] EMR        [] RN        [] Family at bedside       [] Other:    -If unable to interview patient: [] Trach/Vent/BiPAP  [] Disoriented/confused/inappropriate to interview    Diet Order:   Diet, Regular:   1500mL Fluid Restriction ([id number])  Supplement Feeding Modality:  Oral  Ensure Plus High Protein Cans or Servings Per Day:  1       Frequency:  Two Times a day (02-03-24)    - Is current order appropriate/adequate? [x] Yes  []  No:     - PO intake :   [] >75%  Adequate    [x] 50-75%  Fair       [] <50%  Poor    - Nutrition-related concerns:  -pt reports improvement in PO intake, consuming ~75% of meals. States intake does vary depending on preference to meal.   -pt calling for meals to optimize preference to meals. Offered to obtain additional food preference but pt declining.   -drinking Ensure Shakes to supplement PO intake.     GI:  Last BM _2/4__.   Bowel Regimen? [x] Yes   [] No      Weights:   202lbs (1/6) --bed weight (dosing)  standing weights: 185.4lbs (2/3), 180.7lbs (2/4)    Nutritionally Pertinent MEDICATIONS  (STANDING):  ascorbic acid  cyanocobalamin  folic acid  multivitamin  pantoprazole    Tablet  polyethylene glycol 3350  senna  thiamine    Pertinent Labs: 02-06 @ 08:31: Na 141, BUN 10, Cr 0.94, <H>, K+ 4.3, Phos 4.2, Mg 2.1, Alk Phos --, ALT/SGPT --, AST/SGOT --, HbA1c --    Skin per nursing documentation: free of pressure injuries per nursing flow sheets, noted with wounds  Edema: none     Estimated Needs:   [x] no change since previous assessment  [] recalculated:   Estimated Needs: based on standing weight 82 kg (2/4), with consideration for malnutrition  Energy: 1380-2676 (30-35 kcal/kg)  Protein: 90.2-119  (1.1-106.6 g/kg)  defer fluids to team, pt on 1500ml fluid restriction     Previous Nutrition Diagnosis: 1) Increased Nutrient Needs 2) Severe (acute) Malnutrition  Nutrition Diagnosis is: [x] ongoing; addressed with Regular diet, oral supplements and micronutrient supplementation    New Nutrition Diagnosis: [x] Not applicable    Nutrition Care Plan:  [x] In Progress  [] Achieved  [] Not applicable    Nutrition Interventions:     Education Provided:       [x] Yes:  [] No: encouraged intake of protein-rich foods, nutrition supplements        Recommendations:      [x] Continue Regular diet; encourage PO intake, including food from home as available  [x]  Continue 2 servings Ensure Plus High Protein supplement, and Mighty Shake supplement (200 kcal, 6 g protein) TID, in setting of patient preference for liquids  3. Continue micronutrient supplementation: vit C, cyanocobalamin, folic acid, multivitamin  Monitoring and Evaluation:   Continue to monitor nutritional intake, tolerance to diet prescription, weights, labs, skin integrity      RD remains available upon request and will follow up per protocol Nutrition Follow Up Note  Patient seen for: nutrition follow-up     Chart reviewed, events noted.    Source: [] Patient       [x] EMR        [] RN        [] Family at bedside       [] Other:    -If unable to interview patient: [] Trach/Vent/BiPAP  [] Disoriented/confused/inappropriate to interview    Diet Order:   Diet, Regular:   1500mL Fluid Restriction ([id number])  Supplement Feeding Modality:  Oral  Ensure Plus High Protein Cans or Servings Per Day:  1       Frequency:  Two Times a day (02-03-24)    - Is current order appropriate/adequate? [x] Yes  []  No:     - PO intake :   [] >75%  Adequate    [x] 50-75%  Fair       [] <50%  Poor    - Nutrition-related concerns:  -pt reports improvement in PO intake, consuming ~75% of meals. States intake does vary depending on preference to meal.   -pt calling for meals to optimize preference to meals. Offered to obtain additional food preference but pt declining.   -drinking Ensure Shakes to supplement PO intake.   -Supplementation: multivitamin, folic acid, thiamine    GI:  Last BM _2/4__.   Bowel Regimen? [x] Yes   [] No      Weights:   202lbs (1/6) --bed weight (dosing)  standing weights: 185.4lbs (2/3), 180.7lbs (2/4)    Nutritionally Pertinent MEDICATIONS  (STANDING):  ascorbic acid  cyanocobalamin  folic acid  multivitamin  pantoprazole    Tablet  polyethylene glycol 3350  senna  thiamine    Pertinent Labs: 02-06 @ 08:31: Na 141, BUN 10, Cr 0.94, <H>, K+ 4.3, Phos 4.2, Mg 2.1, Alk Phos --, ALT/SGPT --, AST/SGOT --, HbA1c --    Skin per nursing documentation: free of pressure injuries per nursing flow sheets, noted with wounds  Edema: none     Estimated Needs:   [x] no change since previous assessment  [] recalculated:   Estimated Needs: based on standing weight 82 kg (2/4), with consideration for malnutrition  Energy: 3385-6936 (30-35 kcal/kg)  Protein: 90.2-119  (1.1-106.6 g/kg)  defer fluids to team, pt on 1500ml fluid restriction     Previous Nutrition Diagnosis: 1) Increased Nutrient Needs 2) Severe (acute) Malnutrition  Nutrition Diagnosis is: [x] ongoing; addressed with Regular diet, oral supplements and micronutrient supplementation    New Nutrition Diagnosis: [x] Not applicable    Nutrition Care Plan:  [x] In Progress  [] Achieved  [] Not applicable    Nutrition Interventions:     Education Provided:       [x] Yes:  [] No: encouraged intake of protein-rich foods, nutrition supplements        Recommendations:      [x] Continue Regular diet; encourage PO intake, including food from home as available  [x]  Continue 2 servings Ensure Plus High Protein supplement, and Mighty Shake supplement (200 kcal, 6 g protein) TID, in setting of patient preference for liquids  [x] Continue micronutrient supplementation: vit C, cyanocobalamin, folic acid, multivitamin    Monitoring and Evaluation:   Continue to monitor nutritional intake, tolerance to diet prescription, weights, labs, skin integrity      RD remains available upon request and will follow up per protocol  Monica Seo RD, CDN, Available on Teams

## 2024-02-06 NOTE — CONSULT NOTE ADULT - PROVIDER SPECIALTY LIST ADULT
Nephrology
Gastroenterology
Intervent Radiology
Hospitalist
Rehab Medicine
Wound Care
Thoracic Surgery
Orthopedics
SICU
Neurology

## 2024-02-06 NOTE — PROVIDER CONTACT NOTE (MEDICATION) - SITUATION
Patient refusing Zosyn and Lovenox
pt refusing tylenol
pt refusing sodium chloride inhalation and duoneb

## 2024-02-06 NOTE — PROGRESS NOTE ADULT - SUBJECTIVE AND OBJECTIVE BOX
SURGERY DAILY PROGRESS NOTE:     Overnight Events:  Continues to be tachycardic overnight,  to 115. Sinus tach in no acute pain or SOB.    SUBJECTIVE: Patient seen and evaluated on AM rounds. Pt is resting comfortably in bed with no complaints. Denies fever, chills, N/V, chest pain, or shortness of breath. Tolerating diet. Ambulating well. Pain is adequately controlled on current regimen.    OBJECTIVE:  Vital Signs Last 24 Hrs  T(C): 36.7 (06 Feb 2024 04:25), Max: 37 (05 Feb 2024 21:00)  T(F): 98 (06 Feb 2024 04:25), Max: 98.6 (05 Feb 2024 21:00)  HR: 109 (06 Feb 2024 04:25) (103 - 115)  BP: 141/93 (06 Feb 2024 04:25) (133/98 - 155/106)  BP(mean): --  RR: 18 (06 Feb 2024 04:25) (18 - 18)  SpO2: 98% (06 Feb 2024 04:25) (95% - 98%)    Parameters below as of 06 Feb 2024 04:25  Patient On (Oxygen Delivery Method): room air      I&O's Detail    05 Feb 2024 07:01  -  06 Feb 2024 07:00  --------------------------------------------------------  IN:    Oral Fluid: 440 mL  Total IN: 440 mL    OUT:    Voided (mL): 3550 mL  Total OUT: 3550 mL    Total NET: -3110 mL        Daily     Daily     LABS:                        8.9    12.13 )-----------( 688      ( 05 Feb 2024 07:26 )             28.0     02-05    140  |  108  |  6<L>  ----------------------------<  87  3.6   |  21<L>  |  0.83    Ca    8.2<L>      05 Feb 2024 07:26  Phos  3.6     02-05  Mg     1.8     02-05    TPro  6.0  /  Alb  2.6<L>  /  TBili  0.2  /  DBili  x   /  AST  17  /  ALT  17  /  AlkPhos  307<H>  02-05      Urinalysis Basic - ( 05 Feb 2024 07:26 )    Color: x / Appearance: x / SG: x / pH: x  Gluc: 87 mg/dL / Ketone: x  / Bili: x / Urobili: x   Blood: x / Protein: x / Nitrite: x   Leuk Esterase: x / RBC: x / WBC x   Sq Epi: x / Non Sq Epi: x / Bacteria: x      PHYSICAL EXAM:  Gen: NAD, A&Ox3  Pulm: No respiratory distress, no subcostal retractions, all three chest tubes removed  CV: Sinus tachycardia, no JVD  Abd: Soft, NT, ND  Extremities: warm and well perfused

## 2024-02-06 NOTE — PROGRESS NOTE ADULT - ASSESSMENT
48yoM w/ PMHx hepatitis C and EtOH abuse was transferred from OSH on 1/6 for level 1 trauma eval. He was found down with multiple 4-11 left-sided rib fractures and flail chest, and had a chest tube placed at OSH. S/p VATS 1/10 w/ partial lung decortication and thoracoscopic removal of intrapleural foreign body. S/p chest tube removal 1/14, downgraded to floor 1/18, returned to SICU after fever 102.3 on 1/20- s/p L thoracotomy and decortication (1/26). Downgraded to floor 2/4.    Plan:  - PT reassessed and recommended outpatient PT/OT with rolling walker  -Analgesia and antiemetics as needed  - Follow up with medicine concerning tachycardia  - Thoracic surgery signed off and says to f/u outpatient  -Regular diet       Trauma Surgery #[id number]

## 2024-02-06 NOTE — DISCHARGE NOTE NURSING/CASE MANAGEMENT/SOCIAL WORK - NSDCPEFALRISK_GEN_ALL_CORE
For information on Fall & Injury Prevention, visit: [email or web address] OR  [email or web address] OR  https://www.cdc.gov/steadi/patient.html

## 2024-02-06 NOTE — PROVIDER CONTACT NOTE (MEDICATION) - ACTION/TREATMENT ORDERED:
MD stated no new interventions at this time. plan of care continues.
No new interventions at this time as per provider. Nursing care ongoing.
MD stated no interventions at this time. plan of care continues.

## 2024-02-06 NOTE — PROGRESS NOTE ADULT - TIME BILLING
I saw and evaluated patient and agree with above note  looks remarkably well,  conversive, mobilized  still has heart rate recorded at 109 which is not a new finding and has a gradual drift downward.  At this stage will get cardiac echo to facilitate planning for discharge
I saw and evaluated patient and agree with above note  withdraw / critical illness delirium gradually improving  patient prefers lying in bed, on side  increased rate of breathing on my exam although not distressed   I counseled the patient on importance to be out of bed daily   reports eating meals   I reviewed plan of care with nursing to assist with ADL's, and facilitate being OOB as tolerated  due to risk assessment for post trauma distress will get behavioral health evaluation
Derick
I saw and evaluated patient and agree with above note  patient is resting comfortably  the chest tubes are out  reviewed plan of care with house staff  mild sinus tachycardia which is not new

## 2024-02-06 NOTE — CONSULT NOTE ADULT - REASON FOR ADMISSION
Trauma 1 activation

## 2024-02-06 NOTE — PROGRESS NOTE ADULT - PROVIDER SPECIALTY LIST ADULT
Anesthesia
SICU
Surgery
Thoracic Surgery
Trauma Surgery
Wound Care
Wound Care
Anesthesia
CT Surgery
Intervent Radiology
SICU
Thoracic Surgery
Trauma Surgery
Wound Care
Anesthesia
CT Surgery
Intervent Radiology
Intervent Radiology
Rehab Medicine
SICU
Surgery
Thoracic Surgery
Thoracic Surgery
Trauma Surgery
Orthopedics
SICU
Surgery
Thoracic Surgery
Thoracic Surgery
Trauma Surgery
Trauma Surgery
SICU
Surgery
Thoracic Surgery
Thoracic Surgery
Trauma Surgery
CT Surgery
Thoracic Surgery
Trauma Surgery
Thoracic Surgery
SICU
Trauma Surgery
Trauma Surgery
Thoracic Surgery
Thoracic Surgery

## 2024-02-06 NOTE — PROGRESS NOTE ADULT - REASON FOR ADMISSION
Trauma 1 activation
lt pl collection
Trauma 1 activation
Trauma  Flex bronch washout
Trauma 1 activation

## 2024-02-06 NOTE — CONSULT NOTE ADULT - PROBLEM SELECTOR RECOMMENDATION 9
-found down with multiple 4-11 left-sided rib fractures and flail chest, and had a chest tube placed at OSH  - S/p VATS 1/10 w/ partial lung decortication and thoracoscopic removal of intrapleural foreign body  - S/p chest tube removal 1/14, downgraded to floor 1/18  - returned to SICU after fever 102.3F on 1/20- s/p L thoracotomy and decortication (1/26)  -pt is clinically stable ; sating well on room air , Hb stable

## 2024-02-10 LAB
CULTURE RESULTS: SIGNIFICANT CHANGE UP
SPECIMEN SOURCE: SIGNIFICANT CHANGE UP

## 2024-02-22 ENCOUNTER — EMERGENCY (EMERGENCY)
Facility: HOSPITAL | Age: 49
LOS: 1 days | Discharge: HOME | End: 2024-02-22
Attending: EMERGENCY MEDICINE
Payer: MEDICAID

## 2024-02-22 VITALS
SYSTOLIC BLOOD PRESSURE: 121 MMHG | TEMPERATURE: 99 F | HEART RATE: 121 BPM | DIASTOLIC BLOOD PRESSURE: 86 MMHG | RESPIRATION RATE: 17 BRPM | OXYGEN SATURATION: 99 %

## 2024-02-22 VITALS
TEMPERATURE: 98 F | HEIGHT: 73 IN | RESPIRATION RATE: 20 BRPM | OXYGEN SATURATION: 96 % | SYSTOLIC BLOOD PRESSURE: 115 MMHG | HEART RATE: 91 BPM | WEIGHT: 205.03 LBS | DIASTOLIC BLOOD PRESSURE: 83 MMHG

## 2024-02-22 PROCEDURE — 99284 EMERGENCY DEPT VISIT MOD MDM: CPT

## 2024-02-22 NOTE — ED ADULT TRIAGE NOTE - HOW PATIENT ADDRESSED, PROFILE
Caller: Maribel Arreguin    Relationship: Emergency Contact    Best call back number: [phone]    Requested Prescriptions:   Requested Prescriptions     Pending Prescriptions Disp Refills   • sertraline (ZOLOFT) 50 MG tablet 135 tablet 1     Sig: Take 1.5 tablets by mouth Daily.        Pharmacy where request should be sent: [id number] - [place], [place] - [address] AT [facility] [address]  - [phone]  - [phone] FX     Additional details provided by patient: PATIENT HAS ONE DAY LEFT OF MEDICATION    Does the patient have less than a 3 day supply:  [x] Yes  [] No    Would you like a call back once the refill request has been completed: [] Yes [x] No    If the office needs to give you a call back, can they leave a voicemail: [] Yes [x] No    Melinda Berry Rep   03/15/23 08:36 EDT       
Rx Refill Note  Requested Prescriptions     Pending Prescriptions Disp Refills   • sertraline (ZOLOFT) 50 MG tablet 135 tablet 1     Sig: Take 1.5 tablets by mouth Daily.      Last office visit with prescribing clinician: 9/7/2022   Last telemedicine visit with prescribing clinician: 3/29/2023   Next office visit with prescribing clinician: 3/29/2023       {TIP  Please add Last Relevant Lab Date if appropriate: 09/08/22                 Would you like a call back once the refill request has been completed: [] Yes [] No    If the office needs to give you a call back, can they leave a voicemail: [] Yes [] No    Fanta Lee MA  03/16/23, 07:23 EDT  
Bernardo

## 2024-02-22 NOTE — CHART NOTE - NSCHARTNOTEFT_GEN_A_CORE
Emergency Room : Per medical team patient is medically cleared for discharge.  LSMW received a referral from the medical team for assistance with dscharge transportation.  LMSW introdcued herself to the patient and he verbalized understanding the role of the .  Patient reports he has been staying with his mother in [place] since being discharged from the hospital as he is unable to care for himself. Patient provided his mother's address. ([address] [id number]). Patient has Medicaid insurance benefit.  [facility] was contacted to arrange transportation. (TRIP NUMBER: [id number]).  Update to the ED medical team.  No further concerns voiced: LSMW remains available if requested.

## 2024-02-22 NOTE — ED ADULT TRIAGE NOTE - CHIEF COMPLAINT QUOTE
sent from doctors office for wound check  had back surgery, went to have sutures removed and per EMS "site is painful and has inflammation"

## 2024-02-22 NOTE — ED PROVIDER NOTE - NSFOLLOWUPINSTRUCTIONS_ED_ALL_ED_FT
- Your testing/exams was/were reassuring that medically dangerous emergencies/conditions are less likely to be occurring or to have occurred.    - Take all medications, if given/sent to pharmacy, and as, directed.    - If you had labs or imaging done, you were given copies of all labs and/or imaging results from your er visit--please take them with you to your follow up appointments.    - Follow up with Britany England as outpatient.

## 2024-02-22 NOTE — ED PROVIDER NOTE - CARE PROVIDER_API CALL
Elsy Sepulveda  Thoracic Surgery  [address] [place] [address], [address] [place] [address]  Phone: [phone]  Fax: [phone]  Follow Up Time:

## 2024-02-22 NOTE — CONSULT NOTE ADULT - SUBJECTIVE AND OBJECTIVE BOX
General Surgery Consult  Consulting surgical team: thoracic surgery  Consulting attending: Dr. Sepulveda    HPI:  48M hx hepatitis C and EtOH abuse was transferred from OSH on 1/6 for level 1 trauma after being found down with multiple 4-11 left-sided rib fractures and flail chest, and had a chest tube placed at OSH. S/p VATS 1/10 w/ partial lung decortication and thoracoscopic removal of intrapleural foreign body. S/p chest tube removal 1/14, downgraded to floor 1/18, returned to SICU after fever 102.3 on 1/20- s/p L thoracotomy and decortication (1/26). Pt was sent in by his PCP today 2/2 concern for surgical site infection, for which thoracic surgery is consulted. Reports ongoing incisional site tenderness radiating to the left neck and chest wall, global itchiness, and chills since the surgery. Denies nausea, vomiting, fevers, diarrhea.     In ED, vss.    PAST MEDICAL HISTORY:  No pertinent past medical history    PAST SURGICAL HISTORY:  VATS with decortication x2    MEDICATIONS:      ALLERGIES:  No Known Allergies      VITALS & I/Os:  Vital Signs Last 24 Hrs  T(C): 37.2 (22 Feb 2024 15:23), Max: 37.2 (22 Feb 2024 15:23)  T(F): 98.9 (22 Feb 2024 15:23), Max: 98.9 (22 Feb 2024 15:23)  HR: 107 (22 Feb 2024 15:23) (91 - 107)  BP: 118/69 (22 Feb 2024 15:23) (115/83 - 118/69)  BP(mean): --  RR: 18 (22 Feb 2024 15:23) (18 - 20)  SpO2: 98% (22 Feb 2024 15:23) (96% - 98%)    Parameters below as of 22 Feb 2024 15:23  Patient On (Oxygen Delivery Method): room air        I&O's Summary      PHYSICAL EXAM:  General: Not acutely distressed  Respiratory: Nonlabored respirations  Back: diagonal incision over left superior back with staples, c/d/i with minor area ~2cm of dehiscence superiorly with fibrinous exudate. No erythema. No discharge. Appropriate incisional tenderness. Suture x2 present inferior to incision  Abdominal: Soft, nondistended, nontender. No rebound or guarding. No organomegaly, no palpable mass  Extremities: Warm    LABS:          Lactate:                  IMAGING:

## 2024-02-22 NOTE — ED ADULT TRIAGE NOTE - PAIN RATING/NUMBER SCALE (0-10): ACTIVITY
She will need to ask her PCP about this  Generally speaking, as long as using Tylenol no more than 2,000 mg daily, less than 3 times per week this should be fine  But again, if she has specific issues with her liver that preclude her from doing this, it is best she speak to her PCP about her options  7 (severe pain)

## 2024-02-22 NOTE — ED ADULT NURSE NOTE - NSFALLUNIVINTERV_ED_ALL_ED
Bed/Stretcher in lowest position, wheels locked, appropriate side rails in place/Call bell, personal items and telephone in reach/Instruct patient to call for assistance before getting out of bed/chair/stretcher/Non-slip footwear applied when patient is off stretcher/[place] to call system/Physically safe environment - no spills, clutter or unnecessary equipment/Purposeful proactive rounding/Room/bathroom lighting operational, light cord in reach

## 2024-02-22 NOTE — ED ADULT NURSE NOTE - OBJECTIVE STATEMENT
Patient is  alert and  oriented x4. Color is good and skin warm  to touch.  He  is  sent here for  inflammation and  drainage  to  surgical site.  Patient  denies  any  fever.

## 2024-02-22 NOTE — ED PROVIDER NOTE - CLINICAL SUMMARY MEDICAL DECISION MAKING FREE TEXT BOX
48-year-old male past medical history of hepatitis C, alcohol use, recent history of trauma with left-sided flail chest requiring VATS/lung decortication on 01/14 & 01/26, patient discharged otherwise uneventfully from the hospital, was following up with PCP Nkechi De Leon today for staple removal of Left thoracotomy wound.  Patient was examined and told to present to the ED for wound evaluation. HD stable on presentation. On exam of left thoracotomy wound, medial large gape 4 inch in dimension, no active bleeding/discharge. Remainder of wound appears to be well-healing with staples in place. Will consult Thoracic Surg for patient assessment. 48-year-old male past medical history of hepatitis C, alcohol use, recent history of trauma with left-sided flail chest requiring VATS/lung decortication on 01/14 & 01/26, patient discharged otherwise uneventfully from the hospital, was following up with PCP Nkechi De Leon today for staple removal of Left thoracotomy wound.  Patient was examined and told to present to the ED for wound evaluation. HD stable on presentation. On exam of left thoracotomy wound, medial large gape 4 inch in dimension, no active bleeding/discharge. Remainder of wound appears to be well-healing with staples in place. Will consult Thoracic Surg for patient assessment.  Ann Marie: 48 year old male with pmhx of Hep C, etoh use, recent trauma history with left sided flail chest requiring VATS/lung decorticoation on 1/14 and 1/26.  f/u with PCP today for stape removal of left thoracotomy wound.  told to present to ER because wound dehisence. was supposed to f/u with surgery vut has not been able to. PE: Alert, nad, lungsnonlabored respirations, + s1s2, abdomen soft nt/nd, + left thoracotomy wound with large dehisence 4 inch in demimetion posterior left side, no active bleeding/discharge. remainder wound appears well healing with staples in place. will give pain control, will consult surgery for wound, reassess.

## 2024-02-22 NOTE — ED PROVIDER NOTE - PHYSICAL EXAMINATION
PHYSICAL EXAM:  GENERAL: NAD, lying in bed comfortably  HEAD: Normocephalic  EYES: EOMI, PERRLA, conjunctiva and sclera clear  ENT: No erythema/pallor/petechiae/lesions  NECK: Supple  LUNG: Left base reduced air entry   HEART: RRR, +S1/S2  ABDOMEN: soft, NT/ND; BS audible   EXTREMITIES:  2+ Peripheral Pulses, brisk cap refill.   NERVOUS SYSTEM:  AAOx3, speech clear. No sensory/motor deficits   MSK: FROM all 4 extremities, full and equal strength  SKIN: Left thoracotomy wound, large gape identified medially 4 inch in dimension, no active bleeding/discharge. Remainder of wound appears to be well-healing with staples in place.

## 2024-02-22 NOTE — ED PROVIDER NOTE - OBJECTIVE STATEMENT
48-year-old male past medical history of hepatitis C, alcohol use, recent history of trauma with left-sided flail chest requiring VATS/lung decortication on 01/14 & 01/26, patient discharged otherwise uneventfully from the hospital, was following up with PCP Nkechi De Leon today for staple removal of Left thoracotomy wound.  Patient was examined and told to present to the ED for wound evaluation.  Patient denies having fevers, sore throat, runny nose, difficulty in breathing, chest pains, palpitations, abdominal distention/pain.  Only reports having nausea/reduced appetite, and feels itchy all over which is his usual baseline.  Denies recent alcohol intake since time of hospitalization.

## 2024-02-22 NOTE — ED PROVIDER NOTE - PROGRESS NOTE DETAILS
Derrick ESPINO: Surg re-paged for update, will review the patient. Derrick ESPINO: S/b Thoracic Surg, suggesting local wound care and F/U with Dr. Sepulveda as outpatient. Wound does not appear to be infected, patient has no c/o fever, will not consider Abx, shall  for local wound care only.

## 2024-02-22 NOTE — CONSULT NOTE ADULT - ASSESSMENT
48M hx hepatitis C and EtOH abuse was transferred from OSH on 1/6 for level 1 trauma after being found down with multiple 4-11 left-sided rib fractures and flail chest, and had a chest tube placed at OSH. S/p VATS 1/10 w/ partial lung decortication and thoracoscopic removal of intrapleural foreign body. S/p chest tube removal 1/14, downgraded to floor 1/18, returned to SICU after fever 102.3 on 1/20- s/p L thoracotomy and decortication (1/26). Pt was sent in by his PCP today 2/2 concern for surgical site infection, for which thoracic surgery is consulted. Reports ongoing incisional site tenderness radiating to the left neck and chest wall, global itchiness, and chills since the surgery. Denies nausea, vomiting, fevers, diarrhea.     In ED, vss.    Recommendations:  -To be discussed with Dr. Sepulveda.    Thoracic Surgery  48M hx hepatitis C and EtOH abuse was transferred from OSH on 1/6 for level 1 trauma after being found down with multiple 4-11 left-sided rib fractures and flail chest, and had a chest tube placed at OSH. S/p VATS 1/10 w/ partial lung decortication and thoracoscopic removal of intrapleural foreign body. S/p chest tube removal 1/14, downgraded to floor 1/18, returned to SICU after fever 102.3 on 1/20- s/p L thoracotomy and decortication (1/26). Pt was sent in by his PCP today 2/2 concern for surgical site infection, for which thoracic surgery is consulted. Reports ongoing incisional site tenderness radiating to the left neck and chest wall, global itchiness, and chills since the surgery. Denies nausea, vomiting, fevers, diarrhea.     In ED, vss.    Recommendations:  -No acute operative Thoracic surgical intervention at this time.  -F/u op with Dr. Sepulveda for staple removal.  -Local wound care for superior aspect of incision.    Discussed with Dr. Sepulveda.    Thoracic Surgery   [id number]

## 2024-02-24 LAB
CULTURE RESULTS: SIGNIFICANT CHANGE UP
CULTURE RESULTS: SIGNIFICANT CHANGE UP
SPECIMEN SOURCE: SIGNIFICANT CHANGE UP
SPECIMEN SOURCE: SIGNIFICANT CHANGE UP

## 2024-02-28 LAB
CULTURE RESULTS: SIGNIFICANT CHANGE UP
SPECIMEN SOURCE: SIGNIFICANT CHANGE UP

## 2024-03-05 ENCOUNTER — APPOINTMENT (OUTPATIENT)
Dept: THORACIC SURGERY | Facility: CLINIC | Age: 49
End: 2024-03-05
Payer: MEDICAID

## 2024-03-05 ENCOUNTER — APPOINTMENT (OUTPATIENT)
Dept: RADIOLOGY | Facility: CLINIC | Age: 49
End: 2024-03-05
Payer: MEDICAID

## 2024-03-05 VITALS
SYSTOLIC BLOOD PRESSURE: 130 MMHG | HEART RATE: 110 BPM | OXYGEN SATURATION: 99 % | DIASTOLIC BLOOD PRESSURE: 91 MMHG | HEIGHT: 73 IN

## 2024-03-05 PROCEDURE — 99214 OFFICE O/P EST MOD 30 MIN: CPT | Mod: 24

## 2024-03-05 PROCEDURE — 71046 X-RAY EXAM CHEST 2 VIEWS: CPT

## 2024-03-05 RX ORDER — OXYCODONE HYDROCHLORIDE 5 MG/1
CAPSULE ORAL
Refills: 0 | Status: ACTIVE | COMMUNITY

## 2024-03-05 RX ORDER — BACITRACIN ZINC 500 [USP'U]/G
500 OINTMENT TOPICAL 3 TIMES DAILY
Qty: 1 | Refills: 0 | Status: ACTIVE | COMMUNITY
Start: 2024-03-05 | End: 1900-01-01

## 2024-03-06 NOTE — ASSESSMENT
[FreeTextEntry1] :  Mr. BIANCA MICHAEL, 48-year-old male, current smoker, w/history of hepatitis C, depression, alcohol use, transferred from an outside hospital, was found down and noted to have rib fractures 4 through 11. He had a chest tube that was placed at an outside hospital with a liter of bloody output.  He was then transferred to [place] for further management. Thoracic was consulted on 01/08/2024.   Now s/p Left VATS, Pneumolysis, Removal of foreign body, Partial decortication on 1/10/2024. Path on 1/11/2024: Foreign body: 2.2 x 0.6 x 0.1 cm consists of a fragment of purple-blue, bloodstained, curved, irregular portion of membranous soft synthetic material.  Now s/p Left thoracotomy. Total decortication. Lysis of adhesions. Flexible bronchoscopy. Removal of fibrin. Intercostal nerve cryoablation on 1/26/2024.  CXR today---- small residual left effusion and compressive atelectasis left base. Multiple displaced left rib fractures.  I have reviewed the patient's medical records and diagnostic images at time of this office consultation and have made the following recommendation: 1. Incisional wound measured 1 x 0.8 cm noted on Lt dehiscence area and pressure ulcer on Rt buttock. Refer pt to wound care Dr. Gordon for wound care. Rx Bacitracin and gauze for daily wound care.  2. RTC in 3 wks for clinical checkup.    I, [facility]ENTURA[facility] personally performed the evaluation and management (E/M) services for this established patient who presents today with (a) new problem(s)/exacerbation of (an) existing condition(s).  That E/M includes conducting the examination, assessing all new/exacerbated conditions, and establishing a new plan of care.  Today, my ACP, RODERICK Villa was here to observe my evaluation and management services for this new problem/exacerbated condition to be followed going forward.

## 2024-03-06 NOTE — PHYSICAL EXAM
[] : no respiratory distress [Heart Sounds] : normal S1 and S2 [Heart Rate And Rhythm] : heart rate was normal and rhythm regular [Heart Sounds Gallop] : no gallops [Murmurs] : no murmurs [Heart Sounds Pericardial Friction Rub] : no pericardial rub [FreeTextEntry1] : chest wound noted

## 2024-03-06 NOTE — HISTORY OF PRESENT ILLNESS
[FreeTextEntry1] :  Mr. BIANCA MICHAEL, 48-year-old male, current smoker, w/history of hepatitis C, depression, alcohol use, transferred from an outside hospital, was found down and noted to have rib fractures 4 through 11. He had a chest tube that was placed at an outside hospital with a liter of bloody output.  He was then transferred to [place] for further management. Thoracic was consulted on 01/08/2024.   Now s/p Left VATS, Pneumolysis, Removal of foreign body, Partial decortication on 1/10/2024. Path on 1/11/2024: Foreign body: 2.2 x 0.6 x 0.1 cm consists of a fragment of purple-blue, bloodstained, curved, irregular portion of membranous soft synthetic material.  Now s/p Left thoracotomy. Total decortication. Lysis of adhesions. Flexible bronchoscopy. Removal of fibrin. Intercostal nerve cryoablation on 1/26/2024.  CXR today---- small residual left effusion and compressive atelectasis left base. Multiple displaced left rib fractures.  Pt presents today for follow up. c/o still in pain from the surgical site, development pressure ulcer on Rt buttock.

## 2024-04-02 ENCOUNTER — APPOINTMENT (OUTPATIENT)
Dept: THORACIC SURGERY | Facility: CLINIC | Age: 49
End: 2024-04-02
Payer: MEDICAID

## 2024-04-02 VITALS
BODY MASS INDEX: 22.8 KG/M2 | OXYGEN SATURATION: 100 % | DIASTOLIC BLOOD PRESSURE: 99 MMHG | HEIGHT: 73 IN | SYSTOLIC BLOOD PRESSURE: 158 MMHG | WEIGHT: 172 LBS | HEART RATE: 112 BPM

## 2024-04-02 DIAGNOSIS — S22.49XA MULTIPLE FRACTURES OF RIBS, UNSPECIFIED SIDE, INITIAL ENCOUNTER FOR CLOSED FRACTURE: ICD-10-CM

## 2024-04-02 PROCEDURE — 99213 OFFICE O/P EST LOW 20 MIN: CPT | Mod: 24

## 2024-04-02 NOTE — PHYSICAL EXAM
[] : no respiratory distress [Heart Rate And Rhythm] : heart rate was normal and rhythm regular [Auscultation Breath Sounds / Voice Sounds] : lungs were clear to auscultation bilaterally [Examination Of The Chest] : the chest was normal in appearance [Diminished Respiratory Excursion] : normal chest expansion [Chest Visual Inspection Thoracic Asymmetry] : no chest asymmetry

## 2024-04-03 NOTE — CONSULT LETTER
[Dear  ___] : Dear  [unfilled], [Consult Letter:] : I had the pleasure of evaluating your patient, [unfilled]. [Consult Closing:] : Thank you very much for allowing me to participate in the care of this patient.  If you have any questions, please do not hesitate to contact me. [Sincerely,] : Sincerely, [FreeTextEntry2] : Dr. Nkechi Akhtar MD [FreeTextEntry3] :  Elsy Sepulveda MD Attending Surgeon Division of Thoracic Surgery , [facility] [employer] [facility]

## 2024-04-03 NOTE — HISTORY OF PRESENT ILLNESS
[FreeTextEntry1] :  Mr. BIANCA MICHAEL, 48-year-old male, current smoker, w/history of hepatitis C, depression, alcohol use, transferred from an outside hospital, was found down and noted to have rib fractures 4 through 11. He had a chest tube that was placed at an outside hospital with a liter of bloody output.  He was then transferred to [place] for further management. Thoracic was consulted on 01/08/2024.   Now 2 mons s/p Left VATS, Pneumolysis, Removal of foreign body, Partial decortication on 1/10/2024. Path on 1/11/2024: Foreign body: 2.2 x 0.6 x 0.1 cm consists of a fragment of purple-blue, bloodstained, curved, irregular portion of membranous soft synthetic material.  Now s/p Left thoracotomy. Total decortication. Lysis of adhesions. Flexible bronchoscopy. Removal of fibrin. Intercostal nerve cryoablation on 1/26/2024.  CXR on 3/5/24 ---- small residual left effusion and compressive atelectasis left base. Multiple displaced left rib fractures.  development pressure ulcer on Rt buttock.   Pt presents today for clinical follow up. He reports of SOB especially at night, pain and numbness to surgical site and back pain, pain relief with Tylenol.

## 2024-04-03 NOTE — ASSESSMENT
[FreeTextEntry1] :  Mr. BIANCA MICHAEL, 48-year-old male, current smoker, w/history of hepatitis C, depression, alcohol use, transferred from an outside hospital, was found down and noted to have rib fractures 4 through 11. He had a chest tube that was placed at an outside hospital with a liter of bloody output.  He was then transferred to [place] for further management. Thoracic was consulted on 01/08/2024.   Now s/p Left VATS, Pneumolysis, Removal of foreign body, Partial decortication on 1/10/2024. Path on 1/11/2024: Foreign body: 2.2 x 0.6 x 0.1 cm consists of a fragment of purple-blue, bloodstained, curved, irregular portion of membranous soft synthetic material.  Now s/p Left thoracotomy. Total decortication. Lysis of adhesions. Flexible bronchoscopy. Removal of fibrin. Intercostal nerve cryoablation on 1/26/2024.  CXR today---- small residual left effusion and compressive atelectasis left base. Multiple displaced left rib fractures.  I have reviewed the patient's medical records and diagnostic images at time of this office consultation and have made the following recommendation: 1. Patient is here for clinical follow up. Wound is clean with no signs of infection. F/u with Dr. Gordon for Rt. buttock pressure ulcer. F/u with PCP for back pain and worsening SOB, refer to pulmonologist. RTC PRN.    I, Dr. VENTURA, [facility], personally performed the evaluation and management (E/M) services for this established patient who presents today with (a) new problem(s)/exacerbation of (an) existing condition(s).  That E/M includes conducting the examination, assessing all new/exacerbated conditions, and establishing a new plan of care.  Today, my ACP, Marti Gonzales/RODERICK Cifuentes was here to observe my evaluation and management services for this new problem/exacerbated condition to be followed going forward.

## 2024-04-17 ENCOUNTER — OUTPATIENT (OUTPATIENT)
Dept: OUTPATIENT SERVICES | Facility: HOSPITAL | Age: 49
LOS: 1 days | End: 2024-04-17
Payer: MEDICAID

## 2024-04-17 ENCOUNTER — APPOINTMENT (OUTPATIENT)
Dept: SURGERY | Facility: CLINIC | Age: 49
End: 2024-04-17
Payer: MEDICAID

## 2024-04-17 VITALS
DIASTOLIC BLOOD PRESSURE: 91 MMHG | WEIGHT: 175 LBS | BODY MASS INDEX: 23.19 KG/M2 | HEART RATE: 130 BPM | OXYGEN SATURATION: 98 % | SYSTOLIC BLOOD PRESSURE: 122 MMHG | HEIGHT: 73 IN | TEMPERATURE: 97.2 F

## 2024-04-17 DIAGNOSIS — L30.9 DERMATITIS, UNSPECIFIED: ICD-10-CM

## 2024-04-17 DIAGNOSIS — Z86.59 PERSONAL HISTORY OF OTHER MENTAL AND BEHAVIORAL DISORDERS: ICD-10-CM

## 2024-04-17 DIAGNOSIS — F17.200 NICOTINE DEPENDENCE, UNSPECIFIED, UNCOMPLICATED: ICD-10-CM

## 2024-04-17 DIAGNOSIS — B18.2 CHRONIC VIRAL HEPATITIS C: ICD-10-CM

## 2024-04-17 DIAGNOSIS — T81.31XA DISRUPTION OF EXTERNAL OPERATION (SURGICAL) WOUND, NOT ELSEWHERE CLASSIFIED, INITIAL ENCOUNTER: ICD-10-CM

## 2024-04-17 DIAGNOSIS — Z82.49 FAMILY HISTORY OF ISCHEMIC HEART DISEASE AND OTHER DISEASES OF THE CIRCULATORY SYSTEM: ICD-10-CM

## 2024-04-17 DIAGNOSIS — Z00.00 ENCOUNTER FOR GENERAL ADULT MEDICAL EXAMINATION WITHOUT ABNORMAL FINDINGS: ICD-10-CM

## 2024-04-17 DIAGNOSIS — L89.312 PRESSURE ULCER OF RIGHT BUTTOCK, STAGE 2: ICD-10-CM

## 2024-04-17 PROCEDURE — 99204 OFFICE O/P NEW MOD 45 MIN: CPT

## 2024-04-17 PROCEDURE — G0463: CPT

## 2024-04-17 RX ORDER — NYSTATIN 100000 [USP'U]/ML
100000 SUSPENSION ORAL 3 TIMES DAILY
Qty: 105 | Refills: 2 | Status: ACTIVE | COMMUNITY
Start: 2024-04-17 | End: 1900-01-01

## 2024-04-17 RX ORDER — DIPHENHYDRAMINE HCL 25 MG/1
25 CAPSULE ORAL
Qty: 30 | Refills: 0 | Status: ACTIVE | COMMUNITY
Start: 2024-04-17 | End: 1900-01-01

## 2024-04-17 RX ORDER — HYDROCORTISONE 10 MG/G
1 CREAM TOPICAL
Qty: 1 | Refills: 3 | Status: ACTIVE | COMMUNITY
Start: 2024-04-17 | End: 1900-01-01

## 2024-04-17 NOTE — PHYSICAL EXAM
[Normal Breath Sounds] : Normal breath sounds [Normal Rate and Rhythm] : normal rate and rhythm [2+] : left 2+ [] : present [Skin Ulcer] : ulcer [Skin Induration] : induration [Alert] : alert [Oriented to Person] : oriented to person [Oriented to Place] : oriented to place [Oriented to Time] : oriented to time [Anxious] : anxious [JVD] : no jugular venous distention  [Ankle Swelling (On Exam)] : not present [Abdomen Tenderness] : ~T ~M No abdominal tenderness [de-identified] : cranky itchy, in pain, kyphosis, can not raise left shoulder above 90 ' [de-identified] : left thoracotomy wound, sub mammary  moisture dermatitis, left back superior thoracotomy scar open ucer [de-identified] :  weak, moves slowly . slumps over [FreeTextEntry1] : back thoracotomy scar superior [FreeTextEntry2] : 3 [FreeTextEntry3] : 1 [FreeTextEntry4] : .2cm [de-identified] : mepitel [FreeTextEntry7] : right buttock [FreeTextEntry8] : 3 [FreeTextEntry9] : 6 [de-identified] : .2cm [de-identified] : duoderm [de-identified] : cluster of 2 [TWNoteComboBox1] : Left [TWNoteComboBox6] : Surgical [de-identified] : None [de-identified] : >75% [de-identified] : 2 [de-identified] : Pressure [de-identified] : >75%

## 2024-04-17 NOTE — ASSESSMENT
[FreeTextEntry1] :   48-year-old man with healing left thorocotomy scar with small superior post skin dehiscence, and pressure stage 2 ulcer right buttock, scattered puritis and rash on extremitis, no vesicles, ? contact dermatitis  no recent new meds  s/p outside hospital after a fall, and trauma had a chest tube, transferred to [facility]/  a  VATS for a planned rib fixation; drainage of fibrinopurulent material and foriegn bodyin chest cavity. on 01/10/2024.   ,with a small collection seen on the CAT scan.  Had a pigtail that was placed without significant improvement in his  x-ray and given his persistent fevers, then rtor for thoracotomy and decortication. in january,chest tube placement by IR 1/23 CT with minimal drainage, tpa,infused by throacic 1/26 taken by thoracic Open L thoracotomy, thick layer of pleural peel s/p decortication and washout, cryoablation of ribs 3-8. PCA for pain control.  GI- anemia requiring transfusions  [facility] ir[facility]ated and clots/fibrinous exudate removed.  Now home at his mother's house recovering  datacomplexity mod lab, xr, old rec, test resultsreview,, visualize imagecptreview previous  risk- mod surgery  suppplies ordered- right buttock duoderm applied left thoracic wound mepitel applied  nursing/woundcare orders  : wash soap and water, reapply dressing q5-7 days prn rash  cortisone lotion will order benadyl

## 2024-04-17 NOTE — REVIEW OF SYSTEMS
[Arthralgias] : arthralgias [Joint Swelling] : joint swelling [Joint Stiffness] : joint stiffness [Limb Pain] : limb pain [Negative] : Heme/Lymph [FreeTextEntry9] : left shoulder at 90' kyphosis ? s/p fracutres

## 2024-04-17 NOTE — DATA REVIEWED
[FreeTextEntry1] : Hemothorax, left 10-Dominic-2024 16:33:08  Mario WORTHINGTON H/O chest tube placement 10-Dominic-2024 16:36:47  Mario WORTHINGTON. -  POST-OP DIAGNOSIS: H/O chest tube placement 10-Dominic-2024 16:36:56  Mario WORTHINGTON. -  PROCEDURES: Bronchoscopy 31-Jan-2024 11:51:10  Vladimir Tapia. 48-year-old gentleman that had previously undergone a thoracotomy decortication for empyema.  He was noted to have nearly complete white out from his left chest.  We had discussed proceeding to the OR with a bronchoscopy as well as manipulation of drains given that there  appeared to be some debris within the drain from the CAT scan.  Fracture of multiple ribs of left side 10-Dominic-2024 16:32:50  Mario WORTHINGTON Left thoracotomy.  Total decortication.  15-[id number] Date 06-Jan-2024 17:25 Reason for Admission Trauma 1 activationHospital Course 48y Male with PMHx hepatitis C (diagnosed many years ago, never treated),depression transferred from outside hospital for trauma eval. Patient found down by roommate after unknown amount of time, found with multiple 4-11 L sidedrib fractures with flail chest. Chest tube placed there with >1L output ([facility]). Patient also with tachypnea, concern for respiratory failure started onBiPAP. Level 1 called for transient hypotension, patient given 2 unit PRBC in [facility] ED.PRIMARY SURVEYA - airway intactB - bilateral breath sounds and good chest rise C - initial BP: -- , HR: -- , palpable pulses in all extremitiesD - GCS 15 on arrival Lysis of adhesions.  Flexible bronchoscopy. Removal of fibrin. Intercostal nerve cryoablation OPERATIVE INDICATIONS:This is a 48-year-old gentleman who presented to an outside hospital after a fall, had a chest tube that was placed and then subsequently transferred to [place].  He initially had undergone a  VATS for a planned rib fixation; however, upon entry into his chest cavity we noted he had fibrinopurulent material and we found a portion of a glove within his chest cavity.  At this point, we deferred rib plating and just cleared out the old blood from  his chest on 01/10/2024.  He was subsequently on the Trauma Service.  Patient started having fevers approximately a week-and-a-half later with a small collection seen on the CAT scan.  Had a pigtail that was placed without significant improvement in his  x-ray and given his persistent fevers, I had discussed proceeding to the OR with thoracotomy and decortication given his anatomy, his rib fractures, as well as his prior VATS.  thoracic consulted taken to OR 1/10 underwent Left VATS, Pneumolysis, Removal of foreign body, Partial decorticationextubated 1/131/14 Left pleural tube removed CXR ordered 1/15 IMPRESSION:No evidence of deep venous thrombosis in either lower extremity. patient with KEZIA renal us No renal mass, hydronephrosis or calculus is visualized sonographically.Patient seen by PT/OT/ PMNR - acute rehab 1/18 transferred out of SICU to surgical floor 1/19 right buttock wound - cleanse with NS, pat dry, apply aquacel, cover with tegederm daily PT wound evaluation for wound VAC 1/20 patient febrile patient sent back to CXR  CXR showed left lung opacification , CT chest Enlarging complex left pleural effusion, with internal foci of air and1/22 underwent L chest tube placement by IR 1/23 CT with minimal drainage, tpa infused by throacic 1/26 taken by thoracic Open L thoracotomy, thick layer of pleural peel s/p decortication and washout, cryoablation of ribs 3-8. PCA for pain control. CThest tubes managed by thoracic GI- anemia requiring transfusions. consulted for positive FOBTAt this time, risks of endoscopic procedures outweigh the benefit given acute illness, medical complexity and low likelihood of meaningful acute intervention. 1[facility] Chest tubes irrigated and clots/fibrinous exudate removed. Patient monitored with daily cxr- ct removed by thoracic 2/4 tx out of SICU 2/6 Echo performed for sinus tach. No sig findings. Patient with hyponatremia- monitored, fluid restriction resolved pt seen by pt inital rec acute rehab changed to outpatient PT and OT with rolling walker. At time of discharge, patient was stable. Patient to follow up with [facility], Dr. Sepulveda after discharge. Patient may follow up with Dr. Valiente as needed. Patient to follow up with their PCP after discharge in 1 week to repeat EKG. Hemothorax, left 10-Dominic-2024 16:33:08  Mario WORTHINGTON H/O chest tube placement 10-Dominic-2024 16:36:47  LI, Mario Empyema lung 26-Jan-2024 17:43:22  Mario WORTHINGTON. -  POST-OP DIAGNOSIS: Puncture wound of thorax with foreign body 10-Dominic-2024 16:36:34  Mario WORTHINGTON H/O chest tube placement 10-Dominic-2024 16:36:56  ELIN, Mario Empyema 26-Jan-2024 17:43:33  Mario WORTHINGTON. -  PROCEDURES: VATS, with partial lung decortication 10-Dominic-2024 16:31:40  Mario WORTHINGTON Thoracoscopic removal of intrapleural foreign body 10-Dominic-2024 16:32:18  LI,  Thoracotomy, with bronchoscopy 26-Jan-2024 17:42:13  LI, Mario Lung decortication 26-Jan-2024 17:42:27  Mario WORTHINGTON. Operative Findings- Operative Findings Open left thoracotomy, thick layer of pleural peel s/p decortication and washout, cryoablation of ribs 3-8. Operative Findings- Operative Findings Chest tubes irrigated and clots/fibrinous exudate removed Bronchoscopy - left lower lobe secretions cleared

## 2024-04-17 NOTE — HISTORY OF PRESENT ILLNESS
[FreeTextEntry1] : location back left scar wound and right buttock pu  left rib fractured 2-11 initially s/p vats at outside hospital then admit to [facility]>  severity ICU stay , sepsis, fb found in chest, empyema, requiring washouts repeated, now s.p rehab and home  timing/duration 4 months  quality no bleeding, can;t sleep  other itchy rash

## 2024-04-19 DIAGNOSIS — L30.9 DERMATITIS, UNSPECIFIED: ICD-10-CM

## 2024-04-19 DIAGNOSIS — Z86.59 PERSONAL HISTORY OF OTHER MENTAL AND BEHAVIORAL DISORDERS: ICD-10-CM

## 2024-04-19 DIAGNOSIS — F17.200 NICOTINE DEPENDENCE, UNSPECIFIED, UNCOMPLICATED: ICD-10-CM

## 2024-04-19 DIAGNOSIS — T81.31XA DISRUPTION OF EXTERNAL OPERATION (SURGICAL) WOUND, NOT ELSEWHERE CLASSIFIED, INITIAL ENCOUNTER: ICD-10-CM

## 2024-04-19 DIAGNOSIS — Z82.49 FAMILY HISTORY OF ISCHEMIC HEART DISEASE AND OTHER DISEASES OF THE CIRCULATORY SYSTEM: ICD-10-CM

## 2024-04-19 DIAGNOSIS — B18.2 CHRONIC VIRAL HEPATITIS C: ICD-10-CM

## 2024-04-19 DIAGNOSIS — L89.312 PRESSURE ULCER OF RIGHT BUTTOCK, STAGE 2: ICD-10-CM

## 2024-05-14 ENCOUNTER — APPOINTMENT (OUTPATIENT)
Dept: PULMONOLOGY | Facility: CLINIC | Age: 49
End: 2024-05-14

## 2024-05-14 VITALS
OXYGEN SATURATION: 98 % | BODY MASS INDEX: 22.53 KG/M2 | HEART RATE: 61 BPM | DIASTOLIC BLOOD PRESSURE: 81 MMHG | WEIGHT: 170 LBS | TEMPERATURE: 98.7 F | HEIGHT: 73 IN | SYSTOLIC BLOOD PRESSURE: 120 MMHG

## 2025-01-02 NOTE — BH CONSULTATION LIAISON ASSESSMENT NOTE - TIME CONSULT PERFORMED
Call placed. Left VM per med com consent. Advised pt that an abx was sent over to his preferred pharmacy. Advised not to start it until the morning of the cystoscopy and then take as prescribed after. Advised if any other questions or concerns to give our office a call. Call back # provided.    20-Jan-2024 12:58

## 2025-01-02 NOTE — DIETITIAN NUTRITION RISK NOTIFICATION - ETIOLOGY-BASIS
Acute illness or injury
Patient requests all Lab, Cardiology, and Radiology Results on their Discharge Instructions

## 2025-07-11 NOTE — BH CONSULTATION LIAISON ASSESSMENT NOTE - NSBHMSEHYG_PSY_A_CORE
Patient potasium critical levels 2.4   Patient needs to go to the hospital for IV potasium .     Spoke with patient, patient will go to [facility]    Poor

## (undated) DEVICE — GLV 6.5 PROTEXIS (WHITE)

## (undated) DEVICE — SUT BIOSYN 4-0 18" P-12

## (undated) DEVICE — ELCTR BOVIE TIP BLADE INSULATED 2.75" EDGE

## (undated) DEVICE — WARMING BLANKET LOWER ADULT

## (undated) DEVICE — TAPE SILK 3"

## (undated) DEVICE — TRAP SPECIMEN SPUTUM 40CC

## (undated) DEVICE — BLADE SCALPEL SAFETYLOCK #10

## (undated) DEVICE — SOL ANTI FOG

## (undated) DEVICE — TUBING SUCTION 20FT

## (undated) DEVICE — SYR ASEPTO

## (undated) DEVICE — DRAIN 24 FR ROUND HUBLESS FULL FLUTED SILICONE

## (undated) DEVICE — DRSG STERISTRIPS 0.25 X 3"

## (undated) DEVICE — CHEST DRAIN PLEUR-EVAC WET/WET ADULT-PEDS SINGLE (QUICK)

## (undated) DEVICE — VISITEC 4X4

## (undated) DEVICE — SPECIMEN CONTAINER 100ML

## (undated) DEVICE — ELCTR BOVIE TIP BLADE INSULATED 6.5" EDGE

## (undated) DEVICE — GOWN TRIMAX LG

## (undated) DEVICE — ELCTR BOVIE PENCIL HANDPIECE

## (undated) DEVICE — GLV 8.5 PROTEXIS (WHITE)

## (undated) DEVICE — FOLEY TRAY 16FR 5CC LTX UMETER CLOSED

## (undated) DEVICE — STAPLER SKIN VISI-STAT 35 WIDE

## (undated) DEVICE — BUR WIRE PASSER MED 1.5MMX 19MM

## (undated) DEVICE — SUT POLYSORB 0 30" GS-21 UNDYED

## (undated) DEVICE — CHEST DRAIN OASIS DRY SUCTION WATER SEAL

## (undated) DEVICE — Device

## (undated) DEVICE — BLADE SCALPEL SAFETYLOCK #11

## (undated) DEVICE — DRAPE MAYO STAND 30"

## (undated) DEVICE — SOL IRR POUR H2O 250ML

## (undated) DEVICE — DRAPE INSTRUMENT POUCH 6.75" X 11"

## (undated) DEVICE — VENODYNE/SCD SLEEVE CALF LARGE

## (undated) DEVICE — DRAPE MAGNETIC INSTRUMENT MEDIUM

## (undated) DEVICE — PREP CHLORAPREP HI-LITE ORANGE 26ML

## (undated) DEVICE — DRAPE IOBAN 23" X 23"

## (undated) DEVICE — DRAPE LIGHT HANDLE COVER (BLUE)

## (undated) DEVICE — DRSG TEGADERM 6"X8"

## (undated) DEVICE — MEDICATION LABELS W MARKER

## (undated) DEVICE — GLV 8 PROTEXIS (WHITE)

## (undated) DEVICE — SUT POLYSORB 2 30" GS-26

## (undated) DEVICE — BLADE SCALPEL SAFETYLOCK #15

## (undated) DEVICE — DRAPE 3/4 SHEET W REINFORCEMENT 56X77"

## (undated) DEVICE — GLV 7.5 PROTEXIS (WHITE)

## (undated) DEVICE — NDL COUNTER FOAM AND MAGNET 40-70

## (undated) DEVICE — POSITIONER FOAM EGG CRATE ULNAR 2PCS (PINK)

## (undated) DEVICE — SOL IRR POUR NS 0.9% 500ML

## (undated) DEVICE — DRAPE TOWEL BLUE 17" X 24"

## (undated) DEVICE — STAPLER COVIDIEN ENDO GIA STANDARD HANDLE

## (undated) DEVICE — GLV 7 PROTEXIS (WHITE)

## (undated) DEVICE — DISSECTOR ENDO PEANUT 5MM

## (undated) DEVICE — SUT POLYSORB 0 36" GS-25 UNDYED

## (undated) DEVICE — DRSG OPSITE 13.75 X 4"

## (undated) DEVICE — SUT POLYSORB 2-0 30" GS-21 UNDYED

## (undated) DEVICE — DRAPE 1/2 SHEET 40X57"

## (undated) DEVICE — SUCTION YANKAUER NO CONTROL VENT

## (undated) DEVICE — ELCTR AQUAMANTYS BIPOLAR SEALER 6.0

## (undated) DEVICE — SYR LUER LOK 10CC